# Patient Record
Sex: MALE | Race: BLACK OR AFRICAN AMERICAN | NOT HISPANIC OR LATINO | Employment: UNEMPLOYED | URBAN - METROPOLITAN AREA
[De-identification: names, ages, dates, MRNs, and addresses within clinical notes are randomized per-mention and may not be internally consistent; named-entity substitution may affect disease eponyms.]

---

## 2017-02-07 ENCOUNTER — HOSPITAL ENCOUNTER (EMERGENCY)
Facility: MEDICAL CENTER | Age: 67
End: 2017-02-08
Attending: EMERGENCY MEDICINE
Payer: MEDICARE

## 2017-02-07 DIAGNOSIS — R60.9 PERIPHERAL EDEMA: ICD-10-CM

## 2017-02-07 LAB
ANION GAP SERPL CALC-SCNC: 10 MMOL/L (ref 0–11.9)
BASOPHILS # BLD AUTO: 0.3 % (ref 0–1.8)
BASOPHILS # BLD: 0.03 K/UL (ref 0–0.12)
BNP SERPL-MCNC: 81 PG/ML (ref 0–100)
BUN SERPL-MCNC: 20 MG/DL (ref 8–22)
CALCIUM SERPL-MCNC: 9 MG/DL (ref 8.5–10.5)
CHLORIDE SERPL-SCNC: 106 MMOL/L (ref 96–112)
CO2 SERPL-SCNC: 23 MMOL/L (ref 20–33)
CREAT SERPL-MCNC: 1.01 MG/DL (ref 0.5–1.4)
EOSINOPHIL # BLD AUTO: 0.42 K/UL (ref 0–0.51)
EOSINOPHIL NFR BLD: 4.8 % (ref 0–6.9)
ERYTHROCYTE [DISTWIDTH] IN BLOOD BY AUTOMATED COUNT: 48.7 FL (ref 35.9–50)
GFR SERPL CREATININE-BSD FRML MDRD: >60 ML/MIN/1.73 M 2
GLUCOSE SERPL-MCNC: 98 MG/DL (ref 65–99)
HCT VFR BLD AUTO: 45.2 % (ref 42–52)
HGB BLD-MCNC: 14.8 G/DL (ref 14–18)
IMM GRANULOCYTES # BLD AUTO: 0.03 K/UL (ref 0–0.11)
IMM GRANULOCYTES NFR BLD AUTO: 0.3 % (ref 0–0.9)
LYMPHOCYTES # BLD AUTO: 2.17 K/UL (ref 1–4.8)
LYMPHOCYTES NFR BLD: 25 % (ref 22–41)
MCH RBC QN AUTO: 29.7 PG (ref 27–33)
MCHC RBC AUTO-ENTMCNC: 32.7 G/DL (ref 33.7–35.3)
MCV RBC AUTO: 90.6 FL (ref 81.4–97.8)
MONOCYTES # BLD AUTO: 0.49 K/UL (ref 0–0.85)
MONOCYTES NFR BLD AUTO: 5.7 % (ref 0–13.4)
NEUTROPHILS # BLD AUTO: 5.53 K/UL (ref 1.82–7.42)
NEUTROPHILS NFR BLD: 63.9 % (ref 44–72)
NRBC # BLD AUTO: 0 K/UL
NRBC BLD AUTO-RTO: 0 /100 WBC
PLATELET # BLD AUTO: 158 K/UL (ref 164–446)
PMV BLD AUTO: 11 FL (ref 9–12.9)
POTASSIUM SERPL-SCNC: 4.1 MMOL/L (ref 3.6–5.5)
RBC # BLD AUTO: 4.99 M/UL (ref 4.7–6.1)
SODIUM SERPL-SCNC: 139 MMOL/L (ref 135–145)
TROPONIN I SERPL-MCNC: 0.02 NG/ML (ref 0–0.04)
WBC # BLD AUTO: 8.7 K/UL (ref 4.8–10.8)

## 2017-02-07 PROCEDURE — 700111 HCHG RX REV CODE 636 W/ 250 OVERRIDE (IP): Performed by: EMERGENCY MEDICINE

## 2017-02-07 PROCEDURE — 96374 THER/PROPH/DIAG INJ IV PUSH: CPT

## 2017-02-07 PROCEDURE — 84484 ASSAY OF TROPONIN QUANT: CPT

## 2017-02-07 PROCEDURE — 80048 BASIC METABOLIC PNL TOTAL CA: CPT

## 2017-02-07 PROCEDURE — 93005 ELECTROCARDIOGRAM TRACING: CPT | Performed by: EMERGENCY MEDICINE

## 2017-02-07 PROCEDURE — 99285 EMERGENCY DEPT VISIT HI MDM: CPT

## 2017-02-07 PROCEDURE — 85025 COMPLETE CBC W/AUTO DIFF WBC: CPT

## 2017-02-07 PROCEDURE — 36415 COLL VENOUS BLD VENIPUNCTURE: CPT

## 2017-02-07 PROCEDURE — 83880 ASSAY OF NATRIURETIC PEPTIDE: CPT

## 2017-02-07 RX ORDER — FUROSEMIDE 10 MG/ML
40 INJECTION INTRAMUSCULAR; INTRAVENOUS ONCE
Status: COMPLETED | OUTPATIENT
Start: 2017-02-07 | End: 2017-02-07

## 2017-02-07 RX ADMIN — FUROSEMIDE 40 MG: 10 INJECTION, SOLUTION INTRAVENOUS at 23:06

## 2017-02-07 ASSESSMENT — PAIN SCALES - GENERAL: PAINLEVEL_OUTOF10: 6

## 2017-02-07 NOTE — ED AVS SNAPSHOT
[employer] Access Code: [email or web address]  Expires: 3/10/2017 12:58 AM    Your email address is not on file at [facility].  Email Addresses are required for you to sign up for [employer], please contact [phone] to verify your personal information and to provide your email address prior to attempting to register for [employer].    Robert Mcdonnell  [address] [place] [address]    [employer]  A secure, online tool to manage your health information     [employer]’s [employer]® is a secure, online tool that connects you to your personalized health information from the privacy of your home -- day or night - making it very easy for you to manage your healthcare. Once the activation process is completed, you can even access your medical information using the [employer] patricia, which is available for free in the Apple Patricia store or Google Play store.     To learn more about [employer], visit [email or web address]    There are two levels of access available (as shown below):   My Chart Features  [facility] Primary Care Doctor [facility]  Specialists [facility]  Urgent  Care Non-[facility] Primary Care Doctor   Email your healthcare team securely and privately 24/7 X X X    Manage appointments: schedule your next appointment; view details of past/upcoming appointments X      Request prescription refills. X      View recent personal medical records, including lab and immunizations X X X X   View health record, including health history, allergies, medications X X X X   Read reports about your outpatient visits, procedures, consult and ER notes X X X X   See your discharge summary, which is a recap of your hospital and/or ER visit that includes your diagnosis, lab results, and care plan X X  X     How to register for BUILDt:  Once your e-mail address has been verified, follow the following steps to sign up for [employer].     1. Go to  [email or web address]  2. Click on the Sign Up Now box, which takes you to the New Member Sign Up page. You will need to  provide the following information:  a. Enter your [employer] Access Code exactly as it appears at the top of this page. (You will not need to use this code after you’ve completed the sign-up process. If you do not sign up before the expiration date, you must request a new code.)   b. Enter your date of birth.   c. Enter your home email address.   d. Click Submit, and follow the next screen’s instructions.  3. Create a Citymaps ID. This will be your [employer] login ID and cannot be changed, so think of one that is secure and easy to remember.  4. Create a [employer] password. You can change your password at any time.  5. Enter your Password Reset Question and Answer. This can be used at a later time if you forget your password.   6. Enter your e-mail address. This allows you to receive e-mail notifications when new information is available in Citymaps.  7. Click Sign Up. You can now view your health information.    For assistance activating your [employer] account, call [phone]

## 2017-02-07 NOTE — ED AVS SNAPSHOT
Home Care Instructions                                                                                                                Robert Mcdonnell   MRN: [id number]    Department:  [facility], Emergency Dept   Date of Visit:  2/7/2017            [facility], Emergency Dept    [address] [place] [id number]    Phone:  [phone]      You were seen by     Raul Benton M.D.      Your Diagnosis Was     Peripheral edema     R60.9       These are the medications you received during your hospitalization from 02/07/2017 1618 to 02/08/2017 0058     Date/Time Order Dose Route Action    02/07/2017 2306 furosemide (LASIX) injection 40 mg 40 mg Intravenous Given      Follow-up Information     1. Schedule an appointment as soon as possible for a visit with Ming Stinson M.D..    Specialty:  Geriatrics    Contact information    [address]  [address]  [facility] [address] [phone]          2. Follow up with [facility], Emergency Dept.    Specialty:  Emergency Medicine    Why:  If symptoms worsen    Contact information    [address] [address] [phone]      Medication Information     Review all of your home medications and newly ordered medications with your primary doctor and/or pharmacist as soon as possible. Follow medication instructions as directed by your doctor and/or pharmacist.     Please keep your complete medication list with you and share with your physician. Update the information when medications are discontinued, doses are changed, or new medications (including over-the-counter products) are added; and carry medication information at all times in the event of emergency situations.               Medication List      START taking these medications        Instructions    furosemide 20 MG Tabs   Commonly known as:  LASIX    Take 1 Tab by mouth 2 times a day.   Dose:  20 mg         ASK your doctor about these  medications        Instructions    amlodipine 10 MG Tabs   Commonly known as:  NORVASC    Take 1 Tab by mouth every day.   Dose:  10 mg       ascorbic acid 500 MG tablet   Commonly known as:  VITAMIN C    Take 1 Tab by mouth every day.   Dose:  500 mg       ferrous gluconate 324 (38 FE) MG Tabs   Commonly known as:  FERGON    Take 1 Tab by mouth 2 times a day, with meals.   Dose:  324 mg       metoprolol SR 50 MG Tb24   Commonly known as:  TOPROL XL    Take 1 Tab by mouth every day.   Dose:  50 mg       zinc sulfate 220 MG Caps   Commonly known as:  ZINCATE    Take 1 Cap by mouth every day.   Dose:  220 mg               Procedures and tests performed during your visit     BASIC METABOLIC PANEL    BTYPE NATRIURETIC PEPTIDE    CBC WITH DIFFERENTIAL    Cardiac Monitoring    EKG (NOW)    ESTIMATED GFR    IV Saline Lock    Oxygen    TROPONIN        Discharge Instructions       Peripheral Edema  You have swelling in your legs (peripheral edema). This swelling is due to excess accumulation of salt and water in your body. Edema may be a sign of heart, kidney or liver disease, or a side effect of a medication. It may also be due to problems in the leg veins. Elevating your legs and using special support stockings may be very helpful, if the cause of the swelling is due to poor venous circulation. Avoid long periods of standing, whatever the cause.  Treatment of edema depends on identifying the cause. Chips, pretzels, pickles and other salty foods should be avoided. Restricting salt in your diet is almost always needed. Water pills (diuretics) are often used to remove the excess salt and water from your body via urine. These medicines prevent the kidney from reabsorbing sodium. This increases urine flow.  Diuretic treatment may also result in lowering of potassium levels in your body. Potassium supplements may be needed if you have to use diuretics daily. Daily weights can help you keep track of your progress in clearing your  edema. You should call your caregiver for follow up care as recommended.  SEEK IMMEDIATE MEDICAL CARE IF:   · You have increased swelling, pain, redness, or heat in your legs.  · You develop shortness of breath, especially when lying down.  · You develop chest or abdominal pain, weakness, or fainting.  · You have a fever.     This information is not intended to replace advice given to you by your health care provider. Make sure you discuss any questions you have with your health care provider.     Document Released: 01/25/2006 Document Revised: 03/11/2013 Document Reviewed: 01/05/2011  [employer] Interactive Patient Education ©2016 [employer]            Patient Information     Patient Information    Following emergency treatment: all patient requiring follow-up care must return either to a private physician or a clinic if your condition worsens before you are able to obtain further medical attention, please return to the emergency room.     Billing Information    At [facility], we work to make the billing process streamlined for our patients.  Our Representatives are here to answer any questions you may have regarding your hospital bill.  If you have insurance coverage and have supplied your insurance information to us, we will submit a claim to your insurer on your behalf.  Should you have any questions regarding your bill, we can be reached online or by phone as follows:  Online: You are able pay your bills online or live chat with our representatives about any billing questions you may have. We are here to help Monday - Friday from 8:00am to 7:30pm and 9:00am - 12:00pm on Saturdays.  Please visit [email or web address]  for more information.   Phone:  [phone] or [phone]    Please note that your emergency physician, surgeon, pathologist, radiologist, anesthesiologist, and other specialists are not employed by [facility] and will therefore bill separately for their  services.  Please contact them directly for any questions concerning their bills at the numbers below:     Emergency Physician Services:  [phone]  [facility]:  [phone]  [facility]:  [phone]  [facility]:  [phone]    1. Your final bill may vary from the amount quoted upon discharge if all procedures are not complete at that time, or if your doctor has additional procedures of which we are not aware. You will receive an additional bill if you return to the Emergency Department at [facility] for suture removal regardless of the facility of which the sutures were placed.     2. Please arrange for settlement of this account at the emergency registration.    3. All self-pay accounts are due in full at the time of treatment.  If you are unable to meet this obligation then payment is expected within 4-5 days.     4. If you have had radiology studies (CT, X-ray, Ultrasound, MRI), you have received a preliminary result during your emergency department visit. Please contact the radiology department [phone] to receive a copy of your final result. Please discuss the Final result with your primary physician or with the follow up physician provided.     Crisis Hotline:  [place] Crisis Hotline:  0-765-MZUYCPJ or 1-112.305.2612  [place] Crisis Hotline:    1-977.704.9377 or 627-595-3367         ED Discharge Follow Up Questions    1. In order to provide you with very good care, we would like to follow up with a phone call in the next few days.  May we have your permission to contact you?     YES /  NO    2. What is the best phone number to call you? (       )_____-__________    3. What is the best time to call you?      Morning  /  Afternoon  /  Evening                   Patient Signature:  ____________________________________________________________    Date:  ____________________________________________________________

## 2017-02-07 NOTE — ED AVS SNAPSHOT
2/8/2017          Robert Mcdonnell  [address] [place] [address]    Dear Robert:    [facility] wants to ensure your discharge home is safe and you or your loved ones have had all your questions answered regarding your care after you leave the hospital.    You may receive a telephone call within two days of your discharge.  This call is to make certain you understand your discharge instructions as well as ensure we provided you with the best care possible during your stay with us.     The call will only last approximately 3-5 minutes and will be done by a nurse.    Once again, we want to ensure your discharge home is safe and that you have a clear understanding of any next steps in your care.  If you have any questions or concerns, please do not hesitate to contact us, we are here for you.  Thank you for choosing [facility] for your healthcare needs.    Sincerely,    Terrell Hayward    [facility]

## 2017-02-08 VITALS
WEIGHT: 230 LBS | OXYGEN SATURATION: 90 % | BODY MASS INDEX: 30.48 KG/M2 | SYSTOLIC BLOOD PRESSURE: 147 MMHG | HEART RATE: 89 BPM | HEIGHT: 73 IN | DIASTOLIC BLOOD PRESSURE: 102 MMHG | TEMPERATURE: 97.8 F

## 2017-02-08 LAB — EKG IMPRESSION: NORMAL

## 2017-02-08 RX ORDER — FUROSEMIDE 20 MG/1
20 TABLET ORAL 2 TIMES DAILY
Qty: 60 TAB | Refills: 0 | Status: SHIPPED | OUTPATIENT
Start: 2017-02-08 | End: 2020-04-26

## 2017-02-08 NOTE — ED NOTES
Retrieved pt from alexis, pt ambulatory to room B14 with steady gait, prompted pt to change into gown, given warm blankets, pt placed on monitor, assessment as charted.

## 2017-02-08 NOTE — DISCHARGE INSTRUCTIONS
Peripheral Edema  You have swelling in your legs (peripheral edema). This swelling is due to excess accumulation of salt and water in your body. Edema may be a sign of heart, kidney or liver disease, or a side effect of a medication. It may also be due to problems in the leg veins. Elevating your legs and using special support stockings may be very helpful, if the cause of the swelling is due to poor venous circulation. Avoid long periods of standing, whatever the cause.  Treatment of edema depends on identifying the cause. Chips, pretzels, pickles and other salty foods should be avoided. Restricting salt in your diet is almost always needed. Water pills (diuretics) are often used to remove the excess salt and water from your body via urine. These medicines prevent the kidney from reabsorbing sodium. This increases urine flow.  Diuretic treatment may also result in lowering of potassium levels in your body. Potassium supplements may be needed if you have to use diuretics daily. Daily weights can help you keep track of your progress in clearing your edema. You should call your caregiver for follow up care as recommended.  SEEK IMMEDIATE MEDICAL CARE IF:   · You have increased swelling, pain, redness, or heat in your legs.  · You develop shortness of breath, especially when lying down.  · You develop chest or abdominal pain, weakness, or fainting.  · You have a fever.     This information is not intended to replace advice given to you by your health care provider. Make sure you discuss any questions you have with your health care provider.     Document Released: 01/25/2006 Document Revised: 03/11/2013 Document Reviewed: 01/05/2011  [employer] Interactive Patient Education ©2016 [employer]

## 2017-02-08 NOTE — ED NOTES
"Pt reports s/s x 10-11 days, came in today \"because the pain got worse.\" Pt denies any cardiac history.   "

## 2017-02-08 NOTE — ED NOTES
Ambulatory to triage with   Chief Complaint   Patient presents with   • Peripheral Edema     Bilateral LE edema 3+, pitting.    Denies CP, SOB, or N/V. States above symptoms worsening x 11 days

## 2017-02-08 NOTE — ED NOTES
Pt given discharge instructions, follow up care, and prescriptions. Pt verbalized understanding. RN to answer and questions pt and family had. Pt instructed not to drive or operate heavy machinery after receiving narcotics. VSS. Pt ambulated out to front lobby.

## 2017-02-08 NOTE — ED PROVIDER NOTES
ED Provider Note    Scribed for Raul Benton M.D. by Francisca iRvera. 2/7/2017, 10:15 PM.    Primary care provider: Ming Stinson M.D.  Means of arrival: walk-in  History obtained from: patient  History limited by: none    CHIEF COMPLAINT  Chief Complaint   Patient presents with   • Peripheral Edema     Bilateral LE edema 3+, pitting.        HPI  Robert Mcdonnell is a 66 y.o. 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 Patient has experienced this swelling in the past and this current episode is continuously worsening. He is not currently on a water pill.  No complaints of chest pain, trouble breathing, fever, chills, nausea, or vomiting. Patient reports no cardiac medical history what-so-ever.     REVIEW OF SYSTEMS  Pertinent positives include bilateral LE edema. Pertinent negatives include no chest pain, trouble breathing, fever, chills, nausea, vomiting. Otherwise ten systems reviewed and otherwise negative.     PAST MEDICAL HISTORY   has a past medical history of Hypertension.    SURGICAL HISTORY  patient denies any surgical history    SOCIAL HISTORY  Social History   Substance Use Topics   • Smoking status: Current Every Day Smoker -- 1.00 packs/day     Types: Cigarettes   • Smokeless tobacco: Never Used   • Alcohol Use: 14.4 oz/week     24 Cans of beer per week      Comment: occ      History   Drug Use No       FAMILY HISTORY  Non-Contributory    CURRENT MEDICATIONS  Home Medications     Reviewed by Sherley Fry R.N. (Registered Nurse) on 02/07/17 at 2215  Med List Status: Partial    Medication Last Dose Status    amlodipine (NORVASC) 10 MG Tab 10/1/2015 Active    ascorbic acid (VITAMIN C) 500 MG tablet 10/1/2015 Active    ferrous gluconate (FERGON) 324 (38 FE) MG Tab 10/1/2015 Active    metoprolol SR (TOPROL XL) 50 MG TABLET SR 24 HR 10/1/2015 Active    zinc sulfate (ZINCATE) 220 MG Cap  "10/1/2015 Active                ALLERGIES  Allergies   Allergen Reactions   • Aspirin Vomiting     Pt states vomiting.       PHYSICAL EXAM  VITAL SIGNS: /102 mmHg  Pulse 70  Temp(Src) 36.6 °C (97.8 °F) (Temporal)  Resp 14  Ht 1.854 m (6' 1\")  Wt 104.327 kg (230 lb)  BMI 30.35 kg/m2  SpO2 95%    Pulse ox interpretation: I interpret this pulse ox as normal.  Constitutional: Alert and oriented x 3, no Distress  HEENT: Atraumatic normocephalic, pupils are equal round reactive to light extraocular movements are intact. The nares is clear, external ears are normal, mouth shows moist mucous membranes  Neck: Supple, no JVD no tracheal deviation  Cardiovascular: Regular rate and rhythm no murmur rub or gallop 2+ pulses peripherally x4  Thorax & Lungs: No respiratory distress, no wheezes rales or rhonchi, No chest tenderness.   GI: Soft non tender non distended positive bowel sounds, no peritoneal signs  Skin: Warm dry no acute rash or lesion  Musculoskeletal: Moving all extremities with full range and 5 of 5 strength, 2+ bilateral pitting edema to knees  Neurologic: Cranial nerves III through XII are grossly intact, no sensory deficit, no cerebellar dysfunction   Psychiatric: Appropriate affect for situation at this time      DIAGNOSTIC STUDIES / PROCEDURES  LABS  Results for orders placed or performed during the hospital encounter of 02/07/17   CBC WITH DIFFERENTIAL   Result Value Ref Range    WBC 8.7 4.8 - 10.8 K/uL    RBC 4.99 4.70 - 6.10 M/uL    Hemoglobin 14.8 14.0 - 18.0 g/dL    Hematocrit 45.2 42.0 - 52.0 %    MCV 90.6 81.4 - 97.8 fL    MCH 29.7 27.0 - 33.0 pg    MCHC 32.7 (L) 33.7 - 35.3 g/dL    RDW 48.7 35.9 - 50.0 fL    Platelet Count 158 (L) 164 - 446 K/uL    MPV 11.0 9.0 - 12.9 fL    Neutrophils-Polys 63.90 44.00 - 72.00 %    Lymphocytes 25.00 22.00 - 41.00 %    Monocytes 5.70 0.00 - 13.40 %    Eosinophils 4.80 0.00 - 6.90 %    Basophils 0.30 0.00 - 1.80 %    Immature Granulocytes 0.30 0.00 - 0.90 % "    Nucleated RBC 0.00 /100 WBC    Neutrophils (Absolute) 5.53 1.82 - 7.42 K/uL    Lymphs (Absolute) 2.17 1.00 - 4.80 K/uL    Monos (Absolute) 0.49 0.00 - 0.85 K/uL    Eos (Absolute) 0.42 0.00 - 0.51 K/uL    Baso (Absolute) 0.03 0.00 - 0.12 K/uL    Immature Granulocytes (abs) 0.03 0.00 - 0.11 K/uL    NRBC (Absolute) 0.00 K/uL   BASIC METABOLIC PANEL   Result Value Ref Range    Sodium 139 135 - 145 mmol/L    Potassium 4.1 3.6 - 5.5 mmol/L    Chloride 106 96 - 112 mmol/L    Co2 23 20 - 33 mmol/L    Glucose 98 65 - 99 mg/dL    Bun 20 8 - 22 mg/dL    Creatinine 1.01 0.50 - 1.40 mg/dL    Calcium 9.0 8.5 - 10.5 mg/dL    Anion Gap 10.0 0.0 - 11.9   TROPONIN   Result Value Ref Range    Troponin I 0.02 0.00 - 0.04 ng/mL   BTYPE NATRIURETIC PEPTIDE   Result Value Ref Range    B Natriuretic Peptide 81 0 - 100 pg/mL   ESTIMATED GFR   Result Value Ref Range    GFR If African American >60 >60 mL/min/1.73 m 2    GFR If Non African American >60 >60 mL/min/1.73 m 2   EKG (NOW)   Result Value Ref Range    Report       [facility] Emergency Dept.    Test Date:  2017  Pt Name:    HUNTER STEELE                 Department: ER  MRN:        [id number]                      Room:        14  Gender:     M                            Technician: [id number]  :        1950                   Requested By:ORIANA VERDUZCO  Order #:    [id number]                    Reading MD:    Measurements  Intervals                                Axis  Rate:       66                           P:          76  NM:         140                          QRS:        -1  QRSD:       102                          T:          84  QT:         448  QTc:        470    Interpretive Statements  SINUS RHYTHM  LEFT VENTRICULAR HYPERTROPHY  ANTERIOR ST ELEVATION, PROBABLY DUE TO LVH  No previous ECG available for comparison         All labs reviewed by me.    EKG Interpretation  Interpreted by me    Rhythm:  Normal sinus rhythm   Rate: 66  Left  ventricular hypertrophy  Axis: normal  Ectopy: none  Conduction: normal  ST Segments: no acute change  T Waves: slight peaking in pre cordial leads  Q Waves: none  Clinical Impression: LVH      COURSE & MEDICAL DECISION MAKING  Pertinent Labs & Imaging studies reviewed. (See chart for details)    10:15 PM - Patient seen and examined at bedside. Patient will be treated with 40mg IV Lasix. Ordered CBC, BMP, troponin, BTYPE natriuretic peptide and EKG to evaluate his symptoms. I informed the patient that I would run general labs to rule out any acute origin for his edema as well as administer Lasix to help him expel the extra fluid.    Medical Decision Making: Very pleasant 66-year-old male presents for worsening pitting edema denies any history of CHF. Patient is LVH on EKG , troponin negative BNP negative having large fluid diuresis after Lasix feels much better. Patient be started on daily Lasix return for worsening swelling chest pain shortness breath any other acute concerns otherwise follow-up with primary care. Discharged on stable condition.     The patient will return for new or worsening symptoms and is stable at the time of discharge.       DISPOSITION:  Patient will be discharged home in stable condition.    FOLLOW UP:  Ming Stinson M.D.  [address]  [address]  [facility] [id number] [phone]    Schedule an appointment as soon as possible for a visit      [facility], Emergency Dept  [address] [id number] [phone]    If symptoms worsen      OUTPATIENT MEDICATIONS:  Discharge Medication List as of 2/8/2017 12:58 AM      START taking these medications    Details   furosemide (LASIX) 20 MG Tab Take 1 Tab by mouth 2 times a day., Disp-60 Tab, R-0, Print Rx Paper                 FINAL IMPRESSION  1. Peripheral edema           This dictation has been created using voice recognition software and/or scribes. The accuracy of the dictation is limited by the  abilities of the software and the expertise of the scribes. I expect there may be some errors of grammar and possibly content. I made every attempt to manually correct the errors within my dictation. However, errors related to voice recognition software and/or scribes may still exist and should be interpreted within the appropriate context.     I, Francisca Rivera (Scribe), am scribing for, and in the presence of, Raul Benton M.D..    Electronically signed by: Francisca Rivera (Scribe), 2/7/2017    I, Raul Benton M.D. personally performed the services described in this documentation, as scribed by Francisca Rivera in my presence, and it is both accurate and complete.    The note accurately reflects work and decisions made by me.  Raul Benton  2/8/2017  7:50 AM

## 2020-04-26 ENCOUNTER — HOSPITAL ENCOUNTER (INPATIENT)
Facility: MEDICAL CENTER | Age: 70
LOS: 7 days | DRG: 291 | End: 2020-05-03
Attending: EMERGENCY MEDICINE | Admitting: HOSPITALIST
Payer: MEDICARE

## 2020-04-26 ENCOUNTER — APPOINTMENT (OUTPATIENT)
Dept: CARDIOLOGY | Facility: MEDICAL CENTER | Age: 70
DRG: 291 | End: 2020-04-26
Attending: HOSPITALIST
Payer: MEDICARE

## 2020-04-26 ENCOUNTER — APPOINTMENT (OUTPATIENT)
Dept: RADIOLOGY | Facility: MEDICAL CENTER | Age: 70
DRG: 291 | End: 2020-04-26
Attending: EMERGENCY MEDICINE
Payer: MEDICARE

## 2020-04-26 DIAGNOSIS — I50.21 ACUTE SYSTOLIC HEART FAILURE (HCC): ICD-10-CM

## 2020-04-26 DIAGNOSIS — I48.91 ATRIAL FIBRILLATION WITH RAPID VENTRICULAR RESPONSE (HCC): ICD-10-CM

## 2020-04-26 DIAGNOSIS — R06.02 SHORTNESS OF BREATH: ICD-10-CM

## 2020-04-26 DIAGNOSIS — I48.0 PAROXYSMAL ATRIAL FIBRILLATION (HCC): ICD-10-CM

## 2020-04-26 DIAGNOSIS — R60.9 PERIPHERAL EDEMA: ICD-10-CM

## 2020-04-26 PROBLEM — R91.8 RIGHT UPPER LOBE PULMONARY INFILTRATE: Status: ACTIVE | Noted: 2020-04-26

## 2020-04-26 PROBLEM — R79.89 ELEVATED TROPONIN: Status: ACTIVE | Noted: 2020-04-26

## 2020-04-26 PROBLEM — R60.0 LOWER EXTREMITY EDEMA: Status: ACTIVE | Noted: 2020-04-26

## 2020-04-26 PROBLEM — R06.00 DYSPNEA: Status: ACTIVE | Noted: 2020-04-26

## 2020-04-26 LAB
ALBUMIN SERPL BCP-MCNC: 4 G/DL (ref 3.2–4.9)
ALBUMIN/GLOB SERPL: 1.4 G/DL
ALP SERPL-CCNC: 133 U/L (ref 30–99)
ALT SERPL-CCNC: 23 U/L (ref 2–50)
ANION GAP SERPL CALC-SCNC: 15 MMOL/L (ref 7–16)
APPEARANCE UR: CLEAR
AST SERPL-CCNC: 30 U/L (ref 12–45)
BACTERIA #/AREA URNS HPF: NEGATIVE /HPF
BASOPHILS # BLD AUTO: 0.6 % (ref 0–1.8)
BASOPHILS # BLD: 0.04 K/UL (ref 0–0.12)
BILIRUB SERPL-MCNC: 2 MG/DL (ref 0.1–1.5)
BILIRUB UR QL STRIP.AUTO: NEGATIVE
BUN SERPL-MCNC: 17 MG/DL (ref 8–22)
CALCIUM SERPL-MCNC: 9.1 MG/DL (ref 8.5–10.5)
CHLORIDE SERPL-SCNC: 108 MMOL/L (ref 96–112)
CO2 SERPL-SCNC: 21 MMOL/L (ref 20–33)
COLOR UR: YELLOW
COVID ORDER STATUS COVID19: NORMAL
CREAT SERPL-MCNC: 0.98 MG/DL (ref 0.5–1.4)
D DIMER PPP IA.FEU-MCNC: 4.84 UG/ML (FEU) (ref 0–0.5)
EKG IMPRESSION: NORMAL
EOSINOPHIL # BLD AUTO: 0.1 K/UL (ref 0–0.51)
EOSINOPHIL NFR BLD: 1.4 % (ref 0–6.9)
EPI CELLS #/AREA URNS HPF: NEGATIVE /HPF
ERYTHROCYTE [DISTWIDTH] IN BLOOD BY AUTOMATED COUNT: 53.9 FL (ref 35.9–50)
GLOBULIN SER CALC-MCNC: 2.8 G/DL (ref 1.9–3.5)
GLUCOSE SERPL-MCNC: 97 MG/DL (ref 65–99)
GLUCOSE UR STRIP.AUTO-MCNC: NEGATIVE MG/DL
HCT VFR BLD AUTO: 39.7 % (ref 42–52)
HGB BLD-MCNC: 12.9 G/DL (ref 14–18)
HYALINE CASTS #/AREA URNS LPF: ABNORMAL /LPF
IMM GRANULOCYTES # BLD AUTO: 0.02 K/UL (ref 0–0.11)
IMM GRANULOCYTES NFR BLD AUTO: 0.3 % (ref 0–0.9)
KETONES UR STRIP.AUTO-MCNC: NEGATIVE MG/DL
LACTATE BLD-SCNC: 1.5 MMOL/L (ref 0.5–2)
LACTATE BLD-SCNC: 1.6 MMOL/L (ref 0.5–2)
LEUKOCYTE ESTERASE UR QL STRIP.AUTO: NEGATIVE
LV EJECT FRACT  99904: 30
LV EJECT FRACT MOD 2C 99903: 49.14
LV EJECT FRACT MOD 4C 99902: 45.93
LV EJECT FRACT MOD BP 99901: 47.03
LYMPHOCYTES # BLD AUTO: 0.93 K/UL (ref 1–4.8)
LYMPHOCYTES NFR BLD: 12.8 % (ref 22–41)
MCH RBC QN AUTO: 29.1 PG (ref 27–33)
MCHC RBC AUTO-ENTMCNC: 32.5 G/DL (ref 33.7–35.3)
MCV RBC AUTO: 89.6 FL (ref 81.4–97.8)
MICRO URNS: ABNORMAL
MONOCYTES # BLD AUTO: 0.84 K/UL (ref 0–0.85)
MONOCYTES NFR BLD AUTO: 11.6 % (ref 0–13.4)
NEUTROPHILS # BLD AUTO: 5.33 K/UL (ref 1.82–7.42)
NEUTROPHILS NFR BLD: 73.3 % (ref 44–72)
NITRITE UR QL STRIP.AUTO: NEGATIVE
NRBC # BLD AUTO: 0 K/UL
NRBC BLD-RTO: 0 /100 WBC
NT-PROBNP SERPL IA-MCNC: 634 PG/ML (ref 0–125)
PH UR STRIP.AUTO: 5.5 [PH] (ref 5–8)
PLATELET # BLD AUTO: 149 K/UL (ref 164–446)
PMV BLD AUTO: 10.5 FL (ref 9–12.9)
POTASSIUM SERPL-SCNC: 3.9 MMOL/L (ref 3.6–5.5)
PROT SERPL-MCNC: 6.8 G/DL (ref 6–8.2)
PROT UR QL STRIP: NEGATIVE MG/DL
RBC # BLD AUTO: 4.43 M/UL (ref 4.7–6.1)
RBC # URNS HPF: ABNORMAL /HPF
RBC UR QL AUTO: ABNORMAL
SARS-COV-2 RNA RESP QL NAA+PROBE: NEGATIVE
SODIUM SERPL-SCNC: 144 MMOL/L (ref 135–145)
SP GR UR STRIP.AUTO: 1.01
SPECIMEN SOURCE: NORMAL
T4 FREE SERPL-MCNC: 1.56 NG/DL (ref 0.53–1.43)
TROPONIN T SERPL-MCNC: 30 NG/L (ref 6–19)
TROPONIN T SERPL-MCNC: 31 NG/L (ref 6–19)
TSH SERPL DL<=0.005 MIU/L-ACNC: 2.04 UIU/ML (ref 0.38–5.33)
UROBILINOGEN UR STRIP.AUTO-MCNC: 2 MG/DL
WBC # BLD AUTO: 7.3 K/UL (ref 4.8–10.8)
WBC #/AREA URNS HPF: ABNORMAL /HPF

## 2020-04-26 PROCEDURE — 85025 COMPLETE CBC W/AUTO DIFF WBC: CPT

## 2020-04-26 PROCEDURE — 85379 FIBRIN DEGRADATION QUANT: CPT

## 2020-04-26 PROCEDURE — 71275 CT ANGIOGRAPHY CHEST: CPT

## 2020-04-26 PROCEDURE — A9270 NON-COVERED ITEM OR SERVICE: HCPCS | Performed by: HOSPITALIST

## 2020-04-26 PROCEDURE — 71045 X-RAY EXAM CHEST 1 VIEW: CPT

## 2020-04-26 PROCEDURE — 84443 ASSAY THYROID STIM HORMONE: CPT

## 2020-04-26 PROCEDURE — 93005 ELECTROCARDIOGRAM TRACING: CPT | Performed by: EMERGENCY MEDICINE

## 2020-04-26 PROCEDURE — 770020 HCHG ROOM/CARE - TELE (206)

## 2020-04-26 PROCEDURE — 700117 HCHG RX CONTRAST REV CODE 255: Performed by: EMERGENCY MEDICINE

## 2020-04-26 PROCEDURE — 700111 HCHG RX REV CODE 636 W/ 250 OVERRIDE (IP): Performed by: HOSPITALIST

## 2020-04-26 PROCEDURE — 93306 TTE W/DOPPLER COMPLETE: CPT

## 2020-04-26 PROCEDURE — 99223 1ST HOSP IP/OBS HIGH 75: CPT | Mod: AI | Performed by: HOSPITALIST

## 2020-04-26 PROCEDURE — 700102 HCHG RX REV CODE 250 W/ 637 OVERRIDE(OP): Performed by: HOSPITALIST

## 2020-04-26 PROCEDURE — 87086 URINE CULTURE/COLONY COUNT: CPT

## 2020-04-26 PROCEDURE — 93971 EXTREMITY STUDY: CPT | Mod: RT

## 2020-04-26 PROCEDURE — 99285 EMERGENCY DEPT VISIT HI MDM: CPT

## 2020-04-26 PROCEDURE — 96376 TX/PRO/DX INJ SAME DRUG ADON: CPT

## 2020-04-26 PROCEDURE — 81001 URINALYSIS AUTO W/SCOPE: CPT

## 2020-04-26 PROCEDURE — 83605 ASSAY OF LACTIC ACID: CPT

## 2020-04-26 PROCEDURE — 96375 TX/PRO/DX INJ NEW DRUG ADDON: CPT

## 2020-04-26 PROCEDURE — 93005 ELECTROCARDIOGRAM TRACING: CPT

## 2020-04-26 PROCEDURE — 83880 ASSAY OF NATRIURETIC PEPTIDE: CPT

## 2020-04-26 PROCEDURE — 700111 HCHG RX REV CODE 636 W/ 250 OVERRIDE (IP): Performed by: EMERGENCY MEDICINE

## 2020-04-26 PROCEDURE — U0004 COV-19 TEST NON-CDC HGH THRU: HCPCS

## 2020-04-26 PROCEDURE — 84439 ASSAY OF FREE THYROXINE: CPT

## 2020-04-26 PROCEDURE — 36415 COLL VENOUS BLD VENIPUNCTURE: CPT

## 2020-04-26 PROCEDURE — 93306 TTE W/DOPPLER COMPLETE: CPT | Mod: 26 | Performed by: INTERNAL MEDICINE

## 2020-04-26 PROCEDURE — G2023 SPECIMEN COLLECT COVID-19: HCPCS | Performed by: EMERGENCY MEDICINE

## 2020-04-26 PROCEDURE — 80053 COMPREHEN METABOLIC PANEL: CPT

## 2020-04-26 PROCEDURE — 96374 THER/PROPH/DIAG INJ IV PUSH: CPT

## 2020-04-26 PROCEDURE — 87040 BLOOD CULTURE FOR BACTERIA: CPT | Mod: 91

## 2020-04-26 PROCEDURE — 84484 ASSAY OF TROPONIN QUANT: CPT

## 2020-04-26 RX ORDER — DILTIAZEM HYDROCHLORIDE 5 MG/ML
10 INJECTION INTRAVENOUS EVERY 4 HOURS PRN
Status: DISCONTINUED | OUTPATIENT
Start: 2020-04-26 | End: 2020-05-03 | Stop reason: HOSPADM

## 2020-04-26 RX ORDER — POLYETHYLENE GLYCOL 3350 17 G/17G
1 POWDER, FOR SOLUTION ORAL
Status: DISCONTINUED | OUTPATIENT
Start: 2020-04-26 | End: 2020-05-03 | Stop reason: HOSPADM

## 2020-04-26 RX ORDER — BISACODYL 10 MG
10 SUPPOSITORY, RECTAL RECTAL
Status: DISCONTINUED | OUTPATIENT
Start: 2020-04-26 | End: 2020-05-03 | Stop reason: HOSPADM

## 2020-04-26 RX ORDER — FUROSEMIDE 10 MG/ML
20 INJECTION INTRAMUSCULAR; INTRAVENOUS ONCE
Status: COMPLETED | OUTPATIENT
Start: 2020-04-26 | End: 2020-04-26

## 2020-04-26 RX ORDER — ACETAMINOPHEN 325 MG/1
650 TABLET ORAL EVERY 6 HOURS PRN
Status: DISCONTINUED | OUTPATIENT
Start: 2020-04-26 | End: 2020-05-03 | Stop reason: HOSPADM

## 2020-04-26 RX ORDER — DILTIAZEM HYDROCHLORIDE 5 MG/ML
10 INJECTION INTRAVENOUS ONCE
Status: COMPLETED | OUTPATIENT
Start: 2020-04-26 | End: 2020-04-26

## 2020-04-26 RX ORDER — DOXYCYCLINE 100 MG/1
100 TABLET ORAL EVERY 12 HOURS
Status: DISCONTINUED | OUTPATIENT
Start: 2020-04-26 | End: 2020-04-27

## 2020-04-26 RX ORDER — AMOXICILLIN 250 MG
2 CAPSULE ORAL 2 TIMES DAILY
Status: DISCONTINUED | OUTPATIENT
Start: 2020-04-26 | End: 2020-05-03 | Stop reason: HOSPADM

## 2020-04-26 RX ORDER — FUROSEMIDE 10 MG/ML
20 INJECTION INTRAMUSCULAR; INTRAVENOUS
Status: DISCONTINUED | OUTPATIENT
Start: 2020-04-26 | End: 2020-04-30

## 2020-04-26 RX ADMIN — RIVAROXABAN 20 MG: 20 TABLET, FILM COATED ORAL at 19:48

## 2020-04-26 RX ADMIN — FUROSEMIDE 20 MG: 10 INJECTION, SOLUTION INTRAVENOUS at 15:54

## 2020-04-26 RX ADMIN — DILTIAZEM HYDROCHLORIDE 10 MG: 5 INJECTION, SOLUTION INTRAVENOUS at 17:47

## 2020-04-26 RX ADMIN — IOHEXOL 80 ML: 350 INJECTION, SOLUTION INTRAVENOUS at 16:37

## 2020-04-26 RX ADMIN — ACETAMINOPHEN 650 MG: 325 TABLET, FILM COATED ORAL at 19:47

## 2020-04-26 RX ADMIN — FUROSEMIDE 20 MG: 10 INJECTION, SOLUTION INTRAVENOUS at 17:47

## 2020-04-26 RX ADMIN — SENNOSIDES AND DOCUSATE SODIUM 2 TABLET: 8.6; 5 TABLET ORAL at 17:47

## 2020-04-26 RX ADMIN — METOPROLOL TARTRATE 50 MG: 25 TABLET, FILM COATED ORAL at 18:30

## 2020-04-26 ASSESSMENT — LIFESTYLE VARIABLES
TREMOR: NO TREMOR
CONSUMPTION TOTAL: POSITIVE
EVER_SMOKED: YES
EVER HAD A DRINK FIRST THING IN THE MORNING TO STEADY YOUR NERVES TO GET RID OF A HANGOVER: NO
SUBSTANCE_ABUSE: 0
TREMOR: NO TREMOR
AUDITORY DISTURBANCES: NOT PRESENT
HAVE YOU EVER FELT YOU SHOULD CUT DOWN ON YOUR DRINKING: NO
HEADACHE, FULLNESS IN HEAD: NOT PRESENT
TOTAL SCORE: 0
DOES PATIENT WANT TO STOP DRINKING: NO
PAROXYSMAL SWEATS: NO SWEAT VISIBLE
HAVE PEOPLE ANNOYED YOU BY CRITICIZING YOUR DRINKING: NO
NAUSEA AND VOMITING: NO NAUSEA AND NO VOMITING
ALCOHOL_USE: YES
ORIENTATION AND CLOUDING OF SENSORIUM: ORIENTED AND CAN DO SERIAL ADDITIONS
TOTAL SCORE: 0
HOW MANY TIMES IN THE PAST YEAR HAVE YOU HAD 5 OR MORE DRINKS IN A DAY: 365
TOTAL SCORE: 0
ON A TYPICAL DAY WHEN YOU DRINK ALCOHOL HOW MANY DRINKS DO YOU HAVE: 6
VISUAL DISTURBANCES: NOT PRESENT
EVER FELT BAD OR GUILTY ABOUT YOUR DRINKING: NO
VISUAL DISTURBANCES: NOT PRESENT
ANXIETY: NO ANXIETY (AT EASE)
TOTAL SCORE: 0
NAUSEA AND VOMITING: NO NAUSEA AND NO VOMITING
ORIENTATION AND CLOUDING OF SENSORIUM: ORIENTED AND CAN DO SERIAL ADDITIONS
TOTAL SCORE: 0
ANXIETY: NO ANXIETY (AT EASE)
AUDITORY DISTURBANCES: NOT PRESENT
PAROXYSMAL SWEATS: NO SWEAT VISIBLE
AGITATION: NORMAL ACTIVITY
AGITATION: NORMAL ACTIVITY
HEADACHE, FULLNESS IN HEAD: NOT PRESENT
AVERAGE NUMBER OF DAYS PER WEEK YOU HAVE A DRINK CONTAINING ALCOHOL: 7

## 2020-04-26 ASSESSMENT — ENCOUNTER SYMPTOMS
BACK PAIN: 0
WHEEZING: 0
FEVER: 0
FLANK PAIN: 0
HEMOPTYSIS: 0
COUGH: 1
SPEECH CHANGE: 0
TREMORS: 0
SENSORY CHANGE: 0
NECK PAIN: 0
CHILLS: 0
MYALGIAS: 0
WEAKNESS: 1
ABDOMINAL PAIN: 0
VOMITING: 0
HALLUCINATIONS: 0
SHORTNESS OF BREATH: 1
EYE REDNESS: 0
FOCAL WEAKNESS: 0
DIAPHORESIS: 0
STRIDOR: 0
DIARRHEA: 0
EYE DISCHARGE: 0
PALPITATIONS: 0

## 2020-04-26 ASSESSMENT — CHA2DS2 SCORE
SEX: MALE
HYPERTENSION: YES
AGE 75 OR GREATER: NO
PRIOR STROKE OR TIA OR THROMBOEMBOLISM: NO
VASCULAR DISEASE: NO
CHA2DS2 VASC SCORE: 3
AGE 65 TO 74: YES
CHF OR LEFT VENTRICULAR DYSFUNCTION: YES
DIABETES: NO

## 2020-04-26 ASSESSMENT — FIBROSIS 4 INDEX: FIB4 SCORE: 2.9

## 2020-04-26 ASSESSMENT — PATIENT HEALTH QUESTIONNAIRE - PHQ9
SUM OF ALL RESPONSES TO PHQ9 QUESTIONS 1 AND 2: 0
1. LITTLE INTEREST OR PLEASURE IN DOING THINGS: NOT AT ALL
2. FEELING DOWN, DEPRESSED, IRRITABLE, OR HOPELESS: NOT AT ALL

## 2020-04-26 NOTE — ED PROVIDER NOTES
ED Provider Note    CHIEF COMPLAINT  Chief Complaint   Patient presents with   • Leg Swelling     (B), pitting with drainage noted to RLE increasing over last week   • Shortness of Breath     >1 week       HPI  Robert Mcdonnell is a 69 y.o. male with a history of hypertension, peripheral edema who presents with complaints of increased lower extremity swelling along with shortness of breath of the past week.  The patient has some seeping from a wound over his right leg and says it started draining yesterday.  He has had increased difficulty breathing especially with exertional activity.  He has had no fever, chills, sore throat, cough, congestion.  Denies any stomach problems, has had no nausea vomiting, vomiting, diarrhea, or abdominal pain.  The patient continues smoke tobacco about 1 pack/day, does drink beer on a regular basis.  The patient denies any previous cardiac history.  He says he has no regular physician despite living in [place] for many years.    REVIEW OF SYSTEMS  See HPI for further details. All other systems are negative.     PAST MEDICAL HISTORY  Past Medical History:   Diagnosis Date   • Hypertension        FAMILY HISTORY  Family History   Problem Relation Age of Onset   • Heart Disease Mother    • Heart Disease Father        SOCIAL HISTORY  Social History     Tobacco Use   • Smoking status: Current Every Day Smoker     Packs/day: 1.00     Types: Cigarettes   • Smokeless tobacco: Never Used   Substance Use Topics   • Alcohol use: Yes     Alcohol/week: 14.4 oz     Types: 24 Cans of beer per week     Comment: occ   • Drug use: No      Social History     Substance and Sexual Activity   Drug Use No       SURGICAL HISTORY  History reviewed. No pertinent surgical history.    CURRENT MEDICATIONS  Home Medications     Reviewed by Francisca Shannon R.N. (Registered Nurse) on 04/26/20 at 2007  Med List Status: Complete   Patient Valentin Taking any Medications                 ALLERGIES  Allergies   Allergen Reactions   •  "Aspirin Vomiting     Pt states vomiting.       PHYSICAL EXAM0  VITAL SIGNS: Blood Pressure 155/78   Pulse (Abnormal) 103   Temperature 37.8 °C (100.1 °F) (Temporal)   Respiration 20   Height 1.854 m (6' 1\")   Weight (Abnormal) 127 kg (279 lb 15.8 oz)   Oxygen Saturation 92%   Body Mass Index 36.94 kg/m²   Constitutional: Awake, alert, mildly tachypneic, nontoxic.  HENT: Atraumatic. Bilateral external ears normal, mucous membranes moist, throat nonerythematous without exudates, nose is normal.  Eyes: PERRL, EOMI, conjunctiva moist, noninjected.  Neck: Nontender, Normal range of motion, No nuchal rigidity, No stridor.   Lymphatic: No lymphadenopathy noted.   Cardiovascular: Irregular rate and rhythm, rate in the 120's, no murmurs, rubs, gallops.  Thorax & Lungs: Diminished breath sounds on the left, bibasilar crackles, no wheezes or retractions.  No chest tenderness.  Abdomen: Bowel sounds normal, Soft, nontender, nondistended, no rebound, guarding, masses.  Back: No CVA or spinal tenderness.  Extremities: Intact distal pulses, this 4+ edema to the lower extremities with some seeping of clear fluid from several shallow wounds to the right shin.  Skin: Warm, Dry, No rashes.  There were several shallow open wounds over the right shin with seepage of clear fluid.  Musculoskeletal: No joint swelling or tenderness.  Neurologic: Alert & oriented x 3, cranial nerves II through XII intact, speech is fluent, no facial asymmetry, sensory and motor function normal. No focal deficits.   Psychiatric: Affect normal, Judgment normal, Mood normal.     EKG  EKG Interpretation:  Interpreted by me    Rhythm: Atrial fibrillation with RVR  Rate: 132  Intervals: Normal  Axis: Left axis deviation  Ectopy: none  Conduction: normal  ST Segments: no evidence of elevation or depression  T Waves: Inverted in leads I, aVL  Q Waves: No pathologic Q waves  Poor R wave progression anteriorly  Clinical Impression: No acute injury or ischemic " pattern.   Comparison to previous EKG from February 2017 showed a normal sinus rhythm previously      LABS  Labs Reviewed   CBC WITH DIFFERENTIAL - Abnormal; Notable for the following components:       Result Value    RBC 4.43 (*)     Hemoglobin 12.9 (*)     Hematocrit 39.7 (*)     MCHC 32.5 (*)     RDW 53.9 (*)     Platelet Count 149 (*)     Neutrophils-Polys 73.30 (*)     Lymphocytes 12.80 (*)     Lymphs (Absolute) 0.93 (*)     All other components within normal limits   URINALYSIS - Abnormal; Notable for the following components:    Occult Blood Trace (*)     All other components within normal limits    Narrative:     Indication for culture:->Septic Shock: Persistent  hypotension,  Lactic acid > 4, vasopressors/inotropes started   D-DIMER - Abnormal; Notable for the following components:    D-Dimer Screen 4.84 (*)     All other components within normal limits   TROPONIN - Abnormal; Notable for the following components:    Troponin T 31 (*)     All other components within normal limits   PROBRAIN NATRIURETIC PEPTIDE, NT - Abnormal; Notable for the following components:    NT-proBNP 634 (*)     All other components within normal limits   COMP METABOLIC PANEL - Abnormal; Notable for the following components:    Alkaline Phosphatase 133 (*)     Total Bilirubin 2.0 (*)     All other components within normal limits   TROPONIN - Abnormal; Notable for the following components:    Troponin T 30 (*)     All other components within normal limits   FREE THYROXINE - Abnormal; Notable for the following components:    Free T-4 1.56 (*)     All other components within normal limits   URINE MICROSCOPIC (W/UA) - Abnormal; Notable for the following components:    WBC 0-2 (*)     RBC 5-10 (*)     All other components within normal limits    Narrative:     Indication for culture:->Septic Shock: Persistent  hypotension,  Lactic acid > 4, vasopressors/inotropes started   LACTIC ACID   LACTIC ACID    Narrative:     Repeat if initial  "lactic acid result is greater than 2   URINE CULTURE(NEW)    Narrative:     Indication for culture:->Septic Shock: Persistent  hypotension,  Lactic acid > 4, vasopressors/inotropes started   BLOOD CULTURE    Narrative:     Per Hospital Policy: Only change Specimen Src: to \"Line\" if  specified by physician order.   BLOOD CULTURE    Narrative:     Per Hospital Policy: Only change Specimen Src: to \"Line\" if  specified by physician order.   ESTIMATED GFR   COVID/SARS COV-2    Narrative:     Indication for culture:->Septic Shock: Persistent  hypotension,  Lactic acid > 4, vasopressors/inotropes started   TSH   SARS-COV-2, PCR (IN-HOUSE)    Narrative:     Indication for culture:->Septic Shock: Persistent  hypotension,  Lactic acid > 4, vasopressors/inotropes started   LACTIC ACID   TROPONIN       All labs reviewed by me.      RADIOLOGY/PROCEDURES  CT-CTA CHEST PULMONARY ARTERY W/ RECONS   Final Result      1.  No large pulmonary embolus is identified.      2.  Irregular nodular opacity in the right lung apex may represent an infectious or inflammatory process. Follow-up low dose CT is recommended in approximately 3 months to ensure resolution.      3.  Atherosclerosis and cardiomegaly.      4.  Ascites in the partially visualized upper abdomen            US-EXTREMITY VENOUS LOWER UNILAT RIGHT   Final Result      DX-CHEST-PORTABLE (1 VIEW)   Final Result      Interval cardiac silhouette enlargement without consolidation identified      EC-ECHOCARDIOGRAM COMPLETE W/ CONT    (Results Pending)       The radiologist's interpretations of all radiological studies have been reviewed by me.        COURSE & MEDICAL DECISION MAKING  Pertinent Labs & Imaging studies reviewed. (See chart for details)  The patient presents with the above complaints.  He does have significant peripheral edema and shortness of breath.  His saturation is 92% on room air.  He did have a low-grade fever and was tachycardic.    EKG shows atrial fibrillation " with RVR which appears to be new as his previous EKG shows a sinus rhythm.  Chest x-ray shows interval cardiac silhouette enlargement.  Ultrasound of the right lower extremity was negative for DVT.  CBC showed a white count 7300, hemoglobin 12.9, 73% polys, chemistry shows normal electrolytes, normal renal function, total bilirubin 2.0, lactic acid 1.6, troponin 31, BNP elevated 634.  D-dimer was elevated at 4.84.  CT scan of the chest was negative for pulmonary embolism, but did show an irregular nodular density in the right lung apex.  Given the patient's symptoms and physical findings I suspect the likely has some early heart failure.  He was given Lasix 20 mg IV.  He was given Cardizem 10 mg IV for a his rapid atrial fibrillation.  Findings were discussed with the patient and he is agreeable to admission.  Case discussed with Dr. Carty.    FINAL IMPRESSION  1. Shortness of breath    2. Peripheral edema    3. Paroxysmal atrial fibrillation (HCC)          Electronically signed by: James Reynolds M.D., 4/26/2020 1:57 PM

## 2020-04-26 NOTE — ED TRIAGE NOTES
Robert Mcdonnell  Chief Complaint   Patient presents with   • Leg Swelling     (B), pitting with drainage noted to RLE increasing over last week   • Shortness of Breath     >1 week       Pt to triage with above complaint.  Elevated HR noted, EKG done.  Pt denies fever/ cough or contact with anyone positive for Covid/ Corona. Pt/staff masked and in appropriate PPE during encounter.     Sepsis score of 3 noted,  Protocol initiated, charge RN notified.

## 2020-04-27 PROBLEM — J96.01 ACUTE HYPOXEMIC RESPIRATORY FAILURE (HCC): Status: ACTIVE | Noted: 2020-04-26

## 2020-04-27 PROBLEM — D69.6 THROMBOCYTOPENIA (HCC): Status: ACTIVE | Noted: 2020-04-27

## 2020-04-27 PROBLEM — I50.21 ACUTE SYSTOLIC HEART FAILURE (HCC): Status: ACTIVE | Noted: 2020-04-27

## 2020-04-27 LAB
ANION GAP SERPL CALC-SCNC: 15 MMOL/L (ref 7–16)
BUN SERPL-MCNC: 20 MG/DL (ref 8–22)
CALCIUM SERPL-MCNC: 9.4 MG/DL (ref 8.5–10.5)
CHLORIDE SERPL-SCNC: 105 MMOL/L (ref 96–112)
CHOLEST SERPL-MCNC: 102 MG/DL (ref 100–199)
CHOLEST SERPL-MCNC: 108 MG/DL (ref 100–199)
CO2 SERPL-SCNC: 22 MMOL/L (ref 20–33)
CREAT SERPL-MCNC: 1.06 MG/DL (ref 0.5–1.4)
GLUCOSE SERPL-MCNC: 126 MG/DL (ref 65–99)
HDLC SERPL-MCNC: 32 MG/DL
HDLC SERPL-MCNC: 34 MG/DL
INR PPP: 2.72 (ref 0.87–1.13)
LDLC SERPL CALC-MCNC: 59 MG/DL
LDLC SERPL CALC-MCNC: 62 MG/DL
POTASSIUM SERPL-SCNC: 4.1 MMOL/L (ref 3.6–5.5)
PROCALCITONIN SERPL-MCNC: <0.05 NG/ML
PROTHROMBIN TIME: 29.9 SEC (ref 12–14.6)
SODIUM SERPL-SCNC: 142 MMOL/L (ref 135–145)
TRIGL SERPL-MCNC: 54 MG/DL (ref 0–149)
TRIGL SERPL-MCNC: 58 MG/DL (ref 0–149)
TROPONIN T SERPL-MCNC: 35 NG/L (ref 6–19)

## 2020-04-27 PROCEDURE — A9270 NON-COVERED ITEM OR SERVICE: HCPCS | Performed by: HOSPITALIST

## 2020-04-27 PROCEDURE — 700102 HCHG RX REV CODE 250 W/ 637 OVERRIDE(OP): Performed by: HOSPITALIST

## 2020-04-27 PROCEDURE — 700105 HCHG RX REV CODE 258: Performed by: HOSPITALIST

## 2020-04-27 PROCEDURE — 99223 1ST HOSP IP/OBS HIGH 75: CPT | Performed by: INTERNAL MEDICINE

## 2020-04-27 PROCEDURE — 85610 PROTHROMBIN TIME: CPT

## 2020-04-27 PROCEDURE — 700102 HCHG RX REV CODE 250 W/ 637 OVERRIDE(OP): Performed by: INTERNAL MEDICINE

## 2020-04-27 PROCEDURE — 84145 PROCALCITONIN (PCT): CPT

## 2020-04-27 PROCEDURE — 80061 LIPID PANEL: CPT | Mod: 91

## 2020-04-27 PROCEDURE — 84484 ASSAY OF TROPONIN QUANT: CPT

## 2020-04-27 PROCEDURE — 700111 HCHG RX REV CODE 636 W/ 250 OVERRIDE (IP): Performed by: HOSPITALIST

## 2020-04-27 PROCEDURE — 770020 HCHG ROOM/CARE - TELE (206)

## 2020-04-27 PROCEDURE — 99233 SBSQ HOSP IP/OBS HIGH 50: CPT | Performed by: HOSPITALIST

## 2020-04-27 PROCEDURE — 80048 BASIC METABOLIC PNL TOTAL CA: CPT

## 2020-04-27 PROCEDURE — A9270 NON-COVERED ITEM OR SERVICE: HCPCS | Performed by: INTERNAL MEDICINE

## 2020-04-27 RX ORDER — LISINOPRIL 10 MG/1
10 TABLET ORAL
Status: DISCONTINUED | OUTPATIENT
Start: 2020-04-27 | End: 2020-04-29

## 2020-04-27 RX ADMIN — SENNOSIDES AND DOCUSATE SODIUM 2 TABLET: 8.6; 5 TABLET ORAL at 17:14

## 2020-04-27 RX ADMIN — LISINOPRIL 10 MG: 10 TABLET ORAL at 13:16

## 2020-04-27 RX ADMIN — FUROSEMIDE 20 MG: 10 INJECTION, SOLUTION INTRAVENOUS at 04:50

## 2020-04-27 RX ADMIN — FUROSEMIDE 20 MG: 10 INJECTION, SOLUTION INTRAVENOUS at 15:58

## 2020-04-27 RX ADMIN — METOPROLOL TARTRATE 50 MG: 25 TABLET, FILM COATED ORAL at 04:49

## 2020-04-27 RX ADMIN — ACETAMINOPHEN 650 MG: 325 TABLET, FILM COATED ORAL at 23:41

## 2020-04-27 RX ADMIN — RIVAROXABAN 20 MG: 20 TABLET, FILM COATED ORAL at 17:15

## 2020-04-27 RX ADMIN — CEFTRIAXONE SODIUM 2 G: 2 INJECTION, POWDER, FOR SOLUTION INTRAMUSCULAR; INTRAVENOUS at 04:55

## 2020-04-27 RX ADMIN — DOXYCYCLINE 100 MG: 100 TABLET, FILM COATED ORAL at 04:49

## 2020-04-27 RX ADMIN — METOPROLOL TARTRATE 50 MG: 25 TABLET, FILM COATED ORAL at 17:14

## 2020-04-27 RX ADMIN — ACETAMINOPHEN 650 MG: 325 TABLET, FILM COATED ORAL at 13:16

## 2020-04-27 RX ADMIN — DOXYCYCLINE 100 MG: 100 TABLET, FILM COATED ORAL at 00:03

## 2020-04-27 ASSESSMENT — PATIENT HEALTH QUESTIONNAIRE - PHQ9
SUM OF ALL RESPONSES TO PHQ9 QUESTIONS 1 AND 2: 0
1. LITTLE INTEREST OR PLEASURE IN DOING THINGS: NOT AT ALL
SUM OF ALL RESPONSES TO PHQ9 QUESTIONS 1 AND 2: 0
1. LITTLE INTEREST OR PLEASURE IN DOING THINGS: NOT AT ALL
2. FEELING DOWN, DEPRESSED, IRRITABLE, OR HOPELESS: NOT AT ALL
2. FEELING DOWN, DEPRESSED, IRRITABLE, OR HOPELESS: NOT AT ALL

## 2020-04-27 ASSESSMENT — LIFESTYLE VARIABLES
TOTAL SCORE: 0
EVER HAD A DRINK FIRST THING IN THE MORNING TO STEADY YOUR NERVES TO GET RID OF A HANGOVER: NO
ORIENTATION AND CLOUDING OF SENSORIUM: ORIENTED AND CAN DO SERIAL ADDITIONS
TREMOR: NO TREMOR
TOTAL SCORE: 0
DOES PATIENT WANT TO STOP DRINKING: NO
HEADACHE, FULLNESS IN HEAD: NOT PRESENT
TREMOR: NO TREMOR
CONSUMPTION TOTAL: POSITIVE
HOW MANY TIMES IN THE PAST YEAR HAVE YOU HAD 5 OR MORE DRINKS IN A DAY: 365
AVERAGE NUMBER OF DAYS PER WEEK YOU HAVE A DRINK CONTAINING ALCOHOL: 7
PAROXYSMAL SWEATS: NO SWEAT VISIBLE
TOTAL SCORE: 1
TOTAL SCORE: 0
HAVE YOU EVER FELT YOU SHOULD CUT DOWN ON YOUR DRINKING: NO
AGITATION: NORMAL ACTIVITY
VISUAL DISTURBANCES: NOT PRESENT
ON A TYPICAL DAY WHEN YOU DRINK ALCOHOL HOW MANY DRINKS DO YOU HAVE: 6
HEADACHE, FULLNESS IN HEAD: NOT PRESENT
ORIENTATION AND CLOUDING OF SENSORIUM: ORIENTED AND CAN DO SERIAL ADDITIONS
NAUSEA AND VOMITING: NO NAUSEA AND NO VOMITING
VISUAL DISTURBANCES: NOT PRESENT
HAVE PEOPLE ANNOYED YOU BY CRITICIZING YOUR DRINKING: NO
TOTAL SCORE: 0
ANXIETY: MILDLY ANXIOUS
AGITATION: NORMAL ACTIVITY
EVER FELT BAD OR GUILTY ABOUT YOUR DRINKING: NO
PAROXYSMAL SWEATS: NO SWEAT VISIBLE
NAUSEA AND VOMITING: NO NAUSEA AND NO VOMITING
DO YOU DRINK ALCOHOL: YES
AUDITORY DISTURBANCES: NOT PRESENT
ANXIETY: NO ANXIETY (AT EASE)
AUDITORY DISTURBANCES: NOT PRESENT

## 2020-04-27 ASSESSMENT — ENCOUNTER SYMPTOMS
DIZZINESS: 0
PSYCHIATRIC NEGATIVE: 1
FALLS: 0
ABDOMINAL PAIN: 0
COUGH: 1
NAUSEA: 0
FEVER: 0
CHILLS: 0
SHORTNESS OF BREATH: 1
SPUTUM PRODUCTION: 0
PALPITATIONS: 1
VOMITING: 0
LOSS OF CONSCIOUSNESS: 0
HEADACHES: 0

## 2020-04-27 NOTE — CARE PLAN
Problem: Venous Thromboembolism (VTW)/Deep Vein Thrombosis (DVT) Prevention:  Goal: Patient will participate in Venous Thrombosis (VTE)/Deep Vein Thrombosis (DVT)Prevention Measures  Outcome: PROGRESSING AS EXPECTED  Flowsheets (Taken 4/27/2020 0035)  Pharmacologic Prophylaxis Used: Rivaroxaban (Xarelto) - (ONLY for Total Knee and Total Hip Surgery)  Note: Patient being anticoagulated with xarelto for new afib.  Rate controlled with lopressor.       Problem: Fluid Volume:  Goal: Will maintain balanced intake and output  Outcome: PROGRESSING SLOWER THAN EXPECTED  Note: Strict intake and output maintained - patient educated on fluid restrictions.  Patient being diuresed with lasix.  Patient with adequate urine output.       Problem: Respiratory:  Goal: Respiratory status will improve  Outcome: PROGRESSING SLOWER THAN EXPECTED  Note: Patient started on room air but seemed to be uncomfortable with an increased work of breathing, oxygen saturation was in the low 90's. - 2L supplemental oxygen via nasal cannula was placed on patient with therapeutic effect achieved.  Oxygen saturation >95%. Patient with wheezing heard - likely related to fluid overload.  Patient actively being diuresed.

## 2020-04-27 NOTE — ASSESSMENT & PLAN NOTE
New diagnosis unknown duration. Likely cause for patient's exertional dyspnea. Rate with better control now.   Continue rate control with metoprolol.  Cardiology evaluated him and made recommendations.  His insurance does not cover Xarelto and I discussed with case management.  After discussion I discontinued Xarelto and started him on warfarin.  Continue warfarin currently INR is not therapeutic.  Continue monitor on telemetry

## 2020-04-27 NOTE — CONSULTS
Reason of Consult: Heart failure with reduced ejection fraction and atrial fibrillation    Consulting Physician:  Yuko Chris    HPI:  69-year-old morbidly obese male with chronic edema, chronic alcohol and tobacco abuse presented 4/26/2020 with shortness of breath and worsening leg swelling over the past 2 weeks, chronically present for over 3 years.  No COVID-19 contacts.  Approximate 20 pound weight gain recently.  Does not do drugs per his report.  No family history precocious CAD.  Feeling improved since initiation of therapy in the hospital.  Echocardiogram reveals an EF of 30% previously unknown to the patient.  CT PE protocol reveals atherosclerosis but no pulmonary embolism.  Noted to have persistent atrial fibrillation during his hospital stay here, coming under rate control.    Past Medical History:   Diagnosis Date   • Hypertension        Social History     Socioeconomic History   • Marital status:      Spouse name: Not on file   • Number of children: Not on file   • Years of education: Not on file   • Highest education level: Not on file   Occupational History   • Not on file   Social Needs   • Financial resource strain: Not on file   • Food insecurity     Worry: Not on file     Inability: Not on file   • Transportation needs     Medical: Not on file     Non-medical: Not on file   Tobacco Use   • Smoking status: Current Every Day Smoker     Packs/day: 1.00     Types: Cigarettes   • Smokeless tobacco: Never Used   Substance and Sexual Activity   • Alcohol use: Yes     Alcohol/week: 14.4 oz     Types: 24 Cans of beer per week     Comment: occ   • Drug use: No   • Sexual activity: Not on file   Lifestyle   • Physical activity     Days per week: Not on file     Minutes per session: Not on file   • Stress: Not on file   Relationships   • Social connections     Talks on phone: Not on file     Gets together: Not on file     Attends Jewish service: Not on file     Active member of club or  organization: Not on file     Attends meetings of clubs or organizations: Not on file     Relationship status: Not on file   • Intimate partner violence     Fear of current or ex partner: Not on file     Emotionally abused: Not on file     Physically abused: Not on file     Forced sexual activity: Not on file   Other Topics Concern   • Not on file   Social History Narrative   • Not on file       No current facility-administered medications on file prior to encounter.      No current outpatient medications on file prior to encounter.       Current Facility-Administered Medications   Medication Dose Frequency Provider Last Rate Last Dose   • senna-docusate (PERICOLACE or SENOKOT S) 8.6-50 MG per tablet 2 Tab  2 Tab BID Josue Carty M.D.   2 Tab at 04/26/20 1747    And   • polyethylene glycol/lytes (MIRALAX) PACKET 1 Packet  1 Packet QDAY PRN Josue Carty M.D.        And   • magnesium hydroxide (MILK OF MAGNESIA) suspension 30 mL  30 mL QDAY PRN Josue Carty M.D.        And   • bisacodyl (DULCOLAX) suppository 10 mg  10 mg QDAY PRN Josue Carty M.D.       • acetaminophen (TYLENOL) tablet 650 mg  650 mg Q6HRS PRN Josue Carty M.D.   650 mg at 04/26/20 1947   • furosemide (LASIX) injection 20 mg  20 mg BID DIURETIC Josue Carty M.D.   20 mg at 04/27/20 0450   • metoprolol (LOPRESSOR) tablet 50 mg  50 mg TWICE DAILY Josue Carty M.D.   50 mg at 04/27/20 0449   • DILTIAZem (CARDIZEM) injection 10 mg  10 mg Q4HRS PRN Josue Carty M.D.       • rivaroxaban (XARELTO) tablet 20 mg  20 mg PM MEAL Josue Carty M.D.   20 mg at 04/26/20 1948   • Respiratory Therapy Consult   Continuous RT Josue Carty M.D.       • cefTRIAXone (ROCEPHIN) 2 g in  mL IVPB  2 g Q24HRS Josue Carty M.D.   Stopped at 04/27/20 0525   • doxycycline monohydrate (ADOXA) tablet 100 mg  100 mg Q12HRS Josue Carty M.D.   100 mg at 04/27/20 0449   Last reviewed on 4/26/2020  8:07 PM by Francisca Shannon R.N.      Aspirin    Family History   Problem  "Relation Age of Onset   • Heart Disease Mother    • Heart Disease Father        ROS: As per HPI all other systems reviewed and negative     Physical Exam   Blood pressure 149/83, pulse 91, temperature 36.7 °C (98 °F), temperature source Temporal, resp. rate 20, height 1.854 m (6' 1\"), weight (!) 127 kg (279 lb 15.8 oz), SpO2 92 %.    Constitutional: Obese.  Appears well-developed.   HENT: Normocephalic and atraumatic. No scleral icterus.   Neck: JVD present. To jawline while upright.  Cardiovascular: Normal rate. Exam reveals no gallop and no friction rub. No murmur heard.   Pulmonary/Chest: Coarse   Abdominal: S/NT/ND BS+   Musculoskeletal:  Pulses present. No atrophy. Strength normal.  Extremities: Exhibits 3-4+ bilateral lower extremity edema. No clubbing or cyanosis.   Skin: Skin is warm and dry.   Neuro: Non-focal, CN 2-12 intact grossly      Intake/Output Summary (Last 24 hours) at 4/27/2020 1104  Last data filed at 4/27/2020 0828  Gross per 24 hour   Intake 1358 ml   Output 4275 ml   Net -2917 ml       Recent Labs     04/26/20  1357   WBC 7.3   RBC 4.43*   HEMOGLOBIN 12.9*   HEMATOCRIT 39.7*   MCV 89.6   MCH 29.1   MCHC 32.5*   RDW 53.9*   PLATELETCT 149*   MPV 10.5     Recent Labs     04/26/20  1357 04/27/20  0000   SODIUM 144 142   POTASSIUM 3.9 4.1   CHLORIDE 108 105   CO2 21 22   GLUCOSE 97 126*   BUN 17 20   CREATININE 0.98 1.06   CALCIUM 9.1 9.4     Recent Labs     04/27/20  0830   INR 2.72*             Recent Labs     04/27/20  0000 04/27/20  0515   TRIGLYCERIDE 58 54   HDL 34* 32*   LDL 62 59     ECHO CONCLUSIONS (4/26/2020):  Compared to the images of the prior echocardiogram dated 8/29/2015 -   there is now  a reduced ejection fraction and atrial fibrillation.   There is now moderate mitral regurgitation and severe tricuspid   regurgitation.  Moderately reduced left ventricular systolic function.  Left ventricular ejection fraction is visually estimated to be 30%.  Global hypokinesis most prominent " in the inferior wall.  Moderate  mitral regurgitation.  Severe tricuspid regurgitation.  Unable to estimate pulmonary artery pressure due to severe tricuspid   Regurgitation.    EKG (2020):  I have personally reviewed the EKG this visit and discussed with the patient. It shows atrial fibrillation with a ventricular sponsored 132 bpm.  Poor R wave progression.  EKG signed by the ER physician is misread as showing old inferior infarction which is not present.    Imaging reviewed    Impressions:  1.  Acutely decompensated heart failure with reduced ejection fraction, LVEF 30%  2.  Atrial fibrillation, new diagnosis  3.  Poorly controlled hypertension  4.  Alcohol abuse  5.  Tobacco abuse  6.  Morbid obesity  7.  Atherosclerosis  8.  Severe tricuspid regurgitation related to above  9.  Moderate mitral regurgitation related to above    Recommendations:  He has decompensated likely subacute heart failure related to a combination of A. fib RVR, alcohol abuse uncontrolled hypertension and possibly underlying ischemic disease.  He is grossly volume overloaded.  Recommend the followin.  Continued IV diuresis until euvolemic  2.  Continue Lopressor, titrate for heart rate control, change to Toprol-XL or Coreg at discharge.  3.  Continue new anticoagulation for stroke prevention and atrial fibrillation.  4.  Initiate lisinopril 10 mg daily for afterload reduction and improvement of blood pressure control and neuro humeral modification for his heart failure.  5.  He will need an ischemic work-up once his heart failure is treated and he is compensated.  This can be accomplished as an outpatient after he has time to acclimate to his new medical regimen.  I would also suggest discontinuation of ongoing troponin evaluations as they are essentially normal and ordered for every 6 hours indefinitely.  I have taken liberty of canceling this order.  6.  Smoking cessation and alcohol abstinence, monitor for  withdrawal.    Discussed with the referring physician and bedside nursing.

## 2020-04-27 NOTE — ASSESSMENT & PLAN NOTE
Platelet count is trending up and current platelet count is 164.  Does not have any right upper quadrant pain.  Liver enzymes are within normal limits  CMP tomorrow morning.

## 2020-04-27 NOTE — PROGRESS NOTES
Skin assessment completed - wounds cleansed with normal saline and wet to dry dressing applied.  Wound care consult placed. See LDAs and pictures.   - Right leg (shin)  - Right Ankle   - Left lower leg     No other breakdown noted.

## 2020-04-27 NOTE — ASSESSMENT & PLAN NOTE
Patient without chest pain and EKG without ischemic changes.  Likely due to demand ischemia 2/2 afib with RVR and fluid overload  Cardiology evaluated him and recommended outpatient ischemic work-up.  He does not have any acute chest pain

## 2020-04-27 NOTE — CARE PLAN
Problem: Communication  Goal: The ability to communicate needs accurately and effectively will improve  Outcome: PROGRESSING AS EXPECTED     Problem: Safety  Goal: Will remain free from injury  Outcome: PROGRESSING AS EXPECTED  Goal: Will remain free from falls  Outcome: PROGRESSING AS EXPECTED     Problem: Fluid Volume:  Goal: Will maintain balanced intake and output  Outcome: PROGRESSING AS EXPECTED    Strict I/O monitoring     Call light education provide, patient completed return demonstration successfully. Re-enforced use of call light to ensure patient safety and decrease risk of fall.

## 2020-04-27 NOTE — PROGRESS NOTES
Assumed care of patient - patient alert, oriented, able to communicate needs and follow commands.  Patient remains on telemetry - no apparent distress noted. Patient voided in the urinal - no concerns at this time.

## 2020-04-27 NOTE — CARE PLAN
Problem: Safety  Goal: Will remain free from injury  Outcome: MET     Problem: Infection  Goal: Will remain free from infection  Outcome: MET     Problem: Fluid Volume:  Goal: Will maintain balanced intake and output  Outcome: MET     Problem: Respiratory:  Goal: Respiratory status will improve  Outcome: MET

## 2020-04-27 NOTE — ASSESSMENT & PLAN NOTE
Hemoglobin remained stable.  He does not show signs of active bleeding.  Continue to monitor and transfuse if hemoglobin below 7.

## 2020-04-27 NOTE — PROGRESS NOTES
Patient transferred to floor with ACLS RN and telemetry monitored. Monitors applied and patient assessed at this time.

## 2020-04-27 NOTE — ASSESSMENT & PLAN NOTE
New diagnosis of HFrEF, EF reduced from 50 to 30%. Net negative 2.3L since admission.   - cardiology consulted and they evaluated him and made recommendations to  Continue heart failure medication which include metoprolol and ACE inhibitor  Cardiology increase the dose of IV Lasix to 40 mg IV twice daily.  I discussed plan of care with him.  I discussed plan of care with cardiology.

## 2020-04-27 NOTE — ASSESSMENT & PLAN NOTE
Continues to use tobacco.  - nicotine replacement declined initially but available  Continue counseling

## 2020-04-27 NOTE — PROGRESS NOTES
"Hospital Medicine Daily Progress Note    Date of Service  4/27/2020    Chief Complaint  69 y.o. male admitted 4/26/2020 with shortness of breath and LE swelling.     Hospital Course    70 yo M with obesity, chronic LE edema, alcohol and tobacco use here with LE swelling and progressive SOB. He repots that the swelling is chronic but became acute worse over the last 2 months. Endorsed 20 pound weight gain. He was found to have new congestive heart failure and Afib with RVR.      Interval Problem Update  Feels \"better today\" but still with SOB and swelling. No overnight events. Net negative 2.3L since admission yesterday.     Consultants/Specialty  Cardiology    Code Status  Full    Disposition  Anticipate home in 2-3 days.     Review of Systems  Review of Systems   Constitutional: Positive for malaise/fatigue. Negative for chills and fever.   HENT: Negative for congestion.    Respiratory: Positive for cough and shortness of breath. Negative for sputum production.    Cardiovascular: Positive for palpitations and leg swelling. Negative for chest pain.   Gastrointestinal: Negative for abdominal pain, nausea and vomiting.   Genitourinary: Negative for dysuria.   Musculoskeletal: Negative for falls.   Skin: Positive for rash.   Neurological: Negative for dizziness, loss of consciousness and headaches.   Psychiatric/Behavioral: Negative.    All other systems reviewed and are negative.       Physical Exam  Temp:  [36.8 °C (98.3 °F)-37.8 °C (100.1 °F)] 37.4 °C (99.4 °F)  Pulse:  [] 102  Resp:  [19-24] 20  BP: (138-179)/() 149/83  SpO2:  [91 %-96 %] 95 %    Physical Exam  Vitals signs reviewed.   Constitutional:       General: He is not in acute distress.     Appearance: He is obese. He is not diaphoretic.      Interventions: Nasal cannula in place.   HENT:      Head: Normocephalic.      Mouth/Throat:      Mouth: Mucous membranes are moist.   Eyes:      General: No scleral icterus.        Right eye: No discharge.  "        Left eye: No discharge.      Extraocular Movements: Extraocular movements intact.   Neck:      Musculoskeletal: Normal range of motion. No muscular tenderness.   Cardiovascular:      Rate and Rhythm: Normal rate and regular rhythm.      Pulses: Normal pulses.   Pulmonary:      Effort: Pulmonary effort is normal. No respiratory distress.      Breath sounds: No wheezing.      Comments: Diminished at the bases  Abdominal:      General: There is no distension.      Palpations: Abdomen is soft.      Tenderness: There is no abdominal tenderness. There is no guarding.   Musculoskeletal:         General: No tenderness.      Right lower leg: Edema present.      Left lower leg: Edema present.   Skin:     Coloration: Skin is not jaundiced.   Neurological:      General: No focal deficit present.      Mental Status: He is alert and oriented to person, place, and time.   Psychiatric:         Mood and Affect: Mood normal.         Speech: Speech normal.         Behavior: Behavior normal. Behavior is cooperative.         Fluids    Intake/Output Summary (Last 24 hours) at 4/27/2020 0726  Last data filed at 4/27/2020 0626  Gross per 24 hour   Intake 1358 ml   Output 3675 ml   Net -2317 ml       Laboratory  Recent Labs     04/26/20  1357   WBC 7.3   RBC 4.43*   HEMOGLOBIN 12.9*   HEMATOCRIT 39.7*   MCV 89.6   MCH 29.1   MCHC 32.5*   RDW 53.9*   PLATELETCT 149*   MPV 10.5     Recent Labs     04/26/20  1357 04/27/20  0000   SODIUM 144 142   POTASSIUM 3.9 4.1   CHLORIDE 108 105   CO2 21 22   GLUCOSE 97 126*   BUN 17 20   CREATININE 0.98 1.06   CALCIUM 9.1 9.4             Recent Labs     04/27/20  0000 04/27/20  0515   TRIGLYCERIDE 58 54   HDL 34* 32*   LDL 62 59       Imaging  EC-ECHOCARDIOGRAM COMPLETE W/O CONT   Final Result      CT-CTA CHEST PULMONARY ARTERY W/ RECONS   Final Result      1.  No large pulmonary embolus is identified.      2.  Irregular nodular opacity in the right lung apex may represent an infectious or  inflammatory process. Follow-up low dose CT is recommended in approximately 3 months to ensure resolution.      3.  Atherosclerosis and cardiomegaly.      4.  Ascites in the partially visualized upper abdomen            US-EXTREMITY VENOUS LOWER UNILAT RIGHT   Final Result      DX-CHEST-PORTABLE (1 VIEW)   Final Result      Interval cardiac silhouette enlargement without consolidation identified           Assessment/Plan  * Acute systolic heart failure (HCC)- (present on admission)  Assessment & Plan  New diagnosis of HFrEF, EF reduced from 50 to 30%. Net negative 2.3L since admission.   - cardiology consulted, appreciate recs  - I/Os, daily weights  - on metop and lisinopril  - will need to change to Toprol-XL or Coreg at discharge  - lasix 20mg IV daily, monitor renal function    Atrial fibrillation with rapid ventricular response (HCC)- (present on admission)  Assessment & Plan  New diagnosis unknown duration. Likely cause for patient's exertional dyspnea. Rate with better control now.   - continue metop  - PRN cardizem  - elevated chadsvasc score; started on xarelto and preauth sent to pharmacy    Acute hypoxemic respiratory failure (HCC)- (present on admission)  Assessment & Plan  Related to Afib with RVR and acute CHF exacerbation. Procal low. CTA without evidence of PE  - covid negative x1  - management of afib and CHF as noted above  - RT protocol    Lower extremity edema  Assessment & Plan  Chronic. Worsened over the past several weeks to 2 months, per patient.   - management as above    Elevated troponin- (present on admission)  Assessment & Plan  Patient without chest pain and EKG without ischemic changes.  Likely due to demand ischemia 2/2 afib with RVR and fluid overload  - monitor clinically    Hypertension- (present on admission)  Assessment & Plan  Uncontrolled.  - start lisinopril  - continue metop  - continue with diuresis    Thrombocytopenia (HCC)  Assessment & Plan  Plt down to 149. AST/ALT  "normal but Alk phos mildly elevated. Liver disease?  - repeat tomorrow, may need RUQ US    Right upper lobe pulmonary infiltrate- (present on admission)  Assessment & Plan  With symptoms of mild cough, dyspnea. Suspect inflammatory process. \"Irregular nodular opacity in the right lung apex \"  - will need repeat imaging in 3 months to ensure resolution  - d/c abx as procal low    Tobacco dependence- (present on admission)  Assessment & Plan  Continues to use tobacco.  - nicotine replacement declined initially but available  - continued counseling and education    Obesity (BMI 30-39.9)- (present on admission)  Assessment & Plan  Body mass index is 36.94 kg/m².    Anemia- (present on admission)  Assessment & Plan  Hemoglobin at 12.9 today. No evidence of active bleeding  - trend H/H  - transfuse if hemoglobin < 7    Wound of lower extremity- (present on admission)  Assessment & Plan  With stasis dermatitis. No signs for infection on exam.  - Supportive treatment  - Wound care consult       VTE prophylaxis: anticoagulated with xarelto.      "

## 2020-04-27 NOTE — ASSESSMENT & PLAN NOTE
"With symptoms of mild cough, dyspnea. Suspect inflammatory process. \"Irregular nodular opacity in the right lung apex \"  - will need repeat imaging in 3 months to ensure resolution  He remains afebrile.  "

## 2020-04-27 NOTE — H&P
Hospital Medicine History & Physical Note    Date of Service  4/26/2020    Primary Care Physician  Ming Stinson M.D.    Code Status  Full Code    Chief Complaint  Chief Complaint   Patient presents with   • Leg Swelling     (B), pitting with drainage noted to RLE increasing over last week   • Shortness of Breath     >1 week       History of Presenting Illness  69 y.o. male history of morbid obesity, chronic lower extremity edema, alcohol, tobacco abuse who presented 4/26/2020 with increasing shortness of breath with leg swelling.  Patient reports he has had leg swelling over the past 3 years worsened over the past 2 weeks.  Has had dyspnea with exertion.  Source to his shins developed over the past 2 weeks with weeping and drainage.  No fevers or chills no chest pain or shortness of breath.  He does have occasional cough attributed to allergies this time yearly.  Has had weight gain and proxy 20 pounds fluid retention recently.  No tremors, nausea vomiting or diarrhea.  No reported recent travel outside the region or known COVID 19 contacts.      Review of Systems  Review of Systems   Constitutional: Positive for malaise/fatigue. Negative for chills, diaphoresis and fever.   HENT: Negative for congestion.    Eyes: Negative for discharge and redness.   Respiratory: Positive for cough and shortness of breath. Negative for hemoptysis, wheezing and stridor.    Cardiovascular: Positive for leg swelling. Negative for chest pain and palpitations.   Gastrointestinal: Negative for abdominal pain, diarrhea and vomiting.   Genitourinary: Negative for flank pain and hematuria.   Musculoskeletal: Negative for back pain, joint pain, myalgias and neck pain.   Neurological: Positive for weakness (Generalized weakness). Negative for tremors, sensory change, speech change and focal weakness.   Psychiatric/Behavioral: Negative for hallucinations and substance abuse.       Past Medical History   has a past medical history  of Hypertension.  Alcohol abuse no reported history of withdrawal.    Surgical History  None reported    Family History  family history includes Heart Disease in his father and mother.     Social History   reports that he has been smoking cigarettes. He has been smoking about 1.00 pack per day. He has never used smokeless tobacco. He reports current alcohol use of about 14.4 oz of alcohol per week. He reports that he does not use drugs.  Lives by himself.  Averages a 6 pack a day    Allergies  Allergies   Allergen Reactions   • Aspirin Vomiting     Pt states vomiting.       Medications  None       Physical Exam  Temp:  [36.8 °C (98.3 °F)-37.8 °C (100.1 °F)] 37.8 °C (100.1 °F)  Pulse:  [103-135] 103  Resp:  [19-24] 20  BP: (138-179)/() 155/78  SpO2:  [91 %-96 %] 92 %    Physical Exam  Constitutional:       General: He is not in acute distress.     Appearance: He is obese. He is not diaphoretic.      Comments: Disheveled appearing   HENT:      Head: Normocephalic and atraumatic.      Right Ear: External ear normal.      Left Ear: External ear normal.      Nose: Nose normal.   Eyes:      General: No scleral icterus.     Extraocular Movements: Extraocular movements intact.      Conjunctiva/sclera: Conjunctivae normal.   Neck:      Musculoskeletal: Normal range of motion. No neck rigidity.      Comments: Neck vein distention  5 cm  Cardiovascular:      Rate and Rhythm: Normal rate and regular rhythm.      Heart sounds: No murmur.   Pulmonary:      Breath sounds: No stridor. No wheezing, rhonchi or rales.   Abdominal:      General: There is no distension.      Palpations: Abdomen is soft.      Tenderness: There is no abdominal tenderness.   Musculoskeletal:         General: Swelling present.      Right lower leg: Edema present.      Left lower leg: Edema present.      Comments: 4+ lower extremity edema with chronic stasis hyperpigmented changes.  He has ulcers that are weeping with clear serous drainage right shin  and medial heel region.  No increased warmth fluctuance or significant redness  Negative Homans sign   Skin:     General: Skin is dry.   Neurological:      General: No focal deficit present.      Mental Status: He is alert and oriented to person, place, and time.         Laboratory:  Recent Labs     04/26/20  1357   WBC 7.3   RBC 4.43*   HEMOGLOBIN 12.9*   HEMATOCRIT 39.7*   MCV 89.6   MCH 29.1   MCHC 32.5*   RDW 53.9*   PLATELETCT 149*   MPV 10.5     Recent Labs     04/26/20  1357   SODIUM 144   POTASSIUM 3.9   CHLORIDE 108   CO2 21   GLUCOSE 97   BUN 17   CREATININE 0.98   CALCIUM 9.1     Recent Labs     04/26/20  1357   ALTSGPT 23   ASTSGOT 30   ALKPHOSPHAT 133*   TBILIRUBIN 2.0*   GLUCOSE 97         Recent Labs     04/26/20  1357   NTPROBNP 634*         Recent Labs     04/26/20  1357 04/26/20  1745   TROPONINT 31* 30*       Imaging:  EC-ECHOCARDIOGRAM COMPLETE W/O CONT         CT-CTA CHEST PULMONARY ARTERY W/ RECONS   Final Result      1.  No large pulmonary embolus is identified.      2.  Irregular nodular opacity in the right lung apex may represent an infectious or inflammatory process. Follow-up low dose CT is recommended in approximately 3 months to ensure resolution.      3.  Atherosclerosis and cardiomegaly.      4.  Ascites in the partially visualized upper abdomen            US-EXTREMITY VENOUS LOWER UNILAT RIGHT   Final Result      DX-CHEST-PORTABLE (1 VIEW)   Final Result      Interval cardiac silhouette enlargement without consolidation identified        EKG revealed atrial fibrillation rate 130s.  Q inferior lead III aVF.  Nonspecific ST-T findings in the anterolateral leads.  Poor R wave progression.    Assessment/Plan:      * Dyspnea  Assessment & Plan  Likely due to rapid A. Fib.  Plan A. fib rate control  Start IV Lasix   Check echocardiogram to assess LV function.  CTA did reveal right apical opacity.  Reports of mild cough. This area is less typical for COVID 19 pneumonia.  However,  he does  have lymphopenia.  COVID-19 negative x1 . Continue with contact and droplet precautions.  Recommend follow-up low dose CT is recommended in approximately 3 months to ensure resolution.        Atrial fibrillation with rapid ventricular response (HCC)  Assessment & Plan  New diagnosis unknown duration.  Likely cause for patient's exertional dyspnea.  Start oral Lopressor for rate control with PRN IV Cardizem  ChadVasc2 is 2-3 as may have underlying heart failure.   I discussed risks of stroke with his A. fib.  Benefits of anticoagulation and risk of use including bleeding were discussed with patient.  Patient okay with starting Xarelto.      Lower extremity edema  Assessment & Plan  Chronic, worsened over the past 2 weeks.  Start IV Lasix diuresis  Check echocardiogram to evaluate LV and valve function  Strict I's and O's and limit fluid intake  Keep elevated as tolerated    Elevated troponin  Assessment & Plan  Patient without chest pain and EKG without ischemic changes.  Likely due to demand ischemia associated with A. fib RVR  Start Lopressor for A. fib rate control  Repeat serial cardiac enzymes.   Check fasting lipid panel  Monitor telemetry  Cardiology evaluation if significantly increased trend.    Hypertension- (present on admission)  Assessment & Plan  Uncontrolled  Plan to start beta-blocker  Monitor blood pressure response    Right upper lobe pulmonary infiltrate  Assessment & Plan  With symptoms of mild cough, dyspnea.  Suspect overlap symptoms associated with A. fib RVR  Start empiric IV Rocephin, doxycycline.  Check procalcitonin and de-escalate antibiotics as appropriate.     Tobacco dependence- (present on admission)  Assessment & Plan  Benefits of tobacco cessation and treatment strategies were discussed at length with patient  We will place a nicotine patch  Tobacco counseling cessation time spent 5 mins.     Obesity (BMI 30-39.9)- (present on admission)  Assessment & Plan  Continue to encourage  weight loss.  Would benefit from medically supervised weight loss program outpatient    Anemia- (present on admission)  Assessment & Plan  No symptoms of bleed likely chronic disease  Follow-up hemoglobin    Wound of lower extremity- (present on admission)  Assessment & Plan  Patient reports  onset approximately 2 weeks ago with some older lesions.  Suspect component of stasis dermatitis.  No signs for infection on exam.  Supportive treatment  Wound care consult      DVT prophylaxis: SCDS

## 2020-04-27 NOTE — ASSESSMENT & PLAN NOTE
Now resolved currently he is on room air admitted in saturation.  Related to Afib with RVR and acute CHF exacerbation. Procal low. CTA without evidence of PE  - covid negative x1  He is responding to diuresis.  Discussed with cardiology and increase the dose of IV diuretics.  Continue to monitor input and output closely.  Continue to provide him oxygen and titrate as tolerated.

## 2020-04-27 NOTE — PROGRESS NOTES
SEBASTIÁNWA education completed, patient verbalized understanding of s/s of alcohol withdrawal, admitting hospitalist aware.

## 2020-04-27 NOTE — PROGRESS NOTES
PTA meds reviewed - patient states that he does not take any home medications nor has he seen a PCP in many years.

## 2020-04-28 ENCOUNTER — PATIENT OUTREACH (OUTPATIENT)
Dept: HEALTH INFORMATION MANAGEMENT | Facility: OTHER | Age: 70
End: 2020-04-28

## 2020-04-28 LAB
ALBUMIN SERPL BCP-MCNC: 3.6 G/DL (ref 3.2–4.9)
ALBUMIN/GLOB SERPL: 1.2 G/DL
ALP SERPL-CCNC: 129 U/L (ref 30–99)
ALT SERPL-CCNC: 21 U/L (ref 2–50)
ANION GAP SERPL CALC-SCNC: 12 MMOL/L (ref 7–16)
AST SERPL-CCNC: 28 U/L (ref 12–45)
BACTERIA UR CULT: NORMAL
BILIRUB SERPL-MCNC: 1.4 MG/DL (ref 0.1–1.5)
BUN SERPL-MCNC: 18 MG/DL (ref 8–22)
CALCIUM SERPL-MCNC: 9.3 MG/DL (ref 8.5–10.5)
CHLORIDE SERPL-SCNC: 103 MMOL/L (ref 96–112)
CO2 SERPL-SCNC: 26 MMOL/L (ref 20–33)
CREAT SERPL-MCNC: 1.04 MG/DL (ref 0.5–1.4)
ERYTHROCYTE [DISTWIDTH] IN BLOOD BY AUTOMATED COUNT: 54.2 FL (ref 35.9–50)
GLOBULIN SER CALC-MCNC: 3.1 G/DL (ref 1.9–3.5)
GLUCOSE SERPL-MCNC: 116 MG/DL (ref 65–99)
HCT VFR BLD AUTO: 39 % (ref 42–52)
HGB BLD-MCNC: 12.9 G/DL (ref 14–18)
MCH RBC QN AUTO: 29.3 PG (ref 27–33)
MCHC RBC AUTO-ENTMCNC: 33.1 G/DL (ref 33.7–35.3)
MCV RBC AUTO: 88.6 FL (ref 81.4–97.8)
MORPHOLOGY BLD-IMP: NORMAL
PLATELET # BLD AUTO: 154 K/UL (ref 164–446)
PLATELET BLD QL SMEAR: NORMAL
PMV BLD AUTO: 10.3 FL (ref 9–12.9)
POTASSIUM SERPL-SCNC: 3.6 MMOL/L (ref 3.6–5.5)
PROT SERPL-MCNC: 6.7 G/DL (ref 6–8.2)
RBC # BLD AUTO: 4.4 M/UL (ref 4.7–6.1)
SIGNIFICANT IND 70042: NORMAL
SITE SITE: NORMAL
SODIUM SERPL-SCNC: 141 MMOL/L (ref 135–145)
SOURCE SOURCE: NORMAL
WBC # BLD AUTO: 6.5 K/UL (ref 4.8–10.8)

## 2020-04-28 PROCEDURE — 80053 COMPREHEN METABOLIC PANEL: CPT

## 2020-04-28 PROCEDURE — 700102 HCHG RX REV CODE 250 W/ 637 OVERRIDE(OP): Performed by: HOSPITALIST

## 2020-04-28 PROCEDURE — 700102 HCHG RX REV CODE 250 W/ 637 OVERRIDE(OP): Performed by: INTERNAL MEDICINE

## 2020-04-28 PROCEDURE — 99232 SBSQ HOSP IP/OBS MODERATE 35: CPT | Performed by: INTERNAL MEDICINE

## 2020-04-28 PROCEDURE — 770020 HCHG ROOM/CARE - TELE (206)

## 2020-04-28 PROCEDURE — A9270 NON-COVERED ITEM OR SERVICE: HCPCS | Performed by: INTERNAL MEDICINE

## 2020-04-28 PROCEDURE — 99232 SBSQ HOSP IP/OBS MODERATE 35: CPT | Performed by: PHYSICIAN ASSISTANT

## 2020-04-28 PROCEDURE — 85027 COMPLETE CBC AUTOMATED: CPT

## 2020-04-28 PROCEDURE — 700111 HCHG RX REV CODE 636 W/ 250 OVERRIDE (IP): Performed by: HOSPITALIST

## 2020-04-28 PROCEDURE — A9270 NON-COVERED ITEM OR SERVICE: HCPCS | Performed by: HOSPITALIST

## 2020-04-28 RX ADMIN — METOPROLOL TARTRATE 50 MG: 25 TABLET, FILM COATED ORAL at 05:04

## 2020-04-28 RX ADMIN — LISINOPRIL 10 MG: 10 TABLET ORAL at 05:06

## 2020-04-28 RX ADMIN — FUROSEMIDE 20 MG: 10 INJECTION, SOLUTION INTRAVENOUS at 05:03

## 2020-04-28 RX ADMIN — SENNOSIDES AND DOCUSATE SODIUM 2 TABLET: 8.6; 5 TABLET ORAL at 05:03

## 2020-04-28 RX ADMIN — ACETAMINOPHEN 650 MG: 325 TABLET, FILM COATED ORAL at 17:14

## 2020-04-28 RX ADMIN — FUROSEMIDE 20 MG: 10 INJECTION, SOLUTION INTRAVENOUS at 17:14

## 2020-04-28 RX ADMIN — METOPROLOL TARTRATE 50 MG: 25 TABLET, FILM COATED ORAL at 17:14

## 2020-04-28 RX ADMIN — RIVAROXABAN 20 MG: 20 TABLET, FILM COATED ORAL at 17:15

## 2020-04-28 ASSESSMENT — LIFESTYLE VARIABLES
NAUSEA AND VOMITING: NO NAUSEA AND NO VOMITING
AUDITORY DISTURBANCES: NOT PRESENT
AGITATION: NORMAL ACTIVITY
TREMOR: NO TREMOR
ANXIETY: NO ANXIETY (AT EASE)
ORIENTATION AND CLOUDING OF SENSORIUM: ORIENTED AND CAN DO SERIAL ADDITIONS
PAROXYSMAL SWEATS: NO SWEAT VISIBLE
TOTAL SCORE: 0
ON A TYPICAL DAY WHEN YOU DRINK ALCOHOL HOW MANY DRINKS DO YOU HAVE: 6
TREMOR: NO TREMOR
SUBSTANCE_ABUSE: 0
TREMOR: NO TREMOR
AVERAGE NUMBER OF DAYS PER WEEK YOU HAVE A DRINK CONTAINING ALCOHOL: 7
NAUSEA AND VOMITING: NO NAUSEA AND NO VOMITING
TOTAL SCORE: 0
TREMOR: NO TREMOR
HOW MANY TIMES IN THE PAST YEAR HAVE YOU HAD 5 OR MORE DRINKS IN A DAY: 365
VISUAL DISTURBANCES: NOT PRESENT
ANXIETY: NO ANXIETY (AT EASE)
AUDITORY DISTURBANCES: NOT PRESENT
NAUSEA AND VOMITING: NO NAUSEA AND NO VOMITING
VISUAL DISTURBANCES: NOT PRESENT
PAROXYSMAL SWEATS: NO SWEAT VISIBLE
EVER FELT BAD OR GUILTY ABOUT YOUR DRINKING: NO
VISUAL DISTURBANCES: NOT PRESENT
AGITATION: NORMAL ACTIVITY
TOTAL SCORE: 0
TOTAL SCORE: 0
HEADACHE, FULLNESS IN HEAD: NOT PRESENT
TOTAL SCORE: 0
HEADACHE, FULLNESS IN HEAD: NOT PRESENT
TOTAL SCORE: 0
TREMOR: NO TREMOR
VISUAL DISTURBANCES: NOT PRESENT
AUDITORY DISTURBANCES: NOT PRESENT
HEADACHE, FULLNESS IN HEAD: NOT PRESENT
AGITATION: NORMAL ACTIVITY
ORIENTATION AND CLOUDING OF SENSORIUM: ORIENTED AND CAN DO SERIAL ADDITIONS
CONSUMPTION TOTAL: POSITIVE
ANXIETY: NO ANXIETY (AT EASE)
TOTAL SCORE: 0
HAVE PEOPLE ANNOYED YOU BY CRITICIZING YOUR DRINKING: NO
AUDITORY DISTURBANCES: NOT PRESENT
ANXIETY: NO ANXIETY (AT EASE)
NAUSEA AND VOMITING: NO NAUSEA AND NO VOMITING
AGITATION: NORMAL ACTIVITY
ANXIETY: NO ANXIETY (AT EASE)
PAROXYSMAL SWEATS: NO SWEAT VISIBLE
AUDITORY DISTURBANCES: NOT PRESENT
HEADACHE, FULLNESS IN HEAD: NOT PRESENT
HEADACHE, FULLNESS IN HEAD: NOT PRESENT
PAROXYSMAL SWEATS: NO SWEAT VISIBLE
PAROXYSMAL SWEATS: NO SWEAT VISIBLE
EVER HAD A DRINK FIRST THING IN THE MORNING TO STEADY YOUR NERVES TO GET RID OF A HANGOVER: NO
TOTAL SCORE: 0
HAVE YOU EVER FELT YOU SHOULD CUT DOWN ON YOUR DRINKING: NO
VISUAL DISTURBANCES: NOT PRESENT
NAUSEA AND VOMITING: NO NAUSEA AND NO VOMITING
ORIENTATION AND CLOUDING OF SENSORIUM: ORIENTED AND CAN DO SERIAL ADDITIONS
ORIENTATION AND CLOUDING OF SENSORIUM: ORIENTED AND CAN DO SERIAL ADDITIONS
DOES PATIENT WANT TO STOP DRINKING: NO
DO YOU DRINK ALCOHOL: YES
ORIENTATION AND CLOUDING OF SENSORIUM: ORIENTED AND CAN DO SERIAL ADDITIONS
AGITATION: NORMAL ACTIVITY

## 2020-04-28 ASSESSMENT — ENCOUNTER SYMPTOMS
SHORTNESS OF BREATH: 1
FOCAL WEAKNESS: 0
EYE PAIN: 0
FEVER: 0
BLURRED VISION: 0
VOMITING: 0
DIARRHEA: 0
SPUTUM PRODUCTION: 1
TREMORS: 0
DOUBLE VISION: 0
SPEECH CHANGE: 0
DIZZINESS: 0
WEIGHT LOSS: 0
SENSORY CHANGE: 0
HALLUCINATIONS: 0
COUGH: 1
ABDOMINAL PAIN: 0
PHOTOPHOBIA: 0
ORTHOPNEA: 0
PALPITATIONS: 0
HEADACHES: 0
CHILLS: 0
MYALGIAS: 0
NECK PAIN: 0
CONSTIPATION: 0
BACK PAIN: 0
NAUSEA: 0
TINGLING: 0

## 2020-04-28 ASSESSMENT — PATIENT HEALTH QUESTIONNAIRE - PHQ9
1. LITTLE INTEREST OR PLEASURE IN DOING THINGS: NOT AT ALL
SUM OF ALL RESPONSES TO PHQ9 QUESTIONS 1 AND 2: 0
2. FEELING DOWN, DEPRESSED, IRRITABLE, OR HOPELESS: NOT AT ALL

## 2020-04-28 NOTE — DIETARY
Nutrition Services: Consult received for HF diet education. Also noted pt with skin breakdown; Wound Team consult pending. Per policy, RD not permitted to enter isolation rooms. Therefore, provided heart failure booklet to pt's RN and asked that she give it to the pt. If pt has questions, please feel free to have him call [facility] @ [phone]. Thank you.

## 2020-04-28 NOTE — HEART FAILURE PROGRAM
Cardiovascular Nurse Navigator () Advanced Heart Failure Program HF Exacerbation Consult Note:     Patient presented with LE edema and SOB that had apparently been going on for 3 years but worsened recently with the added symptom of a 20 # weight gain.    Patient has poorly controlled HTN, AF as a new diagnosis, obesity, tobacco use, and ETOH consumption higher than that recommended by the Dietary Guidelines for Americans:    U.S. Department of Health and Human Services and U.S. Department of Agriculture. 2015 - 2020 Dietary Guidelines for Americans. 8th Edition. December 2015. Available at https://health.gov/dietaryguidelines/2015/guidelines/.    No cardiology notes in our system since 2012 and those notes were from another local provider who no longer practices with [facility]. So, I've asked hospital schedulers to please check in with the patient to find out if he's been following with another cardiology group before we schedule?    Patient self identifies as  so we do need to address his candidacy for Hydralazine dinitrate prior to discharge.    Dr. Sue consulted today and has advised that patient have an angiogram to assess for ICM once he is euvolemic.    · HFrEF (30%)  · NYHA: not being addressed by providers however, based upon notes currently available, I would say NYHA III upon presentation  · Stage: not being addressed by providers  · Etiology: work up is ongoing. Ischemia to be evaluated with angio and multiple other candidates for etiology listed above.  · Diuresis: furosemide IV 20 BID  · Diabetic or newly diagnosed DM?: no  · Atrial fibrillation?: yes AC with rivaroxaban  · Current smoker? Yes, thank you so much to Dr. Yuko Chris who documented cessation counseling today!  · PHQ-2 score: 0    [facility] Plan Notes: CTTA    Therapy Notes: none    Demographics:    · Residence: [place]  · Insurance: Medicare    GD Secondary Prevention Interventions:    ·   · Influenza vaccine:  n/a    Daily Weights: ordered    I's and O's: ordered    HFrEF Specific GDMT:  · CLEMENTINA - I: lisinopril  · Evidence based BB (bisoprolol, carvedilol, or Toprol XL): Not currently prescribed, will need to be addressed: prescribed or a provider note indicating why not prescribed, prior to discharge.  · Aldosterone receptor antagonist: Not currently prescribed, will need to be addressed: prescribed or a provider note indicating why not prescribed, prior to discharge.   · Consider for hydralazine dinitrate?: needs to be addressed    HFrEF Specific Device Therapy Screening Tool not indicated for new diagnosis    Source: [employer] SCA Prevention Program Screening Tool  2013 ACC/ AHA Heart Failure Guidelines  Rev date: 12/2014    Advanced Care Planning: no AD on file. Full code status at the time of this note's filing.    The ACC recommends engaging palliative care as part of optimization of HF treatment to solicit goals of care and focus on quality of life throughout the clinical course of HF.    Once all diagnostics are in, please consider an order for palliative to discuss Advanced Care Planning.      Speaking with patients frankly about their end-of-life wishes is one of the most important things a palliative care team can do. This is especially important in the context of heart failure, since it’s such an unpredictable disease.    Follow up appointment:   • If discharged from acute care to home (exception hospice discharge), pt must have an appointment scheduled within 7 days of discharge (Cardiology, PCP, or DC Clinic).    • If discharged to Transitional Care Facility (LTAC, SNF, IRH), appointment should be made about a month out for after TCF.     Bedside Nursing Education:  Please provide HF booklet and repeated, ongoing education while administering medications, weighing patient, discussing management of symptoms, diet and need to follow up and act on changes. Please target education to the precipitant of the  "exacerbation.    Bedside Nursing Discharge:  When completing the after visit summary (discharge instructions) please select \"Cardiac Diagnosis, and Heart Failure\" in the special instructions section to populate the heart failure specific discharge instructions.     Referrals/Orders Placed:    Hospital Schedulers for HF f/u?  yes  Social Work   No following  Registered Dietician  yes  [facility] CP Program for patients with Medicaid, [facility], or [facility] coverage?  non  Outpatient Care Coordination for patients with Medicaid?  no    Many thanks, Francisca, Cardiovascular Nurse Navigator, RN, [facility] [phone], & [employer] M-F (excluding holidays).          "

## 2020-04-28 NOTE — PROGRESS NOTES
Hospital Medicine Daily Progress Note    Date of Service  4/28/2020    Chief Complaint  69 y.o. male admitted 4/26/2020 with shortness of breath and LE swelling.     Hospital Course    70 yo M with obesity, chronic LE edema, alcohol and tobacco use here with LE swelling and progressive SOB. He repots that the swelling is chronic but became acute worse over the last 2 months. Endorsed 20 pound weight gain. He was found to have new congestive heart failure and Afib with RVR.  Cardiology was consulted and he was started on IV diuresis.      Interval Problem Update  Today I evaluated and examined him at the bedside  He reported that he is feeling better and his shortness of breath has been improving.  Cardiology evaluated him and recommended continue IV diuresis.  I discussed plan of care with him.  Continue to monitor input and output closely.    Consultants/Specialty  Cardiology    Code Status  Full    Disposition  Home when medically stable.    Review of Systems  Review of Systems   Constitutional: Positive for malaise/fatigue. Negative for chills, fever and weight loss.   HENT: Negative for hearing loss and tinnitus.    Eyes: Negative for blurred vision, double vision, photophobia and pain.   Respiratory: Positive for cough, sputum production and shortness of breath.    Cardiovascular: Negative for chest pain, palpitations, orthopnea and leg swelling.   Gastrointestinal: Negative for abdominal pain, constipation, diarrhea, nausea and vomiting.   Genitourinary: Negative for dysuria, frequency and urgency.   Musculoskeletal: Negative for back pain, joint pain, myalgias and neck pain.   Skin: Negative for rash.   Neurological: Negative for dizziness, tingling, tremors, sensory change, speech change, focal weakness and headaches.   Psychiatric/Behavioral: Negative for hallucinations and substance abuse.   All other systems reviewed and are negative.       Physical Exam  Temp:  [36.1 °C (97 °F)-37.2 °C (99 °F)] 37.2 °C  (99 °F)  Pulse:  [] 98  Resp:  [18-41] 20  BP: (123-156)/() 123/72  SpO2:  [91 %-100 %] 94 %    Physical Exam  Vitals signs reviewed.   Constitutional:       General: He is not in acute distress.  HENT:      Head: Normocephalic and atraumatic. No contusion.      Right Ear: External ear normal.      Left Ear: External ear normal.      Nose: Nose normal.      Comments: Wearing oxygen nasal cannula.     Mouth/Throat:      Mouth: Mucous membranes are dry.      Pharynx: No oropharyngeal exudate.   Eyes:      General:         Right eye: No discharge.         Left eye: No discharge.      Pupils: Pupils are equal, round, and reactive to light.   Neck:      Musculoskeletal: No neck rigidity or muscular tenderness.   Cardiovascular:      Rate and Rhythm: Normal rate and regular rhythm.      Heart sounds: No murmur. No friction rub. No gallop.    Pulmonary:      Effort: Pulmonary effort is normal.      Breath sounds: No wheezing or rhonchi.      Comments: Decreased breath sounds at the bases.  Abdominal:      General: Bowel sounds are normal. There is no distension.      Palpations: Abdomen is soft.      Tenderness: There is no abdominal tenderness. There is no rebound.   Musculoskeletal: Normal range of motion.         General: No swelling or tenderness.      Right lower leg: Edema present.      Left lower leg: Edema present.   Skin:     General: Skin is warm and dry.      Coloration: Skin is not jaundiced.   Neurological:      General: No focal deficit present.      Mental Status: He is alert.      Cranial Nerves: No cranial nerve deficit.      Sensory: No sensory deficit.   Psychiatric:         Mood and Affect: Mood normal.         Fluids    Intake/Output Summary (Last 24 hours) at 4/28/2020 1318  Last data filed at 4/28/2020 0902  Gross per 24 hour   Intake 958 ml   Output 2650 ml   Net -1692 ml       Laboratory  Recent Labs     04/26/20  1357 04/28/20  0515   WBC 7.3 6.5   RBC 4.43* 4.40*   HEMOGLOBIN 12.9*  12.9*   HEMATOCRIT 39.7* 39.0*   MCV 89.6 88.6   MCH 29.1 29.3   MCHC 32.5* 33.1*   RDW 53.9* 54.2*   PLATELETCT 149* 154*   MPV 10.5 10.3     Recent Labs     04/26/20  1357 04/27/20  0000 04/28/20  0515   SODIUM 144 142 141   POTASSIUM 3.9 4.1 3.6   CHLORIDE 108 105 103   CO2 21 22 26   GLUCOSE 97 126* 116*   BUN 17 20 18   CREATININE 0.98 1.06 1.04   CALCIUM 9.1 9.4 9.3     Recent Labs     04/27/20  0830   INR 2.72*         Recent Labs     04/27/20  0000 04/27/20  0515   TRIGLYCERIDE 58 54   HDL 34* 32*   LDL 62 59       Imaging  EC-ECHOCARDIOGRAM COMPLETE W/O CONT   Final Result      CT-CTA CHEST PULMONARY ARTERY W/ RECONS   Final Result      1.  No large pulmonary embolus is identified.      2.  Irregular nodular opacity in the right lung apex may represent an infectious or inflammatory process. Follow-up low dose CT is recommended in approximately 3 months to ensure resolution.      3.  Atherosclerosis and cardiomegaly.      4.  Ascites in the partially visualized upper abdomen            US-EXTREMITY VENOUS LOWER UNILAT RIGHT   Final Result      DX-CHEST-PORTABLE (1 VIEW)   Final Result      Interval cardiac silhouette enlargement without consolidation identified           Assessment/Plan  * Acute systolic heart failure (HCC)- (present on admission)  Assessment & Plan  New diagnosis of HFrEF, EF reduced from 50 to 30%. Net negative 2.3L since admission.   - cardiology consulted and they evaluated him and made recommendations to  Continue IV Lasix.  Continue heart failure medication which include metoprolol and ACE inhibitor      Atrial fibrillation with rapid ventricular response (HCC)- (present on admission)  Assessment & Plan  New diagnosis unknown duration. Likely cause for patient's exertional dyspnea. Rate with better control now.   Continue rate control with metoprolol.  Cardiology evaluated him and made recommendations.  Continue anticoagulation with Xarelto.  Continue monitor on telemetry    Acute  "hypoxemic respiratory failure (HCC)- (present on admission)  Assessment & Plan  Related to Afib with RVR and acute CHF exacerbation. Procal low. CTA without evidence of PE  - covid negative x1  Cardiology consulted and recommended diuresis with IV Lasix.  Continue to monitor input and output closely.  Continue to provide him oxygen and titrate as tolerated.    Lower extremity edema  Assessment & Plan  Chronic. Worsened over the past several weeks to 2 months, per patient.   Continue diuresis.     Elevated troponin- (present on admission)  Assessment & Plan  Patient without chest pain and EKG without ischemic changes.  Likely due to demand ischemia 2/2 afib with RVR and fluid overload  Cardiology evaluated him and recommended outpatient ischemic work-up.  He does not have any acute chest pain    Hypertension- (present on admission)  Assessment & Plan  Uncontrolled.  Continue lisinopril and metoprolol.  Continue Lasix for diuresis.    Thrombocytopenia (HCC)  Assessment & Plan  Platelet count is trending up and current platelet count is 154.  Does not have any right upper quadrant pain.  Liver enzymes are within normal limits  CMP tomorrow morning.    Right upper lobe pulmonary infiltrate- (present on admission)  Assessment & Plan  With symptoms of mild cough, dyspnea. Suspect inflammatory process. \"Irregular nodular opacity in the right lung apex \"  - will need repeat imaging in 3 months to ensure resolution  He remains afebrile.    Tobacco dependence- (present on admission)  Assessment & Plan  Continues to use tobacco.  - nicotine replacement declined initially but available  Continue counseling    Obesity (BMI 30-39.9)- (present on admission)  Assessment & Plan  Body mass index is 36.94 kg/m².    Anemia- (present on admission)  Assessment & Plan  Hemoglobin stable at 12.9 today.   He does not show signs of active bleeding.  Continue to monitor and transfuse if hemoglobin below 7.      Wound of lower extremity- " (present on admission)  Assessment & Plan  With stasis dermatitis. No signs for infection on exam.  Wound care.       VTE prophylaxis: Continue Xarelto for anticoagulation

## 2020-04-28 NOTE — PROGRESS NOTES
Bedside report received 0700. POC discussed with pt; SEBASTIÁNWA per flowsheet; Medication not indicated; No overnight events; Pt requiring supplemental oxygen; Will wean as tolerated; all questions answered at this time.

## 2020-04-28 NOTE — PROGRESS NOTES
Patient resting quietly in bed. No c/o at the present time. Slight dyspnea especially with exertion. Wheezing audibly at times. Sats. are in low 90's so O2 at 2L started per nasal cannula. Patient with generalized edema. Abdomen distended and midline protrusion noted when patient strains to move. Bilateral lower extremities with swelling and discoloration. Open areas noted to front of right lower leg and outer aspect of left lower leg. Open areas also to both inner heels of feet. These areas appear to be healing and sloughing. All wounds cleansed and redressed. Old dressings with large amount of serous drainage.

## 2020-04-28 NOTE — PROGRESS NOTES
[facility] Cardiology Follow-up Note    Date of Service:    4/28/2020      Name:   Robert Mcdonnell     YOB: 1950  Age:   69 y.o.  male   MRN:   [id number]      Chief Complaint: CHF, AFIB    HPI:  Mr Mcdonnell is a 68 y/o fellow with PMH including HTN.  Lives at home with his brother.  Was admitted to [facility] on 4/26/20 with c/o shortness of breath and leg swelling, 20 lb wt gain.  Found to be in AFIB, EF newly reduced from 50 to 30%.  Ruled out for PE by CTA.  COVID negative on 4/26.    Interim Events:  Patient states he is still feeling somewhat shortness of breath  His LE swelling is present but improved  Denies chest pain  Denies productive cough.  + fatigue and copious UOP.      ROS  All other review of systems reviewed and negative.    Past medical, surgical, social, and family history reviewed and unchanged from admission except as noted in assessment and plan.    Medications: Reviewed in MAR  Current Facility-Administered Medications   Medication Dose Frequency Provider Last Rate Last Dose   • lisinopril (PRINIVIL) tablet 10 mg  10 mg Q DAY Brent Sue M.D.   10 mg at 04/28/20 0506   • senna-docusate (PERICOLACE or SENOKOT S) 8.6-50 MG per tablet 2 Tab  2 Tab BID Josue Carty M.D.   2 Tab at 04/28/20 0503    And   • polyethylene glycol/lytes (MIRALAX) PACKET 1 Packet  1 Packet QDAY PRN Josue Carty M.D.        And   • magnesium hydroxide (MILK OF MAGNESIA) suspension 30 mL  30 mL QDAY PRN Josue Carty M.D.        And   • bisacodyl (DULCOLAX) suppository 10 mg  10 mg QDAY PRN Josue Carty M.D.       • acetaminophen (TYLENOL) tablet 650 mg  650 mg Q6HRS PRN Josue Carty M.D.   650 mg at 04/27/20 2341   • furosemide (LASIX) injection 20 mg  20 mg BID DIURETIC Josue Carty M.D.   20 mg at 04/28/20 0503   • metoprolol (LOPRESSOR) tablet 50 mg  50 mg TWICE DAILY Josue Carty M.D.   50 mg at 04/28/20 0504   • DILTIAZem (CARDIZEM) injection 10 mg  10 mg Q4HRS PRN Josue  "TAHIR Carty M.D.       • rivaroxaban (XARELTO) tablet 20 mg  20 mg PM MEAL Josue Carty M.D.   20 mg at 04/27/20 1715   • Respiratory Therapy Consult   Continuous RT Josue Carty M.D.       Last reviewed on 4/26/2020  8:07 PM by Francisca Shannon R.N.    Allergies   Allergen Reactions   • Aspirin Vomiting     Pt states vomiting.       Physical Exam  Body mass index is 36.94 kg/m². /82   Pulse 96   Temp 36.6 °C (97.8 °F) (Temporal)   Resp 20   Ht 1.854 m (6' 1\")   Wt (!) 127 kg (279 lb 15.8 oz)   SpO2 91%    Vitals:    04/28/20 0000 04/28/20 0100 04/28/20 0504 04/28/20 0804   BP: 150/85  145/88 139/82   Pulse: 95 92 95 96   Resp: 18 (!) 21  20   Temp:   36.6 °C (97.9 °F) 36.6 °C (97.8 °F)   TempSrc:   Temporal Temporal   SpO2:   94% 91%   Weight:       Height:        Oxygen Therapy:  Pulse Oximetry: 91 %, O2 (LPM): 0, O2 Delivery Device: None - Room Air    General: no apparent distress, obese  Eyes: normal conjunctiva  ENT: OP clear  Neck: + JVD   Lungs: normal respiratory effort,  With bibasilar crackles, no wheezing or rhonchi.  Heart: normal rate,  irregular rhythm, no murmur, no rubs or gallops,   EXT: 2-3+ edema bilateral lower extremities. + bilateral pedal pulses. no cyanosis  Abdomen: soft, non tender, non distended,  Neurological: No focal deficits, no facial asymmetry.  Normal speech.  Psychiatric: Appropriate affect, alert and oriented x 3.   Skin: very dry and thickened to the BLE.  No rash.    Labs (personally reviewed):     Lab Results   Component Value Date/Time    SODIUM 141 04/28/2020 05:15 AM    POTASSIUM 3.6 04/28/2020 05:15 AM    CHLORIDE 103 04/28/2020 05:15 AM    CO2 26 04/28/2020 05:15 AM    GLUCOSE 116 (H) 04/28/2020 05:15 AM    BUN 18 04/28/2020 05:15 AM    CREATININE 1.04 04/28/2020 05:15 AM     Lab Results   Component Value Date/Time    ALKPHOSPHAT 129 (H) 04/28/2020 05:15 AM    ASTSGOT 28 04/28/2020 05:15 AM    ALTSGPT 21 04/28/2020 05:15 AM    TBILIRUBIN 1.4 04/28/2020 05:15 AM    "   Lab Results   Component Value Date/Time    CHOLSTRLTOT 102 2020 05:15 AM    LDL 59 2020 05:15 AM    HDL 32 (A) 2020 05:15 AM    TRIGLYCERIDE 54 2020 05:15 AM     Lab Results   Component Value Date/Time    BNPBTYPENAT 81 2017 10:25 PM         Cardiac Imaging and Procedures Review:      Personal Telemetry Review:  afib in the 90s.    Personal EKG Interpretation 20:  AFIB 132, Qrad 93, Qtc 464.  Some inferior/lateral st changes.    Echo 20:  CONCLUSIONS  Compared to the images of the prior echocardiogram dated 2015 -   there is now  a reduced ejection fraction and atrial fibrillation.   There is now moderate mitral regurgitation and severe tricuspid   regurgitation.  Moderately reduced left ventricular systolic function.  Left ventricular ejection fraction is visually estimated to be 30%.  Global hypokinesis most prominent in the inferior wall.  Moderate  mitral regurgitation.  Severe tricuspid regurgitation.  Unable to estimate pulmonary artery pressure due to severe tricuspid   regurgitation.      Assessment and Medical Decision Makin   Acute decompensated HFrEF, 30%.  Continue IV diuresis.  Not close to euvolemia.      2   Newly detected atrial fibrillation with improved rate control.  HR in the 90s this AM.  Continue lopressor 50 mg BID.  [id number] = 4 started on Xarelto 20 mg.    3   Essential hypertension  BP improved 139/82 this AM, goal < 130/80.  Will likely continue to improve with volume reduction, otherwise recommend increasing lisinopril or change lopressor to carvedilol.    4   ETOH use     5   Tobacco dependence. Cessation encouraged.     6   Atherosclerosis on CT.  Ischemic evaluation in the future, can be done as out patient.    7   Moderate MR.    8   Severe TR.    9   Obesity, BMI 36.9.    10  COVID negative x 1 on 20.      See further recommendations from Dr. Sue in attestation above.    Yuko Haq PA-C  [facility]

## 2020-04-29 ENCOUNTER — PATIENT OUTREACH (OUTPATIENT)
Dept: HEALTH INFORMATION MANAGEMENT | Facility: OTHER | Age: 70
End: 2020-04-29

## 2020-04-29 LAB
ALBUMIN SERPL BCP-MCNC: 3.8 G/DL (ref 3.2–4.9)
ALBUMIN/GLOB SERPL: 1.2 G/DL
ALP SERPL-CCNC: 130 U/L (ref 30–99)
ALT SERPL-CCNC: 22 U/L (ref 2–50)
ANION GAP SERPL CALC-SCNC: 12 MMOL/L (ref 7–16)
AST SERPL-CCNC: 28 U/L (ref 12–45)
BILIRUB SERPL-MCNC: 1.7 MG/DL (ref 0.1–1.5)
BUN SERPL-MCNC: 17 MG/DL (ref 8–22)
CALCIUM SERPL-MCNC: 9.5 MG/DL (ref 8.5–10.5)
CHLORIDE SERPL-SCNC: 99 MMOL/L (ref 96–112)
CO2 SERPL-SCNC: 26 MMOL/L (ref 20–33)
CREAT SERPL-MCNC: 1.04 MG/DL (ref 0.5–1.4)
ERYTHROCYTE [DISTWIDTH] IN BLOOD BY AUTOMATED COUNT: 52.5 FL (ref 35.9–50)
GLOBULIN SER CALC-MCNC: 3.1 G/DL (ref 1.9–3.5)
GLUCOSE SERPL-MCNC: 91 MG/DL (ref 65–99)
HCT VFR BLD AUTO: 39.8 % (ref 42–52)
HGB BLD-MCNC: 13 G/DL (ref 14–18)
INR PPP: 2.42 (ref 0.87–1.13)
MAGNESIUM SERPL-MCNC: 1.7 MG/DL (ref 1.5–2.5)
MCH RBC QN AUTO: 28.8 PG (ref 27–33)
MCHC RBC AUTO-ENTMCNC: 32.7 G/DL (ref 33.7–35.3)
MCV RBC AUTO: 88.2 FL (ref 81.4–97.8)
PLATELET # BLD AUTO: 149 K/UL (ref 164–446)
PMV BLD AUTO: 10.8 FL (ref 9–12.9)
POTASSIUM SERPL-SCNC: 3.6 MMOL/L (ref 3.6–5.5)
PROT SERPL-MCNC: 6.9 G/DL (ref 6–8.2)
PROTHROMBIN TIME: 27.1 SEC (ref 12–14.6)
RBC # BLD AUTO: 4.51 M/UL (ref 4.7–6.1)
SODIUM SERPL-SCNC: 137 MMOL/L (ref 135–145)
WBC # BLD AUTO: 6.6 K/UL (ref 4.8–10.8)

## 2020-04-29 PROCEDURE — 80053 COMPREHEN METABOLIC PANEL: CPT

## 2020-04-29 PROCEDURE — 85610 PROTHROMBIN TIME: CPT

## 2020-04-29 PROCEDURE — A9270 NON-COVERED ITEM OR SERVICE: HCPCS | Performed by: INTERNAL MEDICINE

## 2020-04-29 PROCEDURE — 700111 HCHG RX REV CODE 636 W/ 250 OVERRIDE (IP): Performed by: HOSPITALIST

## 2020-04-29 PROCEDURE — 83735 ASSAY OF MAGNESIUM: CPT

## 2020-04-29 PROCEDURE — 700102 HCHG RX REV CODE 250 W/ 637 OVERRIDE(OP): Performed by: INTERNAL MEDICINE

## 2020-04-29 PROCEDURE — 99232 SBSQ HOSP IP/OBS MODERATE 35: CPT | Performed by: INTERNAL MEDICINE

## 2020-04-29 PROCEDURE — A9270 NON-COVERED ITEM OR SERVICE: HCPCS | Performed by: HOSPITALIST

## 2020-04-29 PROCEDURE — 700102 HCHG RX REV CODE 250 W/ 637 OVERRIDE(OP): Performed by: HOSPITALIST

## 2020-04-29 PROCEDURE — 770020 HCHG ROOM/CARE - TELE (206)

## 2020-04-29 PROCEDURE — 99232 SBSQ HOSP IP/OBS MODERATE 35: CPT | Performed by: PHYSICIAN ASSISTANT

## 2020-04-29 PROCEDURE — 85027 COMPLETE CBC AUTOMATED: CPT

## 2020-04-29 PROCEDURE — A6209 FOAM DRSG <=16 SQ IN W/O BDR: HCPCS | Performed by: INTERNAL MEDICINE

## 2020-04-29 RX ORDER — LISINOPRIL 20 MG/1
20 TABLET ORAL
Status: DISCONTINUED | OUTPATIENT
Start: 2020-04-30 | End: 2020-05-01

## 2020-04-29 RX ORDER — WARFARIN SODIUM 5 MG/1
5 TABLET ORAL
Status: COMPLETED | OUTPATIENT
Start: 2020-04-29 | End: 2020-04-29

## 2020-04-29 RX ADMIN — FUROSEMIDE 20 MG: 10 INJECTION, SOLUTION INTRAVENOUS at 05:25

## 2020-04-29 RX ADMIN — FUROSEMIDE 20 MG: 10 INJECTION, SOLUTION INTRAVENOUS at 17:13

## 2020-04-29 RX ADMIN — ACETAMINOPHEN 650 MG: 325 TABLET, FILM COATED ORAL at 10:50

## 2020-04-29 RX ADMIN — METOPROLOL TARTRATE 50 MG: 25 TABLET, FILM COATED ORAL at 17:11

## 2020-04-29 RX ADMIN — METOPROLOL TARTRATE 50 MG: 25 TABLET, FILM COATED ORAL at 05:25

## 2020-04-29 RX ADMIN — LISINOPRIL 10 MG: 10 TABLET ORAL at 05:26

## 2020-04-29 RX ADMIN — WARFARIN SODIUM 5 MG: 5 TABLET ORAL at 17:10

## 2020-04-29 ASSESSMENT — LIFESTYLE VARIABLES
TREMOR: NO TREMOR
ORIENTATION AND CLOUDING OF SENSORIUM: ORIENTED AND CAN DO SERIAL ADDITIONS
VISUAL DISTURBANCES: NOT PRESENT
AUDITORY DISTURBANCES: NOT PRESENT
AGITATION: NORMAL ACTIVITY
HEADACHE, FULLNESS IN HEAD: NOT PRESENT
ANXIETY: NO ANXIETY (AT EASE)
TOTAL SCORE: 0
NAUSEA AND VOMITING: NO NAUSEA AND NO VOMITING
SUBSTANCE_ABUSE: 0
PAROXYSMAL SWEATS: NO SWEAT VISIBLE

## 2020-04-29 ASSESSMENT — ENCOUNTER SYMPTOMS
DIARRHEA: 0
MYALGIAS: 0
COUGH: 1
NAUSEA: 0
VOMITING: 0
ORTHOPNEA: 0
TINGLING: 0
ABDOMINAL PAIN: 0
SHORTNESS OF BREATH: 1
DOUBLE VISION: 0
EYE PAIN: 0
WEIGHT LOSS: 0
PALPITATIONS: 0
SPEECH CHANGE: 0
SPUTUM PRODUCTION: 0
NECK PAIN: 0
FOCAL WEAKNESS: 0
SENSORY CHANGE: 0
BACK PAIN: 0
HALLUCINATIONS: 0
FEVER: 0
CHILLS: 0
TREMORS: 0
DIZZINESS: 0
PHOTOPHOBIA: 0
BLURRED VISION: 0
CONSTIPATION: 0
HEADACHES: 0

## 2020-04-29 ASSESSMENT — CHA2DS2 SCORE
PRIOR STROKE OR TIA OR THROMBOEMBOLISM: NO
HYPERTENSION: YES
AGE 65 TO 74: YES
CHA2DS2 VASC SCORE: 3
DIABETES: NO
AGE 75 OR GREATER: NO
SEX: MALE
CHF OR LEFT VENTRICULAR DYSFUNCTION: YES
VASCULAR DISEASE: NO

## 2020-04-29 NOTE — PROGRESS NOTES
Patient slept well. In atrial fibrillation all night. Average heart rate - 97. No c/o all night. Intake fair. Output could be better. Urine remains very concentrated. Again water encouraged. Dsgs. clean, dry and intact to lower legs. Oxygen placed during the night since his oxygen levels fell into the high 80's. Pleasant gentleman.

## 2020-04-29 NOTE — PROGRESS NOTES
Patient sleeping soundly at this time. Not disturbed. No evidence of CIWA issues since patient admission. Urine still continues quite dark.

## 2020-04-29 NOTE — CARE PLAN
Problem: Communication  Goal: The ability to communicate needs accurately and effectively will improve  Outcome: PROGRESSING AS EXPECTED     Problem: Safety  Goal: Will remain free from falls  Outcome: PROGRESSING AS EXPECTED  Goal: Will remain free from injury  Outcome: PROGRESSING AS EXPECTED     Problem: Venous Thromboembolism (VTW)/Deep Vein Thrombosis (DVT) Prevention:  Goal: Patient will participate in Venous Thrombosis (VTE)/Deep Vein Thrombosis (DVT)Prevention Measures  Outcome: PROGRESSING AS EXPECTED     Problem: Pain Management  Goal: Pain level will decrease to patient's comfort goal  Outcome: PROGRESSING AS EXPECTED     Problem: Infection  Goal: Will remain free from infection  Outcome: PROGRESSING AS EXPECTED

## 2020-04-29 NOTE — WOUND TEAM
[facility]  Inpatient Services  Initial Wound and Skin Care Evaluation    Admission Date: 4/26/2020     Last order of IP CONSULT TO WOUND CARE was found on 4/29/2020 from Hospital Encounter on 4/26/2020       HPI, PMH, SH: Reviewed    Unit where seen by Wound Team: T216/00     WOUND CONSULT RELATED TO:  BLE    Self Report / Pain Level:  Pain with palpation       OBJECTIVE:  Pt on pressure redistribution mattress, dressing in place to BLE.    WOUND TYPE, LOCATION, CHARACTERISTICS (Pressure Injuries: location, stage, POA or date identified)  Wound 04/26/20 Full Thickness Wound Leg;Ankle Anterior;Posterior;Medial Bilateral (Active)   Wound Image     4/29/2020  3:00 PM   Site Assessment Dry;White;Red 4/29/2020  3:00 PM   Periwound Assessment Warm;Edema;Painful 4/29/2020  3:00 PM   Margins Defined edges 4/29/2020  3:00 PM   Closure None 4/29/2020  3:00 PM   Drainage Amount Scant 4/29/2020  3:00 PM   Drainage Description Serous 4/29/2020  3:00 PM   Treatments Cleansed;Site care 4/29/2020  3:00 PM   Wound Cleansing Not Applicable 4/29/2020  3:00 PM   Periwound Protectant Skin Protectant Wipes to Periwound 4/29/2020  3:00 PM   Dressing Cleansing/Solutions Not Applicable 4/29/2020  3:00 PM   Dressing Options Hydrofera Blue Ready;Silicone Adhesive Foam;Tubigrip 4/29/2020  3:00 PM   Dressing Changed New 4/29/2020  3:00 PM   Dressing Status Dry;Intact 4/29/2020  3:00 PM   Dressing Change/Treatment Frequency Every 48 hrs, and As Needed 4/29/2020  3:00 PM   NEXT Dressing Change/Treatment Date 05/01/20 4/29/2020  3:00 PM   NEXT Weekly Photo (Inpatient Only) 05/06/20 4/29/2020  3:00 PM   Non-staged Wound Description Full thickness 4/29/2020  3:00 PM   Wound Length (cm) 2 cm 4/29/2020  3:00 PM   Wound Width (cm) 1 cm 4/29/2020  3:00 PM   Wound Depth (cm) 0.1 cm 4/29/2020  3:00 PM   Wound Surface Area (cm^2) 2 cm^2 4/29/2020  3:00 PM   Wound Volume (cm^3) 0.2 cm^3 4/29/2020  3:00 PM   Tunneling (cm) 0 cm 4/29/2020   3:00 PM   Undermining (cm) 0 cm 4/29/2020  3:00 PM   Shape irregular 4/29/2020  3:00 PM   Wound Odor None 4/29/2020  3:00 PM   Pulses 1+;Right;Left;DP;Thready 4/29/2020  3:00 PM   Exposed Structures None 4/29/2020  3:00 PM   Number of days: 3          Vascular:    SHANTI:   No results found.      Lab Values:    Lab Results   Component Value Date/Time    WBC 6.6 04/29/2020 05:30 AM    RBC 4.51 (L) 04/29/2020 05:30 AM    HEMOGLOBIN 13.0 (L) 04/29/2020 05:30 AM    HEMATOCRIT 39.8 (L) 04/29/2020 05:30 AM    SEDRATEWES 9 08/26/2015 08:15 AM    HBA1C 5.5 08/26/2015 08:15 AM          Culture:   NA,   Culture Results show:  No results found for this or any previous visit (from the past 720 hour(s)).      INTERVENTIONS BY WOUND TEAM: Donned PPE. BLE dressing removed. Wound and harriett wound cleansed with wound cleanser and gauze. Pictures and measuements obtained. No sting barrier to harriett wound. Hydrofera blue ready applied to wound beds. Secured with adhesive foams. Tubi  G applied.    Interdisciplinary consultation: Patient, Aneesh BHATIA. Dr. Camejo    EVALUATION: Pt presenting with full thickness wounds to R anterior shin, left posterior calf and R medial ankle. Pt states wounds developed secondary to swelling. Pt reports welling has vastly improved since admission. Tubi  G applied for mild compression.Hydrofera Blue applied for the hydrophilic polyurethane foam which contains ethylene oxide used as a bactericidal, fungicidal, and sporicidal disinfectant. Hydrofera Blue also aids in maintaining a moist wound environment. The absorption properties of this dressing are important in collecting exudates and bacteria from the injured area. These harmful fluid secretions bind to the dressing removing it from the wound without the foam sticking to the wound causing more harm. Dr. Camejo to place referral for OP wound care.    Goals: Steady decrease in wound area and depth weekly.    NURSING PLAN OF CARE ORDERS (X):    Dressing  changes: See Dressing Care orders: x  Skin care: See Skin Care orders:   Rectal tube care: See Rectal Tube Care orders:   Other orders:    RSKIN:   CURRENTLY IN PLACE (X), APPLIED THIS VISIT (A), ORDERED (O):   Q shift Edward:  x  Q shift pressure point assessments:  x  Pressure redistribution mattress     x       Low Airloss          Bariatric AREN         Bariatric foam           Heel float boots     Heel Silicone dressing        Float Heels off Bed with Pillows               Barrier wipes         Barrier Cream         Barrier paste          Sacral silicone dressing         Silicone O2 tubing         Anchorfast         Cannula fixation Device (Tender )          Gray Foam Ear protectors           Trach with Optifoam split foam                 Waffle cushion        Waffle Overlay         Rectal tube or BMS    Purwick/Condom Cath          Antifungal tx      Interdry          Reposition q 2 hours   X encourage pt turning     Up to chair        Ambulate      PT/OT        Dietician        Diabetes Education      PO   x  TF     TPN     NPO   # days   Other    Tubi  applied    WOUND TEAM PLAN OF CARE   Dressing changes by wound team:          Follow up 1-2 times weekly:   X nursing to perform dressing care; wound team to follow weekly.            Follow up 3 times weekly:                NPWT change 3 times weekly:     Follow up as needed:       Other (explain):     Anticipated discharge plans:  LTACH:        SNF/Rehab:                  Home Care:           Outpatient Wound Center:     x       Self Care:

## 2020-04-29 NOTE — PROGRESS NOTES
Inpatient Anticoagulation Service Note    Date: 4/29/2020    Reason for Anticoagulation: Atrial Fibrillation   Target INR: 2.0 to 3.0  [id number] VASc Score: 3  HAS-BLED Score: 1   Hemoglobin Value: (!) 13  Hematocrit Value: (!) 39.8  Lab Platelet Value: (!) 149    INR from last 7 days     Date/Time INR Value    04/29/20 0530  (!) 2.42    04/27/20 0830  (!) 2.72        Dose from last 7 days     Date/Time Dose (mg)    04/29/20 1546  5        Average Dose (mg): new start   Significant Interactions: Not Applicable  Bridge Therapy: No     Reversal Agent Administered: Not Applicable    Comments: New start warfarin for AFib. The patient was previously on Xarelto (dose given the past 3 nights), but DOAC was not covered by the patient's insurance. A referral was sent to the [facility], [place] anticoagulation clinic to establish warfarin care after discharge. INR today is within therapeutic range. H/H and renal function remain stable. No sxs of bleeding noted.     Plan:  Warfarin 5 mg PO this evening. Will obtain an INR with AM labs tomorrow. Pharmacy will continue to follow.        Education Material Provided?: No(The patient will require warfarin counseling prior to discharge)  Pharmacist suggested discharge dosing: TBD based on subsequent INR results. Obtain a follow up INR within 72h of discharge.     Karen Nolasco, PharmD, BCPS

## 2020-04-29 NOTE — PROGRESS NOTES
[facility] Cardiology Follow-up Note    Date of Service:    4/29/2020      Name:   Robert Mcdonnell     YOB: 1950  Age:   69 y.o.  male   MRN:   [id number]      Chief Complaint: CHF, AFIB    HPI:  Mr Mcdonnell is a 68 y/o fellow with PMH including HTN.  Lives at home with his brother.  Was admitted to [facility] on 4/26/20 with c/o shortness of breath and leg swelling, 20 lb wt gain.  Found to be in AFIB, EF newly reduced from 50 to 30%.  Ruled out for PE by CTA.  COVID negative on 4/26.    Interim Events:  Continues to have good UOP  LE swelling improved.   Net negative 5.2 L    ROS  All other review of systems reviewed and negative.    Past medical, surgical, social, and family history reviewed and unchanged from admission except as noted in assessment and plan.    Medications: Reviewed in MAR  Current Facility-Administered Medications   Medication Dose Frequency Provider Last Rate Last Dose   • MD Alert...Warfarin per Pharmacy   PHARMACY TO DOSE Suzette Camejo M.D.       • warfarin (COUMADIN) tablet 5 mg  5 mg ONCE AT 1800 Suzette Camejo M.D.       • lisinopril (PRINIVIL) tablet 10 mg  10 mg Q DAY Brent Sue M.D.   10 mg at 04/29/20 0526   • senna-docusate (PERICOLACE or SENOKOT S) 8.6-50 MG per tablet 2 Tab  2 Tab BID Josue Carty M.D.   2 Tab at 04/28/20 0503    And   • polyethylene glycol/lytes (MIRALAX) PACKET 1 Packet  1 Packet QDAY PRN Josue Carty M.D.        And   • magnesium hydroxide (MILK OF MAGNESIA) suspension 30 mL  30 mL QDAY PRN Josue Carty M.D.        And   • bisacodyl (DULCOLAX) suppository 10 mg  10 mg QDAY PRN Josue Carty M.D.       • acetaminophen (TYLENOL) tablet 650 mg  650 mg Q6HRS PRN Josue Carty M.D.   650 mg at 04/29/20 1050   • furosemide (LASIX) injection 20 mg  20 mg BID DIURETIC Josue Carty M.D.   20 mg at 04/29/20 0525   • metoprolol (LOPRESSOR) tablet 50 mg  50 mg TWICE DAILY Josue Carty M.D.   50 mg at 04/29/20  "0525   • DILTIAZem (CARDIZEM) injection 10 mg  10 mg Q4HRS PRN Josue Carty M.D.       • Respiratory Therapy Consult   Continuous RT Josue Carty M.D.       Last reviewed on 4/26/2020  8:07 PM by Francisca Shannon R.N.    Allergies   Allergen Reactions   • Aspirin Vomiting     Pt states vomiting.       Physical Exam  Body mass index is 36.94 kg/m². /90   Pulse 93   Temp 36.9 °C (98.4 °F) (Temporal)   Resp 20   Ht 1.854 m (6' 1\")   Wt (!) 127 kg (279 lb 15.8 oz)   SpO2 92%    Vitals:    04/29/20 0525 04/29/20 0600 04/29/20 0754 04/29/20 1335   BP: 146/106  130/65 145/90   Pulse:  95 93    Resp:  (!) 22 20 20   Temp:   37.6 °C (99.6 °F) 36.9 °C (98.4 °F)   TempSrc:   Temporal Temporal   SpO2:  90% 90% 92%   Weight:       Height:        Oxygen Therapy:  Pulse Oximetry: 92 %, O2 (LPM): 0, O2 Delivery Device: None - Room Air    General: no apparent distress, obese  Eyes: normal conjunctiva  ENT: OP clear  Neck: + JVD   Lungs: normal respiratory effort,  With bibasilar crackles, no wheezing or rhonchi.  Heart: normal rate,  irregular rhythm, no murmur, no rubs or gallops,   EXT: 2+ edema bilateral lower extremities. + bilateral pedal pulses. no cyanosis  Abdomen: soft, non tender, non distended,  Neurological: No focal deficits, no facial asymmetry.  Normal speech.  Psychiatric: Appropriate affect, alert and oriented x 3.   Skin: very dry and thickened to the BLE.  No rash.    Labs (personally reviewed):     Lab Results   Component Value Date/Time    SODIUM 137 04/29/2020 05:30 AM    POTASSIUM 3.6 04/29/2020 05:30 AM    CHLORIDE 99 04/29/2020 05:30 AM    CO2 26 04/29/2020 05:30 AM    GLUCOSE 91 04/29/2020 05:30 AM    BUN 17 04/29/2020 05:30 AM    CREATININE 1.04 04/29/2020 05:30 AM     Lab Results   Component Value Date/Time    ALKPHOSPHAT 130 (H) 04/29/2020 05:30 AM    ASTSGOT 28 04/29/2020 05:30 AM    ALTSGPT 22 04/29/2020 05:30 AM    TBILIRUBIN 1.7 (H) 04/29/2020 05:30 AM      Lab Results   Component Value " Date/Time    CHOLSTRLTOT 102 2020 05:15 AM    LDL 59 2020 05:15 AM    HDL 32 (A) 2020 05:15 AM    TRIGLYCERIDE 54 2020 05:15 AM     Lab Results   Component Value Date/Time    BNPBTYPENAT 81 2017 10:25 PM         Cardiac Imaging and Procedures Review:      Personal Telemetry Review:  afib in the 90s.    Personal EKG Interpretation 20:  AFIB 132, Qrad 93, Qtc 464.  Some inferior/lateral st changes.    Echo 20:  CONCLUSIONS  Compared to the images of the prior echocardiogram dated 2015 -   there is now  a reduced ejection fraction and atrial fibrillation.   There is now moderate mitral regurgitation and severe tricuspid   regurgitation.  Moderately reduced left ventricular systolic function.  Left ventricular ejection fraction is visually estimated to be 30%.  Global hypokinesis most prominent in the inferior wall.  Moderate  mitral regurgitation.  Severe tricuspid regurgitation.  Unable to estimate pulmonary artery pressure due to severe tricuspid   regurgitation.      Assessment and Medical Decision Makin   Acute decompensated HFrEF, 30%.  Continue IV diuresis today.    2   Newly detected atrial fibrillation with improved rate control.  HR in the 90s this AM.  Continue lopressor 50 mg BID.  [id number] = 4 cannot afford Xarelto, discussed with hospitalist, will change to warfarin.    3   Essential hypertension  BP improved, but still a little high.  Increase lisinopril from 10 to 20 mg for AM.    4   ETOH use     5   Tobacco dependence. Cessation encouraged.     6   Atherosclerosis on CT.  Ischemic evaluation in the future, can be done as out patient.    7   Moderate MR.    8   Severe TR.    9   Obesity, BMI 36.9.    10  COVID negative x 1 on 20.      Yuko Haq PA-C  [facility]

## 2020-04-29 NOTE — RESPIRATORY CARE
" COPD EDUCATION by COPD CLINICAL EDUCATOR  4/29/2020 at 2:10 PM by Kerrie Yanes, RRT     Smoking Cessation Intervention and education completed.  Provided smoking cessation packet with \"Tips to Quit\" and flyer for \"Free Smoking Cessation Classes\". Pt currently in COVID Isolation. Our contact information was provided for further questions    "

## 2020-04-29 NOTE — PROGRESS NOTES
[facility] Medicine Daily Progress Note    Date of Service  4/29/2020    Chief Complaint  69 y.o. male admitted 4/26/2020 with shortness of breath and LE swelling.     Hospital Course    68 yo M with obesity, chronic LE edema, alcohol and tobacco use here with LE swelling and progressive SOB. He repots that the swelling is chronic but became acute worse over the last 2 months. Endorsed 20 pound weight gain. He was found to have new congestive heart failure and Afib with RVR.  Cardiology was consulted and he was started on IV diuresis.      Interval Problem Update  Today I evaluated and examined him at the bedside  His shortness of breath has been improving.  He underwent home oxygen evaluation and has oxygen saturations remained stable.  I discussed with cardiology and they recommended to continue IV diuresis for 1 more day.  His insurance does not cover Xarelto and I discussed with pharmacy and started him on warfarin.  I ordered Coumadin clinic referral and discussed with .  I discussed with him regarding Coumadin and explained him that Xarelto is not covered by his insurance.    Consultants/Specialty  Cardiology    Code Status  Full    Disposition  Home when medically stable.    Review of Systems  Review of Systems   Constitutional: Positive for malaise/fatigue. Negative for chills, fever and weight loss.   HENT: Negative for hearing loss and tinnitus.    Eyes: Negative for blurred vision, double vision, photophobia and pain.   Respiratory: Positive for cough and shortness of breath (Improved). Negative for sputum production.    Cardiovascular: Negative for chest pain, palpitations, orthopnea and leg swelling.   Gastrointestinal: Negative for abdominal pain, constipation, diarrhea, nausea and vomiting.   Genitourinary: Negative for dysuria, frequency and urgency.   Musculoskeletal: Negative for back pain, joint pain, myalgias and neck pain.   Skin: Negative for rash.   Neurological: Negative for dizziness,  tingling, tremors, sensory change, speech change, focal weakness and headaches.   Psychiatric/Behavioral: Negative for hallucinations and substance abuse.   All other systems reviewed and are negative.       Physical Exam  Temp:  [36.4 °C (97.6 °F)-37.6 °C (99.6 °F)] 36.9 °C (98.4 °F)  Pulse:  [] 93  Resp:  [15-29] 20  BP: (130-146)/() 145/90  SpO2:  [87 %-92 %] 92 %    Physical Exam  Vitals signs reviewed.   Constitutional:       General: He is not in acute distress.  HENT:      Head: Normocephalic and atraumatic. No contusion.      Right Ear: External ear normal.      Left Ear: External ear normal.      Nose: Nose normal.      Comments: Wearing oxygen nasal cannula.     Mouth/Throat:      Mouth: Mucous membranes are dry.      Pharynx: No oropharyngeal exudate.   Eyes:      General:         Right eye: No discharge.         Left eye: No discharge.      Pupils: Pupils are equal, round, and reactive to light.   Neck:      Musculoskeletal: No neck rigidity or muscular tenderness.   Cardiovascular:      Rate and Rhythm: Normal rate and regular rhythm.      Heart sounds: No murmur. No friction rub. No gallop.    Pulmonary:      Effort: Pulmonary effort is normal.      Breath sounds: No wheezing or rhonchi.      Comments: Decreased breath sounds at the bases.  Abdominal:      General: Bowel sounds are normal. There is no distension.      Palpations: Abdomen is soft.      Tenderness: There is no abdominal tenderness. There is no rebound.   Musculoskeletal: Normal range of motion.         General: No swelling or tenderness.      Right lower leg: Edema (Decreasing) present.      Left lower leg: Edema (Decreasing) present.   Skin:     General: Skin is warm and dry.      Coloration: Skin is not jaundiced.   Neurological:      General: No focal deficit present.      Mental Status: He is alert.      Cranial Nerves: No cranial nerve deficit.      Sensory: No sensory deficit.   Psychiatric:         Mood and Affect:  Mood normal.         Fluids    Intake/Output Summary (Last 24 hours) at 4/29/2020 1454  Last data filed at 4/29/2020 1335  Gross per 24 hour   Intake 1248 ml   Output 2475 ml   Net -1227 ml       Laboratory  Recent Labs     04/28/20  0515 04/29/20  0530   WBC 6.5 6.6   RBC 4.40* 4.51*   HEMOGLOBIN 12.9* 13.0*   HEMATOCRIT 39.0* 39.8*   MCV 88.6 88.2   MCH 29.3 28.8   MCHC 33.1* 32.7*   RDW 54.2* 52.5*   PLATELETCT 154* 149*   MPV 10.3 10.8     Recent Labs     04/27/20  0000 04/28/20  0515 04/29/20  0530   SODIUM 142 141 137   POTASSIUM 4.1 3.6 3.6   CHLORIDE 105 103 99   CO2 22 26 26   GLUCOSE 126* 116* 91   BUN 20 18 17   CREATININE 1.06 1.04 1.04   CALCIUM 9.4 9.3 9.5     Recent Labs     04/27/20  0830   INR 2.72*         Recent Labs     04/27/20  0000 04/27/20  0515   TRIGLYCERIDE 58 54   HDL 34* 32*   LDL 62 59       Imaging  EC-ECHOCARDIOGRAM COMPLETE W/O CONT   Final Result      CT-CTA CHEST PULMONARY ARTERY W/ RECONS   Final Result      1.  No large pulmonary embolus is identified.      2.  Irregular nodular opacity in the right lung apex may represent an infectious or inflammatory process. Follow-up low dose CT is recommended in approximately 3 months to ensure resolution.      3.  Atherosclerosis and cardiomegaly.      4.  Ascites in the partially visualized upper abdomen            US-EXTREMITY VENOUS LOWER UNILAT RIGHT   Final Result      DX-CHEST-PORTABLE (1 VIEW)   Final Result      Interval cardiac silhouette enlargement without consolidation identified           Assessment/Plan  * Acute systolic heart failure (HCC)- (present on admission)  Assessment & Plan  New diagnosis of HFrEF, EF reduced from 50 to 30%. Net negative 2.3L since admission.   - cardiology consulted and they evaluated him and made recommendations to  I discussed with cardiology and they recommended to continue IV Lasix for diuresis.  Continue heart failure medication which include metoprolol and ACE inhibitor      Atrial fibrillation  "with rapid ventricular response (HCC)- (present on admission)  Assessment & Plan  New diagnosis unknown duration. Likely cause for patient's exertional dyspnea. Rate with better control now.   Continue rate control with metoprolol.  Cardiology evaluated him and made recommendations.  His insurance does not cover Xarelto and I discussed with case management.  After discussion I discontinued Xarelto and started him on warfarin.  Continue monitor on telemetry    Acute hypoxemic respiratory failure (HCC)- (present on admission)  Assessment & Plan  Now resolved currently he is on room air admitted in saturation.  Related to Afib with RVR and acute CHF exacerbation. Procal low. CTA without evidence of PE  - covid negative x1  He is responding to diuresis.  Continue IV Lasix.  Continue to monitor input and output closely.  Continue to provide him oxygen and titrate as tolerated.    Lower extremity edema  Assessment & Plan  Chronic.  His edema has been improving.  Continue diuresis.     Elevated troponin- (present on admission)  Assessment & Plan  Patient without chest pain and EKG without ischemic changes.  Likely due to demand ischemia 2/2 afib with RVR and fluid overload  Cardiology evaluated him and recommended outpatient ischemic work-up.  He does not have any acute chest pain    Hypertension- (present on admission)  Assessment & Plan  Uncontrolled.  Continue lisinopril and metoprolol.  Continue Lasix for diuresis.    Thrombocytopenia (HCC)  Assessment & Plan  Platelet count is trending up and current platelet count is 149.  Does not have any right upper quadrant pain.  Liver enzymes are within normal limits  CMP tomorrow morning.    Right upper lobe pulmonary infiltrate- (present on admission)  Assessment & Plan  With symptoms of mild cough, dyspnea. Suspect inflammatory process. \"Irregular nodular opacity in the right lung apex \"  - will need repeat imaging in 3 months to ensure resolution  He remains " afebrile.    Tobacco dependence- (present on admission)  Assessment & Plan  Continues to use tobacco.  - nicotine replacement declined initially but available  Continue counseling    Obesity (BMI 30-39.9)- (present on admission)  Assessment & Plan  Body mass index is 36.94 kg/m².    Anemia- (present on admission)  Assessment & Plan  Hemoglobin remained stable.  He does not show signs of active bleeding.  Continue to monitor and transfuse if hemoglobin below 7.      Wound of lower extremity- (present on admission)  Assessment & Plan  With stasis dermatitis. No signs for infection on exam.  Wound care.       VTE prophylaxis: Continue Xarelto for anticoagulation

## 2020-04-30 LAB
ANION GAP SERPL CALC-SCNC: 12 MMOL/L (ref 7–16)
BUN SERPL-MCNC: 17 MG/DL (ref 8–22)
CALCIUM SERPL-MCNC: 9.4 MG/DL (ref 8.5–10.5)
CHLORIDE SERPL-SCNC: 100 MMOL/L (ref 96–112)
CO2 SERPL-SCNC: 27 MMOL/L (ref 20–33)
CREAT SERPL-MCNC: 1.14 MG/DL (ref 0.5–1.4)
ERYTHROCYTE [DISTWIDTH] IN BLOOD BY AUTOMATED COUNT: 51.2 FL (ref 35.9–50)
GLUCOSE SERPL-MCNC: 98 MG/DL (ref 65–99)
HCT VFR BLD AUTO: 40.6 % (ref 42–52)
HGB BLD-MCNC: 13.5 G/DL (ref 14–18)
INR PPP: 1.4 (ref 0.87–1.13)
MCH RBC QN AUTO: 29.2 PG (ref 27–33)
MCHC RBC AUTO-ENTMCNC: 33.3 G/DL (ref 33.7–35.3)
MCV RBC AUTO: 87.7 FL (ref 81.4–97.8)
PLATELET # BLD AUTO: 164 K/UL (ref 164–446)
PMV BLD AUTO: 11.2 FL (ref 9–12.9)
POTASSIUM SERPL-SCNC: 3.9 MMOL/L (ref 3.6–5.5)
PROTHROMBIN TIME: 17.6 SEC (ref 12–14.6)
RBC # BLD AUTO: 4.63 M/UL (ref 4.7–6.1)
SODIUM SERPL-SCNC: 139 MMOL/L (ref 135–145)
WBC # BLD AUTO: 6.5 K/UL (ref 4.8–10.8)

## 2020-04-30 PROCEDURE — 700102 HCHG RX REV CODE 250 W/ 637 OVERRIDE(OP): Performed by: INTERNAL MEDICINE

## 2020-04-30 PROCEDURE — 700102 HCHG RX REV CODE 250 W/ 637 OVERRIDE(OP): Performed by: PHYSICIAN ASSISTANT

## 2020-04-30 PROCEDURE — 700111 HCHG RX REV CODE 636 W/ 250 OVERRIDE (IP): Performed by: PHYSICIAN ASSISTANT

## 2020-04-30 PROCEDURE — 85027 COMPLETE CBC AUTOMATED: CPT

## 2020-04-30 PROCEDURE — 99232 SBSQ HOSP IP/OBS MODERATE 35: CPT | Performed by: INTERNAL MEDICINE

## 2020-04-30 PROCEDURE — 770020 HCHG ROOM/CARE - TELE (206)

## 2020-04-30 PROCEDURE — 85610 PROTHROMBIN TIME: CPT

## 2020-04-30 PROCEDURE — A9270 NON-COVERED ITEM OR SERVICE: HCPCS | Performed by: PHYSICIAN ASSISTANT

## 2020-04-30 PROCEDURE — 80048 BASIC METABOLIC PNL TOTAL CA: CPT

## 2020-04-30 PROCEDURE — 700102 HCHG RX REV CODE 250 W/ 637 OVERRIDE(OP): Performed by: HOSPITALIST

## 2020-04-30 PROCEDURE — 99233 SBSQ HOSP IP/OBS HIGH 50: CPT | Performed by: PHYSICIAN ASSISTANT

## 2020-04-30 PROCEDURE — 700111 HCHG RX REV CODE 636 W/ 250 OVERRIDE (IP): Performed by: HOSPITALIST

## 2020-04-30 PROCEDURE — A9270 NON-COVERED ITEM OR SERVICE: HCPCS | Performed by: HOSPITALIST

## 2020-04-30 PROCEDURE — A9270 NON-COVERED ITEM OR SERVICE: HCPCS | Performed by: INTERNAL MEDICINE

## 2020-04-30 RX ORDER — METOPROLOL SUCCINATE 100 MG/1
100 TABLET, EXTENDED RELEASE ORAL EVERY EVENING
Status: DISCONTINUED | OUTPATIENT
Start: 2020-04-30 | End: 2020-05-03 | Stop reason: HOSPADM

## 2020-04-30 RX ORDER — WARFARIN SODIUM 7.5 MG/1
7.5 TABLET ORAL
Status: COMPLETED | OUTPATIENT
Start: 2020-04-30 | End: 2020-04-30

## 2020-04-30 RX ORDER — SPIRONOLACTONE 50 MG/1
25 TABLET, FILM COATED ORAL
Status: DISCONTINUED | OUTPATIENT
Start: 2020-04-30 | End: 2020-05-03 | Stop reason: HOSPADM

## 2020-04-30 RX ORDER — FUROSEMIDE 10 MG/ML
40 INJECTION INTRAMUSCULAR; INTRAVENOUS
Status: DISCONTINUED | OUTPATIENT
Start: 2020-04-30 | End: 2020-05-03

## 2020-04-30 RX ORDER — POTASSIUM CHLORIDE 20 MEQ/1
20 TABLET, EXTENDED RELEASE ORAL DAILY
Status: DISCONTINUED | OUTPATIENT
Start: 2020-04-30 | End: 2020-05-03

## 2020-04-30 RX ADMIN — FUROSEMIDE 20 MG: 10 INJECTION, SOLUTION INTRAVENOUS at 05:43

## 2020-04-30 RX ADMIN — WARFARIN SODIUM 7.5 MG: 7.5 TABLET ORAL at 17:20

## 2020-04-30 RX ADMIN — FUROSEMIDE 40 MG: 10 INJECTION, SOLUTION INTRAMUSCULAR; INTRAVENOUS at 17:20

## 2020-04-30 RX ADMIN — METOPROLOL SUCCINATE 100 MG: 100 TABLET, EXTENDED RELEASE ORAL at 17:20

## 2020-04-30 RX ADMIN — METOPROLOL TARTRATE 50 MG: 25 TABLET, FILM COATED ORAL at 05:43

## 2020-04-30 RX ADMIN — LISINOPRIL 20 MG: 20 TABLET ORAL at 05:43

## 2020-04-30 RX ADMIN — FUROSEMIDE 40 MG: 10 INJECTION, SOLUTION INTRAMUSCULAR; INTRAVENOUS at 12:13

## 2020-04-30 RX ADMIN — SPIRONOLACTONE 25 MG: 50 TABLET ORAL at 12:13

## 2020-04-30 RX ADMIN — POTASSIUM CHLORIDE 20 MEQ: 1500 TABLET, EXTENDED RELEASE ORAL at 17:20

## 2020-04-30 ASSESSMENT — PATIENT HEALTH QUESTIONNAIRE - PHQ9
2. FEELING DOWN, DEPRESSED, IRRITABLE, OR HOPELESS: NOT AT ALL
1. LITTLE INTEREST OR PLEASURE IN DOING THINGS: NOT AT ALL
SUM OF ALL RESPONSES TO PHQ9 QUESTIONS 1 AND 2: 0
1. LITTLE INTEREST OR PLEASURE IN DOING THINGS: NOT AT ALL
SUM OF ALL RESPONSES TO PHQ9 QUESTIONS 1 AND 2: 0

## 2020-04-30 ASSESSMENT — ENCOUNTER SYMPTOMS
SPUTUM PRODUCTION: 0
ABDOMINAL DISTENTION: 1
DIZZINESS: 0
FOCAL WEAKNESS: 0
PHOTOPHOBIA: 0
NECK PAIN: 0
ACTIVITY CHANGE: 0
FEVER: 0
COUGH: 1
CHILLS: 0
ABDOMINAL PAIN: 0
TINGLING: 0
CHEST TIGHTNESS: 0
ORTHOPNEA: 0
TREMORS: 0
SHORTNESS OF BREATH: 1
SPEECH CHANGE: 0
DIARRHEA: 0
MYALGIAS: 0
CONSTIPATION: 0
WEIGHT LOSS: 0
LIGHT-HEADEDNESS: 0
BLURRED VISION: 0
BACK PAIN: 0
SENSORY CHANGE: 0
VOMITING: 0
NAUSEA: 0
COUGH: 0
HEADACHES: 0
HALLUCINATIONS: 0
PALPITATIONS: 0
EYE PAIN: 0
DOUBLE VISION: 0

## 2020-04-30 ASSESSMENT — FIBROSIS 4 INDEX: FIB4 SCORE: 2.51

## 2020-04-30 ASSESSMENT — LIFESTYLE VARIABLES: SUBSTANCE_ABUSE: 0

## 2020-04-30 ASSESSMENT — PAIN SCALES - WONG BAKER: WONGBAKER_NUMERICALRESPONSE: DOESN'T HURT AT ALL

## 2020-04-30 NOTE — PROGRESS NOTES
Hospital Medicine Daily Progress Note    Date of Service  4/30/2020    Chief Complaint  69 y.o. male admitted 4/26/2020 with shortness of breath and LE swelling.     Hospital Course    70 yo M with obesity, chronic LE edema, alcohol and tobacco use here with LE swelling and progressive SOB. He repots that the swelling is chronic but became acute worse over the last 2 months. Endorsed 20 pound weight gain. He was found to have new congestive heart failure and Afib with RVR.  Cardiology was consulted and he was started on IV diuresis.      Interval Problem Update    Today I evaluated and examined him at the bedside  Reported that he is feeling better.  He denies any symptoms of severe shortness of breath.  I discussed with cardiology and increase the dose of diuretic.  I discussed plan of care with him.  Continue warfarin.    Consultants/Specialty  Cardiology    Code Status  Full    Disposition  Home when medically stable.    Review of Systems  Review of Systems   Constitutional: Positive for malaise/fatigue. Negative for chills, fever and weight loss.   HENT: Negative for hearing loss and tinnitus.    Eyes: Negative for blurred vision, double vision, photophobia and pain.   Respiratory: Positive for cough and shortness of breath (Improving). Negative for sputum production.    Cardiovascular: Negative for chest pain, palpitations, orthopnea and leg swelling.   Gastrointestinal: Negative for abdominal pain, constipation, diarrhea, nausea and vomiting.   Genitourinary: Negative for dysuria, frequency and urgency.   Musculoskeletal: Negative for back pain, joint pain, myalgias and neck pain.   Skin: Negative for rash.   Neurological: Negative for dizziness, tingling, tremors, sensory change, speech change, focal weakness and headaches.   Psychiatric/Behavioral: Negative for hallucinations and substance abuse.   All other systems reviewed and are negative.       Physical Exam  Temp:  [36 °C (96.8 °F)-36.9 °C (98.5 °F)]  36.7 °C (98 °F)  Pulse:  [] 94  Resp:  [20] 20  BP: (129-156)/(79-98) 129/85  SpO2:  [90 %-96 %] 95 %    Physical Exam  Vitals signs reviewed.   Constitutional:       General: He is not in acute distress.  HENT:      Head: Normocephalic and atraumatic. No contusion.      Right Ear: External ear normal.      Left Ear: External ear normal.      Nose: Nose normal.      Mouth/Throat:      Mouth: Mucous membranes are moist.      Pharynx: No oropharyngeal exudate.   Eyes:      General:         Right eye: No discharge.         Left eye: No discharge.      Pupils: Pupils are equal, round, and reactive to light.   Neck:      Musculoskeletal: No neck rigidity or muscular tenderness.   Cardiovascular:      Rate and Rhythm: Normal rate and regular rhythm.      Heart sounds: No murmur. No friction rub. No gallop.    Pulmonary:      Effort: Pulmonary effort is normal.      Breath sounds: No wheezing or rhonchi.      Comments: Decreased breath sounds at the bases.  Abdominal:      General: Bowel sounds are normal. There is no distension.      Palpations: Abdomen is soft.      Tenderness: There is no abdominal tenderness. There is no rebound.   Musculoskeletal: Normal range of motion.         General: No swelling or tenderness.      Right lower leg: Edema (Decreasing) present.      Left lower leg: Edema (Decreasing) present.   Skin:     General: Skin is warm and dry.      Coloration: Skin is not jaundiced.   Neurological:      General: No focal deficit present.      Mental Status: He is alert.      Cranial Nerves: No cranial nerve deficit.      Sensory: No sensory deficit.   Psychiatric:         Mood and Affect: Mood normal.         Fluids    Intake/Output Summary (Last 24 hours) at 4/30/2020 1603  Last data filed at 4/30/2020 1522  Gross per 24 hour   Intake 660 ml   Output 4100 ml   Net -3440 ml       Laboratory  Recent Labs     04/28/20  0515 04/29/20  0530 04/30/20  0400   WBC 6.5 6.6 6.5   RBC 4.40* 4.51* 4.63*    HEMOGLOBIN 12.9* 13.0* 13.5*   HEMATOCRIT 39.0* 39.8* 40.6*   MCV 88.6 88.2 87.7   MCH 29.3 28.8 29.2   MCHC 33.1* 32.7* 33.3*   RDW 54.2* 52.5* 51.2*   PLATELETCT 154* 149* 164   MPV 10.3 10.8 11.2     Recent Labs     04/28/20  0515 04/29/20  0530 04/30/20  0400   SODIUM 141 137 139   POTASSIUM 3.6 3.6 3.9   CHLORIDE 103 99 100   CO2 26 26 27   GLUCOSE 116* 91 98   BUN 18 17 17   CREATININE 1.04 1.04 1.14   CALCIUM 9.3 9.5 9.4     Recent Labs     04/29/20  0530 04/30/20  0650   INR 2.42* 1.40*               Imaging  EC-ECHOCARDIOGRAM COMPLETE W/O CONT   Final Result      CT-CTA CHEST PULMONARY ARTERY W/ RECONS   Final Result      1.  No large pulmonary embolus is identified.      2.  Irregular nodular opacity in the right lung apex may represent an infectious or inflammatory process. Follow-up low dose CT is recommended in approximately 3 months to ensure resolution.      3.  Atherosclerosis and cardiomegaly.      4.  Ascites in the partially visualized upper abdomen            US-EXTREMITY VENOUS LOWER UNILAT RIGHT   Final Result      DX-CHEST-PORTABLE (1 VIEW)   Final Result      Interval cardiac silhouette enlargement without consolidation identified           Assessment/Plan  * Acute systolic heart failure (HCC)- (present on admission)  Assessment & Plan  New diagnosis of HFrEF, EF reduced from 50 to 30%. Net negative 2.3L since admission.   - cardiology consulted and they evaluated him and made recommendations to  Continue heart failure medication which include metoprolol and ACE inhibitor  Cardiology increase the dose of IV Lasix to 40 mg IV twice daily.  I discussed plan of care with him.  I discussed plan of care with cardiology.    Atrial fibrillation with rapid ventricular response (HCC)- (present on admission)  Assessment & Plan  New diagnosis unknown duration. Likely cause for patient's exertional dyspnea. Rate with better control now.   Continue rate control with metoprolol.  Cardiology evaluated him  "and made recommendations.  His insurance does not cover Xarelto and I discussed with case management.  After discussion I discontinued Xarelto and started him on warfarin.  Continue warfarin currently INR is not therapeutic.  Continue monitor on telemetry    Acute hypoxemic respiratory failure (HCC)- (present on admission)  Assessment & Plan  Now resolved currently he is on room air admitted in saturation.  Related to Afib with RVR and acute CHF exacerbation. Procal low. CTA without evidence of PE  - covid negative x1  He is responding to diuresis.  Discussed with cardiology and increase the dose of IV diuretics.  Continue to monitor input and output closely.  Continue to provide him oxygen and titrate as tolerated.    Lower extremity edema  Assessment & Plan  Chronic.  His edema has been improving.  Continue diuresis.     Elevated troponin- (present on admission)  Assessment & Plan  Patient without chest pain and EKG without ischemic changes.  Likely due to demand ischemia 2/2 afib with RVR and fluid overload  Cardiology evaluated him and recommended outpatient ischemic work-up.  He does not have any acute chest pain    Hypertension- (present on admission)  Assessment & Plan  Uncontrolled.  Continue lisinopril and metoprolol.  Continue Lasix for diuresis.    Thrombocytopenia (HCC)  Assessment & Plan  Platelet count is trending up and current platelet count is 164.  Does not have any right upper quadrant pain.  Liver enzymes are within normal limits  CMP tomorrow morning.    Right upper lobe pulmonary infiltrate- (present on admission)  Assessment & Plan  With symptoms of mild cough, dyspnea. Suspect inflammatory process. \"Irregular nodular opacity in the right lung apex \"  - will need repeat imaging in 3 months to ensure resolution  He remains afebrile.    Tobacco dependence- (present on admission)  Assessment & Plan  Continues to use tobacco.  - nicotine replacement declined initially but available  Continue " counseling    Obesity (BMI 30-39.9)- (present on admission)  Assessment & Plan  Body mass index is 36.94 kg/m².    Anemia- (present on admission)  Assessment & Plan  Hemoglobin remained stable.  He does not show signs of active bleeding.  Continue to monitor and transfuse if hemoglobin below 7.      Wound of lower extremity- (present on admission)  Assessment & Plan  With stasis dermatitis. No signs for infection on exam.  Wound care.     I discussed with cardiology regarding his current medical condition.    VTE prophylaxis: Warfarin

## 2020-04-30 NOTE — PROGRESS NOTES
Bedside shift report received from day shift RN. Assumed care of pt. Pt A&Ox4. Denies any pain, nausea, or SOB at this time. Tele monitor on. Pt resting in bed with call light within reach. Bed locked and in lowest position.

## 2020-04-30 NOTE — PROGRESS NOTES
Cardiology Follow Up Progress Note    Date of Service  4/30/2020    Attending Physician  SANDRA Frias*    Chief Complaint   Shortness of breath, leg swelling, weight gain    HPI  Robert Mcdonnell is a 69 y.o. male with HTN admitted 4/26/20 with shortness of breath and leg swelling, 20 lb wt gain.  Found to be in AFIB with RVR, EF newly reduced from 50 to 30%.     Interim Events  Still has orthopnea, requires bed to be above 45deg angle. Leg swelling has improved. Urinating frequently throughout the day. No palpitations or dizziness. Does not have scale at home.     Afib 90-95 on telemetry.     BPs 130-150  Review of Systems  Review of Systems   Constitutional: Negative for activity change, chills and fever.   Respiratory: Positive for shortness of breath. Negative for cough and chest tightness.    Cardiovascular: Positive for leg swelling.        +Orthopnea   Gastrointestinal: Positive for abdominal distention.   Genitourinary: Negative for difficulty urinating and scrotal swelling.   Neurological: Negative for dizziness and light-headedness.       Vital signs in last 24 hours  Temp:  [36 °C (96.8 °F)-36.9 °C (98.5 °F)] 36 °C (96.8 °F)  Pulse:  [] 87  Resp:  [20] 20  BP: (136-156)/(79-98) 136/79  SpO2:  [90 %-96 %] 95 %    Physical Exam  Physical Exam  Vitals signs and nursing note reviewed.   Constitutional:       Appearance: Normal appearance. He is not ill-appearing.   HENT:      Head: Normocephalic and atraumatic.      Mouth/Throat:      Mouth: Mucous membranes are moist.   Neck:      Comments: JVD 12-13  Cardiovascular:      Rate and Rhythm: Normal rate. Rhythm irregular.   Pulmonary:      Effort: Pulmonary effort is normal.      Comments: No rales or wheezes  Abdominal:      General: There is distension.      Tenderness: There is no guarding.      Comments: Firm abdomen without guarding or pain   Musculoskeletal:      Comments: 1+ pitting edema bilateral pretibia   Skin:     General: Skin is warm  and dry.      Comments: Cracked scaling skin lesion over bilateral feet, ankles, LE   Neurological:      Mental Status: He is alert. Mental status is at baseline.   Psychiatric:         Judgment: Judgment normal.         Lab Review  Lab Results   Component Value Date/Time    WBC 6.5 04/30/2020 04:00 AM    RBC 4.63 (L) 04/30/2020 04:00 AM    HEMOGLOBIN 13.5 (L) 04/30/2020 04:00 AM    HEMATOCRIT 40.6 (L) 04/30/2020 04:00 AM    MCV 87.7 04/30/2020 04:00 AM    MCH 29.2 04/30/2020 04:00 AM    MCHC 33.3 (L) 04/30/2020 04:00 AM    MPV 11.2 04/30/2020 04:00 AM      Lab Results   Component Value Date/Time    SODIUM 139 04/30/2020 04:00 AM    POTASSIUM 3.9 04/30/2020 04:00 AM    CHLORIDE 100 04/30/2020 04:00 AM    CO2 27 04/30/2020 04:00 AM    GLUCOSE 98 04/30/2020 04:00 AM    BUN 17 04/30/2020 04:00 AM    CREATININE 1.14 04/30/2020 04:00 AM      Lab Results   Component Value Date/Time    ASTSGOT 28 04/29/2020 05:30 AM    ALTSGPT 22 04/29/2020 05:30 AM     Lab Results   Component Value Date/Time    CHOLSTRLTOT 102 04/27/2020 05:15 AM    LDL 59 04/27/2020 05:15 AM    HDL 32 (A) 04/27/2020 05:15 AM    TRIGLYCERIDE 54 04/27/2020 05:15 AM    TROPONINT 35 (H) 04/27/2020 12:00 AM       No results for input(s): NTPROBNP in the last 72 hours.    Cardiac Imaging and Procedures Review  EKG 4/26/20: atrial fibrillation with RVR, slow R wave progression, inferior infarct    Echocardiogram:  4/26/20  CONCLUSIONS  Compared to the images of the prior echocardiogram dated 8/29/2015 -   there is now  a reduced ejection fraction and atrial fibrillation.   There is now moderate mitral regurgitation and severe tricuspid   regurgitation.  Moderately reduced left ventricular systolic function.  Left ventricular ejection fraction is visually estimated to be 30%.  Global hypokinesis most prominent in the inferior wall.  Moderate  mitral regurgitation.  Severe tricuspid regurgitation.  Unable to estimate pulmonary artery pressure due to severe  tricuspid   regurgitation.      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  Increase to lasix 40mg IV bid, weight down 16lbs from admission (279--> 263lbs) with potassium 20meq   - lisinopril 20mg  - BB: change to metoprolol SR 100mg QHS, consider additional 50mg in the AM for HR control   - AA: start mary 25mg   - RN to obtain scale for patient to take home     Persistent atrial fibrillation, new diagnosis  - unclear on duration of afib, considered YVONNE/CV however given his volume overload, the chronicity of his arrhythmia, he is likely to not remain in NSR, will follow up in clinic    - good rate control, continue metoprolol SR 100mg  [id number] = 4  -  Warfarin with referral to AC clinic      Essential hypertension, uncontrolled  -start [employer] lasix today  - goal BP<120/80, titrate lisinopril       ETOH use    Tobacco dependence  -  Education re cardio toxicity of ETOH, Cessation encouraged.        Plan discussed with Zoe Hernandez, RN    Staffed with Dr. Sue.

## 2020-04-30 NOTE — CARE PLAN
Problem: Communication  Goal: The ability to communicate needs accurately and effectively will improve  Outcome: PROGRESSING AS EXPECTED       Problem: Safety  Goal: Will remain free from falls  Outcome: PROGRESSING AS EXPECTED  Goal: Will remain free from injury  Outcome: PROGRESSING AS EXPECTED     Problem: Pain Management  Goal: Pain level will decrease to patient's comfort goal  Outcome: PROGRESSING AS EXPECTED

## 2020-04-30 NOTE — PROGRESS NOTES
Inpatient Anticoagulation Service Note    Date: 4/30/2020    Reason for Anticoagulation: Atrial Fibrillation   Target INR: 2.0 to 3.0  [id number] VASc Score: 3  HAS-BLED Score: 1   Hemoglobin Value: (!) 13.5  Hematocrit Value: (!) 40.6  Lab Platelet Value: 164    INR from last 7 days     Date/Time INR Value    04/30/20 0650  (!) 1.4    04/29/20 0530  (!) 2.42    04/27/20 0830  (!) 2.72        Dose from last 7 days     Date/Time Dose (mg)    04/30/20 1438  7.5    04/29/20 1546  5        Average Dose (mg): New start  Significant Interactions: Not Applicable  Bridge Therapy: No   Reversal Agent Administered: Not Applicable    Comments: New start warfarin for new diagnosis of AFib (day #2 of warfarin therapy). The patient was previously on Xarelto, but DOAC was not covered by the patient's insurance. A referral was sent to the [facility] anticoagulation clinic to establish warfarin care after discharge. INR today is subtherapeutic. H/H and renal function remain stable. No sxs of bleeding noted.     Plan:  Warfarin 7.5 mg PO this evening. Will obtain an INR with AM labs tomorrow. Pharmacy will continue to follow.    Education Material Provided?: The patient will require warfarin counseling prior to discharge  Pharmacist suggested discharge dosing: TBD based on subsequent INR results. Obtain a follow up INR within 72h of discharge.      Karen Nolasco, PharmD, BCPS

## 2020-05-01 LAB
ANION GAP SERPL CALC-SCNC: 13 MMOL/L (ref 7–16)
BACTERIA BLD CULT: NORMAL
BACTERIA BLD CULT: NORMAL
BUN SERPL-MCNC: 17 MG/DL (ref 8–22)
CALCIUM SERPL-MCNC: 9.3 MG/DL (ref 8.5–10.5)
CHLORIDE SERPL-SCNC: 99 MMOL/L (ref 96–112)
CO2 SERPL-SCNC: 28 MMOL/L (ref 20–33)
CREAT SERPL-MCNC: 1.12 MG/DL (ref 0.5–1.4)
ERYTHROCYTE [DISTWIDTH] IN BLOOD BY AUTOMATED COUNT: 54.1 FL (ref 35.9–50)
GLUCOSE SERPL-MCNC: 105 MG/DL (ref 65–99)
HCT VFR BLD AUTO: 43.5 % (ref 42–52)
HGB BLD-MCNC: 13.8 G/DL (ref 14–18)
INR PPP: 1.4 (ref 0.87–1.13)
MCH RBC QN AUTO: 28.5 PG (ref 27–33)
MCHC RBC AUTO-ENTMCNC: 31.7 G/DL (ref 33.7–35.3)
MCV RBC AUTO: 89.9 FL (ref 81.4–97.8)
PLATELET # BLD AUTO: 166 K/UL (ref 164–446)
PMV BLD AUTO: 11.4 FL (ref 9–12.9)
POTASSIUM SERPL-SCNC: 3.8 MMOL/L (ref 3.6–5.5)
PROTHROMBIN TIME: 17.5 SEC (ref 12–14.6)
RBC # BLD AUTO: 4.84 M/UL (ref 4.7–6.1)
SIGNIFICANT IND 70042: NORMAL
SIGNIFICANT IND 70042: NORMAL
SITE SITE: NORMAL
SITE SITE: NORMAL
SODIUM SERPL-SCNC: 140 MMOL/L (ref 135–145)
SOURCE SOURCE: NORMAL
SOURCE SOURCE: NORMAL
WBC # BLD AUTO: 7.7 K/UL (ref 4.8–10.8)

## 2020-05-01 PROCEDURE — A9270 NON-COVERED ITEM OR SERVICE: HCPCS | Performed by: INTERNAL MEDICINE

## 2020-05-01 PROCEDURE — 700102 HCHG RX REV CODE 250 W/ 637 OVERRIDE(OP): Performed by: PHYSICIAN ASSISTANT

## 2020-05-01 PROCEDURE — 80048 BASIC METABOLIC PNL TOTAL CA: CPT

## 2020-05-01 PROCEDURE — A9270 NON-COVERED ITEM OR SERVICE: HCPCS | Performed by: PHYSICIAN ASSISTANT

## 2020-05-01 PROCEDURE — 85027 COMPLETE CBC AUTOMATED: CPT

## 2020-05-01 PROCEDURE — 85610 PROTHROMBIN TIME: CPT

## 2020-05-01 PROCEDURE — 700102 HCHG RX REV CODE 250 W/ 637 OVERRIDE(OP): Performed by: INTERNAL MEDICINE

## 2020-05-01 PROCEDURE — 700111 HCHG RX REV CODE 636 W/ 250 OVERRIDE (IP): Performed by: PHYSICIAN ASSISTANT

## 2020-05-01 PROCEDURE — 770020 HCHG ROOM/CARE - TELE (206)

## 2020-05-01 PROCEDURE — 97161 PT EVAL LOW COMPLEX 20 MIN: CPT

## 2020-05-01 PROCEDURE — 99232 SBSQ HOSP IP/OBS MODERATE 35: CPT | Performed by: INTERNAL MEDICINE

## 2020-05-01 PROCEDURE — 99232 SBSQ HOSP IP/OBS MODERATE 35: CPT | Performed by: NURSE PRACTITIONER

## 2020-05-01 RX ORDER — WARFARIN SODIUM 7.5 MG/1
7.5 TABLET ORAL
Status: COMPLETED | OUTPATIENT
Start: 2020-05-01 | End: 2020-05-01

## 2020-05-01 RX ORDER — LISINOPRIL 20 MG/1
40 TABLET ORAL
Status: DISCONTINUED | OUTPATIENT
Start: 2020-05-02 | End: 2020-05-03 | Stop reason: HOSPADM

## 2020-05-01 RX ADMIN — METOPROLOL SUCCINATE 100 MG: 100 TABLET, EXTENDED RELEASE ORAL at 17:07

## 2020-05-01 RX ADMIN — FUROSEMIDE 40 MG: 10 INJECTION, SOLUTION INTRAMUSCULAR; INTRAVENOUS at 06:22

## 2020-05-01 RX ADMIN — SPIRONOLACTONE 25 MG: 50 TABLET ORAL at 05:15

## 2020-05-01 RX ADMIN — LISINOPRIL 20 MG: 20 TABLET ORAL at 05:14

## 2020-05-01 RX ADMIN — FUROSEMIDE 40 MG: 10 INJECTION, SOLUTION INTRAMUSCULAR; INTRAVENOUS at 17:51

## 2020-05-01 RX ADMIN — WARFARIN SODIUM 7.5 MG: 7.5 TABLET ORAL at 17:06

## 2020-05-01 RX ADMIN — POTASSIUM CHLORIDE 20 MEQ: 1500 TABLET, EXTENDED RELEASE ORAL at 05:14

## 2020-05-01 ASSESSMENT — ENCOUNTER SYMPTOMS
DOUBLE VISION: 0
DIZZINESS: 0
CHEST TIGHTNESS: 0
BLURRED VISION: 0
COUGH: 1
SHORTNESS OF BREATH: 1
PHOTOPHOBIA: 0
HALLUCINATIONS: 0
SHORTNESS OF BREATH: 0
DIARRHEA: 0
MYALGIAS: 0
BACK PAIN: 0
HEADACHES: 0
TREMORS: 0
FOCAL WEAKNESS: 0
NECK PAIN: 0
SENSORY CHANGE: 0
NAUSEA: 0
TINGLING: 0
ORTHOPNEA: 0
FEVER: 0
CONSTIPATION: 0
WHEEZING: 0
PALPITATIONS: 0
SPEECH CHANGE: 0
CHILLS: 0
VOMITING: 0
SPUTUM PRODUCTION: 0
EYE PAIN: 0
ABDOMINAL PAIN: 0
WEIGHT LOSS: 0
COUGH: 0

## 2020-05-01 ASSESSMENT — GAIT ASSESSMENTS
DISTANCE (FEET): 50
ASSISTIVE DEVICE: NONE;SINGLE POINT CANE
GAIT LEVEL OF ASSIST: SUPERVISED

## 2020-05-01 ASSESSMENT — PAIN SCALES - WONG BAKER
WONGBAKER_NUMERICALRESPONSE: DOESN'T HURT AT ALL
WONGBAKER_NUMERICALRESPONSE: DOESN'T HURT AT ALL

## 2020-05-01 ASSESSMENT — COGNITIVE AND FUNCTIONAL STATUS - GENERAL
SUGGESTED CMS G CODE MODIFIER MOBILITY: CH
MOBILITY SCORE: 24

## 2020-05-01 ASSESSMENT — PATIENT HEALTH QUESTIONNAIRE - PHQ9
SUM OF ALL RESPONSES TO PHQ9 QUESTIONS 1 AND 2: 0
2. FEELING DOWN, DEPRESSED, IRRITABLE, OR HOPELESS: NOT AT ALL
1. LITTLE INTEREST OR PLEASURE IN DOING THINGS: NOT AT ALL

## 2020-05-01 ASSESSMENT — LIFESTYLE VARIABLES: SUBSTANCE_ABUSE: 0

## 2020-05-01 NOTE — PROGRESS NOTES
Inpatient Anticoagulation Service Note    Date: 5/1/2020    Reason for Anticoagulation: Atrial Fibrillation   Target INR: 2.0 to 3.0  [id number] VASc Score: 3  HAS-BLED Score: 1   Hemoglobin Value: (!) 13.8  Hematocrit Value: 43.5  Lab Platelet Value: 166    INR from last 7 days     Date/Time INR Value    05/01/20 0522  (!) 1.4    04/30/20 0650  (!) 1.4    04/29/20 0530  (!) 2.42    04/27/20 0830  (!) 2.72        Dose from last 7 days     Date/Time Dose (mg)    05/01/20 0958  7.5    04/30/20 1438  7.5    04/29/20 1546  5        Average Dose (mg): New start   Significant Interactions: Not Applicable  Bridge Therapy: No   Reversal Agent Administered: Not Applicable    Comments: New start warfarin for AFib (day #3). The patient was previously on Xarelto, but DOAC was not covered by the patient's insurance. A referral was sent to the [facility] anticoagulation clinic to establish warfarin care after discharge. INR today remains subtherapeutic. H/H and renal function are stable. No sxs of bleeding noted. No change in diet.      Plan: Warfarin 7.5 mg PO this evening. Will obtain an INR with AM labs tomorrow. Pharmacy will continue to follow.   Education Material Provided?:The patient will require warfarin counseling prior to discharge  Pharmacist suggested discharge dosing: TBD based on subsequent INR results. Obtain a follow up INR within 72h of discharge.      Karen Nolasco, PharmD, BCPS

## 2020-05-01 NOTE — FACE TO FACE
Face to Face Supporting Documentation - Home Health    The encounter with this patient was in whole or in part the primary reason for home health admission.    Date of encounter:   Patient:                    MRN:                       YOB: 2020  Robert Mcdonnell  [id number]  1950     Home health to see patient for:  Physical Therapy evaluation and treatment    Skilled need for:  New Onset Medical Diagnosis Systolic congestive heart failure    Skilled nursing interventions to include:  Comment: Physical therapy    Homebound status evidenced by:  Need the aid of supportive devices such as crutches, canes, wheelchairs or walkers. Leaving home requires a considerable and taxing effort. There is a normal inability to leave the home.    Community Physician to provide follow up care: Ming Stinson M.D.     Optional Interventions? No      I certify the face to face encounter for this home health care referral meets the CMS requirements and the encounter/clinical assessment with the patient was, in whole, or in part, for the medical condition(s) listed above, which is the primary reason for home health care. Based on my clinical findings: the service(s) are medically necessary, support the need for home health care, and the homebound criteria are met.  I certify that this patient has had a face to face encounter by myself.  Suzette Camejo M.D. - NPI: [id number]

## 2020-05-01 NOTE — CARE PLAN
Problem: Communication  Goal: The ability to communicate needs accurately and effectively will improve  Outcome: PROGRESSING AS EXPECTED     Problem: Safety  Goal: Will remain free from falls  Outcome: PROGRESSING AS EXPECTED     Problem: Safety  Goal: Will remain free from injury  Outcome: PROGRESSING AS EXPECTED

## 2020-05-01 NOTE — PROGRESS NOTES
Hospital Medicine Daily Progress Note    Date of Service  5/1/2020    Chief Complaint  69 y.o. male admitted 4/26/2020 with shortness of breath and LE swelling.     Hospital Course    70 yo M with obesity, chronic LE edema, alcohol and tobacco use here with LE swelling and progressive SOB. He repots that the swelling is chronic but became acute worse over the last 2 months. Endorsed 20 pound weight gain. He was found to have new congestive heart failure and Afib with RVR.  Cardiology was consulted and he was started on IV diuresis.      Interval Problem Update    Today I evaluated and examined him at the bedside  He reported his lower extremity swelling has been improving.  I discussed with cardiology plan is to continue IV diuresis.  Physical therapy evaluated him and recommended home health and I ordered home health for him.  I discussed plan of care with him.  Continue warfarin.      Consultants/Specialty  Cardiology    Code Status  Full    Disposition  Home when medically stable.    Review of Systems  Review of Systems   Constitutional: Negative for chills, fever, malaise/fatigue and weight loss.   HENT: Negative for hearing loss and tinnitus.    Eyes: Negative for blurred vision, double vision, photophobia and pain.   Respiratory: Positive for cough and shortness of breath (Improving). Negative for sputum production.    Cardiovascular: Negative for chest pain, palpitations, orthopnea and leg swelling.   Gastrointestinal: Negative for abdominal pain, constipation, diarrhea, nausea and vomiting.   Genitourinary: Negative for dysuria, frequency and urgency.   Musculoskeletal: Negative for back pain, joint pain, myalgias and neck pain.   Skin: Negative for rash.   Neurological: Negative for dizziness, tingling, tremors, sensory change, speech change, focal weakness and headaches.   Psychiatric/Behavioral: Negative for hallucinations and substance abuse.   All other systems reviewed and are negative.       Physical  Exam  Temp:  [36.4 °C (97.6 °F)-37.1 °C (98.7 °F)] 37 °C (98.6 °F)  Pulse:  [90-99] 94  Resp:  [18-20] 19  BP: (139-154)/(86-97) 141/86  SpO2:  [90 %-99 %] 95 %    Physical Exam  Vitals signs reviewed.   Constitutional:       General: He is not in acute distress.  HENT:      Head: Normocephalic and atraumatic. No contusion.      Right Ear: External ear normal.      Left Ear: External ear normal.      Nose: Nose normal.      Mouth/Throat:      Mouth: Mucous membranes are moist.      Pharynx: No oropharyngeal exudate.   Eyes:      General:         Right eye: No discharge.         Left eye: No discharge.      Pupils: Pupils are equal, round, and reactive to light.   Neck:      Musculoskeletal: No neck rigidity or muscular tenderness.   Cardiovascular:      Rate and Rhythm: Normal rate and regular rhythm.      Heart sounds: No murmur. No friction rub. No gallop.    Pulmonary:      Effort: Pulmonary effort is normal.      Breath sounds: No wheezing or rhonchi.      Comments: Decreased breath sounds at the bases.  Abdominal:      General: Bowel sounds are normal. There is no distension.      Palpations: Abdomen is soft.      Tenderness: There is no abdominal tenderness. There is no rebound.   Musculoskeletal: Normal range of motion.         General: No swelling or tenderness.      Right lower leg: Edema (Improving) present.      Left lower leg: Edema (Improving) present.   Skin:     General: Skin is warm and dry.      Coloration: Skin is not jaundiced.   Neurological:      General: No focal deficit present.      Mental Status: He is alert.      Cranial Nerves: No cranial nerve deficit.      Sensory: No sensory deficit.   Psychiatric:         Mood and Affect: Mood normal.         Fluids    Intake/Output Summary (Last 24 hours) at 5/1/2020 1618  Last data filed at 5/1/2020 1424  Gross per 24 hour   Intake 922 ml   Output 5625 ml   Net -4703 ml       Laboratory  Recent Labs     04/29/20  0530 04/30/20  0400 05/01/20  0522    WBC 6.6 6.5 7.7   RBC 4.51* 4.63* 4.84   HEMOGLOBIN 13.0* 13.5* 13.8*   HEMATOCRIT 39.8* 40.6* 43.5   MCV 88.2 87.7 89.9   MCH 28.8 29.2 28.5   MCHC 32.7* 33.3* 31.7*   RDW 52.5* 51.2* 54.1*   PLATELETCT 149* 164 166   MPV 10.8 11.2 11.4     Recent Labs     04/29/20  0530 04/30/20  0400 05/01/20  0522   SODIUM 137 139 140   POTASSIUM 3.6 3.9 3.8   CHLORIDE 99 100 99   CO2 26 27 28   GLUCOSE 91 98 105*   BUN 17 17 17   CREATININE 1.04 1.14 1.12   CALCIUM 9.5 9.4 9.3     Recent Labs     04/29/20  0530 04/30/20  0650 05/01/20  0522   INR 2.42* 1.40* 1.40*               Imaging  EC-ECHOCARDIOGRAM COMPLETE W/O CONT   Final Result      CT-CTA CHEST PULMONARY ARTERY W/ RECONS   Final Result      1.  No large pulmonary embolus is identified.      2.  Irregular nodular opacity in the right lung apex may represent an infectious or inflammatory process. Follow-up low dose CT is recommended in approximately 3 months to ensure resolution.      3.  Atherosclerosis and cardiomegaly.      4.  Ascites in the partially visualized upper abdomen            US-EXTREMITY VENOUS LOWER UNILAT RIGHT   Final Result      DX-CHEST-PORTABLE (1 VIEW)   Final Result      Interval cardiac silhouette enlargement without consolidation identified           Assessment/Plan  * Acute systolic heart failure (HCC)- (present on admission)  Assessment & Plan  New diagnosis of HFrEF, EF reduced from 50 to 30%. Net negative 2.3L since admission.   - cardiology consulted and they evaluated him and made recommendations to  Continue heart failure medication which include metoprolol and ACE inhibitor  Cardiology increase the dose of IV Lasix to 40 mg IV twice daily.  I discussed plan of care with him.  I discussed plan of care with cardiology.    Atrial fibrillation with rapid ventricular response (HCC)- (present on admission)  Assessment & Plan  New diagnosis unknown duration. Likely cause for patient's exertional dyspnea. Rate with better control now.  "  Continue rate control with metoprolol.  Cardiology evaluated him and made recommendations.  His insurance does not cover Xarelto and I discussed with case management.  After discussion I discontinued Xarelto and started him on warfarin.  Continue warfarin currently INR is not therapeutic.  Continue monitor on telemetry    Acute hypoxemic respiratory failure (HCC)- (present on admission)  Assessment & Plan  Now resolved currently he is on room air admitted in saturation.  Related to Afib with RVR and acute CHF exacerbation. Procal low. CTA without evidence of PE  - covid negative x1  He is responding to diuresis.  Discussed with cardiology and increase the dose of IV diuretics.  Continue to monitor input and output closely.  Continue to provide him oxygen and titrate as tolerated.    Lower extremity edema  Assessment & Plan  Chronic.  His edema has been improving.  Continue diuresis.     Elevated troponin- (present on admission)  Assessment & Plan  Patient without chest pain and EKG without ischemic changes.  Likely due to demand ischemia 2/2 afib with RVR and fluid overload  Cardiology evaluated him and recommended outpatient ischemic work-up.  He does not have any acute chest pain    Hypertension- (present on admission)  Assessment & Plan  Uncontrolled.  Continue lisinopril and metoprolol.  Continue Lasix for diuresis.    Thrombocytopenia (HCC)  Assessment & Plan  Platelet count is trending up and current platelet count is 164.  Does not have any right upper quadrant pain.  Liver enzymes are within normal limits  CMP tomorrow morning.    Right upper lobe pulmonary infiltrate- (present on admission)  Assessment & Plan  With symptoms of mild cough, dyspnea. Suspect inflammatory process. \"Irregular nodular opacity in the right lung apex \"  - will need repeat imaging in 3 months to ensure resolution  He remains afebrile.    Tobacco dependence- (present on admission)  Assessment & Plan  Continues to use tobacco.  - " nicotine replacement declined initially but available  Continue counseling    Obesity (BMI 30-39.9)- (present on admission)  Assessment & Plan  Body mass index is 36.94 kg/m².    Anemia- (present on admission)  Assessment & Plan  Hemoglobin remained stable.  He does not show signs of active bleeding.  Continue to monitor and transfuse if hemoglobin below 7.      Wound of lower extremity- (present on admission)  Assessment & Plan  With stasis dermatitis. No signs for infection on exam.  Wound care.     I discussed with cardiology regarding his current medical condition.  Plan is to continue diuresis.  Physical therapy evaluated him and recommended home health and I ordered home health for him.    I have seen and examined patient on 5/1/2020. I have reviewed vitals, new labs and imaging. I have discussed POC with RN. There are no changes from (4/30/2020) except for what is mentioned above.       VTE prophylaxis: Warfarin

## 2020-05-01 NOTE — CARE PLAN
Problem: Infection  Goal: Will remain free from infection  Outcome: PROGRESSING AS EXPECTED  Intervention: Implement standard precautions and perform hand washing before and after patient contact  Note: Adhere to isolation protocol.     Problem: Fluid Volume:  Goal: Will maintain balanced intake and output  Outcome: PROGRESSING AS EXPECTED  Intervention: Monitor, educate, and encourage compliance with therapeutic intake of liquids  Note: Administer prescribed diuretics.

## 2020-05-01 NOTE — PROGRESS NOTES
Cardiology Follow Up Progress Note    Date of Service  5/1/2020    Attending Physician  SANDRA Frias*    Chief Complaint   Shortness of breath, leg swelling, weight gain    HPI  Robert Mcdonnell is a 69 y.o. male with HTN admitted 4/26/20 with shortness of breath and leg swelling, 20 lb wt gain.  Found to be in AFIB with RVR, EF newly reduced from 50 to 30%.     Interim Events  Still has orthopnea, requires bed to be above 45deg angle. Leg swelling has improved. Urinating frequently throughout the day. No palpitations or dizziness. Does not have scale at home.     Afib 90-95 on telemetry.     BPs 130-150  Review of Systems  Review of Systems   Constitutional: Negative for chills and fever.   Respiratory: Negative for cough, chest tightness, shortness of breath and wheezing.    Cardiovascular: Negative for chest pain, palpitations and leg swelling.   Gastrointestinal: Negative for nausea and vomiting.   Neurological: Negative for dizziness and headaches.   All other systems reviewed and are negative.      Vital signs in last 24 hours  Temp:  [36.4 °C (97.6 °F)-37.1 °C (98.7 °F)] 36.4 °C (97.6 °F)  Pulse:  [90-99] 96  Resp:  [18-20] 18  BP: (129-154)/(85-97) 139/87  SpO2:  [90 %-99 %] 99 %    Physical Exam  Physical Exam  Vitals signs and nursing note reviewed.   Constitutional:       Appearance: Normal appearance.   HENT:      Head: Normocephalic and atraumatic.      Mouth/Throat:      Mouth: Mucous membranes are moist.   Eyes:      Extraocular Movements: Extraocular movements intact.   Neck:      Musculoskeletal: Normal range of motion and neck supple.      Comments: No JVD  Cardiovascular:      Rate and Rhythm: Normal rate and regular rhythm.      Pulses: Normal pulses.      Heart sounds: Normal heart sounds. No murmur.   Pulmonary:      Effort: Pulmonary effort is normal.      Breath sounds: Normal breath sounds.   Abdominal:      Palpations: Abdomen is soft.   Skin:     General: Skin is warm and dry.    Neurological:      General: No focal deficit present.      Mental Status: He is alert and oriented to person, place, and time.   Psychiatric:         Mood and Affect: Mood normal.         Behavior: Behavior normal.         Lab Review  Lab Results   Component Value Date/Time    WBC 7.7 05/01/2020 05:22 AM    RBC 4.84 05/01/2020 05:22 AM    HEMOGLOBIN 13.8 (L) 05/01/2020 05:22 AM    HEMATOCRIT 43.5 05/01/2020 05:22 AM    MCV 89.9 05/01/2020 05:22 AM    MCH 28.5 05/01/2020 05:22 AM    MCHC 31.7 (L) 05/01/2020 05:22 AM    MPV 11.4 05/01/2020 05:22 AM      Lab Results   Component Value Date/Time    SODIUM 140 05/01/2020 05:22 AM    POTASSIUM 3.8 05/01/2020 05:22 AM    CHLORIDE 99 05/01/2020 05:22 AM    CO2 28 05/01/2020 05:22 AM    GLUCOSE 105 (H) 05/01/2020 05:22 AM    BUN 17 05/01/2020 05:22 AM    CREATININE 1.12 05/01/2020 05:22 AM      Lab Results   Component Value Date/Time    ASTSGOT 28 04/29/2020 05:30 AM    ALTSGPT 22 04/29/2020 05:30 AM     Lab Results   Component Value Date/Time    CHOLSTRLTOT 102 04/27/2020 05:15 AM    LDL 59 04/27/2020 05:15 AM    HDL 32 (A) 04/27/2020 05:15 AM    TRIGLYCERIDE 54 04/27/2020 05:15 AM    TROPONINT 35 (H) 04/27/2020 12:00 AM       No results for input(s): NTPROBNP in the last 72 hours.    Cardiac Imaging and Procedures Review  EKG 4/26/20: atrial fibrillation with RVR, slow R wave progression, inferior infarct    Echocardiogram:  4/26/20  CONCLUSIONS  Compared to the images of the prior echocardiogram dated 8/29/2015 -   there is now  a reduced ejection fraction and atrial fibrillation.   There is now moderate mitral regurgitation and severe tricuspid   regurgitation.  Moderately reduced left ventricular systolic function.  Left ventricular ejection fraction is visually estimated to be 30%.  Global hypokinesis most prominent in the inferior wall.  Moderate  mitral regurgitation.  Severe tricuspid regurgitation.  Unable to estimate pulmonary artery pressure due to severe  tricuspid   regurgitation.      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  Continue furosemide 40 mg IV twice daily, weight down 16lbs from admission (279--> 263lbs) with potassium 20meq   -Continue lisinopril 20mg, metoprolol  mg every evening, spironolactone 25 mg daily      Persistent atrial fibrillation, new diagnosis  - unclear on duration of afib, considered YVONNE/CV however given his volume overload, the chronicity of his arrhythmia, he is likely to not remain in NSR, will follow up in clinic    - good rate control, continue metoprolol SR 100mg  [id number] = 4  -  Warfarin with referral to AC clinic      Essential hypertension, uncontrolled  -Increase lisinopril 40 mg daily  - goal BP<120/80      ETOH use    Tobacco dependence  -  Education re cardio toxicity of ETOH, Cessation encouraged.        Plan discussed with Zoe Hernandez, RN    Staffed with Dr. Sue.

## 2020-05-01 NOTE — PROGRESS NOTES
Received in bed, aox4, afib hr 101  on monitor. Assessment as per CDU. Call light within reach. Needs attended. Plan of care discussed and understood.

## 2020-05-02 LAB
ANION GAP SERPL CALC-SCNC: 15 MMOL/L (ref 7–16)
BUN SERPL-MCNC: 21 MG/DL (ref 8–22)
CALCIUM SERPL-MCNC: 9.4 MG/DL (ref 8.5–10.5)
CHLORIDE SERPL-SCNC: 99 MMOL/L (ref 96–112)
CO2 SERPL-SCNC: 27 MMOL/L (ref 20–33)
CREAT SERPL-MCNC: 1.16 MG/DL (ref 0.5–1.4)
EKG IMPRESSION: NORMAL
GLUCOSE SERPL-MCNC: 97 MG/DL (ref 65–99)
INR PPP: 1.53 (ref 0.87–1.13)
MAGNESIUM SERPL-MCNC: 1.9 MG/DL (ref 1.5–2.5)
MAGNESIUM SERPL-MCNC: 2 MG/DL (ref 1.5–2.5)
POTASSIUM SERPL-SCNC: 4 MMOL/L (ref 3.6–5.5)
PROTHROMBIN TIME: 18.8 SEC (ref 12–14.6)
SODIUM SERPL-SCNC: 141 MMOL/L (ref 135–145)
TROPONIN T SERPL-MCNC: 26 NG/L (ref 6–19)
TROPONIN T SERPL-MCNC: 27 NG/L (ref 6–19)

## 2020-05-02 PROCEDURE — A9270 NON-COVERED ITEM OR SERVICE: HCPCS | Performed by: NURSE PRACTITIONER

## 2020-05-02 PROCEDURE — 85610 PROTHROMBIN TIME: CPT

## 2020-05-02 PROCEDURE — 99232 SBSQ HOSP IP/OBS MODERATE 35: CPT | Performed by: NURSE PRACTITIONER

## 2020-05-02 PROCEDURE — 93005 ELECTROCARDIOGRAM TRACING: CPT | Performed by: HOSPITALIST

## 2020-05-02 PROCEDURE — A9270 NON-COVERED ITEM OR SERVICE: HCPCS | Performed by: PHYSICIAN ASSISTANT

## 2020-05-02 PROCEDURE — 700102 HCHG RX REV CODE 250 W/ 637 OVERRIDE(OP): Performed by: HOSPITALIST

## 2020-05-02 PROCEDURE — 93010 ELECTROCARDIOGRAM REPORT: CPT | Performed by: INTERNAL MEDICINE

## 2020-05-02 PROCEDURE — 80048 BASIC METABOLIC PNL TOTAL CA: CPT

## 2020-05-02 PROCEDURE — A9270 NON-COVERED ITEM OR SERVICE: HCPCS | Performed by: HOSPITALIST

## 2020-05-02 PROCEDURE — 770020 HCHG ROOM/CARE - TELE (206)

## 2020-05-02 PROCEDURE — 700102 HCHG RX REV CODE 250 W/ 637 OVERRIDE(OP): Performed by: PHYSICIAN ASSISTANT

## 2020-05-02 PROCEDURE — 700111 HCHG RX REV CODE 636 W/ 250 OVERRIDE (IP): Performed by: PHYSICIAN ASSISTANT

## 2020-05-02 PROCEDURE — 700102 HCHG RX REV CODE 250 W/ 637 OVERRIDE(OP): Performed by: INTERNAL MEDICINE

## 2020-05-02 PROCEDURE — 83735 ASSAY OF MAGNESIUM: CPT

## 2020-05-02 PROCEDURE — 99232 SBSQ HOSP IP/OBS MODERATE 35: CPT | Performed by: INTERNAL MEDICINE

## 2020-05-02 PROCEDURE — 700102 HCHG RX REV CODE 250 W/ 637 OVERRIDE(OP): Performed by: NURSE PRACTITIONER

## 2020-05-02 PROCEDURE — 84484 ASSAY OF TROPONIN QUANT: CPT

## 2020-05-02 PROCEDURE — A9270 NON-COVERED ITEM OR SERVICE: HCPCS | Performed by: INTERNAL MEDICINE

## 2020-05-02 RX ORDER — WARFARIN SODIUM 7.5 MG/1
7.5 TABLET ORAL DAILY
Status: DISCONTINUED | OUTPATIENT
Start: 2020-05-02 | End: 2020-05-03 | Stop reason: HOSPADM

## 2020-05-02 RX ADMIN — SENNOSIDES AND DOCUSATE SODIUM 2 TABLET: 8.6; 5 TABLET ORAL at 06:11

## 2020-05-02 RX ADMIN — ACETAMINOPHEN 650 MG: 325 TABLET, FILM COATED ORAL at 20:40

## 2020-05-02 RX ADMIN — WARFARIN SODIUM 7.5 MG: 7.5 TABLET ORAL at 17:40

## 2020-05-02 RX ADMIN — SPIRONOLACTONE 25 MG: 50 TABLET ORAL at 06:12

## 2020-05-02 RX ADMIN — FUROSEMIDE 40 MG: 10 INJECTION, SOLUTION INTRAMUSCULAR; INTRAVENOUS at 15:55

## 2020-05-02 RX ADMIN — POTASSIUM CHLORIDE 20 MEQ: 1500 TABLET, EXTENDED RELEASE ORAL at 06:11

## 2020-05-02 RX ADMIN — FUROSEMIDE 40 MG: 10 INJECTION, SOLUTION INTRAMUSCULAR; INTRAVENOUS at 06:13

## 2020-05-02 RX ADMIN — LISINOPRIL 40 MG: 20 TABLET ORAL at 06:11

## 2020-05-02 RX ADMIN — METOPROLOL SUCCINATE 100 MG: 100 TABLET, EXTENDED RELEASE ORAL at 17:40

## 2020-05-02 ASSESSMENT — ENCOUNTER SYMPTOMS
MYALGIAS: 0
CHEST TIGHTNESS: 0
TINGLING: 0
SPEECH CHANGE: 0
PHOTOPHOBIA: 0
SENSORY CHANGE: 0
SPUTUM PRODUCTION: 0
COUGH: 0
SHORTNESS OF BREATH: 1
HALLUCINATIONS: 0
BLURRED VISION: 0
FEVER: 0
NAUSEA: 0
PALPITATIONS: 0
BACK PAIN: 0
TREMORS: 0
COUGH: 1
CONSTIPATION: 0
CHILLS: 0
EYE PAIN: 0
WHEEZING: 0
NECK PAIN: 0
DIZZINESS: 0
DIARRHEA: 0
SHORTNESS OF BREATH: 0
FOCAL WEAKNESS: 0
HEADACHES: 0
DOUBLE VISION: 0
ORTHOPNEA: 0
ABDOMINAL PAIN: 0
VOMITING: 0
WEIGHT LOSS: 0

## 2020-05-02 ASSESSMENT — PATIENT HEALTH QUESTIONNAIRE - PHQ9
2. FEELING DOWN, DEPRESSED, IRRITABLE, OR HOPELESS: NOT AT ALL
1. LITTLE INTEREST OR PLEASURE IN DOING THINGS: NOT AT ALL
SUM OF ALL RESPONSES TO PHQ9 QUESTIONS 1 AND 2: 0

## 2020-05-02 ASSESSMENT — PAIN SCALES - WONG BAKER
WONGBAKER_NUMERICALRESPONSE: DOESN'T HURT AT ALL
WONGBAKER_NUMERICALRESPONSE: DOESN'T HURT AT ALL

## 2020-05-02 ASSESSMENT — LIFESTYLE VARIABLES: SUBSTANCE_ABUSE: 0

## 2020-05-02 ASSESSMENT — FIBROSIS 4 INDEX: FIB4 SCORE: 2.48

## 2020-05-02 NOTE — PROGRESS NOTES
Patient presented to the hospital with shortness of breath and leg swelling found to have systolic heart failure and he has been receiving diuretics.  He underwent COVID-19 testing it came back negative.  His symptoms of shortness of breath and leg swelling related to his new onset of heart failure and his chances of having COVID-19 risk is low.

## 2020-05-02 NOTE — PROGRESS NOTES
Inpatient Anticoagulation Service Note    Date: 5/2/2020    Reason for Anticoagulation: Atrial Fibrillation   Target INR: 2.0 to 3.0  [id number] VASc Score: 3  HAS-BLED Score: 1   Hemoglobin Value: (!) 13.8  Hematocrit Value: 43.5  Lab Platelet Value: 166    INR from last 7 days     Date/Time INR Value    05/02/20 0220  (!) 1.53    05/01/20 0522  (!) 1.4    04/30/20 0650  (!) 1.4    04/29/20 0530  (!) 2.42    04/27/20 0830  (!) 2.72        Dose from last 7 days     Date/Time Dose (mg)    05/02/20 0917  7.5    05/01/20 0958  7.5    04/30/20 1438  7.5    04/29/20 1546  5        Average Dose (mg): (New start )  Significant Interactions: Not Applicable  Bridge Therapy: No     Reversal Agent Administered: Not Applicable  Comments: INR subtherapeutic but starting to trend up. Continue 7.5mg daily. NNL to assess H/H but no indication of bleeding noted. No DDI.     Plan:  Warfarin 7.5mg with INR check tomorrow  Education Material Provided?: No(The patient will require warfarin counseling prior to discharge)  Pharmacist suggested discharge dosing: Warfarin 7.5mg PO daily with close f/u within 3 days       Chinyere Castellanos, PharmD

## 2020-05-02 NOTE — PROGRESS NOTES
Seen by MD today and update plan of care. For floor transfer. Afib, with BBB. On room air. Happy to loose weight. Continue I/O.

## 2020-05-02 NOTE — PROGRESS NOTES
Cardiology Follow Up Progress Note    Date of Service  5/2/2020    Attending Physician  SANDRA Frias*    Chief Complaint   Worsening dyspnea x2 weeks, lower extremity edema chronic worsening x2 weeks, dyspnea on exertion, 20 pounds fluid retention (weight gain)    LAUREANO Mcdonnell is a 69 y.o. male admitted 4/26/2020 with above mentioned, found to have new cardiomyopathy, LVEF 30%, moderate MR, severe TR, global hypokinesis most prominent in the inferior wall.  In addition EKG revealed new diagnosis of A. fib RVR.  CT PE reveals atherosclerosis, no pulmonary embolism.        Past medical history significant for morbid obesity, chronic alcohol and tobacco abuse, chronic lower extremity edema.        Blood pressure 140/78  Rhythm: Sinus    Labs reviewed  Sodium 138  Potassium 4  Creatinine 1.12  Magnesium 1.9  Troponin 24  LDL 59    Interim Events  5/3/2020 no overnight cardiac events, diuresing well, weight down, lower extremity edema (chronic) persists, ambulated in the room, asymptomatic, denies angina, no dyspnea, we discussed signs and symptoms of heart failure, discussed compliance with close follow-up with heart failure clinic, discussed outpatient ischemic and DCCV work-up.    Review of Systems  Review of Systems   Respiratory: Negative for apnea, cough, choking, chest tightness, shortness of breath, wheezing and stridor.    Cardiovascular: Positive for leg swelling. Negative for chest pain.       Vital signs in last 24 hours  Temp:  [36.3 °C (97.3 °F)-36.9 °C (98.5 °F)] 36.3 °C (97.3 °F)  Pulse:  [81-98] 91  Resp:  [16-25] 25  BP: (124-137)/(75-84) 124/81  SpO2:  [92 %-97 %] 93 %    Physical Exam  Physical Exam  Cardiovascular:      Rate and Rhythm: Rhythm irregular.      Pulses: Normal pulses.   Pulmonary:      Effort: Pulmonary effort is normal.   Abdominal:      General: Abdomen is flat.   Skin:     General: Skin is warm.   Neurological:      General: No focal deficit present.      Mental  Status: He is alert.   Psychiatric:         Mood and Affect: Mood normal.         Lab Review  Lab Results   Component Value Date/Time    WBC 7.7 05/01/2020 05:22 AM    RBC 4.84 05/01/2020 05:22 AM    HEMOGLOBIN 13.8 (L) 05/01/2020 05:22 AM    HEMATOCRIT 43.5 05/01/2020 05:22 AM    MCV 89.9 05/01/2020 05:22 AM    MCH 28.5 05/01/2020 05:22 AM    MCHC 31.7 (L) 05/01/2020 05:22 AM    MPV 11.4 05/01/2020 05:22 AM      Lab Results   Component Value Date/Time    SODIUM 141 05/02/2020 02:20 AM    POTASSIUM 4.0 05/02/2020 02:20 AM    CHLORIDE 99 05/02/2020 02:20 AM    CO2 27 05/02/2020 02:20 AM    GLUCOSE 97 05/02/2020 02:20 AM    BUN 21 05/02/2020 02:20 AM    CREATININE 1.16 05/02/2020 02:20 AM      Lab Results   Component Value Date/Time    ASTSGOT 28 04/29/2020 05:30 AM    ALTSGPT 22 04/29/2020 05:30 AM     Lab Results   Component Value Date/Time    CHOLSTRLTOT 102 04/27/2020 05:15 AM    LDL 59 04/27/2020 05:15 AM    HDL 32 (A) 04/27/2020 05:15 AM    TRIGLYCERIDE 54 04/27/2020 05:15 AM    TROPONINT 26 (H) 05/02/2020 06:26 AM       No results for input(s): NTPROBNP in the last 72 hours.    Cardiac Imaging and Procedures Review  EKG: A. fib RVR    Echocardiogram: LVEF 30%    Cardiac Catheterization: Not applicable    Imaging  Chest X-Ray: On admission, interval cardiac silhouette enlargement without consolidation identified    Stress Test: Not applicable    Assessment/Plan    Acute/subacute decompensated systolic heart failure, NYHA class II/III, stage C  -Furosemide 40 IV twice daily, switch to torsemide  -Discussed daily weight  -Strict intake and output  -Current weight 239#, dry weight ~230's per patient report   -Lower extremity edema persists but stable (chronic), wound care  -Wishes to be discharged today  -We will schedule follow-up appointment with heart failure clinic early this week  -Follow-up with Coumadin clinic  -BMP this week      New cardiomyopathy, LVEF 30%  -Optimal guided medical therapy  Lisinopril 40  mg, Toprol- mg, spironolactone 25 mg  -plan for outpatient ischemic work up      New diagnosis A. fib RVR  -Warfarin  -INR  -Toprol- mg  -Remains in A. fib, persistent, rate well controlled  -Will consider outpatient DCCV work-up.  -New left bundle branch block/repeat EKG today prior to discharge.      Hypertension  -This morning blood pressure check reveals 140/70 on lisinopril 40 mg and Toprol- mg, in addition to IV furosemide 40 twice a day.  -We will monitor    Alcohol abuse  -Encouraged alcohol abstinence      Tobacco abuse  -Encouraged smoking cessation      Morbid obesity  -BMI 33, discussed therapeutic lifestyle changes    Severe TR based on recent echo  -Continue with torsemide    Moderate MR based on recent echo  -Continue with torsemide      Close follow-up with the Coumadin clinic, repeat echo in 90 days and optimal guided medical therapy, outpatient ischemic and DCCV follow-up, will arrange        Please contact me with any questions.    ANDREW Abad.   Cardiologist, [facility]  [phone]

## 2020-05-02 NOTE — PROGRESS NOTES
Cardiology Follow Up Progress Note    Date of Service  5/2/2020    Attending Physician  SANDRA Frias*    Chief Complaint   Shortness of breath, leg swelling, weight gain    HPI  Robert Mcdonnell is a 69 y.o. male with HTN admitted 4/26/20 with shortness of breath and leg swelling, 20 lb wt gain.  Found to be in AFIB with RVR, EF newly reduced from 50 to 30%.     Interim Events  5/2/2020: Patient is going to be de-escalated since he has 1- COVID 19 test.  His lower extremity edema is significantly improved.  His weight is down 9 kg.  He is feeling much better.  Still a little short of breath.  EKG shows new left bundle branch block.  It is intermittent.  Patient denies chest pain, palpitations, improvement in shortness of breath and orthopnea.    Labs: , TG 54, HDL 32, LDL 59, Na 141, K 4.0, BUN 21, Cr 1.16, GFR >60      Review of Systems  Review of Systems   Constitutional: Negative for chills and fever.   Respiratory: Negative for cough, chest tightness, shortness of breath and wheezing.    Cardiovascular: Negative for chest pain, palpitations and leg swelling.   Gastrointestinal: Negative for nausea and vomiting.   Neurological: Negative for dizziness and headaches.   All other systems reviewed and are negative.      Vital signs in last 24 hours  Temp:  [36.3 °C (97.4 °F)-37 °C (98.6 °F)] 36.6 °C (97.9 °F)  Pulse:  [81-98] 98  Resp:  [16-20] 20  BP: (126-141)/(75-86) 126/84  SpO2:  [92 %-97 %] 93 %    Physical Exam  Physical Exam  Vitals signs and nursing note reviewed.   Constitutional:       Appearance: Normal appearance.   HENT:      Head: Normocephalic and atraumatic.      Mouth/Throat:      Mouth: Mucous membranes are moist.   Eyes:      Extraocular Movements: Extraocular movements intact.   Neck:      Musculoskeletal: Normal range of motion and neck supple.      Comments: No JVD  Cardiovascular:      Rate and Rhythm: Normal rate and regular rhythm.      Pulses: Normal pulses.           Carotid  pulses are 2+ on the right side and 2+ on the left side.       Radial pulses are 2+ on the right side and 2+ on the left side.      Heart sounds: Normal heart sounds. No murmur.      Comments: 2+ B/L lower extremity edema  Pulmonary:      Effort: Pulmonary effort is normal.      Breath sounds: Normal breath sounds.   Abdominal:      Palpations: Abdomen is soft.   Skin:     General: Skin is warm and dry.   Neurological:      General: No focal deficit present.      Mental Status: He is alert and oriented to person, place, and time.   Psychiatric:         Mood and Affect: Mood normal.         Behavior: Behavior normal.         Lab Review  Lab Results   Component Value Date/Time    WBC 7.7 05/01/2020 05:22 AM    RBC 4.84 05/01/2020 05:22 AM    HEMOGLOBIN 13.8 (L) 05/01/2020 05:22 AM    HEMATOCRIT 43.5 05/01/2020 05:22 AM    MCV 89.9 05/01/2020 05:22 AM    MCH 28.5 05/01/2020 05:22 AM    MCHC 31.7 (L) 05/01/2020 05:22 AM    MPV 11.4 05/01/2020 05:22 AM      Lab Results   Component Value Date/Time    SODIUM 141 05/02/2020 02:20 AM    POTASSIUM 4.0 05/02/2020 02:20 AM    CHLORIDE 99 05/02/2020 02:20 AM    CO2 27 05/02/2020 02:20 AM    GLUCOSE 97 05/02/2020 02:20 AM    BUN 21 05/02/2020 02:20 AM    CREATININE 1.16 05/02/2020 02:20 AM      Lab Results   Component Value Date/Time    ASTSGOT 28 04/29/2020 05:30 AM    ALTSGPT 22 04/29/2020 05:30 AM     Lab Results   Component Value Date/Time    CHOLSTRLTOT 102 04/27/2020 05:15 AM    LDL 59 04/27/2020 05:15 AM    HDL 32 (A) 04/27/2020 05:15 AM    TRIGLYCERIDE 54 04/27/2020 05:15 AM    TROPONINT 26 (H) 05/02/2020 06:26 AM       No results for input(s): NTPROBNP in the last 72 hours.    Cardiac Imaging and Procedures Review  EKG 4/26/20: atrial fibrillation with RVR, slow R wave progression, inferior infarct    Echocardiogram:  4/26/20  CONCLUSIONS  Compared to the images of the prior echocardiogram dated 8/29/2015 -   there is now  a reduced ejection fraction and atrial  fibrillation.   There is now moderate mitral regurgitation and severe tricuspid   regurgitation.  Moderately reduced left ventricular systolic function.  Left ventricular ejection fraction is visually estimated to be 30%.  Global hypokinesis most prominent in the inferior wall.  Moderate  mitral regurgitation.  Severe tricuspid regurgitation.  Unable to estimate pulmonary artery pressure due to severe tricuspid   regurgitation.      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  Continue furosemide 40 mg IV twice daily  -Continue lisinopril 40mg, metoprolol  mg every evening, spironolactone 25 mg daily      Persistent atrial fibrillation, new diagnosis  - unclear on duration of afib, considered YVONNE/CV however given his volume overload, the chronicity of his arrhythmia, he is likely to not remain in NSR, will follow up in clinic    - good rate control, continue metoprolol SR 100mg  [id number] = 4  -  Warfarin with referral to AC clinic      Essential hypertension, uncontrolled  -Increase lisinopril 40 mg daily  - goal BP<120/80      ETOH use    Tobacco dependence  -  Education re cardio toxicity of ETOH, Cessation encouraged.        Plan discussed with Dr. Camejo and RN           MIKAELA Melendrez  [facility]  [phone]    Future Appointments   Date Time Provider Department Center   5/4/2020  2:00 PM [facility] EXAM 5 [facility] None       Please note that this dictation was created using voice recognition software. I have worked with consultants from the vendor as well as technical experts from [facility] to optimize the interface. I have made every reasonable attempt to correct obvious errors, but I  expect that there are errors of grammar and possibly content I did not discover before finalizing the note.

## 2020-05-02 NOTE — PROGRESS NOTES
Received in bed, aox4, afib  with couplets  on monitor. Assessment as per CDU. Call light within reach. Needs attended. Plan of care discussed and understood.

## 2020-05-02 NOTE — CARE PLAN
Problem: Safety  Goal: Will remain free from falls  Outcome: PROGRESSING AS EXPECTED  Intervention: Implement fall precautions  Note: Assist with ADL. Fall precautions in place.     Problem: Infection  Goal: Will remain free from infection  Outcome: PROGRESSING AS EXPECTED  Intervention: Implement standard precautions and perform hand washing before and after patient contact  Note: Adhere to isolation protocols.

## 2020-05-02 NOTE — CARE PLAN
Problem: Safety  Goal: Will remain free from falls  Outcome: MET     Problem: Infection  Goal: Will remain free from infection  Outcome: MET     Problem: Medication  Goal: Compliance with prescribed medication will improve  Outcome: MET

## 2020-05-03 VITALS
BODY MASS INDEX: 31.76 KG/M2 | RESPIRATION RATE: 18 BRPM | HEART RATE: 89 BPM | OXYGEN SATURATION: 96 % | HEIGHT: 73 IN | WEIGHT: 239.64 LBS | DIASTOLIC BLOOD PRESSURE: 78 MMHG | TEMPERATURE: 96.6 F | SYSTOLIC BLOOD PRESSURE: 142 MMHG

## 2020-05-03 PROBLEM — D69.6 THROMBOCYTOPENIA (HCC): Status: RESOLVED | Noted: 2020-04-27 | Resolved: 2020-05-03

## 2020-05-03 PROBLEM — R91.8 RIGHT UPPER LOBE PULMONARY INFILTRATE: Status: RESOLVED | Noted: 2020-04-26 | Resolved: 2020-05-03

## 2020-05-03 LAB
ANION GAP SERPL CALC-SCNC: 14 MMOL/L (ref 7–16)
BUN SERPL-MCNC: 20 MG/DL (ref 8–22)
CALCIUM SERPL-MCNC: 9.2 MG/DL (ref 8.5–10.5)
CHLORIDE SERPL-SCNC: 99 MMOL/L (ref 96–112)
CO2 SERPL-SCNC: 25 MMOL/L (ref 20–33)
CREAT SERPL-MCNC: 1.12 MG/DL (ref 0.5–1.4)
ERYTHROCYTE [DISTWIDTH] IN BLOOD BY AUTOMATED COUNT: 54.1 FL (ref 35.9–50)
GLUCOSE SERPL-MCNC: 95 MG/DL (ref 65–99)
HCT VFR BLD AUTO: 44.2 % (ref 42–52)
HGB BLD-MCNC: 14.6 G/DL (ref 14–18)
INR PPP: 1.71 (ref 0.87–1.13)
MCH RBC QN AUTO: 29.6 PG (ref 27–33)
MCHC RBC AUTO-ENTMCNC: 33 G/DL (ref 33.7–35.3)
MCV RBC AUTO: 89.7 FL (ref 81.4–97.8)
MORPHOLOGY BLD-IMP: NORMAL
PLATELET # BLD AUTO: 173 K/UL (ref 164–446)
PLATELET BLD QL SMEAR: NORMAL
PMV BLD AUTO: 11.7 FL (ref 9–12.9)
POTASSIUM SERPL-SCNC: 4.1 MMOL/L (ref 3.6–5.5)
PROTHROMBIN TIME: 20.6 SEC (ref 12–14.6)
RBC # BLD AUTO: 4.93 M/UL (ref 4.7–6.1)
SODIUM SERPL-SCNC: 138 MMOL/L (ref 135–145)
TROPONIN T SERPL-MCNC: 24 NG/L (ref 6–19)
WBC # BLD AUTO: 7.3 K/UL (ref 4.8–10.8)

## 2020-05-03 PROCEDURE — 700102 HCHG RX REV CODE 250 W/ 637 OVERRIDE(OP): Performed by: NURSE PRACTITIONER

## 2020-05-03 PROCEDURE — 93005 ELECTROCARDIOGRAM TRACING: CPT | Performed by: NURSE PRACTITIONER

## 2020-05-03 PROCEDURE — 80048 BASIC METABOLIC PNL TOTAL CA: CPT

## 2020-05-03 PROCEDURE — 99232 SBSQ HOSP IP/OBS MODERATE 35: CPT | Performed by: NURSE PRACTITIONER

## 2020-05-03 PROCEDURE — 99239 HOSP IP/OBS DSCHRG MGMT >30: CPT | Performed by: INTERNAL MEDICINE

## 2020-05-03 PROCEDURE — 84484 ASSAY OF TROPONIN QUANT: CPT

## 2020-05-03 PROCEDURE — 85610 PROTHROMBIN TIME: CPT

## 2020-05-03 PROCEDURE — A9270 NON-COVERED ITEM OR SERVICE: HCPCS | Performed by: PHYSICIAN ASSISTANT

## 2020-05-03 PROCEDURE — 700111 HCHG RX REV CODE 636 W/ 250 OVERRIDE (IP): Performed by: PHYSICIAN ASSISTANT

## 2020-05-03 PROCEDURE — 85027 COMPLETE CBC AUTOMATED: CPT

## 2020-05-03 PROCEDURE — A9270 NON-COVERED ITEM OR SERVICE: HCPCS | Performed by: NURSE PRACTITIONER

## 2020-05-03 PROCEDURE — 700102 HCHG RX REV CODE 250 W/ 637 OVERRIDE(OP): Performed by: PHYSICIAN ASSISTANT

## 2020-05-03 RX ORDER — METOPROLOL SUCCINATE 100 MG/1
100 TABLET, EXTENDED RELEASE ORAL EVERY EVENING
Qty: 30 TAB | Refills: 0 | Status: SHIPPED | OUTPATIENT
Start: 2020-05-03 | End: 2020-11-24 | Stop reason: SDUPTHER

## 2020-05-03 RX ORDER — SPIRONOLACTONE 25 MG/1
25 TABLET ORAL DAILY
Qty: 30 TAB | Refills: 3 | Status: SHIPPED | OUTPATIENT
Start: 2020-05-04 | End: 2020-11-24 | Stop reason: SDUPTHER

## 2020-05-03 RX ORDER — LISINOPRIL 40 MG/1
40 TABLET ORAL DAILY
Qty: 30 TAB | Refills: 0 | Status: SHIPPED | OUTPATIENT
Start: 2020-05-04 | End: 2020-11-24 | Stop reason: SDUPTHER

## 2020-05-03 RX ORDER — WARFARIN SODIUM 7.5 MG/1
7.5 TABLET ORAL DAILY
Qty: 30 TAB | Refills: 0 | Status: SHIPPED | OUTPATIENT
Start: 2020-05-03 | End: 2020-11-24 | Stop reason: SDUPTHER

## 2020-05-03 RX ORDER — TORSEMIDE 20 MG/1
20 TABLET ORAL DAILY
Qty: 30 TAB | Refills: 3 | Status: SHIPPED | OUTPATIENT
Start: 2020-05-04 | End: 2020-11-24 | Stop reason: SDUPTHER

## 2020-05-03 RX ORDER — TORSEMIDE 20 MG/1
20 TABLET ORAL
Status: DISCONTINUED | OUTPATIENT
Start: 2020-05-03 | End: 2020-05-03 | Stop reason: HOSPADM

## 2020-05-03 RX ADMIN — FUROSEMIDE 40 MG: 10 INJECTION, SOLUTION INTRAMUSCULAR; INTRAVENOUS at 05:32

## 2020-05-03 RX ADMIN — LISINOPRIL 40 MG: 20 TABLET ORAL at 05:32

## 2020-05-03 RX ADMIN — SPIRONOLACTONE 25 MG: 50 TABLET ORAL at 05:33

## 2020-05-03 RX ADMIN — TORSEMIDE 20 MG: 20 TABLET ORAL at 09:44

## 2020-05-03 RX ADMIN — POTASSIUM CHLORIDE 20 MEQ: 1500 TABLET, EXTENDED RELEASE ORAL at 05:32

## 2020-05-03 ASSESSMENT — ENCOUNTER SYMPTOMS
COUGH: 0
CHEST TIGHTNESS: 0
CHOKING: 0
APNEA: 0
SHORTNESS OF BREATH: 0
WHEEZING: 0
STRIDOR: 0

## 2020-05-03 ASSESSMENT — FIBROSIS 4 INDEX
FIB4 SCORE: 2.38
FIB4 SCORE: 2.38

## 2020-05-03 NOTE — DISCHARGE SUMMARY
Discharge Summary    CHIEF COMPLAINT ON ADMISSION  Chief Complaint   Patient presents with   • Leg Swelling     (B), pitting with drainage noted to RLE increasing over last week   • Shortness of Breath     >1 week       Reason for Admission  leg swelling      Admission Date  4/26/2020    CODE STATUS  Full Code    HPI & HOSPITAL COURSE    69-year-old male with past medical history of obesity, chronic LE edema, alcohol and tobacco use here with lower extremity swelling and progressive shortness of breath. He repots that the swelling is chronic but became acute worse over the last 2 months.  He reported 20 pound weight gain. He was found to have new congestive heart failure ejection fraction of 30% and Afib with RVR.  Cardiology was consulted and he was started on IV diuresis.  For his atrial fibrillation he was started on Xarelto initially but insurance does not cover Xarelto so after discussing with patient and cardiology I started him on Coumadin for anticoagulation.  He has been diuresing well during his hospitalization and his shortness of breath has improved significantly.  I requested referral for wound care as well as with anticoagulation clinic.  He is going to have follow-up appointment with cardiology.  Today I evaluated and examined him at the bedside he denies any acute complaints and he feels ready to be discharged.  I discussed with cardiology regarding his current condition and recommended follow-up with cardiology clinic with BMP.  I recommended patient not to take any NSAIDs for pain control due to use of warfarin.  I discussed with pharmacy regarding dosage of warfarin and I discharged him on 7.5 mg of warfarin and he has follow-up appointment with anticoagulation clinic.  He requested me to provide him time off from his work for 1 week and I provided him a letter.  I discussed plan of care with him and answered all his questions.  His current INR is 1.71.      Therefore, he is discharged in fair and  stable condition to home with close outpatient follow-up.    The patient met 2-midnight criteria for an inpatient stay at the time of discharge.    Discharge Date  05/03/20      FOLLOW UP ITEMS POST DISCHARGE  Primary care provider  Cardiology    DISCHARGE DIAGNOSES  Principal Problem:    Acute systolic heart failure (HCC) POA: Yes  Active Problems:    Lower extremity edema POA: Unknown    Acute hypoxemic respiratory failure (HCC) POA: Yes    Atrial fibrillation with rapid ventricular response (HCC) POA: Yes    Hypertension POA: Yes    Elevated troponin POA: Yes    Wound of lower extremity POA: Yes    Anemia POA: Yes    Obesity (BMI 30-39.9) POA: Yes    Tobacco dependence POA: Yes  Resolved Problems:    Right upper lobe pulmonary infiltrate POA: Yes    Thrombocytopenia (HCC) POA: Unknown      FOLLOW UP  Future Appointments   Date Time Provider Department Center   5/4/2020  2:00 PM [facility] EXAM 5 [facility] [address] [address]  [phone]  Go on 5/6/2020  Please walk in at 9am to establish care with a primary care physician. Thank you!     Coumadin/Anti-Coag Clinic  [address]  [phone]  Go on 5/4/2020  2pm      MEDICATIONS ON DISCHARGE     Medication List      START taking these medications      Instructions   lisinopril 40 MG tablet  Start taking on:  May 4, 2020  Commonly known as:  PRINIVIL   Take 1 Tab by mouth every day.  Dose:  40 mg     metoprolol  MG Tb24  Commonly known as:  TOPROL XL   Take 1 Tab by mouth every evening.  Dose:  100 mg     spironolactone 25 MG Tabs  Start taking on:  May 4, 2020  Commonly known as:  ALDACTONE   Take 1 Tab by mouth every day.  Dose:  25 mg     torsemide 20 MG Tabs  Start taking on:  May 4, 2020  Commonly known as:  DEMADEX   Take 1 Tab by mouth every day.  Dose:  20 mg     warfarin 7.5 MG Tabs  Commonly known as:  COUMADIN   Take 1 Tab by mouth every day at 6 PM.  Dose:   7.5 mg            Allergies  Allergies   Allergen Reactions   • Aspirin Vomiting     Pt states vomiting.       DIET  Orders Placed This Encounter   Procedures   • Diet Order Cardiac     Standing Status:   Standing     Number of Occurrences:   1     Order Specific Question:   Diet:     Answer:   Cardiac [6]       ACTIVITY  As tolerated.  Weight bearing as tolerated    CONSULTATIONS  Cardiology    PROCEDURES  None    LABORATORY  Lab Results   Component Value Date    SODIUM 138 05/03/2020    POTASSIUM 4.1 05/03/2020    CHLORIDE 99 05/03/2020    CO2 25 05/03/2020    GLUCOSE 95 05/03/2020    BUN 20 05/03/2020    CREATININE 1.12 05/03/2020        Lab Results   Component Value Date    WBC 7.3 05/03/2020    HEMOGLOBIN 14.6 05/03/2020    HEMATOCRIT 44.2 05/03/2020    PLATELETCT 173 05/03/2020        EC-ECHOCARDIOGRAM COMPLETE W/O CONT   Final Result      CT-CTA CHEST PULMONARY ARTERY W/ RECONS   Final Result      1.  No large pulmonary embolus is identified.      2.  Irregular nodular opacity in the right lung apex may represent an infectious or inflammatory process. Follow-up low dose CT is recommended in approximately 3 months to ensure resolution.      3.  Atherosclerosis and cardiomegaly.      4.  Ascites in the partially visualized upper abdomen            US-EXTREMITY VENOUS LOWER UNILAT RIGHT   Final Result      DX-CHEST-PORTABLE (1 VIEW)   Final Result      Interval cardiac silhouette enlargement without consolidation identified            Total time of the discharge process exceeds 34 minutes.

## 2020-05-03 NOTE — PROGRESS NOTES
Patient got Weighing scale for patient to use. Discuss Lab draw on Wednesday as schedule. Need to follow up Coumadin clinic as discussed.

## 2020-05-03 NOTE — PROGRESS NOTES
A/o, respirations are even and unlabored on room air ,assessment completed, vital signs stable, Afib on the monitor Hr-81,bilateral leg edema noted, pt complaining of ankle pain, tylenol given, updated communication board,  poc discussed and understood, verbalized understanding, pt strict I and O,  all questions answered at this time , fall precautions in place, call button within reach, will continue to monitor

## 2020-05-03 NOTE — PROGRESS NOTES
Hospital Medicine Daily Progress Note    Date of Service  5/2/2020    Chief Complaint  69 y.o. male admitted 4/26/2020 with shortness of breath and LE swelling.     Hospital Course    70 yo M with obesity, chronic LE edema, alcohol and tobacco use here with LE swelling and progressive SOB. He repots that the swelling is chronic but became acute worse over the last 2 months. Endorsed 20 pound weight gain. He was found to have new congestive heart failure and Afib with RVR.  Cardiology was consulted and he was started on IV diuresis.      Interval Problem Update    He reported that his breathing has been improving.  Overnight he developed new left bundle branch block.  I discussed this with cardiology.    Consultants/Specialty  Cardiology    Code Status  Full    Disposition  Home when medically stable.    Review of Systems  Review of Systems   Constitutional: Negative for chills, fever, malaise/fatigue and weight loss.   HENT: Negative for hearing loss and tinnitus.    Eyes: Negative for blurred vision, double vision, photophobia and pain.   Respiratory: Positive for cough and shortness of breath (Improving). Negative for sputum production.    Cardiovascular: Negative for chest pain, palpitations, orthopnea and leg swelling.   Gastrointestinal: Negative for abdominal pain, constipation, diarrhea, nausea and vomiting.   Genitourinary: Negative for dysuria, frequency and urgency.   Musculoskeletal: Negative for back pain, joint pain, myalgias and neck pain.   Skin: Negative for rash.   Neurological: Negative for dizziness, tingling, tremors, sensory change, speech change, focal weakness and headaches.   Psychiatric/Behavioral: Negative for hallucinations and substance abuse.   All other systems reviewed and are negative.       Physical Exam  Temp:  [36.1 °C (97 °F)-36.9 °C (98.5 °F)] 36.1 °C (97 °F)  Pulse:  [81-98] 92  Resp:  [16-25] 20  BP: (121-133)/(64-84) 121/64  SpO2:  [92 %-99 %] 99 %    Physical Exam  Vitals  signs reviewed.   Constitutional:       General: He is not in acute distress.  HENT:      Head: Normocephalic and atraumatic. No contusion.      Right Ear: External ear normal.      Left Ear: External ear normal.      Nose: Nose normal.      Mouth/Throat:      Mouth: Mucous membranes are moist.      Pharynx: No oropharyngeal exudate.   Eyes:      General:         Right eye: No discharge.         Left eye: No discharge.      Pupils: Pupils are equal, round, and reactive to light.   Neck:      Musculoskeletal: No neck rigidity or muscular tenderness.   Cardiovascular:      Rate and Rhythm: Normal rate and regular rhythm.      Heart sounds: No murmur. No friction rub. No gallop.    Pulmonary:      Effort: Pulmonary effort is normal.      Breath sounds: No wheezing or rhonchi.      Comments: Decreased breath sounds at the bases.  Abdominal:      General: Bowel sounds are normal. There is no distension.      Palpations: Abdomen is soft.      Tenderness: There is no abdominal tenderness. There is no rebound.   Musculoskeletal: Normal range of motion.         General: No swelling or tenderness.      Right lower leg: Edema (Improving) present.      Left lower leg: Edema (Improving) present.   Skin:     General: Skin is warm and dry.      Coloration: Skin is not jaundiced.   Neurological:      General: No focal deficit present.      Mental Status: He is alert.      Cranial Nerves: No cranial nerve deficit.      Sensory: No sensory deficit.   Psychiatric:         Mood and Affect: Mood normal.     I performed physical exam on May 2, 2020 recommend similar without any acute changes.    Fluids    Intake/Output Summary (Last 24 hours) at 5/2/2020 1724  Last data filed at 5/2/2020 1718  Gross per 24 hour   Intake 460 ml   Output 3100 ml   Net -2640 ml       Laboratory  Recent Labs     04/30/20  0400 05/01/20  0522   WBC 6.5 7.7   RBC 4.63* 4.84   HEMOGLOBIN 13.5* 13.8*   HEMATOCRIT 40.6* 43.5   MCV 87.7 89.9   MCH 29.2 28.5   MCHC  33.3* 31.7*   RDW 51.2* 54.1*   PLATELETCT 164 166   MPV 11.2 11.4     Recent Labs     04/30/20  0400 05/01/20  0522 05/02/20  0220   SODIUM 139 140 141   POTASSIUM 3.9 3.8 4.0   CHLORIDE 100 99 99   CO2 27 28 27   GLUCOSE 98 105* 97   BUN 17 17 21   CREATININE 1.14 1.12 1.16   CALCIUM 9.4 9.3 9.4     Recent Labs     04/30/20  0650 05/01/20  0522 05/02/20  0220   INR 1.40* 1.40* 1.53*               Imaging  EC-ECHOCARDIOGRAM COMPLETE W/O CONT   Final Result      CT-CTA CHEST PULMONARY ARTERY W/ RECONS   Final Result      1.  No large pulmonary embolus is identified.      2.  Irregular nodular opacity in the right lung apex may represent an infectious or inflammatory process. Follow-up low dose CT is recommended in approximately 3 months to ensure resolution.      3.  Atherosclerosis and cardiomegaly.      4.  Ascites in the partially visualized upper abdomen            US-EXTREMITY VENOUS LOWER UNILAT RIGHT   Final Result      DX-CHEST-PORTABLE (1 VIEW)   Final Result      Interval cardiac silhouette enlargement without consolidation identified           Assessment/Plan  * Acute systolic heart failure (HCC)- (present on admission)  Assessment & Plan  New diagnosis of HFrEF, EF reduced from 50 to 30%. Net negative 2.3L since admission.   - cardiology consulted and they evaluated him and made recommendations to  Continue heart failure medication which include metoprolol and ACE inhibitor  Cardiology increase the dose of IV Lasix to 40 mg IV twice daily.  I discussed plan of care with him.  I discussed plan of care with cardiology.    Atrial fibrillation with rapid ventricular response (HCC)- (present on admission)  Assessment & Plan  New diagnosis unknown duration. Likely cause for patient's exertional dyspnea. Rate with better control now.   Continue rate control with metoprolol.  Cardiology evaluated him and made recommendations.  His insurance does not cover Xarelto and I discussed with case management.  After  "discussion I discontinued Xarelto and started him on warfarin.  Continue warfarin currently INR is not therapeutic.  Continue monitor on telemetry    Acute hypoxemic respiratory failure (HCC)- (present on admission)  Assessment & Plan  Now resolved currently he is on room air admitted in saturation.  Related to Afib with RVR and acute CHF exacerbation. Procal low. CTA without evidence of PE  - covid negative x1  He is responding to diuresis.  Discussed with cardiology and increase the dose of IV diuretics.  Continue to monitor input and output closely.  Continue to provide him oxygen and titrate as tolerated.    Lower extremity edema  Assessment & Plan  Chronic.  His edema has been improving.  Continue diuresis.     Elevated troponin- (present on admission)  Assessment & Plan  Patient without chest pain and EKG without ischemic changes.  Likely due to demand ischemia 2/2 afib with RVR and fluid overload  Cardiology evaluated him and recommended outpatient ischemic work-up.  He does not have any acute chest pain    Hypertension- (present on admission)  Assessment & Plan  Uncontrolled.  Continue lisinopril and metoprolol.  Continue Lasix for diuresis.    Thrombocytopenia (HCC)  Assessment & Plan  Platelet count is trending up and current platelet count is 164.  Does not have any right upper quadrant pain.  Liver enzymes are within normal limits  CMP tomorrow morning.    Right upper lobe pulmonary infiltrate- (present on admission)  Assessment & Plan  With symptoms of mild cough, dyspnea. Suspect inflammatory process. \"Irregular nodular opacity in the right lung apex \"  - will need repeat imaging in 3 months to ensure resolution  He remains afebrile.    Tobacco dependence- (present on admission)  Assessment & Plan  Continues to use tobacco.  - nicotine replacement declined initially but available  Continue counseling    Obesity (BMI 30-39.9)- (present on admission)  Assessment & Plan  Body mass index is 36.94 " kg/m².    Anemia- (present on admission)  Assessment & Plan  Hemoglobin remained stable.  He does not show signs of active bleeding.  Continue to monitor and transfuse if hemoglobin below 7.      Wound of lower extremity- (present on admission)  Assessment & Plan  With stasis dermatitis. No signs for infection on exam.  Wound care.     I discussed with cardiology regarding his current medical condition and discussed above his new left bundle branch block.  Continue diuretics with IV Lasix 40 mg twice daily.  Continue warfarin for anticoagulation.    I have seen and examined patient on 5/2/2020. I have reviewed vitals, new labs and imaging. I have discussed POC with RN. There are no changes from (5/1/2020) except for what is mentioned above.       VTE prophylaxis: Warfarin

## 2020-05-03 NOTE — DISCHARGE INSTRUCTIONS
Discharge Instructions    Discharged to home by car with relative. Discharged via walking, hospital escort: Yes.  Special equipment needed: Not Applicable    Be sure to schedule a follow-up appointment with your primary care doctor or any specialists as instructed.     Discharge Plan:   Diet Plan: Discussed  Activity Level: Discussed  Smoking Cessation Offered: Patient Counseled  Confirmed Follow up Appointment: Patient to Call and Schedule Appointment  Confirmed Symptoms Management: Discussed  Medication Reconciliation Updated: Yes  Influenza Vaccine Indication: Patient Refuses    I understand that a diet low in cholesterol, fat, and sodium is recommended for good health. Unless I have been given specific instructions below for another diet, I accept this instruction as my diet prescription.   Other diet: Cardiac Diet    Special Instructions: None    · Is patient discharged on Warfarin / Coumadin?   No     Depression / Suicide Risk    As you are discharged from this [facility] facility, it is important to learn how to keep safe from harming yourself.    Recognize the warning signs:  · Abrupt changes in personality, positive or negative- including increase in energy   · Giving away possessions  · Change in eating patterns- significant weight changes-  positive or negative  · Change in sleeping patterns- unable to sleep or sleeping all the time   · Unwillingness or inability to communicate  · Depression  · Unusual sadness, discouragement and loneliness  · Talk of wanting to die  · Neglect of personal appearance   · Rebelliousness- reckless behavior  · Withdrawal from people/activities they love  · Confusion- inability to concentrate     If you or a loved one observes any of these behaviors or has concerns about self-harm, here's what you can do:  · Talk about it- your feelings and reasons for harming yourself  · Remove any means that you might use to hurt yourself (examples: pills, rope, extension cords,  firearm)  · Get professional help from the community (Mental Health, Substance Abuse, psychological counseling)  · Do not be alone:Call your Safe Contact- someone whom you trust who will be there for you.  · Call your local CRISIS HOTLINE 525-8112 or 699-337-2531  · Call your local Children's Mobile Crisis Response Team [place] (441) 872-8485 or www.[employer]  · Call the toll free National Suicide Prevention Hotlines   · National Suicide Prevention Lifeline 549-981-WMTL (9127)  · [employer] Line Network 800-SUICIDE (011-5505)        DISCHARGE INSTRUCTIONS PER Suzette Camejo M.D.    Diagnosis: Systoliccongestive heart failure    Please take medication as prescribed.  Please follow-up with primary care provider and with cardiology.  Please check BMP.  Any medical emergency please go to the nearest emergency room or call 911.  Please follow-up with Coumadin clinic and did not take pain medication like ibuprofen, Aleve and naproxen.  Please take Tylenol for pain control and before taking any new medication please discuss with your primary care provider.  For many medical emergency please go to the nearest emergency room or call 911.

## 2020-05-04 ENCOUNTER — TELEPHONE (OUTPATIENT)
Dept: VASCULAR LAB | Facility: MEDICAL CENTER | Age: 70
End: 2020-05-04

## 2020-05-04 LAB
EKG IMPRESSION: NORMAL
EKG IMPRESSION: NORMAL

## 2020-05-04 PROCEDURE — 93010 ELECTROCARDIOGRAM REPORT: CPT | Performed by: INTERNAL MEDICINE

## 2020-05-04 PROCEDURE — 93010 ELECTROCARDIOGRAM REPORT: CPT | Mod: 76 | Performed by: INTERNAL MEDICINE

## 2020-05-04 NOTE — TELEPHONE ENCOUNTER
[facility]    Pt missed his appt today.  Tried calling pt by phone but unable to leave VM, will try again at a later time.    Maxi Lazo, PharmD

## 2020-05-06 ENCOUNTER — TELEPHONE (OUTPATIENT)
Dept: VASCULAR LAB | Facility: MEDICAL CENTER | Age: 70
End: 2020-05-06

## 2020-05-06 NOTE — LETTER
May 6, 2020      Robert Mcdonnell  [address] [address]      Dear Robert Mcdonnell ,    We have been unsuccessful in our attempts to contact you regarding your Anticoagulation Service referral.  Anticoagulants are medications that can cause potential harmful side effects when not monitored properly. Without proper monitoring there is potential for serious, sometimes life-threatening bleeding problems or life-threatening blood clots or stroke could result.    Please contact our clinic so we may assist you.  We are open Monday-Friday 8 am until 5 pm.  You may reach our Service at [phone].    To monitor your anticoagulant effectively, we need to be able to communicate with you.  This is a requirement to be followed by our Service.  Please contact the clinic as soon as possible to establish care and provide your current contact information.  Thank you.        Sincerely,        Sreedhar Lazar, PharmD, [facility]  Pharmacy Clinical Supervisor  [facility]  Outpatient Anticoagulation Service

## 2020-05-06 NOTE — TELEPHONE ENCOUNTER
Received anticoagulation referral for warfarin due to Afib from Dr. Camejo on 04/29/20    Phone rings and rings, no answer and VM box is full. No mychart. Will send letter.     Insurance: Medicare  [facility]P: [facility]  Locations to be seen: [facility]???    [facility] at [phone], fax [phone]    Pati Oropeza, PharmD

## 2020-05-11 ENCOUNTER — TELEPHONE (OUTPATIENT)
Dept: CARDIOLOGY | Facility: MEDICAL CENTER | Age: 70
End: 2020-05-11

## 2020-05-11 NOTE — LETTER
May 11, 2020        Robert Mcdonnell  [address] [address]        Dear Robert:    We have attempted to reach you regarding scheduling your appointment.    Please call our offices as soon as possible.    [phone]    If you have any questions or concerns, please don't hesitate to call.        Sincerely,      [facility]  [facility]

## 2020-05-11 NOTE — TELEPHONE ENCOUNTER
Message   Received: 3 days ago   Message Contents   LEBRON Abad R.N.             Patient was seen by [facility] inpatient ,ruma can follow up with the patient   thx    Previous Messages      ----- Message -----   From: Sahra Vides R.N.   Sent: 5/5/2020   1:26 PM PDT   To: LEBRON Abad, *     Should this be a HF New appointment?   This is not an already established HF patient     Should they see a HF MD in office this week instead?     Please advise ladies.   ----- Message -----   From: Ying Quevedo R.N.   Sent: 5/5/2020  12:01 PM PDT   To: Sahra Vides R.N.     I missed this yesterday.  But its for you.       Thank you.     T   ----- Message -----   From: LEBRON Abad   Sent: 5/3/2020   9:11 AM PDT   To: PATTI Louis     Would you please forward this to Ruma Horner's RN.       Patient needs a follow-up appointment with Ruma for heart failure this week please preferably Thursday or Friday thank you so much         Pt called x 2  VM box is full and unable to LVM    Tabitha Manzano, [phone] called. No answer, voicemail left with request for call back and contact information.     Message   Received: Today   Message Contents   SARAH Santana R.N.             Pt should be a Heart Failure new.     Ruma      Letter written and sent regarding calling to schedule appointment

## 2020-05-13 ENCOUNTER — TELEPHONE (OUTPATIENT)
Dept: VASCULAR LAB | Facility: MEDICAL CENTER | Age: 70
End: 2020-05-13

## 2020-05-13 NOTE — LETTER
May 13, 2020    Robert Mcdonnell  [address] [address]      Dear Robert Mcdonnell ,    We have been unsuccessful in our attempts to contact you regarding your Anticoagulation Service referral.  Anticoagulants are medications that can cause potential harmful side effects when not monitored properly. Without proper monitoring there is potential for serious, sometimes life-threatening bleeding problems or life-threatening blood clots or stroke could result.    Please contact our clinic so we may assist you.  We are open Monday-Friday 8 am until 5 pm.  You may reach our Service at [phone].    To monitor your anticoagulant effectively, we need to be able to communicate with you.  This is a requirement to be followed by our Service.  Please contact the clinic as soon as possible to establish care and provide your current contact information.  Thank you.        Sincerely,        Sreedhar Lazar, PharmD, [facility]  Pharmacy Clinical Supervisor  [facility]  Outpatient Anticoagulation Service

## 2020-05-22 ENCOUNTER — TELEPHONE (OUTPATIENT)
Dept: VASCULAR LAB | Facility: MEDICAL CENTER | Age: 70
End: 2020-05-22

## 2020-06-02 ENCOUNTER — TELEPHONE (OUTPATIENT)
Dept: VASCULAR LAB | Facility: MEDICAL CENTER | Age: 70
End: 2020-06-02

## 2020-11-24 ENCOUNTER — HOSPITAL ENCOUNTER (EMERGENCY)
Facility: MEDICAL CENTER | Age: 70
End: 2020-11-24
Attending: EMERGENCY MEDICINE
Payer: MEDICARE

## 2020-11-24 ENCOUNTER — APPOINTMENT (OUTPATIENT)
Dept: RADIOLOGY | Facility: MEDICAL CENTER | Age: 70
End: 2020-11-24
Attending: EMERGENCY MEDICINE
Payer: MEDICARE

## 2020-11-24 VITALS
SYSTOLIC BLOOD PRESSURE: 136 MMHG | HEART RATE: 113 BPM | HEIGHT: 73 IN | WEIGHT: 230 LBS | BODY MASS INDEX: 30.48 KG/M2 | TEMPERATURE: 98.7 F | DIASTOLIC BLOOD PRESSURE: 79 MMHG | RESPIRATION RATE: 24 BRPM | OXYGEN SATURATION: 98 %

## 2020-11-24 DIAGNOSIS — I48.91 ATRIAL FIBRILLATION WITH RAPID VENTRICULAR RESPONSE (HCC): ICD-10-CM

## 2020-11-24 DIAGNOSIS — R60.9 PERIPHERAL EDEMA: ICD-10-CM

## 2020-11-24 DIAGNOSIS — R60.0 LOWER EXTREMITY EDEMA: ICD-10-CM

## 2020-11-24 LAB
ANION GAP SERPL CALC-SCNC: 8 MMOL/L (ref 7–16)
BASOPHILS # BLD AUTO: 0.4 % (ref 0–1.8)
BASOPHILS # BLD: 0.03 K/UL (ref 0–0.12)
BUN SERPL-MCNC: 13 MG/DL (ref 8–22)
CALCIUM SERPL-MCNC: 9 MG/DL (ref 8.5–10.5)
CHLORIDE SERPL-SCNC: 105 MMOL/L (ref 96–112)
CO2 SERPL-SCNC: 30 MMOL/L (ref 20–33)
CREAT SERPL-MCNC: 1.15 MG/DL (ref 0.5–1.4)
EOSINOPHIL # BLD AUTO: 0.15 K/UL (ref 0–0.51)
EOSINOPHIL NFR BLD: 1.9 % (ref 0–6.9)
ERYTHROCYTE [DISTWIDTH] IN BLOOD BY AUTOMATED COUNT: 48.1 FL (ref 35.9–50)
GLUCOSE SERPL-MCNC: 64 MG/DL (ref 65–99)
HCT VFR BLD AUTO: 40.6 % (ref 42–52)
HGB BLD-MCNC: 13.3 G/DL (ref 14–18)
IMM GRANULOCYTES # BLD AUTO: 0.03 K/UL (ref 0–0.11)
IMM GRANULOCYTES NFR BLD AUTO: 0.4 % (ref 0–0.9)
LYMPHOCYTES # BLD AUTO: 1.42 K/UL (ref 1–4.8)
LYMPHOCYTES NFR BLD: 17.9 % (ref 22–41)
MCH RBC QN AUTO: 30.4 PG (ref 27–33)
MCHC RBC AUTO-ENTMCNC: 32.8 G/DL (ref 33.7–35.3)
MCV RBC AUTO: 92.9 FL (ref 81.4–97.8)
MONOCYTES # BLD AUTO: 0.9 K/UL (ref 0–0.85)
MONOCYTES NFR BLD AUTO: 11.4 % (ref 0–13.4)
NEUTROPHILS # BLD AUTO: 5.39 K/UL (ref 1.82–7.42)
NEUTROPHILS NFR BLD: 68 % (ref 44–72)
NRBC # BLD AUTO: 0 K/UL
NRBC BLD-RTO: 0 /100 WBC
PLATELET # BLD AUTO: 141 K/UL (ref 164–446)
PMV BLD AUTO: 11.4 FL (ref 9–12.9)
POTASSIUM SERPL-SCNC: 4.4 MMOL/L (ref 3.6–5.5)
RBC # BLD AUTO: 4.37 M/UL (ref 4.7–6.1)
SODIUM SERPL-SCNC: 143 MMOL/L (ref 135–145)
WBC # BLD AUTO: 7.9 K/UL (ref 4.8–10.8)

## 2020-11-24 PROCEDURE — 99283 EMERGENCY DEPT VISIT LOW MDM: CPT | Mod: XU

## 2020-11-24 PROCEDURE — 80048 BASIC METABOLIC PNL TOTAL CA: CPT

## 2020-11-24 PROCEDURE — 71045 X-RAY EXAM CHEST 1 VIEW: CPT

## 2020-11-24 PROCEDURE — 36415 COLL VENOUS BLD VENIPUNCTURE: CPT

## 2020-11-24 PROCEDURE — 85025 COMPLETE CBC W/AUTO DIFF WBC: CPT

## 2020-11-24 RX ORDER — LISINOPRIL 40 MG/1
40 TABLET ORAL DAILY
Qty: 30 TAB | Refills: 0 | Status: SHIPPED | OUTPATIENT
Start: 2020-11-24 | End: 2021-06-16 | Stop reason: SDUPTHER

## 2020-11-24 RX ORDER — SPIRONOLACTONE 25 MG/1
25 TABLET ORAL DAILY
Qty: 30 TAB | Refills: 3 | Status: SHIPPED | OUTPATIENT
Start: 2020-11-24 | End: 2021-06-16 | Stop reason: SDUPTHER

## 2020-11-24 RX ORDER — WARFARIN SODIUM 7.5 MG/1
7.5 TABLET ORAL DAILY
Qty: 30 TAB | Refills: 0 | Status: SHIPPED | OUTPATIENT
Start: 2020-11-24 | End: 2021-06-16 | Stop reason: SDUPTHER

## 2020-11-24 RX ORDER — FUROSEMIDE 10 MG/ML
60 INJECTION INTRAMUSCULAR; INTRAVENOUS ONCE
Status: DISCONTINUED | OUTPATIENT
Start: 2020-11-24 | End: 2020-11-24 | Stop reason: HOSPADM

## 2020-11-24 RX ORDER — TORSEMIDE 20 MG/1
20 TABLET ORAL DAILY
Qty: 30 TAB | Refills: 3 | Status: SHIPPED | OUTPATIENT
Start: 2020-11-24 | End: 2021-06-16 | Stop reason: SDUPTHER

## 2020-11-24 RX ORDER — METOPROLOL SUCCINATE 100 MG/1
100 TABLET, EXTENDED RELEASE ORAL EVERY EVENING
Qty: 30 TAB | Refills: 0 | Status: SHIPPED | OUTPATIENT
Start: 2020-11-24 | End: 2021-06-16 | Stop reason: SDUPTHER

## 2020-11-24 ASSESSMENT — FIBROSIS 4 INDEX: FIB4 SCORE: 2.42

## 2020-11-25 ENCOUNTER — TELEPHONE (OUTPATIENT)
Dept: VASCULAR LAB | Facility: MEDICAL CENTER | Age: 70
End: 2020-11-25

## 2020-11-25 NOTE — ED TRIAGE NOTES
Pt presents to ED with sudden onset of SOB today. Pt reports bilateral lower leg edema for the last week. After talking with pt, he has been out of his lasix for the past 2 weeks. Pt does not appear to be in any apparent distress. VSS. Call ligth within [facility]. Pt got 40 lasix IV, 0.8 nitro, and 10 mg morphine by EMS. Pt has voided 700 ml.

## 2020-11-25 NOTE — DISCHARGE INSTRUCTIONS
We have made sure that there are refills available for you at your pharmacy.  Please call your primary care doctor to schedule a follow-up appointment.  We have also placed referrals to the heart failure clinic and anticoagulation clinic.  Use the phone numbers here to call to confirm these appointments.

## 2020-11-25 NOTE — TELEPHONE ENCOUNTER
Received anticoagulation referral for warfarin due to atrial fibrillation from hospital discharge team on 11-24-20    LM for patient to return call to establish care.    Insurance: Medicare  PCP: Shantell Lima)  Locations to be seen: [facility] or [facility] at [phone], fax [phone]    Bonilla Ugalde, PharmD, BCACP

## 2020-11-25 NOTE — ED PROVIDER NOTES
ED Provider Note    Scribed for Ramu Olvera M.D. by Ramu Olvera M.D.. 11/24/2020,  6:56 PM.    CHIEF COMPLAINT  Chief Complaint   Patient presents with   • Shortness of Breath   • Peripheral Edema       HPI  Robert Mcdonnell is a 70 y.o. male with a history of CHF and heavy alcohol consumption, which continues, who presents to the Emergency Department reporting shortness of breath that he developed today.  However, he also reports bilateral lower extremity edema for at least the last week.  He reports running out of his home medications, including his diuretic, at least 2 weeks ago.  I suspect is been somewhat longer, given that his last documented visit within our cardiology department was in June.  He reports that he was unable to  medications because of of financial issues, but is doing better, working, and can  medications at his pharmacy now if they are available.  He shows no signs of respiratory distress.  He is not requiring supplemental oxygen.  He has not had fevers or chills, chest pain, cough or nausea or vomiting.  He came in by EMS, and got 40 mg of IV Lasix among other medications.  On arrival, he had already voided 700 mL.    REVIEW OF SYSTEMS  See HPI for further details. All other systems are negative.     PAST MEDICAL HISTORY   has a past medical history of Hypertension.    SOCIAL HISTORY  Social History     Tobacco Use   • Smoking status: Current Every Day Smoker     Packs/day: 1.00     Types: Cigarettes   • Smokeless tobacco: Never Used   Substance and Sexual Activity   • Alcohol use: Yes     Alcohol/week: 14.4 oz     Types: 24 Cans of beer per week     Comment: occ   • Drug use: No   • Sexual activity: Not on file     Social History     Substance and Sexual Activity   Drug Use No       SURGICAL HISTORY  patient denies any surgical history    CURRENT MEDICATIONS  Home Medications     Reviewed by Beckie Pérez R.N. (Registered Nurse) on 11/24/20 at 1912  Med List  "Status: <None>   Medication Last Dose Status   lisinopril (PRINIVIL) 40 MG tablet  Active   metoprolol SR (TOPROL XL) 100 MG TABLET SR 24 HR  Active   spironolactone (ALDACTONE) 25 MG Tab  Active   torsemide (DEMADEX) 20 MG Tab  Active   warfarin (COUMADIN) 7.5 MG Tab  Active                ALLERGIES  Allergies   Allergen Reactions   • Aspirin Vomiting     Pt states vomiting.       PHYSICAL EXAM  VITAL SIGNS: /79   Pulse (!) 113   Temp 37.1 °C (98.7 °F) (Temporal)   Resp (!) 24   Ht 1.854 m (6' 1\")   Wt 104.3 kg (230 lb)   SpO2 98%   BMI 30.34 kg/m²   Pulse ox interpretation: I interpret this pulse ox as normal.  Constitutional: Alert in no apparent distress.  HENT: No signs of trauma, Bilateral external ears normal, Nose normal.   Eyes: Conjunctiva normal, Non-icteric.   Neck: Normal range of motion, Supple, No stridor.   Lymphatic: No lymphadenopathy noted.   Cardiovascular: Regular slightly tachycardic rate and rhythm, no murmurs.   Thorax & Lungs: Normal breath sounds, No respiratory distress, No wheezing, No chest tenderness.   Abdomen: Bowel sounds normal, Soft, No tenderness, No masses, No pulsatile masses. No peritoneal signs.  Skin: Warm, Dry, No erythema, No rash.   Extremities: Intact distal pulses, symmetric bilateral lower extremity edema, No cyanosis.  Musculoskeletal: Good range of motion in all major joints. No or major deformities noted.   Neurologic: Alert , Normal motor function, Normal sensory function, No focal deficits noted.   Psychiatric: Affect normal, Judgment normal, Mood normal.     DIAGNOSTIC STUDIES / PROCEDURES        LABS  Labs Reviewed   CBC WITH DIFFERENTIAL - Abnormal; Notable for the following components:       Result Value    RBC 4.37 (*)     Hemoglobin 13.3 (*)     Hematocrit 40.6 (*)     MCHC 32.8 (*)     Platelet Count 141 (*)     Lymphocytes 17.90 (*)     Monos (Absolute) 0.90 (*)     All other components within normal limits   BASIC METABOLIC PANEL - Abnormal; " Notable for the following components:    Glucose 64 (*)     All other components within normal limits   ESTIMATED GFR     All labs reviewed by me.    RADIOLOGY  DX-CHEST-LIMITED (1 VIEW)   Final Result         Small right pleural effusion with right basilar atelectasis.        The radiologist's interpretation of all radiological studies have been reviewed by me.    COURSE & MEDICAL DECISION MAKING  Nursing notes, VS, PMSFHx reviewed in chart.     6:56 PM Patient seen and examined at bedside. Differential diagnosis includes but is not limited to peripheral edema secondary to CHF, likely a component of medication noncompliance.  Less likely acute kidney injury. Ordered for CBC BMP, chest x-ray to evaluate. Patient will be treated with additional IV Lasix for his symptoms.     8:13 PM this patient has diuresed 2L with IV Lasix.  His laboratory tests are reassuring.  His chest x-ray is unremarkable.  He has never been hypoxic.  I think his mild tachycardia is related to ongoing alcohol abuse.  He has been referred to heart failure clinic and anticoagulation clinic.  I have ensured that he has refills available at his pharmacy which he reports he intends to  tomorrow.     The patient will return for new or worsening symptoms and is stable at the time of discharge.    The patient is referred to a primary physician for blood pressure management, diabetic screening, and for all other preventative health concerns.      DISPOSITION:  Patient will be discharged home in stable condition.    FOLLOW UP:  Ming Stinson M.D.  [address]  [place] [address] [phone]    Schedule an appointment as soon as possible for a visit       [facility]  [address]  [place] [facility] [address] [phone]  Schedule an appointment as soon as possible for a visit       Outpatient Anticoagulation Services  [address] [address] [phone]  Schedule an appointment as  soon as possible for a visit         OUTPATIENT MEDICATIONS:  Discharge Medication List as of 11/24/2020  8:23 PM            FINAL IMPRESSION  1. Peripheral edema    2. Lower extremity edema    3. Atrial fibrillation with rapid ventricular response (HCC)

## 2020-11-30 ENCOUNTER — TELEPHONE (OUTPATIENT)
Dept: VASCULAR LAB | Facility: MEDICAL CENTER | Age: 70
End: 2020-11-30

## 2020-11-30 NOTE — TELEPHONE ENCOUNTER
Received anticoagulation referral for warfarin due to atrial fibrillation from hospital discharge team on 11-24-20     2nd attempt to call pt. LM for patient to return call to establish care.     Insurance: Medicare  PCP: Shantell Lima)  Locations to be seen: [facility] or [facility] at [phone], fax [phone]

## 2020-12-04 ENCOUNTER — TELEPHONE (OUTPATIENT)
Dept: VASCULAR LAB | Facility: MEDICAL CENTER | Age: 70
End: 2020-12-04

## 2020-12-04 NOTE — TELEPHONE ENCOUNTER
[facility] [place] [facility]    Received anticoagulation referral for warfarin due to atrial fibrillation from hospital discharge team on 11-24-20     LM for patient to return call to establish care.     Insurance: Medicare  PCP: Shantell Lima)  Locations to be seen: [facility] or [facility] at [phone], fax [phone]       Minoo GillilandD

## 2020-12-07 ENCOUNTER — TELEPHONE (OUTPATIENT)
Dept: VASCULAR LAB | Facility: MEDICAL CENTER | Age: 70
End: 2020-12-07

## 2020-12-07 NOTE — TELEPHONE ENCOUNTER
[facility] [place] [facility]     Received anticoagulation referral for warfarin due to atrial fibrillation from hospital discharge team on 11-24-20     4th attempt  LM for patient to return call to establish care.  Pt is homeless - unable to send a letter     Insurance: Medicare  PCP: Shantell (Natacha)  Locations to be seen: [facility] or [facility] at [phone], fax [phone]      Maya Kirby, DarshanaD

## 2020-12-07 NOTE — LETTER
Robert Mcdonnell  [address] [place] [address]    12/07/20      Dear Robert Mcdonnell,    We have been unsuccessful in our attempts to contact you regarding your Anticoagulation Service appointments.  Warfarin is a potent blood-thinning agent that requires frequent monitoring to measure its level in the blood.  If your level becomes too high, you could develop serious, sometimes life-threatening bleeding problems.  If your level becomes too low, life-threatening blood clots or stroke could result.     The last recorded INR that we have on file for you is:  INR   Date Value Ref Range Status   05/03/2020 1.71 (H) 0.87 - 1.13 Final     Comment:     INR - Non-therapeutic Reference Range: 0.87-1.13  INR - Therapeutic Reference Range: 2.0-4.0       No results found for: POCINR     To monitor your warfarin effectively, we need to be able to communicate with you.  This is a requirement to be followed by our Service.  If we are unable to contact you through repeated occasions, you are at risk of being discharged from the Anticoagulation Service.     It is extremely important that you have your lab work drawn as soon as possible.  Alternatively, you may schedule an appointment for a fingerstick INR at our clinic.  We are open Monday-Friday 8 am until 5 pm.  You may reach our Service at [phone].        Sincerely,           Maxi Lazo PharmD, [facility]  Clinic Supervisor  [facility]  Outpatient Anticoagulation Service

## 2020-12-14 ENCOUNTER — TELEPHONE (OUTPATIENT)
Dept: VASCULAR LAB | Facility: MEDICAL CENTER | Age: 70
End: 2020-12-14

## 2020-12-14 NOTE — TELEPHONE ENCOUNTER
[facility] [place] [facility]     Received anticoagulation referral for warfarin due to atrial fibrillation from hospital discharge team on 11-24-20     5th attempt  LM for patient to return call to establish care.  Pt is homeless - unable to send a letter     Insurance: Medicare  PCP: Shantell (Natacha)  Locations to be seen: [facility] or [facility] at [phone], fax [phone]      Maya Kirby, DarshanaD

## 2020-12-21 ENCOUNTER — TELEPHONE (OUTPATIENT)
Dept: VASCULAR LAB | Facility: MEDICAL CENTER | Age: 70
End: 2020-12-21

## 2020-12-21 NOTE — TELEPHONE ENCOUNTER
[facility] [place] [facility]     Received anticoagulation referral for warfarin due to atrial fibrillation from hospital discharge team on 11-24-20     6th attempt  LM for patient to return call to establish care.  Pt is homeless - unable to send a letter     Insurance: Medicare  PCP: Shantell (Natacha)  Locations to be seen: [facility] or [facility] at [phone], fax [phone]    Darshana ValentinoD

## 2020-12-28 ENCOUNTER — TELEPHONE (OUTPATIENT)
Dept: VASCULAR LAB | Facility: MEDICAL CENTER | Age: 70
End: 2020-12-28

## 2020-12-28 NOTE — TELEPHONE ENCOUNTER
[facility] [place] [facility]     Received anticoagulation referral for warfarin due to atrial fibrillation from hospital discharge team on 11-24-20 7th attempt  LM for patient to return call to establish care.  Pt is homeless - unable to send a letter     Insurance: Medicare  PCP: Shantell (Natacha)  Locations to be seen: [facility] or [facility] at [phone], fax [phone]     Maya Kirby, DarshanaD

## 2021-01-04 ENCOUNTER — TELEPHONE (OUTPATIENT)
Dept: VASCULAR LAB | Facility: MEDICAL CENTER | Age: 71
End: 2021-01-04

## 2021-01-04 NOTE — TELEPHONE ENCOUNTER
[facility]    Unable to reach pt regarding anticoagulation referral.  Pt has not responded to our multiple forms of contact.  Will send letter to contact us + send FYI to referring provider.    Maya Kirby, DarshanaD

## 2021-01-15 DIAGNOSIS — Z23 NEED FOR VACCINATION: ICD-10-CM

## 2021-06-09 ENCOUNTER — DOCUMENTATION (OUTPATIENT)
Dept: VASCULAR LAB | Facility: MEDICAL CENTER | Age: 71
End: 2021-06-09

## 2021-06-09 NOTE — PROGRESS NOTES
Refill  Received: Today  Emilie Trujillo  I spoke with pt today and he said that [facility] pharmacy wouldn't give him all 5 of his medications at . He was told to contact Provider's office to get details of why he was denied.      ------------------------------------    Received called from pt. S/w pt. Explained that he has no refills on his prescription because he has not followed up with his PCP. We received an anticoagulation referral on April 2020 and November 2020, and pt did not respond to our multiple calls to get him to establish care. Scheduled NP for tomorrow 6/10 @ 8:15AM.    Maya Kirby, DarshanaD

## 2021-06-10 ENCOUNTER — APPOINTMENT (OUTPATIENT)
Dept: VASCULAR LAB | Facility: MEDICAL CENTER | Age: 71
End: 2021-06-10
Attending: INTERNAL MEDICINE
Payer: MEDICARE

## 2021-06-15 ENCOUNTER — DOCUMENTATION (OUTPATIENT)
Dept: VASCULAR LAB | Facility: MEDICAL CENTER | Age: 71
End: 2021-06-15

## 2021-06-15 NOTE — PROGRESS NOTES
Called pt and left VM to have them call back to get rescheduled for missed new pt anticoag appt with the [facility].

## 2021-06-16 ENCOUNTER — SUPERVISING PHYSICIAN REVIEW (OUTPATIENT)
Dept: VASCULAR LAB | Facility: MEDICAL CENTER | Age: 71
End: 2021-06-16

## 2021-06-16 ENCOUNTER — APPOINTMENT (OUTPATIENT)
Dept: RADIOLOGY | Facility: MEDICAL CENTER | Age: 71
End: 2021-06-16
Attending: EMERGENCY MEDICINE
Payer: MEDICARE

## 2021-06-16 ENCOUNTER — HOSPITAL ENCOUNTER (EMERGENCY)
Facility: MEDICAL CENTER | Age: 71
End: 2021-06-16
Attending: EMERGENCY MEDICINE
Payer: MEDICARE

## 2021-06-16 ENCOUNTER — ANTICOAGULATION VISIT (OUTPATIENT)
Dept: VASCULAR LAB | Facility: MEDICAL CENTER | Age: 71
End: 2021-06-16
Attending: INTERNAL MEDICINE
Payer: MEDICARE

## 2021-06-16 VITALS
HEART RATE: 92 BPM | SYSTOLIC BLOOD PRESSURE: 144 MMHG | OXYGEN SATURATION: 95 % | WEIGHT: 267.2 LBS | BODY MASS INDEX: 35.41 KG/M2 | DIASTOLIC BLOOD PRESSURE: 92 MMHG | TEMPERATURE: 97.3 F | RESPIRATION RATE: 23 BRPM | HEIGHT: 73 IN

## 2021-06-16 VITALS — HEART RATE: 119 BPM | DIASTOLIC BLOOD PRESSURE: 81 MMHG | OXYGEN SATURATION: 96 % | SYSTOLIC BLOOD PRESSURE: 167 MMHG

## 2021-06-16 DIAGNOSIS — Z76.0 MEDICATION REFILL: ICD-10-CM

## 2021-06-16 DIAGNOSIS — I50.9 CHRONIC CONGESTIVE HEART FAILURE, UNSPECIFIED HEART FAILURE TYPE (HCC): ICD-10-CM

## 2021-06-16 DIAGNOSIS — R60.9 PERIPHERAL EDEMA: ICD-10-CM

## 2021-06-16 DIAGNOSIS — I48.91 ATRIAL FIBRILLATION WITH RAPID VENTRICULAR RESPONSE (HCC): ICD-10-CM

## 2021-06-16 LAB
ALBUMIN SERPL BCP-MCNC: 4.2 G/DL (ref 3.2–4.9)
ALBUMIN/GLOB SERPL: 1.6 G/DL
ALP SERPL-CCNC: 94 U/L (ref 30–99)
ALT SERPL-CCNC: 27 U/L (ref 2–50)
ANION GAP SERPL CALC-SCNC: 13 MMOL/L (ref 7–16)
AST SERPL-CCNC: 37 U/L (ref 12–45)
BASOPHILS # BLD AUTO: 0.4 % (ref 0–1.8)
BASOPHILS # BLD: 0.03 K/UL (ref 0–0.12)
BILIRUB SERPL-MCNC: 1.1 MG/DL (ref 0.1–1.5)
BUN SERPL-MCNC: 14 MG/DL (ref 8–22)
CALCIUM SERPL-MCNC: 9.1 MG/DL (ref 8.5–10.5)
CHLORIDE SERPL-SCNC: 109 MMOL/L (ref 96–112)
CO2 SERPL-SCNC: 20 MMOL/L (ref 20–33)
CREAT SERPL-MCNC: 1.08 MG/DL (ref 0.5–1.4)
EKG IMPRESSION: NORMAL
EOSINOPHIL # BLD AUTO: 0.12 K/UL (ref 0–0.51)
EOSINOPHIL NFR BLD: 1.5 % (ref 0–6.9)
ERYTHROCYTE [DISTWIDTH] IN BLOOD BY AUTOMATED COUNT: 54.5 FL (ref 35.9–50)
GLOBULIN SER CALC-MCNC: 2.6 G/DL (ref 1.9–3.5)
GLUCOSE SERPL-MCNC: 94 MG/DL (ref 65–99)
HCT VFR BLD AUTO: 42.5 % (ref 42–52)
HGB BLD-MCNC: 14 G/DL (ref 14–18)
IMM GRANULOCYTES # BLD AUTO: 0.03 K/UL (ref 0–0.11)
IMM GRANULOCYTES NFR BLD AUTO: 0.4 % (ref 0–0.9)
INR PPP: 1.2 (ref 2–3.5)
LYMPHOCYTES # BLD AUTO: 1.13 K/UL (ref 1–4.8)
LYMPHOCYTES NFR BLD: 14 % (ref 22–41)
MCH RBC QN AUTO: 30.4 PG (ref 27–33)
MCHC RBC AUTO-ENTMCNC: 32.9 G/DL (ref 33.7–35.3)
MCV RBC AUTO: 92.2 FL (ref 81.4–97.8)
MONOCYTES # BLD AUTO: 0.79 K/UL (ref 0–0.85)
MONOCYTES NFR BLD AUTO: 9.8 % (ref 0–13.4)
NEUTROPHILS # BLD AUTO: 5.96 K/UL (ref 1.82–7.42)
NEUTROPHILS NFR BLD: 73.9 % (ref 44–72)
NRBC # BLD AUTO: 0 K/UL
NRBC BLD-RTO: 0 /100 WBC
PLATELET # BLD AUTO: 106 K/UL (ref 164–446)
PMV BLD AUTO: 12 FL (ref 9–12.9)
POTASSIUM SERPL-SCNC: 4 MMOL/L (ref 3.6–5.5)
PROT SERPL-MCNC: 6.8 G/DL (ref 6–8.2)
RBC # BLD AUTO: 4.61 M/UL (ref 4.7–6.1)
SODIUM SERPL-SCNC: 142 MMOL/L (ref 135–145)
TROPONIN T SERPL-MCNC: 22 NG/L (ref 6–19)
WBC # BLD AUTO: 8.1 K/UL (ref 4.8–10.8)

## 2021-06-16 PROCEDURE — 80053 COMPREHEN METABOLIC PANEL: CPT

## 2021-06-16 PROCEDURE — 85610 PROTHROMBIN TIME: CPT

## 2021-06-16 PROCEDURE — 71045 X-RAY EXAM CHEST 1 VIEW: CPT

## 2021-06-16 PROCEDURE — A9270 NON-COVERED ITEM OR SERVICE: HCPCS | Performed by: EMERGENCY MEDICINE

## 2021-06-16 PROCEDURE — 99284 EMERGENCY DEPT VISIT MOD MDM: CPT

## 2021-06-16 PROCEDURE — 93005 ELECTROCARDIOGRAM TRACING: CPT | Performed by: EMERGENCY MEDICINE

## 2021-06-16 PROCEDURE — 36415 COLL VENOUS BLD VENIPUNCTURE: CPT

## 2021-06-16 PROCEDURE — 84484 ASSAY OF TROPONIN QUANT: CPT

## 2021-06-16 PROCEDURE — 93005 ELECTROCARDIOGRAM TRACING: CPT

## 2021-06-16 PROCEDURE — 85025 COMPLETE CBC W/AUTO DIFF WBC: CPT

## 2021-06-16 PROCEDURE — 700102 HCHG RX REV CODE 250 W/ 637 OVERRIDE(OP): Performed by: EMERGENCY MEDICINE

## 2021-06-16 PROCEDURE — 99213 OFFICE O/P EST LOW 20 MIN: CPT | Mod: 25,27 | Performed by: NURSE PRACTITIONER

## 2021-06-16 RX ORDER — METOPROLOL SUCCINATE 50 MG/1
100 TABLET, EXTENDED RELEASE ORAL ONCE
Status: COMPLETED | OUTPATIENT
Start: 2021-06-16 | End: 2021-06-16

## 2021-06-16 RX ORDER — METOPROLOL SUCCINATE 100 MG/1
100 TABLET, EXTENDED RELEASE ORAL EVERY EVENING
Qty: 30 TABLET | Refills: 0 | Status: SHIPPED | OUTPATIENT
Start: 2021-06-16 | End: 2021-07-26 | Stop reason: SDUPTHER

## 2021-06-16 RX ORDER — WARFARIN SODIUM 7.5 MG/1
TABLET ORAL
Qty: 30 TABLET | Refills: 0 | Status: SHIPPED | OUTPATIENT
Start: 2021-06-16 | End: 2023-01-29

## 2021-06-16 RX ORDER — LISINOPRIL 40 MG/1
40 TABLET ORAL DAILY
Qty: 30 TABLET | Refills: 0 | Status: SHIPPED | OUTPATIENT
Start: 2021-06-16 | End: 2021-07-26 | Stop reason: SDUPTHER

## 2021-06-16 RX ORDER — TORSEMIDE 20 MG/1
20 TABLET ORAL ONCE
Status: COMPLETED | OUTPATIENT
Start: 2021-06-16 | End: 2021-06-16

## 2021-06-16 RX ORDER — LISINOPRIL 20 MG/1
40 TABLET ORAL ONCE
Status: COMPLETED | OUTPATIENT
Start: 2021-06-16 | End: 2021-06-16

## 2021-06-16 RX ORDER — TORSEMIDE 20 MG/1
20 TABLET ORAL DAILY
Qty: 30 TABLET | Refills: 3 | Status: SHIPPED | OUTPATIENT
Start: 2021-06-16 | End: 2021-07-26 | Stop reason: SDUPTHER

## 2021-06-16 RX ORDER — WARFARIN SODIUM 7.5 MG/1
TABLET ORAL
Qty: 30 TABLET | Refills: 0 | Status: SHIPPED | OUTPATIENT
Start: 2021-06-16 | End: 2021-06-16 | Stop reason: SDUPTHER

## 2021-06-16 RX ORDER — WARFARIN SODIUM 7.5 MG/1
7.5 TABLET ORAL ONCE
Status: COMPLETED | OUTPATIENT
Start: 2021-06-16 | End: 2021-06-16

## 2021-06-16 RX ORDER — SPIRONOLACTONE 25 MG/1
25 TABLET ORAL DAILY
Qty: 30 TABLET | Refills: 3 | Status: SHIPPED | OUTPATIENT
Start: 2021-06-16 | End: 2021-07-26 | Stop reason: SDUPTHER

## 2021-06-16 RX ADMIN — TORSEMIDE 20 MG: 20 TABLET ORAL at 20:19

## 2021-06-16 RX ADMIN — LISINOPRIL 40 MG: 20 TABLET ORAL at 20:05

## 2021-06-16 RX ADMIN — METOPROLOL SUCCINATE 100 MG: 50 TABLET, EXTENDED RELEASE ORAL at 20:05

## 2021-06-16 RX ADMIN — WARFARIN SODIUM 7.5 MG: 7.5 TABLET ORAL at 20:19

## 2021-06-16 ASSESSMENT — FIBROSIS 4 INDEX: FIB4 SCORE: 3.01

## 2021-06-16 NOTE — PROGRESS NOTES
Anticoagulation Summary  As of 2021    INR goal:     TTR:  --   INR used for dosin.20 (2021)   Warfarin maintenance plan:  7.5 mg (7.5 mg x 1) every day   Weekly warfarin total:  52.5 mg   Plan last modified:  LEBRON Kelsey (2021)   Next INR check:  2021   Target end date:  Indefinite    Indications    Atrial fibrillation with rapid ventricular response (HCC) [I48.91]             Anticoagulation Episode Summary     INR check location:      Preferred lab:      Send INR reminders to:      Comments:        Anticoagulation Care Providers     Provider Role Specialty Phone number    Ramu Olvera M.D. Referring Emergency Medicine [phone]    [facility] Responsible  [phone]        Anticoagulation Patient Findings      HPI:  Robert Mcdonnell seen in clinic today for follow up on anticoagulation therapy in the presence of AF.   He is new to our clinic.   CHADSVASC = 4  HAS-BLED = 1 (age)    Diagnosed with AF in May of 2020. Never followed up with our clinic. Was non compliant with taking warfarin in the past. He reports restarting warfarin 7.5 mg daily a few months ago but ran out about 2 weeks ago due to no refills. He did not follow up with our clinic despite several attempts to get him in.     PMH: chronic LE edema, HTN, CHF, obesity, anemia, tobacco use - 1 PPD.     Reviewed his medications. He has ran out of all of his meds.  Today he reports shortness of breath which is worse than normal. Worsened by walking, improves a little with rest. States his legs are more swollen than usual. Has an appt with PCP on Friday. He assures me he will attend this visit as he needs his medications refilled.  checked price of DOAC when he was hospitalized and it was unaffordable.    Hx of homelessness. Wife  in . No local family or friends. Currently has an apt nearby. Also has a cell phone.    /81, , 96%.    Pt is not on antiplatelet  therapy.    Education given to patient regarding instructions for taking warfarin, food/drug interactions (keep vitamin k intake consistent), drug/drug interactions (avoid NSAIDs, ASA, notify us of any medication changes) and signs/symptoms of bleeding or thrombosis/stroke. Discussed anticoagulation in detail including dosing, INR levels and safety. Discussed smoking cessation. Offered smoking cessation visits. He declines. Instructed to avoid alcohol while taking warfarin.    A/P:   Breathing slightly labored. No acute distress. Can speak in full sentences. Olvin LE noted. Difficult to assess skin due to jeans.  INR 1.2. Rx for warfarin 7.5 mg sent to pharmacy. He will began taking 1 tab daily. Emphasized need for regular INR monitoring and we discussed s/sx of CVA/TIA. He knows to monitor for prolonged bleeding and to let all his providers know he takes warfarin.  Referral to cards placed.  We discussed the option of the ER for his SOB and worsening olvin LE swelling. He wants to go.    Pt educated to contact our clinic with any changes in medications or s/s of bleeding or thrombosis. Pt is aware to seek immediate medical attention for falls, head injury or deep cuts    Follow up appointment on Tuesday (soonest he can return).    Next Appointment: Tuesday, June 22, 2021 at 4:30 pm.    Doreen LA    Cc:  Dr Bloch Dr Patterson

## 2021-06-17 NOTE — PROGRESS NOTES
Initial anticoag note and most recent d/c summary (april 2020) reviewed.  Patient with afib and chads vasc = 4  Adherence with follow up has been an issue    Pending further recommendations, we will continue with indefinite anticoagulation with warfarin as directed at discharge    Will defer all management of rhythm, rate, and other cv issues, aside from anticoagulation, to cards - needs f/u    Michael Bloch, MD  Anticoagulation Clinic    Cc: MACHO Stinson

## 2021-06-17 NOTE — DISCHARGE INSTRUCTIONS
We have sent medication refills to your pharmacy.  Please make sure you see your primary care doctor, and make sure that you have refills for when these 30 days of medications run out.

## 2021-06-17 NOTE — ED NOTES
Pt discharged. Pt educated to follow-up w/ PCP and to return to the ER w/ any worsening symptoms. Pt had prescription e-scribed to pharmacy. Pt walked to the lobby.

## 2021-06-17 NOTE — ED TRIAGE NOTES
72 y/o male ambulatory to triage with c/o increased sob and bilateral LE swelling. Pt reports symptoms have been increasing since he ran out of his daily prescription medication aprox 2 weeks ago. Hx of CHF.

## 2021-06-17 NOTE — ED NOTES
Pt medicated w/ metoprolol and lisinopril. Waiting for pharmacy to send other medication. No other needs at this time.

## 2021-06-17 NOTE — ED PROVIDER NOTES
ED Provider Note    Scribed for Ramu Olvera M.D. by Ramu Olvera M.D.. 6/16/2021,  7:47 PM.    CHIEF COMPLAINT  Chief Complaint   Patient presents with   • Shortness of Breath   • Leg Swelling       HPI  Robert Mcdonnell is a 71 y.o. male with a known history of CHF and atrial fibrillation who presents to the Emergency Department with a chief complaint of shortness of breath and leg swelling.  He reports the swelling is in both legs, and has been increasing gradually since he ran out of his home medication 2 weeks ago.  He says that he went to his pharmacy 2 weeks ago and could not get refills, and has not followed up with anyone since.  He has noticed a mild but steady progression in bilateral lower extremity leg swelling, which she is used to, and some mild shortness of breath.  His oxygen saturation is 95% at rest.  He is not tachypneic.  He is speaking in full, complete sentences.  He denies any chest pain, fevers or chills, nausea or vomiting, cough, or abdominal pain.  He feels that his symptoms are related to being without his medications.    REVIEW OF SYSTEMS  See HPI for further details. All other systems are negative.     PAST MEDICAL HISTORY   has a past medical history of CHF (congestive heart failure) (HCC) and Hypertension.    SOCIAL HISTORY  Social History     Tobacco Use   • Smoking status: Current Every Day Smoker     Packs/day: 1.00     Types: Cigarettes   • Smokeless tobacco: Never Used   Vaping Use   • Vaping Use: Never used   Substance and Sexual Activity   • Alcohol use: Yes     Alcohol/week: 14.4 oz     Types: 24 Cans of beer per week     Comment: occ   • Drug use: No   • Sexual activity: Not on file     Social History     Substance and Sexual Activity   Drug Use No       SURGICAL HISTORY  patient denies any surgical history    CURRENT MEDICATIONS  Home Medications     Reviewed by Naresh Hansen R.N. (Registered Nurse) on 06/16/21 at 1733  Med List Status: Partial  "  Medication Last Dose Status   lisinopril (PRINIVIL) 40 MG tablet  Active   metoprolol SR (TOPROL XL) 100 MG TABLET SR 24 HR  Active   spironolactone (ALDACTONE) 25 MG Tab  Active   torsemide (DEMADEX) 20 MG Tab  Active   warfarin (COUMADIN) 7.5 MG Tab  Active                ALLERGIES  Allergies   Allergen Reactions   • Aspirin Vomiting     Pt states vomiting.       PHYSICAL EXAM  VITAL SIGNS: BP (!) 165/99   Pulse 98   Temp 36.1 °C (97 °F) (Temporal)   Resp (!) 24   Ht 1.854 m (6' 1\")   Wt 121 kg (267 lb 3.2 oz)   SpO2 94%   BMI 35.25 kg/m²   Pulse ox interpretation: I interpret this pulse ox as normal.  Constitutional: Alert in no apparent distress.  HENT: No signs of trauma, Bilateral external ears normal, Nose normal.   Eyes: Conjunctiva normal, Non-icteric.   Neck: Normal range of motion, Supple, No stridor.   Lymphatic: No lymphadenopathy noted.   Cardiovascular: Regular rate and rhythm, no murmurs.   Thorax & Lungs: Normal breath sounds, No respiratory distress, No wheezing, No chest tenderness.   Abdomen: Bowel sounds normal, Soft, No tenderness, No masses, No pulsatile masses. No peritoneal signs.  Skin: Warm, Dry, No erythema, No rash.   Extremities: Intact distal pulses, significant pitting symmetric bilateral lower extremity edema with signs of chronic venous stasis changes to the skin, No cyanosis.  Musculoskeletal: Good range of motion in all major joints. No or major deformities noted.   Neurologic: Alert , Normal motor function, Normal sensory function, No focal deficits noted.   Psychiatric: Affect normal, Judgment normal, Mood normal.     DIAGNOSTIC STUDIES / PROCEDURES    EKG  Interpreted by me    Results for orders placed or performed during the hospital encounter of 06/16/21   EKG   Result Value Ref Range    Report       [facility]    Test Date:  2021-06-16  Pt Name:    HUNTER STEELE                 Department: ER  MRN:        [id number]                    "   Room:  Gender:     Male                         Technician: [id number]  :        1950                   Requested By:ER TRIAGE PROTOCOL  Order #:    [id number]                    Reading MD: TRELL VILLAR MD    Measurements  Intervals                                Axis  Rate:       107                          P:  MD:                                      QRS:        -75  QRSD:       136                          T:          100  QT:         400  QTc:        534    Interpretive Statements  ATRIAL FIBRILLATION  IVCD, CONSIDER ATYPICAL LBBB  Compared to ECG 2020 09:19:01  No significant changes  Electronically Signed On 2021 19:47:25 PDT by TRELL VILLAR MD         LABS  Labs Reviewed   CBC WITH DIFFERENTIAL - Abnormal; Notable for the following components:       Result Value    RBC 4.61 (*)     MCHC 32.9 (*)     RDW 54.5 (*)     Platelet Count 106 (*)     Neutrophils-Polys 73.90 (*)     Lymphocytes 14.00 (*)     All other components within normal limits   TROPONIN - Abnormal; Notable for the following components:    Troponin T 22 (*)     All other components within normal limits   COMP METABOLIC PANEL   ESTIMATED GFR     All labs reviewed by me.    RADIOLOGY  DX-CHEST-PORTABLE (1 VIEW)   Final Result         1.  Pulmonary vascular congestion and/or mild pulmonary edema.   2.  Cardiomegaly   3.  Atherosclerosis        The radiologist's interpretation of all radiological studies have been reviewed by me.    COURSE & MEDICAL DECISION MAKING  Nursing notes, VS, PMSFHx reviewed in chart.     7:47 PM Patient seen and examined at bedside. Differential diagnosis includes but is not limited to medication noncompliance, peripheral edema, congestive heart failure exacerbation, less likely pulmonary infection or ACS.  Appropriate orders were placed per protocol at triage, and have all resulted except of the chest x-ray.  This patient is a very slight troponin elevation, though actually lower than his  previous values.  This extremely slight elevation is not due to acute coronary syndrome, given the lack of EKG changes and lack of chest pain or any typical symptoms.  Rather, his chronic low-grade troponin elevation is related to congestive heart failure, and in this case likely to medication noncompliance.  His chest x-ray is still pending.  Patient will be treated with his home medications for his symptoms.     8:52 PM this patient has continued to rest comfortably.  His blood pressure has responded well to his home medications.  He has very mild pulmonary edema, but not more than would be expected for 2 weeks without his medications.  I expect him to return steadily to baseline by maintaining medication compliance.  He says he knows who his primary care doctor is, and will schedule follow-up.     The patient will return for new or worsening symptoms and is stable at the time of discharge.    The patient is referred to a primary physician for blood pressure management, diabetic screening, and for all other preventative health concerns.    DISPOSITION:  Patient will be discharged home in stable condition.    FOLLOW UP:  Ming Stinson M.D.  [address] [place] [id number]  [phone]            OUTPATIENT MEDICATIONS:  Current Discharge Medication List            FINAL IMPRESSION  1. Medication refill    2. Peripheral edema    3. Chronic congestive heart failure, unspecified heart failure type (HCC)

## 2021-06-18 LAB — INR BLD: 1.2 (ref 0.9–1.2)

## 2021-06-23 ENCOUNTER — DOCUMENTATION (OUTPATIENT)
Dept: VASCULAR LAB | Facility: MEDICAL CENTER | Age: 71
End: 2021-06-23

## 2021-06-23 ENCOUNTER — TELEPHONE (OUTPATIENT)
Dept: SCHEDULING | Facility: IMAGING CENTER | Age: 71
End: 2021-06-23

## 2021-06-23 NOTE — PROGRESS NOTES
[facility]    Pt missed 6/22/21 routine visit for warfarin monitoring.    Called and LVM for pt to reschedule appt. Will attempt at a later.      Sven Billy, PharmD, [facility] at [phone], fax [phone]

## 2021-06-25 ENCOUNTER — DOCUMENTATION (OUTPATIENT)
Dept: VASCULAR LAB | Facility: MEDICAL CENTER | Age: 71
End: 2021-06-25

## 2021-06-25 ENCOUNTER — TELEPHONE (OUTPATIENT)
Dept: CARDIOLOGY | Facility: MEDICAL CENTER | Age: 71
End: 2021-06-25

## 2021-06-25 NOTE — TELEPHONE ENCOUNTER
Called patient in regards to his NP appointment with  on 07/01/2021. Calling to verify if pt has seen a previous cardiologist, if we have most recent blood work and imaging that's cardiology related within his chart. LVM for patient to call back the office with this information. Verbally informed them of the time & date of NP appointment.

## 2021-06-25 NOTE — PROGRESS NOTES
Left message for pt to have INR checked ASAP as he missed routinely scheduled visit this week.   Bonilla Ugalde, PharmD, BCACP

## 2021-06-29 ENCOUNTER — DOCUMENTATION (OUTPATIENT)
Dept: VASCULAR LAB | Facility: MEDICAL CENTER | Age: 71
End: 2021-06-29

## 2021-06-29 NOTE — PROGRESS NOTES
Pt missed their f/u anticoagulation appointment on 6/22.    2nd call    [facility] to reschedule.    Maya Kirby, DarshanaD

## 2021-07-02 ENCOUNTER — DOCUMENTATION (OUTPATIENT)
Dept: VASCULAR LAB | Facility: MEDICAL CENTER | Age: 71
End: 2021-07-02

## 2021-07-02 NOTE — PROGRESS NOTES
Left message for pt to have INR checked ASAP.  Was recently started on warfarin and has failed to f/u for initial INR.  Bonilla Ugalde, PharmD, BCACP

## 2021-07-06 ENCOUNTER — DOCUMENTATION (OUTPATIENT)
Dept: VASCULAR LAB | Facility: MEDICAL CENTER | Age: 71
End: 2021-07-06

## 2021-07-06 NOTE — PROGRESS NOTES
4th call  Left message for pt to have INR checked ASAP.  Was recently started on warfarin and has failed to f/u for initial INR.    Unable to send letter as pt is homeless    Maya Kirby, DarshanaD

## 2021-07-21 ENCOUNTER — APPOINTMENT (OUTPATIENT)
Dept: MEDICAL GROUP | Facility: MEDICAL CENTER | Age: 71
End: 2021-07-21

## 2021-07-26 ENCOUNTER — OFFICE VISIT (OUTPATIENT)
Dept: MEDICAL GROUP | Facility: MEDICAL CENTER | Age: 71
End: 2021-07-26

## 2021-07-26 VITALS
WEIGHT: 246 LBS | BODY MASS INDEX: 32.46 KG/M2 | RESPIRATION RATE: 16 BRPM | DIASTOLIC BLOOD PRESSURE: 52 MMHG | SYSTOLIC BLOOD PRESSURE: 108 MMHG | TEMPERATURE: 97.6 F | HEART RATE: 77 BPM | OXYGEN SATURATION: 98 %

## 2021-07-26 DIAGNOSIS — I48.91 ON COUMADIN FOR ATRIAL FIBRILLATION (HCC): ICD-10-CM

## 2021-07-26 DIAGNOSIS — I50.22 CHRONIC SYSTOLIC HEART FAILURE (HCC): ICD-10-CM

## 2021-07-26 DIAGNOSIS — I10 ESSENTIAL HYPERTENSION: ICD-10-CM

## 2021-07-26 DIAGNOSIS — I48.91 ATRIAL FIBRILLATION WITH RAPID VENTRICULAR RESPONSE (HCC): ICD-10-CM

## 2021-07-26 DIAGNOSIS — R60.0 LOWER EXTREMITY EDEMA: ICD-10-CM

## 2021-07-26 DIAGNOSIS — Z12.11 COLON CANCER SCREENING: ICD-10-CM

## 2021-07-26 DIAGNOSIS — Z79.01 ON COUMADIN FOR ATRIAL FIBRILLATION (HCC): ICD-10-CM

## 2021-07-26 PROBLEM — J96.01 ACUTE HYPOXEMIC RESPIRATORY FAILURE (HCC): Chronic | Status: ACTIVE | Noted: 2020-04-26

## 2021-07-26 PROBLEM — I50.21 ACUTE SYSTOLIC HEART FAILURE (HCC): Chronic | Status: ACTIVE | Noted: 2020-04-27

## 2021-07-26 PROCEDURE — 99204 OFFICE O/P NEW MOD 45 MIN: CPT | Performed by: STUDENT IN AN ORGANIZED HEALTH CARE EDUCATION/TRAINING PROGRAM

## 2021-07-26 RX ORDER — METOPROLOL SUCCINATE 100 MG/1
100 TABLET, EXTENDED RELEASE ORAL EVERY EVENING
Qty: 30 TABLET | Refills: 11 | Status: SHIPPED | OUTPATIENT
Start: 2021-07-26 | End: 2023-02-16 | Stop reason: SDUPTHER

## 2021-07-26 RX ORDER — LISINOPRIL 40 MG/1
40 TABLET ORAL DAILY
Qty: 30 TABLET | Refills: 11 | Status: SHIPPED | OUTPATIENT
Start: 2021-07-26 | End: 2022-12-28

## 2021-07-26 RX ORDER — SPIRONOLACTONE 25 MG/1
25 TABLET ORAL DAILY
Qty: 30 TABLET | Refills: 11 | Status: SHIPPED | OUTPATIENT
Start: 2021-07-26 | End: 2022-12-28

## 2021-07-26 RX ORDER — ALBUTEROL SULFATE 90 UG/1
2 AEROSOL, METERED RESPIRATORY (INHALATION) EVERY 4 HOURS PRN
Qty: 1 EACH | Refills: 0 | Status: SHIPPED | OUTPATIENT
Start: 2021-07-26

## 2021-07-26 RX ORDER — TORSEMIDE 20 MG/1
20 TABLET ORAL DAILY
Qty: 30 TABLET | Refills: 11 | Status: SHIPPED | OUTPATIENT
Start: 2021-07-26 | End: 2022-12-28

## 2021-07-26 ASSESSMENT — FIBROSIS 4 INDEX: FIB4 SCORE: 4.77

## 2021-07-26 ASSESSMENT — PATIENT HEALTH QUESTIONNAIRE - PHQ9: CLINICAL INTERPRETATION OF PHQ2 SCORE: 0

## 2021-07-26 NOTE — ASSESSMENT & PLAN NOTE
Patient with diagnosis of heart failure.  Patient recommended to follow-up with cardiology.  Patient has not been following up with cardiology.  Patient continue on spironolactone.

## 2021-07-26 NOTE — PROGRESS NOTES
Subjective:     CC:  Diagnoses of Atrial fibrillation with rapid ventricular response (HCC), Colon cancer screening, Chronic systolic heart failure (HCC), Essential hypertension, Lower extremity edema, and On Coumadin for atrial fibrillation (HCC) were pertinent to this visit.    HISTORY OF THE PRESENT ILLNESS: Patient is a 71 y.o. male. This pleasant patient is here today to establish care and discuss heart failure, A. fib, hypertension.     Patient presents today after ED visits, patient states that he has not seen primary care since 2015.  Patient being seen in the ED for complaints of shortness of breath and leg swelling likely due to medication noncompliance.    Atrial fibrillation with rapid ventricular response (HCC)  Patient diagnosed with A. fib 4/2020.  Patient placed on metoprolol for rate control.  Patient continues on metoprolol.  Patient placed on Coumadin and takes as prescribed frequently misses his appointments at the vascular clinic.  On recent labs INR is therapeutic.  Patient encouraged to follow-up with Coumadin clinic regularly.  Patient encouraged to follow-up with cardiology and number provided today.    Chronic systolic heart failure (HCC)  Patient with diagnosis of heart failure.  Patient recommended to follow-up with cardiology.  Patient has not been following up with cardiology.  Patient continue on spironolactone.    Hypertension  Pressure well controlled today at 108/52.  Patient continues on lisinopril and metoprolol.  Patient does have spironolactone for diuresis.  Refill patient's medications for 1 year.  Patient states that frequently has noncompliance is due to not having medication refills.    Lower extremity edema  Patient seen in the ED multiple times for leg swelling.  Most frequently this occurs when he is off of his medications.  Patient states that he has been taking his medications as prescribed and denies trouble with leg swelling today.  Patient denies shortness of breath  or chest pain today.    On Coumadin for atrial fibrillation (HCC)  Patient continues on Coumadin for A. fib.  Patient has previously failed to follow-up with vascular clinic for INR.  Patient strongly encouraged to follow-up.      Current Outpatient Medications Ordered in Epic   Medication Sig Dispense Refill   • lisinopril (PRINIVIL) 40 MG tablet Take 1 tablet by mouth every day. 30 tablet 11   • metoprolol SR (TOPROL XL) 100 MG TABLET SR 24 HR Take 1 tablet by mouth every evening. 30 tablet 11   • spironolactone (ALDACTONE) 25 MG Tab Take 1 tablet by mouth every day. 30 tablet 11   • torsemide (DEMADEX) 20 MG Tab Take 1 tablet by mouth every day. 30 tablet 11   • albuterol 108 (90 Base) MCG/ACT Aero Soln inhalation aerosol Inhale 2 Puffs every four hours as needed for Shortness of Breath. 1 Each 0   • warfarin (COUMADIN) 7.5 MG Tab Take 1 tab by mouth daily or as directed by anticoagulation clinic 30 tablet 0     No current Epic-ordered facility-administered medications on file.         ROS:   Gen: no fevers/chills  Pulm: no sob, no cough  CV: no chest pain, no palpitations  GI: no nausea/vomiting, no diarrhea  : no dysuria  MSk: no myalgias  Skin: no rash  Neuro: no headaches, no numbness/tingling    Objective:     Exam: /52   Pulse 77   Temp 36.4 °C (97.6 °F)   Resp 16   Wt 112 kg (246 lb)   SpO2 98%  Body mass index is 32.46 kg/m².    General: Normal appearing. No distress.  HEENT: Normocephalic. Eyes conjunctiva clear lids without ptosis, pupils equal and reactive to light accommodation, ears normal shape and contour  Neck: Supple without JVD. Thyroid is not enlarged.  Pulmonary: Clear to ausculation.  Normal effort. No rales, ronchi, or wheezing.  Cardiovascular: Regular rate and rhythm without murmur.   Abdomen: Soft, nontender, nondistended.  Neurologic: Grossly nonfocal  Skin: Warm and dry.  No obvious lesions.  Musculoskeletal: Normal gait.  Mild edema  Psych: Normal mood and affect. Alert  and oriented x3. Judgment and insight is normal.      Assessment & Plan:   71 y.o. male with the following -    1. Atrial fibrillation with rapid ventricular response (HCC)  Chronic, stable.  Patient continues on metoprolol and is anticoagulated on warfarin.    2. Colon cancer screening  - REFERRAL TO GI FOR COLONOSCOPY    3. Chronic systolic heart failure (HCC)  4. Essential hypertension  5. Lower extremity edema  Chronic, Stable.  Patient recommended to follow-up with cardiology.  Patient provided phone number today and strongly encouraged to schedule an appointment.    No follow-ups on file.    Please note that this dictation was created using voice recognition software. I have made every reasonable attempt to correct obvious errors, but I expect that there are errors of grammar and possibly content that I did not discover before finalizing the note.

## 2021-07-26 NOTE — ASSESSMENT & PLAN NOTE
Patient diagnosed with A. fib 4/2020.  Patient placed on metoprolol for rate control.  Patient continues on metoprolol.  Patient placed on Coumadin and takes as prescribed frequently misses his appointments at the vascular clinic.  On recent labs INR is therapeutic.  Patient encouraged to follow-up with Coumadin clinic regularly.  Patient encouraged to follow-up with cardiology and number provided today.

## 2021-07-27 NOTE — ASSESSMENT & PLAN NOTE
Pressure well controlled today at 108/52.  Patient continues on lisinopril and metoprolol.  Patient does have spironolactone for diuresis.  Refill patient's medications for 1 year.  Patient states that frequently has noncompliance is due to not having medication refills.

## 2021-07-27 NOTE — ASSESSMENT & PLAN NOTE
Patient continues on Coumadin for A. fib.  Patient has previously failed to follow-up with vascular clinic for INR.  Patient strongly encouraged to follow-up.

## 2021-07-27 NOTE — ASSESSMENT & PLAN NOTE
Patient seen in the ED multiple times for leg swelling.  Most frequently this occurs when he is off of his medications.  Patient states that he has been taking his medications as prescribed and denies trouble with leg swelling today.  Patient denies shortness of breath or chest pain today.

## 2021-08-01 ENCOUNTER — APPOINTMENT (OUTPATIENT)
Dept: RADIOLOGY | Facility: MEDICAL CENTER | Age: 71
End: 2021-08-01
Attending: EMERGENCY MEDICINE
Payer: MEDICARE

## 2021-08-01 ENCOUNTER — HOSPITAL ENCOUNTER (EMERGENCY)
Facility: MEDICAL CENTER | Age: 71
End: 2021-08-01
Attending: EMERGENCY MEDICINE
Payer: MEDICARE

## 2021-08-01 VITALS
RESPIRATION RATE: 18 BRPM | DIASTOLIC BLOOD PRESSURE: 73 MMHG | HEIGHT: 73 IN | SYSTOLIC BLOOD PRESSURE: 131 MMHG | TEMPERATURE: 98 F | BODY MASS INDEX: 32.47 KG/M2 | HEART RATE: 83 BPM | OXYGEN SATURATION: 95 % | WEIGHT: 245 LBS

## 2021-08-01 DIAGNOSIS — S21.91XA LACERATION OF CHEST, INITIAL ENCOUNTER: ICD-10-CM

## 2021-08-01 PROCEDURE — 90471 IMMUNIZATION ADMIN: CPT

## 2021-08-01 PROCEDURE — 700101 HCHG RX REV CODE 250: Performed by: EMERGENCY MEDICINE

## 2021-08-01 PROCEDURE — 71045 X-RAY EXAM CHEST 1 VIEW: CPT

## 2021-08-01 PROCEDURE — 700111 HCHG RX REV CODE 636 W/ 250 OVERRIDE (IP): Performed by: EMERGENCY MEDICINE

## 2021-08-01 PROCEDURE — 305949 HCHG RED TRAUMA ACT PRE-NOTIFY NO CC

## 2021-08-01 PROCEDURE — 304999 HCHG REPAIR-SIMPLE/INTERMED LEVEL 1

## 2021-08-01 PROCEDURE — A9270 NON-COVERED ITEM OR SERVICE: HCPCS | Performed by: EMERGENCY MEDICINE

## 2021-08-01 PROCEDURE — 305308 HCHG STAPLER,SKIN,DISP.

## 2021-08-01 PROCEDURE — 304217 HCHG IRRIGATION SYSTEM

## 2021-08-01 PROCEDURE — 700102 HCHG RX REV CODE 250 W/ 637 OVERRIDE(OP): Performed by: EMERGENCY MEDICINE

## 2021-08-01 PROCEDURE — 90715 TDAP VACCINE 7 YRS/> IM: CPT | Performed by: EMERGENCY MEDICINE

## 2021-08-01 PROCEDURE — 99284 EMERGENCY DEPT VISIT MOD MDM: CPT

## 2021-08-01 RX ORDER — LIDOCAINE HYDROCHLORIDE 10 MG/ML
20 INJECTION, SOLUTION INFILTRATION; PERINEURAL ONCE
Status: COMPLETED | OUTPATIENT
Start: 2021-08-01 | End: 2021-08-01

## 2021-08-01 RX ORDER — CEPHALEXIN 500 MG/1
500 CAPSULE ORAL 3 TIMES DAILY
Qty: 21 CAPSULE | Refills: 0 | Status: SHIPPED | OUTPATIENT
Start: 2021-08-01 | End: 2021-08-08

## 2021-08-01 RX ORDER — CEPHALEXIN 500 MG/1
500 CAPSULE ORAL ONCE
Status: COMPLETED | OUTPATIENT
Start: 2021-08-01 | End: 2021-08-01

## 2021-08-01 RX ADMIN — CEPHALEXIN 500 MG: 500 CAPSULE ORAL at 23:00

## 2021-08-01 RX ADMIN — LIDOCAINE HYDROCHLORIDE 20 ML: 10 INJECTION, SOLUTION INFILTRATION; PERINEURAL at 23:00

## 2021-08-01 RX ADMIN — CLOSTRIDIUM TETANI TOXOID ANTIGEN (FORMALDEHYDE INACTIVATED), CORYNEBACTERIUM DIPHTHERIAE TOXOID ANTIGEN (FORMALDEHYDE INACTIVATED), BORDETELLA PERTUSSIS TOXOID ANTIGEN (GLUTARALDEHYDE INACTIVATED), BORDETELLA PERTUSSIS FILAMENTOUS HEMAGGLUTININ ANTIGEN (FORMALDEHYDE INACTIVATED), BORDETELLA PERTUSSIS PERTACTIN ANTIGEN, AND BORDETELLA PERTUSSIS FIMBRIAE 2/3 ANTIGEN 0.5 ML: 5; 2; 2.5; 5; 3; 5 INJECTION, SUSPENSION INTRAMUSCULAR at 22:26
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
## 2021-08-02 NOTE — ED NOTES
Discharge education provided. Discharge paperwork signed by pt. Prescription  Keflex to be picked up by pt. All questions answered. All belongings with pt. Pt ambulated to lobby unassisted with steady gait
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
## 2021-08-03 ENCOUNTER — DOCUMENTATION (OUTPATIENT)
Dept: VASCULAR LAB | Facility: MEDICAL CENTER | Age: 71
End: 2021-08-03

## 2021-08-03 NOTE — PROGRESS NOTES
5th call  Was recently started on warfarin and has failed to f/u for initial anticoagulation appt.    S/w pt - NP rescheduled on 8/9     Darshana PerezD

## 2021-08-10 ENCOUNTER — DOCUMENTATION (OUTPATIENT)
Dept: VASCULAR LAB | Facility: MEDICAL CENTER | Age: 71
End: 2021-08-10

## 2021-08-10 NOTE — PROGRESS NOTES
6th call  Pt was recently started on warfarin and has failed to f/u for initial INR.     VM was full. Unable to send letter as pt is homeless.    Will try back at a later time.    Kervin Pena, DarshanaD

## 2021-12-02 DIAGNOSIS — Z79.01 CHRONIC ANTICOAGULATION: ICD-10-CM

## 2022-11-22 NOTE — THERAPY
"Physical Therapy Evaluation completed.   Bed Mobility:  Supine to Sit: Supervised  Transfers: Sit to Stand: Supervised  Gait: Level Of Assist: Supervised with No Equipment Needed       Plan of Care: Patient with no further skilled PT needs in the acute care setting at this time  Discharge Recommendations: Equipment: Single Point Cane. Post-acute therapy Discharge to home with home health for additional skilled therapy services.    See \"Rehab Therapy-Acute\" Patient Summary Report for complete documentation.     69 year old male who presents with SOB and leg swelling with PMHx of obesity, LE edema, alcohol and tobacco abuse, HTN and A-fib.  Patient demonstrates limitation in strength, balance, and ambulation tolerance however appear at baseline functional level and is safe to go home.  Recommend home health physical therapy to improve cardiovascular endurance.  Significant education provided on cardiovascular health, including talk test, importance of exercise, alcohol/tobacco abuse effect on heart health.  Patient will not be actively followed for physical therapy services at this time, however may be seen if requested by physician for 1 more visit within 30 days to address any discharge or equipment needs    Ty Berven PT  "
Rectal Polyp

## 2022-12-28 RX ORDER — SPIRONOLACTONE 25 MG/1
TABLET ORAL
Qty: 30 TABLET | Refills: 0 | Status: SHIPPED | OUTPATIENT
Start: 2022-12-28 | End: 2023-02-16 | Stop reason: SDUPTHER

## 2022-12-28 RX ORDER — TORSEMIDE 20 MG/1
TABLET ORAL
Qty: 30 TABLET | Refills: 0 | Status: SHIPPED | OUTPATIENT
Start: 2022-12-28 | End: 2023-02-16 | Stop reason: SDUPTHER

## 2022-12-28 RX ORDER — LISINOPRIL 40 MG/1
TABLET ORAL
Qty: 30 TABLET | Refills: 0 | Status: ON HOLD | OUTPATIENT
Start: 2022-12-28 | End: 2023-02-02 | Stop reason: SDUPTHER

## 2022-12-29 ENCOUNTER — APPOINTMENT (OUTPATIENT)
Dept: MEDICAL GROUP | Facility: MEDICAL CENTER | Age: 72
End: 2022-12-29
Payer: MEDICARE

## 2023-01-28 ENCOUNTER — HOSPITAL ENCOUNTER (INPATIENT)
Facility: MEDICAL CENTER | Age: 73
LOS: 4 days | DRG: 871 | End: 2023-02-02
Attending: EMERGENCY MEDICINE | Admitting: STUDENT IN AN ORGANIZED HEALTH CARE EDUCATION/TRAINING PROGRAM
Payer: COMMERCIAL

## 2023-01-28 ENCOUNTER — APPOINTMENT (OUTPATIENT)
Dept: RADIOLOGY | Facility: MEDICAL CENTER | Age: 73
DRG: 871 | End: 2023-01-28
Attending: EMERGENCY MEDICINE
Payer: COMMERCIAL

## 2023-01-28 DIAGNOSIS — I48.91 ATRIAL FIBRILLATION, UNSPECIFIED TYPE (HCC): ICD-10-CM

## 2023-01-28 DIAGNOSIS — K92.2 UPPER GI BLEED: ICD-10-CM

## 2023-01-28 DIAGNOSIS — I50.9 ACUTE CONGESTIVE HEART FAILURE, UNSPECIFIED HEART FAILURE TYPE (HCC): ICD-10-CM

## 2023-01-28 PROBLEM — E87.70 FLUID OVERLOAD: Status: ACTIVE | Noted: 2023-01-28

## 2023-01-28 PROBLEM — E66.01 MORBID OBESITY (HCC): Status: ACTIVE | Noted: 2023-01-28

## 2023-01-28 LAB
ALBUMIN SERPL BCP-MCNC: 3.5 G/DL (ref 3.2–4.9)
ALBUMIN/GLOB SERPL: 1.5 G/DL
ALP SERPL-CCNC: 63 U/L (ref 30–99)
ALT SERPL-CCNC: 15 U/L (ref 2–50)
ANION GAP SERPL CALC-SCNC: 13 MMOL/L (ref 7–16)
AST SERPL-CCNC: 25 U/L (ref 12–45)
BASOPHILS # BLD AUTO: 0.2 % (ref 0–1.8)
BASOPHILS # BLD: 0.03 K/UL (ref 0–0.12)
BILIRUB SERPL-MCNC: 1 MG/DL (ref 0.1–1.5)
BUN SERPL-MCNC: 52 MG/DL (ref 8–22)
CALCIUM ALBUM COR SERPL-MCNC: 9.1 MG/DL (ref 8.5–10.5)
CALCIUM SERPL-MCNC: 8.7 MG/DL (ref 8.5–10.5)
CHLORIDE SERPL-SCNC: 113 MMOL/L (ref 96–112)
CO2 SERPL-SCNC: 19 MMOL/L (ref 20–33)
CREAT SERPL-MCNC: 1.24 MG/DL (ref 0.5–1.4)
EOSINOPHIL # BLD AUTO: 0.06 K/UL (ref 0–0.51)
EOSINOPHIL NFR BLD: 0.4 % (ref 0–6.9)
ERYTHROCYTE [DISTWIDTH] IN BLOOD BY AUTOMATED COUNT: 53.8 FL (ref 35.9–50)
GFR SERPLBLD CREATININE-BSD FMLA CKD-EPI: 62 ML/MIN/1.73 M 2
GLOBULIN SER CALC-MCNC: 2.4 G/DL (ref 1.9–3.5)
GLUCOSE SERPL-MCNC: 124 MG/DL (ref 65–99)
HCT VFR BLD AUTO: 27 % (ref 42–52)
HGB BLD-MCNC: 8.5 G/DL (ref 14–18)
IMM GRANULOCYTES # BLD AUTO: 0.06 K/UL (ref 0–0.11)
IMM GRANULOCYTES NFR BLD AUTO: 0.4 % (ref 0–0.9)
INR PPP: 1.31 (ref 0.87–1.13)
LYMPHOCYTES # BLD AUTO: 1.61 K/UL (ref 1–4.8)
LYMPHOCYTES NFR BLD: 12 % (ref 22–41)
MCH RBC QN AUTO: 28.4 PG (ref 27–33)
MCHC RBC AUTO-ENTMCNC: 31.5 G/DL (ref 33.7–35.3)
MCV RBC AUTO: 90.3 FL (ref 81.4–97.8)
MONOCYTES # BLD AUTO: 1.06 K/UL (ref 0–0.85)
MONOCYTES NFR BLD AUTO: 7.9 % (ref 0–13.4)
NEUTROPHILS # BLD AUTO: 10.56 K/UL (ref 1.82–7.42)
NEUTROPHILS NFR BLD: 79.1 % (ref 44–72)
NRBC # BLD AUTO: 0.02 K/UL
NRBC BLD-RTO: 0.1 /100 WBC
NT-PROBNP SERPL IA-MCNC: 267 PG/ML (ref 0–125)
PLATELET # BLD AUTO: 134 K/UL (ref 164–446)
PMV BLD AUTO: 11.4 FL (ref 9–12.9)
POTASSIUM SERPL-SCNC: 4.3 MMOL/L (ref 3.6–5.5)
PROCALCITONIN SERPL-MCNC: 0.28 NG/ML
PROT SERPL-MCNC: 5.9 G/DL (ref 6–8.2)
PROTHROMBIN TIME: 16.1 SEC (ref 12–14.6)
RBC # BLD AUTO: 2.99 M/UL (ref 4.7–6.1)
SODIUM SERPL-SCNC: 145 MMOL/L (ref 135–145)
TROPONIN T SERPL-MCNC: 30 NG/L (ref 6–19)
WBC # BLD AUTO: 13.4 K/UL (ref 4.8–10.8)

## 2023-01-28 PROCEDURE — A9270 NON-COVERED ITEM OR SERVICE: HCPCS | Performed by: EMERGENCY MEDICINE

## 2023-01-28 PROCEDURE — 83880 ASSAY OF NATRIURETIC PEPTIDE: CPT

## 2023-01-28 PROCEDURE — 93005 ELECTROCARDIOGRAM TRACING: CPT | Performed by: EMERGENCY MEDICINE

## 2023-01-28 PROCEDURE — 84145 PROCALCITONIN (PCT): CPT

## 2023-01-28 PROCEDURE — 85025 COMPLETE CBC W/AUTO DIFF WBC: CPT

## 2023-01-28 PROCEDURE — 700111 HCHG RX REV CODE 636 W/ 250 OVERRIDE (IP): Performed by: EMERGENCY MEDICINE

## 2023-01-28 PROCEDURE — 99223 1ST HOSP IP/OBS HIGH 75: CPT | Performed by: STUDENT IN AN ORGANIZED HEALTH CARE EDUCATION/TRAINING PROGRAM

## 2023-01-28 PROCEDURE — 80053 COMPREHEN METABOLIC PANEL: CPT

## 2023-01-28 PROCEDURE — 96375 TX/PRO/DX INJ NEW DRUG ADDON: CPT

## 2023-01-28 PROCEDURE — 36415 COLL VENOUS BLD VENIPUNCTURE: CPT

## 2023-01-28 PROCEDURE — 71045 X-RAY EXAM CHEST 1 VIEW: CPT

## 2023-01-28 PROCEDURE — G0378 HOSPITAL OBSERVATION PER HR: HCPCS

## 2023-01-28 PROCEDURE — 700102 HCHG RX REV CODE 250 W/ 637 OVERRIDE(OP): Performed by: EMERGENCY MEDICINE

## 2023-01-28 PROCEDURE — 85610 PROTHROMBIN TIME: CPT

## 2023-01-28 PROCEDURE — C9803 HOPD COVID-19 SPEC COLLECT: HCPCS | Performed by: STUDENT IN AN ORGANIZED HEALTH CARE EDUCATION/TRAINING PROGRAM

## 2023-01-28 PROCEDURE — 99285 EMERGENCY DEPT VISIT HI MDM: CPT

## 2023-01-28 PROCEDURE — 84484 ASSAY OF TROPONIN QUANT: CPT | Mod: 91

## 2023-01-28 PROCEDURE — 96374 THER/PROPH/DIAG INJ IV PUSH: CPT

## 2023-01-28 RX ORDER — METOPROLOL SUCCINATE 50 MG/1
100 TABLET, EXTENDED RELEASE ORAL ONCE
Status: COMPLETED | OUTPATIENT
Start: 2023-01-28 | End: 2023-01-28

## 2023-01-28 RX ORDER — FUROSEMIDE 10 MG/ML
20 INJECTION INTRAMUSCULAR; INTRAVENOUS ONCE
Status: COMPLETED | OUTPATIENT
Start: 2023-01-28 | End: 2023-01-28

## 2023-01-28 RX ADMIN — FUROSEMIDE 20 MG: 10 INJECTION, SOLUTION INTRAMUSCULAR; INTRAVENOUS at 21:53

## 2023-01-28 RX ADMIN — METOPROLOL SUCCINATE 100 MG: 50 TABLET, EXTENDED RELEASE ORAL at 21:53

## 2023-01-28 ASSESSMENT — FIBROSIS 4 INDEX: FIB4 SCORE: 4.84

## 2023-01-29 ENCOUNTER — APPOINTMENT (OUTPATIENT)
Dept: CARDIOLOGY | Facility: MEDICAL CENTER | Age: 73
DRG: 871 | End: 2023-01-29
Attending: STUDENT IN AN ORGANIZED HEALTH CARE EDUCATION/TRAINING PROGRAM
Payer: COMMERCIAL

## 2023-01-29 PROBLEM — A41.9 SEPSIS (HCC): Status: ACTIVE | Noted: 2023-01-29

## 2023-01-29 PROBLEM — I50.9 ACUTE CONGESTIVE HEART FAILURE, UNSPECIFIED HEART FAILURE TYPE (HCC): Status: ACTIVE | Noted: 2023-01-29

## 2023-01-29 PROBLEM — Z72.0 TOBACCO USE: Status: ACTIVE | Noted: 2023-01-29

## 2023-01-29 LAB
APPEARANCE UR: CLEAR
BASOPHILS # BLD AUTO: 0.4 % (ref 0–1.8)
BASOPHILS # BLD: 0.05 K/UL (ref 0–0.12)
BILIRUB UR QL STRIP.AUTO: NEGATIVE
COLOR UR: YELLOW
EKG IMPRESSION: NORMAL
EOSINOPHIL # BLD AUTO: 0.11 K/UL (ref 0–0.51)
EOSINOPHIL NFR BLD: 0.9 % (ref 0–6.9)
ERYTHROCYTE [DISTWIDTH] IN BLOOD BY AUTOMATED COUNT: 53.9 FL (ref 35.9–50)
ERYTHROCYTE [DISTWIDTH] IN BLOOD BY AUTOMATED COUNT: 55.3 FL (ref 35.9–50)
FERRITIN SERPL-MCNC: 111 NG/ML (ref 22–322)
FLUAV RNA SPEC QL NAA+PROBE: NEGATIVE
FLUBV RNA SPEC QL NAA+PROBE: NEGATIVE
GLUCOSE UR STRIP.AUTO-MCNC: NEGATIVE MG/DL
HCT VFR BLD AUTO: 24.6 % (ref 42–52)
HCT VFR BLD AUTO: 25.8 % (ref 42–52)
HEMOCCULT STL QL: POSITIVE
HGB BLD-MCNC: 7.7 G/DL (ref 14–18)
HGB BLD-MCNC: 7.9 G/DL (ref 14–18)
IMM GRANULOCYTES # BLD AUTO: 0.09 K/UL (ref 0–0.11)
IMM GRANULOCYTES NFR BLD AUTO: 0.8 % (ref 0–0.9)
IRON SATN MFR SERPL: 25 % (ref 15–55)
IRON SERPL-MCNC: 69 UG/DL (ref 50–180)
KETONES UR STRIP.AUTO-MCNC: NEGATIVE MG/DL
LEUKOCYTE ESTERASE UR QL STRIP.AUTO: NEGATIVE
LV EJECT FRACT  99904: 45
LV EJECT FRACT MOD 2C 99903: 48.15
LV EJECT FRACT MOD 4C 99902: 55.53
LV EJECT FRACT MOD BP 99901: 50.45
LYMPHOCYTES # BLD AUTO: 1.43 K/UL (ref 1–4.8)
LYMPHOCYTES NFR BLD: 12 % (ref 22–41)
MCH RBC QN AUTO: 28.1 PG (ref 27–33)
MCH RBC QN AUTO: 28.1 PG (ref 27–33)
MCHC RBC AUTO-ENTMCNC: 30.6 G/DL (ref 33.7–35.3)
MCHC RBC AUTO-ENTMCNC: 31.3 G/DL (ref 33.7–35.3)
MCV RBC AUTO: 89.8 FL (ref 81.4–97.8)
MCV RBC AUTO: 91.8 FL (ref 81.4–97.8)
MICRO URNS: NORMAL
MONOCYTES # BLD AUTO: 0.93 K/UL (ref 0–0.85)
MONOCYTES NFR BLD AUTO: 7.8 % (ref 0–13.4)
NEUTROPHILS # BLD AUTO: 9.34 K/UL (ref 1.82–7.42)
NEUTROPHILS NFR BLD: 78.1 % (ref 44–72)
NITRITE UR QL STRIP.AUTO: NEGATIVE
NRBC # BLD AUTO: 0 K/UL
NRBC BLD-RTO: 0 /100 WBC
PH UR STRIP.AUTO: 5 [PH] (ref 5–8)
PLATELET # BLD AUTO: 142 K/UL (ref 164–446)
PLATELET # BLD AUTO: 144 K/UL (ref 164–446)
PMV BLD AUTO: 11 FL (ref 9–12.9)
PMV BLD AUTO: 11 FL (ref 9–12.9)
PROT UR QL STRIP: NEGATIVE MG/DL
RBC # BLD AUTO: 2.74 M/UL (ref 4.7–6.1)
RBC # BLD AUTO: 2.81 M/UL (ref 4.7–6.1)
RBC UR QL AUTO: NEGATIVE
RSV RNA SPEC QL NAA+PROBE: NEGATIVE
SARS-COV-2 RNA RESP QL NAA+PROBE: NOTDETECTED
SP GR UR STRIP.AUTO: 1.01
SPECIMEN SOURCE: NORMAL
TIBC SERPL-MCNC: 278 UG/DL (ref 250–450)
TROPONIN T SERPL-MCNC: 29 NG/L (ref 6–19)
TROPONIN T SERPL-MCNC: 31 NG/L (ref 6–19)
UIBC SERPL-MCNC: 209 UG/DL (ref 110–370)
UROBILINOGEN UR STRIP.AUTO-MCNC: 0.2 MG/DL
WBC # BLD AUTO: 11.5 K/UL (ref 4.8–10.8)
WBC # BLD AUTO: 12 K/UL (ref 4.8–10.8)

## 2023-01-29 PROCEDURE — 700102 HCHG RX REV CODE 250 W/ 637 OVERRIDE(OP): Performed by: STUDENT IN AN ORGANIZED HEALTH CARE EDUCATION/TRAINING PROGRAM

## 2023-01-29 PROCEDURE — 700111 HCHG RX REV CODE 636 W/ 250 OVERRIDE (IP): Performed by: STUDENT IN AN ORGANIZED HEALTH CARE EDUCATION/TRAINING PROGRAM

## 2023-01-29 PROCEDURE — 96365 THER/PROPH/DIAG IV INF INIT: CPT

## 2023-01-29 PROCEDURE — 99233 SBSQ HOSP IP/OBS HIGH 50: CPT | Performed by: STUDENT IN AN ORGANIZED HEALTH CARE EDUCATION/TRAINING PROGRAM

## 2023-01-29 PROCEDURE — 85025 COMPLETE CBC W/AUTO DIFF WBC: CPT

## 2023-01-29 PROCEDURE — 84484 ASSAY OF TROPONIN QUANT: CPT

## 2023-01-29 PROCEDURE — 36415 COLL VENOUS BLD VENIPUNCTURE: CPT

## 2023-01-29 PROCEDURE — 81003 URINALYSIS AUTO W/O SCOPE: CPT

## 2023-01-29 PROCEDURE — 96375 TX/PRO/DX INJ NEW DRUG ADDON: CPT

## 2023-01-29 PROCEDURE — C9113 INJ PANTOPRAZOLE SODIUM, VIA: HCPCS | Performed by: STUDENT IN AN ORGANIZED HEALTH CARE EDUCATION/TRAINING PROGRAM

## 2023-01-29 PROCEDURE — 82272 OCCULT BLD FECES 1-3 TESTS: CPT

## 2023-01-29 PROCEDURE — 93306 TTE W/DOPPLER COMPLETE: CPT | Mod: 26 | Performed by: INTERNAL MEDICINE

## 2023-01-29 PROCEDURE — 700105 HCHG RX REV CODE 258: Performed by: STUDENT IN AN ORGANIZED HEALTH CARE EDUCATION/TRAINING PROGRAM

## 2023-01-29 PROCEDURE — 83550 IRON BINDING TEST: CPT

## 2023-01-29 PROCEDURE — 0241U HCHG SARS-COV-2 COVID-19 NFCT DS RESP RNA 4 TRGT MIC: CPT

## 2023-01-29 PROCEDURE — 770020 HCHG ROOM/CARE - TELE (206)

## 2023-01-29 PROCEDURE — 97162 PT EVAL MOD COMPLEX 30 MIN: CPT

## 2023-01-29 PROCEDURE — 83540 ASSAY OF IRON: CPT

## 2023-01-29 PROCEDURE — 85027 COMPLETE CBC AUTOMATED: CPT

## 2023-01-29 PROCEDURE — A9270 NON-COVERED ITEM OR SERVICE: HCPCS | Performed by: STUDENT IN AN ORGANIZED HEALTH CARE EDUCATION/TRAINING PROGRAM

## 2023-01-29 PROCEDURE — 93306 TTE W/DOPPLER COMPLETE: CPT

## 2023-01-29 PROCEDURE — 82728 ASSAY OF FERRITIN: CPT

## 2023-01-29 PROCEDURE — 96376 TX/PRO/DX INJ SAME DRUG ADON: CPT

## 2023-01-29 RX ORDER — WARFARIN SODIUM 7.5 MG/1
7.5 TABLET ORAL DAILY
Status: DISCONTINUED | OUTPATIENT
Start: 2023-01-29 | End: 2023-02-02 | Stop reason: HOSPADM

## 2023-01-29 RX ORDER — ALBUTEROL SULFATE 90 UG/1
2 AEROSOL, METERED RESPIRATORY (INHALATION) EVERY 4 HOURS PRN
Status: DISCONTINUED | OUTPATIENT
Start: 2023-01-29 | End: 2023-02-02 | Stop reason: HOSPADM

## 2023-01-29 RX ORDER — SPIRONOLACTONE 25 MG/1
25 TABLET ORAL DAILY
Status: DISCONTINUED | OUTPATIENT
Start: 2023-01-29 | End: 2023-02-02 | Stop reason: HOSPADM

## 2023-01-29 RX ORDER — SODIUM CHLORIDE 9 MG/ML
250 INJECTION, SOLUTION INTRAVENOUS ONCE
Status: COMPLETED | OUTPATIENT
Start: 2023-01-29 | End: 2023-01-29

## 2023-01-29 RX ORDER — ENOXAPARIN SODIUM 150 MG/ML
120 INJECTION SUBCUTANEOUS ONCE
Status: COMPLETED | OUTPATIENT
Start: 2023-01-29 | End: 2023-01-29

## 2023-01-29 RX ORDER — ACETAMINOPHEN 325 MG/1
650 TABLET ORAL EVERY 6 HOURS PRN
Status: DISCONTINUED | OUTPATIENT
Start: 2023-01-29 | End: 2023-02-02 | Stop reason: HOSPADM

## 2023-01-29 RX ORDER — WARFARIN SODIUM 7.5 MG/1
7.5 TABLET ORAL DAILY
Status: ON HOLD | COMMUNITY
End: 2023-02-02

## 2023-01-29 RX ORDER — LISINOPRIL 20 MG/1
40 TABLET ORAL DAILY
Status: DISCONTINUED | OUTPATIENT
Start: 2023-01-29 | End: 2023-02-02 | Stop reason: HOSPADM

## 2023-01-29 RX ORDER — PANTOPRAZOLE SODIUM 40 MG/10ML
40 INJECTION, POWDER, LYOPHILIZED, FOR SOLUTION INTRAVENOUS DAILY
Status: DISCONTINUED | OUTPATIENT
Start: 2023-01-29 | End: 2023-01-30

## 2023-01-29 RX ORDER — BENZONATATE 100 MG/1
100 CAPSULE ORAL 3 TIMES DAILY PRN
Status: DISCONTINUED | OUTPATIENT
Start: 2023-01-29 | End: 2023-02-02 | Stop reason: HOSPADM

## 2023-01-29 RX ORDER — FUROSEMIDE 10 MG/ML
40 INJECTION INTRAMUSCULAR; INTRAVENOUS
Status: DISCONTINUED | OUTPATIENT
Start: 2023-01-29 | End: 2023-01-30

## 2023-01-29 RX ORDER — AZITHROMYCIN 500 MG/5ML
500 INJECTION, POWDER, LYOPHILIZED, FOR SOLUTION INTRAVENOUS EVERY 24 HOURS
Status: COMPLETED | OUTPATIENT
Start: 2023-01-29 | End: 2023-01-31

## 2023-01-29 RX ADMIN — WARFARIN SODIUM 11.5 MG: 4 TABLET ORAL at 00:38

## 2023-01-29 RX ADMIN — CEFTRIAXONE SODIUM 2000 MG: 10 INJECTION, POWDER, FOR SOLUTION INTRAVENOUS at 00:50

## 2023-01-29 RX ADMIN — ENOXAPARIN SODIUM 120 MG: 120 INJECTION SUBCUTANEOUS at 01:00

## 2023-01-29 RX ADMIN — AZITHROMYCIN FOR INJECTION INJECTION, POWDER, LYOPHILIZED, FOR SOLUTION 500 MG: 500 INJECTION INTRAVENOUS at 00:54

## 2023-01-29 RX ADMIN — FUROSEMIDE 40 MG: 10 INJECTION, SOLUTION INTRAMUSCULAR; INTRAVENOUS at 05:30

## 2023-01-29 RX ADMIN — FUROSEMIDE 40 MG: 10 INJECTION, SOLUTION INTRAMUSCULAR; INTRAVENOUS at 16:43

## 2023-01-29 RX ADMIN — PANTOPRAZOLE SODIUM 40 MG: 40 INJECTION, POWDER, LYOPHILIZED, FOR SOLUTION INTRAVENOUS at 05:30

## 2023-01-29 RX ADMIN — SODIUM CHLORIDE 250 ML: 9 INJECTION, SOLUTION INTRAVENOUS at 05:39

## 2023-01-29 RX ADMIN — BENZONATATE 100 MG: 100 CAPSULE ORAL at 16:43

## 2023-01-29 ASSESSMENT — LIFESTYLE VARIABLES
TOTAL SCORE: 0
CONSUMPTION TOTAL: NEGATIVE
TOTAL SCORE: 0
HAVE YOU EVER FELT YOU SHOULD CUT DOWN ON YOUR DRINKING: NO
TOTAL SCORE: 0
ALCOHOL_USE: YES
AVERAGE NUMBER OF DAYS PER WEEK YOU HAVE A DRINK CONTAINING ALCOHOL: 1
ON A TYPICAL DAY WHEN YOU DRINK ALCOHOL HOW MANY DRINKS DO YOU HAVE: 1
EVER FELT BAD OR GUILTY ABOUT YOUR DRINKING: NO
EVER HAD A DRINK FIRST THING IN THE MORNING TO STEADY YOUR NERVES TO GET RID OF A HANGOVER: NO
HAVE PEOPLE ANNOYED YOU BY CRITICIZING YOUR DRINKING: NO
HOW MANY TIMES IN THE PAST YEAR HAVE YOU HAD 5 OR MORE DRINKS IN A DAY: 0

## 2023-01-29 ASSESSMENT — ENCOUNTER SYMPTOMS
COUGH: 0
NAUSEA: 0
NEUROLOGICAL NEGATIVE: 1
SPUTUM PRODUCTION: 0
WEAKNESS: 1
DIZZINESS: 0
PALPITATIONS: 0
SORE THROAT: 0
PND: 0
NECK PAIN: 0
HEADACHES: 0
GASTROINTESTINAL NEGATIVE: 1
CHILLS: 0
ORTHOPNEA: 1
PSYCHIATRIC NEGATIVE: 1
FEVER: 0
VOMITING: 0
MYALGIAS: 0
ABDOMINAL PAIN: 0
FOCAL WEAKNESS: 0
SHORTNESS OF BREATH: 1
MUSCULOSKELETAL NEGATIVE: 1
DOUBLE VISION: 0
BLURRED VISION: 0
DEPRESSION: 0
HEARTBURN: 0
EYES NEGATIVE: 1

## 2023-01-29 ASSESSMENT — CHA2DS2 SCORE
HYPERTENSION: YES
SEX: MALE
PRIOR STROKE OR TIA OR THROMBOEMBOLISM: NO
DIABETES: NO
CHA2DS2 VASC SCORE: 3
VASCULAR DISEASE: NO
AGE 65 TO 74: YES
AGE 75 OR GREATER: NO
CHF OR LEFT VENTRICULAR DYSFUNCTION: YES

## 2023-01-29 ASSESSMENT — COGNITIVE AND FUNCTIONAL STATUS - GENERAL
STANDING UP FROM CHAIR USING ARMS: A LITTLE
MOVING FROM LYING ON BACK TO SITTING ON SIDE OF FLAT BED: A LITTLE
SUGGESTED CMS G CODE MODIFIER MOBILITY: CK
TURNING FROM BACK TO SIDE WHILE IN FLAT BAD: A LITTLE
MOVING TO AND FROM BED TO CHAIR: A LOT
CLIMB 3 TO 5 STEPS WITH RAILING: A LOT
WALKING IN HOSPITAL ROOM: A LITTLE
MOBILITY SCORE: 16

## 2023-01-29 ASSESSMENT — GAIT ASSESSMENTS
DISTANCE (FEET): 50
GAIT LEVEL OF ASSIST: CONTACT GUARD ASSIST
DEVIATION: INCREASED BASE OF SUPPORT;BRADYKINETIC;SHUFFLED GAIT;OTHER (COMMENT)
ASSISTIVE DEVICE: FRONT WHEEL WALKER

## 2023-01-29 ASSESSMENT — PATIENT HEALTH QUESTIONNAIRE - PHQ9
SUM OF ALL RESPONSES TO PHQ9 QUESTIONS 1 AND 2: 0
1. LITTLE INTEREST OR PLEASURE IN DOING THINGS: NOT AT ALL
SUM OF ALL RESPONSES TO PHQ9 QUESTIONS 1 AND 2: 0
2. FEELING DOWN, DEPRESSED, IRRITABLE, OR HOPELESS: NOT AT ALL
1. LITTLE INTEREST OR PLEASURE IN DOING THINGS: NOT AT ALL
2. FEELING DOWN, DEPRESSED, IRRITABLE, OR HOPELESS: NOT AT ALL

## 2023-01-29 NOTE — ASSESSMENT & PLAN NOTE
This is Sepsis Present on admission  SIRS criteria identified on my evaluation include: Tachycardia, with heart rate greater than 90 BPM, Tachypnea, with respirations greater than 20 per minute and Leukocytosis, with WBC greater than 12,000  Source is Possible PNA  Sepsis protocol initiated  Fluid resuscitation ordered per protocol  IV antibiotics as appropriate for source of sepsis  Reassessment: I have reassessed the patient's hemodynamic status    Resolving  Bc so far negative  Leukocytosis resolved  Empiric abx stopped  following

## 2023-01-29 NOTE — CARE PLAN
Problem: Pain - Standard  Goal: Alleviation of pain or a reduction in pain to the patient’s comfort goal  Outcome: Progressing     Problem: Knowledge Deficit - Standard  Goal: Patient and family/care givers will demonstrate understanding of plan of care, disease process/condition, diagnostic tests and medications  Outcome: Progressing     The patient is Stable - Low risk of patient condition declining or worsening         Progress made toward(s) clinical / shift goals:  patient understands the care plan that has been set by their care team. Patient understands the importance of using the call light before getting up out of bed.     Patient is not progressing towards the following goals:

## 2023-01-29 NOTE — H&P
Hospital Medicine History & Physical Note    Date of Service  1/28/2023    Primary Care Physician  Ming Stinson M.D.    Consultants  None    Code Status  Full Code    Chief Complaint  Chief Complaint   Patient presents with    Chest Pain    Shortness of Breath     Pt chest pain, SOB and productive cough x1 day. Chest pain non radiating but a constant pain       History of Presenting Illness  Robert Mcdonnell is a 72 y.o. male who presented 1/28/2023 with progressively worsening shortness of breath for the last few days.  Patient has a history of longstanding tobacco use, CHF with reduced ejection fraction, atrial fibrillation on Coumadin.  States that he ran out of his medications several days ago and has not taken anything since then.  For the last few days, he is reported progressively worsening shortness of breath, lower extremity swelling, cough without phlegm.  He denies fever chills nausea vomiting diarrhea abdominal pain.    States that he smokes a pack of cigarettes a day for the last 30 years.  Has never been checked for COPD.     In the ED, patient found to be tachycardic.  Pertinent labs include neutrophil leukocytosis, hemoglobin 8.5, prerenal azotemia, non-anion gap metabolic acidosis, troponin 30, , INR 1.31.  Chest x-ray showing no acute cardiopulmonary abnormality.  EKG showing atrial fibrillation with left bundle branch block.    I discussed the plan of care with patient.    Review of Systems  Review of Systems   Constitutional:  Positive for malaise/fatigue.   HENT: Negative.     Eyes: Negative.    Respiratory:  Positive for shortness of breath.    Cardiovascular:  Positive for leg swelling.   Gastrointestinal: Negative.    Genitourinary: Negative.    Musculoskeletal: Negative.    Skin: Negative.    Neurological: Negative.    Endo/Heme/Allergies: Negative.    Psychiatric/Behavioral: Negative.       Past Medical History   has a past medical history of CHF (congestive heart  failure) (HCC), Congestive heart failure (HCC), and Hypertension.    Surgical History  No pertinent surgical hx      Family History  family history includes Heart Disease in his father and mother.   Family history reviewed with patient. There is no family history that is pertinent to the chief complaint.     Social History   reports that he has been smoking cigarettes. He has been smoking an average of 1 pack per day. He has never used smokeless tobacco. He reports current alcohol use. He reports that he does not currently use drugs.    Allergies  No Active Allergies    Medications  Prior to Admission Medications   Prescriptions Last Dose Informant Patient Reported? Taking?   albuterol 108 (90 Base) MCG/ACT Aero Soln inhalation aerosol   No No   Sig: Inhale 2 Puffs every four hours as needed for Shortness of Breath.   lisinopril (PRINIVIL) 40 MG tablet   No No   Sig: Take 1 tablet by mouth once daily   metoprolol SR (TOPROL XL) 100 MG TABLET SR 24 HR   No No   Sig: Take 1 tablet by mouth every evening.   spironolactone (ALDACTONE) 25 MG Tab   No No   Sig: Take 1 tablet by mouth once daily   torsemide (DEMADEX) 20 MG Tab   No No   Sig: Take 1 tablet by mouth once daily   warfarin (COUMADIN) 7.5 MG Tab   No No   Sig: Take 1 tab by mouth daily or as directed by anticoagulation clinic      Facility-Administered Medications: None       Physical Exam  Temp:  [36.5 °C (97.7 °F)] 36.5 °C (97.7 °F)  Pulse:  [102-118] 102  Resp:  [16-20] 20  BP: (118-131)/(63-82) 126/72  SpO2:  [86 %-100 %] 100 %  Blood Pressure : 126/72   Temperature: 36.5 °C (97.7 °F)   Pulse: (!) 102   Respiration: 20   Pulse Oximetry: 100 %       Physical Exam  Constitutional:       Appearance: Normal appearance. He is obese.   HENT:      Head: Normocephalic.      Nose: Nose normal.      Mouth/Throat:      Mouth: Mucous membranes are moist.   Eyes:      Pupils: Pupils are equal, round, and reactive to light.   Cardiovascular:      Rate and Rhythm:  Tachycardia present. Rhythm irregular.   Pulmonary:      Effort: Pulmonary effort is normal.      Comments: Mild rhonchi heard bilaterally at lung bases  Abdominal:      General: Abdomen is flat. Bowel sounds are normal.      Palpations: Abdomen is soft.   Musculoskeletal:      Cervical back: Neck supple.      Comments: Chronic venous changes noted  Pitting edema 2+ on lower extremities bilaterally   Skin:     General: Skin is warm.   Neurological:      General: No focal deficit present.      Mental Status: He is alert and oriented to person, place, and time. Mental status is at baseline.   Psychiatric:         Mood and Affect: Mood normal.         Behavior: Behavior normal.         Thought Content: Thought content normal.         Judgment: Judgment normal.       Laboratory:  Recent Labs     01/28/23 2137   WBC 13.4*   RBC 2.99*   HEMOGLOBIN 8.5*   HEMATOCRIT 27.0*   MCV 90.3   MCH 28.4   MCHC 31.5*   RDW 53.8*   PLATELETCT 134*   MPV 11.4     Recent Labs     01/28/23 2137   SODIUM 145   POTASSIUM 4.3   CHLORIDE 113*   CO2 19*   GLUCOSE 124*   BUN 52*   CREATININE 1.24   CALCIUM 8.7     Recent Labs     01/28/23 2137   ALTSGPT 15   ASTSGOT 25   ALKPHOSPHAT 63   TBILIRUBIN 1.0   GLUCOSE 124*     Recent Labs     01/28/23 2137   INR 1.31*     Recent Labs     01/28/23 2137   NTPROBNP 267*         Recent Labs     01/28/23 2137   TROPONINT 30*       Imaging:  DX-CHEST-PORTABLE (1 VIEW)   Final Result      No acute cardiopulmonary abnormality.          EKG:  I have personally reviewed the images and compared with prior images.    Assessment/Plan:  Justification for Admission Status  I anticipate this patient will require at least two midnights for appropriate medical management, necessitating inpatient admission because admitted for CHF exacerbation        * Acute exacerbation of CHF (congestive heart failure) (HCC)  Assessment & Plan  Ran out of medication several days ago.  Has a history of EF 30% based on echo in  April 2020.  Repeat echocardiogram  Lasix IV  Restart home medications  Oxygen prn, keep O2 > 90%      Tobacco use  Assessment & Plan  Smokes a pack of cigarettes a day for the last 30 years.  Has never been checked for COPD.  Encouraged to follow-up with primary care doctor to be evaluated for COPD upon discharge.    Sepsis ([facility])  Assessment & Plan  This is Sepsis Present on admission  SIRS criteria identified on my evaluation include: Tachycardia, with heart rate greater than 90 BPM, Tachypnea, with respirations greater than 20 per minute and Leukocytosis, with WBC greater than 12,000  Source is Possible PNA  Sepsis protocol initiated  Fluid resuscitation ordered per protocol  IV antibiotics as appropriate for source of sepsis  Reassessment: I have reassessed the patient's hemodynamic status    Meet criteria for sepsis.  Leukocytosis and procalcitonin elevated.  Possible pneumonia.  Start Rocephin and azithromycin.  Blood cultures.  Can hold fluids for now due to fluid overload status. COVID swab ordered          Morbid obesity ([facility])  Assessment & Plan  Will need counseling when clinically appropriate    AF (atrial fibrillation) ([facility])  Assessment & Plan  Known history of atrial fibrillation, INR subtherapeutic  On Coumadin can give 1 dose of Lovenox tonight to bridge    Atrial fibrillation with rapid ventricular response ([facility])- (present on admission)  Assessment & Plan  Resume home medications        VTE prophylaxis: therapeutic anticoagulation with lovenox

## 2023-01-29 NOTE — ASSESSMENT & PLAN NOTE
Query SSS  Tachy up to 170s then 30's - see cardiology note, likely due to scope - now rvr today, discussed with cardiology and I will resume his previous BB   coumdadin held for possible gi bleed  following

## 2023-01-29 NOTE — PROGRESS NOTES
Contacted Tucker Christianson MD about patient BP dropping. Patient currently not symptomatic. MD said that he would be up to examine the patient.  MD said to hold all morning BP meds and lasix.

## 2023-01-29 NOTE — ED TRIAGE NOTES
"Pulse (!) 110   Temp 36.5 °C (97.7 °F)   Resp (!) 2   Ht 1.854 m (6' 1\")   Wt 125 kg (275 lb)   SpO2 96%   BMI 36.28 kg/m²     Chief Complaint   Patient presents with    Chest Pain    Shortness of Breath     Pt chest pain, SOB and productive cough x1 day. Chest pain non radiating but a constant pain     Pt BIBA [facility] for chest pain, SOB & productive cough x1 day. Pt HX CHF and HTN. Pt takes torsemide, metoprolol, lisinopril and spirolactone but ran out and has not taken meds for a couple days. EKG done en route & per EMS report, Afib rvr was seen.   "

## 2023-01-29 NOTE — ASSESSMENT & PLAN NOTE
Smokes a pack of cigarettes a day for the last 30 years.  Has never been checked for COPD.  Encouraged to follow-up with primary care doctor to be evaluated for COPD upon discharge.

## 2023-01-29 NOTE — ED NOTES
Pt to [facility] from EMS, in gown, on momiotr and chart up for ERP. IV established, blood drawn and sent to lab. Call light in reach

## 2023-01-29 NOTE — PROGRESS NOTES
4 Eyes Skin Assessment Completed by JANNETTE Santos and JANNETTE Jean.    Head WDL  Ears WDL  Nose WDL  Mouth WDL  Neck WDL  Breast/Chest WDL  Shoulder Blades WDL  Spine WDL  (R) Arm/Elbow/Hand WDL  (L) Arm/Elbow/Hand WDL  Abdomen WDL  Groin WDL  Scrotum/Coccyx/Buttocks WDL  (R) Leg WDL  (L) Leg WDL  (R) Heel/Foot/Toe WDL  (L) Heel/Foot/Toe WDL          Devices In Places Tele Box, Blood Pressure Cuff, and Pulse Ox      Interventions In Place Pillows and Pressure Redistribution Mattress    Possible Skin Injury No    Pictures Uploaded Into Epic N/A  Wound Consult Placed N/A  RN Wound Prevention Protocol Ordered No

## 2023-01-29 NOTE — THERAPY
Physical Therapy   Initial Evaluation     Patient Name: Robert Mcdonnell  Age:  72 y.o., Sex:  male  Medical Record #: [id number]  Today's Date: 1/29/2023     Precautions  Precautions: Fall Risk    Assessment  Patient is 72 y.o. male presenting for worsening SOB, acute CHF exacerbation and sepsis w/ a PMH of HFrEF, afib, obesity and tobacco use.  He lives alone in a SS apartment w/ no ECHO, and reports limited outside support available for assistance.  The pt currently displays generalized weakness BLE and poor functional endurance limiting his independence w/ mobility tasks.  He was received seated EOB, and able to demo STS w/ FWW CGA.  He demo'd gait 50' (x2) using FWW CGA via wide LIZETTE, slow apryl and shuffled gait pattern requiring consistent cues for proper AD management, no LOB observed and distance limited by fatigue.  Recommend pt dc home w/ HH for home safety and further PT needs given current extent of functional mobility demonstrated.  Will follow for further gait distance and AD management.    Plan    Physical Therapy Initial Treatment Plan   Treatment Plan : Bed Mobility, Equipment, Gait Training, Manual Therapy, Neuro Re-Education / Balance, Orthotics Training , Self Care / Home Evaluation, Stair Training, Therapeutic Activities, Therapeutic Exercise  Treatment Frequency: 4 Times per Week  Duration: Until Therapy Goals Met    DC Equipment Recommendations: None (pt owns rollator walker)  Discharge Recommendations: Recommend home health for continued physical therapy services       Subjective/Objective       01/29/23 1146   Prior Living Situation   Prior Services Home-Independent   Housing / Facility 1 Story Apartment / Condo   Steps Into Home 0   Steps In Home 0   Equipment Owned 4-Wheel Walker   Lives with - Patient's Self Care Capacity Alone and Able to Care For Self   Comments pt lives alone in ground-level apt, reports limited support available   Prior Level of Functional Mobility   Bed Mobility  Independent   Transfer Status Independent   Ambulation Independent   Distance Ambulation (Feet)   (community distances)   Assistive Devices Used 4-Wheel Walker   History of Falls   History of Falls No   Date of Last Fall   (pt denies any recent falls)   Cognition    Cognition / Consciousness WDL   Comments pt pleasant and cooperative   Passive ROM Lower Body   Passive ROM Lower Body WDL   Active ROM Lower Body    Active ROM Lower Body  X   Comments B hip flexion limited by body habitus, functional for mobility tasks   Strength Lower Body   Lower Body Strength  X   Comments generalized weakness BLE, functional for mobility tasks   Sensation Lower Body   Lower Extremity Sensation   WDL   Comments sensation intact to DP/LT BLE   Coordination Lower Body    Coordination Lower Body  WDL   Balance Assessment   Sitting Balance (Static) Fair +   Sitting Balance (Dynamic) Fair   Standing Balance (Static) Fair -   Standing Balance (Dynamic) Fair -   Weight Shift Sitting Fair   Weight Shift Standing Fair   Comments stand w/ FWW   Bed Mobility    Comments pt position seated EOB pre- and post-assessment   Gait Analysis   Gait Level Of Assist Contact Guard Assist   Assistive Device Front Wheel Walker   Distance (Feet) 50   # of Times Distance was Traveled 2   Deviation Increased Base Of Support;Bradykinetic;Shuffled Gait;Other (Comment)  (forward flexed trunk)   Weight Bearing Status no restrictions   Vision Deficits Impacting Mobility denies   Comments distance limited by fatigue   Functional Mobility   Sit to Stand Contact Guard Assist   Bed, Chair, Wheelchair Transfer Contact Guard Assist   Transfer Method Stand Step   Mobility STS EOBx2 w/ FWW, EOB<>hallway/bathroom   Activity Tolerance   Sitting Edge of Bed >30min   Standing 10min total   Edema / Skin Assessment   Edema / Skin  Not Assessed   Short Term Goals    Short Term Goal # 1 The pt will demo supine<>sit EOB w/ hob flat and no rails spv in 6 visits for IND w/ bed  mobility.   Short Term Goal # 2 The pt will demo STS EOB w/ FWW spv in 6 visits to prepare for OOB mobility.   Short Term Goal # 3 The pt will demo gait >150' using FWW spv in 6 visits for household ambulation.   Education Group   Education Provided Role of Physical Therapist   Role of Physical Therapist Patient Response Patient;Acceptance;Explanation;Demonstration;Action Demonstration   Additional Comments pt receptive of [facility] provided   Physical Therapy Initial Treatment Plan    Treatment Plan  Bed Mobility;Equipment;Gait Training;Manual Therapy;Neuro Re-Education / Balance;Orthotics Training ;Self Care / Home Evaluation;Stair Training;Therapeutic Activities;Therapeutic Exercise   Treatment Frequency 4 Times per Week   Duration Until Therapy Goals Met   Problem List    Problems Impaired Bed Mobility;Impaired Transfers;Impaired Ambulation;Impaired Balance;Decreased Activity Tolerance   Interdisciplinary Plan of Care Collaboration   IDT Collaboration with  Nursing   Patient Position at End of Therapy Seated;Edge of Bed;Call Light within Reach;Tray Table within Reach;Phone within Reach   Collaboration Comments regarding outcome of tx session   Session Information   Date / Session Number  1/29- 1 (1/4, 2/4)

## 2023-01-29 NOTE — PROGRESS NOTES
4 Eyes Skin Assessment Completed by JANNETTE Mina and JANNETTE Espinosa.    Head WDL  Ears Blanching  Nose WDL  Mouth WDL  Neck WDL  Breast/Chest WDL  Shoulder Blades Blanching  Spine WDL  (R) Arm/Elbow/Hand WDL  (L) Arm/Elbow/Hand WDL  Abdomen WDL  Groin WDL  Scrotum/Coccyx/Buttocks WDL  (R) Leg Swelling and Edema  (L) Leg Swelling and Edema  (R) Heel/Foot/Toe Discoloration and Scab  (L) Heel/Foot/Toe Discoloration and Edema          Devices In Places Tele Box and Pulse Ox and Condom Cath      Interventions In Place Pillows    Possible Skin Injury No    Pictures Uploaded Into Epic N/A  Wound Consult Placed N/A  RN Wound Prevention Protocol Ordered No

## 2023-01-29 NOTE — ED PROVIDER NOTES
"ED Provider Note    CHIEF COMPLAINT  Chief Complaint   Patient presents with    Chest Pain    Shortness of Breath     Pt chest pain, SOB and productive cough x1 day. Chest pain non radiating but a constant pain       EXTERNAL RECORDS REVIEWED  Inpatient Notes prior visits for hypertension and CHF    HPI/ROS  LIMITATION TO HISTORY   Select: : None  OUTSIDE HISTORIAN(S):  None    Robert Mcdonnell is a 72 y.o. male who presents to the emerge department chief complaint of shortness of breath.  The patient states that he has been compliant with his medications but ran out of them about 4 days ago and unfortunately he cannot get his refills until the first of this month.  He states he is have aggressively worsening shortness of breath over the last few days as well as a cough but no fevers or chills has been no hemoptysis.  He has bilateral lower extremity swelling states got a little bit worse.  He is definitely short with ambulation and when he lies flat.    PAST MEDICAL HISTORY   has a past medical history of CHF (congestive heart failure) (HCC), Congestive heart failure (HCC), and Hypertension.    SURGICAL HISTORY  patient denies any surgical history    FAMILY HISTORY  Family History   Problem Relation Age of Onset    Heart Disease Mother     Heart Disease Father        SOCIAL HISTORY  Social History     Tobacco Use    Smoking status: Every Day     Packs/day: 1.00     Types: Cigarettes    Smokeless tobacco: Never   Vaping Use    Vaping Use: Never used   Substance and Sexual Activity    Alcohol use: Yes     Comment: occ    Drug use: Not Currently    Sexual activity: Not on file       CURRENT MEDICATIONS  Home Medications    **Home medications have not yet been reviewed for this encounter**         ALLERGIES  No Active Allergies    PHYSICAL EXAM  VITAL SIGNS: /63   Pulse (!) 118   Temp 36.5 °C (97.7 °F)   Resp 19   Ht 1.854 m (6' 1\")   Wt 125 kg (275 lb)   SpO2 100%   BMI 36.28 kg/m²    Pulse Ox " Interpretation:   Pulse Ox is normal  Constitutional: Alert in no apparent distress.  HENT: Normocephalic atraumatic, MMM  Eyes: PER, Conjunctiva normal, Non-icteric.   Neck: Normal range of motion, No tenderness, Supple, No stridor.   Cardiovascular: Irregular regular tachycardic no murmurs.   Thorax & Lungs: Diminished breath sounds with rhonchi at the bases, No respiratory distress, No wheezing, No chest tenderness.   Abdomen: Bowel sounds normal, Soft, No tenderness, No pulsatile masses. No peritoneal signs.  Skin: Warm, Dry, No erythema, No rash.   Back: No bony tenderness, No CVA tenderness.   Extremities/MSK: Intact equal distal pulses, 3+ pitting edema bilateral lower extremities no tenderness, No cyanosis, no major deformities noted  Neurologic: Alert and oriented x3, No focal deficits noted.         DIAGNOSTIC STUDIES / PROCEDURES  EKG  I have independently interpreted this EKG  Results for orders placed or performed during the hospital encounter of 23   EKG   Result Value Ref Range    Report       [facility] Emergency Dept.    Test Date:  2023  Pt Name:    HUNTER STEELE                 Department: ER  MRN:        [id number]                      Room:        22  Gender:     Male                         Technician: [id number]  :        1950                   Requested By:ER TRIAGE PROTOCOL  Order #:    [id number]                    Reading MD: Sarah Pickering MD    Measurements  Intervals                                Axis  Rate:       90                           P:  VT:                                      QRS:        -50  QRSD:       141                          T:          147  QT:         396  QTc:        485    Interpretive Statements  Atrial fibrillation at a rate of 90 with a left bundle branch block no ST  elevations or ST depressions no abnormal T wave inversions    Compared to ECG 2021 17:17:11  Ventricular premature complex(es) now present  Electronically  Signed On 1- 0:20:40 PST by Sarah Pickering MD           LABS  Labs Reviewed   CBC WITH DIFFERENTIAL - Abnormal; Notable for the following components:       Result Value    WBC 13.4 (*)     RBC 2.99 (*)     Hemoglobin 8.5 (*)     Hematocrit 27.0 (*)     MCHC 31.5 (*)     RDW 53.8 (*)     Platelet Count 134 (*)     Neutrophils-Polys 79.10 (*)     Lymphocytes 12.00 (*)     Neutrophils (Absolute) 10.56 (*)     Monos (Absolute) 1.06 (*)     All other components within normal limits    Narrative:     Biotin intake of greater than 5 mg per day may interfere with  troponin levels, causing false low values.   COMP METABOLIC PANEL - Abnormal; Notable for the following components:    Chloride 113 (*)     Co2 19 (*)     Glucose 124 (*)     Bun 52 (*)     Total Protein 5.9 (*)     All other components within normal limits    Narrative:     Biotin intake of greater than 5 mg per day may interfere with  troponin levels, causing false low values.   TROPONIN - Abnormal; Notable for the following components:    Troponin T 30 (*)     All other components within normal limits    Narrative:     Biotin intake of greater than 5 mg per day may interfere with  troponin levels, causing false low values.   TROPONIN - Abnormal; Notable for the following components:    Troponin T 29 (*)     All other components within normal limits    Narrative:     Biotin intake of greater than 5 mg per day may interfere with  troponin levels, causing false low values.   PROBRAIN NATRIURETIC PEPTIDE, NT - Abnormal; Notable for the following components:    NT-proBNP 267 (*)     All other components within normal limits    Narrative:     Biotin intake of greater than 5 mg per day may interfere with  troponin levels, causing false low values.   PROTHROMBIN TIME - Abnormal; Notable for the following components:    PT 16.1 (*)     INR 1.31 (*)     All other components within normal limits    Narrative:     Indicate which anticoagulants the patient is  on:->UNKNOWN   PROCALCITONIN - Abnormal; Notable for the following components:    Procalcitonin 0.28 (*)     All other components within normal limits    Narrative:     Biotin intake of greater than 5 mg per day may interfere with  troponin levels, causing false low values.   ESTIMATED GFR    Narrative:     Biotin intake of greater than 5 mg per day may interfere with  troponin levels, causing false low values.   CORRECTED CALCIUM    Narrative:     Biotin intake of greater than 5 mg per day may interfere with  troponin levels, causing false low values.   TROPONIN   COV-2, FLU A/B, AND RSV BY PCR ([employer])   URINALYSIS         RADIOLOGY  I have independently interpreted the diagnostic imaging associated with this visit and am waiting the final reading from the radiologist.   My preliminary interpretation is a follows: CXR - perihilar fluid collection  Radiologist interpretation:   DX-CHEST-PORTABLE (1 VIEW)   Final Result      No acute cardiopulmonary abnormality.      EC-ECHOCARDIOGRAM COMPLETE W/O CONT    (Results Pending)         COURSE & MEDICAL DECISION MAKING    ED Observation Status? Yes; I am placing the patient in to an observation status due to a diagnostic uncertainty as well as therapeutic intensity. Patient placed in observation status at 9:40PM, 1/28/2023.     Observation plan is as follows: Evaluation for fluid overload hypoxia or determination of admission    Upon Reevaluation, the patient's condition has: not improved; and will be escalated to hospitalization.    Patient discharged from ED Observation status at 10:53 PM (Time) 1/28/23 (Date).     INITIAL ASSESSMENT, COURSE AND PLAN  Care Narrative: Patient resents emerged department with signs symptoms consistent with fluid overload and A. fib.  He is not in severe RVR but is definitely irregular.  He is not given on examination he states he is getting short of breath with ambulation and lying flat.  The plan is for observation laboratory analysis of  given some Lasix as well as his home dose of metoprolol and if he cannot ambulate without dropping his sats then potentially discharged home otherwise he may need to be hospitalized.  Fortunately the patient has been denying any chest pain    10:52 PM  Unfortunately the patient became hypoxic at rest often in the 80s to sleeping.  He was placed on 2 L nasal cannula be hospitalized for fluid overload and hypoxia.      ADDITIONAL PROBLEM LIST  1.  Hypoxia  2.  Fluid overload  3.  Short of breath  DISPOSITION AND DISCUSSIONS  I have discussed management of the patient with the following physicians and BRANDON's: Spoke with Dr. Christianson with the hospitalist service and he is excepted the patient for hospitalization    Discussion of management with other QHP or appropriate source(s): None     Escalation of care considered, and ultimately not performed:diagnostic imaging such as a CT scan of the chest    Barriers to care at this time, including but not limited to: Patient had difficult affording medications.     Decision tools and prescription drugs considered including, but not limited to: HEART Score 4 .    FINAL DIAGNOSIS  1.  Hypoxia  2.  Fluid overload  3.  A. fib RVR  4.  Medication nonadherence       Electronically signed by: Sarah Pickering M.D., 1/28/2023 9:44 PM

## 2023-01-29 NOTE — PROGRESS NOTES
Inpatient Anticoagulation Service Note for 1/29/2023      Reason for Anticoagulation: Atrial Fibrillation   [id number] VASc Score: 3  HAS-BLED Score: 2    Hemoglobin Value: (!) 8.5  Hematocrit Value: (!) 27  Lab Platelet Value: (!) 134  Target INR: 2.0 to 3.0    INR from last 7 days       Date/Time INR Value    01/28/23 2137 1.31          Dose from last 7 days       Date/Time Dose (mg)    01/29/23 0019 11.5          Average Dose (mg): 7.5  Bridge Therapy: Yes (Bridge with one time weight based lovenox dose)     Reversal Agent Administered: Not Applicable  Comments: Patient on warfarin for A-Fib presenting after not taking his medications for a few days due to running out. Per chart review patient was initially established with the [facility] in June of 2021 but failed to show up for any appointments to manage therapy and was subsequently discharged from the clinic. INR on admission sub-therapeutic at 1.31, CBC reviewed and Hgb low at 8.5 mg/dL but no overt signs of bleeding noted. Patient was instucted to start at 7.5mg daily and reports that as his dose still. Plan to bolus with 11.5 mg (roughly 150% of normal dose) then resume home dose of 7.5mg daily. INR with AM labs starting 1/30 ordered x3 occurences.    Plan:  11.5mg now then 7.5mg nightly  Education Material Provided?: No    Pharmacist suggested discharge dosing: Recommend 7.5mg daily with follow up INR within 48-72 hours of discharge.     Ishan Hubbard, PharmD

## 2023-01-29 NOTE — ED NOTES
.Pt transferred out of ED at this time with JANNETTE Menard via wheelchair. Pt is A&Ox4, with stable vital signs and no apparent distress upon transfer. Pt transferred on tele monitor. All paperwork and personal belongings sent with pt to .

## 2023-01-29 NOTE — PROGRESS NOTES
Noted to have downtrending hemoglobin. Reports that he occasionally sees dark colored stool in his stool. Given therapeutic lovenox at 1/29/23. INR 1.31.    Blood pressure 100/74 HR 86   Patient reports symptomatic improvement in SOB after being given lasix.     At this time, ac to be held for now. FOBT, protonix, serial CBC, iron, TIBC, ferritin, 250 ml bolus ordered.   Blood pressure medications hold as his blood pressure somewhat soft.

## 2023-01-29 NOTE — ED NOTES
Med rec completed per patient  Allergies reviewed  No PO Antibiotics in the last 30 days     Patient states he is taking Coumadin 7.5 mg one tablet a day.    Patient states he has not had his medications for about 3 days due to being out of the medications

## 2023-01-30 PROBLEM — N17.9 AKI (ACUTE KIDNEY INJURY) (HCC): Status: ACTIVE | Noted: 2023-01-30

## 2023-01-30 PROBLEM — K92.2 UPPER GI BLEED: Status: ACTIVE | Noted: 2023-01-30

## 2023-01-30 LAB
ANION GAP SERPL CALC-SCNC: 11 MMOL/L (ref 7–16)
BASOPHILS # BLD AUTO: 0.3 % (ref 0–1.8)
BASOPHILS # BLD AUTO: 0.4 % (ref 0–1.8)
BASOPHILS # BLD: 0.02 K/UL (ref 0–0.12)
BASOPHILS # BLD: 0.03 K/UL (ref 0–0.12)
BUN SERPL-MCNC: 33 MG/DL (ref 8–22)
CALCIUM SERPL-MCNC: 8.5 MG/DL (ref 8.5–10.5)
CHLORIDE SERPL-SCNC: 106 MMOL/L (ref 96–112)
CO2 SERPL-SCNC: 25 MMOL/L (ref 20–33)
CREAT SERPL-MCNC: 1.44 MG/DL (ref 0.5–1.4)
EOSINOPHIL # BLD AUTO: 0.17 K/UL (ref 0–0.51)
EOSINOPHIL # BLD AUTO: 0.18 K/UL (ref 0–0.51)
EOSINOPHIL # BLD AUTO: 0.2 K/UL (ref 0–0.51)
EOSINOPHIL # BLD AUTO: 0.2 K/UL (ref 0–0.51)
EOSINOPHIL NFR BLD: 1.8 % (ref 0–6.9)
EOSINOPHIL NFR BLD: 2.2 % (ref 0–6.9)
EOSINOPHIL NFR BLD: 2.2 % (ref 0–6.9)
EOSINOPHIL NFR BLD: 2.6 % (ref 0–6.9)
ERYTHROCYTE [DISTWIDTH] IN BLOOD BY AUTOMATED COUNT: 53.7 FL (ref 35.9–50)
ERYTHROCYTE [DISTWIDTH] IN BLOOD BY AUTOMATED COUNT: 54.3 FL (ref 35.9–50)
ERYTHROCYTE [DISTWIDTH] IN BLOOD BY AUTOMATED COUNT: 54.4 FL (ref 35.9–50)
ERYTHROCYTE [DISTWIDTH] IN BLOOD BY AUTOMATED COUNT: 54.7 FL (ref 35.9–50)
GFR SERPLBLD CREATININE-BSD FMLA CKD-EPI: 51 ML/MIN/1.73 M 2
GLUCOSE SERPL-MCNC: 115 MG/DL (ref 65–99)
HCT VFR BLD AUTO: 23.8 % (ref 42–52)
HCT VFR BLD AUTO: 24.2 % (ref 42–52)
HCT VFR BLD AUTO: 24.3 % (ref 42–52)
HCT VFR BLD AUTO: 24.4 % (ref 42–52)
HGB BLD-MCNC: 7.4 G/DL (ref 14–18)
HGB BLD-MCNC: 7.6 G/DL (ref 14–18)
HGB BLD-MCNC: 7.6 G/DL (ref 14–18)
HGB BLD-MCNC: 7.7 G/DL (ref 14–18)
IMM GRANULOCYTES # BLD AUTO: 0.03 K/UL (ref 0–0.11)
IMM GRANULOCYTES # BLD AUTO: 0.04 K/UL (ref 0–0.11)
IMM GRANULOCYTES # BLD AUTO: 0.05 K/UL (ref 0–0.11)
IMM GRANULOCYTES # BLD AUTO: 0.06 K/UL (ref 0–0.11)
IMM GRANULOCYTES NFR BLD AUTO: 0.4 % (ref 0–0.9)
IMM GRANULOCYTES NFR BLD AUTO: 0.5 % (ref 0–0.9)
IMM GRANULOCYTES NFR BLD AUTO: 0.6 % (ref 0–0.9)
IMM GRANULOCYTES NFR BLD AUTO: 0.6 % (ref 0–0.9)
INR PPP: 1.77 (ref 0.87–1.13)
LYMPHOCYTES # BLD AUTO: 1.43 K/UL (ref 1–4.8)
LYMPHOCYTES # BLD AUTO: 1.5 K/UL (ref 1–4.8)
LYMPHOCYTES # BLD AUTO: 1.51 K/UL (ref 1–4.8)
LYMPHOCYTES # BLD AUTO: 1.74 K/UL (ref 1–4.8)
LYMPHOCYTES NFR BLD: 16.8 % (ref 22–41)
LYMPHOCYTES NFR BLD: 17.4 % (ref 22–41)
LYMPHOCYTES NFR BLD: 18.5 % (ref 22–41)
LYMPHOCYTES NFR BLD: 19.4 % (ref 22–41)
MCH RBC QN AUTO: 28.5 PG (ref 27–33)
MCH RBC QN AUTO: 28.7 PG (ref 27–33)
MCH RBC QN AUTO: 28.8 PG (ref 27–33)
MCH RBC QN AUTO: 28.9 PG (ref 27–33)
MCHC RBC AUTO-ENTMCNC: 31.1 G/DL (ref 33.7–35.3)
MCHC RBC AUTO-ENTMCNC: 31.1 G/DL (ref 33.7–35.3)
MCHC RBC AUTO-ENTMCNC: 31.4 G/DL (ref 33.7–35.3)
MCHC RBC AUTO-ENTMCNC: 31.7 G/DL (ref 33.7–35.3)
MCV RBC AUTO: 90.7 FL (ref 81.4–97.8)
MCV RBC AUTO: 91.4 FL (ref 81.4–97.8)
MCV RBC AUTO: 91.7 FL (ref 81.4–97.8)
MCV RBC AUTO: 93 FL (ref 81.4–97.8)
MONOCYTES # BLD AUTO: 0.78 K/UL (ref 0–0.85)
MONOCYTES # BLD AUTO: 0.78 K/UL (ref 0–0.85)
MONOCYTES # BLD AUTO: 0.85 K/UL (ref 0–0.85)
MONOCYTES # BLD AUTO: 0.97 K/UL (ref 0–0.85)
MONOCYTES NFR BLD AUTO: 10 % (ref 0–13.4)
MONOCYTES NFR BLD AUTO: 10.9 % (ref 0–13.4)
MONOCYTES NFR BLD AUTO: 9 % (ref 0–13.4)
MONOCYTES NFR BLD AUTO: 9.5 % (ref 0–13.4)
NEUTROPHILS # BLD AUTO: 5.25 K/UL (ref 1.82–7.42)
NEUTROPHILS # BLD AUTO: 5.75 K/UL (ref 1.82–7.42)
NEUTROPHILS # BLD AUTO: 6.18 K/UL (ref 1.82–7.42)
NEUTROPHILS # BLD AUTO: 6.56 K/UL (ref 1.82–7.42)
NEUTROPHILS NFR BLD: 67.3 % (ref 44–72)
NEUTROPHILS NFR BLD: 69.2 % (ref 44–72)
NEUTROPHILS NFR BLD: 69.8 % (ref 44–72)
NEUTROPHILS NFR BLD: 70 % (ref 44–72)
NRBC # BLD AUTO: 0 K/UL
NRBC # BLD AUTO: 0.02 K/UL
NRBC # BLD AUTO: 0.02 K/UL
NRBC # BLD AUTO: 0.04 K/UL
NRBC BLD-RTO: 0 /100 WBC
NRBC BLD-RTO: 0.2 /100 WBC
NRBC BLD-RTO: 0.2 /100 WBC
NRBC BLD-RTO: 0.5 /100 WBC
PLATELET # BLD AUTO: 140 K/UL (ref 164–446)
PLATELET # BLD AUTO: 141 K/UL (ref 164–446)
PLATELET # BLD AUTO: 142 K/UL (ref 164–446)
PLATELET # BLD AUTO: 155 K/UL (ref 164–446)
PMV BLD AUTO: 11.1 FL (ref 9–12.9)
PMV BLD AUTO: 11.6 FL (ref 9–12.9)
PMV BLD AUTO: 11.6 FL (ref 9–12.9)
PMV BLD AUTO: 11.8 FL (ref 9–12.9)
POTASSIUM SERPL-SCNC: 3.7 MMOL/L (ref 3.6–5.5)
PROTHROMBIN TIME: 20.2 SEC (ref 12–14.6)
RBC # BLD AUTO: 2.56 M/UL (ref 4.7–6.1)
RBC # BLD AUTO: 2.64 M/UL (ref 4.7–6.1)
RBC # BLD AUTO: 2.67 M/UL (ref 4.7–6.1)
RBC # BLD AUTO: 2.68 M/UL (ref 4.7–6.1)
SODIUM SERPL-SCNC: 142 MMOL/L (ref 135–145)
WBC # BLD AUTO: 7.8 K/UL (ref 4.8–10.8)
WBC # BLD AUTO: 8.2 K/UL (ref 4.8–10.8)
WBC # BLD AUTO: 8.9 K/UL (ref 4.8–10.8)
WBC # BLD AUTO: 9.4 K/UL (ref 4.8–10.8)

## 2023-01-30 PROCEDURE — 700111 HCHG RX REV CODE 636 W/ 250 OVERRIDE (IP): Performed by: STUDENT IN AN ORGANIZED HEALTH CARE EDUCATION/TRAINING PROGRAM

## 2023-01-30 PROCEDURE — 770020 HCHG ROOM/CARE - TELE (206)

## 2023-01-30 PROCEDURE — 99233 SBSQ HOSP IP/OBS HIGH 50: CPT | Performed by: STUDENT IN AN ORGANIZED HEALTH CARE EDUCATION/TRAINING PROGRAM

## 2023-01-30 PROCEDURE — 80048 BASIC METABOLIC PNL TOTAL CA: CPT

## 2023-01-30 PROCEDURE — C9113 INJ PANTOPRAZOLE SODIUM, VIA: HCPCS | Performed by: STUDENT IN AN ORGANIZED HEALTH CARE EDUCATION/TRAINING PROGRAM

## 2023-01-30 PROCEDURE — 36415 COLL VENOUS BLD VENIPUNCTURE: CPT

## 2023-01-30 PROCEDURE — 85610 PROTHROMBIN TIME: CPT

## 2023-01-30 PROCEDURE — 85025 COMPLETE CBC W/AUTO DIFF WBC: CPT

## 2023-01-30 PROCEDURE — 99221 1ST HOSP IP/OBS SF/LOW 40: CPT | Mod: GC | Performed by: SPECIALIST

## 2023-01-30 PROCEDURE — 97165 OT EVAL LOW COMPLEX 30 MIN: CPT

## 2023-01-30 RX ORDER — PANTOPRAZOLE SODIUM 40 MG/10ML
40 INJECTION, POWDER, LYOPHILIZED, FOR SOLUTION INTRAVENOUS 2 TIMES DAILY
Status: COMPLETED | OUTPATIENT
Start: 2023-01-30 | End: 2023-02-02

## 2023-01-30 RX ADMIN — PANTOPRAZOLE SODIUM 40 MG: 40 INJECTION, POWDER, LYOPHILIZED, FOR SOLUTION INTRAVENOUS at 06:43

## 2023-01-30 RX ADMIN — PANTOPRAZOLE SODIUM 40 MG: 40 INJECTION, POWDER, FOR SOLUTION INTRAVENOUS at 17:12

## 2023-01-30 RX ADMIN — CEFTRIAXONE SODIUM 2000 MG: 10 INJECTION, POWDER, FOR SOLUTION INTRAVENOUS at 07:34

## 2023-01-30 RX ADMIN — FUROSEMIDE 40 MG: 10 INJECTION, SOLUTION INTRAMUSCULAR; INTRAVENOUS at 06:43

## 2023-01-30 RX ADMIN — AZITHROMYCIN FOR INJECTION INJECTION, POWDER, LYOPHILIZED, FOR SOLUTION 500 MG: 500 INJECTION INTRAVENOUS at 07:40

## 2023-01-30 ASSESSMENT — COGNITIVE AND FUNCTIONAL STATUS - GENERAL
SUGGESTED CMS G CODE MODIFIER MOBILITY: CH
SUGGESTED CMS G CODE MODIFIER DAILY ACTIVITY: CH
DAILY ACTIVITIY SCORE: 24
SUGGESTED CMS G CODE MODIFIER DAILY ACTIVITY: CH
DAILY ACTIVITIY SCORE: 24
MOBILITY SCORE: 24

## 2023-01-30 ASSESSMENT — ENCOUNTER SYMPTOMS
CHILLS: 0
CONSTIPATION: 0
BLOOD IN STOOL: 0
HEMOPTYSIS: 0
FEVER: 0
COUGH: 0
BRUISES/BLEEDS EASILY: 0
SHORTNESS OF BREATH: 0
DIARRHEA: 0
HEARTBURN: 0
DIZZINESS: 0
ABDOMINAL PAIN: 0
BLURRED VISION: 0
MYALGIAS: 0
VOMITING: 0
NAUSEA: 0

## 2023-01-30 ASSESSMENT — ACTIVITIES OF DAILY LIVING (ADL): TOILETING: INDEPENDENT

## 2023-01-30 ASSESSMENT — PAIN DESCRIPTION - PAIN TYPE: TYPE: ACUTE PAIN

## 2023-01-30 ASSESSMENT — FIBROSIS 4 INDEX
FIB4 SCORE: 3
FIB4 SCORE: 3.3

## 2023-01-30 NOTE — ASSESSMENT & PLAN NOTE
Likely in setting of upper GI bleed  Positive occult blood feces  Transfuse if hbg less 7 , continue to follow  Hold coumadin  EGD cancelled - see above

## 2023-01-30 NOTE — PROGRESS NOTES
Report received from Day Shift RN, assumed care of patient. Patient A&O x 4 . Plan of care discussed with patient, labs and chart reviewed. All needs met at this time. Tele box on, on Room air. Call light within reach, bed locked and in lowest position. All fall precautions and hourly rounding in place.

## 2023-01-30 NOTE — PROGRESS NOTES
Hospital Medicine Daily Progress Note    Date of Service  1/30/2023    Chief Complaint  Robert Mcdonnell is a 72 y.o. male admitted 1/28/2023 with chest pain and shortness of breath    Hospital Course    Robert Mcdonnell is a 72 y.o. male who presented 1/28/2023 with progressively worsening shortness of breath for the last few days.  Patient has a history of longstanding tobacco use, CHF with reduced ejection fraction, atrial fibrillation on Coumadin.  States that he ran out of his medications several days ago and has not taken anything since then.  For the last few days, he reported progressively worsening shortness of breath, lower extremity swelling, cough without phlegm.  He denies fever chills nausea vomiting diarrhea abdominal pain. States that he smokes a pack of cigarettes a day for the last 30 years.  Has never been checked for COPD.      In the ED, patient found to be tachycardic.  Pertinent labs include neutrophil leukocytosis, hemoglobin 8.5, prerenal azotemia, non-anion gap metabolic acidosis, troponin 30, , INR 1.31.  Chest x-ray showing no acute cardiopulmonary abnormality. EKG showing atrial fibrillation with atypical left bundle branch block (not new).      Pt was admitted for a clinical impression of decompensated CHF from med non-compliant and chest pain observation. With elevated procal and leukocytosis, empiric treatment for CAP was also started.     Interval Problem Update  1/30/2023    Vitals remained stable, on room air saturating over 90  Labs reviewed, leukocytosis improved  Hemoglobin trending down,  Noted to have NIRMAL, elevated BUN/creatinine  Blood culture negative, procalcitonin mildly elevated  Positive occult blood feces    Patient does not clinically appear to be volume overloaded.  We will hold Lasix.    GI Dr Lozoya consulted for possible EGD  Monitor H&H closely  Continue on PPI  Continue on Rocephin and azithromycin,  Antibiotics can be discontinued tomorrow once 3 days  course of azithromycin completed as there is no pleural treatment for pneumonia        I have discussed this patient's plan of care and discharge plan at IDT rounds today with Case Management, Nursing, Nursing leadership, and other members of the IDT team.    Consultants/Specialty  GI    Code Status  Full Code    Disposition  Patient is not medically cleared for discharge.   Anticipate discharge to to home with close outpatient follow-up.  I have placed the appropriate orders for post-discharge needs.    Review of Systems  Review of Systems   Constitutional:  Positive for malaise/fatigue. Negative for fever.   HENT:  Negative for hearing loss.    Eyes:  Negative for blurred vision.   Respiratory:  Negative for cough and shortness of breath.    Cardiovascular:  Negative for chest pain.   Gastrointestinal:  Positive for melena. Negative for nausea and vomiting.   Genitourinary:  Negative for dysuria.   Musculoskeletal:  Negative for myalgias.   Skin:  Negative for rash.   Neurological:  Negative for dizziness.   Endo/Heme/Allergies:  Does not bruise/bleed easily.      Physical Exam  Temp:  [36.8 °C (98.2 °F)-37.1 °C (98.8 °F)] 36.9 °C (98.4 °F)  Pulse:  [] 90  Resp:  [18-19] 18  BP: ()/(50-68) 105/60  SpO2:  [90 %-98 %] 97 %    Physical Exam  Constitutional:       General: He is not in acute distress.  HENT:      Head: Normocephalic and atraumatic.      Right Ear: Tympanic membrane normal.      Left Ear: Tympanic membrane normal.      Nose: Nose normal.      Mouth/Throat:      Mouth: Mucous membranes are moist.   Eyes:      Extraocular Movements: Extraocular movements intact.      Pupils: Pupils are equal, round, and reactive to light.   Cardiovascular:      Rate and Rhythm: Normal rate and regular rhythm.      Pulses: Normal pulses.   Pulmonary:      Effort: Pulmonary effort is normal. No respiratory distress.   Abdominal:      General: Abdomen is flat. There is no distension.   Musculoskeletal:       Cervical back: Normal range of motion.      Right lower leg: Edema present.      Left lower leg: Edema present.   Skin:     General: Skin is warm.   Neurological:      General: No focal deficit present.      Mental Status: He is alert and oriented to person, place, and time. Mental status is at baseline.   Psychiatric:         Mood and Affect: Mood normal.       Fluids    Intake/Output Summary (Last 24 hours) at 1/30/2023 1014  Last data filed at 1/30/2023 0523  Gross per 24 hour   Intake 350 ml   Output 1100 ml   Net -750 ml       Laboratory  Recent Labs     01/29/23  0158 01/29/23  1105 01/30/23  0042   WBC 12.0* 11.5* 9.4   RBC 2.81* 2.74* 2.67*   HEMOGLOBIN 7.9* 7.7* 7.6*   HEMATOCRIT 25.8* 24.6* 24.4*   MCV 91.8 89.8 91.4   MCH 28.1 28.1 28.5   MCHC 30.6* 31.3* 31.1*   RDW 55.3* 53.9* 54.4*   PLATELETCT 142* 144* 141*   MPV 11.0 11.0 11.8     Recent Labs     01/28/23  2137 01/30/23  0042   SODIUM 145 142   POTASSIUM 4.3 3.7   CHLORIDE 113* 106   CO2 19* 25   GLUCOSE 124* 115*   BUN 52* 33*   CREATININE 1.24 1.44*   CALCIUM 8.7 8.5     Recent Labs     01/28/23  2137 01/30/23  0042   INR 1.31* 1.77*               Imaging  EC-ECHOCARDIOGRAM COMPLETE W/O CONT   Final Result      DX-CHEST-PORTABLE (1 VIEW)   Final Result      No acute cardiopulmonary abnormality.           Assessment/Plan  * Acute exacerbation of CHF (congestive heart failure) ([facility])  Assessment & Plan  Ran out of medication several days ago.  Has a history of EF 30% based on echo in April 2020.  Repeat echocardiogram with improved EF  Lasix IV (can likely transition to oral lasix on 1/30)  Restart home medications  Oxygen prn, keep O2 > 90%    1/30/2023  He looks euvolemic on exam with no Rales on auscultation, mild lower extremity edema which is chronic,  Hold Lasix      NIRMAL (acute kidney injury) ([facility])  Assessment & Plan  Likely prerenal  Hold Lasix and monitor  Avoid nephrotoxins, monitor inputs and outputs  Nephrology evaluation if renal function  continue to worsens      Upper GI bleed  Assessment & Plan    -Monitor CBC Q6H and transfuse as necessary  -Insert to wide pore peripheral IV accesses    -PPI therapy  -Avoid non-steroidal anti-inflammatory drugs, anti-platelets, anticoagulants  -GI consuled  for EGD   -N.p.o. to midnight             Tobacco use- (present on admission)  Assessment & Plan  Smokes a pack of cigarettes a day for the last 30 years.  Has never been checked for COPD.  Encouraged to follow-up with primary care doctor to be evaluated for COPD upon discharge.    Sepsis (HCC)  Assessment & Plan  This is Sepsis Present on admission  SIRS criteria identified on my evaluation include: Tachycardia, with heart rate greater than 90 BPM, Tachypnea, with respirations greater than 20 per minute and Leukocytosis, with WBC greater than 12,000  Source is Possible PNA  Sepsis protocol initiated  Fluid resuscitation ordered per protocol  IV antibiotics as appropriate for source of sepsis  Reassessment: I have reassessed the patient's hemodynamic status    Meet criteria for sepsis.  Leukocytosis and procalcitonin elevated.  Possible pneumonia.  Started Rocephin and azithromycin (would consider stopping early if cultures unrevealing)  Blood cultures.    Morbid obesity (HCC)  Assessment & Plan  Will need counseling when clinically appropriate    AF (atrial fibrillation) ([facility])  Assessment & Plan  Known history of atrial fibrillation, INR subtherapeutic    Hold Coumadin for upper GI bleed    Atrial fibrillation with rapid ventricular response (HCC)- (present on admission)  Assessment & Plan  Resume home medications    Anemia- (present on admission)  Assessment & Plan  Likely in setting of upper GI bleed  Positive occult blood feces  GI consulted for possible EGD           VTE prophylaxis: SCDs/TEDs  Chemical prophylaxis on hold given GI bleed    I have performed a physical exam and reviewed and updated ROS and Plan today (1/30/2023). In review of yesterday's  note (1/29/2023), there are no changes except as documented above.

## 2023-01-30 NOTE — CONSULTS
Date of Consultation:  1/30/2023    Patient: : Robret Mcdonnell  MRN: [id number]    Referring Physician:  Gregory Kingsley MD     GI:Remy Collazo M.D.     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 Patient was subsequently admitted for decompensated heart failure with IV diuresis and empiric treatment for community acquired pneumonia.    Patient states that they have not had any of their medications for the past month. States that on Saturday they began feeling dyspnea on exertion, lightheadedness on standing, and noticed episodes of melena. Yesterday patient was noted to have melena; FOBT positive. Hgb has downtrended from 8.5 on admission to 7.6 this morning. Hgb in the 14s in 2021. Denies nausea, vomiting, fever, chills, chest pain, shortness of breath, abdominal pain, or hematochezia.    Hgb 8.5 -> 7.6.   .   WBC 11.5 -> 9.4.   BUN 52 -> 33.   INR 1.31 -> 1.77.  Iron panel/ferritin wnl.    Tobacco use: 30py; 1ppd  Alcohol use: 2-3 beers per day  Illicit drug use: Denies    Last EGD: N/A  Last colonoscopy: N/A  NSAID/ASA use: Patient states uses ibuprofen 2-3 times per week.  Anticoagulation use: Patient states without warfarin for 1mo.       Past Medical History:   Diagnosis Date    CHF (congestive heart failure) (HCC)     Congestive heart failure (HCC)     Hypertension          No past surgical history on file.    Family History   Problem Relation Age of Onset    Heart Disease Mother     Heart Disease Father        Social History     Socioeconomic History    Marital status:    Tobacco Use    Smoking status: Every Day     Packs/day: 1.00     Types: Cigarettes    Smokeless tobacco: Never   Vaping Use    Vaping Use: Never used   Substance and Sexual Activity    Alcohol use: Yes      "Comment: occ    Drug use: Not Currently   Social History Narrative    ** Merged History Encounter **            Review of systems:  Review of Systems   Constitutional:  Negative for chills and fever.   Respiratory:  Negative for cough, hemoptysis and shortness of breath.    Cardiovascular:  Positive for leg swelling. Negative for chest pain.   Gastrointestinal:  Positive for melena. Negative for abdominal pain, blood in stool, constipation, diarrhea, heartburn, nausea and vomiting.   Genitourinary:  Negative for dysuria and hematuria.   Neurological:  Negative for dizziness.       Physical Exam:  Vitals:    01/30/23 0523 01/30/23 0748 01/30/23 1006 01/30/23 1226   BP: 91/50 105/60  106/59   Pulse: (!) 103 90  (!) 103   Resp: 19 18  18   Temp:  36.9 °C (98.4 °F)  37 °C (98.6 °F)   TempSrc: Temporal Temporal  Temporal   SpO2: 95% 97%  97%   Weight:   125 kg (275 lb 9.2 oz)    Height:   1.854 m (6' 0.99\")        Physical Exam  Constitutional:       General: He is not in acute distress.     Comments: Unkempt   HENT:      Head: Normocephalic and atraumatic.      Mouth/Throat:      Mouth: Mucous membranes are moist.      Pharynx: Oropharynx is clear. No oropharyngeal exudate or posterior oropharyngeal erythema.      Comments: Poor dentition  Eyes:      General: No scleral icterus.     Extraocular Movements: Extraocular movements intact.      Pupils: Pupils are equal, round, and reactive to light.   Cardiovascular:      Rate and Rhythm: Normal rate and regular rhythm.      Heart sounds: No murmur heard.    No friction rub. No gallop.   Pulmonary:      Effort: No respiratory distress.      Breath sounds: Normal breath sounds. No wheezing, rhonchi or rales.   Abdominal:      General: Bowel sounds are normal. There is no distension.      Palpations: Abdomen is soft. There is no mass.      Tenderness: There is no abdominal tenderness. There is no guarding or rebound.      Hernia: No hernia is present.   Musculoskeletal:        "  General: Swelling present. No tenderness.      Cervical back: No tenderness.      Right lower leg: Edema present.      Left lower leg: Edema present.      Comments: B/l LE 1+ pitting edema w/ venous stasis dermatitis   Lymphadenopathy:      Cervical: No cervical adenopathy.   Skin:     General: Skin is warm and dry.      Coloration: Skin is not jaundiced.   Neurological:      Mental Status: He is alert and oriented to person, place, and time.         Labs:  Recent Labs     01/29/23  1105 01/30/23  0042 01/30/23  1050   WBC 11.5* 9.4 8.9   RBC 2.74* 2.67* 2.68*   HEMOGLOBIN 7.7* 7.6* 7.7*   HEMATOCRIT 24.6* 24.4* 24.3*   MCV 89.8 91.4 90.7   MCH 28.1 28.5 28.7   MCHC 31.3* 31.1* 31.7*   RDW 53.9* 54.4* 53.7*   PLATELETCT 144* 141* 140*   MPV 11.0 11.8 11.6     Recent Labs     01/28/23 2137 01/30/23  0042   SODIUM 145 142   POTASSIUM 4.3 3.7   CHLORIDE 113* 106   CO2 19* 25   GLUCOSE 124* 115*   BUN 52* 33*     Recent Labs     01/28/23 2137 01/30/23  0042   INR 1.31* 1.77*       Recent Labs     01/28/23 2137 01/30/23 0042   ASTSGOT 25  --    ALTSGPT 15  --    TBILIRUBIN 1.0  --    ALKPHOSPHAT 63  --    GLOBULIN 2.4  --    INR 1.31* 1.77*         Imaging:  EC-ECHOCARDIOGRAM COMPLETE W/O CONT  Transthoracic  Echo Report    Echocardiography Laboratory    CONCLUSIONS  Compared to the images of the prior study 04/26/2020, there has been   improvement in ejection fraction, previously 30%  Left ventricular systolic function is mildly reduced.  The left ventricular ejection fraction is visually estimated to be 45%.  Global hypokinesis with akinesis of the basal inferior wall.  Diastolic function is difficult to assess with arrhythmia.  Normal right ventricular size and systolic function.  Mild mitral regurgitation.  Mild tricuspid regurgitation.    HUNTER STEELE  Exam Date:         01/29/2023                      07:59  Exam Location:     Inpatient  Priority:          Routine    Ordering Physician:        MAMI CORTÉS  LV  Referring Physician:       [id number] uRbin  Sonographer:               Mahsa CAMPBELL    Age:    72     Gender:    M  MRN:    [id number]  :    1950  BSA:    2.46   Ht (in):    73     Wt (lb):    275  Exam Type:     Complete    Indications:     Acute MI  ICD Codes:       410.9    CPT Codes:       24627    BP:   126    /   72     HR:   87  Technical Quality:       Fair    MEASUREMENTS  (Male / Female) Normal Values  2D ECHO  LV Diastolic Diameter PLAX        5 cm                  4.2 - 5.9 / 3.9 - 5.3   cm  LV Systolic Diameter PLAX         3.7 cm                2.1 - 4.0 cm  IVS Diastolic Thickness           1.2 cm                  LVPW Diastolic Thickness          1 cm                    LVOT Diameter                     2.2 cm                  Estimated LV Ejection Fraction    45 %                    LV Ejection Fraction MOD BP       50.5 %                >= 55  %  LV Ejection Fraction MOD 4C       55.5 %                  LV Ejection Fraction MOD 2C       48.1 %                    DOPPLER  AV Peak Velocity                  1.1 m/s                 AV Peak Gradient                  5.2 mmHg                AV Mean Gradient                  2.3 mmHg                LVOT Peak Velocity                0.67 m/s                AV Area Cont Eq vti               2 cm2                   Mitral E Point Velocity           0.65 m/s                TR Peak Velocity                  268 cm/s                  * Indicates values subject to auto-interpretation  LV EF:  45    %    FINDINGS  Left Ventricle  Normal left ventricular chamber size. Mild concentric left ventricular   hypertrophy. Left ventricular systolic function is mildly reduced. The   left ventricular ejection fraction is visually estimated to be 45%.   Global hypokinesis with akinesis of the basal inferior wall. Diastolic   function is difficult to assess with arrhythmia.    Right Ventricle  Normal right ventricular size and systolic function.    Right  Atrium  Dilated inferior vena cava with inspiratory collapse. Enlarged right   atrium.    Left Atrium  Mildly dilated left atrium.    Mitral Valve  Structurally normal mitral valve. No mitral stenosis. Mild mitral   regurgitation.    Aortic Valve  Structurally normal aortic valve without significant stenosis or   regurgitation. Tricuspid aortic valve.    Tricuspid Valve  Structurally normal tricuspid valve. No tricuspid stenosis. Mild   tricuspid regurgitation. Estimated right ventricular systolic pressure   is 35 mmHg. Right atrial pressure is estimated to be 8 mmHg.    Pulmonic Valve  Structurally normal pulmonic valve without significant stenosis or   regurgitation.    Pericardium  No pericardial effusion.    Aorta  Normal aortic root for body surface area. The ascending aorta diameter   is  cm. 3.6    Timothy Bullard MD  (Electronically Signed)  Final Date:     29 January 2023                   11:11            Impressions:  Patient is a 71yo male with chronic anticoagulation presenting with likely upper GI bleed. Patient Hgb in the 14s in 2021, with 8.5 on admission and further decrease to 7.6. Iron panel/ferritin wnl. Differential includes PUD (H. Pylori vs. NSAID), esophagitis, gastritis, variceal bleed, Abbi-I[place] tear, AVM, and malignancy. PUD, esophagitis, and gastritis higher on the differential for patient with alcohol use disorder and history of NSAID use. Patient unfortunately ate a full breakfast this morning, so NPO at midnight for EGD tomorrow.      Recommendations:  -NPO at midnight  -Continue to hold warfarin  -Transfuse for Hgb<7  -Continue pantoprazole 40mg IV bid  -Hold NSAIDs/ASA  -Plan for EGD tomorrow

## 2023-01-30 NOTE — PROGRESS NOTES
Hospital Medicine Daily Progress Note    Date of Service  1/29/2023    Chief Complaint  Robert Mcdonnell is a 72 y.o. male admitted 1/28/2023 with chest pain and SOB    Hospital Course  Robert Mcdonnell is a 72 y.o. male who presented 1/28/2023 with progressively worsening shortness of breath for the last few days.  Patient has a history of longstanding tobacco use, CHF with reduced ejection fraction, atrial fibrillation on Coumadin.  States that he ran out of his medications several days ago and has not taken anything since then.  For the last few days, he reported progressively worsening shortness of breath, lower extremity swelling, cough without phlegm.  He denies fever chills nausea vomiting diarrhea abdominal pain. States that he smokes a pack of cigarettes a day for the last 30 years.  Has never been checked for COPD.      In the ED, patient found to be tachycardic.  Pertinent labs include neutrophil leukocytosis, hemoglobin 8.5, prerenal azotemia, non-anion gap metabolic acidosis, troponin 30, , INR 1.31.  Chest x-ray showing no acute cardiopulmonary abnormality. EKG showing atrial fibrillation with atypical left bundle branch block (not new).     Pt was admitted for a clinical impression of decompensated CHF from med non-compliant and chest pain observation. With elevated procal and leukocytosis, empiric treatment for CAP was also started.      Interval Problem Update  1/29  Vitals reviewed; afebrile.  The rest of the vitals within normal parameters.  Pain scale reported - none    Overnight, noted downtrending Hb. No kristie bleeding but reported hx of dark colored stool - anti-coag were held.    At bedside, reported symptoms improvement in SOB. I explained the need to be compliant with meds to prevent exacerbation.     Echo done reviewed with improvement in LVEF to 45% (from 30%), global hypokinesis.   FOBT +  Anemia workup: iron, TIBC and Ferritin.     I have discussed this patient's plan of care  and discharge plan at IDT rounds today with Case Management, Nursing, Nursing leadership, and other members of the IDT team.    Consultants/Specialty  N/A    Code Status  Full Code    Disposition  Patient is not medically cleared for discharge.   Anticipate discharge to to home with close outpatient follow-up.  I have placed the appropriate orders for post-discharge needs.    Review of Systems  Review of Systems   Constitutional:  Positive for malaise/fatigue. Negative for chills and fever.   HENT:  Negative for congestion and sore throat.    Eyes:  Negative for blurred vision and double vision.   Respiratory:  Positive for shortness of breath. Negative for cough and sputum production.    Cardiovascular:  Positive for chest pain, orthopnea and leg swelling. Negative for palpitations and PND.   Gastrointestinal:  Negative for abdominal pain, heartburn, nausea and vomiting.   Genitourinary:  Negative for dysuria, frequency and urgency.   Musculoskeletal:  Negative for myalgias and neck pain.   Neurological:  Positive for weakness. Negative for dizziness, focal weakness and headaches.   Psychiatric/Behavioral:  Negative for depression.       Physical Exam  Temp:  [36.6 °C (97.8 °F)-37.1 °C (98.8 °F)] 36.8 °C (98.2 °F)  Pulse:  [] 96  Resp:  [18-20] 18  BP: ()/(52-74) 99/62  SpO2:  [90 %-100 %] 98 %    Physical Exam  Vitals and nursing note reviewed.   Constitutional:       General: He is not in acute distress.     Appearance: Normal appearance. He is not ill-appearing.   HENT:      Head: Normocephalic and atraumatic.      Mouth/Throat:      Mouth: Mucous membranes are moist.      Pharynx: Oropharynx is clear.   Eyes:      General: No scleral icterus.     Conjunctiva/sclera: Conjunctivae normal.   Cardiovascular:      Rate and Rhythm: Normal rate and regular rhythm.      Pulses: Normal pulses.      Heart sounds: Normal heart sounds.   Pulmonary:      Effort: Pulmonary effort is normal. No respiratory  distress.      Breath sounds: Rales present. No wheezing.   Abdominal:      General: Bowel sounds are normal. There is no distension.      Palpations: Abdomen is soft.      Tenderness: There is no abdominal tenderness.   Musculoskeletal:         General: No swelling or tenderness. Normal range of motion.      Right lower leg: Edema present.      Left lower leg: Edema present.   Skin:     General: Skin is warm and dry.      Capillary Refill: Capillary refill takes less than 2 seconds.   Neurological:      General: No focal deficit present.      Mental Status: He is alert and oriented to person, place, and time. Mental status is at baseline.   Psychiatric:         Mood and Affect: Mood normal.       Fluids    Intake/Output Summary (Last 24 hours) at 1/29/2023 2209  Last data filed at 1/29/2023 2000  Gross per 24 hour   Intake --   Output 1900 ml   Net -1900 ml       Laboratory  Recent Labs     01/28/23 2137 01/29/23  0158 01/29/23  1105   WBC 13.4* 12.0* 11.5*   RBC 2.99* 2.81* 2.74*   HEMOGLOBIN 8.5* 7.9* 7.7*   HEMATOCRIT 27.0* 25.8* 24.6*   MCV 90.3 91.8 89.8   MCH 28.4 28.1 28.1   MCHC 31.5* 30.6* 31.3*   RDW 53.8* 55.3* 53.9*   PLATELETCT 134* 142* 144*   MPV 11.4 11.0 11.0     Recent Labs     01/28/23 2137   SODIUM 145   POTASSIUM 4.3   CHLORIDE 113*   CO2 19*   GLUCOSE 124*   BUN 52*   CREATININE 1.24   CALCIUM 8.7     Recent Labs     01/28/23 2137   INR 1.31*               Imaging  EC-ECHOCARDIOGRAM COMPLETE W/O CONT   Final Result      DX-CHEST-PORTABLE (1 VIEW)   Final Result      No acute cardiopulmonary abnormality.           Assessment/Plan  * Acute exacerbation of CHF (congestive heart failure) (HCC)  Assessment & Plan  Ran out of medication several days ago.  Has a history of EF 30% based on echo in April 2020.  Repeat echocardiogram with improved EF  Lasix IV (can likely transition to oral lasix on 1/30)  Restart home medications  Oxygen prn, keep O2 > 90%      Tobacco use- (present on  admission)  Assessment & Plan  Smokes a pack of cigarettes a day for the last 30 years.  Has never been checked for COPD.  Encouraged to follow-up with primary care doctor to be evaluated for COPD upon discharge.    Sepsis ([facility])  Assessment & Plan  This is Sepsis Present on admission  SIRS criteria identified on my evaluation include: Tachycardia, with heart rate greater than 90 BPM, Tachypnea, with respirations greater than 20 per minute and Leukocytosis, with WBC greater than 12,000  Source is Possible PNA  Sepsis protocol initiated  Fluid resuscitation ordered per protocol  IV antibiotics as appropriate for source of sepsis  Reassessment: I have reassessed the patient's hemodynamic status    Meet criteria for sepsis.  Leukocytosis and procalcitonin elevated.  Possible pneumonia.  Started Rocephin and azithromycin (would consider stopping early if cultures unrevealing)  Blood cultures.    Morbid obesity ([facility])  Assessment & Plan  Will need counseling when clinically appropriate    AF (atrial fibrillation) ([facility])  Assessment & Plan  Known history of atrial fibrillation, INR subtherapeutic  On Coumadin; given 1 dose of Lovenox on admission to bridge    Atrial fibrillation with rapid ventricular response ([facility])- (present on admission)  Assessment & Plan  Resume home medications    Anemia- (present on admission)  Assessment & Plan  Acute on chronic  Iron, TIBC and ferritin wnl  FOBT +  With stable virals, no current indication for an endoscopic procedures unless continued downtrend  Will trend  If stablize, would consider restarting anti-coag         VTE prophylaxis: SCDs/TEDs    I have performed a physical exam and reviewed and updated ROS and Plan today (1/29/2023).

## 2023-01-30 NOTE — CARE PLAN
Problem: Pain - Standard  Goal: Alleviation of pain or a reduction in pain to the patient’s comfort goal  Outcome: Progressing     Problem: Hemodynamics  Goal: Patient's hemodynamics, fluid balance and neurologic status will be stable or improve  Outcome: Progressing     Problem: Respiratory  Goal: Patient will achieve/maintain optimum respiratory ventilation and gas exchange  Outcome: Progressing   The patient is Stable - Low risk of patient condition declining or worsening    Shift Goals  Clinical Goals: maintain respiratory status, monitor vitals  Patient Goals: rest    Progress made toward(s) clinical / shift goals:   Respiratory status remains stable, vitals have been stable

## 2023-01-30 NOTE — THERAPY
"Occupational Therapy   Initial Evaluation     Patient Name: Robert Mcdonnell  Age:  72 y.o., Sex:  male  Medical Record #: [id number]  Today's Date: 1/30/2023       Precautions: Fall Risk    Assessment  Patient is 72 y.o. male admitted for CP and SOB. PMHx: CHF, Afib, obesity, and tobacco dependence. This admission pt is dx w/acute exacerbation of CHF, NIRMAL, Upper GI bleed, and sepsis. At this time pt demonstrated ability to complete ADLs and mobility w/o assist. Pt reports no social supports may benefit from HH to maximize recovery after dc.     Plan  Occupational Therapy Initial Treatment Plan   Duration: Evaluation only  Patient will not be actively followed for occupational therapy services at this time, however may be seen if requested by physician for 1 more visit within 30 days to address any discharge or equipment needs.       DC Equipment Recommendations: None  Discharge Recommendations: Recommend home health for continued occupational therapy services     Subjective  \"I feel pretty normal\"      Objective     01/30/23 1026   Charge Group   OT Evaluation OT Evaluation Low   Total Time Spent   OT Time Spent Yes   OT Evaluation (Minutes) 15   OT Total Time Spent (Calculated) 15    Services   Is patient using  services for this encounter? No   Initial Contact Note    Initial Contact Note Order Received and Verified, Evaluation Only - Patient Does Not Require Further Acute Occupational Therapy at this Time.  However, May Benefit from Post Acute Therapy for Higher Level Functional Deficits.   Prior Living Situation   Prior Services Home-Independent   Housing / Facility 1 Story Apartment / Condo   Steps Into Home 0   Steps In Home 0   Bathroom Set up Bathtub / Shower Combination   Equipment Owned 4-Wheel Walker   Lives with - Patient's Self Care Capacity Alone and Able to Care For Self   Comments pt reports no social supports at dc   Prior Level of ADL Function   Self Feeding Independent "   Grooming / Hygiene Independent   Bathing Independent   Dressing Independent   Toileting Independent   Prior Level of IADL Function   Medication Management Independent   Laundry Independent   Kitchen Mobility Independent   Finances Independent   Home Management Independent   Shopping Independent   Prior Level Of Mobility Independent With Device in Community   Precautions   Precautions Fall Risk   Pain 0 - 10 Group   Therapist Pain Assessment 0;Nurse Notified   Cognition    Cognition / Consciousness WDL   Comments pleasant and cooperative   Passive ROM Upper Body   Passive ROM Upper Body WDL   Active ROM Upper Body   Active ROM Upper Body  WDL   Strength Upper Body   Upper Body Strength  WDL   Sensation Upper Body   Upper Extremity Sensation  WDL   Upper Body Muscle Tone   Upper Body Muscle Tone  Not Tested   Neurological Concerns   Neurological Concerns No   Coordination Upper Body   Coordination WDL   Balance Assessment   Sitting Balance (Static) Fair +   Sitting Balance (Dynamic) Fair   Standing Balance (Static) Fair   Standing Balance (Dynamic) Fair   Weight Shift Sitting Fair   Weight Shift Standing Fair   Comments w/fww   Bed Mobility    Supine to Sit Supervised   Sit to Supine Supervised   Scooting Supervised   ADL Assessment   Grooming Supervision;Standing   Upper Body Dressing Supervision   Lower Body Dressing Supervision   Toileting Supervision   How much help from another person does the patient currently need...   6 Clicks Daily Activity Score 24   Functional Mobility   Sit to Stand Supervised   Bed, Chair, Wheelchair Transfer Supervised   Toilet Transfers Standby Assist   Mobility walking in room w/fww   Edema / Skin Assessment   Edema / Skin  Not Assessed   Activity Tolerance   Comments no c/o pain or fatigue   Patient / Family Goals   Patient / Family Goal #1 to go home   Short Term Goals   Short Term Goal # 1 pt will have no further acute OT needs   Education Group   Role of Occupational Therapist  Patient Response Patient;Acceptance;Explanation;Demonstration   Occupational Therapy Initial Treatment Plan    Duration Evaluation only   Problem List   Problem List None   Anticipated Discharge Equipment and Recommendations   DC Equipment Recommendations None   Discharge Recommendations Recommend home health for continued occupational therapy services   Interdisciplinary Plan of Care Collaboration   IDT Collaboration with  Nursing   Patient Position at End of Therapy In Bed;Call Light within Reach;Tray Table within Reach;Phone within Reach   Collaboration Comments RN aware of OT eval and pts efforts   Session Information   Date / Session Number  1/30 #1 eval only  (dc needs)

## 2023-01-30 NOTE — ASSESSMENT & PLAN NOTE
Monitor CBC Q6H and transfuse as necessary  Insert to wide pore peripheral IV accesses    PPI therapy  Avoid non-steroidal anti-inflammatory drugs, anti-platelets, anticoagulants  GI following, see above

## 2023-01-30 NOTE — CARE PLAN
The patient is Watcher - Medium risk of patient condition declining or worsening    Shift Goals  Clinical Goals: vitals, hgb  Patient Goals: rest    Progress made toward(s) clinical / shift goals:      Patient is not progressing towards the following goals:

## 2023-01-30 NOTE — CARE PLAN
Problem: Pain - Standard  Goal: Alleviation of pain or a reduction in pain to the patient’s comfort goal  Outcome: Progressing     Problem: Knowledge Deficit - Standard  Goal: Patient and family/care givers will demonstrate understanding of plan of care, disease process/condition, diagnostic tests and medications  Outcome: Progressing     Problem: Hemodynamics  Goal: Patient's hemodynamics, fluid balance and neurologic status will be stable or improve  Outcome: Progressing     Problem: Fluid Volume  Goal: Fluid volume balance will be maintained  Outcome: Progressing     Problem: Urinary - Renal Perfusion  Goal: Ability to achieve and maintain adequate renal perfusion and functioning will improve  Outcome: Progressing     Problem: Respiratory  Goal: Patient will achieve/maintain optimum respiratory ventilation and gas exchange  Outcome: Progressing     Problem: Mechanical Ventilation  Goal: Safe management of artificial airway and ventilation  Outcome: Progressing  Goal: Successful weaning off mechanical ventilator, spontaneously maintains adequate gas exchange  Outcome: Progressing  Goal: Patient will be able to express needs and understand communication  Outcome: Progressing     Problem: Physical Regulation  Goal: Diagnostic test results will improve  Outcome: Progressing  Goal: Signs and symptoms of infection will decrease  Outcome: Progressing   The patient is Stable - Low risk of patient condition declining or worsening    Shift Goals  Clinical Goals: Monitor blood pressure  Patient Goals: Rest    Progress made toward(s) clinical / shift goals:  Patient blood pressure slightly improved but still hypotensive. Provider aware. Benzonatate prn given for cough. Lasix given, will monitor output.     Patient is not progressing towards the following goals:

## 2023-01-30 NOTE — PROGRESS NOTES
Inpatient Anticoagulation Service Note for 1/30/2023      Reason for Anticoagulation: Atrial Fibrillation   [id number] VASc Score: 3  HAS-BLED Score: 2    Hemoglobin Value: (!) 7.7  Hematocrit Value: (!) 24.3  Lab Platelet Value: (!) 140  Target INR: 2.0 to 3.0    INR from last 7 days       Date/Time INR Value    01/30/23 0042 1.77    01/28/23 2137 1.31          Dose from last 7 days       Date/Time Dose (mg)    01/30/23 1432 0    01/29/23 0019 11.5          Average Dose (mg): 7.5    Comments: Pt on warfarin 7.5mg daily at home for afib. Received 11.5mg once on admit (subtherapeutic INR and missed doses) + lovenox 120mg x 1. Warfarin is now held for positive fecal occult blood/possible GI bleed. INR remains subtherapeutic today at 1.77.  - Will continue to hold warfarin and follow for restart.     Education Material Provided?: No    Pharmacist suggested discharge dosing: likely home dose of 7.5mg daily. Recommend f/u INR within 48-72h of discharge.      Donna Nash, PharmD

## 2023-01-31 ENCOUNTER — PATIENT OUTREACH (OUTPATIENT)
Dept: SCHEDULING | Facility: IMAGING CENTER | Age: 73
End: 2023-01-31

## 2023-01-31 ENCOUNTER — APPOINTMENT (OUTPATIENT)
Dept: RADIOLOGY | Facility: MEDICAL CENTER | Age: 73
DRG: 871 | End: 2023-01-31
Attending: HOSPITALIST
Payer: COMMERCIAL

## 2023-01-31 ENCOUNTER — ANESTHESIA (OUTPATIENT)
Dept: SURGERY | Facility: MEDICAL CENTER | Age: 73
DRG: 871 | End: 2023-01-31
Payer: COMMERCIAL

## 2023-01-31 ENCOUNTER — ANESTHESIA EVENT (OUTPATIENT)
Dept: SURGERY | Facility: MEDICAL CENTER | Age: 73
DRG: 871 | End: 2023-01-31
Payer: COMMERCIAL

## 2023-01-31 LAB
ALBUMIN SERPL BCP-MCNC: 3.6 G/DL (ref 3.2–4.9)
ALBUMIN/GLOB SERPL: 1.4 G/DL
ALP SERPL-CCNC: 63 U/L (ref 30–99)
ALT SERPL-CCNC: 15 U/L (ref 2–50)
ANION GAP SERPL CALC-SCNC: 9 MMOL/L (ref 7–16)
AST SERPL-CCNC: 27 U/L (ref 12–45)
BASOPHILS # BLD AUTO: 0.4 % (ref 0–1.8)
BASOPHILS # BLD AUTO: 0.5 % (ref 0–1.8)
BASOPHILS # BLD: 0.03 K/UL (ref 0–0.12)
BASOPHILS # BLD: 0.04 K/UL (ref 0–0.12)
BILIRUB SERPL-MCNC: 0.5 MG/DL (ref 0.1–1.5)
BUN SERPL-MCNC: 20 MG/DL (ref 8–22)
CALCIUM ALBUM COR SERPL-MCNC: 8.6 MG/DL (ref 8.5–10.5)
CALCIUM SERPL-MCNC: 8.3 MG/DL (ref 8.5–10.5)
CHLORIDE SERPL-SCNC: 107 MMOL/L (ref 96–112)
CO2 SERPL-SCNC: 26 MMOL/L (ref 20–33)
CREAT SERPL-MCNC: 1.28 MG/DL (ref 0.5–1.4)
EOSINOPHIL # BLD AUTO: 0.16 K/UL (ref 0–0.51)
EOSINOPHIL # BLD AUTO: 0.17 K/UL (ref 0–0.51)
EOSINOPHIL # BLD AUTO: 0.21 K/UL (ref 0–0.51)
EOSINOPHIL # BLD AUTO: 0.22 K/UL (ref 0–0.51)
EOSINOPHIL NFR BLD: 1.9 % (ref 0–6.9)
EOSINOPHIL NFR BLD: 2.2 % (ref 0–6.9)
EOSINOPHIL NFR BLD: 3 % (ref 0–6.9)
EOSINOPHIL NFR BLD: 3.1 % (ref 0–6.9)
ERYTHROCYTE [DISTWIDTH] IN BLOOD BY AUTOMATED COUNT: 53.4 FL (ref 35.9–50)
ERYTHROCYTE [DISTWIDTH] IN BLOOD BY AUTOMATED COUNT: 54 FL (ref 35.9–50)
ERYTHROCYTE [DISTWIDTH] IN BLOOD BY AUTOMATED COUNT: 54.2 FL (ref 35.9–50)
ERYTHROCYTE [DISTWIDTH] IN BLOOD BY AUTOMATED COUNT: 55.1 FL (ref 35.9–50)
GFR SERPLBLD CREATININE-BSD FMLA CKD-EPI: 59 ML/MIN/1.73 M 2
GLOBULIN SER CALC-MCNC: 2.5 G/DL (ref 1.9–3.5)
GLUCOSE SERPL-MCNC: 97 MG/DL (ref 65–99)
HCT VFR BLD AUTO: 23.7 % (ref 42–52)
HCT VFR BLD AUTO: 23.7 % (ref 42–52)
HCT VFR BLD AUTO: 24.2 % (ref 42–52)
HCT VFR BLD AUTO: 24.2 % (ref 42–52)
HGB BLD-MCNC: 7.3 G/DL (ref 14–18)
HGB BLD-MCNC: 7.3 G/DL (ref 14–18)
HGB BLD-MCNC: 7.4 G/DL (ref 14–18)
HGB BLD-MCNC: 7.5 G/DL (ref 14–18)
IMM GRANULOCYTES # BLD AUTO: 0.02 K/UL (ref 0–0.11)
IMM GRANULOCYTES # BLD AUTO: 0.03 K/UL (ref 0–0.11)
IMM GRANULOCYTES # BLD AUTO: 0.04 K/UL (ref 0–0.11)
IMM GRANULOCYTES # BLD AUTO: 0.05 K/UL (ref 0–0.11)
IMM GRANULOCYTES NFR BLD AUTO: 0.3 % (ref 0–0.9)
IMM GRANULOCYTES NFR BLD AUTO: 0.4 % (ref 0–0.9)
IMM GRANULOCYTES NFR BLD AUTO: 0.5 % (ref 0–0.9)
IMM GRANULOCYTES NFR BLD AUTO: 0.6 % (ref 0–0.9)
INR PPP: 1.43 (ref 0.87–1.13)
LYMPHOCYTES # BLD AUTO: 1.23 K/UL (ref 1–4.8)
LYMPHOCYTES # BLD AUTO: 1.42 K/UL (ref 1–4.8)
LYMPHOCYTES # BLD AUTO: 1.46 K/UL (ref 1–4.8)
LYMPHOCYTES # BLD AUTO: 1.61 K/UL (ref 1–4.8)
LYMPHOCYTES NFR BLD: 14.6 % (ref 22–41)
LYMPHOCYTES NFR BLD: 18.2 % (ref 22–41)
LYMPHOCYTES NFR BLD: 21.7 % (ref 22–41)
LYMPHOCYTES NFR BLD: 21.8 % (ref 22–41)
MCH RBC QN AUTO: 27.9 PG (ref 27–33)
MCH RBC QN AUTO: 28.3 PG (ref 27–33)
MCH RBC QN AUTO: 28.5 PG (ref 27–33)
MCH RBC QN AUTO: 28.5 PG (ref 27–33)
MCHC RBC AUTO-ENTMCNC: 30.2 G/DL (ref 33.7–35.3)
MCHC RBC AUTO-ENTMCNC: 30.8 G/DL (ref 33.7–35.3)
MCHC RBC AUTO-ENTMCNC: 31 G/DL (ref 33.7–35.3)
MCHC RBC AUTO-ENTMCNC: 31.2 G/DL (ref 33.7–35.3)
MCV RBC AUTO: 91.2 FL (ref 81.4–97.8)
MCV RBC AUTO: 91.3 FL (ref 81.4–97.8)
MCV RBC AUTO: 92.4 FL (ref 81.4–97.8)
MCV RBC AUTO: 92.6 FL (ref 81.4–97.8)
MONOCYTES # BLD AUTO: 0.63 K/UL (ref 0–0.85)
MONOCYTES # BLD AUTO: 0.65 K/UL (ref 0–0.85)
MONOCYTES # BLD AUTO: 0.66 K/UL (ref 0–0.85)
MONOCYTES # BLD AUTO: 0.74 K/UL (ref 0–0.85)
MONOCYTES NFR BLD AUTO: 7.8 % (ref 0–13.4)
MONOCYTES NFR BLD AUTO: 8.5 % (ref 0–13.4)
MONOCYTES NFR BLD AUTO: 9.5 % (ref 0–13.4)
MONOCYTES NFR BLD AUTO: 9.7 % (ref 0–13.4)
NEUTROPHILS # BLD AUTO: 4.35 K/UL (ref 1.82–7.42)
NEUTROPHILS # BLD AUTO: 4.83 K/UL (ref 1.82–7.42)
NEUTROPHILS # BLD AUTO: 5.41 K/UL (ref 1.82–7.42)
NEUTROPHILS # BLD AUTO: 6.31 K/UL (ref 1.82–7.42)
NEUTROPHILS NFR BLD: 64.8 % (ref 44–72)
NEUTROPHILS NFR BLD: 65.7 % (ref 44–72)
NEUTROPHILS NFR BLD: 69.3 % (ref 44–72)
NEUTROPHILS NFR BLD: 74.7 % (ref 44–72)
NRBC # BLD AUTO: 0.03 K/UL
NRBC # BLD AUTO: 0.04 K/UL
NRBC BLD-RTO: 0.4 /100 WBC
NRBC BLD-RTO: 0.5 /100 WBC
PLATELET # BLD AUTO: 148 K/UL (ref 164–446)
PLATELET # BLD AUTO: 149 K/UL (ref 164–446)
PLATELET # BLD AUTO: 149 K/UL (ref 164–446)
PLATELET # BLD AUTO: 151 K/UL (ref 164–446)
PMV BLD AUTO: 11.1 FL (ref 9–12.9)
PMV BLD AUTO: 11.3 FL (ref 9–12.9)
PMV BLD AUTO: 11.5 FL (ref 9–12.9)
PMV BLD AUTO: 11.7 FL (ref 9–12.9)
POTASSIUM SERPL-SCNC: 3.7 MMOL/L (ref 3.6–5.5)
PROT SERPL-MCNC: 6.1 G/DL (ref 6–8.2)
PROTHROMBIN TIME: 17.2 SEC (ref 12–14.6)
RBC # BLD AUTO: 2.56 M/UL (ref 4.7–6.1)
RBC # BLD AUTO: 2.6 M/UL (ref 4.7–6.1)
RBC # BLD AUTO: 2.62 M/UL (ref 4.7–6.1)
RBC # BLD AUTO: 2.65 M/UL (ref 4.7–6.1)
SODIUM SERPL-SCNC: 142 MMOL/L (ref 135–145)
WBC # BLD AUTO: 6.7 K/UL (ref 4.8–10.8)
WBC # BLD AUTO: 7.4 K/UL (ref 4.8–10.8)
WBC # BLD AUTO: 7.8 K/UL (ref 4.8–10.8)
WBC # BLD AUTO: 8.4 K/UL (ref 4.8–10.8)

## 2023-01-31 PROCEDURE — 85025 COMPLETE CBC W/AUTO DIFF WBC: CPT | Mod: 91

## 2023-01-31 PROCEDURE — 700111 HCHG RX REV CODE 636 W/ 250 OVERRIDE (IP): Performed by: ANESTHESIOLOGY

## 2023-01-31 PROCEDURE — 160009 HCHG ANES TIME/MIN: Performed by: INTERNAL MEDICINE

## 2023-01-31 PROCEDURE — 71045 X-RAY EXAM CHEST 1 VIEW: CPT

## 2023-01-31 PROCEDURE — 160048 HCHG OR STATISTICAL LEVEL 1-5: Performed by: INTERNAL MEDICINE

## 2023-01-31 PROCEDURE — C9113 INJ PANTOPRAZOLE SODIUM, VIA: HCPCS | Performed by: STUDENT IN AN ORGANIZED HEALTH CARE EDUCATION/TRAINING PROGRAM

## 2023-01-31 PROCEDURE — 160035 HCHG PACU - 1ST 60 MINS PHASE I: Performed by: INTERNAL MEDICINE

## 2023-01-31 PROCEDURE — 99100 ANES PT EXTEME AGE<1 YR&>70: CPT | Performed by: ANESTHESIOLOGY

## 2023-01-31 PROCEDURE — 99222 1ST HOSP IP/OBS MODERATE 55: CPT | Performed by: INTERNAL MEDICINE

## 2023-01-31 PROCEDURE — 770020 HCHG ROOM/CARE - TELE (206)

## 2023-01-31 PROCEDURE — 85610 PROTHROMBIN TIME: CPT

## 2023-01-31 PROCEDURE — 80053 COMPREHEN METABOLIC PANEL: CPT

## 2023-01-31 PROCEDURE — 00731 ANES UPR GI NDSC PX NOS: CPT | Performed by: ANESTHESIOLOGY

## 2023-01-31 PROCEDURE — 99233 SBSQ HOSP IP/OBS HIGH 50: CPT | Performed by: HOSPITALIST

## 2023-01-31 PROCEDURE — 700105 HCHG RX REV CODE 258: Performed by: ANESTHESIOLOGY

## 2023-01-31 PROCEDURE — 93005 ELECTROCARDIOGRAM TRACING: CPT | Performed by: STUDENT IN AN ORGANIZED HEALTH CARE EDUCATION/TRAINING PROGRAM

## 2023-01-31 PROCEDURE — 0DJ08ZZ INSPECTION OF UPPER INTESTINAL TRACT, VIA NATURAL OR ARTIFICIAL OPENING ENDOSCOPIC: ICD-10-PCS | Performed by: INTERNAL MEDICINE

## 2023-01-31 PROCEDURE — 43235 EGD DIAGNOSTIC BRUSH WASH: CPT | Performed by: INTERNAL MEDICINE

## 2023-01-31 PROCEDURE — 160202 HCHG ENDO MINUTES - 1ST 30 MINS LEVEL 3: Performed by: INTERNAL MEDICINE

## 2023-01-31 PROCEDURE — 36415 COLL VENOUS BLD VENIPUNCTURE: CPT

## 2023-01-31 PROCEDURE — 160002 HCHG RECOVERY MINUTES (STAT): Performed by: INTERNAL MEDICINE

## 2023-01-31 PROCEDURE — 700111 HCHG RX REV CODE 636 W/ 250 OVERRIDE (IP): Performed by: STUDENT IN AN ORGANIZED HEALTH CARE EDUCATION/TRAINING PROGRAM

## 2023-01-31 RX ORDER — SODIUM CHLORIDE, SODIUM LACTATE, POTASSIUM CHLORIDE, CALCIUM CHLORIDE 600; 310; 30; 20 MG/100ML; MG/100ML; MG/100ML; MG/100ML
INJECTION, SOLUTION INTRAVENOUS
Status: DISCONTINUED | OUTPATIENT
Start: 2023-01-31 | End: 2023-01-31 | Stop reason: SURG

## 2023-01-31 RX ORDER — HALOPERIDOL 5 MG/ML
1 INJECTION INTRAMUSCULAR
Status: DISCONTINUED | OUTPATIENT
Start: 2023-01-31 | End: 2023-01-31 | Stop reason: HOSPADM

## 2023-01-31 RX ORDER — ONDANSETRON 2 MG/ML
4 INJECTION INTRAMUSCULAR; INTRAVENOUS
Status: DISCONTINUED | OUTPATIENT
Start: 2023-01-31 | End: 2023-01-31 | Stop reason: HOSPADM

## 2023-01-31 RX ORDER — SODIUM CHLORIDE, SODIUM LACTATE, POTASSIUM CHLORIDE, CALCIUM CHLORIDE 600; 310; 30; 20 MG/100ML; MG/100ML; MG/100ML; MG/100ML
INJECTION, SOLUTION INTRAVENOUS CONTINUOUS
Status: DISCONTINUED | OUTPATIENT
Start: 2023-01-31 | End: 2023-01-31 | Stop reason: HOSPADM

## 2023-01-31 RX ORDER — PHENYLEPHRINE HYDROCHLORIDE 10 MG/ML
INJECTION, SOLUTION INTRAMUSCULAR; INTRAVENOUS; SUBCUTANEOUS PRN
Status: DISCONTINUED | OUTPATIENT
Start: 2023-01-31 | End: 2023-01-31 | Stop reason: SURG

## 2023-01-31 RX ORDER — DIPHENHYDRAMINE HYDROCHLORIDE 50 MG/ML
12.5 INJECTION INTRAMUSCULAR; INTRAVENOUS
Status: DISCONTINUED | OUTPATIENT
Start: 2023-01-31 | End: 2023-01-31 | Stop reason: HOSPADM

## 2023-01-31 RX ADMIN — PROPOFOL 150 MG: 10 INJECTION, EMULSION INTRAVENOUS at 08:32

## 2023-01-31 RX ADMIN — AZITHROMYCIN FOR INJECTION INJECTION, POWDER, LYOPHILIZED, FOR SOLUTION 500 MG: 500 INJECTION INTRAVENOUS at 06:24

## 2023-01-31 RX ADMIN — PHENYLEPHRINE HYDROCHLORIDE 100 MCG: 10 INJECTION INTRAVENOUS at 07:59

## 2023-01-31 RX ADMIN — PHENYLEPHRINE HYDROCHLORIDE 100 MCG: 10 INJECTION INTRAVENOUS at 08:08

## 2023-01-31 RX ADMIN — CEFTRIAXONE SODIUM 2000 MG: 10 INJECTION, POWDER, FOR SOLUTION INTRAVENOUS at 06:20

## 2023-01-31 RX ADMIN — SODIUM CHLORIDE, POTASSIUM CHLORIDE, SODIUM LACTATE AND CALCIUM CHLORIDE: 600; 310; 30; 20 INJECTION, SOLUTION INTRAVENOUS at 08:41

## 2023-01-31 RX ADMIN — PANTOPRAZOLE SODIUM 40 MG: 40 INJECTION, POWDER, FOR SOLUTION INTRAVENOUS at 06:20

## 2023-01-31 RX ADMIN — PHENYLEPHRINE HYDROCHLORIDE 100 MCG: 10 INJECTION INTRAVENOUS at 08:02

## 2023-01-31 RX ADMIN — PANTOPRAZOLE SODIUM 40 MG: 40 INJECTION, POWDER, FOR SOLUTION INTRAVENOUS at 17:19

## 2023-01-31 RX ADMIN — PHENYLEPHRINE HYDROCHLORIDE 100 MCG: 10 INJECTION INTRAVENOUS at 08:05

## 2023-01-31 ASSESSMENT — ENCOUNTER SYMPTOMS
FOCAL WEAKNESS: 0
DIAPHORESIS: 0
BLURRED VISION: 0
WEAKNESS: 0
COUGH: 0
DIZZINESS: 0
PALPITATIONS: 0
RESPIRATORY NEGATIVE: 1
ABDOMINAL PAIN: 0
MYALGIAS: 0
PSYCHIATRIC NEGATIVE: 1
BRUISES/BLEEDS EASILY: 0
DEPRESSION: 0
EYES NEGATIVE: 1
FEVER: 0
SHORTNESS OF BREATH: 0
SORE THROAT: 0
MUSCULOSKELETAL NEGATIVE: 1
CARDIOVASCULAR NEGATIVE: 1
NAUSEA: 0
HEADACHES: 0
VOMITING: 0
WEIGHT LOSS: 0
NEUROLOGICAL NEGATIVE: 1
CHILLS: 0

## 2023-01-31 ASSESSMENT — LIFESTYLE VARIABLES: SUBSTANCE_ABUSE: 0

## 2023-01-31 ASSESSMENT — FIBROSIS 4 INDEX: FIB4 SCORE: 3.32

## 2023-01-31 NOTE — DIETARY
Nutrition Services: Heart Failure Diet Education Consult   Day 2 of admit.  Robert Mcdonnell is a 72 y.o. male with admitting DX of Fluid overload [E87.70]  Acute congestive heart failure, unspecified heart failure type (HCC) [I50.9]    RD able to visit pt at bedside to provide heart failure diet education. Pt declined RD teaching, but accepted [facility] CHF educational booklet. Pt denies questions at this time. RD encouraged pt to request RD follow up if any questions arise during admit.     No other education needs identified at this time. Consider referral to outpatient nutrition services for continuation of education as indicated or per pt preferences.     Please re-consult RD as indicated.

## 2023-01-31 NOTE — PROCEDURES
OPERATIVE REPORT    PATIENT:   Robert Mcdonnell   1950       PREOPERATIVE DIAGNOSES/INDICATIONS: History of melena, anemia    POSTOPERATIVE DIAGNOSIS: Incomplete exam (se anesthesia note), nonbleeding single gastric angioectasia    PROCEDURE:  ESOPHAGOGASTRODUODENOSCOPY    PHYSICIAN:  oJy Mcnair MD    ANESTHESIA:  Per anesthesiologist.    LOCATION: [facility]    CONSENT:  OBTAINED. The risks, benefits and alternatives of the procedure were discussed in details. The risks include and are not limited to bleeding, infection, perforation, missed lesions, and sedations risks (cardiopulmonary compromise and allergic reaction to medications).    DESCRIPTION: The patient presented to the procedure room.  After routine checkup was performed, patient was brought into the endoscopy suite.  Patient was placed on his left lateral decubitus position. A bite block was placed in patient's mouth. Patient was sedated by anesthesia.  Vital signs were monitored throughout the procedure.  Oxygenation support was provided throughout the procedure. Upper endoscope was inserted into patient's mouth and advanced to the second portion of the duodenum under direct visualization.      Once the site was reached and examined, the upper endoscope was withdrawn.  Retroflexion was made within the stomach.  The stomach was decompressed, scope was withdrawn and the procedure was terminated.     The patient tolerated the procedure well.  There were no immediate complications.    OPERATIVE FINDINGS:    1. Esophagus: normal  2. Stomach: no blood in lumen, 2mm red spot and probable nonbleeding angioectasia.  At this point, patient went into rapid afib 160's and then bradycardia to 30's.  Scope removed and procedure aborted.  Please see anethesthia note.  3. Duodenum: not visualized    RECOMMENDATIONS:  Patient not a candidate for anesthesia at this time.  Recommend supportive care and will need to review with patient about medications such as  aspirin or other NSAIDs.  Will also review alcohol consumption.  Full liquid diet today and adv as long as hemoglobin stable.

## 2023-01-31 NOTE — PROGRESS NOTES
Hospital Medicine Daily Progress Note    Date of Service  1/31/2023    Chief Complaint  Robert Mcdonnell is a 72 y.o. male admitted 1/28/2023 with chest pain and shortness of breath    Hospital Course    Robert Mcdonnell is a 72 y.o. male who presented 1/28/2023 with progressively worsening shortness of breath. He has a history of longstanding tobacco use, CHF with reduced ejection fraction, atrial fibrillation on Coumadin. He reportedly ran out of his medications several days prior to admission and had not taken anything since then. He denied fever, chills, nausea , vomiting,diarrhea  and abdominal pain. States that he smokes a pack of cigarettes a day for the last 30 years. He reported he had never been checked for COPD.      In the ED, patient found to be tachycardic.  Pertinent labs include neutrophil leukocytosis, hemoglobin 8.5, prerenal azotemia, non-anion gap metabolic acidosis, troponin 30, , INR 1.31.  Chest x-ray showing no acute cardiopulmonary abnormality. EKG showing atrial fibrillation with atypical left bundle branch block (not new).      Pt was admitted for a suspected decompensated CHF from med non-compliant and chest pain observation. With elevated procal and leukocytosis, empiric treatment for CAP was also started. He was also found to be anemia with occult positive stool. Dr Lozoya of GI was consulted and PPI started.     Interval Problem Update  Axox3, went for EGD and procedure was aborted, reportedly as the scope began he became tachy in the 170's then dropped to 30's. Currently he is stable hr 80, on 2L, cxr clear. He was sedated for the procedure and does not recall the events. Axox3, denies pain, discussed with cardiology they will come eval him      I have discussed this patient's plan of care and discharge plan at IDT rounds today with Case Management, Nursing, Nursing leadership, and other members of the IDT team.    Consultants/Specialty  GI  Cardiology Viry Cavazos  Status  Full Code    Disposition  Patient is not medically cleared for discharge.   Anticipate discharge to to home with close outpatient follow-up.  I have placed the appropriate orders for post-discharge needs.    Review of Systems  Review of Systems   Constitutional:  Positive for malaise/fatigue. Negative for chills, diaphoresis, fever and weight loss.   HENT: Negative.  Negative for hearing loss and sore throat.    Eyes: Negative.  Negative for blurred vision.   Respiratory: Negative.  Negative for cough and shortness of breath.    Cardiovascular: Negative.  Negative for chest pain, palpitations and leg swelling.   Gastrointestinal:  Positive for melena. Negative for abdominal pain, nausea and vomiting.   Genitourinary: Negative.  Negative for dysuria.   Musculoskeletal: Negative.  Negative for myalgias.   Skin: Negative.  Negative for itching and rash.   Neurological: Negative.  Negative for dizziness, focal weakness, weakness and headaches.   Endo/Heme/Allergies: Negative.  Does not bruise/bleed easily.   Psychiatric/Behavioral: Negative.  Negative for depression, substance abuse and suicidal ideas.    All other systems reviewed and are negative.     Physical Exam  Temp:  [36.5 °C (97.7 °F)-37.2 °C (99 °F)] 36.7 °C (98.1 °F)  Pulse:  [] 93  Resp:  [16-25] 16  BP: ()/(55-86) 127/60  SpO2:  [92 %-100 %] 100 %    Physical Exam  Vitals and nursing note reviewed. Exam conducted with a chaperone present.   Constitutional:       General: He is not in acute distress.     Appearance: Normal appearance. He is not diaphoretic.   HENT:      Head: Normocephalic and atraumatic.      Right Ear: Tympanic membrane normal.      Left Ear: Tympanic membrane normal.      Nose: Nose normal.      Mouth/Throat:      Mouth: Mucous membranes are moist.   Eyes:      Extraocular Movements: Extraocular movements intact.      Pupils: Pupils are equal, round, and reactive to light.   Cardiovascular:      Rate and Rhythm: Normal  rate and regular rhythm.      Pulses: Normal pulses.      Heart sounds: Normal heart sounds.   Pulmonary:      Effort: Pulmonary effort is normal. No respiratory distress.      Breath sounds: Normal breath sounds.   Abdominal:      General: Abdomen is flat. Bowel sounds are normal. There is no distension.      Palpations: Abdomen is soft.   Musculoskeletal:         General: No swelling or deformity. Normal range of motion.      Cervical back: Normal range of motion.      Right lower leg: Edema present.      Left lower leg: Edema present.   Skin:     General: Skin is warm and dry.      Capillary Refill: Capillary refill takes less than 2 seconds.   Neurological:      General: No focal deficit present.      Mental Status: He is alert and oriented to person, place, and time. Mental status is at baseline.      Cranial Nerves: No cranial nerve deficit.   Psychiatric:         Mood and Affect: Mood normal.         Behavior: Behavior normal.       Fluids    Intake/Output Summary (Last 24 hours) at 1/31/2023 1437  Last data filed at 1/31/2023 1136  Gross per 24 hour   Intake 220 ml   Output 500 ml   Net -280 ml       Laboratory  Recent Labs     01/30/23 2248 01/31/23  0431 01/31/23  1022   WBC 7.8 7.4 8.4   RBC 2.56* 2.60* 2.65*   HEMOGLOBIN 7.4* 7.4* 7.5*   HEMATOCRIT 23.8* 23.7* 24.2*   MCV 93.0 91.2 91.3   MCH 28.9 28.5 28.3   MCHC 31.1* 31.2* 31.0*   RDW 54.7* 53.4* 54.0*   PLATELETCT 142* 148* 149*   MPV 11.6 11.7 11.1     Recent Labs     01/28/23 2137 01/30/23  0042 01/31/23  0431   SODIUM 145 142 142   POTASSIUM 4.3 3.7 3.7   CHLORIDE 113* 106 107   CO2 19* 25 26   GLUCOSE 124* 115* 97   BUN 52* 33* 20   CREATININE 1.24 1.44* 1.28   CALCIUM 8.7 8.5 8.3*     Recent Labs     01/28/23 2137 01/30/23  0042 01/31/23  0431   INR 1.31* 1.77* 1.43*               Imaging  DX-CHEST-PORTABLE (1 VIEW)   Final Result      No acute cardiac or pulmonary abnormalities are identified.      EC-ECHOCARDIOGRAM COMPLETE W/O CONT   Final  Result      DX-CHEST-PORTABLE (1 VIEW)   Final Result      No acute cardiopulmonary abnormality.           Assessment/Plan  * Acute exacerbation of CHF (congestive heart failure) ([facility])  Assessment & Plan  Ran out of medication several days ago.  Has a history of EF 30% based on echo in April 2020.  Repeat echocardiogram with improved EF  Reportedly acute exacerbation on admit - currently euvolemic  following      NIRMAL (acute kidney injury) ([facility])  Assessment & Plan  Likely prerenal  Resolved  following      Upper GI bleed  Assessment & Plan    Monitor CBC Q6H and transfuse as necessary  Insert to wide pore peripheral IV accesses    PPI therapy  Avoid non-steroidal anti-inflammatory drugs, anti-platelets, anticoagulants  GI following, see above             Tobacco use- (present on admission)  Assessment & Plan  Smokes a pack of cigarettes a day for the last 30 years.  Has never been checked for COPD.  Encouraged to follow-up with primary care doctor to be evaluated for COPD upon discharge.    Sepsis ([facility])  Assessment & Plan  This is Sepsis Present on admission  SIRS criteria identified on my evaluation include: Tachycardia, with heart rate greater than 90 BPM, Tachypnea, with respirations greater than 20 per minute and Leukocytosis, with WBC greater than 12,000  Source is Possible PNA  Sepsis protocol initiated  Fluid resuscitation ordered per protocol  IV antibiotics as appropriate for source of sepsis  Reassessment: I have reassessed the patient's hemodynamic status    Resolving  Bc so far negative  Leukocytosis resolved  Empiric abx stopped  following    Morbid obesity ([facility])  Assessment & Plan  Will need counseling when clinically appropriate    AF (atrial fibrillation) ([facility])  Assessment & Plan  Known history of atrial fibrillation, INR subtherapeutic    Hold Coumadin for upper GI bleed    Chronic systolic heart failure ([facility])- (present on admission)  Assessment & Plan  No evidence of acute  exacerbation  Following  Continue aldactone, demadex etc as tolerated    Atrial fibrillation with rapid ventricular response (HCC)- (present on admission)  Assessment & Plan  Query SSS  Tachy up to 170s then 30's - see above  Cardiology to eval   Continue BB as tolerated   coumdadin held for possible gi bleed  following      Acute hypoxemic respiratory failure (HCC)- (present on admission)  Assessment & Plan  Now resolved  cxr clear  Suspect was due to atelectasis during EGD  following    Tobacco dependence- (present on admission)  Assessment & Plan  Cessation discussed and recommended    Anemia- (present on admission)  Assessment & Plan  Likely in setting of upper GI bleed  Positive occult blood feces  Transfuse if hbg less 7   Hold coumadin  EGD cancelled - see above           VTE prophylaxis: SCDs/TEDs  Chemical prophylaxis on hold given GI bleed    I have performed a physical exam and reviewed and updated ROS and Plan today (1/31/2023). In review of yesterday's note (1/30/2023), there are no changes except as documented above.

## 2023-01-31 NOTE — OR NURSING
0821 Patient arrived to PACU from OR. Report received. Patient attached to monitoring. VSS. Patient oxygenating well on 6 L via mask.     0847 Report given to Lauren BHATIA and JANNETTE lugo. All questions answered.     0900 Patient then transferred back to room via [facility]. Oxygen and monitors attached. Patient accompanied by two Rns. Patient returned safetly back to room.

## 2023-01-31 NOTE — CARE PLAN
Problem: Pain - Standard  Goal: Alleviation of pain or a reduction in pain to the patient’s comfort goal  Outcome: Progressing     Problem: Knowledge Deficit - Standard  Goal: Patient and family/care givers will demonstrate understanding of plan of care, disease process/condition, diagnostic tests and medications  Outcome: Progressing     Problem: Hemodynamics  Goal: Patient's hemodynamics, fluid balance and neurologic status will be stable or improve  Outcome: Progressing     Problem: Fluid Volume  Goal: Fluid volume balance will be maintained  Outcome: Progressing     Problem: Urinary - Renal Perfusion  Goal: Ability to achieve and maintain adequate renal perfusion and functioning will improve  Outcome: Progressing     Problem: Respiratory  Goal: Patient will achieve/maintain optimum respiratory ventilation and gas exchange  Outcome: Progressing     Problem: Mechanical Ventilation  Goal: Safe management of artificial airway and ventilation  Outcome: Progressing  Goal: Successful weaning off mechanical ventilator, spontaneously maintains adequate gas exchange  Outcome: Progressing  Goal: Patient will be able to express needs and understand communication  Outcome: Progressing     Problem: Physical Regulation  Goal: Diagnostic test results will improve  Outcome: Progressing  Goal: Signs and symptoms of infection will decrease  Outcome: Progressing   The patient is Stable - Low risk of patient condition declining or worsening    Shift Goals  Clinical Goals: vitals stable, monitor labs  Patient Goals: rest, comfort  Family Goals: MARCOS    Progress made toward(s) clinical / shift goals:  Patient NPO for EGD today. Patient rested comfortably overnight.    Patient is not progressing towards the following goals:

## 2023-01-31 NOTE — ANESTHESIA TIME REPORT
Anesthesia Start and Stop Event Times     Date Time Event    1/31/2023 0741 Ready for Procedure     0757 Anesthesia Start     0841 Anesthesia Stop        Responsible Staff  01/31/23    Name Role Begin End    Kuldeep Barajas M.D. Anesth 0757 0841        Overtime Reason:  no overtime (within assigned shift)    Comments:

## 2023-01-31 NOTE — ANESTHESIA POSTPROCEDURE EVALUATION
Patient: Robert Mcdonnell    Procedure Summary     Date: 01/31/23 Room / Location: [facility] ROOM 26 / SURGERY SAME DAY [facility]    Anesthesia Start: 0757 Anesthesia Stop: 0841    Procedure: GASTROSCOPY (Esophagus) Diagnosis: (gastric AVMs)    Surgeons: Joy Mcnair M.D. Responsible Provider: Kuldeep Barajas M.D.    Anesthesia Type: MAC ASA Status: 3          Final Anesthesia Type: MAC  Last vitals  BP   Blood Pressure : (!) 171/86    Temp   36.9 °C (98.4 °F)    Pulse   83   Resp   (!) 24    SpO2   95 %      Anesthesia Post Evaluation    Patient location during evaluation: PACU  Patient participation: complete - patient participated  Level of consciousness: awake and alert    Airway patency: patent  Anesthetic complications: no  Cardiovascular status: hemodynamically stable  Respiratory status: acceptable  Hydration status: euvolemic    PONV: none          There were no known notable events for this encounter.     Nurse Pain Score: 0 (NPRS)

## 2023-01-31 NOTE — HEART FAILURE PROGRAM
AF warfarin is being held for GI bleed. Patient has a history of EF of 30% which improved to 45%. He's had no contact with cardiology in system, other than during an admission in 2020.    It looks like pt is being treated for GI Bleed and he is noted to be in decompensated heart failure.     Notes indicating that patient is an active nicotine user but no one has documented cessation counseling only that he should be checked for COPD.    Previous pneumococcal   Influenza indicated per admit navigator but it's not on th MAR, messaged Pharmacist and RN.      Nurses: Please document HF education in the education tab and populate the HF Care Plan. These tools will guide day to day care of the HF patient.    Providers: below are Guideline Directed Medical Therapy (GDMT) for HFrEF. If any cannot be prescribed by discharge, would you please note the clinical reason for each in your discharge summary? Thanks! Francisca  Evidence Based Beta Blocker (bisoprolol, carvedilol, or toprol xl), for EF of 40% or less  CLEMENTINA - I, for EF of 40% or less ARNI is preferred If not cost prohibitive for patient  SGLT2 inhibitor with proven ASCVD, HF, or DKD benefit Jardiance/empagliflozin): If not cost prohibitive for patient  Aldosterone antagonist, for EF of 40% or less  Anticoagulation for atrial arrhythmia, if applicable  Glycemic control for DM + HF, if applicable  Lipid lowering medication for DM + HF, if applicable  Hydralazine Hydrochloride/Isosorbide Dinitrate. The combination of hydralazine and isosorbide- dinitrate is recommended to reduce morbidity and mortality for patients self-described  Americans with NYHA class III-IV HFrEF (EF 40% or less), receiving optimal therapy with ACE inhibitors and beta blockers, unless contraindicated (Class I, MARY: A).  Pneumococcal vaccine, if not previously received  Influenza vaccine for current season, if not previously received  Nicotine cessation counseling documented, if applicable  Device  screening, if applicable  Referral to disease management program specializing in heart failure care- requested that schedulers notify me if patient cannot be scheduled at the Heart Failure Clinic  Referral to Cardiac Rehab: Patient Criteria: Stable, chronic heart failure patients who can receive Medicare coverage are defined as patients with left ventricular ejection fraction of 35% or less and New York Heart Association (NYHA) Class II to IV symptoms on optimal heart failure therapy for at least six weeks.  7 calendar day f/u appointment scheduled prior to discharge, appearing on signed after visit summary.      T709  HUNTER STEELE  : 50  Hi, Dr. Kingsley. We are missing documentation of nicotine cessation counseling for Mr. Steele. Would you be able to document that, please?

## 2023-01-31 NOTE — ANESTHESIA PREPROCEDURE EVALUATION
Case: [id number] Date/Time: 01/31/23 0815    Procedure: GASTROSCOPY (Esophagus)    Anesthesia type: General    Pre-op diagnosis: upper GI bleed    Location: [facility] ROOM 26 / SURGERY SAME DAY [facility]    Surgeons: Joy Mcnair M.D.          Relevant Problems   CARDIAC   (positive) AF (atrial fibrillation) ([facility])   (positive) Acute congestive heart failure, unspecified heart failure type ([facility])   (positive) Acute exacerbation of CHF (congestive heart failure) ([facility])   (positive) Atrial fibrillation with rapid ventricular response ([facility])   (positive) Hypertension         (positive) NIRMAL (acute kidney injury) ([facility])       Physical Exam    Airway   Mallampati: II  TM distance: >3 FB  Neck ROM: full       Cardiovascular - normal exam  Rhythm: regular  Rate: normal  (-) murmur     Dental - normal exam           Pulmonary - normal exam  Breath sounds clear to auscultation     Abdominal    Neurological - normal exam                 Anesthesia Plan    ASA 3   ASA physical status 3 criteria: moderate reduction of ejection fraction    Plan - MAC               Induction: intravenous    Postoperative Plan: Postoperative administration of opioids is intended.    Pertinent diagnostic labs and testing reviewed    Informed Consent:    Anesthetic plan and risks discussed with patient.    Use of blood products discussed with: patient whom consented to blood products.

## 2023-02-01 DIAGNOSIS — R00.1 BRADYCARDIA: ICD-10-CM

## 2023-02-01 DIAGNOSIS — I48.91 ATRIAL FIBRILLATION WITH RAPID VENTRICULAR RESPONSE (HCC): Chronic | ICD-10-CM

## 2023-02-01 DIAGNOSIS — I48.19 PERSISTENT ATRIAL FIBRILLATION (HCC): ICD-10-CM

## 2023-02-01 LAB
ANION GAP SERPL CALC-SCNC: 11 MMOL/L (ref 7–16)
BASOPHILS # BLD AUTO: 0.3 % (ref 0–1.8)
BASOPHILS # BLD AUTO: 0.3 % (ref 0–1.8)
BASOPHILS # BLD AUTO: 0.4 % (ref 0–1.8)
BASOPHILS # BLD AUTO: 0.6 % (ref 0–1.8)
BASOPHILS # BLD: 0.02 K/UL (ref 0–0.12)
BASOPHILS # BLD: 0.02 K/UL (ref 0–0.12)
BASOPHILS # BLD: 0.03 K/UL (ref 0–0.12)
BASOPHILS # BLD: 0.04 K/UL (ref 0–0.12)
BUN SERPL-MCNC: 14 MG/DL (ref 8–22)
CALCIUM SERPL-MCNC: 8.3 MG/DL (ref 8.5–10.5)
CHLORIDE SERPL-SCNC: 105 MMOL/L (ref 96–112)
CO2 SERPL-SCNC: 23 MMOL/L (ref 20–33)
CREAT SERPL-MCNC: 1.22 MG/DL (ref 0.5–1.4)
EKG IMPRESSION: NORMAL
EKG IMPRESSION: NORMAL
EOSINOPHIL # BLD AUTO: 0.2 K/UL (ref 0–0.51)
EOSINOPHIL # BLD AUTO: 0.22 K/UL (ref 0–0.51)
EOSINOPHIL # BLD AUTO: 0.23 K/UL (ref 0–0.51)
EOSINOPHIL # BLD AUTO: 0.24 K/UL (ref 0–0.51)
EOSINOPHIL NFR BLD: 3.1 % (ref 0–6.9)
EOSINOPHIL NFR BLD: 3.2 % (ref 0–6.9)
EOSINOPHIL NFR BLD: 3.3 % (ref 0–6.9)
EOSINOPHIL NFR BLD: 3.7 % (ref 0–6.9)
ERYTHROCYTE [DISTWIDTH] IN BLOOD BY AUTOMATED COUNT: 53.6 FL (ref 35.9–50)
ERYTHROCYTE [DISTWIDTH] IN BLOOD BY AUTOMATED COUNT: 53.7 FL (ref 35.9–50)
ERYTHROCYTE [DISTWIDTH] IN BLOOD BY AUTOMATED COUNT: 54.1 FL (ref 35.9–50)
ERYTHROCYTE [DISTWIDTH] IN BLOOD BY AUTOMATED COUNT: 55.5 FL (ref 35.9–50)
GFR SERPLBLD CREATININE-BSD FMLA CKD-EPI: 63 ML/MIN/1.73 M 2
GLUCOSE SERPL-MCNC: 93 MG/DL (ref 65–99)
HCT VFR BLD AUTO: 23.4 % (ref 42–52)
HCT VFR BLD AUTO: 24.8 % (ref 42–52)
HCT VFR BLD AUTO: 25.2 % (ref 42–52)
HCT VFR BLD AUTO: 25.6 % (ref 42–52)
HGB BLD-MCNC: 7.2 G/DL (ref 14–18)
HGB BLD-MCNC: 7.7 G/DL (ref 14–18)
HGB BLD-MCNC: 7.9 G/DL (ref 14–18)
HGB BLD-MCNC: 7.9 G/DL (ref 14–18)
IMM GRANULOCYTES # BLD AUTO: 0.02 K/UL (ref 0–0.11)
IMM GRANULOCYTES # BLD AUTO: 0.02 K/UL (ref 0–0.11)
IMM GRANULOCYTES # BLD AUTO: 0.03 K/UL (ref 0–0.11)
IMM GRANULOCYTES # BLD AUTO: 0.05 K/UL (ref 0–0.11)
IMM GRANULOCYTES NFR BLD AUTO: 0.3 % (ref 0–0.9)
IMM GRANULOCYTES NFR BLD AUTO: 0.3 % (ref 0–0.9)
IMM GRANULOCYTES NFR BLD AUTO: 0.4 % (ref 0–0.9)
IMM GRANULOCYTES NFR BLD AUTO: 0.7 % (ref 0–0.9)
INR PPP: 1.41 (ref 0.87–1.13)
LYMPHOCYTES # BLD AUTO: 1.37 K/UL (ref 1–4.8)
LYMPHOCYTES # BLD AUTO: 1.55 K/UL (ref 1–4.8)
LYMPHOCYTES # BLD AUTO: 1.61 K/UL (ref 1–4.8)
LYMPHOCYTES # BLD AUTO: 1.89 K/UL (ref 1–4.8)
LYMPHOCYTES NFR BLD: 21.2 % (ref 22–41)
LYMPHOCYTES NFR BLD: 22 % (ref 22–41)
LYMPHOCYTES NFR BLD: 25.7 % (ref 22–41)
LYMPHOCYTES NFR BLD: 27 % (ref 22–41)
MCH RBC QN AUTO: 28.4 PG (ref 27–33)
MCH RBC QN AUTO: 28.5 PG (ref 27–33)
MCH RBC QN AUTO: 28.7 PG (ref 27–33)
MCH RBC QN AUTO: 29.2 PG (ref 27–33)
MCHC RBC AUTO-ENTMCNC: 30.8 G/DL (ref 33.7–35.3)
MCHC RBC AUTO-ENTMCNC: 30.9 G/DL (ref 33.7–35.3)
MCHC RBC AUTO-ENTMCNC: 31 G/DL (ref 33.7–35.3)
MCHC RBC AUTO-ENTMCNC: 31.3 G/DL (ref 33.7–35.3)
MCV RBC AUTO: 91.5 FL (ref 81.4–97.8)
MCV RBC AUTO: 92.5 FL (ref 81.4–97.8)
MCV RBC AUTO: 93 FL (ref 81.4–97.8)
MCV RBC AUTO: 93.1 FL (ref 81.4–97.8)
MONOCYTES # BLD AUTO: 0.54 K/UL (ref 0–0.85)
MONOCYTES # BLD AUTO: 0.59 K/UL (ref 0–0.85)
MONOCYTES # BLD AUTO: 0.63 K/UL (ref 0–0.85)
MONOCYTES # BLD AUTO: 0.64 K/UL (ref 0–0.85)
MONOCYTES NFR BLD AUTO: 8.4 % (ref 0–13.4)
MONOCYTES NFR BLD AUTO: 8.6 % (ref 0–13.4)
MONOCYTES NFR BLD AUTO: 9.1 % (ref 0–13.4)
MONOCYTES NFR BLD AUTO: 9.7 % (ref 0–13.4)
NEUTROPHILS # BLD AUTO: 3.85 K/UL (ref 1.82–7.42)
NEUTROPHILS # BLD AUTO: 4.19 K/UL (ref 1.82–7.42)
NEUTROPHILS # BLD AUTO: 4.21 K/UL (ref 1.82–7.42)
NEUTROPHILS # BLD AUTO: 4.6 K/UL (ref 1.82–7.42)
NEUTROPHILS NFR BLD: 60.1 % (ref 44–72)
NEUTROPHILS NFR BLD: 61.6 % (ref 44–72)
NEUTROPHILS NFR BLD: 64.8 % (ref 44–72)
NEUTROPHILS NFR BLD: 65.2 % (ref 44–72)
NRBC # BLD AUTO: 0.02 K/UL
NRBC # BLD AUTO: 0.03 K/UL
NRBC BLD-RTO: 0.3 /100 WBC
NRBC BLD-RTO: 0.4 /100 WBC
PLATELET # BLD AUTO: 154 K/UL (ref 164–446)
PLATELET # BLD AUTO: 166 K/UL (ref 164–446)
PLATELET # BLD AUTO: 170 K/UL (ref 164–446)
PLATELET # BLD AUTO: 171 K/UL (ref 164–446)
PMV BLD AUTO: 10.8 FL (ref 9–12.9)
PMV BLD AUTO: 11.2 FL (ref 9–12.9)
POTASSIUM SERPL-SCNC: 3.9 MMOL/L (ref 3.6–5.5)
PROTHROMBIN TIME: 17 SEC (ref 12–14.6)
RBC # BLD AUTO: 2.53 M/UL (ref 4.7–6.1)
RBC # BLD AUTO: 2.71 M/UL (ref 4.7–6.1)
RBC # BLD AUTO: 2.71 M/UL (ref 4.7–6.1)
RBC # BLD AUTO: 2.75 M/UL (ref 4.7–6.1)
SODIUM SERPL-SCNC: 139 MMOL/L (ref 135–145)
WBC # BLD AUTO: 6.3 K/UL (ref 4.8–10.8)
WBC # BLD AUTO: 6.5 K/UL (ref 4.8–10.8)
WBC # BLD AUTO: 7 K/UL (ref 4.8–10.8)
WBC # BLD AUTO: 7.1 K/UL (ref 4.8–10.8)

## 2023-02-01 PROCEDURE — A9270 NON-COVERED ITEM OR SERVICE: HCPCS | Performed by: HOSPITALIST

## 2023-02-01 PROCEDURE — 85025 COMPLETE CBC W/AUTO DIFF WBC: CPT | Mod: 91

## 2023-02-01 PROCEDURE — 93010 ELECTROCARDIOGRAM REPORT: CPT | Mod: 77 | Performed by: INTERNAL MEDICINE

## 2023-02-01 PROCEDURE — 80048 BASIC METABOLIC PNL TOTAL CA: CPT

## 2023-02-01 PROCEDURE — 700102 HCHG RX REV CODE 250 W/ 637 OVERRIDE(OP): Performed by: HOSPITALIST

## 2023-02-01 PROCEDURE — 700111 HCHG RX REV CODE 636 W/ 250 OVERRIDE (IP): Performed by: STUDENT IN AN ORGANIZED HEALTH CARE EDUCATION/TRAINING PROGRAM

## 2023-02-01 PROCEDURE — 99233 SBSQ HOSP IP/OBS HIGH 50: CPT | Performed by: HOSPITALIST

## 2023-02-01 PROCEDURE — 770020 HCHG ROOM/CARE - TELE (206)

## 2023-02-01 PROCEDURE — C9113 INJ PANTOPRAZOLE SODIUM, VIA: HCPCS | Performed by: STUDENT IN AN ORGANIZED HEALTH CARE EDUCATION/TRAINING PROGRAM

## 2023-02-01 PROCEDURE — 97116 GAIT TRAINING THERAPY: CPT

## 2023-02-01 PROCEDURE — A9270 NON-COVERED ITEM OR SERVICE: HCPCS | Performed by: STUDENT IN AN ORGANIZED HEALTH CARE EDUCATION/TRAINING PROGRAM

## 2023-02-01 PROCEDURE — 93010 ELECTROCARDIOGRAM REPORT: CPT | Performed by: INTERNAL MEDICINE

## 2023-02-01 PROCEDURE — 85610 PROTHROMBIN TIME: CPT

## 2023-02-01 PROCEDURE — 99231 SBSQ HOSP IP/OBS SF/LOW 25: CPT | Performed by: INTERNAL MEDICINE

## 2023-02-01 PROCEDURE — 700102 HCHG RX REV CODE 250 W/ 637 OVERRIDE(OP): Performed by: STUDENT IN AN ORGANIZED HEALTH CARE EDUCATION/TRAINING PROGRAM

## 2023-02-01 PROCEDURE — 93005 ELECTROCARDIOGRAM TRACING: CPT | Performed by: HOSPITALIST

## 2023-02-01 RX ORDER — METOPROLOL SUCCINATE 50 MG/1
100 TABLET, EXTENDED RELEASE ORAL EVERY EVENING
Status: DISCONTINUED | OUTPATIENT
Start: 2023-02-01 | End: 2023-02-02 | Stop reason: HOSPADM

## 2023-02-01 RX ADMIN — PANTOPRAZOLE SODIUM 40 MG: 40 INJECTION, POWDER, FOR SOLUTION INTRAVENOUS at 18:00

## 2023-02-01 RX ADMIN — PANTOPRAZOLE SODIUM 40 MG: 40 INJECTION, POWDER, FOR SOLUTION INTRAVENOUS at 05:35

## 2023-02-01 RX ADMIN — SPIRONOLACTONE 25 MG: 25 TABLET ORAL at 05:35

## 2023-02-01 RX ADMIN — METOPROLOL SUCCINATE 100 MG: 50 TABLET, EXTENDED RELEASE ORAL at 17:33

## 2023-02-01 ASSESSMENT — ENCOUNTER SYMPTOMS
MYALGIAS: 0
DIAPHORESIS: 0
NEUROLOGICAL NEGATIVE: 1
CHILLS: 0
SHORTNESS OF BREATH: 0
WEAKNESS: 0
PSYCHIATRIC NEGATIVE: 1
WEIGHT LOSS: 0
CARDIOVASCULAR NEGATIVE: 1
VOMITING: 0
DIZZINESS: 0
FOCAL WEAKNESS: 0
HEADACHES: 0
FEVER: 0
DEPRESSION: 0
BRUISES/BLEEDS EASILY: 0
RESPIRATORY NEGATIVE: 1
NAUSEA: 0
COUGH: 0
ABDOMINAL PAIN: 0
BLURRED VISION: 0
PALPITATIONS: 0
MUSCULOSKELETAL NEGATIVE: 1
SORE THROAT: 0
EYES NEGATIVE: 1

## 2023-02-01 ASSESSMENT — COGNITIVE AND FUNCTIONAL STATUS - GENERAL
MOBILITY SCORE: 19
WALKING IN HOSPITAL ROOM: A LITTLE
SUGGESTED CMS G CODE MODIFIER MOBILITY: CK
TURNING FROM BACK TO SIDE WHILE IN FLAT BAD: A LITTLE
CLIMB 3 TO 5 STEPS WITH RAILING: A LITTLE
MOVING FROM LYING ON BACK TO SITTING ON SIDE OF FLAT BED: A LITTLE
MOVING TO AND FROM BED TO CHAIR: A LITTLE

## 2023-02-01 ASSESSMENT — GAIT ASSESSMENTS
DISTANCE (FEET): 150
ASSISTIVE DEVICE: OTHER (COMMENTS)
DEVIATION: SHUFFLED GAIT;DECREASED HEEL STRIKE;DECREASED TOE OFF;BRADYKINETIC
GAIT LEVEL OF ASSIST: SUPERVISED

## 2023-02-01 ASSESSMENT — LIFESTYLE VARIABLES: SUBSTANCE_ABUSE: 0

## 2023-02-01 ASSESSMENT — PAIN DESCRIPTION - PAIN TYPE: TYPE: ACUTE PAIN

## 2023-02-01 NOTE — DISCHARGE PLANNING
Case Management Discharge Planning    Admission Date: 1/28/2023  GMLOS: 3.9  ALOS: 3    6-Clicks ADL Score: 24  6-Clicks Mobility Score: 24      Anticipated Discharge Dispo: Discharge Disposition: Discharged to home/self care (01)  PT/OT recs are for HH, RNCM to complete assessment    Medically Clear: {YES / NO:71475}    Next Steps: ***    Barriers to Discharge: {DC Barriers:39413}    Is the patient up for discharge tomorrow: {Is Patient Up for Discharge Tomorrow:28631}

## 2023-02-01 NOTE — PROGRESS NOTES
Patient: : Robert Mcdonnell  MRN: [id number]    Dx: r/o upper GI bleeding.     Saw the patient at the bedside in the morning.  The patient was having breakfast and sitting on the bed.  The patient denies any active GI symptoms or sign.  No bowel movement overnight and no evidence of more GI bleeding.   Due to high risk of sedation, the GI team will suggest supportive care with PPI.  If no brisk bleeding during this admission, the GI team will suggest outpatient follow-up without further endoscopy.     GI signing off February 1, please call us back if any brisk bleeding is found.  The patient should have a referral from his primary doctor upon discharge to see either [facility] or [facility] in the local community.    Labs:  Recent Labs     01/31/23  1621 01/31/23 2222 02/01/23 0407   WBC 7.8 6.7 6.3   RBC 2.56* 2.62* 2.53*   HEMOGLOBIN 7.3* 7.3* 7.2*   HEMATOCRIT 23.7* 24.2* 23.4*   MCV 92.6 92.4 92.5   MCH 28.5 27.9 28.5   MCHC 30.8* 30.2* 30.8*   RDW 55.1* 54.2* 53.6*   PLATELETCT 151* 149* 154*   MPV 11.5 11.3 11.2     Recent Labs     01/30/23 0042 01/31/23 0431 02/01/23 0407   SODIUM 142 142 139   POTASSIUM 3.7 3.7 3.9   CHLORIDE 106 107 105   CO2 25 26 23   GLUCOSE 115* 97 93   BUN 33* 20 14     Recent Labs     01/30/23 0042 01/31/23 0431 02/01/23 0407   INR 1.77* 1.43* 1.41*       Recent Labs     01/30/23 0042 01/31/23  0431 02/01/23 0407   ASTSGOT  --  27  --    ALTSGPT  --  15  --    TBILIRUBIN  --  0.5  --    ALKPHOSPHAT  --  63  --    GLOBULIN  --  2.5  --    INR 1.77* 1.43* 1.41*

## 2023-02-01 NOTE — CARE PLAN
Problem: Pain - Standard  Goal: Alleviation of pain or a reduction in pain to the patient’s comfort goal  Outcome: Progressing     Problem: Knowledge Deficit - Standard  Goal: Patient and family/care givers will demonstrate understanding of plan of care, disease process/condition, diagnostic tests and medications  Outcome: Progressing     Problem: Hemodynamics  Goal: Patient's hemodynamics, fluid balance and neurologic status will be stable or improve  Outcome: Progressing   The patient is Watcher - Medium risk of patient condition declining or worsening    Shift Goals  Clinical Goals: monitor labs, vitals and possible egd  Patient Goals: rest  Family Goals: MARCOS    Progress made toward(s) clinical / shift goals:      Patient is not progressing towards the following goals:

## 2023-02-01 NOTE — CONSULTS
Cardiology Consult Note:    Timothy Baires M.D.  Date & Time note created:    1/31/2023   4:04 PM     Referring MD:  Dr. Otto    Patient ID:   Name:             Robert Mcdonnell   YOB: 1950  Age:                 72 y.o.  male   MRN:               [id number]                                                             Reason for Consult:      bradycardia    History of Present Illness:    72-year-old male 72-year-old male with a past medical history of cardiomyopathy either from atrial fibrillation or alcohol with most recent EF of 45%.  He was admitted for symptoms of GI bleed and taken for EGD today.  He has chronic atrial fibrillation was on metoprolol which was held.  He is having for borderline rapid ventricular response.  During induction of his Anesthesia and passing a probe he reportedly had a bradycardia down to 30 the procedure was aborted and he was transferred back to the floor.  He is been having no chest pain shortness of breath or PND.  His heart rates been controlled.  He does not follow with a cardiologist.    Review of Systems:      Constitutional: Denies fevers, Denies weight changes  Eyes: Denies changes in vision, no eye pain  Ears/Nose/Throat/Mouth: Denies nasal congestion or sore throat   Cardiovascular: no chest pain, no palpitations   Respiratory: no shortness of breath , Denies cough  Gastrointestinal/Hepatic: Denies abdominal pain, nausea, vomiting, diarrhea, constipation or GI bleeding   Genitourinary: Denies dysuria or frequency  Musculoskeletal/Rheum: Denies  joint pain and swelling, no edema  Skin: Denies rash  Neurological: Denies headache, confusion, memory loss or focal weakness/parasthesias  Psychiatric: denies mood disorder   Endocrine: Sahra thyroid problems  Heme/Oncology/Lymph Nodes: Denies enlarged lymph nodes, denies brusing or known bleeding disorder  All other systems were reviewed and are negative (AMA/CMS criteria)                Past Medical  History:   Past Medical History:   Diagnosis Date    CHF (congestive heart failure) (HCC)     Congestive heart failure (HCC)     Hypertension      Active Hospital Problems    Diagnosis     Upper GI bleed [K92.2]     NIRMAL (acute kidney injury) ([facility]) [N17.9]     Sepsis (HCC) [A41.9]     Tobacco use [Z72.0]     Acute congestive heart failure, unspecified heart failure type ([facility]) [I50.9]     Morbid obesity ([facility]) [E66.01]     Acute exacerbation of CHF (congestive heart failure) ([facility]) [I50.9]     Fluid overload [E87.70]     AF (atrial fibrillation) ([facility]) [I48.91]     Chronic systolic heart failure ([facility]) [I50.22]     Atrial fibrillation with rapid ventricular response ([facility]) [I48.91]     Acute hypoxemic respiratory failure ([facility]) [J96.01]     Tobacco dependence [F17.200]     Anemia [D64.9]        Past Surgical History:  Past Surgical History:   Procedure Laterality Date    [place] UPPER GI ENDOSCOPY,DIAGNOSIS N/A 1/31/2023    Procedure: GASTROSCOPY;  Surgeon: Joy Mcnair M.D.;  Location: SURGERY SAME DAY [facility];  Service: Gastroenterology       Hospital Medications:  Current Facility-Administered Medications   Medication Dose    menthol (Halls) lozenge 1 Lozenge  1 Lozenge    pantoprazole (Protonix) injection 40 mg  40 mg    influenza Vac High-Dose Quad (Fluzone) injection 0.7 mL  0.7 mL    acetaminophen (Tylenol) tablet 650 mg  650 mg    albuterol inhaler 2 Puff  2 Puff    [Held by provider] lisinopril (PRINIVIL) tablet 40 mg  40 mg    spironolactone (ALDACTONE) tablet 25 mg  25 mg    [Held by provider] MD Alert...Warfarin per Pharmacy      [Held by provider] warfarin (COUMADIN) tablet 7.5 mg  7.5 mg    benzonatate (TESSALON) capsule 100 mg  100 mg         Current Outpatient Medications:  Medications Prior to Admission   Medication Sig Dispense Refill Last Dose    warfarin (COUMADIN) 7.5 MG Tab Take 7.5 mg by mouth every day.   >3 days at [facility]    lisinopril (PRINIVIL) 40 MG tablet Take 1 tablet by mouth once daily 30  "Tablet 0 >3 days at [facility]    spironolactone (ALDACTONE) 25 MG Tab Take 1 tablet by mouth once daily 30 Tablet 0 >3 days at [facility]    torsemide (DEMADEX) 20 MG Tab Take 1 tablet by mouth once daily 30 Tablet 0 >3 days at [facility]    metoprolol SR (TOPROL XL) 100 MG TABLET SR 24 HR Take 1 tablet by mouth every evening. 30 tablet 11 >3 days at [facility]    albuterol 108 (90 Base) MCG/ACT Aero Soln inhalation aerosol Inhale 2 Puffs every four hours as needed for Shortness of Breath. 1 Each 0 >3 days at [facility]       Medication Allergy:  No Known Allergies    Family History:  Family History   Problem Relation Age of Onset    Heart Disease Mother     Heart Disease Father        Social History:  Social History     Socioeconomic History    Marital status:      Spouse name: Not on file    Number of children: Not on file    Years of education: Not on file    Highest education level: Not on file   Occupational History    Not on file   Tobacco Use    Smoking status: Every Day     Packs/day: 1.00     Types: Cigarettes    Smokeless tobacco: Never   Vaping Use    Vaping Use: Never used   Substance and Sexual Activity    Alcohol use: Yes     Comment: occ    Drug use: Not Currently    Sexual activity: Not on file   Other Topics Concern    Not on file   Social History Narrative    ** Merged History Encounter **          Social Determinants of Health     Financial Resource Strain: Not on file   Food Insecurity: Not on file   Transportation Needs: Not on file   Physical Activity: Not on file   Stress: Not on file   Social Connections: Not on file   Intimate Partner Violence: Not on file   Housing Stability: Not on file         Physical Exam:  Vitals/ General Appearance:   Weight/BMI: Body mass index is 36.37 kg/m².  BP 97/55   Pulse (!) 103   Temp 36.5 °C (97.7 °F) (Temporal)   Resp 16   Ht 1.854 m (6' 0.99\")   Wt 125 kg (275 lb 9.2 oz)   SpO2 90%   Vitals:    01/31/23 0855 01/31/23 0923 01/31/23 1134 01/31/23 1539   BP: 106/55 130/58 " 127/60 97/55   Pulse: 95 (!) 110 93 (!) 103   Resp: (!) 22 16 16 16   Temp:  36.5 °C (97.7 °F) 36.7 °C (98.1 °F) 36.5 °C (97.7 °F)   TempSrc:  Temporal Temporal Temporal   SpO2: 92% 98% 100% 90%   Weight:       Height:         Oxygen Therapy:  Pulse Oximetry: 90 %, O2 (LPM): 0, O2 Delivery Device: Room air w/o2 available    Constitutional:   Well developed, Well nourished, No acute distress  HENMT:  Normocephalic, Atraumatic, Oropharynx moist mucous membranes, No oral exudates, Nose normal.  No thyromegaly.  Eyes:  EOMI, Conjunctiva normal, No discharge.  Neck:  Normal range of motion, No cervical tenderness,  no JVD.  Cardiovascular:  Normal heart rate, Normal rhythm, No murmurs, No rubs, No gallops.   Extremitites with intact distal pulses, no cyanosis, or edema.  Lungs:  Normal breath sounds, breath sounds clear to auscultation bilaterally,  no crackles, no wheezing.   Abdomen: Bowel sounds normal, Soft, No tenderness, No guarding, No rebound, No masses, No hepatosplenomegaly.  Skin: Warm, Dry, No erythema, No rash, no induration.  Neurologic: Alert & oriented x 3, No focal deficits noted, cranial nerves II through X are grossly intact.  Psychiatric: Affect normal, Judgment normal, Mood normal.      MDM (Data Review):     Records reviewed and summarized in current documentation    Lab Data Review:  Recent Results (from the past 24 hour(s))   CBC WITH DIFFERENTIAL    Collection Time: 01/30/23  4:31 PM   Result Value Ref Range    WBC 8.2 4.8 - 10.8 K/uL    RBC 2.64 (L) 4.70 - 6.10 M/uL    Hemoglobin 7.6 (L) 14.0 - 18.0 g/dL    Hematocrit 24.2 (L) 42.0 - 52.0 %    MCV 91.7 81.4 - 97.8 fL    MCH 28.8 27.0 - 33.0 pg    MCHC 31.4 (L) 33.7 - 35.3 g/dL    RDW 54.3 (H) 35.9 - 50.0 fL    Platelet Count 155 (L) 164 - 446 K/uL    MPV 11.1 9.0 - 12.9 fL    Neutrophils-Polys 70.00 44.00 - 72.00 %    Lymphocytes 17.40 (L) 22.00 - 41.00 %    Monocytes 9.50 0.00 - 13.40 %    Eosinophils 2.20 0.00 - 6.90 %    Basophils 0.40 0.00 -  1.80 %    Immature Granulocytes 0.50 0.00 - 0.90 %    Nucleated RBC 0.20 /100 WBC    Neutrophils (Absolute) 5.75 1.82 - 7.42 K/uL    Lymphs (Absolute) 1.43 1.00 - 4.80 K/uL    Monos (Absolute) 0.78 0.00 - 0.85 K/uL    Eos (Absolute) 0.18 0.00 - 0.51 K/uL    Baso (Absolute) 0.03 0.00 - 0.12 K/uL    Immature Granulocytes (abs) 0.04 0.00 - 0.11 K/uL    NRBC (Absolute) 0.02 K/uL   CBC WITH DIFFERENTIAL    Collection Time: 01/30/23 10:48 PM   Result Value Ref Range    WBC 7.8 4.8 - 10.8 K/uL    RBC 2.56 (L) 4.70 - 6.10 M/uL    Hemoglobin 7.4 (L) 14.0 - 18.0 g/dL    Hematocrit 23.8 (L) 42.0 - 52.0 %    MCV 93.0 81.4 - 97.8 fL    MCH 28.9 27.0 - 33.0 pg    MCHC 31.1 (L) 33.7 - 35.3 g/dL    RDW 54.7 (H) 35.9 - 50.0 fL    Platelet Count 142 (L) 164 - 446 K/uL    MPV 11.6 9.0 - 12.9 fL    Neutrophils-Polys 67.30 44.00 - 72.00 %    Lymphocytes 19.40 (L) 22.00 - 41.00 %    Monocytes 10.00 0.00 - 13.40 %    Eosinophils 2.60 0.00 - 6.90 %    Basophils 0.30 0.00 - 1.80 %    Immature Granulocytes 0.40 0.00 - 0.90 %    Nucleated RBC 0.50 /100 WBC    Neutrophils (Absolute) 5.25 1.82 - 7.42 K/uL    Lymphs (Absolute) 1.51 1.00 - 4.80 K/uL    Monos (Absolute) 0.78 0.00 - 0.85 K/uL    Eos (Absolute) 0.20 0.00 - 0.51 K/uL    Baso (Absolute) 0.02 0.00 - 0.12 K/uL    Immature Granulocytes (abs) 0.03 0.00 - 0.11 K/uL    NRBC (Absolute) 0.04 K/uL   Prothrombin Time    Collection Time: 01/31/23  4:31 AM   Result Value Ref Range    PT 17.2 (H) 12.0 - 14.6 sec    INR 1.43 (H) 0.87 - 1.13   Comp Metabolic Panel    Collection Time: 01/31/23  4:31 AM   Result Value Ref Range    Sodium 142 135 - 145 mmol/L    Potassium 3.7 3.6 - 5.5 mmol/L    Chloride 107 96 - 112 mmol/L    Co2 26 20 - 33 mmol/L    Anion Gap 9.0 7.0 - 16.0    Glucose 97 65 - 99 mg/dL    Bun 20 8 - 22 mg/dL    Creatinine 1.28 0.50 - 1.40 mg/dL    Calcium 8.3 (L) 8.5 - 10.5 mg/dL    AST(SGOT) 27 12 - 45 U/L    ALT(SGPT) 15 2 - 50 U/L    Alkaline Phosphatase 63 30 - 99 U/L    Total  Bilirubin 0.5 0.1 - 1.5 mg/dL    Albumin 3.6 3.2 - 4.9 g/dL    Total Protein 6.1 6.0 - 8.2 g/dL    Globulin 2.5 1.9 - 3.5 g/dL    A-G Ratio 1.4 g/dL   CBC WITH DIFFERENTIAL    Collection Time: 01/31/23  4:31 AM   Result Value Ref Range    WBC 7.4 4.8 - 10.8 K/uL    RBC 2.60 (L) 4.70 - 6.10 M/uL    Hemoglobin 7.4 (L) 14.0 - 18.0 g/dL    Hematocrit 23.7 (L) 42.0 - 52.0 %    MCV 91.2 81.4 - 97.8 fL    MCH 28.5 27.0 - 33.0 pg    MCHC 31.2 (L) 33.7 - 35.3 g/dL    RDW 53.4 (H) 35.9 - 50.0 fL    Platelet Count 148 (L) 164 - 446 K/uL    MPV 11.7 9.0 - 12.9 fL    Neutrophils-Polys 65.70 44.00 - 72.00 %    Lymphocytes 21.80 (L) 22.00 - 41.00 %    Monocytes 8.50 0.00 - 13.40 %    Eosinophils 3.00 0.00 - 6.90 %    Basophils 0.50 0.00 - 1.80 %    Immature Granulocytes 0.50 0.00 - 0.90 %    Nucleated RBC 0.40 /100 WBC    Neutrophils (Absolute) 4.83 1.82 - 7.42 K/uL    Lymphs (Absolute) 1.61 1.00 - 4.80 K/uL    Monos (Absolute) 0.63 0.00 - 0.85 K/uL    Eos (Absolute) 0.22 0.00 - 0.51 K/uL    Baso (Absolute) 0.04 0.00 - 0.12 K/uL    Immature Granulocytes (abs) 0.04 0.00 - 0.11 K/uL    NRBC (Absolute) 0.03 K/uL   CORRECTED CALCIUM    Collection Time: 01/31/23  4:31 AM   Result Value Ref Range    Correct Calcium 8.6 8.5 - 10.5 mg/dL   ESTIMATED GFR    Collection Time: 01/31/23  4:31 AM   Result Value Ref Range    GFR (CKD-EPI) 59 (A) >60 mL/min/1.73 m 2   CBC WITH DIFFERENTIAL    Collection Time: 01/31/23 10:22 AM   Result Value Ref Range    WBC 8.4 4.8 - 10.8 K/uL    RBC 2.65 (L) 4.70 - 6.10 M/uL    Hemoglobin 7.5 (L) 14.0 - 18.0 g/dL    Hematocrit 24.2 (L) 42.0 - 52.0 %    MCV 91.3 81.4 - 97.8 fL    MCH 28.3 27.0 - 33.0 pg    MCHC 31.0 (L) 33.7 - 35.3 g/dL    RDW 54.0 (H) 35.9 - 50.0 fL    Platelet Count 149 (L) 164 - 446 K/uL    MPV 11.1 9.0 - 12.9 fL    Neutrophils-Polys 74.70 (H) 44.00 - 72.00 %    Lymphocytes 14.60 (L) 22.00 - 41.00 %    Monocytes 7.80 0.00 - 13.40 %    Eosinophils 1.90 0.00 - 6.90 %    Basophils 0.40 0.00 -  1.80 %    Immature Granulocytes 0.60 0.00 - 0.90 %    Nucleated RBC 0.50 /100 WBC    Neutrophils (Absolute) 6.31 1.82 - 7.42 K/uL    Lymphs (Absolute) 1.23 1.00 - 4.80 K/uL    Monos (Absolute) 0.66 0.00 - 0.85 K/uL    Eos (Absolute) 0.16 0.00 - 0.51 K/uL    Baso (Absolute) 0.03 0.00 - 0.12 K/uL    Immature Granulocytes (abs) 0.05 0.00 - 0.11 K/uL    NRBC (Absolute) 0.04 K/uL       Imaging/Procedures Review:    Chest Xray:  Reviewed    EKG:   Dated 1/29/2023 personally viewed inter myself showing atrial fibrillation with controlled ventricular response.    ECHO:  Echocardiogram: Dated 1/29/2023 personally viewed inter myself showing low normal LV ejection fraction no valvular heart disease.  MDM (Assessment and Plan):     Active Hospital Problems    Diagnosis     Upper GI bleed [K92.2]     NIRMAL (acute kidney injury) ([facility]) [N17.9]     Sepsis ([facility]) [A41.9]     Tobacco use [Z72.0]     Acute congestive heart failure, unspecified heart failure type ([facility]) [I50.9]     Morbid obesity ([facility]) [E66.01]     Acute exacerbation of CHF (congestive heart failure) ([facility]) [I50.9]     Fluid overload [E87.70]     AF (atrial fibrillation) ([facility]) [I48.91]     Chronic systolic heart failure ([facility]) [I50.22]     Atrial fibrillation with rapid ventricular response ([facility]) [I48.91]     Acute hypoxemic respiratory failure ([facility]) [J96.01]     Tobacco dependence [F17.200]     Anemia [D64.9]      72-year-old male with anemia of unknown etiology with bradycardia during a GI procedure.  I do not feel that he is any higher risk for repeat procedure.  I would have medications on hand to raise his heart rate this is likely response to either propofol or the placement of the probe.  We have held his metoprolol.  I would avoid propofol if possible.  I would not recommend a pacemaker be placed for this procedure given the odds of him doing well with things such as atropine other medications to avoid bradycardia and avoiding medications that would also exacerbate  bradycardia.          show

## 2023-02-01 NOTE — THERAPY
Physical Therapy   Daily Treatment     Patient Name: Robert Mcdonnell  Age:  72 y.o., Sex:  male  Medical Record #: [id number]  Today's Date: 2/1/2023     Precautions  Precautions: Fall Risk    Assessment    Pt seen for follow up PT tx. Pt reports no concerns for dc home. He did have 1 LOB in room due to shoes slipping. Education provided on better footwear to prevent shuffling therefore decrease fall risk. Pt declined use of FWW but ambulated household distances with use of handrail. He states he will use his 4WW at home. No further acute PT needs.     Plan    Physical Therapy Treatment Plan  Physical Therapy Treatment Plan: Continue Current Treatment Plan    DC Equipment Recommendations: None (has 4WW)  Discharge Recommendations: Recommend home health for continued physical therapy services         02/01/23 1007   Pain 0 - 10 Group   Therapist Pain Assessment During Activity;0  (pt reports stiffness not pain)   Other Treatments   Other Treatments Provided pt reports no concern with dc home   Balance   Sitting Balance (Static) Fair +   Sitting Balance (Dynamic) Fair +   Standing Balance (Static) Fair   Standing Balance (Dynamic) Fair -   Weight Shift Sitting Good   Weight Shift Standing Fair   Skilled Intervention Verbal Cuing   Comments pt declined AD and used handrail in hallway. 1 LOB in room due to shoe slipping   Bed Mobility    Sit to Supine Supervised   Scooting Supervised   Comments left EOB   Gait Analysis   Gait Level Of Assist Supervised   Assistive Device Other (Comments)  (handrail in hallway)   Distance (Feet) 150   # of Times Distance was Traveled 1   Deviation Shuffled Gait;Decreased Heel Strike;Decreased Toe Off;Bradykinetic   Weight Bearing Status no restrictions   Skilled Intervention Verbal Cuing   Comments pt declined FWW, opted for handrail in hallway   Functional Mobility   Sit to Stand Supervised   Bed, Chair, Wheelchair Transfer Supervised   Transfer Method Stand Step   Mobility in room and  hallway with use of handrail   Short Term Goals    Short Term Goal # 1 The pt will demo supine<>sit EOB w/ hob flat and no rails spv in 6 visits for IND w/ bed mobility.   Goal Outcome # 1 Goal met   Short Term Goal # 2 The pt will demo STS EOB w/ FWW spv in 6 visits to prepare for OOB mobility.   Goal Outcome # 2 Goal met   Short Term Goal # 3 The pt will demo gait >150' using FWW spv in 6 visits for household ambulation.   Goal Outcome # 3 Other (see comments)  (declined FWW, used handrail in hallway)   Anticipated Discharge Equipment and Recommendations   DC Equipment Recommendations None  (has 4WW)   Discharge Recommendations Recommend home health for continued physical therapy services

## 2023-02-01 NOTE — PROGRESS NOTES
Hospital Medicine Daily Progress Note    Date of Service  2/1/2023    Chief Complaint  Robert Mcdonnell is a 72 y.o. male admitted 1/28/2023 with chest pain and shortness of breath    Hospital Course    Robert Mcdonnell is a 72 y.o. male who presented 1/28/2023 with progressively worsening shortness of breath. He has a history of longstanding tobacco use, CHF with reduced ejection fraction, atrial fibrillation on Coumadin. He reportedly ran out of his medications several days prior to admission and had not taken anything since then. He denied fever, chills, nausea , vomiting,diarrhea  and abdominal pain. States that he smokes a pack of cigarettes a day for the last 30 years. He reported he had never been checked for COPD.      In the ED, patient found to be tachycardic.  Pertinent labs include neutrophil leukocytosis, hemoglobin 8.5, prerenal azotemia, non-anion gap metabolic acidosis, troponin 30, , INR 1.31.  Chest x-ray showing no acute cardiopulmonary abnormality. EKG showing atrial fibrillation with atypical left bundle branch block (not new).      Pt was admitted for a suspected decompensated CHF from med non-compliant and chest pain observation. With elevated procal and leukocytosis, empiric treatment for CAP was also started. He was also found to be anemia with occult positive stool. Dr Lozoya of GI was consulted and PPI started.     Interval Problem Update  Axox3, afib rvr up to 130's earlier, pt denied associated sx, I discussed with Dr. Baires of cardiology and he recommended that his previous metoprolol be restarted and he will arrange a ziopatch at d/c. No sob, no melena or hematochezia. ROS otherwise negative      I have discussed this patient's plan of care and discharge plan at IDT rounds today with Case Management, Nursing, Nursing leadership, and other members of the IDT team.    Consultants/Specialty  GI  Cardiology Vir[facility]    Code Status  Full Code    Disposition  Patient is not medically  cleared for discharge.   Anticipate discharge to to home with close outpatient follow-up.  I have placed the appropriate orders for post-discharge needs.    Review of Systems  Review of Systems   Constitutional:  Positive for malaise/fatigue. Negative for chills, diaphoresis, fever and weight loss.   HENT: Negative.  Negative for hearing loss and sore throat.    Eyes: Negative.  Negative for blurred vision.   Respiratory: Negative.  Negative for cough and shortness of breath.    Cardiovascular: Negative.  Negative for chest pain, palpitations and leg swelling.   Gastrointestinal:  Negative for abdominal pain, melena, nausea and vomiting.   Genitourinary: Negative.  Negative for dysuria.   Musculoskeletal: Negative.  Negative for myalgias.   Skin: Negative.  Negative for itching and rash.   Neurological: Negative.  Negative for dizziness, focal weakness, weakness and headaches.   Endo/Heme/Allergies: Negative.  Does not bruise/bleed easily.   Psychiatric/Behavioral: Negative.  Negative for depression, substance abuse and suicidal ideas.    All other systems reviewed and are negative.     Physical Exam  Temp:  [36.5 °C (97.7 °F)-37.1 °C (98.8 °F)] (P) 37 °C (98.6 °F)  Pulse:  [] (P) 77  Resp:  [16-19] (P) 18  BP: ()/(55-77) (P) 134/66  SpO2:  [90 %-93 %] (P) 90 %    Physical Exam  Vitals and nursing note reviewed. Exam conducted with a chaperone present.   Constitutional:       General: He is not in acute distress.     Appearance: Normal appearance. He is not diaphoretic.   HENT:      Head: Normocephalic and atraumatic.      Right Ear: Tympanic membrane normal.      Left Ear: Tympanic membrane normal.      Nose: Nose normal.      Mouth/Throat:      Mouth: Mucous membranes are moist.   Eyes:      Extraocular Movements: Extraocular movements intact.      Pupils: Pupils are equal, round, and reactive to light.   Cardiovascular:      Rate and Rhythm: Normal rate and regular rhythm.      Pulses: Normal pulses.       Heart sounds: Normal heart sounds.   Pulmonary:      Effort: Pulmonary effort is normal. No respiratory distress.      Breath sounds: Normal breath sounds.   Abdominal:      General: Abdomen is flat. Bowel sounds are normal. There is no distension.      Palpations: Abdomen is soft.   Musculoskeletal:         General: No swelling or deformity. Normal range of motion.      Cervical back: Normal range of motion.      Right lower leg: Edema present.      Left lower leg: Edema present.   Skin:     General: Skin is warm and dry.      Capillary Refill: Capillary refill takes less than 2 seconds.   Neurological:      General: No focal deficit present.      Mental Status: He is alert and oriented to person, place, and time. Mental status is at baseline.      Cranial Nerves: No cranial nerve deficit.   Psychiatric:         Mood and Affect: Mood normal.         Behavior: Behavior normal.       Fluids    Intake/Output Summary (Last 24 hours) at 2/1/2023 1416  Last data filed at 1/31/2023 1539  Gross per 24 hour   Intake 480 ml   Output 275 ml   Net 205 ml       Laboratory  Recent Labs     01/31/23  2222 02/01/23  0407 02/01/23  1037   WBC 6.7 6.3 7.0   RBC 2.62* 2.53* 2.71*   HEMOGLOBIN 7.3* 7.2* 7.7*   HEMATOCRIT 24.2* 23.4* 24.8*   MCV 92.4 92.5 91.5   MCH 27.9 28.5 28.4   MCHC 30.2* 30.8* 31.0*   RDW 54.2* 53.6* 54.1*   PLATELETCT 149* 154* 166   MPV 11.3 11.2 11.2     Recent Labs     01/30/23  0042 01/31/23  0431 02/01/23  0407   SODIUM 142 142 139   POTASSIUM 3.7 3.7 3.9   CHLORIDE 106 107 105   CO2 25 26 23   GLUCOSE 115* 97 93   BUN 33* 20 14   CREATININE 1.44* 1.28 1.22   CALCIUM 8.5 8.3* 8.3*     Recent Labs     01/30/23  0042 01/31/23  0431 02/01/23  0407   INR 1.77* 1.43* 1.41*               Imaging  DX-CHEST-PORTABLE (1 VIEW)   Final Result      No acute cardiac or pulmonary abnormalities are identified.      EC-ECHOCARDIOGRAM COMPLETE W/O CONT   Final Result      DX-CHEST-PORTABLE (1 VIEW)   Final Result       No acute cardiopulmonary abnormality.           Assessment/Plan  * Acute exacerbation of CHF (congestive heart failure) ([facility])  Assessment & Plan  Ran out of medication several days ago.  Has a history of EF 30% based on echo in April 2020.  Repeat echocardiogram with improved EF  Reportedly acute exacerbation on admit - currently euvolemic  following      NIRMAL (acute kidney injury) ([facility])  Assessment & Plan  Likely prerenal  Resolved  following      Upper GI bleed  Assessment & Plan    Monitor CBC Q6H and transfuse as necessary  Insert to wide pore peripheral IV accesses    PPI therapy  Avoid non-steroidal anti-inflammatory drugs, anti-platelets, anticoagulants  GI following, see above             Tobacco use- (present on admission)  Assessment & Plan  Smokes a pack of cigarettes a day for the last 30 years.  Has never been checked for COPD.  Encouraged to follow-up with primary care doctor to be evaluated for COPD upon discharge.    Sepsis ([facility])  Assessment & Plan  This is Sepsis Present on admission  SIRS criteria identified on my evaluation include: Tachycardia, with heart rate greater than 90 BPM, Tachypnea, with respirations greater than 20 per minute and Leukocytosis, with WBC greater than 12,000  Source is Possible PNA  Sepsis protocol initiated  Fluid resuscitation ordered per protocol  IV antibiotics as appropriate for source of sepsis  Reassessment: I have reassessed the patient's hemodynamic status    Resolving  Bc so far negative  Leukocytosis resolved  Empiric abx stopped  following    Morbid obesity ([facility])  Assessment & Plan  Will need counseling when clinically appropriate    AF (atrial fibrillation) ([facility])  Assessment & Plan  Known history of atrial fibrillation, INR subtherapeutic    Hold Coumadin for upper GI bleed    Chronic systolic heart failure (HCC)- (present on admission)  Assessment & Plan  No evidence of acute exacerbation  Following  Continue aldactone, demadex etc as tolerated    Atrial  fibrillation with rapid ventricular response (HCC)- (present on admission)  Assessment & Plan  Query SSS  Tachy up to 170s then 30's - see cardiology note, likely due to scope - now rvr today, discussed with cardiology and I will resume his previous BB   coumdadin held for possible gi bleed  following      Acute hypoxemic respiratory failure (HCC)- (present on admission)  Assessment & Plan  Now resolved  cxr clear  Suspect was due to atelectasis during EGD  Continue to follow    Tobacco dependence- (present on admission)  Assessment & Plan  Cessation discussed and recommended    Anemia- (present on admission)  Assessment & Plan  Likely in setting of upper GI bleed  Positive occult blood feces  Transfuse if hbg less 7 , continue to follow  Hold coumadin  EGD cancelled - see above           VTE prophylaxis: SCDs/TEDs  Chemical prophylaxis on hold given GI bleed    I have performed a physical exam and reviewed and updated ROS and Plan today (2/1/2023). In review of yesterday's note (1/31/2023), there are no changes except as documented above.

## 2023-02-02 ENCOUNTER — PHARMACY VISIT (OUTPATIENT)
Dept: PHARMACY | Facility: MEDICAL CENTER | Age: 73
End: 2023-02-02
Payer: COMMERCIAL

## 2023-02-02 VITALS
HEIGHT: 73 IN | OXYGEN SATURATION: 93 % | WEIGHT: 275.8 LBS | HEART RATE: 78 BPM | TEMPERATURE: 97.9 F | DIASTOLIC BLOOD PRESSURE: 68 MMHG | RESPIRATION RATE: 17 BRPM | BODY MASS INDEX: 36.55 KG/M2 | SYSTOLIC BLOOD PRESSURE: 129 MMHG

## 2023-02-02 PROBLEM — K92.2 UPPER GI BLEED: Status: RESOLVED | Noted: 2023-01-30 | Resolved: 2023-02-02

## 2023-02-02 PROBLEM — E87.70 FLUID OVERLOAD: Status: RESOLVED | Noted: 2023-01-28 | Resolved: 2023-02-02

## 2023-02-02 PROBLEM — N17.9 AKI (ACUTE KIDNEY INJURY) (HCC): Status: RESOLVED | Noted: 2023-01-30 | Resolved: 2023-02-02

## 2023-02-02 PROBLEM — I48.91 ATRIAL FIBRILLATION WITH RAPID VENTRICULAR RESPONSE (HCC): Chronic | Status: RESOLVED | Noted: 2020-04-26 | Resolved: 2023-02-02

## 2023-02-02 PROBLEM — A41.9 SEPSIS (HCC): Status: RESOLVED | Noted: 2023-01-29 | Resolved: 2023-02-02

## 2023-02-02 PROBLEM — J96.01 ACUTE HYPOXEMIC RESPIRATORY FAILURE (HCC): Chronic | Status: RESOLVED | Noted: 2020-04-26 | Resolved: 2023-02-02

## 2023-02-02 LAB
ANION GAP SERPL CALC-SCNC: 9 MMOL/L (ref 7–16)
BASOPHILS # BLD AUTO: 0.4 % (ref 0–1.8)
BASOPHILS # BLD AUTO: 0.5 % (ref 0–1.8)
BASOPHILS # BLD: 0.03 K/UL (ref 0–0.12)
BASOPHILS # BLD: 0.04 K/UL (ref 0–0.12)
BUN SERPL-MCNC: 12 MG/DL (ref 8–22)
CALCIUM SERPL-MCNC: 8.6 MG/DL (ref 8.5–10.5)
CHLORIDE SERPL-SCNC: 102 MMOL/L (ref 96–112)
CO2 SERPL-SCNC: 23 MMOL/L (ref 20–33)
CREAT SERPL-MCNC: 1.29 MG/DL (ref 0.5–1.4)
EOSINOPHIL # BLD AUTO: 0.27 K/UL (ref 0–0.51)
EOSINOPHIL # BLD AUTO: 0.28 K/UL (ref 0–0.51)
EOSINOPHIL NFR BLD: 3.5 % (ref 0–6.9)
EOSINOPHIL NFR BLD: 3.6 % (ref 0–6.9)
ERYTHROCYTE [DISTWIDTH] IN BLOOD BY AUTOMATED COUNT: 53.3 FL (ref 35.9–50)
ERYTHROCYTE [DISTWIDTH] IN BLOOD BY AUTOMATED COUNT: 53.9 FL (ref 35.9–50)
GFR SERPLBLD CREATININE-BSD FMLA CKD-EPI: 59 ML/MIN/1.73 M 2
GLUCOSE SERPL-MCNC: 95 MG/DL (ref 65–99)
HCT VFR BLD AUTO: 24.5 % (ref 42–52)
HCT VFR BLD AUTO: 24.8 % (ref 42–52)
HGB BLD-MCNC: 7.5 G/DL (ref 14–18)
HGB BLD-MCNC: 7.6 G/DL (ref 14–18)
IMM GRANULOCYTES # BLD AUTO: 0.03 K/UL (ref 0–0.11)
IMM GRANULOCYTES # BLD AUTO: 0.04 K/UL (ref 0–0.11)
IMM GRANULOCYTES NFR BLD AUTO: 0.4 % (ref 0–0.9)
IMM GRANULOCYTES NFR BLD AUTO: 0.5 % (ref 0–0.9)
LYMPHOCYTES # BLD AUTO: 1.94 K/UL (ref 1–4.8)
LYMPHOCYTES # BLD AUTO: 1.98 K/UL (ref 1–4.8)
LYMPHOCYTES NFR BLD: 24.8 % (ref 22–41)
LYMPHOCYTES NFR BLD: 25.4 % (ref 22–41)
MCH RBC QN AUTO: 28 PG (ref 27–33)
MCH RBC QN AUTO: 28.3 PG (ref 27–33)
MCHC RBC AUTO-ENTMCNC: 30.6 G/DL (ref 33.7–35.3)
MCHC RBC AUTO-ENTMCNC: 30.6 G/DL (ref 33.7–35.3)
MCV RBC AUTO: 91.4 FL (ref 81.4–97.8)
MCV RBC AUTO: 92.2 FL (ref 81.4–97.8)
MONOCYTES # BLD AUTO: 0.72 K/UL (ref 0–0.85)
MONOCYTES # BLD AUTO: 0.78 K/UL (ref 0–0.85)
MONOCYTES NFR BLD AUTO: 10 % (ref 0–13.4)
MONOCYTES NFR BLD AUTO: 9.2 % (ref 0–13.4)
NEUTROPHILS # BLD AUTO: 4.69 K/UL (ref 1.82–7.42)
NEUTROPHILS # BLD AUTO: 4.81 K/UL (ref 1.82–7.42)
NEUTROPHILS NFR BLD: 60.2 % (ref 44–72)
NEUTROPHILS NFR BLD: 61.5 % (ref 44–72)
NRBC # BLD AUTO: 0.02 K/UL
NRBC # BLD AUTO: 0.03 K/UL
NRBC BLD-RTO: 0.3 /100 WBC
NRBC BLD-RTO: 0.4 /100 WBC
PLATELET # BLD AUTO: 169 K/UL (ref 164–446)
PLATELET # BLD AUTO: 172 K/UL (ref 164–446)
PMV BLD AUTO: 10.9 FL (ref 9–12.9)
PMV BLD AUTO: 11.1 FL (ref 9–12.9)
POTASSIUM SERPL-SCNC: 4.5 MMOL/L (ref 3.6–5.5)
RBC # BLD AUTO: 2.68 M/UL (ref 4.7–6.1)
RBC # BLD AUTO: 2.69 M/UL (ref 4.7–6.1)
SODIUM SERPL-SCNC: 134 MMOL/L (ref 135–145)
WBC # BLD AUTO: 7.8 K/UL (ref 4.8–10.8)
WBC # BLD AUTO: 7.8 K/UL (ref 4.8–10.8)

## 2023-02-02 PROCEDURE — RXMED WILLOW AMBULATORY MEDICATION CHARGE: Performed by: HOSPITALIST

## 2023-02-02 PROCEDURE — 85025 COMPLETE CBC W/AUTO DIFF WBC: CPT | Mod: 91

## 2023-02-02 PROCEDURE — 700111 HCHG RX REV CODE 636 W/ 250 OVERRIDE (IP): Performed by: STUDENT IN AN ORGANIZED HEALTH CARE EDUCATION/TRAINING PROGRAM

## 2023-02-02 PROCEDURE — 700102 HCHG RX REV CODE 250 W/ 637 OVERRIDE(OP): Performed by: STUDENT IN AN ORGANIZED HEALTH CARE EDUCATION/TRAINING PROGRAM

## 2023-02-02 PROCEDURE — C9113 INJ PANTOPRAZOLE SODIUM, VIA: HCPCS | Performed by: STUDENT IN AN ORGANIZED HEALTH CARE EDUCATION/TRAINING PROGRAM

## 2023-02-02 PROCEDURE — A9270 NON-COVERED ITEM OR SERVICE: HCPCS | Performed by: STUDENT IN AN ORGANIZED HEALTH CARE EDUCATION/TRAINING PROGRAM

## 2023-02-02 PROCEDURE — 80048 BASIC METABOLIC PNL TOTAL CA: CPT

## 2023-02-02 PROCEDURE — 99239 HOSP IP/OBS DSCHRG MGMT >30: CPT | Performed by: HOSPITALIST

## 2023-02-02 RX ORDER — PANTOPRAZOLE SODIUM 20 MG/1
20 TABLET, DELAYED RELEASE ORAL DAILY
Qty: 30 TABLET | Refills: 1 | Status: SHIPPED | OUTPATIENT
Start: 2023-02-02 | End: 2023-02-16 | Stop reason: SDUPTHER

## 2023-02-02 RX ORDER — LISINOPRIL 40 MG/1
20 TABLET ORAL DAILY
Qty: 30 TABLET | Refills: 0 | Status: SHIPPED | OUTPATIENT
Start: 2023-02-02

## 2023-02-02 RX ADMIN — PANTOPRAZOLE SODIUM 40 MG: 40 INJECTION, POWDER, FOR SOLUTION INTRAVENOUS at 04:33

## 2023-02-02 RX ADMIN — SPIRONOLACTONE 25 MG: 25 TABLET ORAL at 04:33

## 2023-02-02 NOTE — PROGRESS NOTES
Received bedside report from Doris BHATIA. Patient sitting comfortably at bedside on room air, denies pain (0/10). Plan of care updated with patient. Education reinforced about fall prevention and using call light. Bed locked and in lowest position, call light in reach.

## 2023-02-02 NOTE — DISCHARGE SUMMARY
Discharge Summary    CHIEF COMPLAINT ON ADMISSION  Chief Complaint   Patient presents with    Chest Pain    Shortness of Breath     Pt chest pain, SOB and productive cough x1 day. Chest pain non radiating but a constant pain       Reason for Admission  EMS     Admission Date  1/28/2023    CODE STATUS  Full Code    HPI & HOSPITAL COURSE  Robert Mcdonnell is a 72 y.o. male who presented 1/28/2023 with progressively worsening shortness of breath. He has a history of longstanding tobacco use, CHF with reduced ejection fraction, atrial fibrillation on Coumadin. He reportedly ran out of his medications several days prior to admission and had not taken anything since then. He denied fever, chills, nausea , vomiting,diarrhea  and abdominal pain. States that he smokes a pack of cigarettes a day for the last 30 years. He reported he had never been checked for COPD.      In the ED, patient found to be tachycardic.  Pertinent labs include neutrophil leukocytosis, hemoglobin 8.5, prerenal azotemia, non-anion gap metabolic acidosis, troponin 30, , INR 1.31.  Chest x-ray showing no acute cardiopulmonary abnormality. EKG showing atrial fibrillation with atypical left bundle branch block (not new).      Pt was admitted for a suspected decompensated CHF from med non-compliant and chest pain observation. With elevated procal and leukocytosis, empiric treatment for CAP was also started. He was also found to be anemia with occult positive stool. Dr Lozoya of GI was consulted and PPI started.   An EGD was attempted on 1/31/23, however as the scope was advanced to the second portion of the duodenum the patient went into rapid afib in the 160's and then jak in the 30's. The procedure was aborted and patient was seen by cardiology. Cardiology recommend that his chronic beta blocker be held and the next day when he went back into rvr, they recommended that the previous dose be resumed. He was monitored back on that dose and did  well, no further jak or rvr.     He had no further bleeding and his hemoglobin remained stable. He agreed to stay off of his previous coumadin for at least one week and have a repeat cbc with his pcp in one week, if stable, he and his pcp will discuss restarting the coumadin at that time.    Cardiology placed a zio patch prior to discharged and will follow up with him in clinic. Patient will also follow up with [facility] as an outpatient. Smoking cessation was discussed and recommended and he agrees to close outpatient follow up and to return to the er if needed.     Therefore, he is discharged in fair and stable condition to home with close outpatient follow-up.    The patient met 2-midnight criteria for an inpatient stay at the time of discharge.    Discharge Date  2/2/23    FOLLOW UP ITEMS POST DISCHARGE  Pcp  Cardiology  GI    DISCHARGE DIAGNOSES  Principal Problem:    Acute exacerbation of CHF (congestive heart failure) (HCC) POA: Unknown  Active Problems:    Anemia POA: Yes    Tobacco dependence POA: Yes    Chronic systolic heart failure (HCC) (Chronic) POA: Yes    AF (atrial fibrillation) (HCC) POA: Unknown    Morbid obesity (HCC) POA: Unknown    Tobacco use POA: Yes    Acute congestive heart failure, unspecified heart failure type (HCC) POA: Yes  Resolved Problems:    Acute hypoxemic respiratory failure (HCC) (Chronic) POA: Yes    Atrial fibrillation with rapid ventricular response (HCC) (Chronic) POA: Yes    Fluid overload POA: Yes    Sepsis (HCC) POA: Unknown    Upper GI bleed POA: Unknown    NIRMAL (acute kidney injury) (HCC) POA: Unknown      FOLLOW UP  No future appointments.  Ruben Guzman M.D.  [address] [address]  [phone]    Go on 4/4/2023  Please go to your follow up appointment with Ruben Guzman M.D. on Tuesday April 4, 2023 check in at 1:15pm for a 1:45pm appointment.    Torey Ferguson M.D.  [address] [place] [address]  [phone]    Go on  2/8/2023  Please go to your follow up appointment with Torey Ferguson M.D. on Wednesday February 8, 2023 check in at 8:15am for a 8:45am appointment.    Santi Chery M.D.  [address]  [facility] [address]  [phone]    Follow up in 2 week(s)      Pati Faulkner  [facility]  [address]  [facility] [address]  [phone]          [facility]  [address]  [facility] [address]  [phone]          MEDICATIONS ON DISCHARGE     Medication List        START taking these medications        Instructions   pantoprazole 20 MG tablet  Commonly known as: PROTONIX   Take 1 Tablet by mouth every day.  Dose: 20 mg            CHANGE how you take these medications        Instructions   lisinopril 40 MG tablet  What changed: how much to take  Commonly known as: PRINIVIL   Doctor's comments: Hold sbp less 105  Take 0.5 Tablet by mouth every day. Hold sbp less 105  Dose: 20 mg            CONTINUE taking these medications        Instructions   albuterol 108 (90 Base) MCG/ACT Aers inhalation aerosol   Doctor's comments: Give albuterol that is patient or insurance preference please  Inhale 2 Puffs every four hours as needed for Shortness of Breath.  Dose: 2 Puff     metoprolol  MG Tb24  Commonly known as: TOPROL XL   Take 1 tablet by mouth every evening.  Dose: 100 mg     spironolactone 25 MG Tabs  Commonly known as: ALDACTONE   Take 1 tablet by mouth once daily     torsemide 20 MG Tabs  Commonly known as: DEMADEX   Take 1 tablet by mouth once daily            STOP taking these medications      warfarin 7.5 MG Tabs  Commonly known as: COUMADIN              Allergies  No Known Allergies    DIET  Orders Placed This Encounter   Procedures    Diet Order Diet: Full Liquid; Miscellaneous modifications: (optional): No Red     Standing Status:   Standing     Number of Occurrences:   1     Order Specific Question:   Diet:     Answer:   Full Liquid [11]     Order Specific Question:    Miscellaneous modifications: (optional)     Answer:   No Red [61]       ACTIVITY  As tolerated.  Weight bearing as tolerated    CONSULTATIONS  [place] [facility]  [facility]    PROCEDURES  DX-CHEST-PORTABLE (1 VIEW)   Final Result      No acute cardiac or pulmonary abnormalities are identified.      EC-ECHOCARDIOGRAM COMPLETE W/O CONT   Final Result      DX-CHEST-PORTABLE (1 VIEW)   Final Result      No acute cardiopulmonary abnormality.            LABORATORY  Lab Results   Component Value Date    SODIUM 134 (L) 02/02/2023    POTASSIUM 4.5 02/02/2023    CHLORIDE 102 02/02/2023    CO2 23 02/02/2023    GLUCOSE 95 02/02/2023    BUN 12 02/02/2023    CREATININE 1.29 02/02/2023        Lab Results   Component Value Date    WBC 7.8 02/02/2023    WBC 7.8 02/02/2023    HEMOGLOBIN 7.6 (L) 02/02/2023    HEMOGLOBIN 7.5 (L) 02/02/2023    HEMATOCRIT 24.8 (L) 02/02/2023    HEMATOCRIT 24.5 (L) 02/02/2023    PLATELETCT 169 02/02/2023    PLATELETCT 172 02/02/2023        Total time of the discharge process exceeds 45 minutes.

## 2023-02-02 NOTE — DISCHARGE PLANNING
Received Choice form at 0832  Agency/Facility Name: [facility]   Referral sent per Choice form @ 0858, via Communication Management     0928-  Agency/Facility Name: [facility]  Spoke To: Diana  Outcome: DPA requested update on referral, informed that  is reviewing it. HH to verify PCP and insurance, and call DPA back with update.     0939-  Agency/Facility Name: [facility]  Spoke To: Diana  Outcome:  confirmed they have accepted Pt. DPA confirmed Pt will DC today. HH to contact the Pt later this evening or tomorrow morning.     RN CM notified.     0946-  Agency/Facility Name: [facility]  Spoke To: Diana  Outcome:  requesting that DPA fax HH order and facesheet.     0954-  Agency/Facility Name: [facility]  Spoke To: Diana  Outcome: DPA informed that  completed insurance verification process, and determined that Pt's insurance is inactive.     RN CM notified.

## 2023-02-02 NOTE — DISCHARGE INSTRUCTIONS
HF Patient Discharge Instructions  Monitor your weight daily, and maintain a weight chart, to track your weight changes.   Activity as tolerated, unless your Doctor has ordered otherwise.  Follow a low fat, low cholesterol, low salt diet unless instructed otherwise by your Doctor. Read the labels on the back of food products and track your intake of fat, cholesterol and salt.   Fluid Restriction No. If a Fluid Restriction has been ordered by your Doctor, measure fluids with a measuring cup to ensure that you are not exceeding the restriction.   No smoking.  Oxygen No. If your Doctor has ordered that you wear Oxygen at home, it is important to wear it as ordered.  Did you receive an explanation from staff on the importance of taking each of your medications and why it is necessary to keep taking them unless your doctor says to stop? Yes  Were all of your questions answered about how to manage your heart failure and what to do if you have increased signs and symptoms after you go home? Yes  Do you feel like your heart failure care team involved you in the care treatment plan and allowed you to make decisions regarding your care while in the hospital and addressed any discharge needs you might have? Yes    See the educational handout provided at discharge for more information on monitoring your daily weight, activity and diet. This also explains more about Heart Failure, symptoms of a flare-up and some of the tests that you have undergone.     Warning Signs of a Flare-Up include:  Swelling in the ankles or lower legs.  Shortness of breath, while at rest, or while doing normal activities.   Shortness of breath at night when in bed, or coughing in bed.   Requiring more pillows to sleep at night, or needing to sit up at night to sleep.  Feeling weak, dizzy or fatigued.     When to call your Doctor:  Call [employer] seven days a week from 8:00 a.m. to 8:00 p.m. for medical questions [phone].  Call your  Primary Care Physician or Cardiologist if:   You experience any pain radiating to your jaw or neck.  You have any difficulty breathing.  You experience weight gain of 3 lbs in a day or 5 lbs in a week.   You feel any palpitations or irregular heartbeats.  You become dizzy or lose consciousness.   If you have had an angiogram or had a pacemaker or AICD placed, and experience:  Bleeding, drainage or swelling at the surgical / puncture site.  Fever greater than 100.0 F  Shock from internal defibrillator.  Cool and / or numb extremities.     Please access the [facility]HA My HF Guide/Heart Failure Interactive Workbook:   [email or web address]

## 2023-02-02 NOTE — FACE TO FACE
Face to Face Supporting Documentation - Home Health    The encounter with this patient was in whole or in part the primary reason for home health admission.    Date of encounter:   Patient:                    MRN:                       YOB: 2023  Robert Mcdonnell  [id number]  1950     Home health to see patient for:  Skilled Nursing care for assessment, interventions & education    Skilled need for:  Exacerbation of Chronic Disease State gi bleed, afib    Skilled nursing interventions to include:  Comment: exam, vitals, pt and ot    Homebound status evidenced by:  Needs the assistance of another person in order to leave the home. Leaving home requires a considerable and taxing effort. There is a normal inability to leave the home.    Community Physician to provide follow up care: Ming Stinson M.D.     Optional Interventions? No      I certify the face to face encounter for this home health care referral meets the CMS requirements and the encounter/clinical assessment with the patient was, in whole, or in part, for the medical condition(s) listed above, which is the primary reason for home health care. Based on my clinical findings: the service(s) are medically necessary, support the need for home health care, and the homebound criteria are met.  I certify that this patient has had a face to face encounter by myself.  Shaina Otto M.D. - NPI: [id number]

## 2023-02-06 NOTE — DOCUMENTATION QUERY
[facility]                                                                       Query Response Note      PATIENT:               HUNTER STEELE  ACCT #:                  [id number]  MRN:                     [id number]  :                      1950  ADMIT DATE:       2023 9:12 PM  DISCH DATE:        2023 10:27 AM  RESPONDING  PROVIDER #:        [id number]           QUERY TEXT:    Sepsis with source of possible pneumonia is documented in the Medical Record.  Lab Results show  WBC 13.4 ->  WBC 12 ->11.5.   Empiric antibiotics were stopped on .   After further study, can the status of the diagnosis of sepsis be clarified?    Sepsis - real or suspected infection plus 2 or more SIRS criteria  Temp <96.8 or >101  HR >90  RR >20  WBC <4,000 or > 12,000  >10% bandemia         The patient's Clinical Indicators include:  H&P and Progress Notes:  Sepsis POA, SIRS include: tachycardia , tachypnea, and Leukocytosis.  Source is possible PNA.  Empiric abx stopped. Leukocytosis resolved.   WBC 13.4 ,  WBC 12.0, 11.5   Procal 0.28   131/82, 110, 16, 97.7F, 96%  Risk Factors: possible pna, atrial fibrillation  Treatment: Zithromax, Rocephin, IVF    Thank you,  Anthony Saini RN, BSN  Clinical   Connect via [employer]  Options provided:   -- Sepsis has been ruled out   -- Sepsis confirmed, please clarify if associated with organ dysfunction   -- Other explantion, please specify   -- Unable to provide additional clarity regarding the diagnosis      Query created by: Anthony Saini on 2023 1:12 PM    RESPONSE TEXT:    sepsis confirmed pneumonia with tachycardia and tachypnea          Electronically signed by:  HATTIE BRODERICK MD 2023 2:02 PM

## 2023-02-16 ENCOUNTER — OFFICE VISIT (OUTPATIENT)
Dept: MEDICAL GROUP | Facility: MEDICAL CENTER | Age: 73
End: 2023-02-16
Payer: MEDICARE

## 2023-02-16 ENCOUNTER — TELEPHONE (OUTPATIENT)
Dept: VASCULAR LAB | Facility: MEDICAL CENTER | Age: 73
End: 2023-02-16

## 2023-02-16 VITALS
WEIGHT: 249 LBS | HEART RATE: 108 BPM | TEMPERATURE: 97.2 F | OXYGEN SATURATION: 96 % | HEIGHT: 72 IN | RESPIRATION RATE: 16 BRPM | DIASTOLIC BLOOD PRESSURE: 72 MMHG | SYSTOLIC BLOOD PRESSURE: 144 MMHG | BODY MASS INDEX: 33.72 KG/M2

## 2023-02-16 DIAGNOSIS — E66.01 MORBID OBESITY (HCC): ICD-10-CM

## 2023-02-16 DIAGNOSIS — Z79.01 ON COUMADIN FOR ATRIAL FIBRILLATION (HCC): ICD-10-CM

## 2023-02-16 DIAGNOSIS — Z72.0 TOBACCO USE: ICD-10-CM

## 2023-02-16 DIAGNOSIS — K92.2 UPPER GI BLEED: ICD-10-CM

## 2023-02-16 DIAGNOSIS — E66.9 OBESITY (BMI 30-39.9): ICD-10-CM

## 2023-02-16 DIAGNOSIS — I50.23 ACUTE ON CHRONIC SYSTOLIC CONGESTIVE HEART FAILURE (HCC): ICD-10-CM

## 2023-02-16 DIAGNOSIS — I48.91 ATRIAL FIBRILLATION, UNSPECIFIED TYPE (HCC): ICD-10-CM

## 2023-02-16 DIAGNOSIS — I48.91 ON COUMADIN FOR ATRIAL FIBRILLATION (HCC): ICD-10-CM

## 2023-02-16 DIAGNOSIS — Z91.199 NONCOMPLIANCE: ICD-10-CM

## 2023-02-16 DIAGNOSIS — I50.22 CHRONIC SYSTOLIC HEART FAILURE (HCC): Chronic | ICD-10-CM

## 2023-02-16 PROCEDURE — 99214 OFFICE O/P EST MOD 30 MIN: CPT | Performed by: STUDENT IN AN ORGANIZED HEALTH CARE EDUCATION/TRAINING PROGRAM

## 2023-02-16 RX ORDER — LISINOPRIL 20 MG/1
20 TABLET ORAL DAILY
Qty: 90 TABLET | Refills: 3 | Status: SHIPPED | OUTPATIENT
Start: 2023-02-16

## 2023-02-16 RX ORDER — SPIRONOLACTONE 25 MG/1
25 TABLET ORAL DAILY
Qty: 90 TABLET | Refills: 3 | Status: SHIPPED | OUTPATIENT
Start: 2023-02-16

## 2023-02-16 RX ORDER — LISINOPRIL 40 MG/1
20 TABLET ORAL DAILY
Qty: 30 TABLET | Refills: 0 | Status: CANCELLED | OUTPATIENT
Start: 2023-02-16

## 2023-02-16 RX ORDER — METOPROLOL SUCCINATE 100 MG/1
100 TABLET, EXTENDED RELEASE ORAL EVERY EVENING
Qty: 90 TABLET | Refills: 3 | Status: SHIPPED | OUTPATIENT
Start: 2023-02-16

## 2023-02-16 RX ORDER — TORSEMIDE 20 MG/1
20 TABLET ORAL DAILY
Qty: 90 TABLET | Refills: 3 | Status: SHIPPED | OUTPATIENT
Start: 2023-02-16

## 2023-02-16 RX ORDER — PANTOPRAZOLE SODIUM 20 MG/1
20 TABLET, DELAYED RELEASE ORAL DAILY
Qty: 90 TABLET | Refills: 1 | Status: SHIPPED | OUTPATIENT
Start: 2023-02-16

## 2023-02-16 ASSESSMENT — FIBROSIS 4 INDEX: FIB4 SCORE: 2.92

## 2023-02-16 NOTE — PROGRESS NOTES
Subjective:     Chief Complaint   Patient presents with    Hospital Follow-up    Medication Refill         HPI:   Robert presents today with    Hospital follow-up  Patient hospitalized 1/28 through 2/2 for chest pain and shortness of breath.  Patient had ran out of his medications and was found to be tachycardic.  Patient was admitted for suspected decompensated CHF from med noncompliance and for further evaluation of chest pain.  Patient was found to have anemia with occult positive stool, GI consulted, PPI started.  Patient went into rapid A-fib during EGD attempt.  Due to bleeding, was recommended to stay off Coumadin x1 week, needs repeat CBC.  CBC stable, recommend restarting on Coumadin.  CBC ordered today.  Patient advised importance of getting CBC, patient never withheld Coumadin.  Patient notes that he is not having black stool.    Radiology placed a Zio patch prior to discharge and we will follow-up with him in clinic.  Patient has not yet followed up with cardiology.  No scheduled appointment.  Unclear if patient completed Zio patch.  Discussed with patient the importance of following up with cardiology.  Patient provided phone numbers and discussed importance of scheduling follow-up today.    Needs outpatient follow-up with GI consultants.        Patient with poor noncompliance.  Patient declined home health.  Patient states that he does not need any assistance.  Patient was seen in ED due to being out of his medications, patient has not since picked up medications.  Denies chest pain, shortness of breath or leg swelling today.  Patient is not receptive to discussing his noncompliance or difficulties adhering to care.          ROS:  Gen: no fevers/chills  Pulm: no sob, no cough  CV: no chest pain, no palpitations  GI: no nausea/vomiting, no diarrhea        Objective:     Exam:  BP (!) 144/72 (BP Location: Left arm, Patient Position: Sitting, BP Cuff Size: Adult)   Pulse (!) 108   Temp 36.2 °C (97.2 °F)  (Temporal)   Resp 16   Ht 1.829 m (6')   Wt 113 kg (249 lb)   SpO2 96%   BMI 33.77 kg/m²  Body mass index is 33.77 kg/m².    Gen: Alert and oriented, No apparent distress.  Neck: Neck is supple without lymphadenopathy.  Lungs: Normal effort, CTA bilaterally, no wheezes, rhonchi, or rales  CV: Regular rate and rhythm. No murmurs, rubs, or gallops.  Ext: No clubbing, cyanosis, edema.        Assessment & Plan:     72 y.o. male with the following -     1. Noncompliance  - REFERRAL TO CARE MANAGEMENT    2. On Coumadin for atrial fibrillation (HCC)  - CBC WITH DIFFERENTIAL; Future  - Referral to Vascular Medicine    3. Upper GI bleed  - pantoprazole (PROTONIX) 20 MG tablet; Take 1 Tablet by mouth every day.  Dispense: 90 Tablet; Refill: 1    4. Acute on chronic systolic congestive heart failure (HCC)      5. Atrial fibrillation, unspecified type (HCC)  - metoprolol SR (TOPROL XL) 100 MG TABLET SR 24 HR; Take 1 Tablet by mouth every evening.  Dispense: 90 Tablet; Refill: 3    6. Chronic systolic heart failure (HCC)  - metoprolol SR (TOPROL XL) 100 MG TABLET SR 24 HR; Take 1 Tablet by mouth every evening.  Dispense: 90 Tablet; Refill: 3  - spironolactone (ALDACTONE) 25 MG Tab; Take 1 Tablet by mouth every day.  Dispense: 90 Tablet; Refill: 3  - torsemide (DEMADEX) 20 MG Tab; Take 1 Tablet by mouth every day.  Dispense: 90 Tablet; Refill: 3  - lisinopril (PRINIVIL) 20 MG Tab; Take 1 Tablet by mouth every day.  Dispense: 90 Tablet; Refill: 3    7. Morbid obesity (HCC)  8. Obesity (BMI 30-39.9)  Chronic, uncontrolled.  BMI of 33.    9. Tobacco use  Chronic, uncontrolled.  Patient continues to use tobacco.    No follow-ups on file.    Please note that this dictation was created using voice recognition software. I have made every reasonable attempt to correct obvious errors, but I expect that there are errors of grammar and possibly content that I did not discover before finalizing the note.

## 2023-02-16 NOTE — TELEPHONE ENCOUNTER
[facility]    Received referral for anticoagulation.  Unable to leave  today, will try again at a later time.    Maxi Lazo, PharmD

## 2023-02-16 NOTE — LETTER
February 16, 2023        Robert Guzman M.D.  [address] [id number] [phone]     Go on 4/4/2023  Please go to your follow up appointment with Ruben Guzman M.D. on Tuesday April 4, 2023 check in at 1:15pm for a 1:45pm appointment.     Torey Ferguson M.D.  [address] [facility] [address]  [phone]     Go on 2/8/2023  Please go to your follow up appointment with Torey Ferguson M.D. on Wednesday February 8, 2023 check in at 8:15am for a 8:45am appointment.     Santi Chery M.D.  [address] [facility] [address]  [phone]     Follow up in 2 week(s)                           Torri Razo P.A.-C.

## 2023-02-17 ENCOUNTER — PATIENT OUTREACH (OUTPATIENT)
Dept: HEALTH INFORMATION MANAGEMENT | Facility: OTHER | Age: 73
End: 2023-02-17
Payer: MEDICARE

## 2023-02-17 NOTE — PROGRESS NOTES
"2-17-23  ANYI received referral for Robert from provider, Torri Razo P.A.-C for \"noncompliance\".   @[id number] ANYI called Robert. Robert was not available and his voicemail box was full. ANYI will follow up.  2-21-23  @[id number] ANYI called Robert. Robert was not available and his voicemail box was full.   "

## 2023-02-22 ENCOUNTER — TELEPHONE (OUTPATIENT)
Dept: VASCULAR LAB | Facility: MEDICAL CENTER | Age: 73
End: 2023-02-22
Payer: MEDICARE

## 2023-02-22 NOTE — TELEPHONE ENCOUNTER
[facility] and Vascular Health and Pharmacotherapy Programs    Received anticoagulation referral for restarting warfarin from Torri Razo PA-C on 2/16    Pt is not new to our clinic. He was d/c'd for noncompliance  2nd call  Unable to lvm as vm was full  Will send letter    Insurance: [employer] - confirmed w/ PAR - Unable to be seen in [facility]  Locations to be seen: Can manage warfarin via phone?    [facility] Anticoagulation/Pharmacotherapy Clinic at [phone], fax [phone]    Maya Dominguez, PharmD

## 2023-02-22 NOTE — LETTER
February 22, 2023          Robert Mcdonnell  [address]  [place] [address]      Dear Robert Mcdonnell ,    We have been unsuccessful in our attempts to contact you regarding your Anticoagulation Service referral.  Anticoagulants are medications that can cause potential harmful side effects when not monitored properly. Without proper monitoring there is potential for serious, sometimes life-threatening bleeding problems or life-threatening blood clots or stroke could result.    Please contact our clinic so we may assist you.  We are open Monday-Friday 8 am until 5 pm.  You may reach our Service at [phone].    To monitor your anticoagulant effectively, we need to be able to communicate with you.  This is a requirement to be followed by our Service.  Please contact the clinic as soon as possible to establish care and provide your current contact information.  Thank you.        Sincerely,        Maxi Lazo PharmD, [facility]  Pharmacy Clinical Supervisor  [facility]  Outpatient Anticoagulation Service

## 2023-02-27 ENCOUNTER — PATIENT OUTREACH (OUTPATIENT)
Dept: HEALTH INFORMATION MANAGEMENT | Facility: OTHER | Age: 73
End: 2023-02-27
Payer: MEDICARE

## 2023-02-27 ENCOUNTER — TELEPHONE (OUTPATIENT)
Dept: VASCULAR LAB | Facility: MEDICAL CENTER | Age: 73
End: 2023-02-27
Payer: MEDICARE

## 2023-02-27 NOTE — PROGRESS NOTES
2-27-23  @[id number] ANYI called Robert. ANYI was unable to leave a voicemail as the mailbox was full.  @[id number] ANYI mailed Robert a closure letter that has the ANYI contact info in case Robert would like to reach out to ANYI.

## 2023-02-27 NOTE — TELEPHONE ENCOUNTER
[facility] Heart and Vascular Health and Pharmacotherapy Programs     Received anticoagulation referral for restarting warfarin from Torri Razo PA-C on 2/16     Pt is not new to our clinic. He was d/c'd for noncompliance  3rd call  Unable to lvm as vm was full  Letter was sent last week     Insurance: [employer] - confirmed w/ PAR - Unable to be seen in [facility]  Locations to be seen: Can manage warfarin via phone?     [facility] Anticoagulation/Pharmacotherapy Clinic at [phone], fax [phone]     Maya Dominguez, PharmD

## 2023-03-06 ENCOUNTER — TELEPHONE (OUTPATIENT)
Dept: VASCULAR LAB | Facility: MEDICAL CENTER | Age: 73
End: 2023-03-06
Payer: MEDICARE

## 2023-03-06 ENCOUNTER — TELEPHONE (OUTPATIENT)
Dept: MEDICAL GROUP | Facility: MEDICAL CENTER | Age: 73
End: 2023-03-06
Payer: MEDICARE

## 2023-03-06 DIAGNOSIS — I48.91 ON COUMADIN FOR ATRIAL FIBRILLATION (HCC): ICD-10-CM

## 2023-03-06 DIAGNOSIS — Z79.01 ON COUMADIN FOR ATRIAL FIBRILLATION (HCC): ICD-10-CM

## 2023-03-06 NOTE — TELEPHONE ENCOUNTER
VOICEMAIL  1. Caller Name: [facility]                       Call Back Number: [phone]    2. Message: LVM stating that the referral could no be completed for the clinical reasons listed. They suggest referring patient to cardiology instead.     3. Patient approves office to leave a detailed voicemail/MyChart message: N/A

## 2023-03-06 NOTE — TELEPHONE ENCOUNTER
[facility] Heart and Vascular Health and Pharmacotherapy Programs     Received anticoagulation referral for restarting warfarin from Torri Razo PA-C on 2/16     Pt is not new to our clinic. He was d/c'd for noncompliance  3rd call  Unable to lvm as number on file now out of service    Called pt's emergency contact Tabitha - left VM asking if she has pt's updated contact info     Insurance: [employer] - confirmed w/ PAR - Unable to be seen in [facility]  Locations to be seen: Can manage warfarin via phone?     [facility] Anticoagulation/Pharmacotherapy Clinic at [phone], fax [phone]     Maya Dominguez, PharmD

## 2023-03-13 ENCOUNTER — TELEPHONE (OUTPATIENT)
Dept: VASCULAR LAB | Facility: MEDICAL CENTER | Age: 73
End: 2023-03-13
Payer: MEDICARE

## 2023-03-13 NOTE — TELEPHONE ENCOUNTER
[facility] Heart and Vascular Health and Pharmacotherapy Programs     Received anticoagulation referral for restarting warfarin from Torri Razo PA-C on 2/16     Pt is not new to our clinic. He was d/c'd for noncompliance    4th call  Unable to lvm as number on file now out of service     Called pt's emergency contact Tabitha - left VM asking if she has pt's updated contact info    Pt has not responded to multiple attempts/calls to establish care. Will send letter & await pt contact.     Insurance: [employer] - confirmed w/ PAR - Unable to be seen in [facility]  Locations to be seen: Can manage warfarin via phone?     [facility] Anticoagulation/Pharmacotherapy Clinic at [phone], fax [phone]    Maya Dominguez, PharmD    CC sent FYI to referring provided Torri Razo PA-C

## 2023-03-22 ENCOUNTER — PATIENT OUTREACH (OUTPATIENT)
Dept: HEALTH INFORMATION MANAGEMENT | Facility: OTHER | Age: 73
End: 2023-03-22
Payer: MEDICARE

## 2023-03-22 NOTE — PROGRESS NOTES
3-22-23  @[id number] ANYI called Robert. ANYI received busy signal.  ANYI made multiple phone attempts and sent a letter attempting to reach Robert. ANYI was unsuccessful. ANYI closed CCM referral.

## 2024-01-05 ENCOUNTER — TELEPHONE (OUTPATIENT)
Dept: HEALTH INFORMATION MANAGEMENT | Facility: OTHER | Age: 74
End: 2024-01-05

## 2024-09-07 ENCOUNTER — APPOINTMENT (OUTPATIENT)
Dept: RADIOLOGY | Facility: MEDICAL CENTER | Age: 74
DRG: 871 | End: 2024-09-07
Attending: EMERGENCY MEDICINE
Payer: MEDICARE

## 2024-09-07 ENCOUNTER — HOSPITAL ENCOUNTER (INPATIENT)
Facility: MEDICAL CENTER | Age: 74
LOS: 11 days | DRG: 871 | End: 2024-09-18
Attending: EMERGENCY MEDICINE | Admitting: STUDENT IN AN ORGANIZED HEALTH CARE EDUCATION/TRAINING PROGRAM
Payer: MEDICARE

## 2024-09-07 DIAGNOSIS — I10 PRIMARY HYPERTENSION: ICD-10-CM

## 2024-09-07 DIAGNOSIS — R65.10 SIRS (SYSTEMIC INFLAMMATORY RESPONSE SYNDROME) (HCC): ICD-10-CM

## 2024-09-07 DIAGNOSIS — I48.91 ATRIAL FIBRILLATION WITH RVR (HCC): ICD-10-CM

## 2024-09-07 DIAGNOSIS — J18.9 PNEUMONIA OF LEFT LOWER LOBE DUE TO INFECTIOUS ORGANISM: ICD-10-CM

## 2024-09-07 DIAGNOSIS — I50.22 CHRONIC SYSTOLIC HEART FAILURE (HCC): Chronic | ICD-10-CM

## 2024-09-07 DIAGNOSIS — N17.9 AKI (ACUTE KIDNEY INJURY) (HCC): ICD-10-CM

## 2024-09-07 DIAGNOSIS — E78.5 DYSLIPIDEMIA: ICD-10-CM

## 2024-09-07 DIAGNOSIS — R11.2 NAUSEA AND VOMITING, UNSPECIFIED VOMITING TYPE: ICD-10-CM

## 2024-09-07 DIAGNOSIS — E87.20 METABOLIC ACIDOSIS: ICD-10-CM

## 2024-09-07 DIAGNOSIS — K92.1 GASTROINTESTINAL HEMORRHAGE WITH MELENA: ICD-10-CM

## 2024-09-07 PROBLEM — D72.829 LEUKOCYTOSIS: Status: ACTIVE | Noted: 2024-09-07

## 2024-09-07 PROBLEM — Z71.6 TOBACCO ABUSE COUNSELING: Status: ACTIVE | Noted: 2024-09-07

## 2024-09-07 PROBLEM — J44.9 COPD (CHRONIC OBSTRUCTIVE PULMONARY DISEASE) (HCC): Status: ACTIVE | Noted: 2024-09-07

## 2024-09-07 PROBLEM — Z71.89 ACP (ADVANCE CARE PLANNING): Status: ACTIVE | Noted: 2024-09-07

## 2024-09-07 PROBLEM — R10.9 AP (ABDOMINAL PAIN): Status: ACTIVE | Noted: 2024-09-07

## 2024-09-07 LAB
ACANTHOCYTES BLD QL SMEAR: NORMAL
ALBUMIN SERPL BCP-MCNC: 4.2 G/DL (ref 3.2–4.9)
ALBUMIN/GLOB SERPL: 1 G/DL
ALP SERPL-CCNC: 94 U/L (ref 30–99)
ALT SERPL-CCNC: 6 U/L (ref 2–50)
AMPHET UR QL SCN: NEGATIVE
ANION GAP SERPL CALC-SCNC: 24 MMOL/L (ref 7–16)
ANISOCYTOSIS BLD QL SMEAR: ABNORMAL
APPEARANCE UR: ABNORMAL
APTT PPP: 24.2 SEC (ref 24.7–36)
APTT PPP: 25.7 SEC (ref 24.7–36)
AST SERPL-CCNC: 15 U/L (ref 12–45)
B-OH-BUTYR SERPL-MCNC: 1.73 MMOL/L (ref 0.02–0.27)
BACTERIA #/AREA URNS HPF: ABNORMAL /HPF
BARBITURATES UR QL SCN: NEGATIVE
BASOPHILS # BLD AUTO: 0.8 % (ref 0–1.8)
BASOPHILS # BLD: 0.13 K/UL (ref 0–0.12)
BENZODIAZ UR QL SCN: NEGATIVE
BILIRUB SERPL-MCNC: 1.3 MG/DL (ref 0.1–1.5)
BILIRUB UR QL STRIP.AUTO: NEGATIVE
BUN SERPL-MCNC: 60 MG/DL (ref 8–22)
BZE UR QL SCN: NEGATIVE
CALCIUM ALBUM COR SERPL-MCNC: 10.1 MG/DL (ref 8.5–10.5)
CALCIUM SERPL-MCNC: 10.3 MG/DL (ref 8.5–10.5)
CANNABINOIDS UR QL SCN: NEGATIVE
CHLORIDE SERPL-SCNC: 103 MMOL/L (ref 96–112)
CO2 SERPL-SCNC: 18 MMOL/L (ref 20–33)
COLOR UR: YELLOW
CORTIS SERPL-MCNC: 39.5 UG/DL (ref 0–23)
CREAT SERPL-MCNC: 2.24 MG/DL (ref 0.5–1.4)
CRP SERPL HS-MCNC: 2.13 MG/DL (ref 0–0.75)
EOSINOPHIL # BLD AUTO: 0 K/UL (ref 0–0.51)
EOSINOPHIL NFR BLD: 0 % (ref 0–6.9)
EPI CELLS #/AREA URNS HPF: ABNORMAL /HPF
ERYTHROCYTE [DISTWIDTH] IN BLOOD BY AUTOMATED COUNT: 48.3 FL (ref 35.9–50)
ERYTHROCYTE [SEDIMENTATION RATE] IN BLOOD BY WESTERGREN METHOD: 3 MM/HOUR (ref 0–20)
ETHANOL BLD-MCNC: <10.1 MG/DL
FENTANYL UR QL: NEGATIVE
GFR SERPLBLD CREATININE-BSD FMLA CKD-EPI: 30 ML/MIN/1.73 M 2
GLOBULIN SER CALC-MCNC: 4.4 G/DL (ref 1.9–3.5)
GLUCOSE SERPL-MCNC: 141 MG/DL (ref 65–99)
GLUCOSE UR STRIP.AUTO-MCNC: NEGATIVE MG/DL
HCT VFR BLD AUTO: 51.7 % (ref 42–52)
HGB BLD-MCNC: 17 G/DL (ref 14–18)
HYALINE CASTS #/AREA URNS LPF: ABNORMAL /LPF
INR PPP: 1.26 (ref 0.87–1.13)
INR PPP: 1.28 (ref 0.87–1.13)
KETONES UR STRIP.AUTO-MCNC: 15 MG/DL
LACTATE SERPL-SCNC: 1.8 MMOL/L (ref 0.5–2)
LACTATE SERPL-SCNC: 2.9 MMOL/L (ref 0.5–2)
LACTATE SERPL-SCNC: 3.5 MMOL/L (ref 0.5–2)
LEUKOCYTE ESTERASE UR QL STRIP.AUTO: NEGATIVE
LIPASE SERPL-CCNC: 22 U/L (ref 11–82)
LIPASE SERPL-CCNC: 23 U/L (ref 11–82)
LYMPHOCYTES # BLD AUTO: 0.67 K/UL (ref 1–4.8)
LYMPHOCYTES NFR BLD: 4.2 % (ref 22–41)
MACROCYTES BLD QL SMEAR: ABNORMAL
MAGNESIUM SERPL-MCNC: 2.3 MG/DL (ref 1.5–2.5)
MANUAL DIFF BLD: NORMAL
MCH RBC QN AUTO: 27.9 PG (ref 27–33)
MCHC RBC AUTO-ENTMCNC: 32.9 G/DL (ref 32.3–36.5)
MCV RBC AUTO: 84.8 FL (ref 81.4–97.8)
METHADONE UR QL SCN: NEGATIVE
MICRO URNS: ABNORMAL
MONOCYTES # BLD AUTO: 0.94 K/UL (ref 0–0.85)
MONOCYTES NFR BLD AUTO: 5.9 % (ref 0–13.4)
MORPHOLOGY BLD-IMP: NORMAL
MUCOUS THREADS #/AREA URNS HPF: ABNORMAL /HPF
NEUTROPHILS # BLD AUTO: 14.26 K/UL (ref 1.82–7.42)
NEUTROPHILS NFR BLD: 89.1 % (ref 44–72)
NITRITE UR QL STRIP.AUTO: NEGATIVE
NRBC # BLD AUTO: 0 K/UL
NRBC BLD-RTO: 0 /100 WBC (ref 0–0.2)
OPIATES UR QL SCN: NEGATIVE
OVALOCYTES BLD QL SMEAR: NORMAL
OXYCODONE UR QL SCN: NEGATIVE
PCP UR QL SCN: NEGATIVE
PH UR STRIP.AUTO: 5.5 [PH] (ref 5–8)
PHOSPHATE SERPL-MCNC: 4.5 MG/DL (ref 2.5–4.5)
PLATELET # BLD AUTO: 205 K/UL (ref 164–446)
PLATELET BLD QL SMEAR: NORMAL
PMV BLD AUTO: 10.9 FL (ref 9–12.9)
POIKILOCYTOSIS BLD QL SMEAR: NORMAL
POTASSIUM SERPL-SCNC: 4 MMOL/L (ref 3.6–5.5)
PROCALCITONIN SERPL-MCNC: 0.24 NG/ML
PROPOXYPH UR QL SCN: NEGATIVE
PROT SERPL-MCNC: 8.6 G/DL (ref 6–8.2)
PROT UR QL STRIP: 30 MG/DL
PROTHROMBIN TIME: 16 SEC (ref 12–14.6)
PROTHROMBIN TIME: 16.1 SEC (ref 12–14.6)
RBC # BLD AUTO: 6.1 M/UL (ref 4.7–6.1)
RBC # URNS HPF: ABNORMAL /HPF
RBC BLD AUTO: PRESENT
RBC UR QL AUTO: NEGATIVE
SODIUM SERPL-SCNC: 145 MMOL/L (ref 135–145)
SP GR UR STRIP.AUTO: 1.02
UFH PPP CHRO-ACNC: <0.1 IU/ML
UROBILINOGEN UR STRIP.AUTO-MCNC: 1 MG/DL
WBC # BLD AUTO: 16 K/UL (ref 4.8–10.8)
WBC #/AREA URNS HPF: ABNORMAL /HPF

## 2024-09-07 PROCEDURE — 85007 BL SMEAR W/DIFF WBC COUNT: CPT

## 2024-09-07 PROCEDURE — 99406 BEHAV CHNG SMOKING 3-10 MIN: CPT | Performed by: STUDENT IN AN ORGANIZED HEALTH CARE EDUCATION/TRAINING PROGRAM

## 2024-09-07 PROCEDURE — 81001 URINALYSIS AUTO W/SCOPE: CPT

## 2024-09-07 PROCEDURE — 99285 EMERGENCY DEPT VISIT HI MDM: CPT

## 2024-09-07 PROCEDURE — 82010 KETONE BODYS QUAN: CPT

## 2024-09-07 PROCEDURE — 700111 HCHG RX REV CODE 636 W/ 250 OVERRIDE (IP): Mod: JZ | Performed by: EMERGENCY MEDICINE

## 2024-09-07 PROCEDURE — 85652 RBC SED RATE AUTOMATED: CPT

## 2024-09-07 PROCEDURE — 84145 PROCALCITONIN (PCT): CPT

## 2024-09-07 PROCEDURE — 700101 HCHG RX REV CODE 250: Performed by: STUDENT IN AN ORGANIZED HEALTH CARE EDUCATION/TRAINING PROGRAM

## 2024-09-07 PROCEDURE — 83690 ASSAY OF LIPASE: CPT | Mod: 91

## 2024-09-07 PROCEDURE — 80307 DRUG TEST PRSMV CHEM ANLYZR: CPT

## 2024-09-07 PROCEDURE — 87086 URINE CULTURE/COLONY COUNT: CPT

## 2024-09-07 PROCEDURE — 700105 HCHG RX REV CODE 258: Performed by: EMERGENCY MEDICINE

## 2024-09-07 PROCEDURE — 85730 THROMBOPLASTIN TIME PARTIAL: CPT | Mod: 91

## 2024-09-07 PROCEDURE — 85610 PROTHROMBIN TIME: CPT | Mod: 91

## 2024-09-07 PROCEDURE — 83605 ASSAY OF LACTIC ACID: CPT

## 2024-09-07 PROCEDURE — 96365 THER/PROPH/DIAG IV INF INIT: CPT

## 2024-09-07 PROCEDURE — 80053 COMPREHEN METABOLIC PANEL: CPT

## 2024-09-07 PROCEDURE — 85520 HEPARIN ASSAY: CPT

## 2024-09-07 PROCEDURE — 84100 ASSAY OF PHOSPHORUS: CPT

## 2024-09-07 PROCEDURE — 83735 ASSAY OF MAGNESIUM: CPT

## 2024-09-07 PROCEDURE — 36415 COLL VENOUS BLD VENIPUNCTURE: CPT

## 2024-09-07 PROCEDURE — 82077 ASSAY SPEC XCP UR&BREATH IA: CPT

## 2024-09-07 PROCEDURE — 700105 HCHG RX REV CODE 258: Performed by: STUDENT IN AN ORGANIZED HEALTH CARE EDUCATION/TRAINING PROGRAM

## 2024-09-07 PROCEDURE — A9270 NON-COVERED ITEM OR SERVICE: HCPCS | Performed by: STUDENT IN AN ORGANIZED HEALTH CARE EDUCATION/TRAINING PROGRAM

## 2024-09-07 PROCEDURE — 96368 THER/DIAG CONCURRENT INF: CPT

## 2024-09-07 PROCEDURE — 71045 X-RAY EXAM CHEST 1 VIEW: CPT

## 2024-09-07 PROCEDURE — 700102 HCHG RX REV CODE 250 W/ 637 OVERRIDE(OP): Performed by: STUDENT IN AN ORGANIZED HEALTH CARE EDUCATION/TRAINING PROGRAM

## 2024-09-07 PROCEDURE — 700111 HCHG RX REV CODE 636 W/ 250 OVERRIDE (IP): Performed by: STUDENT IN AN ORGANIZED HEALTH CARE EDUCATION/TRAINING PROGRAM

## 2024-09-07 PROCEDURE — 93005 ELECTROCARDIOGRAM TRACING: CPT | Performed by: STUDENT IN AN ORGANIZED HEALTH CARE EDUCATION/TRAINING PROGRAM

## 2024-09-07 PROCEDURE — 96367 TX/PROPH/DG ADDL SEQ IV INF: CPT

## 2024-09-07 PROCEDURE — 82533 TOTAL CORTISOL: CPT

## 2024-09-07 PROCEDURE — 99497 ADVNCD CARE PLAN 30 MIN: CPT | Mod: 25 | Performed by: STUDENT IN AN ORGANIZED HEALTH CARE EDUCATION/TRAINING PROGRAM

## 2024-09-07 PROCEDURE — 74176 CT ABD & PELVIS W/O CONTRAST: CPT

## 2024-09-07 PROCEDURE — 85027 COMPLETE CBC AUTOMATED: CPT

## 2024-09-07 PROCEDURE — 99223 1ST HOSP IP/OBS HIGH 75: CPT | Mod: 25,AI | Performed by: STUDENT IN AN ORGANIZED HEALTH CARE EDUCATION/TRAINING PROGRAM

## 2024-09-07 PROCEDURE — 86140 C-REACTIVE PROTEIN: CPT

## 2024-09-07 PROCEDURE — 96375 TX/PRO/DX INJ NEW DRUG ADDON: CPT

## 2024-09-07 PROCEDURE — 770020 HCHG ROOM/CARE - TELE (206)

## 2024-09-07 RX ORDER — SODIUM CHLORIDE, SODIUM LACTATE, POTASSIUM CHLORIDE, AND CALCIUM CHLORIDE .6; .31; .03; .02 G/100ML; G/100ML; G/100ML; G/100ML
500 INJECTION, SOLUTION INTRAVENOUS
Status: DISCONTINUED | OUTPATIENT
Start: 2024-09-07 | End: 2024-09-18 | Stop reason: HOSPADM

## 2024-09-07 RX ORDER — ONDANSETRON 2 MG/ML
4 INJECTION INTRAMUSCULAR; INTRAVENOUS ONCE
Status: COMPLETED | OUTPATIENT
Start: 2024-09-07 | End: 2024-09-07

## 2024-09-07 RX ORDER — ACETAMINOPHEN 325 MG/1
650 TABLET ORAL EVERY 6 HOURS
Status: DISPENSED | OUTPATIENT
Start: 2024-09-07 | End: 2024-09-12

## 2024-09-07 RX ORDER — NICOTINE 21 MG/24HR
21 PATCH, TRANSDERMAL 24 HOURS TRANSDERMAL
Status: DISCONTINUED | OUTPATIENT
Start: 2024-09-07 | End: 2024-09-18 | Stop reason: HOSPADM

## 2024-09-07 RX ORDER — HEPARIN SODIUM 1000 [USP'U]/ML
40 INJECTION, SOLUTION INTRAVENOUS; SUBCUTANEOUS PRN
Status: DISCONTINUED | OUTPATIENT
Start: 2024-09-07 | End: 2024-09-09

## 2024-09-07 RX ORDER — OXYCODONE HYDROCHLORIDE 5 MG/1
5 TABLET ORAL
Status: DISCONTINUED | OUTPATIENT
Start: 2024-09-07 | End: 2024-09-18 | Stop reason: HOSPADM

## 2024-09-07 RX ORDER — ACETAMINOPHEN 325 MG/1
650 TABLET ORAL EVERY 6 HOURS PRN
Status: DISCONTINUED | OUTPATIENT
Start: 2024-09-07 | End: 2024-09-07

## 2024-09-07 RX ORDER — METOPROLOL TARTRATE 1 MG/ML
5 INJECTION, SOLUTION INTRAVENOUS
Status: DISCONTINUED | OUTPATIENT
Start: 2024-09-07 | End: 2024-09-18 | Stop reason: HOSPADM

## 2024-09-07 RX ORDER — ONDANSETRON 2 MG/ML
4 INJECTION INTRAMUSCULAR; INTRAVENOUS EVERY 4 HOURS PRN
Status: DISCONTINUED | OUTPATIENT
Start: 2024-09-07 | End: 2024-09-18 | Stop reason: HOSPADM

## 2024-09-07 RX ORDER — OXYCODONE HYDROCHLORIDE 10 MG/1
10 TABLET ORAL
Status: DISCONTINUED | OUTPATIENT
Start: 2024-09-07 | End: 2024-09-18 | Stop reason: HOSPADM

## 2024-09-07 RX ORDER — METRONIDAZOLE 500 MG/100ML
500 INJECTION, SOLUTION INTRAVENOUS EVERY 12 HOURS
Status: DISCONTINUED | OUTPATIENT
Start: 2024-09-07 | End: 2024-09-08

## 2024-09-07 RX ORDER — SODIUM CHLORIDE, SODIUM LACTATE, POTASSIUM CHLORIDE, CALCIUM CHLORIDE 600; 310; 30; 20 MG/100ML; MG/100ML; MG/100ML; MG/100ML
1000 INJECTION, SOLUTION INTRAVENOUS ONCE
Status: COMPLETED | OUTPATIENT
Start: 2024-09-07 | End: 2024-09-07

## 2024-09-07 RX ORDER — ACETAMINOPHEN 325 MG/1
650 TABLET ORAL EVERY 6 HOURS PRN
Status: DISCONTINUED | OUTPATIENT
Start: 2024-09-12 | End: 2024-09-18 | Stop reason: HOSPADM

## 2024-09-07 RX ORDER — ONDANSETRON 4 MG/1
4 TABLET, ORALLY DISINTEGRATING ORAL EVERY 6 HOURS PRN
COMMUNITY
Start: 2024-07-10

## 2024-09-07 RX ORDER — IBUPROFEN 200 MG
400 TABLET ORAL DAILY
Status: ON HOLD | COMMUNITY
End: 2024-09-18

## 2024-09-07 RX ORDER — ATORVASTATIN CALCIUM 40 MG/1
40 TABLET, FILM COATED ORAL EVERY EVENING
Status: DISCONTINUED | OUTPATIENT
Start: 2024-09-07 | End: 2024-09-18 | Stop reason: HOSPADM

## 2024-09-07 RX ORDER — SODIUM CHLORIDE 9 MG/ML
INJECTION, SOLUTION INTRAVENOUS CONTINUOUS
Status: DISCONTINUED | OUTPATIENT
Start: 2024-09-07 | End: 2024-09-15

## 2024-09-07 RX ORDER — LABETALOL HYDROCHLORIDE 5 MG/ML
10 INJECTION, SOLUTION INTRAVENOUS EVERY 4 HOURS PRN
Status: DISCONTINUED | OUTPATIENT
Start: 2024-09-07 | End: 2024-09-18 | Stop reason: HOSPADM

## 2024-09-07 RX ORDER — HEPARIN SODIUM 5000 [USP'U]/100ML
0-30 INJECTION, SOLUTION INTRAVENOUS CONTINUOUS
Status: DISCONTINUED | OUTPATIENT
Start: 2024-09-07 | End: 2024-09-09

## 2024-09-07 RX ORDER — ONDANSETRON 4 MG/1
4 TABLET, ORALLY DISINTEGRATING ORAL EVERY 4 HOURS PRN
Status: DISCONTINUED | OUTPATIENT
Start: 2024-09-07 | End: 2024-09-18 | Stop reason: HOSPADM

## 2024-09-07 RX ORDER — HYDROMORPHONE HYDROCHLORIDE 1 MG/ML
0.5 INJECTION, SOLUTION INTRAMUSCULAR; INTRAVENOUS; SUBCUTANEOUS
Status: DISCONTINUED | OUTPATIENT
Start: 2024-09-07 | End: 2024-09-18 | Stop reason: HOSPADM

## 2024-09-07 RX ORDER — PANTOPRAZOLE SODIUM 40 MG/10ML
40 INJECTION, POWDER, LYOPHILIZED, FOR SOLUTION INTRAVENOUS 2 TIMES DAILY
Status: COMPLETED | OUTPATIENT
Start: 2024-09-07 | End: 2024-09-10

## 2024-09-07 RX ORDER — SODIUM CHLORIDE, SODIUM LACTATE, POTASSIUM CHLORIDE, CALCIUM CHLORIDE 600; 310; 30; 20 MG/100ML; MG/100ML; MG/100ML; MG/100ML
INJECTION, SOLUTION INTRAVENOUS CONTINUOUS
Status: DISCONTINUED | OUTPATIENT
Start: 2024-09-07 | End: 2024-09-07

## 2024-09-07 RX ORDER — MAGNESIUM SULFATE 1 G/100ML
1 INJECTION INTRAVENOUS ONCE
Status: COMPLETED | OUTPATIENT
Start: 2024-09-07 | End: 2024-09-07

## 2024-09-07 RX ORDER — METOPROLOL TARTRATE 50 MG
50 TABLET ORAL 2 TIMES DAILY
Status: DISCONTINUED | OUTPATIENT
Start: 2024-09-07 | End: 2024-09-11

## 2024-09-07 RX ORDER — METOCLOPRAMIDE HYDROCHLORIDE 5 MG/ML
10 INJECTION INTRAMUSCULAR; INTRAVENOUS EVERY 6 HOURS
Status: COMPLETED | OUTPATIENT
Start: 2024-09-07 | End: 2024-09-08

## 2024-09-07 RX ADMIN — HEPARIN SODIUM 18 UNITS/KG/HR: 5000 INJECTION, SOLUTION INTRAVENOUS at 18:00

## 2024-09-07 RX ADMIN — CEFTRIAXONE SODIUM 2000 MG: 10 INJECTION, POWDER, FOR SOLUTION INTRAVENOUS at 17:36

## 2024-09-07 RX ADMIN — SODIUM CHLORIDE, POTASSIUM CHLORIDE, SODIUM LACTATE AND CALCIUM CHLORIDE 1000 ML: 600; 310; 30; 20 INJECTION, SOLUTION INTRAVENOUS at 14:00

## 2024-09-07 RX ADMIN — ONDANSETRON 4 MG: 2 INJECTION INTRAMUSCULAR; INTRAVENOUS at 13:52

## 2024-09-07 RX ADMIN — METOPROLOL TARTRATE 50 MG: 50 TABLET, FILM COATED ORAL at 17:36

## 2024-09-07 RX ADMIN — METRONIDAZOLE 500 MG: 500 INJECTION, SOLUTION INTRAVENOUS at 17:50

## 2024-09-07 RX ADMIN — PANTOPRAZOLE SODIUM 40 MG: 40 INJECTION, POWDER, FOR SOLUTION INTRAVENOUS at 17:37

## 2024-09-07 RX ADMIN — SODIUM CHLORIDE, POTASSIUM CHLORIDE, SODIUM LACTATE AND CALCIUM CHLORIDE 1000 ML: 600; 310; 30; 20 INJECTION, SOLUTION INTRAVENOUS at 16:15

## 2024-09-07 RX ADMIN — ATORVASTATIN CALCIUM 40 MG: 40 TABLET, FILM COATED ORAL at 19:53

## 2024-09-07 RX ADMIN — METOCLOPRAMIDE 10 MG: 5 INJECTION, SOLUTION INTRAMUSCULAR; INTRAVENOUS at 23:50

## 2024-09-07 RX ADMIN — MAGNESIUM SULFATE IN DEXTROSE 1 G: 10 INJECTION, SOLUTION INTRAVENOUS at 17:48

## 2024-09-07 RX ADMIN — ACETAMINOPHEN 650 MG: 325 TABLET ORAL at 23:50

## 2024-09-07 RX ADMIN — ACETAMINOPHEN 650 MG: 325 TABLET ORAL at 17:36

## 2024-09-07 RX ADMIN — SODIUM CHLORIDE: 9 INJECTION, SOLUTION INTRAVENOUS at 19:48

## 2024-09-07 RX ADMIN — NICOTINE TRANSDERMAL SYSTEM 21 MG: 21 PATCH, EXTENDED RELEASE TRANSDERMAL at 17:36

## 2024-09-07 RX ADMIN — METOCLOPRAMIDE 10 MG: 5 INJECTION, SOLUTION INTRAMUSCULAR; INTRAVENOUS at 17:39

## 2024-09-07 ASSESSMENT — PAIN DESCRIPTION - PAIN TYPE
TYPE: ACUTE PAIN
TYPE: ACUTE PAIN

## 2024-09-07 ASSESSMENT — ENCOUNTER SYMPTOMS
ABDOMINAL PAIN: 1
WEAKNESS: 1
CHILLS: 0
HEADACHES: 0
VOMITING: 1
BRUISES/BLEEDS EASILY: 0
FEVER: 0
MYALGIAS: 0
SHORTNESS OF BREATH: 0
COUGH: 0
FOCAL WEAKNESS: 0
BLURRED VISION: 0
DIZZINESS: 0
HEARTBURN: 1
NAUSEA: 1
PALPITATIONS: 0
DEPRESSION: 0
BLOOD IN STOOL: 0

## 2024-09-07 ASSESSMENT — LIFESTYLE VARIABLES: SUBSTANCE_ABUSE: 0

## 2024-09-07 ASSESSMENT — FIBROSIS 4 INDEX
FIB4 SCORE: 2.65
FIB4 SCORE: 2.21

## 2024-09-07 NOTE — ASSESSMENT & PLAN NOTE
Due to gastric ulcer, see above - suspect gastric cancer   resolved  Following  Supportive care  PPI  Gastric biopsy showed dysplasia, repeat edg and biopsy 9/13- still awaiting new biopsy results  Continue PPI and carafate  following

## 2024-09-07 NOTE — ASSESSMENT & PLAN NOTE
Remains euvolemic, no acute exacerbation  Following closely   Echo Moderately reduced left ventricular systolic function.  The right ventricle is dilated with reduced right ventricular systolic   function.  Thickened mitral valve leaflets with focal restriction vs cleft of at   the P2-P3 junction.  Moderate to severe mitral regurgitation.  Moderate to severe tricuspid regurgitation  He agrees to follow up with cardiology as outpatient, continue current cardiac regimen as tolerated  Will titrate back on chronic cardiac meds as his bp has been stable and acute gi bleed has resolved- following closely

## 2024-09-07 NOTE — ASSESSMENT & PLAN NOTE
BUN 60, creatinine 2.24, bicarb 18  Suspect secondary to intractable nausea vomiting  Resolved  Avoid nephro toxins  Recheck bmp in am

## 2024-09-07 NOTE — ASSESSMENT & PLAN NOTE
This is Sepsis Present on admission  SIRS criteria identified on my evaluation include: Tachycardia, with heart rate greater than 90 BPM, Leukocytosis, with WBC greater than 12,000, and Bandemia, greater than 10% bands  Clinical indicators of end organ dysfunction include Lactic Acid greater than 2 and Acute On Chronic Renal Failure, with creatinine >0.5 above baseline level  Source is possible GI, pulm (LLL)  Sepsis protocol initiated  Crystalloid Fluid Administration: Fluid resuscitation ordered per standard protocol - 30 mL/kg per current or ideal body weight  IV antibiotics as appropriate for source of sepsis  Reassessment: I have reassessed the patient's hemodynamic status possible    WBC 16.0, lactic acid 3.5, creatinine 2.24  Possible GI source versus LLL infiltrate (aspiration pneumonitis versus pneumonia from nausea vomiting)  IVF  Resolving, see above, discussed with ID, monitoring off abx

## 2024-09-07 NOTE — ED PROVIDER NOTES
ED Provider Note    CHIEF COMPLAINT  Chief Complaint   Patient presents with    Abdominal Pain    N/V     BIB [facility] complaining of abd pain, n/v that increased over the last 3 days. Patient reports having GI complications x 1 year.        EXTERNAL RECORDS REVIEWED  External ED Note patient was seen at [facility] emergency department 7/10/2020 for for abdominal pain nausea and vomiting.  He has a history of congestive heart failure and acid reflux disease GI cocktail has improved his symptoms in the past and he was placed on omeprazole he had run out of his omeprazole and he just felt like the last time he had pain.  His physical exam was unremarkable.  He was discharged home with omeprazole and outpatient follow-up for acid reflux disease    HPI/ROS  LIMITATION TO HISTORY   Select: : None  OUTSIDE HISTORIAN(S):  none    Robert Mcdonnell is a 74 y.o. male who presents complaining of abdominal pain nausea and vomiting and is being grossly worse over the last 3 days.  The patient states that he has had these problems for at least a year intermittently.  He states that he ran out of his omeprazole and when he does not take this medicine it usually makes his symptoms worse.  He states he has not kept any food down in the last 3 days because he keeps vomiting it back up.  He denies any diarrhea.  He thinks he might have had some weight loss.  He is taking his heart failure medication and denies any increase swelling in his legs chest pain or shortness of breath.  The patient states that he has not smoked for about a week.  He denies any alcohol or drug use.  He currently lives at [facility].  He reports that he has never had an EGD or seen a GI doctor but it looks like he did have an EGD in January 31, 2023 where he was found to have a normal esophagus 2 mm red spot and nonbleeding angioectasia in the stomach patient went into atrial fibrillation in the 160s and then bradycardic into the 30s during the procedure  so the duodenum was not visualized and the procedure was aborted.    PAST MEDICAL HISTORY   has a past medical history of CHF (congestive heart failure) (HCC), Congestive heart failure (HCC), and Hypertension.    SURGICAL HISTORY   has a past surgical history that includes upper gi endoscopy,diagnosis (N/A, 1/31/2023).    FAMILY HISTORY  Family History   Problem Relation Age of Onset    Heart Disease Mother     Heart Disease Father        SOCIAL HISTORY  Social History     Tobacco Use    Smoking status: Every Day     Current packs/day: 1.00     Types: Cigarettes    Smokeless tobacco: Never   Vaping Use    Vaping status: Never Used   Substance and Sexual Activity    Alcohol use: Yes     Comment: occ    Drug use: Not Currently    Sexual activity: Not on file       CURRENT MEDICATIONS  Home Medications       Reviewed by Aixa Vázquez (Pharmacy Tech) on 09/07/24 at 1518  Med List Status: Complete     Medication Last Dose Status   lisinopril (PRINIVIL) 20 MG Tab 9/5/2024 Active   metoprolol SR (TOPROL XL) 100 MG TABLET SR 24 HR 9/5/2024 Active   omeprazole (PRILOSEC) 20 MG delayed-release capsule Unk Active   ondansetron (ZOFRAN ODT) 4 MG TABLET DISPERSIBLE Unk Active   spironolactone (ALDACTONE) 25 MG Tab 9/5/2024 Active   torsemide (DEMADEX) 20 MG Tab 9/5/2024 Active                  Audit from Redirected Encounters    **Home medications have not yet been reviewed for this encounter**         ALLERGIES  No Known Allergies    PHYSICAL EXAM  VITAL SIGNS: BP 98/61   Pulse 68   Temp 36.2 °C (97.2 °F) (Temporal)   Resp 17   Wt 95.3 kg (210 lb)   SpO2 95%   BMI 28.48 kg/m²      Constitutional: Well developed,  dehydrated and cachectic non-toxic appearance.   HEENT: Normocephalic, Atraumatic,  external ears normal, pharynx pink,  Mucous  Membranes moist, No rhinorrhea or mucosal edema  Eyes: PERRL, EOMI, Conjunctiva normal, No discharge.   Neck: Normal range of motion, No tenderness, Supple, No stridor.    Lymphatic: No lymphadenopathy    Cardiovascular: Regular Rate and Rhythm, No murmurs,  rubs, or gallops.   Thorax & Lungs: Lungs clear to auscultation bilaterally, No respiratory distress, No wheezes, rhales or rhonchi, No chest wall tenderness.   Abdomen: Abdomen soft but diffusely tender with some palpable masses in his bilateral upper quadrant areas no rebound or peritoneal signs   Skin: Warm, Dry, No erythema, No rash,   Back:  No CVA tenderness,  No spinal tenderness, bony crepitance step offs or instability.   Extremities: Equal, intact distal pulses, No cyanosis, clubbing or edema,  No tenderness.   Musculoskeletal: Good range of motion in all major joints. No tenderness to palpation or major deformities noted.   Neurologic: Alert & oriented No focal deficits noted.  Psychiatric: Affect normal, Judgment normal, Mood normal.      EKG/LABS  Results for orders placed or performed during the hospital encounter of 09/07/24   CBC WITH DIFFERENTIAL   Result Value Ref Range    WBC 16.0 (H) 4.8 - 10.8 K/uL    RBC 6.10 4.70 - 6.10 M/uL    Hemoglobin 17.0 14.0 - 18.0 g/dL    Hematocrit 51.7 42.0 - 52.0 %    MCV 84.8 81.4 - 97.8 fL    MCH 27.9 27.0 - 33.0 pg    MCHC 32.9 32.3 - 36.5 g/dL    RDW 48.3 35.9 - 50.0 fL    Platelet Count 205 164 - 446 K/uL    MPV 10.9 9.0 - 12.9 fL    Neutrophils-Polys 89.10 (H) 44.00 - 72.00 %    Lymphocytes 4.20 (L) 22.00 - 41.00 %    Monocytes 5.90 0.00 - 13.40 %    Eosinophils 0.00 0.00 - 6.90 %    Basophils 0.80 0.00 - 1.80 %    Nucleated RBC 0.00 0.00 - 0.20 /100 WBC    Neutrophils (Absolute) 14.26 (H) 1.82 - 7.42 K/uL    Lymphs (Absolute) 0.67 (L) 1.00 - 4.80 K/uL    Monos (Absolute) 0.94 (H) 0.00 - 0.85 K/uL    Eos (Absolute) 0.00 0.00 - 0.51 K/uL    Baso (Absolute) 0.13 (H) 0.00 - 0.12 K/uL    NRBC (Absolute) 0.00 K/uL    Anisocytosis 1+     Macrocytosis 3+ (A)    COMP METABOLIC PANEL   Result Value Ref Range    Sodium 145 135 - 145 mmol/L    Potassium 4.0 3.6 - 5.5 mmol/L     Chloride 103 96 - 112 mmol/L    Co2 18 (L) 20 - 33 mmol/L    Anion Gap 24.0 (H) 7.0 - 16.0    Glucose 141 (H) 65 - 99 mg/dL    Bun 60 (H) 8 - 22 mg/dL    Creatinine 2.24 (H) 0.50 - 1.40 mg/dL    Calcium 10.3 8.5 - 10.5 mg/dL    Correct Calcium 10.1 8.5 - 10.5 mg/dL    AST(SGOT) 15 12 - 45 U/L    ALT(SGPT) 6 2 - 50 U/L    Alkaline Phosphatase 94 30 - 99 U/L    Total Bilirubin 1.3 0.1 - 1.5 mg/dL    Albumin 4.2 3.2 - 4.9 g/dL    Total Protein 8.6 (H) 6.0 - 8.2 g/dL    Globulin 4.4 (H) 1.9 - 3.5 g/dL    A-G Ratio 1.0 g/dL   LIPASE   Result Value Ref Range    Lipase 22 11 - 82 U/L   PT/INR   Result Value Ref Range    PT 16.0 (H) 12.0 - 14.6 sec    INR 1.26 (H) 0.87 - 1.13   PTT   Result Value Ref Range    APTT 24.2 (L) 24.7 - 36.0 sec   ESTIMATED GFR   Result Value Ref Range    GFR (CKD-EPI) 30 (A) >60 mL/min/1.73 m 2   DIFFERENTIAL MANUAL   Result Value Ref Range    Manual Diff Status PERFORMED    PERIPHERAL SMEAR REVIEW   Result Value Ref Range    Peripheral Smear Review see below    PLATELET ESTIMATE   Result Value Ref Range    Plt Estimation Normal    MORPHOLOGY   Result Value Ref Range    RBC Morphology Present     Poikilocytosis 1+     Ovalocytes 1+     Acanthocytes 1+    Lactic Acid   Result Value Ref Range    Lactic Acid 3.5 (H) 0.5 - 2.0 mmol/L   BETA-HYDROXYBUTYRIC ACID   Result Value Ref Range    beta-Hydroxybutyric Acid 1.73 (H) 0.02 - 0.27 mmol/L       I have independently interpreted this EKG    RADIOLOGY/PROCEDURES   I have independently interpreted the diagnostic imaging associated with this visit and am waiting the final reading from the radiologist.   My preliminary interpretation is as follows: CT abdomen pelvis no obstruction or colitis    Radiologist interpretation:  CT-ABDOMEN-PELVIS W/O   Final Result      1.  Opacification left lung base at costophrenic angle is identified which could be due to scarring or atelectasis.      2.  Cardiomegaly.      3.  Otherwise no acute abnormalities are noted in  the abdomen or pelvis.      DX-CHEST-PORTABLE (1 VIEW)   Final Result         1.  Possible small left pleural effusion.      2.  Minimal atelectasis left lung base.      3.  No consolidations identified.          COURSE & MEDICAL DECISION MAKING    ASSESSMENT, COURSE AND PLAN  Care Narrative: Robert Mcdonnell is a 74 y.o. male who presents complaining of abdominal pain nausea and vomiting and is being grossly worse over the last 3 days.  The patient states that he has had these problems for at least a year intermittently.  He states that he ran out of his omeprazole and when he does not take this medicine it usually makes his symptoms worse.  He states he has not kept any food down in the last 3 days because he keeps vomiting it back up.  He denies any diarrhea.  He thinks he might have had some weight loss.  He is taking his heart failure medication and denies any increase swelling in his legs chest pain or shortness of breath.  The patient states that he has not smoked for about a week.  He denies any alcohol or drug use.  He currently lives at [facility].  He reports that he has never had an EGD or seen a GI doctor but it looks like he did have an EGD in January 31, 2023 where he was found to have a normal esophagus 2 mm red spot and nonbleeding angioectasia in the stomach patient went into atrial fibrillation in the 160s and then bradycardic into the 30s during the procedure so the duodenum was not visualized and the procedure was aborted.  On physical exam he has diffuse abdominal tenderness with some masses palpable in his bilateral upper quadrants.  His mucous membranes are dry.  His blood pressure is 98/61.    Hydration: Based on the patient's presentation of Acute Vomiting the patient was given IV fluids. IV Hydration was used because oral hydration was not adequate alone. Upon recheck following hydration, the patient was improved.          ADDITIONAL PROBLEMS MANAGED  History of congestive heart  failure  History of A-fib followed by bradycardia during the last EGD January 2023    DISPOSITION AND DISCUSSIONS    Patient's white count is elevated at 16 hemoglobin 17 platelet count 205 with 89% neutrophils and 0.13 basophil's he is abnormal indices.  Comprehensive metabolic panel shows acute kidney injury with a BUN of 60 creatinine 2.24 CO2 is low at 18 anion gap is elevated at 24 glucose is elevated at 141.  Liver function tests are normal.  Calcium is normal at 10.3.  I ordered a lactic acid as well as a beta hydroxybutyric acid to evaluate the cause of his acidosis.  Lipase is normal at 22.  INR is slightly evaded at 1.26.    I initially ordered a CT with contrast but because of the patient's acute kidney injury I changed it to a noncontrast CT.  The CT showed no intra-abdominal pathology but he does have a left lower lobe consolidation.    Beta hydroxybutyric acid is elevated at 1.73.  Lactate is elevated at 3.5.  I discussed antibiotic choice with pharmacy and they recommend IV Zosyn.  I also ordered another lactated ringer bolus for him.    The patient will be admitted to the hospital in guarded condition.  Chest x-ray shows left lower lobe atelectasis versus infiltrate.    I spoke with Dr. Mehta who accepts the patient for admission.    I have discussed management of the patient with the following physicians and BRANDON's:  hospitalist Dr Mehta    Discussion of management with other [facility] or appropriate source(s): Pharmacy regarding antibiotic choice      Escalation of care considered, and ultimately not performed:none    Barriers to care at this time, including but not limited to: Patient is homeless.     Decision tools and prescription drugs considered including, but not limited to: Antibiotics zosyn .    CRITICAL CARE  The very real possibilty of a deterioration of this patient's condition required the highest level of my preparedness for sudden, emergent intervention.  I provided critical care services,  which included medication orders, frequent reevaluations of the patient's condition and response to treatment, ordering and reviewing test results, and discussing the case with various consultants.  The critical care time associated with the care of the patient was 45 minutes. Review chart for interventions. This time is exclusive of any other billable procedures.      Will be admitted in serious condition.  FINAL DIAGNOSIS  1. Nausea and vomiting, unspecified vomiting type    2. NIRMAL (acute kidney injury) (HCC)    3. Metabolic acidosis    4. Pneumonia of left lower lobe due to infectious organism    5. SIRS (systemic inflammatory response syndrome) (HCC)         Electronically signed by: Orquidea Ortega M.D., 9/7/2024 1:27 PM

## 2024-09-07 NOTE — ASSESSMENT & PLAN NOTE
Goal of care discussed with patient in emergency room.  He stated he is agreeable for noninvasive, as well as invasive/heroic life-sustaining measures-including CPR/defibrillation/intubation or mechanical ventilation.  He is also agreeable for further treatment with IVF, IV antibiotic, imaging study with echocardiogram renal ultrasound, as well as the need to quit smoking and to follow-up with anticoagulation clinic outpatient once discharged.  Diagnosis, prognosis, question and concern addressed.  Full CODE STATUS confirmed.  ACP: 16 minutes

## 2024-09-07 NOTE — ED NOTES
Med rec is complete per patient at bedside  Outpatient antibiotics within the last 30 days: NONE  Anticoagulants: NONE  Patient's home pharmacy - [facility] ([phone])    Judith Dueñas, PhT

## 2024-09-07 NOTE — ASSESSMENT & PLAN NOTE
Known history of A-fib  He did not follow through with anticoagulation clinic last time, high risk on this med, does not want to take it and high risk for falls and recurrent GI bleed  Chads score only 2  Pt opts to remain off anticoagulation   See above, due to bp adjusting/ decreasing dose of metoprolol, monitoring a fib on this new dose  Will check associated electrolytes in am   Continue tele

## 2024-09-07 NOTE — ED TRIAGE NOTES
Chief Complaint   Patient presents with    Abdominal Pain    N/V     BIB [facility] complaining of abd pain, n/v that increased over the last 3 days. Patient reports having GI complications x 1 year.      Vitals:    09/07/24 1309   BP: 98/61   Pulse: 68   Resp: 17   Temp: 36.2 °C (97.2 °F)   SpO2: 95%

## 2024-09-07 NOTE — ASSESSMENT & PLAN NOTE
Reported smoking more than 1 pack of cigarettes daily  Stated he has been try to quit for the past week  Tobacco smoking cessation counseling provided: 5 minutes  We discussed tobacco smoking cessation given concern for COPD, as well as cardiovascular health benefits, he expressed understanding.  Offered patient with nicotine patch, nicotine gum, Chantix as alternative.  Provided patient with standard tobacco cessation information per protocol

## 2024-09-07 NOTE — H&P
Hospital Medicine History & Physical Note    Date of Service  9/7/2024    Primary Care Physician  Torri Razo P.A.-C.    Consultants  None    Code Status  Full Code    Chief Complaint  Chief Complaint   Patient presents with    Abdominal Pain    N/V     BIB [facility] 11 complaining of abd pain, n/v that increased over the last 3 days. Patient reports having GI complications x 1 year.        History of Presenting Illness  Robert Mcdonnell is a 74 y.o. homeless male from [facility] with history of chronic A-fib not on anticoagulation due to medical noncompliance, tobacco abuse, COPD, CHF, hypertension, hyperlipidemia who presented 9/7/2024 with evaluation for intractable nausea and vomiting.  Patient reported ongoing symptoms for at least the past 3 to 4 days, reported epigastric discomfort associate with nausea and vomiting.  In ER, found to have leukocytosis, lactic acid of 3.5 and NIRMAL with creatinine of 2.24.  Initially thought to have bowel perforation, no perforation seen on CTAP, however noted to have left lung base opacity.  Patient was given IVF boluses per sepsis protocol and Zosyn empirically by ERP.  Admission requested by ERP.  Therefore, admitted to medicine service for further evaluation and treatment.    I discussed the plan of care with patient, bedside RN, and pharmacy.    Review of Systems  Review of Systems   Constitutional:  Positive for malaise/fatigue. Negative for chills and fever.   HENT:  Negative for hearing loss and tinnitus.    Eyes:  Negative for blurred vision.   Respiratory:  Negative for cough and shortness of breath.    Cardiovascular:  Negative for chest pain and palpitations.   Gastrointestinal:  Positive for abdominal pain, heartburn, nausea and vomiting. Negative for blood in stool.   Genitourinary:  Negative for dysuria and urgency.   Musculoskeletal:  Negative for joint pain and myalgias.   Skin:  Negative for itching and rash.   Neurological:  Positive for weakness.  Negative for dizziness, focal weakness and headaches.   Endo/Heme/Allergies:  Negative for environmental allergies. Does not bruise/bleed easily.   Psychiatric/Behavioral:  Negative for depression and substance abuse.    All other systems reviewed and are negative.      Past Medical History   has a past medical history of CHF (congestive heart failure) (HCC), Congestive heart failure (HCC), and Hypertension.    Surgical History   has a past surgical history that includes pr upper gi endoscopy,diagnosis (N/A, 1/31/2023).     Family History  family history includes Heart Disease in his father and mother.   Family history reviewed with patient. There is family history that is pertinent to the chief complaint.     Social History   reports that he has been smoking cigarettes. He has never used smokeless tobacco. He reports current alcohol use. He reports that he does not currently use drugs.    Allergies  No Known Allergies    Medications  Prior to Admission Medications   Prescriptions Last Dose Informant Patient Reported? Taking?   ibuprofen (MOTRIN) 200 MG Tab 9/6/2024 at [facility] Patient Yes Yes   Sig: Take 400 mg by mouth every day.   lisinopril (PRINIVIL) 20 MG Tab 9/5/2024 at AM Patient, Patient's Home Pharmacy No No   Sig: Take 1 Tablet by mouth every day.   metoprolol SR (TOPROL XL) 100 MG TABLET SR 24 HR 9/5/2024 at AM Patient, Patient's Home Pharmacy No No   Sig: Take 1 Tablet by mouth every evening.   omeprazole (PRILOSEC) 20 MG delayed-release capsule Unk at [facility] Patient's Home Pharmacy Yes Yes   Sig: Take 20 mg by mouth every day.   ondansetron (ZOFRAN ODT) 4 MG TABLET DISPERSIBLE Unk at [facility] Patient's Home Pharmacy Yes Yes   Sig: Take 4 mg by mouth every 6 hours as needed for Nausea/Vomiting.   spironolactone (ALDACTONE) 25 MG Tab 9/5/2024 at AM Patient, Patient's Home Pharmacy No No   Sig: Take 1 Tablet by mouth every day.   torsemide (DEMADEX) 20 MG Tab 9/5/2024 at AM Patient, Patient's Home Pharmacy No No    Sig: Take 1 Tablet by mouth every day.      Facility-Administered Medications: None       Physical Exam  Temp:  [36.2 °C (97.2 °F)] 36.2 °C (97.2 °F)  Pulse:  [] 120  Resp:  [12-19] 12  BP: ()/(61-90) 144/89  SpO2:  [94 %-95 %] 94 %  Blood Pressure : (!) 154/90   Temperature: 36.2 °C (97.2 °F)   Pulse: 78   Respiration: 19   Pulse Oximetry: 95 %       Physical Exam  Vitals and nursing note reviewed.   Constitutional:       General: He is not in acute distress.     Appearance: He is ill-appearing.   HENT:      Head: Normocephalic and atraumatic.      Nose: Nose normal.      Mouth/Throat:      Mouth: Mucous membranes are dry.      Pharynx: Oropharynx is clear.   Eyes:      General: No scleral icterus.     Extraocular Movements: Extraocular movements intact.   Cardiovascular:      Rate and Rhythm: Tachycardia present. Rhythm irregular.      Pulses: Normal pulses.      Heart sounds:      No friction rub.   Pulmonary:      Effort: No respiratory distress.      Breath sounds: Rales present. No wheezing.   Chest:      Chest wall: No tenderness.   Abdominal:      General: There is distension.      Tenderness: There is no abdominal tenderness. There is no guarding or rebound.   Musculoskeletal:         General: Normal range of motion.      Cervical back: Neck supple. No tenderness.      Right lower leg: No edema.      Left lower leg: No edema.   Skin:     General: Skin is warm and dry.      Capillary Refill: Capillary refill takes less than 2 seconds.   Neurological:      General: No focal deficit present.      Mental Status: He is alert and oriented to person, place, and time.   Psychiatric:         Mood and Affect: Mood normal.         Laboratory:  Recent Labs     09/07/24  1315   WBC 16.0*   RBC 6.10   HEMOGLOBIN 17.0   HEMATOCRIT 51.7   MCV 84.8   MCH 27.9   MCHC 32.9   RDW 48.3   PLATELETCT 205   MPV 10.9     Recent Labs     09/07/24  1315   SODIUM 145   POTASSIUM 4.0   CHLORIDE 103   CO2 18*   GLUCOSE  "141*   BUN 60*   CREATININE 2.24*   CALCIUM 10.3     Recent Labs     09/07/24  1315   ALTSGPT 6   ASTSGOT 15   ALKPHOSPHAT 94   TBILIRUBIN 1.3   LIPASE 23  22   GLUCOSE 141*     Recent Labs     09/07/24  1315   APTT 24.2*   INR 1.26*     No results for input(s): \"NTPROBNP\" in the last 72 hours.      No results for input(s): \"TROPONINT\" in the last 72 hours.    Imaging:  CT-ABDOMEN-PELVIS W/O   Final Result      1.  Opacification left lung base at costophrenic angle is identified which could be due to scarring or atelectasis.      2.  Cardiomegaly.      3.  Otherwise no acute abnormalities are noted in the abdomen or pelvis.      DX-CHEST-PORTABLE (1 VIEW)   Final Result         1.  Possible small left pleural effusion.      2.  Minimal atelectasis left lung base.      3.  No consolidations identified.      EC-ECHOCARDIOGRAM COMPLETE W/O CONT    (Results Pending)       X-Ray:  I have personally reviewed the images and compared with prior images.  EKG:  I have personally reviewed the images and compared with prior images.    Assessment/Plan:  Justification for Admission Status  I anticipate this patient will require at least 2 midnights of hospitalization, therefore appropriate for inpatient status.      * Intractable nausea and vomiting- (present on admission)  Assessment & Plan  IVF  CLD, advance diet as tolerated  Trial of IV Protonix, IV Reglan  Aspiration precautions  Support antiemetic    Atrial fibrillation with RVR (HCC)- (present on admission)  Assessment & Plan  Known history of A-fib  He did not follow through with anticoagulation clinic last time  Advised patient that he will have to follow-up with outpatient anticoagulation clinic after discharge    Metoprolol for rate control-titrate up as tolerated  As needed additional IV metoprolol with parameter  Initiate on heparin drip, transition to DOAC when appropriate  Maintain K>4.0 and Mg>2.0  Check echo    Sepsis (HCC)- (present on admission)  Assessment & " Plan  This is Sepsis Present on admission  SIRS criteria identified on my evaluation include: Tachycardia, with heart rate greater than 90 BPM, Leukocytosis, with WBC greater than 12,000, and Bandemia, greater than 10% bands  Clinical indicators of end organ dysfunction include Lactic Acid greater than 2 and Acute On Chronic Renal Failure, with creatinine >0.5 above baseline level  Source is possible GI, pulm (LLL)  Sepsis protocol initiated  Crystalloid Fluid Administration: Fluid resuscitation ordered per standard protocol - 30 mL/kg per current or ideal body weight  IV antibiotics as appropriate for source of sepsis  Reassessment: I have reassessed the patient's hemodynamic status possible    WBC 16.0, lactic acid 3.5, creatinine 2.24  Possible GI source versus LLL infiltrate (aspiration pneumonitis versus pneumonia from nausea vomiting)  IVF  Given Zosyn empirically in ER  Antibiotic: Ceftriaxone, Flagyl  De-escalate as appropriate    Pneumonia due to infectious organism  Assessment & Plan  ?aspirated  LLL opacity, WBC 16, LA 3.5  IVF  Abx: ceftriaxone, flagyl  -s/p Zosyn in ER  Follow sputum Cx    AP (abdominal pain)  Assessment & Plan  CT AP completed, no perforation  Trial of IV protonix  Supportive pain control    Lactic acidosis  Assessment & Plan  Likely secondary sepsis  IVF, antibiotic  High-dose IV thiamine  Trend    Leukocytosis  Assessment & Plan  IVF, antibiotic  Trend    COPD (chronic obstructive pulmonary disease) (HCC)  Assessment & Plan  Currently not in acute exacerbation  Pulmonary hygiene  Smoking cessation advised    Acute kidney injury (HCC)- (present on admission)  Assessment & Plan  BUN 60, creatinine 2.24, bicarb 18  Suspect secondary to intractable nausea vomiting  IVF  Minimize nephrotoxic agents, renally dose meds  Check FeNa, renal US    Chronic systolic heart failure (HCC)- (present on admission)  Assessment & Plan  Currently not in acute exacerbation  Currently appears  hypovolemic  Provide IVF  Resume CHF meds when appropriate    ACP (advance care planning)  Assessment & Plan  Goal of care discussed with patient in emergency room.  He stated he is agreeable for noninvasive, as well as invasive/heroic life-sustaining measures-including CPR/defibrillation/intubation or mechanical ventilation.  He is also agreeable for further treatment with IVF, IV antibiotic, imaging study with echocardiogram renal ultrasound, as well as the need to quit smoking and to follow-up with anticoagulation clinic outpatient once discharged.  Diagnosis, prognosis, question and concern addressed.  Full CODE STATUS confirmed.  ACP: 16 minutes    Tobacco abuse counseling  Assessment & Plan  Reported smoking more than 1 pack of cigarettes daily  Stated he has been try to quit for the past week  Tobacco smoking cessation counseling provided: 5 minutes  We discussed tobacco smoking cessation given concern for COPD, as well as cardiovascular health benefits, he expressed understanding.  Offered patient with nicotine patch, nicotine gum, Chantix as alternative.  Provided patient with standard tobacco cessation information per protocol        VTE prophylaxis: therapeutic anticoagulation with heparin gtt    Patient is critically ill due to sepsis with endorgan damage --NIRMAL, A-fib with RVR, lactic acidosis --requiring aggressive medical management and close hemodynamic monitoring.

## 2024-09-08 LAB
ALBUMIN SERPL BCP-MCNC: 3.6 G/DL (ref 3.2–4.9)
ALBUMIN/GLOB SERPL: 1.1 G/DL
ALP SERPL-CCNC: 69 U/L (ref 30–99)
ALT SERPL-CCNC: 8 U/L (ref 2–50)
ANION GAP SERPL CALC-SCNC: 14 MMOL/L (ref 7–16)
AST SERPL-CCNC: 14 U/L (ref 12–45)
BASOPHILS # BLD AUTO: 0.3 % (ref 0–1.8)
BASOPHILS # BLD: 0.04 K/UL (ref 0–0.12)
BILIRUB SERPL-MCNC: 0.8 MG/DL (ref 0.1–1.5)
BUN SERPL-MCNC: 53 MG/DL (ref 8–22)
CALCIUM ALBUM COR SERPL-MCNC: 9.1 MG/DL (ref 8.5–10.5)
CALCIUM SERPL-MCNC: 8.8 MG/DL (ref 8.5–10.5)
CHLORIDE SERPL-SCNC: 106 MMOL/L (ref 96–112)
CHOLEST SERPL-MCNC: 153 MG/DL (ref 100–199)
CO2 SERPL-SCNC: 23 MMOL/L (ref 20–33)
CREAT SERPL-MCNC: 1.79 MG/DL (ref 0.5–1.4)
EKG IMPRESSION: NORMAL
EKG IMPRESSION: NORMAL
EOSINOPHIL # BLD AUTO: 0.01 K/UL (ref 0–0.51)
EOSINOPHIL NFR BLD: 0.1 % (ref 0–6.9)
ERYTHROCYTE [DISTWIDTH] IN BLOOD BY AUTOMATED COUNT: 48.3 FL (ref 35.9–50)
EST. AVERAGE GLUCOSE BLD GHB EST-MCNC: 123 MG/DL
GFR SERPLBLD CREATININE-BSD FMLA CKD-EPI: 39 ML/MIN/1.73 M 2
GLOBULIN SER CALC-MCNC: 3.3 G/DL (ref 1.9–3.5)
GLUCOSE SERPL-MCNC: 143 MG/DL (ref 65–99)
HBA1C MFR BLD: 5.9 % (ref 4–5.6)
HCT VFR BLD AUTO: 44.9 % (ref 42–52)
HDLC SERPL-MCNC: 33 MG/DL
HGB BLD-MCNC: 14.6 G/DL (ref 14–18)
IMM GRANULOCYTES # BLD AUTO: 0.1 K/UL (ref 0–0.11)
IMM GRANULOCYTES NFR BLD AUTO: 0.7 % (ref 0–0.9)
LACTATE SERPL-SCNC: 1.5 MMOL/L (ref 0.5–2)
LDLC SERPL CALC-MCNC: 109 MG/DL
LYMPHOCYTES # BLD AUTO: 1.38 K/UL (ref 1–4.8)
LYMPHOCYTES NFR BLD: 9.5 % (ref 22–41)
MAGNESIUM SERPL-MCNC: 2.4 MG/DL (ref 1.5–2.5)
MCH RBC QN AUTO: 27.6 PG (ref 27–33)
MCHC RBC AUTO-ENTMCNC: 32.5 G/DL (ref 32.3–36.5)
MCV RBC AUTO: 84.9 FL (ref 81.4–97.8)
MONOCYTES # BLD AUTO: 1.09 K/UL (ref 0–0.85)
MONOCYTES NFR BLD AUTO: 7.5 % (ref 0–13.4)
NEUTROPHILS # BLD AUTO: 11.87 K/UL (ref 1.82–7.42)
NEUTROPHILS NFR BLD: 81.9 % (ref 44–72)
NRBC # BLD AUTO: 0 K/UL
NRBC BLD-RTO: 0 /100 WBC (ref 0–0.2)
PHOSPHATE SERPL-MCNC: 3.1 MG/DL (ref 2.5–4.5)
PLATELET # BLD AUTO: 147 K/UL (ref 164–446)
PMV BLD AUTO: 10.7 FL (ref 9–12.9)
POTASSIUM SERPL-SCNC: 3.7 MMOL/L (ref 3.6–5.5)
PROT SERPL-MCNC: 6.9 G/DL (ref 6–8.2)
RBC # BLD AUTO: 5.29 M/UL (ref 4.7–6.1)
SCCMEC + MECA PNL NOSE NAA+PROBE: NEGATIVE
SODIUM SERPL-SCNC: 143 MMOL/L (ref 135–145)
TRIGL SERPL-MCNC: 57 MG/DL (ref 0–149)
UFH PPP CHRO-ACNC: 0.96 IU/ML
UFH PPP CHRO-ACNC: >1.1 IU/ML
UFH PPP CHRO-ACNC: >1.1 IU/ML
WBC # BLD AUTO: 14.5 K/UL (ref 4.8–10.8)

## 2024-09-08 PROCEDURE — 700105 HCHG RX REV CODE 258: Performed by: INTERNAL MEDICINE

## 2024-09-08 PROCEDURE — 700111 HCHG RX REV CODE 636 W/ 250 OVERRIDE (IP): Mod: JZ | Performed by: INTERNAL MEDICINE

## 2024-09-08 PROCEDURE — 93005 ELECTROCARDIOGRAM TRACING: CPT | Performed by: STUDENT IN AN ORGANIZED HEALTH CARE EDUCATION/TRAINING PROGRAM

## 2024-09-08 PROCEDURE — 93010 ELECTROCARDIOGRAM REPORT: CPT | Performed by: INTERNAL MEDICINE

## 2024-09-08 PROCEDURE — 80053 COMPREHEN METABOLIC PANEL: CPT

## 2024-09-08 PROCEDURE — 700111 HCHG RX REV CODE 636 W/ 250 OVERRIDE (IP): Mod: JG | Performed by: STUDENT IN AN ORGANIZED HEALTH CARE EDUCATION/TRAINING PROGRAM

## 2024-09-08 PROCEDURE — 84100 ASSAY OF PHOSPHORUS: CPT

## 2024-09-08 PROCEDURE — 99407 BEHAV CHNG SMOKING > 10 MIN: CPT

## 2024-09-08 PROCEDURE — 99233 SBSQ HOSP IP/OBS HIGH 50: CPT | Performed by: INTERNAL MEDICINE

## 2024-09-08 PROCEDURE — 87641 MR-STAPH DNA AMP PROBE: CPT

## 2024-09-08 PROCEDURE — 700102 HCHG RX REV CODE 250 W/ 637 OVERRIDE(OP): Performed by: STUDENT IN AN ORGANIZED HEALTH CARE EDUCATION/TRAINING PROGRAM

## 2024-09-08 PROCEDURE — A9270 NON-COVERED ITEM OR SERVICE: HCPCS | Performed by: STUDENT IN AN ORGANIZED HEALTH CARE EDUCATION/TRAINING PROGRAM

## 2024-09-08 PROCEDURE — 85520 HEPARIN ASSAY: CPT

## 2024-09-08 PROCEDURE — 85025 COMPLETE CBC W/AUTO DIFF WBC: CPT

## 2024-09-08 PROCEDURE — 700101 HCHG RX REV CODE 250: Performed by: STUDENT IN AN ORGANIZED HEALTH CARE EDUCATION/TRAINING PROGRAM

## 2024-09-08 PROCEDURE — 83036 HEMOGLOBIN GLYCOSYLATED A1C: CPT

## 2024-09-08 PROCEDURE — 83605 ASSAY OF LACTIC ACID: CPT

## 2024-09-08 PROCEDURE — 36415 COLL VENOUS BLD VENIPUNCTURE: CPT

## 2024-09-08 PROCEDURE — 83735 ASSAY OF MAGNESIUM: CPT

## 2024-09-08 PROCEDURE — 770020 HCHG ROOM/CARE - TELE (206)

## 2024-09-08 PROCEDURE — 80061 LIPID PANEL: CPT

## 2024-09-08 RX ADMIN — SODIUM CHLORIDE: 9 INJECTION, SOLUTION INTRAVENOUS at 14:15

## 2024-09-08 RX ADMIN — AMPICILLIN SODIUM, SULBACTAM SODIUM 3 G: 2; 1 INJECTION, POWDER, FOR SOLUTION INTRAMUSCULAR; INTRAVENOUS at 23:18

## 2024-09-08 RX ADMIN — CEFTRIAXONE SODIUM 2000 MG: 10 INJECTION, POWDER, FOR SOLUTION INTRAVENOUS at 04:02

## 2024-09-08 RX ADMIN — METRONIDAZOLE 500 MG: 500 INJECTION, SOLUTION INTRAVENOUS at 04:27

## 2024-09-08 RX ADMIN — ATORVASTATIN CALCIUM 40 MG: 40 TABLET, FILM COATED ORAL at 16:57

## 2024-09-08 RX ADMIN — ACETAMINOPHEN 650 MG: 325 TABLET ORAL at 12:06

## 2024-09-08 RX ADMIN — PANTOPRAZOLE SODIUM 40 MG: 40 INJECTION, POWDER, FOR SOLUTION INTRAVENOUS at 16:57

## 2024-09-08 RX ADMIN — ACETAMINOPHEN 650 MG: 325 TABLET ORAL at 23:13

## 2024-09-08 RX ADMIN — METOCLOPRAMIDE 10 MG: 5 INJECTION, SOLUTION INTRAMUSCULAR; INTRAVENOUS at 12:07

## 2024-09-08 RX ADMIN — ONDANSETRON 4 MG: 4 TABLET, ORALLY DISINTEGRATING ORAL at 23:13

## 2024-09-08 RX ADMIN — PANTOPRAZOLE SODIUM 40 MG: 40 INJECTION, POWDER, FOR SOLUTION INTRAVENOUS at 04:02

## 2024-09-08 RX ADMIN — METOCLOPRAMIDE 10 MG: 5 INJECTION, SOLUTION INTRAMUSCULAR; INTRAVENOUS at 04:26

## 2024-09-08 RX ADMIN — NICOTINE TRANSDERMAL SYSTEM 21 MG: 21 PATCH, EXTENDED RELEASE TRANSDERMAL at 04:03

## 2024-09-08 RX ADMIN — HEPARIN SODIUM 13 UNITS/KG/HR: 5000 INJECTION, SOLUTION INTRAVENOUS at 11:31

## 2024-09-08 RX ADMIN — ACETAMINOPHEN 650 MG: 325 TABLET ORAL at 04:02

## 2024-09-08 SDOH — ECONOMIC STABILITY: TRANSPORTATION INSECURITY
IN THE PAST 12 MONTHS, HAS THE LACK OF TRANSPORTATION KEPT YOU FROM MEDICAL APPOINTMENTS OR FROM GETTING MEDICATIONS?: NO

## 2024-09-08 SDOH — ECONOMIC STABILITY: TRANSPORTATION INSECURITY
IN THE PAST 12 MONTHS, HAS LACK OF RELIABLE TRANSPORTATION KEPT YOU FROM MEDICAL APPOINTMENTS, MEETINGS, WORK OR FROM GETTING THINGS NEEDED FOR DAILY LIVING?: NO

## 2024-09-08 ASSESSMENT — ENCOUNTER SYMPTOMS
HEADACHES: 0
BLURRED VISION: 0
FEVER: 0
MYALGIAS: 0
FOCAL WEAKNESS: 0
CHILLS: 0
COUGH: 0
NAUSEA: 1
BLOOD IN STOOL: 0
DIZZINESS: 0
ABDOMINAL PAIN: 1
VOMITING: 1
HEARTBURN: 1
BRUISES/BLEEDS EASILY: 0
PALPITATIONS: 0
DEPRESSION: 0
SHORTNESS OF BREATH: 0
WEAKNESS: 1

## 2024-09-08 ASSESSMENT — PATIENT HEALTH QUESTIONNAIRE - PHQ9
SUM OF ALL RESPONSES TO PHQ QUESTIONS 1-9: 4
5. POOR APPETITE OR OVEREATING: NOT AT ALL
9. THOUGHTS THAT YOU WOULD BE BETTER OFF DEAD, OR OF HURTING YOURSELF: NOT AT ALL
4. FEELING TIRED OR HAVING LITTLE ENERGY: SEVERAL DAYS
3. TROUBLE FALLING OR STAYING ASLEEP OR SLEEPING TOO MUCH: NOT AT ALL
2. FEELING DOWN, DEPRESSED, IRRITABLE, OR HOPELESS: SEVERAL DAYS
1. LITTLE INTEREST OR PLEASURE IN DOING THINGS: SEVERAL DAYS
7. TROUBLE CONCENTRATING ON THINGS, SUCH AS READING THE NEWSPAPER OR WATCHING TELEVISION: SEVERAL DAYS
6. FEELING BAD ABOUT YOURSELF - OR THAT YOU ARE A FAILURE OR HAVE LET YOURSELF OR YOUR FAMILY DOWN: NOT AL ALL
SUM OF ALL RESPONSES TO PHQ9 QUESTIONS 1 AND 2: 2
8. MOVING OR SPEAKING SO SLOWLY THAT OTHER PEOPLE COULD HAVE NOTICED. OR THE OPPOSITE, BEING SO FIGETY OR RESTLESS THAT YOU HAVE BEEN MOVING AROUND A LOT MORE THAN USUAL: NOT AT ALL

## 2024-09-08 ASSESSMENT — SOCIAL DETERMINANTS OF HEALTH (SDOH)
WITHIN THE LAST YEAR, HAVE YOU BEEN AFRAID OF YOUR PARTNER OR EX-PARTNER?: NO
IN THE PAST 12 MONTHS, HAS THE ELECTRIC, GAS, OIL, OR WATER COMPANY THREATENED TO SHUT OFF SERVICE IN YOUR HOME?: NO
WITHIN THE PAST 12 MONTHS, YOU WORRIED THAT YOUR FOOD WOULD RUN OUT BEFORE YOU GOT THE MONEY TO BUY MORE: NEVER TRUE
WITHIN THE LAST YEAR, HAVE YOU BEEN AFRAID OF YOUR PARTNER OR EX-PARTNER?: NO
WITHIN THE LAST YEAR, HAVE TO BEEN RAPED OR FORCED TO HAVE ANY KIND OF SEXUAL ACTIVITY BY YOUR PARTNER OR EX-PARTNER?: NO
WITHIN THE LAST YEAR, HAVE YOU BEEN HUMILIATED OR EMOTIONALLY ABUSED IN OTHER WAYS BY YOUR PARTNER OR EX-PARTNER?: NO
WITHIN THE LAST YEAR, HAVE TO BEEN RAPED OR FORCED TO HAVE ANY KIND OF SEXUAL ACTIVITY BY YOUR PARTNER OR EX-PARTNER?: NO
WITHIN THE LAST YEAR, HAVE YOU BEEN KICKED, HIT, SLAPPED, OR OTHERWISE PHYSICALLY HURT BY YOUR PARTNER OR EX-PARTNER?: NO
WITHIN THE LAST YEAR, HAVE YOU BEEN HUMILIATED OR EMOTIONALLY ABUSED IN OTHER WAYS BY YOUR PARTNER OR EX-PARTNER?: NO
WITHIN THE PAST 12 MONTHS, THE FOOD YOU BOUGHT JUST DIDN'T LAST AND YOU DIDN'T HAVE MONEY TO GET MORE: NEVER TRUE

## 2024-09-08 ASSESSMENT — COGNITIVE AND FUNCTIONAL STATUS - GENERAL
WALKING IN HOSPITAL ROOM: A LITTLE
SUGGESTED CMS G CODE MODIFIER MOBILITY: CJ
CLIMB 3 TO 5 STEPS WITH RAILING: A LITTLE
MOBILITY SCORE: 22
SUGGESTED CMS G CODE MODIFIER DAILY ACTIVITY: CH
DAILY ACTIVITIY SCORE: 24

## 2024-09-08 ASSESSMENT — PAIN DESCRIPTION - PAIN TYPE
TYPE: ACUTE PAIN
TYPE: ACUTE PAIN

## 2024-09-08 ASSESSMENT — LIFESTYLE VARIABLES
TOTAL SCORE: 3
TOTAL SCORE: 3
DOES PATIENT WANT TO TALK TO SOMEONE ABOUT QUITTING: YES
EVER FELT BAD OR GUILTY ABOUT YOUR DRINKING: YES
SUBSTANCE_ABUSE: 0
EVER HAD A DRINK FIRST THING IN THE MORNING TO STEADY YOUR NERVES TO GET RID OF A HANGOVER: NO
CONSUMPTION TOTAL: POSITIVE
AVERAGE NUMBER OF DAYS PER WEEK YOU HAVE A DRINK CONTAINING ALCOHOL: 2
DOES PATIENT WANT TO STOP DRINKING: YES
ALCOHOL_USE: YES
TOTAL SCORE: 3
HOW MANY TIMES IN THE PAST YEAR HAVE YOU HAD 5 OR MORE DRINKS IN A DAY: 0
HAVE YOU EVER FELT YOU SHOULD CUT DOWN ON YOUR DRINKING: YES
ON A TYPICAL DAY WHEN YOU DRINK ALCOHOL HOW MANY DRINKS DO YOU HAVE: 2
HAVE PEOPLE ANNOYED YOU BY CRITICIZING YOUR DRINKING: YES

## 2024-09-08 ASSESSMENT — COPD QUESTIONNAIRES
IN THE PAST 12 MONTHS DO YOU DO LESS THAN YOU USED TO BECAUSE OF YOUR BREATHING PROBLEMS: AGREE
DURING THE PAST 4 WEEKS HOW MUCH DID YOU FEEL SHORT OF BREATH: SOME OF THE TIME
COPD SCREENING SCORE: 7
HAVE YOU SMOKED AT LEAST 100 CIGARETTES IN YOUR ENTIRE LIFE: YES
DO YOU EVER COUGH UP ANY MUCUS OR PHLEGM?: YES, A FEW DAYS A WEEK OR MONTH

## 2024-09-08 NOTE — PROGRESS NOTES
4 Eyes Skin Assessment Completed by JANNETTE Patel and JANNETTE Martinez.    Head WDL  Ears WDL  Nose WDL  Mouth missing teeth  Neck WDL  Breast/Chest WDL  Shoulder Blades WDL  Spine WDL  (R) Arm/Elbow/Hand WDL  (L) Arm/Elbow/Hand WDL  Abdomen WDL  Groin WDL  Scrotum/Coccyx/Buttocks Redness and Blanching in buttocks  (R) Leg Discoloration, dry, cracked  (L) Leg Discoloration, dry, cracked  (R) Heel/Foot/Toe Dry, cracked  (L) Heel/Foot/Toe Dry, cracked          Devices In Places Tele Box, Blood Pressure Cuff, and Pulse Ox      Interventions In Place Pillows    Possible Skin Injury No    Pictures Uploaded Into Epic N/A  Wound Consult Placed N/A  RN Wound Prevention Protocol Ordered No

## 2024-09-08 NOTE — CARE PLAN
The patient is Stable - Low risk of patient condition declining or worsening    Shift Goals  Clinical Goals: heparin drip, hemodynamic stability, ABX  Patient Goals: sleep, food  Family Goals: MARCOS    Progress made toward(s) clinical / shift goals:    Problem: Pain - Standard  Goal: Alleviation of pain or a reduction in pain to the patient’s comfort goal  Outcome: Progressing     Problem: Knowledge Deficit - COPD  Goal: Patient/significant other demonstrates understanding of disease process, utilization of the Action Plan, medications and discharge instruction  Outcome: Progressing     Problem: Nutrition - Advanced  Goal: Patient will display progressive weight gain toward goal have adequate food and fluid intake  Outcome: Progressing     Problem: Ineffective Airway Clearance  Goal: Patient will maintain patent airway with clear/clearing breath sounds  Outcome: Progressing     Problem: Hemodynamics  Goal: Patient's hemodynamics, fluid balance and neurologic status will be stable or improve  Outcome: Progressing       Patient is not progressing towards the following goals:    Pt verbalizes understanding of care plan. Pt has been tolerating clear liquid diet with no c/o n/v. Pt currently infusing heparin, titrated per Xa protocol. Pt has been in afib throughout shift. Lactic and creatinine trending down.

## 2024-09-08 NOTE — PROGRESS NOTES
Hospital Medicine Daily Progress Note    Date of Service  9/8/2024    Chief Complaint  Robert Mcdonnell is a 74 y.o. male admitted 9/7/2024 with abdominal pain and N/V    Hospital Course  This is a 74 y.o. homeless male from [facility] with history of chronic A-fib not on anticoagulation due to medical noncompliance, tobacco abuse, COPD, CHF, hypertension, hyperlipidemia who presented 9/7/2024 with evaluation for intractable nausea and vomiting.  Patient reported ongoing symptoms for at least the past 3 to 4 days, reported epigastric discomfort associate with nausea and vomiting.  In ER, found to have leukocytosis, lactic acid of 3.5 and NIRMAL with creatinine of 2.24.  Initially thought to have bowel perforation, no perforation seen on CTAP, however noted to have left lung base opacity.  Patient was given IVF boluses per sepsis protocol and Zosyn empirically by ERP.   Patient admitted for further treatment     Interval Problem Update  Patient seen and examined, afebrile, still with some nausea, switching abx to Unasyn today .  Follow up cultures.  Close monitor on tele for decompensation     I have discussed this patient's plan of care and discharge plan at IDT rounds today with Case Management, Nursing, Nursing leadership, and other members of the IDT team.    Consultants/Specialty  None     Code Status  Full Code    Disposition  The patient is not medically cleared for discharge to home or a post-acute facility.      I have placed the appropriate orders for post-discharge needs.    Review of Systems  Review of Systems   Constitutional:  Positive for malaise/fatigue. Negative for chills and fever.   HENT:  Negative for hearing loss and tinnitus.    Eyes:  Negative for blurred vision.   Respiratory:  Negative for cough and shortness of breath.    Cardiovascular:  Negative for chest pain and palpitations.   Gastrointestinal:  Positive for abdominal pain, heartburn, nausea and vomiting. Negative for blood in stool.    Genitourinary:  Negative for dysuria and urgency.   Musculoskeletal:  Negative for joint pain and myalgias.   Skin:  Negative for itching and rash.   Neurological:  Positive for weakness. Negative for dizziness, focal weakness and headaches.   Endo/Heme/Allergies:  Negative for environmental allergies. Does not bruise/bleed easily.   Psychiatric/Behavioral:  Negative for depression and substance abuse.    All other systems reviewed and are negative.       Physical Exam  Temp:  [36.4 °C (97.5 °F)-36.7 °C (98.1 °F)] 36.6 °C (97.9 °F)  Pulse:  [] 92  Resp:  [12-19] 17  BP: ()/() 113/75  SpO2:  [92 %-95 %] 95 %    Physical Exam  Vitals and nursing note reviewed.   Constitutional:       General: He is not in acute distress.     Appearance: He is ill-appearing.   HENT:      Head: Normocephalic and atraumatic.      Nose: Nose normal.      Mouth/Throat:      Mouth: Mucous membranes are dry.      Pharynx: Oropharynx is clear.   Eyes:      General: No scleral icterus.     Extraocular Movements: Extraocular movements intact.   Cardiovascular:      Rate and Rhythm: Tachycardia present. Rhythm irregular.      Pulses: Normal pulses.      Heart sounds:      No friction rub.   Pulmonary:      Effort: No respiratory distress.      Breath sounds: Rales present. No wheezing.   Chest:      Chest wall: No tenderness.   Abdominal:      General: There is distension.      Tenderness: There is no abdominal tenderness. There is no guarding or rebound.   Musculoskeletal:         General: Normal range of motion.      Cervical back: Neck supple. No tenderness.      Right lower leg: No edema.      Left lower leg: No edema.   Skin:     General: Skin is warm and dry.      Capillary Refill: Capillary refill takes less than 2 seconds.   Neurological:      General: No focal deficit present.      Mental Status: He is alert and oriented to person, place, and time.   Psychiatric:         Mood and Affect: Mood normal.          Fluids    Intake/Output Summary (Last 24 hours) at 9/8/2024 1403  Last data filed at 9/8/2024 0148  Gross per 24 hour   Intake 400 ml   Output --   Net 400 ml        Laboratory  Recent Labs     09/07/24  1315 09/08/24  0024   WBC 16.0* 14.5*   RBC 6.10 5.29   HEMOGLOBIN 17.0 14.6   HEMATOCRIT 51.7 44.9   MCV 84.8 84.9   MCH 27.9 27.6   MCHC 32.9 32.5   RDW 48.3 48.3   PLATELETCT 205 147*   MPV 10.9 10.7     Recent Labs     09/07/24  1315 09/08/24  0024   SODIUM 145 143   POTASSIUM 4.0 3.7   CHLORIDE 103 106   CO2 18* 23   GLUCOSE 141* 143*   BUN 60* 53*   CREATININE 2.24* 1.79*   CALCIUM 10.3 8.8     Recent Labs     09/07/24  1315 09/07/24  1703   APTT 24.2* 25.7   INR 1.26* 1.28*         Recent Labs     09/08/24  0024   TRIGLYCERIDE 57   HDL 33*   *       Imaging  CT-ABDOMEN-PELVIS W/O   Final Result      1.  Opacification left lung base at costophrenic angle is identified which could be due to scarring or atelectasis.      2.  Cardiomegaly.      3.  Otherwise no acute abnormalities are noted in the abdomen or pelvis.      DX-CHEST-PORTABLE (1 VIEW)   Final Result         1.  Possible small left pleural effusion.      2.  Minimal atelectasis left lung base.      3.  No consolidations identified.      EC-ECHOCARDIOGRAM COMPLETE W/O CONT    (Results Pending)        Assessment/Plan  * Intractable nausea and vomiting- (present on admission)  Assessment & Plan  IVF  CLD, advance diet as tolerated  Trial of IV Protonix, IV Reglan  Aspiration precautions  Support antiemetic    Pneumonia due to infectious organism  Assessment & Plan  ?aspirated  LLL opacity, WBC 16, LA 3.5  IVF  Abx: ceftriaxone, flagyl  -s/p Zosyn in ER  Follow sputum Cx    AP (abdominal pain)  Assessment & Plan  CT AP completed, no perforation  Trial of IV protonix  Supportive pain control    Lactic acidosis  Assessment & Plan  Likely secondary sepsis  IVF, antibiotic  High-dose IV thiamine  Trend    Leukocytosis  Assessment & Plan  IVF,  antibiotic  Trend    ACP (advance care planning)  Assessment & Plan  Goal of care discussed with patient in emergency room.  He stated he is agreeable for noninvasive, as well as invasive/heroic life-sustaining measures-including CPR/defibrillation/intubation or mechanical ventilation.  He is also agreeable for further treatment with IVF, IV antibiotic, imaging study with echocardiogram renal ultrasound, as well as the need to quit smoking and to follow-up with anticoagulation clinic outpatient once discharged.  Diagnosis, prognosis, question and concern addressed.  Full CODE STATUS confirmed.  ACP: 16 minutes    COPD (chronic obstructive pulmonary disease) (HCC)  Assessment & Plan  Currently not in acute exacerbation  Pulmonary hygiene  Smoking cessation advised    Tobacco abuse counseling  Assessment & Plan  Reported smoking more than 1 pack of cigarettes daily  Stated he has been try to quit for the past week  Tobacco smoking cessation counseling provided: 5 minutes  We discussed tobacco smoking cessation given concern for COPD, as well as cardiovascular health benefits, he expressed understanding.  Offered patient with nicotine patch, nicotine gum, Chantix as alternative.  Provided patient with standard tobacco cessation information per protocol    Acute kidney injury (HCC)- (present on admission)  Assessment & Plan  BUN 60, creatinine 2.24, bicarb 18  Suspect secondary to intractable nausea vomiting  Cont on IVF   Check BMP in AM   Minimize nephrotoxic agents, renally dose meds      Sepsis (HCC)- (present on admission)  Assessment & Plan  This is Sepsis Present on admission  SIRS criteria identified on my evaluation include: Tachycardia, with heart rate greater than 90 BPM, Leukocytosis, with WBC greater than 12,000, and Bandemia, greater than 10% bands  Clinical indicators of end organ dysfunction include Lactic Acid greater than 2 and Acute On Chronic Renal Failure, with creatinine >0.5 above baseline  level  Source is possible GI, pulm (LLL)  Sepsis protocol initiated  Crystalloid Fluid Administration: Fluid resuscitation ordered per standard protocol - 30 mL/kg per current or ideal body weight  IV antibiotics as appropriate for source of sepsis  Reassessment: I have reassessed the patient's hemodynamic status possible    WBC 16.0, lactic acid 3.5, creatinine 2.24  Possible GI source versus LLL infiltrate (aspiration pneumonitis versus pneumonia from nausea vomiting)  IVF  Given Zosyn empirically in ER  Antibiotic: switched to Unasyn       Atrial fibrillation with RVR (HCC)- (present on admission)  Assessment & Plan  Known history of A-fib  He did not follow through with anticoagulation clinic last time  Advised patient that he will have to follow-up with outpatient anticoagulation clinic after discharge    Metoprolol for rate control-titrate up as tolerated  As needed additional IV metoprolol with parameter   HR better controlled   Cont on heparin   Close monitor on tele for decompensation     Chronic systolic heart failure (HCC)- (present on admission)  Assessment & Plan  Currently not in acute exacerbation  Currently appears hypovolemic  Provide IVF  Resume CHF meds when appropriate         VTE prophylaxis: scd    Greater than 51 minutes spent prepping to see patient (e.g. review of tests) obtaining and/or reviewing separately obtained history. Performing a medically appropriate examination and/ evaluation.  Counseling and educating the patient/family/caregiver.  Ordering medications, tests, or procedures.  Referring and communicating with other health care professionals.  Documenting clinical information in EPIC.  Independently interpreting results and communicating results to patient/family/caregiver.  Care coordination.      I have performed a physical exam and reviewed and updated ROS and Plan today (9/8/2024). In review of yesterday's note (9/7/2024), there are no changes except as documented  above.

## 2024-09-08 NOTE — RESPIRATORY CARE
"COPD EDUCATION by COPD CLINICAL EDUCATOR  9/8/2024 at 1:42 PM by Magno Mackay, RRT     Patient interviewed by COPD education team. Patient refused COPD program at this time.          Smoking Cessation Intervention and education completed, 13 minutes spent on smoking cessation education with patient.  Provided smoking cessation packet with \"Tips to Quit\" and brochure for \"Free Smoking Cessation Classes\".    Patient denies COPD hx, does not take respiratory medication or use home oxygen.      COPD Screen  COPD Risk Screening  Do you have a history of COPD?: No  COPD Population Screener  During the past 4 weeks, how much did you feel short of breath?: Some of the time  Do you ever cough up any mucus or phlegm?: Yes, a few days a week or month  In the past 12 months, you do less than you used to because of your breathing problems: Agree  Have you smoked at least 100 cigarettes in your entire life?: Yes  How old are you?: 60+  COPD Screening Score: 7  COPD Coordinator Recommended: Yes    COPD Assessment  COPD Clinical Specialists ONLY  COPD Education Initiated: Yes--Short Intervention (Patient denies COPD dx, Smoking cessation complete, refused COPD education.)  Is this a COPD exacerbation patient?: No  DME Company: none  DME Equipment Type: none  Physician Name: none  Pulmonologist Name: none  Referrals Initiated: Yes  Smoking Cessation: Yes  $ Smoking Cessation >10 Minutes: Symptomatic  Interdisciplinary Rounds: Attendance at Rounds (30 Min)    PFT Results        Meds to Beds  [facility] provides bedside medication delivery for all eligible patients at discharge and you have been automatically enrolled in the Meds to Beds Program. Would you like to opt out of this program for any reason?: No - Stay Opted In     MY COPD ACTION PLAN     It is recommended that patients and physicians /healthcare providers complete this action plan together. This plan should be discussed at each physician visit and updated as " "needed.    The green, yellow and red zones show groups of symptoms of COPD. This list of symptoms is not comprehensive, and you may experience other symptoms. In the \"Actions\" column, your healthcare provider has recommended actions for you to take based on your symptoms.    Patient Name: Robert Mcdonnell   YOB: 1950   Last Updated on:     Green Zone:  I am doing well today Actions     Usual activitiy and exercise level   Take daily medications     Usual amounts of cough and phlegm/mucus   Use oxygen as prescribed     Sleep well at night   Continue regular exercise/diet plan     Appetite is good   At all times avoid cigarette smoke, inhaled irritants     Daily Medications (these medications are taken every day):                Yellow Zone:  I am having a bad day or a COPD flare Actions     More breathless than usual   Continue daily medications     I have less energy for my daily activities   Use quick relief inhaler as ordered     Increased or thicker phlegm/mucus   Use oxygen as prescribed     Using quick relief inhaler/nebulizer more often   Get plenty of rest     Swelling of ankles more than usual   Use pursed lip breathing     More coughing than usual   At all times avoid cigarette smoke, inhaled irritants     I feel like I have a \"chest cold\"     Poor sleep and my symptoms woke me up     My appetite is not good     My medicine is not helping      Call provider immediately if symptoms don’t improve     Continue daily medications, add rescue medications:               Medications to be used during a flare up, (as Discussed with Provider):              Red Zone:  I need urgent medical care Actions     Severe shortness of breath even at rest   Call 911 or seek medical care immediately     Not able to do any activity because of breathing      Fever or shaking chills      Feeling confused or very drowsy       Chest pains      Coughing up blood                  "

## 2024-09-08 NOTE — PROGRESS NOTES
Monitor summary:        Rhythm: a fib   Rate: 86-97  Ectopy: (o)couplet PVC  Measurements: -/.12/-

## 2024-09-08 NOTE — PROGRESS NOTES
Report received from An ARMSTRONG Patient transported to room via sean SPRAGUE. Patient placed on tele. On room air. A&O x4. Oriented to room. Educated on fall risk, all fall precautions in place. Call light within reach, bed locked and in lowest position, denied other needs at this time.

## 2024-09-08 NOTE — ED NOTES
Pt transported off unit in [facility] with all personal belongings. Pt A&Ox4, on RA in [facility] on monitors.

## 2024-09-08 NOTE — CARE PLAN
The patient is Stable - Low risk of patient condition declining or worsening    Shift Goals  Clinical Goals: heparin drip, hemodynamic stability, ABX  Patient Goals: sleep, food  Family Goals: MARCOS    Progress made toward(s) clinical / shift goals:    Problem: Pain - Standard  Goal: Alleviation of pain or a reduction in pain to the patient’s comfort goal  Outcome: Progressing     Problem: Hemodynamics  Goal: Patient's hemodynamics, fluid balance and neurologic status will be stable or improve  Outcome: Progressing     Problem: Knowledge Deficit - Standard  Goal: Patient and family/care givers will demonstrate understanding of plan of care, disease process/condition, diagnostic tests and medications  Outcome: Progressing       Patient is not progressing towards the following goals:

## 2024-09-09 PROBLEM — K92.1 GASTROINTESTINAL HEMORRHAGE WITH MELENA: Status: ACTIVE | Noted: 2024-09-09

## 2024-09-09 LAB
ANION GAP SERPL CALC-SCNC: 13 MMOL/L (ref 7–16)
BUN SERPL-MCNC: 31 MG/DL (ref 8–22)
CALCIUM SERPL-MCNC: 8.2 MG/DL (ref 8.5–10.5)
CHLORIDE SERPL-SCNC: 104 MMOL/L (ref 96–112)
CO2 SERPL-SCNC: 23 MMOL/L (ref 20–33)
CREAT SERPL-MCNC: 1.01 MG/DL (ref 0.5–1.4)
ERYTHROCYTE [DISTWIDTH] IN BLOOD BY AUTOMATED COUNT: 48.6 FL (ref 35.9–50)
GFR SERPLBLD CREATININE-BSD FMLA CKD-EPI: 78 ML/MIN/1.73 M 2
GLUCOSE SERPL-MCNC: 83 MG/DL (ref 65–99)
HCT VFR BLD AUTO: 35.3 % (ref 42–52)
HCT VFR BLD AUTO: 38.3 % (ref 42–52)
HGB BLD-MCNC: 11 G/DL (ref 14–18)
HGB BLD-MCNC: 12.4 G/DL (ref 14–18)
MCH RBC QN AUTO: 28.1 PG (ref 27–33)
MCHC RBC AUTO-ENTMCNC: 32.4 G/DL (ref 32.3–36.5)
MCV RBC AUTO: 86.7 FL (ref 81.4–97.8)
PLATELET # BLD AUTO: 127 K/UL (ref 164–446)
PMV BLD AUTO: 11.9 FL (ref 9–12.9)
POTASSIUM SERPL-SCNC: 3.4 MMOL/L (ref 3.6–5.5)
RBC # BLD AUTO: 4.42 M/UL (ref 4.7–6.1)
SODIUM SERPL-SCNC: 140 MMOL/L (ref 135–145)
UFH PPP CHRO-ACNC: 0.44 IU/ML
UFH PPP CHRO-ACNC: 0.7 IU/ML
WBC # BLD AUTO: 11.9 K/UL (ref 4.8–10.8)

## 2024-09-09 PROCEDURE — 85014 HEMATOCRIT: CPT

## 2024-09-09 PROCEDURE — 700105 HCHG RX REV CODE 258: Performed by: INTERNAL MEDICINE

## 2024-09-09 PROCEDURE — 700111 HCHG RX REV CODE 636 W/ 250 OVERRIDE (IP): Mod: JZ,JG | Performed by: INTERNAL MEDICINE

## 2024-09-09 PROCEDURE — 99233 SBSQ HOSP IP/OBS HIGH 50: CPT | Performed by: INTERNAL MEDICINE

## 2024-09-09 PROCEDURE — A9270 NON-COVERED ITEM OR SERVICE: HCPCS | Performed by: INTERNAL MEDICINE

## 2024-09-09 PROCEDURE — 85018 HEMOGLOBIN: CPT

## 2024-09-09 PROCEDURE — 700102 HCHG RX REV CODE 250 W/ 637 OVERRIDE(OP): Mod: JZ

## 2024-09-09 PROCEDURE — 700102 HCHG RX REV CODE 250 W/ 637 OVERRIDE(OP): Performed by: INTERNAL MEDICINE

## 2024-09-09 PROCEDURE — 80048 BASIC METABOLIC PNL TOTAL CA: CPT

## 2024-09-09 PROCEDURE — 770020 HCHG ROOM/CARE - TELE (206)

## 2024-09-09 PROCEDURE — A9270 NON-COVERED ITEM OR SERVICE: HCPCS | Mod: JZ

## 2024-09-09 PROCEDURE — 85027 COMPLETE CBC AUTOMATED: CPT

## 2024-09-09 PROCEDURE — 85520 HEPARIN ASSAY: CPT | Mod: 91

## 2024-09-09 PROCEDURE — 700102 HCHG RX REV CODE 250 W/ 637 OVERRIDE(OP): Performed by: STUDENT IN AN ORGANIZED HEALTH CARE EDUCATION/TRAINING PROGRAM

## 2024-09-09 PROCEDURE — 700111 HCHG RX REV CODE 636 W/ 250 OVERRIDE (IP): Performed by: STUDENT IN AN ORGANIZED HEALTH CARE EDUCATION/TRAINING PROGRAM

## 2024-09-09 PROCEDURE — A9270 NON-COVERED ITEM OR SERVICE: HCPCS | Performed by: STUDENT IN AN ORGANIZED HEALTH CARE EDUCATION/TRAINING PROGRAM

## 2024-09-09 PROCEDURE — 700105 HCHG RX REV CODE 258: Performed by: STUDENT IN AN ORGANIZED HEALTH CARE EDUCATION/TRAINING PROGRAM

## 2024-09-09 PROCEDURE — 36415 COLL VENOUS BLD VENIPUNCTURE: CPT

## 2024-09-09 PROCEDURE — 99222 1ST HOSP IP/OBS MODERATE 55: CPT | Mod: AI | Performed by: NURSE PRACTITIONER

## 2024-09-09 RX ORDER — POTASSIUM CHLORIDE 1500 MG/1
40 TABLET, EXTENDED RELEASE ORAL ONCE
Status: COMPLETED | OUTPATIENT
Start: 2024-09-09 | End: 2024-09-09

## 2024-09-09 RX ADMIN — PROTHROMBIN, COAGULATION FACTOR VII HUMAN, COAGULATION FACTOR IX HUMAN, COAGULATION FACTOR X HUMAN, PROTEIN C, PROTEIN S HUMAN, AND WATER 2000 UNITS: KIT at 17:51

## 2024-09-09 RX ADMIN — THIAMINE HYDROCHLORIDE 500 MG: 100 INJECTION, SOLUTION INTRAMUSCULAR; INTRAVENOUS at 04:52

## 2024-09-09 RX ADMIN — ATORVASTATIN CALCIUM 40 MG: 40 TABLET, FILM COATED ORAL at 17:53

## 2024-09-09 RX ADMIN — ACETAMINOPHEN 650 MG: 325 TABLET ORAL at 12:10

## 2024-09-09 RX ADMIN — AMPICILLIN SODIUM, SULBACTAM SODIUM 3 G: 2; 1 INJECTION, POWDER, FOR SOLUTION INTRAMUSCULAR; INTRAVENOUS at 23:56

## 2024-09-09 RX ADMIN — SODIUM CHLORIDE: 9 INJECTION, SOLUTION INTRAVENOUS at 18:00

## 2024-09-09 RX ADMIN — NICOTINE TRANSDERMAL SYSTEM 21 MG: 21 PATCH, EXTENDED RELEASE TRANSDERMAL at 04:42

## 2024-09-09 RX ADMIN — APIXABAN 5 MG: 5 TABLET, FILM COATED ORAL at 13:08

## 2024-09-09 RX ADMIN — ACETAMINOPHEN 650 MG: 325 TABLET ORAL at 17:53

## 2024-09-09 RX ADMIN — ACETAMINOPHEN 650 MG: 325 TABLET ORAL at 04:41

## 2024-09-09 RX ADMIN — PANTOPRAZOLE SODIUM 40 MG: 40 INJECTION, POWDER, FOR SOLUTION INTRAVENOUS at 17:53

## 2024-09-09 RX ADMIN — POTASSIUM CHLORIDE 40 MEQ: 1500 TABLET, EXTENDED RELEASE ORAL at 01:39

## 2024-09-09 RX ADMIN — AMPICILLIN SODIUM, SULBACTAM SODIUM 3 G: 2; 1 INJECTION, POWDER, FOR SOLUTION INTRAMUSCULAR; INTRAVENOUS at 18:02

## 2024-09-09 RX ADMIN — PANTOPRAZOLE SODIUM 40 MG: 40 INJECTION, POWDER, FOR SOLUTION INTRAVENOUS at 04:43

## 2024-09-09 RX ADMIN — HEPARIN SODIUM 10 UNITS/KG/HR: 5000 INJECTION, SOLUTION INTRAVENOUS at 12:12

## 2024-09-09 RX ADMIN — AMPICILLIN SODIUM, SULBACTAM SODIUM 3 G: 2; 1 INJECTION, POWDER, FOR SOLUTION INTRAMUSCULAR; INTRAVENOUS at 12:09

## 2024-09-09 RX ADMIN — AMPICILLIN SODIUM, SULBACTAM SODIUM 3 G: 2; 1 INJECTION, POWDER, FOR SOLUTION INTRAMUSCULAR; INTRAVENOUS at 06:05

## 2024-09-09 RX ADMIN — POTASSIUM CHLORIDE 40 MEQ: 1500 TABLET, EXTENDED RELEASE ORAL at 09:42

## 2024-09-09 ASSESSMENT — ENCOUNTER SYMPTOMS
FOCAL WEAKNESS: 0
DIARRHEA: 1
BLURRED VISION: 0
MYALGIAS: 0
COUGH: 0
INSOMNIA: 0
NAUSEA: 1
NAUSEA: 0
VOMITING: 0
WEAKNESS: 1
ABDOMINAL PAIN: 1
NERVOUS/ANXIOUS: 0
PALPITATIONS: 0
DIZZINESS: 0
HEADACHES: 0
WEAKNESS: 0
CHILLS: 0
BLOOD IN STOOL: 0
VOMITING: 1
SHORTNESS OF BREATH: 0
FEVER: 0
FLANK PAIN: 0
DEPRESSION: 0
BRUISES/BLEEDS EASILY: 0
HEARTBURN: 1
CONSTIPATION: 0
FALLS: 0
SORE THROAT: 0

## 2024-09-09 ASSESSMENT — FIBROSIS 4 INDEX: FIB4 SCORE: 2.88

## 2024-09-09 ASSESSMENT — LIFESTYLE VARIABLES: SUBSTANCE_ABUSE: 0

## 2024-09-09 ASSESSMENT — PAIN DESCRIPTION - PAIN TYPE
TYPE: ACUTE PAIN
TYPE: ACUTE PAIN

## 2024-09-09 NOTE — CARE PLAN
The patient is Watcher - Medium risk of patient condition declining or worsening    Shift Goals  Clinical Goals: heparin gtt, VSS  Patient Goals: rest  Family Goals: MARCOS    Progress made toward(s) clinical / shift goals:      Problem: Pain - Standard  Goal: Alleviation of pain or a reduction in pain to the patient’s comfort goal  Description: Target End Date:  Prior to discharge or change in level of care    Document on Vitals flowsheet    1.  Document pain using the appropriate pain scale per order or unit policy  2.  Educate and implement non-pharmacologic comfort measures (i.e. relaxation, distraction, massage, cold/heat therapy, etc.)  3.  Pain management medications as ordered  4.  Reassess pain after pain med administration per policy  5.  If opiods administered assess patient's response to pain medication is appropriate per POSS sedation scale  6.  Follow pain management plan developed in collaboration with patient and interdisciplinary team (including palliative care or pain specialists if applicable)  Outcome: Progressing     Problem: Knowledge Deficit - COPD  Goal: Patient/significant other demonstrates understanding of disease process, utilization of the Action Plan, medications and discharge instruction  Description: Target End Date:  1-3 days or as soon as patient condition allows    Document in Patient Education    1.  Discuss the importance of medical follow-up care, periodic chest x-rays, sputum cultures  2.  Review worsening signs and symptoms of COPD flare and exacerbation and its management  3.  Discuss respiratory medications, side effects, adverse reactions  4.  Demonstrate technique for using a metered-dose inhaler (MDI) and utilization of a spacer  5.  Review the COPD Action Plan  6.  Instruct and reinforce the rationale for breathing exercises, coughing effectively, and general conditioning exercises  7.  Stress importance of oral care and dental hygiene  8.  Discuss the importance of  avoiding people with active respiratory infections and need for routine influenza and pneumococcal vaccinations  9.  Discuss factors that may trigger condition and encourage patient/significant other to explore ways to control these factors in and around the home and work setting  10. Review the harmful effects of smoking and advise cessation of smoking  11. Provide information about activity limitations and alternating activities with rest periods to prevent fatigue  12. Instruct asthmatic patient of use of peak flow meter, as appropriate  13. Review oxygen requirements and dosage, safe use of oxygen, and refer to the supplier as indicated  14. Educate patient/family/caregiver on the use of Nasal Intermittent Positive Pressure Ventilation (NIPPV) and possible adverse reactions  Outcome: Progressing     Problem: Nutrition - Advanced  Goal: Patient will display progressive weight gain toward goal have adequate food and fluid intake  Description: Target End Date:  Prior to discharge or change in level of care    1.  Daily weights  2.  Monitor dietary intake  3.  Promote systemic fluid hydration within cardiac tolerance  4.  Albumin and PreAlbumin per provider order  5.  Enteral and parenteral nutrition per provider order  6.  Calorie count  7.  Provide oral care  8.  Collaborate with Clinical Dietician  9.  Consider Speech Therapy consult for swallowing difficulties  10. Administer supplemental oxygen during meals as indicated  Outcome: Progressing     Problem: Care Map:  Day 3 Optimal Outcome for the Heart Failure Patient  Goal: Day 3:  Optimal Care of the heart failure patient  Description: Target End Date:  end of day 3  Outcome: Progressing       Patient is not progressing towards the following goals:

## 2024-09-09 NOTE — H&P (VIEW-ONLY)
Date of Consultation:  9/9/2024    Patient: : Robert Mcdonnell  MRN: [id number]    Referring Physician: Eliana Black MD     GI:SARAH Archer     Reason for Consultation: Melena, epigastric pain    History of Present Illness:     This is a 74-year-old male with a past medical history of chronic atrial fibrillation (not on anticoagulation due to medical noncompliance; controlled on metoprolol), heart failure reduced ejection fraction (EF 30-45), hypertension, hyperlipidemia, COPD (no O2 at baseline) who presented to [facility] on 9/7/2024 for evaluation of epigastric pain.  Patient reports epigastric pain for the past 3-4 days prior to arrival with associated nausea, vomiting.  He denies pain radiating.  No relieving or exacerbating factors.  He further endorses dark, soft/liquid stools in addition to heartburn/reflux  In the emergency department, he was found to have a leukocytosis, lactic acid of 3.5, NIRMAL with creatinine 2.24.  He was initially suspected of having a bowel perforation however CT abdomen pelvis was obtained showing no bowel perforation but patient with left lung base opacity consistent with pneumonia.  He was given IV fluid boluses for sepsis protocol, started on empiric Zosyn, and admitted for further evaluation.  Initial hemoglobin 17 but is down trended to 12.4 on day of consult.  MCV normocytic.  Platelets 127.  BUN elevated at 60 in setting of NIRMAL on arrival.  This is down trended to 31.   Denies fever, chills, weight loss, change in appetite, odynophagia, hematemesis, hematochezia.      Tobacco use: Denies tobacco use products in the last few weeks.  Otherwise was previously regular smoker  Alcohol use: Denies alcohol use in the past month  Illicit drug use: Denies    Last EGD: Denies prior EGD  Last colonoscopy: Denies prior colonoscopy   NSAID/ASA use: Ibuprofen 400 mg p.o. daily for aches and pains  Anticoagulation use: Patient was started on Eliquis 5 mg twice  daily 9/9/2024.  Had single/first dose 9/9/2024 at 1308.      Past Medical History:   Diagnosis Date    CHF (congestive heart failure) (HCC)     Congestive heart failure (HCC)     Hypertension          Past Surgical History:   Procedure Laterality Date    AL UPPER GI ENDOSCOPY,DIAGNOSIS N/A 1/31/2023    Procedure: GASTROSCOPY;  Surgeon: Joy Mcnair M.D.;  Location: SURGERY SAME DAY [facility];  Service: Gastroenterology       Family History   Problem Relation Age of Onset    Heart Disease Mother     Heart Disease Father        Social History     Socioeconomic History    Marital status:    Tobacco Use    Smoking status: Every Day     Current packs/day: 1.00     Types: Cigarettes    Smokeless tobacco: Never   Vaping Use    Vaping status: Never Used   Substance and Sexual Activity    Alcohol use: Yes     Comment: occ    Drug use: Not Currently   Social History Narrative    ** Merged History Encounter **          Social Determinants of Health     Food Insecurity: No Food Insecurity (9/8/2024)    Hunger Vital Sign     Worried About Running Out of Food in the Last Year: Never true     Ran Out of Food in the Last Year: Never true   Transportation Needs: No Transportation Needs (9/8/2024)    PRAPARE - Transportation     Lack of Transportation (Medical): No     Lack of Transportation (Non-Medical): No   Intimate Partner Violence: Not At Risk (9/8/2024)    Humiliation, Afraid, Rape, and Kick questionnaire     Fear of Current or Ex-Partner: No     Emotionally Abused: No     Physically Abused: No     Sexually Abused: No   Housing Stability: High Risk (9/8/2024)    Housing Stability Vital Sign     Unable to Pay for Housing in the Last Year: No     Number of Times Moved in the Last Year: 1     Homeless in the Last Year: Yes       Review of systems:  Review of Systems   Constitutional:  Negative for chills, fever and malaise/fatigue.   HENT:  Negative for congestion and sore throat.    Respiratory:  Negative for cough  and shortness of breath.    Cardiovascular:  Negative for chest pain and leg swelling.   Gastrointestinal:  Positive for abdominal pain (epigastric), diarrhea and melena. Negative for blood in stool, constipation, nausea (resolved) and vomiting (resolved).   Genitourinary:  Negative for dysuria and flank pain.   Musculoskeletal:  Negative for falls and myalgias.   Neurological:  Negative for weakness and headaches.   Psychiatric/Behavioral:  Negative for substance abuse. The patient is not nervous/anxious and does not have insomnia.    All other systems reviewed and are negative.        Physical Exam:  Vitals:    09/09/24 0439 09/09/24 0737 09/09/24 1122 09/09/24 1529   BP: 92/54 101/67 95/53 93/52   Pulse: 95 (!) 101 94 94   Resp:  17 18 17   Temp:  36.7 °C (98 °F) 36.5 °C (97.7 °F) 36.1 °C (97 °F)   TempSrc:  Temporal Temporal Temporal   SpO2:  93% 94% 95%   Weight:       Height:           Physical Exam  Vitals and nursing note reviewed.   Constitutional:       General: He is not in acute distress.     Appearance: He is normal weight. He is ill-appearing (Chronically ill-appearing). He is not toxic-appearing.   HENT:      Head: Normocephalic and atraumatic.      Nose: Nose normal. No congestion.      Mouth/Throat:      Mouth: Mucous membranes are moist.      Pharynx: Oropharynx is clear. No oropharyngeal exudate.   Eyes:      General: No scleral icterus.     Extraocular Movements: Extraocular movements intact.      Conjunctiva/sclera: Conjunctivae normal.   Cardiovascular:      Rate and Rhythm: Normal rate. Rhythm irregular.      Pulses: Normal pulses.      Heart sounds: Normal heart sounds. No murmur heard.  Pulmonary:      Effort: Pulmonary effort is normal. No respiratory distress.      Breath sounds: Rales present.   Abdominal:      General: Abdomen is flat. Bowel sounds are normal. There is no distension.      Palpations: Abdomen is soft. There is no mass.      Tenderness: There is no abdominal tenderness.  There is no guarding.      Hernia: No hernia is present.   Musculoskeletal:      Right lower leg: No edema.      Left lower leg: No edema.   Skin:     General: Skin is warm and dry.      Capillary Refill: Capillary refill takes less than 2 seconds.      Coloration: Skin is not jaundiced or pale.   Neurological:      General: No focal deficit present.      Mental Status: He is alert and oriented to person, place, and time. Mental status is at baseline.   Psychiatric:         Mood and Affect: Mood normal.         Behavior: Behavior normal.           Labs:  Recent Labs     09/07/24  1315 09/08/24  0024 09/09/24  0019   WBC 16.0* 14.5* 11.9*   RBC 6.10 5.29 4.42*   HEMOGLOBIN 17.0 14.6 12.4*   HEMATOCRIT 51.7 44.9 38.3*   MCV 84.8 84.9 86.7   MCH 27.9 27.6 28.1   MCHC 32.9 32.5 32.4   RDW 48.3 48.3 48.6   PLATELETCT 205 147* 127*   MPV 10.9 10.7 11.9     Recent Labs     09/07/24  1315 09/08/24  0024 09/09/24  0019   SODIUM 145 143 140   POTASSIUM 4.0 3.7 3.4*   CHLORIDE 103 106 104   CO2 18* 23 23   GLUCOSE 141* 143* 83   BUN 60* 53* 31*     Recent Labs     09/07/24 1315 09/07/24  1703   APTT 24.2* 25.7   INR 1.26* 1.28*       Recent Labs     09/07/24  1315 09/07/24  1703 09/08/24  0024   ASTSGOT 15  --  14   ALTSGPT 6  --  8   TBILIRUBIN 1.3  --  0.8   ALKPHOSPHAT 94  --  69   GLOBULIN 4.4*  --  3.3   INR 1.26* 1.28*  --          Imaging:  CT-ABDOMEN-PELVIS W/O  Narrative: 9/7/2024 3:33 PM    HISTORY/REASON FOR EXAM:  Abdominal pain.    TECHNIQUE/EXAM DESCRIPTION:  CT scan of the abdomen and pelvis without contrast.    Noncontrast helical scanning was obtained from the diaphragmatic domes through the pubic symphysis.    Low dose optimization technique was utilized for this CT exam including automated exposure control and adjustment of the mA and/or kV according to patient size.    COMPARISON: 04/26/2020    FINDINGS:  Lower Chest: Mild opacification is noted posterior left lung base.. Heart size enlarged    Liver:  Unremarkable with punctate well-defined low-attenuation lesions measuring less than 1 cm are again noted..    Spleen: Unremarkable.    Pancreas: Unremarkable.    Gallbladder: No calcified stones.    Biliary: Nondilated.    Adrenal glands: Normal.    Kidneys: Unremarkable without hydronephrosis.    Bowel: No obstruction or acute inflammation.    Lymph nodes: No adenopathy.    Vasculature: Unremarkable.    Peritoneum: Unremarkable without ascites.    Pelvis: No adenopathy or free fluid.    Musculoskeletal: No acute or destructive process.  Impression: 1.  Opacification left lung base at costophrenic angle is identified which could be due to scarring or atelectasis.    2.  Cardiomegaly.    3.  Otherwise no acute abnormalities are noted in the abdomen or pelvis.  DX-CHEST-PORTABLE (1 VIEW)  Narrative: 9/7/2024 3:27 PM    HISTORY/REASON FOR EXAM:  Sepsis    TECHNIQUE/EXAM DESCRIPTION AND NUMBER OF VIEWS:  Single portable view of the chest.    COMPARISON: 01/31/2023    FINDINGS:    Heart size is within normal limits.  No focal infiltrates or consolidations are identified in the lungs. Minimal linear opacification is noted in left lower lung.  There is mild blunting of left costophrenic angle. No pneumothorax is appreciated.  Impression: 1.  Possible small left pleural effusion.    2.  Minimal atelectasis left lung base.    3.  No consolidations identified.            Impressions:  GI bleeding with melena  Nausea/vomiting  Normocytic anemia  Atrial fibrillation-received single dose of Eliquis 9/9/2024 at 1308.  NIRMAL-improving  Community-acquired pneumonia  Lactic acidosis-resolved  Tobacco use  Heart failure reduced ejection fraction-EF 30-45%.  Repeat echocardiogram pending.  Medical noncompliance    MDM:  This is a 74-year-old male with a past medical history as listed above who presented to [facility] on 9/7/2024 with epigastric pain with associated nausea, vomiting, melena.  There were initial  concerns for bowel perforation given elevated lactic acid at 3.5.  CT abdomen pelvis negative for bowel perforation.  He is previously noncompliant with anticoagulation for his atrial fibrillation and was given a single dose of Eliquis on 9/8/2024 at 1308.  He has had ongoing/subsequent melena.  Discussed concern for GI bleeding with patient and further evaluation with EGD.  He is agreeable to proceed.  His blood pressure is mildly soft with systolics .  Heart rate 80s-101 with atrial fibrillation.  Remains on room air without distress.   Due to Eliquis administration, recommend monitoring melena tonight.  If melena worsens, consider reversal of anticoagulation,  Reevaluate labs in the morning to determine timing of EGD.      Recommendations:  Hold Eliquis  Monitor H/H and for signs of rebleeding  If worsening melena tonight, consider anticoagulation reversal agent  PPI twice daily  Repeat echocardiogram pending  Continue metoprolol for rate control  Avoid NSAIDs.   Clear liquid diet (no reds); n.p.o. at midnight  Timing of EGD to be determined    Will reevaluate patient tomorrow morning.    Discussed with patient, nursing at bedside, Dr. Gutierres, Dr. Black.      This note was generated using voice recognition software which has a small chance of producing errors of grammar and possibly content. I have made every reasonable attempt to find and correct any obvious errors, but expect that some may not be found prior to finalization of this note.

## 2024-09-09 NOTE — DISCHARGE PLANNING
CHW attempted to introduced community care management to the patient.   Pt was asleep.   CHW will attempt again.

## 2024-09-09 NOTE — DISCHARGE PLANNING
In the case of an emergency, pt's legal NOK is brother Clyde Mcdonnell     RNCM met with pt at bedside and obtained the information used in this assessment. Pt verified accuracy of facesheet. Pt lives at the [facility]. Prior to current hospitalization, pt was completely independent in ADLS/IADLS. Pt earns approximately $1237.00/mo SSI.  Pt has no support system. Pt denies any hx of substance use, states he has been sober X1 mo and denies any dx of mh. No DME.    Care Transition Team Assessment    Information Source:pt  Orientation Level: Oriented X4  Information Given By: Patient  Informant's Name: Robert  Who is responsible for making decisions for patient? : Patient         Elopement Risk  Legal Hold: No  Ambulatory or Self Mobile in Wheelchair: Yes  Disoriented: No  Psychiatric Symptoms: None  History of Wandering: No  Elopement this Admit: No  Vocalizing Wanting to Leave: No  Displays Behaviors, Body Language Wanting to Leave: No-Not at Risk for Elopement  Elopement Risk: Not at Risk for Elopement    Interdisciplinary Discharge Planning  Does Admitting Nurse Feel This Could be a Complex Discharge?: No  Primary Care Physician: none  Lives with - Patient's Self Care Capacity: Unrelated Adult  Patient or legal guardian wants to designate a caregiver: No  Support Systems: Other (Comments) ()  Housing / Facility: Homeless  Name of Care Facility: [facility]  Do You Take your Prescribed Medications Regularly: Yes  Mobility Issues: No  Prior Services: None  Durable Medical Equipment: Not Applicable    Discharge Preparedness  What is your plan after discharge?: Home with help  Prior Functional Level: Ambulatory, Independent with Activities of Daily Living, Independent with Medication Management  Difficulity with ADLs: None  Difficulity with IADLs: None    Functional Assesment  Prior Functional Level: Ambulatory, Independent with Activities of Daily Living, Independent with Medication  Management    Finances  Prescription Coverage: Yes    Vision / Hearing Impairment  Vision Impairment : No  Hearing Impairment : No              Domestic Abuse  Have you ever been the victim of abuse or violence?: No  Possible Abuse/Neglect Reported to:: Not Applicable    Psychological Assessment  History of Substance Abuse: Alcohol  Date Last Used - Alcohol: 08/01/2024  Substance Abuse Comments: states hes been sober 1 mo  History of Psychiatric Problems: No         Anticipated Discharge Information  Discharge Disposition: Discharged to home/self care (01)

## 2024-09-09 NOTE — PROGRESS NOTES
Patient having black tarry stools. Patient unsure if this is the first time this has happened. MD notified. New orders received.

## 2024-09-09 NOTE — CONSULTS
Date of Consultation:  9/9/2024    Patient: : Robert Mcdonnell  MRN: [id number]    Referring Physician: Eliana Black MD     GI:SARAH Archer     Reason for Consultation: Melena, epigastric pain    History of Present Illness:     This is a 74-year-old male with a past medical history of chronic atrial fibrillation (not on anticoagulation due to medical noncompliance; controlled on metoprolol), heart failure reduced ejection fraction (EF 30-45), hypertension, hyperlipidemia, COPD (no O2 at baseline) who presented to [facility] on 9/7/2024 for evaluation of epigastric pain.  Patient reports epigastric pain for the past 3-4 days prior to arrival with associated nausea, vomiting.  He denies pain radiating.  No relieving or exacerbating factors.  He further endorses dark, soft/liquid stools in addition to heartburn/reflux  In the emergency department, he was found to have a leukocytosis, lactic acid of 3.5, NIRMAL with creatinine 2.24.  He was initially suspected of having a bowel perforation however CT abdomen pelvis was obtained showing no bowel perforation but patient with left lung base opacity consistent with pneumonia.  He was given IV fluid boluses for sepsis protocol, started on empiric Zosyn, and admitted for further evaluation.  Initial hemoglobin 17 but is down trended to 12.4 on day of consult.  MCV normocytic.  Platelets 127.  BUN elevated at 60 in setting of NIRMAL on arrival.  This is down trended to 31.   Denies fever, chills, weight loss, change in appetite, odynophagia, hematemesis, hematochezia.      Tobacco use: Denies tobacco use products in the last few weeks.  Otherwise was previously regular smoker  Alcohol use: Denies alcohol use in the past month  Illicit drug use: Denies    Last EGD: Denies prior EGD  Last colonoscopy: Denies prior colonoscopy   NSAID/ASA use: Ibuprofen 400 mg p.o. daily for aches and pains  Anticoagulation use: Patient was started on Eliquis 5 mg twice  daily 9/9/2024.  Had single/first dose 9/9/2024 at 1308.      Past Medical History:   Diagnosis Date    CHF (congestive heart failure) (HCC)     Congestive heart failure (HCC)     Hypertension          Past Surgical History:   Procedure Laterality Date    [place] UPPER GI ENDOSCOPY,DIAGNOSIS N/A 1/31/2023    Procedure: GASTROSCOPY;  Surgeon: Joy Mcnair M.D.;  Location: SURGERY SAME DAY [facility];  Service: Gastroenterology       Family History   Problem Relation Age of Onset    Heart Disease Mother     Heart Disease Father        Social History     Socioeconomic History    Marital status:    Tobacco Use    Smoking status: Every Day     Current packs/day: 1.00     Types: Cigarettes    Smokeless tobacco: Never   Vaping Use    Vaping status: Never Used   Substance and Sexual Activity    Alcohol use: Yes     Comment: occ    Drug use: Not Currently   Social History Narrative    ** Merged History Encounter **          Social Determinants of Health     Food Insecurity: No Food Insecurity (9/8/2024)    Hunger Vital Sign     Worried About Running Out of Food in the Last Year: Never true     Ran Out of Food in the Last Year: Never true   Transportation Needs: No Transportation Needs (9/8/2024)    PRAPARE - Transportation     Lack of Transportation (Medical): No     Lack of Transportation (Non-Medical): No   Intimate Partner Violence: Not At Risk (9/8/2024)    Humiliation, Afraid, Rape, and Kick questionnaire     Fear of Current or Ex-Partner: No     Emotionally Abused: No     Physically Abused: No     Sexually Abused: No   Housing Stability: High Risk (9/8/2024)    Housing Stability Vital Sign     Unable to Pay for Housing in the Last Year: No     Number of Times Moved in the Last Year: 1     Homeless in the Last Year: Yes       Review of systems:  Review of Systems   Constitutional:  Negative for chills, fever and malaise/fatigue.   HENT:  Negative for congestion and sore throat.    Respiratory:  Negative for cough  and shortness of breath.    Cardiovascular:  Negative for chest pain and leg swelling.   Gastrointestinal:  Positive for abdominal pain (epigastric), diarrhea and melena. Negative for blood in stool, constipation, nausea (resolved) and vomiting (resolved).   Genitourinary:  Negative for dysuria and flank pain.   Musculoskeletal:  Negative for falls and myalgias.   Neurological:  Negative for weakness and headaches.   Psychiatric/Behavioral:  Negative for substance abuse. The patient is not nervous/anxious and does not have insomnia.    All other systems reviewed and are negative.        Physical Exam:  Vitals:    09/09/24 0439 09/09/24 0737 09/09/24 1122 09/09/24 1529   BP: 92/54 101/67 95/53 93/52   Pulse: 95 (!) 101 94 94   Resp:  17 18 17   Temp:  36.7 °C (98 °F) 36.5 °C (97.7 °F) 36.1 °C (97 °F)   TempSrc:  Temporal Temporal Temporal   SpO2:  93% 94% 95%   Weight:       Height:           Physical Exam  Vitals and nursing note reviewed.   Constitutional:       General: He is not in acute distress.     Appearance: He is normal weight. He is ill-appearing (Chronically ill-appearing). He is not toxic-appearing.   HENT:      Head: Normocephalic and atraumatic.      Nose: Nose normal. No congestion.      Mouth/Throat:      Mouth: Mucous membranes are moist.      Pharynx: Oropharynx is clear. No oropharyngeal exudate.   Eyes:      General: No scleral icterus.     Extraocular Movements: Extraocular movements intact.      Conjunctiva/sclera: Conjunctivae normal.   Cardiovascular:      Rate and Rhythm: Normal rate. Rhythm irregular.      Pulses: Normal pulses.      Heart sounds: Normal heart sounds. No murmur heard.  Pulmonary:      Effort: Pulmonary effort is normal. No respiratory distress.      Breath sounds: Rales present.   Abdominal:      General: Abdomen is flat. Bowel sounds are normal. There is no distension.      Palpations: Abdomen is soft. There is no mass.      Tenderness: There is no abdominal tenderness.  There is no guarding.      Hernia: No hernia is present.   Musculoskeletal:      Right lower leg: No edema.      Left lower leg: No edema.   Skin:     General: Skin is warm and dry.      Capillary Refill: Capillary refill takes less than 2 seconds.      Coloration: Skin is not jaundiced or pale.   Neurological:      General: No focal deficit present.      Mental Status: He is alert and oriented to person, place, and time. Mental status is at baseline.   Psychiatric:         Mood and Affect: Mood normal.         Behavior: Behavior normal.           Labs:  Recent Labs     09/07/24  1315 09/08/24  0024 09/09/24  0019   WBC 16.0* 14.5* 11.9*   RBC 6.10 5.29 4.42*   HEMOGLOBIN 17.0 14.6 12.4*   HEMATOCRIT 51.7 44.9 38.3*   MCV 84.8 84.9 86.7   MCH 27.9 27.6 28.1   MCHC 32.9 32.5 32.4   RDW 48.3 48.3 48.6   PLATELETCT 205 147* 127*   MPV 10.9 10.7 11.9     Recent Labs     09/07/24  1315 09/08/24  0024 09/09/24  0019   SODIUM 145 143 140   POTASSIUM 4.0 3.7 3.4*   CHLORIDE 103 106 104   CO2 18* 23 23   GLUCOSE 141* 143* 83   BUN 60* 53* 31*     Recent Labs     09/07/24 1315 09/07/24  1703   APTT 24.2* 25.7   INR 1.26* 1.28*       Recent Labs     09/07/24  1315 09/07/24  1703 09/08/24  0024   ASTSGOT 15  --  14   ALTSGPT 6  --  8   TBILIRUBIN 1.3  --  0.8   ALKPHOSPHAT 94  --  69   GLOBULIN 4.4*  --  3.3   INR 1.26* 1.28*  --          Imaging:  CT-ABDOMEN-PELVIS W/O  Narrative: 9/7/2024 3:33 PM    HISTORY/REASON FOR EXAM:  Abdominal pain.    TECHNIQUE/EXAM DESCRIPTION:  CT scan of the abdomen and pelvis without contrast.    Noncontrast helical scanning was obtained from the diaphragmatic domes through the pubic symphysis.    Low dose optimization technique was utilized for this CT exam including automated exposure control and adjustment of the mA and/or kV according to patient size.    COMPARISON: 04/26/2020    FINDINGS:  Lower Chest: Mild opacification is noted posterior left lung base.. Heart size enlarged    Liver:  Unremarkable with punctate well-defined low-attenuation lesions measuring less than 1 cm are again noted..    Spleen: Unremarkable.    Pancreas: Unremarkable.    Gallbladder: No calcified stones.    Biliary: Nondilated.    Adrenal glands: Normal.    Kidneys: Unremarkable without hydronephrosis.    Bowel: No obstruction or acute inflammation.    Lymph nodes: No adenopathy.    Vasculature: Unremarkable.    Peritoneum: Unremarkable without ascites.    Pelvis: No adenopathy or free fluid.    Musculoskeletal: No acute or destructive process.  Impression: 1.  Opacification left lung base at costophrenic angle is identified which could be due to scarring or atelectasis.    2.  Cardiomegaly.    3.  Otherwise no acute abnormalities are noted in the abdomen or pelvis.  DX-CHEST-PORTABLE (1 VIEW)  Narrative: 9/7/2024 3:27 PM    HISTORY/REASON FOR EXAM:  Sepsis    TECHNIQUE/EXAM DESCRIPTION AND NUMBER OF VIEWS:  Single portable view of the chest.    COMPARISON: 01/31/2023    FINDINGS:    Heart size is within normal limits.  No focal infiltrates or consolidations are identified in the lungs. Minimal linear opacification is noted in left lower lung.  There is mild blunting of left costophrenic angle. No pneumothorax is appreciated.  Impression: 1.  Possible small left pleural effusion.    2.  Minimal atelectasis left lung base.    3.  No consolidations identified.            Impressions:  GI bleeding with melena  Nausea/vomiting  Normocytic anemia  Atrial fibrillation-received single dose of Eliquis 9/9/2024 at 1308.  NIRMAL-improving  Community-acquired pneumonia  Lactic acidosis-resolved  Tobacco use  Heart failure reduced ejection fraction-EF 30-45%.  Repeat echocardiogram pending.  Medical noncompliance    MDM:  This is a 74-year-old male with a past medical history as listed above who presented to [facility] on 9/7/2024 with epigastric pain with associated nausea, vomiting, melena.  There were initial  concerns for bowel perforation given elevated lactic acid at 3.5.  CT abdomen pelvis negative for bowel perforation.  He is previously noncompliant with anticoagulation for his atrial fibrillation and was given a single dose of Eliquis on 9/8/2024 at 1308.  He has had ongoing/subsequent melena.  Discussed concern for GI bleeding with patient and further evaluation with EGD.  He is agreeable to proceed.  His blood pressure is mildly soft with systolics .  Heart rate 80s-101 with atrial fibrillation.  Remains on room air without distress.   Due to Eliquis administration, recommend monitoring melena tonight.  If melena worsens, consider reversal of anticoagulation,  Reevaluate labs in the morning to determine timing of EGD.      Recommendations:  Hold Eliquis  Monitor H/H and for signs of rebleeding  If worsening melena tonight, consider anticoagulation reversal agent  PPI twice daily  Repeat echocardiogram pending  Continue metoprolol for rate control  Avoid NSAIDs.   Clear liquid diet (no reds); n.p.o. at midnight  Timing of EGD to be determined    Will reevaluate patient tomorrow morning.    Discussed with patient, nursing at bedside, Dr. Gutierres, Dr. Black.      This note was generated using voice recognition software which has a small chance of producing errors of grammar and possibly content. I have made every reasonable attempt to find and correct any obvious errors, but expect that some may not be found prior to finalization of this note.

## 2024-09-09 NOTE — CARE PLAN
The patient is Stable - Low risk of patient condition declining or worsening    Shift Goals  Clinical Goals: heparin drip, hemodynamic stability  Patient Goals: rest  Family Goals: MARCOS    Progress made toward(s) clinical / shift goals:    Problem: Pain - Standard  Goal: Alleviation of pain or a reduction in pain to the patient’s comfort goal  Outcome: Progressing     Problem: Knowledge Deficit - COPD  Goal: Patient/significant other demonstrates understanding of disease process, utilization of the Action Plan, medications and discharge instruction  Outcome: Progressing     Problem: Hemodynamics  Goal: Patient's hemodynamics, fluid balance and neurologic status will be stable or improve  Outcome: Progressing     Problem: Knowledge Deficit - Standard  Goal: Patient and family/care givers will demonstrate understanding of plan of care, disease process/condition, diagnostic tests and medications  Outcome: Progressing     Problem: Care Map:  Day 2 Optimal Outcome for the Heart Failure Patient  Goal: Day 2:  Optimal Care of the heart failure patient  Outcome: Progressing       Patient is not progressing towards the following goals:    Pt verbalizes understanding of care plan. Heparin infusion titrated via Xa protocol; last Xa no therapeutic. Pt is afib rate controlled, has scheduled metoprolol. Had c/o nausea during shift, administered sublingual zofran and administered tylenol for abdominal pain. Urine output measured via urinal. Last lactic lab stable, WBC remains elevated. Pt has scheduled unasyn. Pending daily weight.

## 2024-09-09 NOTE — DISCHARGE PLANNING
Case Management Discharge Planning    Admission Date: 9/7/2024  GMLOS: 5.1  ALOS: 2    6-Clicks ADL Score: 24  6-Clicks Mobility Score: 22      Anticipated Discharge Dispo: Discharge Disposition: Discharged to home/self care (01)    DME Needed: No    Action(s) Taken: Updated Provider/Nurse on Discharge Plan and DC Assessment Complete (See below)    Escalations Completed: None    Medically Clear: No    Next Steps: Pt lives at [facility] and will return there when medically cleared.    Barriers to Discharge: Medical clearance    Is the patient up for discharge tomorrow: No

## 2024-09-09 NOTE — PROGRESS NOTES
Hospital Medicine Daily Progress Note    Date of Service  9/9/2024    Chief Complaint  Robert Mcdonnell is a 74 y.o. male admitted 9/7/2024 with abdominal pain and N/V    Hospital Course  This is a 74 y.o. homeless male from [facility] with history of chronic A-fib not on anticoagulation due to medical noncompliance, tobacco abuse, COPD, CHF, hypertension, hyperlipidemia who presented 9/7/2024 with evaluation for intractable nausea and vomiting.  Patient reported ongoing symptoms for at least the past 3 to 4 days, reported epigastric discomfort associate with nausea and vomiting.  In ER, found to have leukocytosis, lactic acid of 3.5 and NIRMAL with creatinine of 2.24.  Initially thought to have bowel perforation, no perforation seen on CTAP, however noted to have left lung base opacity.  Patient was given IVF boluses per sepsis protocol and Zosyn empirically by ERP.   Patient admitted for further treatment     Interval Problem Update  Patient seen and examined, afebrile,on unasyn for possible pneumonia, nausea has improved.  He was on anticoagulation for his A.fibb but started having black tarry stool and his hemoglobin has dropped down to 12 from 14 yesterday and was 17 on admission.  I have consulted GI appreciate rec.   Holding anticoagulation     I have discussed this patient's plan of care and discharge plan at IDT rounds today with Case Management, Nursing, Nursing leadership, and other members of the IDT team.    Consultants/Specialty  GI    Code Status  Full Code    Disposition  The patient is not medically cleared for discharge to home or a post-acute facility.      I have placed the appropriate orders for post-discharge needs.    Review of Systems  Review of Systems   Constitutional:  Positive for malaise/fatigue. Negative for chills and fever.   HENT:  Negative for hearing loss and tinnitus.    Eyes:  Negative for blurred vision.   Respiratory:  Negative for cough and shortness of breath.     Cardiovascular:  Negative for chest pain and palpitations.   Gastrointestinal:  Positive for abdominal pain, heartburn, melena, nausea and vomiting. Negative for blood in stool.   Genitourinary:  Negative for dysuria and urgency.   Musculoskeletal:  Negative for joint pain and myalgias.   Skin:  Negative for itching and rash.   Neurological:  Positive for weakness. Negative for dizziness, focal weakness and headaches.   Endo/Heme/Allergies:  Negative for environmental allergies. Does not bruise/bleed easily.   Psychiatric/Behavioral:  Negative for depression and substance abuse.    All other systems reviewed and are negative.       Physical Exam  Temp:  [36.3 °C (97.3 °F)-37.1 °C (98.8 °F)] 36.5 °C (97.7 °F)  Pulse:  [] 94  Resp:  [17-18] 18  BP: ()/(53-85) 95/53  SpO2:  [92 %-96 %] 94 %    Physical Exam  Vitals and nursing note reviewed.   Constitutional:       General: He is not in acute distress.     Appearance: He is ill-appearing.   HENT:      Head: Normocephalic and atraumatic.      Nose: Nose normal.      Mouth/Throat:      Mouth: Mucous membranes are dry.      Pharynx: Oropharynx is clear.   Eyes:      General: No scleral icterus.     Extraocular Movements: Extraocular movements intact.   Cardiovascular:      Rate and Rhythm: Tachycardia present. Rhythm irregular.      Pulses: Normal pulses.      Heart sounds:      No friction rub.   Pulmonary:      Effort: No respiratory distress.      Breath sounds: Rales present. No wheezing.   Chest:      Chest wall: No tenderness.   Abdominal:      General: There is distension.      Tenderness: There is no abdominal tenderness. There is no guarding or rebound.   Musculoskeletal:         General: Normal range of motion.      Cervical back: Neck supple. No tenderness.      Right lower leg: No edema.      Left lower leg: No edema.   Skin:     General: Skin is warm and dry.      Capillary Refill: Capillary refill takes less than 2 seconds.   Neurological:       General: No focal deficit present.      Mental Status: He is alert and oriented to person, place, and time.   Psychiatric:         Mood and Affect: Mood normal.         Fluids    Intake/Output Summary (Last 24 hours) at 9/9/2024 1515  Last data filed at 9/9/2024 1311  Gross per 24 hour   Intake 440 ml   Output 1250 ml   Net -810 ml        Laboratory  Recent Labs     09/07/24  1315 09/08/24  0024 09/09/24  0019   WBC 16.0* 14.5* 11.9*   RBC 6.10 5.29 4.42*   HEMOGLOBIN 17.0 14.6 12.4*   HEMATOCRIT 51.7 44.9 38.3*   MCV 84.8 84.9 86.7   MCH 27.9 27.6 28.1   MCHC 32.9 32.5 32.4   RDW 48.3 48.3 48.6   PLATELETCT 205 147* 127*   MPV 10.9 10.7 11.9     Recent Labs     09/07/24  1315 09/08/24  0024 09/09/24  0019   SODIUM 145 143 140   POTASSIUM 4.0 3.7 3.4*   CHLORIDE 103 106 104   CO2 18* 23 23   GLUCOSE 141* 143* 83   BUN 60* 53* 31*   CREATININE 2.24* 1.79* 1.01   CALCIUM 10.3 8.8 8.2*     Recent Labs     09/07/24  1315 09/07/24  1703   APTT 24.2* 25.7   INR 1.26* 1.28*         Recent Labs     09/08/24  0024   TRIGLYCERIDE 57   HDL 33*   *       Imaging  CT-ABDOMEN-PELVIS W/O   Final Result      1.  Opacification left lung base at costophrenic angle is identified which could be due to scarring or atelectasis.      2.  Cardiomegaly.      3.  Otherwise no acute abnormalities are noted in the abdomen or pelvis.      DX-CHEST-PORTABLE (1 VIEW)   Final Result         1.  Possible small left pleural effusion.      2.  Minimal atelectasis left lung base.      3.  No consolidations identified.      EC-ECHOCARDIOGRAM COMPLETE W/O CONT    (Results Pending)        Assessment/Plan  * Intractable nausea and vomiting- (present on admission)  Assessment & Plan  IVF  CLD, advance diet as tolerated  Trial of IV Protonix, IV Reglan  Aspiration precautions  Support antiemetic    Gastrointestinal hemorrhage with melena  Assessment & Plan  Now having black tarry stool   Holding anticoagulation  I have consulted GI   Appreciate  rec.  Close monitor transfuse if hemoglobin below 7     Pneumonia due to infectious organism  Assessment & Plan  ?aspirated  LLL opacity,   IVF  Abx: unasyn   Follow sputum Cx    AP (abdominal pain)  Assessment & Plan  CT AP completed, no perforation  Trial of IV protonix  Supportive pain control    Lactic acidosis  Assessment & Plan  Likely secondary sepsis  IVF, antibiotic  High-dose IV thiamine  Trend    Leukocytosis  Assessment & Plan  IVF, antibiotic  Trend    ACP (advance care planning)  Assessment & Plan  Goal of care discussed with patient in emergency room.  He stated he is agreeable for noninvasive, as well as invasive/heroic life-sustaining measures-including CPR/defibrillation/intubation or mechanical ventilation.  He is also agreeable for further treatment with IVF, IV antibiotic, imaging study with echocardiogram renal ultrasound, as well as the need to quit smoking and to follow-up with anticoagulation clinic outpatient once discharged.  Diagnosis, prognosis, question and concern addressed.  Full CODE STATUS confirmed.  ACP: 16 minutes    COPD (chronic obstructive pulmonary disease) (HCC)  Assessment & Plan  Currently not in acute exacerbation  Pulmonary hygiene  Smoking cessation advised    Tobacco abuse counseling  Assessment & Plan  Reported smoking more than 1 pack of cigarettes daily  Stated he has been try to quit for the past week  Tobacco smoking cessation counseling provided: 5 minutes  We discussed tobacco smoking cessation given concern for COPD, as well as cardiovascular health benefits, he expressed understanding.  Offered patient with nicotine patch, nicotine gum, Chantix as alternative.  Provided patient with standard tobacco cessation information per protocol    Acute kidney injury (HCC)- (present on admission)  Assessment & Plan  BUN 60, creatinine 2.24, bicarb 18  Suspect secondary to intractable nausea vomiting  Cont on IVF   Check BMP in AM   Minimize nephrotoxic agents, renally  dose meds      Sepsis (HCC)- (present on admission)  Assessment & Plan  This is Sepsis Present on admission  SIRS criteria identified on my evaluation include: Tachycardia, with heart rate greater than 90 BPM, Leukocytosis, with WBC greater than 12,000, and Bandemia, greater than 10% bands  Clinical indicators of end organ dysfunction include Lactic Acid greater than 2 and Acute On Chronic Renal Failure, with creatinine >0.5 above baseline level  Source is possible GI, pulm (LLL)  Sepsis protocol initiated  Crystalloid Fluid Administration: Fluid resuscitation ordered per standard protocol - 30 mL/kg per current or ideal body weight  IV antibiotics as appropriate for source of sepsis  Reassessment: I have reassessed the patient's hemodynamic status possible    WBC 16.0, lactic acid 3.5, creatinine 2.24  Possible GI source versus LLL infiltrate (aspiration pneumonitis versus pneumonia from nausea vomiting)  IVF  Given Zosyn empirically in ER  Antibiotic: switched to Unasyn       Atrial fibrillation with RVR (HCC)- (present on admission)  Assessment & Plan  Known history of A-fib  He did not follow through with anticoagulation clinic last time  Advised patient that he will have to follow-up with outpatient anticoagulation clinic after discharge    Metoprolol for rate control-titrate up as tolerated  As needed additional IV metoprolol with parameter   HR better controlled   Holding anticoagulation for now as he is having black tarry stool     Chronic systolic heart failure (HCC)- (present on admission)  Assessment & Plan  Currently not in acute exacerbation  Currently appears hypovolemic  Provide IVF  Resume CHF meds when appropriate       .time    VTE prophylaxis: scd      I have performed a physical exam and reviewed and updated ROS and Plan today (9/9/2024). In review of yesterday's note (9/8/2024), there are no changes except as documented above.

## 2024-09-09 NOTE — PROGRESS NOTES
Monitor summary:        Rhythm: afib  Rate: 83-99  Ectopy: (R)PVC, couplet, trigeminy  Measurements: -/.09/.45        12hr chart check          Strip unavailable

## 2024-09-09 NOTE — PROGRESS NOTES
Received bedside report from RN, pt care assumed, VSS, pt assessment complete. Pt AAOx4, with 0/10 of pain at this time. No signs of acute distress noted at this time. Plan of care discussed with pt and verbalizes no questions. Pt denies any additional needs at this time. Bed locked/in lowest position, bed alarm on, pt educated on fall risk and verbalized understanding, call light within reach, hourly rounding initiated.

## 2024-09-09 NOTE — DIETARY
"Nutrition Services: Initial Assessment  Day 2 of admit.  Robert Mcdonnell is a 74 y.o. male with admitting DX of Intractable nausea and vomiting.     Consult received for Malnutrition Screening Tool score of 5 for unplanned weight loss of 34 or more lb in >1 year and poor appetite.     Current hospital problems list:  Principal Problem:    Intractable nausea and vomiting (POA: Yes)  Active Problems:    Chronic systolic heart failure (HCC) (Chronic) (POA: Yes)    Atrial fibrillation with RVR (HCC) (POA: Yes)    Sepsis (HCC) (POA: Yes)    Acute kidney injury (HCC) (POA: Yes)    Tobacco abuse counseling (POA: Unknown)    COPD (chronic obstructive pulmonary disease) (HCC) (POA: Unknown)    ACP (advance care planning) (POA: Unknown)    Leukocytosis (POA: Unknown)    Lactic acidosis (POA: Unknown)    AP (abdominal pain) (POA: Unknown)    Pneumonia due to infectious organism (POA: Unknown)     Nutrition Assessment:   Height: 185.4 cm (6' 1\")  Weight: 71.3 kg (157 lb 3 oz)   Body mass index is 20.74 kg/m²., BMI classification: Normal      Objective:   Pertinent labs 9/9: K+ 3.4 (L). 9/8 Alb 3.6  Pertinent meds: Unasyn, Lipitor, Protonix, Thiamine  Skin/wounds: No skin injury per RN skin assessment.  Food Allergies: No known food allergies.  Last BM: 9/7  Pt admitted due to 3-4 days of intractable nausea and vomiting, but reports ongoing GI problems intermittently for at least a year per MD notes.      Current diet order/intake: Full liquid     Subjective:   Patient reported UBW: 235 lb over one year ago.   Dietary recall/ Energy Intake: Pt reports unable to keep food down and has lost a lot of weight over the past year. Full liquid breakfast at bedside with <25% consumed at time of visit. Pt is agreeable to addition of Ensure supplements.   Nutrition Focused Physical Exam (NFPE)   Weight loss: Per chart review, weight at ED admit on 5/22/24 = 102 kg. Pt with 26.4% weight loss x 3.5 months which is severe.  Muscle mass:  " severe loss at clavicle and temples.  Subcutaneous fat: mild loss orbitals.   Fluid Accumulation: None noted  Reduced  Strength N/A in acute care setting.      Nutrition Diagnosis: (PES)      Severe malnutrition in context of chronic illness related to intractable nausea and vomiting as evidenced by 26.4% weight loss in 3.5 months per chart review, severe muscle loss at temple, clavicle. Voalte message sent to MD.     Nutrition Interventions:   Ensure Plus with meals.  Encourage PO intake >50% of meals, supplements.  Patient aware of active plan of care as appropriate.     Nutrition Monitoring and Evaluation:   Monitor nutrition POC  Monitor vital signs pertinent to nutrition       RD following

## 2024-09-09 NOTE — ASSESSMENT & PLAN NOTE
See above  Due to gastric ulcer and anticoag  Biopsy results pending  GI following, repeat egd 9/13  Plts decreased some, h/h also, cbc in am   Transfuse further as needed  Iv ppi

## 2024-09-10 ENCOUNTER — ANESTHESIA EVENT (OUTPATIENT)
Dept: SURGERY | Facility: MEDICAL CENTER | Age: 74
DRG: 871 | End: 2024-09-10
Payer: MEDICARE

## 2024-09-10 ENCOUNTER — ANESTHESIA (OUTPATIENT)
Dept: SURGERY | Facility: MEDICAL CENTER | Age: 74
DRG: 871 | End: 2024-09-10
Payer: MEDICARE

## 2024-09-10 LAB
ANION GAP SERPL CALC-SCNC: 10 MMOL/L (ref 7–16)
BACTERIA UR CULT: NORMAL
BUN SERPL-MCNC: 25 MG/DL (ref 8–22)
CALCIUM SERPL-MCNC: 8 MG/DL (ref 8.5–10.5)
CHLORIDE SERPL-SCNC: 107 MMOL/L (ref 96–112)
CO2 SERPL-SCNC: 22 MMOL/L (ref 20–33)
CREAT SERPL-MCNC: 0.88 MG/DL (ref 0.5–1.4)
ERYTHROCYTE [DISTWIDTH] IN BLOOD BY AUTOMATED COUNT: 49.1 FL (ref 35.9–50)
GFR SERPLBLD CREATININE-BSD FMLA CKD-EPI: 90 ML/MIN/1.73 M 2
GLUCOSE BLD STRIP.AUTO-MCNC: 70 MG/DL (ref 65–99)
GLUCOSE BLD STRIP.AUTO-MCNC: 73 MG/DL (ref 65–99)
GLUCOSE SERPL-MCNC: 62 MG/DL (ref 65–99)
HCT VFR BLD AUTO: 27.3 % (ref 42–52)
HCT VFR BLD AUTO: 31.3 % (ref 42–52)
HCT VFR BLD AUTO: 31.9 % (ref 42–52)
HGB BLD-MCNC: 10 G/DL (ref 14–18)
HGB BLD-MCNC: 10.1 G/DL (ref 14–18)
HGB BLD-MCNC: 8.8 G/DL (ref 14–18)
MCH RBC QN AUTO: 28.2 PG (ref 27–33)
MCHC RBC AUTO-ENTMCNC: 31.7 G/DL (ref 32.3–36.5)
MCV RBC AUTO: 89.1 FL (ref 81.4–97.8)
PATHOLOGY CONSULT NOTE: NORMAL
PLATELET # BLD AUTO: 114 K/UL (ref 164–446)
PMV BLD AUTO: 12.2 FL (ref 9–12.9)
POTASSIUM SERPL-SCNC: 4.5 MMOL/L (ref 3.6–5.5)
RBC # BLD AUTO: 3.58 M/UL (ref 4.7–6.1)
SIGNIFICANT IND 70042: NORMAL
SITE SITE: NORMAL
SODIUM SERPL-SCNC: 139 MMOL/L (ref 135–145)
SOURCE SOURCE: NORMAL
WBC # BLD AUTO: 12.5 K/UL (ref 4.8–10.8)

## 2024-09-10 PROCEDURE — 0DB68ZX EXCISION OF STOMACH, VIA NATURAL OR ARTIFICIAL OPENING ENDOSCOPIC, DIAGNOSTIC: ICD-10-PCS | Performed by: INTERNAL MEDICINE

## 2024-09-10 PROCEDURE — 700105 HCHG RX REV CODE 258: Performed by: STUDENT IN AN ORGANIZED HEALTH CARE EDUCATION/TRAINING PROGRAM

## 2024-09-10 PROCEDURE — 700101 HCHG RX REV CODE 250: Performed by: STUDENT IN AN ORGANIZED HEALTH CARE EDUCATION/TRAINING PROGRAM

## 2024-09-10 PROCEDURE — 99233 SBSQ HOSP IP/OBS HIGH 50: CPT | Performed by: HOSPITALIST

## 2024-09-10 PROCEDURE — 36415 COLL VENOUS BLD VENIPUNCTURE: CPT

## 2024-09-10 PROCEDURE — 85018 HEMOGLOBIN: CPT

## 2024-09-10 PROCEDURE — 160002 HCHG RECOVERY MINUTES (STAT): Performed by: INTERNAL MEDICINE

## 2024-09-10 PROCEDURE — 700111 HCHG RX REV CODE 636 W/ 250 OVERRIDE (IP): Performed by: STUDENT IN AN ORGANIZED HEALTH CARE EDUCATION/TRAINING PROGRAM

## 2024-09-10 PROCEDURE — 0W3P8ZZ CONTROL BLEEDING IN GASTROINTESTINAL TRACT, VIA NATURAL OR ARTIFICIAL OPENING ENDOSCOPIC: ICD-10-PCS | Performed by: INTERNAL MEDICINE

## 2024-09-10 PROCEDURE — 700105 HCHG RX REV CODE 258: Performed by: INTERNAL MEDICINE

## 2024-09-10 PROCEDURE — 85014 HEMATOCRIT: CPT | Mod: 91

## 2024-09-10 PROCEDURE — 700111 HCHG RX REV CODE 636 W/ 250 OVERRIDE (IP): Mod: JZ | Performed by: INTERNAL MEDICINE

## 2024-09-10 PROCEDURE — 3E0G8GC INTRODUCTION OF OTHER THERAPEUTIC SUBSTANCE INTO UPPER GI, VIA NATURAL OR ARTIFICIAL OPENING ENDOSCOPIC: ICD-10-PCS | Performed by: INTERNAL MEDICINE

## 2024-09-10 PROCEDURE — 160009 HCHG ANES TIME/MIN: Performed by: INTERNAL MEDICINE

## 2024-09-10 PROCEDURE — 88305 TISSUE EXAM BY PATHOLOGIST: CPT

## 2024-09-10 PROCEDURE — 82962 GLUCOSE BLOOD TEST: CPT

## 2024-09-10 PROCEDURE — 80048 BASIC METABOLIC PNL TOTAL CA: CPT

## 2024-09-10 PROCEDURE — A9270 NON-COVERED ITEM OR SERVICE: HCPCS | Performed by: STUDENT IN AN ORGANIZED HEALTH CARE EDUCATION/TRAINING PROGRAM

## 2024-09-10 PROCEDURE — 88312 SPECIAL STAINS GROUP 1: CPT

## 2024-09-10 PROCEDURE — 700102 HCHG RX REV CODE 250 W/ 637 OVERRIDE(OP): Performed by: STUDENT IN AN ORGANIZED HEALTH CARE EDUCATION/TRAINING PROGRAM

## 2024-09-10 PROCEDURE — 160203 HCHG ENDO MINUTES - 1ST 30 MINS LEVEL 4: Performed by: INTERNAL MEDICINE

## 2024-09-10 PROCEDURE — 85027 COMPLETE CBC AUTOMATED: CPT

## 2024-09-10 PROCEDURE — 43255 EGD CONTROL BLEEDING ANY: CPT | Performed by: INTERNAL MEDICINE

## 2024-09-10 PROCEDURE — 160048 HCHG OR STATISTICAL LEVEL 1-5: Performed by: INTERNAL MEDICINE

## 2024-09-10 PROCEDURE — 160035 HCHG PACU - 1ST 60 MINS PHASE I: Performed by: INTERNAL MEDICINE

## 2024-09-10 PROCEDURE — 700111 HCHG RX REV CODE 636 W/ 250 OVERRIDE (IP): Performed by: INTERNAL MEDICINE

## 2024-09-10 PROCEDURE — C1889 IMPLANT/INSERT DEVICE, NOC: HCPCS | Performed by: INTERNAL MEDICINE

## 2024-09-10 PROCEDURE — 770020 HCHG ROOM/CARE - TELE (206)

## 2024-09-10 RX ORDER — SODIUM CHLORIDE, SODIUM LACTATE, POTASSIUM CHLORIDE, CALCIUM CHLORIDE 600; 310; 30; 20 MG/100ML; MG/100ML; MG/100ML; MG/100ML
INJECTION, SOLUTION INTRAVENOUS
Status: DISCONTINUED | OUTPATIENT
Start: 2024-09-10 | End: 2024-09-10 | Stop reason: SURG

## 2024-09-10 RX ORDER — LIDOCAINE HYDROCHLORIDE 20 MG/ML
INJECTION, SOLUTION EPIDURAL; INFILTRATION; INTRACAUDAL; PERINEURAL PRN
Status: DISCONTINUED | OUTPATIENT
Start: 2024-09-10 | End: 2024-09-10 | Stop reason: SURG

## 2024-09-10 RX ORDER — ONDANSETRON 2 MG/ML
4 INJECTION INTRAMUSCULAR; INTRAVENOUS
Status: DISCONTINUED | OUTPATIENT
Start: 2024-09-10 | End: 2024-09-10 | Stop reason: HOSPADM

## 2024-09-10 RX ORDER — EPINEPHRINE 0.1 MG/ML
SYRINGE (ML) INJECTION
Status: DISCONTINUED | OUTPATIENT
Start: 2024-09-10 | End: 2024-09-10 | Stop reason: HOSPADM

## 2024-09-10 RX ORDER — DEXTROSE MONOHYDRATE 25 G/50ML
25 INJECTION, SOLUTION INTRAVENOUS
Status: DISCONTINUED | OUTPATIENT
Start: 2024-09-10 | End: 2024-09-18 | Stop reason: HOSPADM

## 2024-09-10 RX ORDER — HALOPERIDOL 5 MG/ML
1 INJECTION INTRAMUSCULAR
Status: DISCONTINUED | OUTPATIENT
Start: 2024-09-10 | End: 2024-09-10 | Stop reason: HOSPADM

## 2024-09-10 RX ORDER — DIPHENHYDRAMINE HYDROCHLORIDE 50 MG/ML
12.5 INJECTION INTRAMUSCULAR; INTRAVENOUS
Status: DISCONTINUED | OUTPATIENT
Start: 2024-09-10 | End: 2024-09-10 | Stop reason: HOSPADM

## 2024-09-10 RX ORDER — DEXTROSE MONOHYDRATE 25 G/50ML
25 INJECTION, SOLUTION INTRAVENOUS ONCE
Status: COMPLETED | OUTPATIENT
Start: 2024-09-10 | End: 2024-09-10

## 2024-09-10 RX ORDER — PHENYLEPHRINE HCL IN 0.9% NACL 1 MG/10 ML
SYRINGE (ML) INTRAVENOUS PRN
Status: DISCONTINUED | OUTPATIENT
Start: 2024-09-10 | End: 2024-09-10 | Stop reason: HOSPADM

## 2024-09-10 RX ADMIN — AMPICILLIN SODIUM, SULBACTAM SODIUM 3 G: 2; 1 INJECTION, POWDER, FOR SOLUTION INTRAMUSCULAR; INTRAVENOUS at 18:23

## 2024-09-10 RX ADMIN — PANTOPRAZOLE SODIUM 40 MG: 40 INJECTION, POWDER, FOR SOLUTION INTRAVENOUS at 05:41

## 2024-09-10 RX ADMIN — SODIUM CHLORIDE, POTASSIUM CHLORIDE, SODIUM LACTATE AND CALCIUM CHLORIDE: 600; 310; 30; 20 INJECTION, SOLUTION INTRAVENOUS at 07:41

## 2024-09-10 RX ADMIN — FENTANYL CITRATE 50 MCG: 50 INJECTION, SOLUTION INTRAMUSCULAR; INTRAVENOUS at 07:41

## 2024-09-10 RX ADMIN — SODIUM CHLORIDE: 9 INJECTION, SOLUTION INTRAVENOUS at 12:01

## 2024-09-10 RX ADMIN — PROPOFOL 20 MG: 10 INJECTION, EMULSION INTRAVENOUS at 07:50

## 2024-09-10 RX ADMIN — AMPICILLIN SODIUM, SULBACTAM SODIUM 3 G: 2; 1 INJECTION, POWDER, FOR SOLUTION INTRAMUSCULAR; INTRAVENOUS at 23:51

## 2024-09-10 RX ADMIN — PROPOFOL 40 MG: 10 INJECTION, EMULSION INTRAVENOUS at 07:47

## 2024-09-10 RX ADMIN — Medication 200 MCG: at 07:49

## 2024-09-10 RX ADMIN — Medication 200 MCG: at 08:00

## 2024-09-10 RX ADMIN — Medication 200 MCG: at 07:46

## 2024-09-10 RX ADMIN — ACETAMINOPHEN 650 MG: 325 TABLET ORAL at 05:39

## 2024-09-10 RX ADMIN — LIDOCAINE HYDROCHLORIDE 100 MG: 20 INJECTION, SOLUTION EPIDURAL; INFILTRATION; INTRACAUDAL at 07:47

## 2024-09-10 RX ADMIN — PROPOFOL 20 MG: 10 INJECTION, EMULSION INTRAVENOUS at 08:00

## 2024-09-10 RX ADMIN — AMPICILLIN SODIUM, SULBACTAM SODIUM 3 G: 2; 1 INJECTION, POWDER, FOR SOLUTION INTRAMUSCULAR; INTRAVENOUS at 12:04

## 2024-09-10 RX ADMIN — ACETAMINOPHEN 650 MG: 325 TABLET ORAL at 11:58

## 2024-09-10 RX ADMIN — Medication 200 MCG: at 07:52

## 2024-09-10 RX ADMIN — ACETAMINOPHEN 650 MG: 325 TABLET ORAL at 18:16

## 2024-09-10 RX ADMIN — DEXTROSE MONOHYDRATE 25 ML: 25 INJECTION, SOLUTION INTRAVENOUS at 07:00

## 2024-09-10 RX ADMIN — AMPICILLIN SODIUM, SULBACTAM SODIUM 3 G: 2; 1 INJECTION, POWDER, FOR SOLUTION INTRAMUSCULAR; INTRAVENOUS at 05:42

## 2024-09-10 RX ADMIN — ATORVASTATIN CALCIUM 40 MG: 40 TABLET, FILM COATED ORAL at 18:16

## 2024-09-10 RX ADMIN — NICOTINE TRANSDERMAL SYSTEM 21 MG: 21 PATCH, EXTENDED RELEASE TRANSDERMAL at 05:39

## 2024-09-10 RX ADMIN — PROPOFOL 20 MG: 10 INJECTION, EMULSION INTRAVENOUS at 07:56

## 2024-09-10 ASSESSMENT — ENCOUNTER SYMPTOMS
NAUSEA: 0
PALPITATIONS: 0
WEAKNESS: 1
HEADACHES: 0
FEVER: 0
HEARTBURN: 1
BLURRED VISION: 0
SHORTNESS OF BREATH: 0
COUGH: 0
VOMITING: 0
MYALGIAS: 0
BRUISES/BLEEDS EASILY: 0
ABDOMINAL PAIN: 0
BLOOD IN STOOL: 0
CHILLS: 0
DIZZINESS: 0
FOCAL WEAKNESS: 0
DEPRESSION: 0

## 2024-09-10 ASSESSMENT — FIBROSIS 4 INDEX: FIB4 SCORE: 3.21

## 2024-09-10 ASSESSMENT — PAIN SCALES - GENERAL: PAIN_LEVEL: 0

## 2024-09-10 ASSESSMENT — PAIN DESCRIPTION - PAIN TYPE
TYPE: ACUTE PAIN;SURGICAL PAIN
TYPE: SURGICAL PAIN
TYPE: ACUTE PAIN
TYPE: SURGICAL PAIN
TYPE: SURGICAL PAIN

## 2024-09-10 ASSESSMENT — LIFESTYLE VARIABLES: SUBSTANCE_ABUSE: 0

## 2024-09-10 NOTE — PROGRESS NOTES
Monitor summary:        Rhythm: afib  Rate: , highest 160  Ectopy: (R)PVC,   Measurements: -/.13/-        12hr chart check

## 2024-09-10 NOTE — CARE PLAN
The patient is Stable - Low risk of patient condition declining or worsening    Shift Goals  Clinical Goals: hemodynamic stability, monitor BM for blood, monitor hgb  Patient Goals: rest, comfort, pain  Family Goals: MARCOS    Progress made toward(s) clinical / shift goals:      Problem: Pain - Standard  Goal: Alleviation of pain or a reduction in pain to the patient’s comfort goal  Description: Target End Date:  Prior to discharge or change in level of care    Document on Vitals flowsheet    1.  Document pain using the appropriate pain scale per order or unit policy  2.  Educate and implement non-pharmacologic comfort measures (i.e. relaxation, distraction, massage, cold/heat therapy, etc.)  3.  Pain management medications as ordered  4.  Reassess pain after pain med administration per policy  5.  If opiods administered assess patient's response to pain medication is appropriate per POSS sedation scale  6.  Follow pain management plan developed in collaboration with patient and interdisciplinary team (including palliative care or pain specialists if applicable)  Outcome: Progressing     Problem: Knowledge Deficit - COPD  Goal: Patient/significant other demonstrates understanding of disease process, utilization of the Action Plan, medications and discharge instruction  Description: Target End Date:  1-3 days or as soon as patient condition allows    Document in Patient Education    1.  Discuss the importance of medical follow-up care, periodic chest x-rays, sputum cultures  2.  Review worsening signs and symptoms of COPD flare and exacerbation and its management  3.  Discuss respiratory medications, side effects, adverse reactions  4.  Demonstrate technique for using a metered-dose inhaler (MDI) and utilization of a spacer  5.  Review the COPD Action Plan  6.  Instruct and reinforce the rationale for breathing exercises, coughing effectively, and general conditioning exercises  7.  Stress importance of oral care and  dental hygiene  8.  Discuss the importance of avoiding people with active respiratory infections and need for routine influenza and pneumococcal vaccinations  9.  Discuss factors that may trigger condition and encourage patient/significant other to explore ways to control these factors in and around the home and work setting  10. Review the harmful effects of smoking and advise cessation of smoking  11. Provide information about activity limitations and alternating activities with rest periods to prevent fatigue  12. Instruct asthmatic patient of use of peak flow meter, as appropriate  13. Review oxygen requirements and dosage, safe use of oxygen, and refer to the supplier as indicated  14. Educate patient/family/caregiver on the use of Nasal Intermittent Positive Pressure Ventilation (NIPPV) and possible adverse reactions  Outcome: Progressing     Problem: Care Map:  Day 3 Optimal Outcome for the Heart Failure Patient  Goal: Day 3:  Optimal Care of the heart failure patient  Description: Target End Date:  end of day 3  Outcome: Progressing       Patient is not progressing towards the following goals:

## 2024-09-10 NOTE — PROGRESS NOTES
Hospital Medicine Daily Progress Note    Date of Service  9/10/2024    Chief Complaint  Robert Mcdonnell is a 74 y.o. male admitted 9/7/2024 with abdominal pain and N/V    Hospital Course  This is a 74 y.o. homeless male from [facility] with history of chronic A-fib not on anticoagulation due to medical noncompliance, tobacco abuse, COPD, CHF, hypertension, hyperlipidemia who presented 9/7/2024 with evaluation for intractable nausea and vomiting.  Patient reported ongoing symptoms for at least the past 3 to 4 days, reported epigastric discomfort associate with nausea and vomiting.  In ER, found to have leukocytosis, lactic acid of 3.5 and NIRMAL with creatinine of 2.24.  Initially thought to have bowel perforation, no perforation seen on CTAP, however noted to have left lung base opacity.  Patient was given IVF boluses per sepsis protocol and Zosyn empirically by ERP.   Patient admitted for further treatment. He was on anticoagulation for his A.fib but started having black tarry stool and his hemoglobin dropped down to 12 from 14 so anticoagulation was stopped (had been non complaint with this at home anyway) and GI was consulted    Interval Problem Update  Axox3, no further melena, back from EGD, tolerated well, reports some mild gas pain 2/10, no n/v, denies cp, no sob. Discussed with GI, biopsy results pending and may need repeat egd in 72 hours. Hypoglycemia this am, no associated sx, due to being npo, following, on clears, hypoglycemic protocol    I have discussed this patient's plan of care and discharge plan at IDT rounds today with Case Management, Nursing, Nursing leadership, and other members of the IDT team.    Consultants/Specialty  GI    Code Status  Full Code    Disposition  The patient is not medically cleared for discharge to home or a post-acute facility.      I have placed the appropriate orders for post-discharge needs.    Review of Systems  Review of Systems   Constitutional:  Positive for  malaise/fatigue. Negative for chills and fever.   HENT:  Negative for hearing loss and tinnitus.    Eyes:  Negative for blurred vision.   Respiratory:  Negative for cough and shortness of breath.    Cardiovascular:  Negative for chest pain and palpitations.   Gastrointestinal:  Positive for heartburn and melena. Negative for abdominal pain, blood in stool, nausea and vomiting.   Genitourinary:  Negative for dysuria and urgency.   Musculoskeletal:  Negative for joint pain and myalgias.   Skin:  Negative for itching and rash.   Neurological:  Positive for weakness. Negative for dizziness, focal weakness and headaches.   Endo/Heme/Allergies:  Negative for environmental allergies. Does not bruise/bleed easily.   Psychiatric/Behavioral:  Negative for depression and substance abuse.    All other systems reviewed and are negative.       Physical Exam  Temp:  [36.5 °C (97.7 °F)-36.7 °C (98.1 °F)] 36.7 °C (98.1 °F)  Pulse:  [] 100  Resp:  [14-20] 17  BP: ()/(51-87) 101/63  SpO2:  [93 %-100 %] 95 %    Physical Exam  Vitals and nursing note reviewed.   Constitutional:       General: He is not in acute distress.     Appearance: He is ill-appearing.   HENT:      Head: Normocephalic and atraumatic.      Nose: Nose normal.      Mouth/Throat:      Pharynx: Oropharynx is clear.   Eyes:      General: No scleral icterus.     Extraocular Movements: Extraocular movements intact.   Cardiovascular:      Rate and Rhythm: Tachycardia present. Rhythm irregular.      Pulses: Normal pulses.      Heart sounds:      No friction rub.   Pulmonary:      Effort: No respiratory distress.      Breath sounds: No wheezing or rales.   Chest:      Chest wall: No tenderness.   Abdominal:      General: There is no distension.      Tenderness: There is no abdominal tenderness. There is no guarding or rebound.   Musculoskeletal:         General: Normal range of motion.      Cervical back: Neck supple. No tenderness.      Right lower leg: No edema.       Left lower leg: No edema.   Skin:     General: Skin is warm and dry.      Capillary Refill: Capillary refill takes less than 2 seconds.   Neurological:      General: No focal deficit present.      Mental Status: He is alert and oriented to person, place, and time.   Psychiatric:         Mood and Affect: Mood normal.         Fluids    Intake/Output Summary (Last 24 hours) at 9/10/2024 1715  Last data filed at 9/10/2024 0810  Gross per 24 hour   Intake 1000 ml   Output 500 ml   Net 500 ml        Laboratory  Recent Labs     09/08/24  0024 09/09/24  0019 09/09/24  1712 09/10/24  0110 09/10/24  1001   WBC 14.5* 11.9*  --  12.5*  --    RBC 5.29 4.42*  --  3.58*  --    HEMOGLOBIN 14.6 12.4* 11.0* 10.1* 10.0*   HEMATOCRIT 44.9 38.3* 35.3* 31.9* 31.3*   MCV 84.9 86.7  --  89.1  --    MCH 27.6 28.1  --  28.2  --    MCHC 32.5 32.4  --  31.7*  --    RDW 48.3 48.6  --  49.1  --    PLATELETCT 147* 127*  --  114*  --    MPV 10.7 11.9  --  12.2  --      Recent Labs     09/08/24  0024 09/09/24  0019 09/10/24  0110   SODIUM 143 140 139   POTASSIUM 3.7 3.4* 4.5   CHLORIDE 106 104 107   CO2 23 23 22   GLUCOSE 143* 83 62*   BUN 53* 31* 25*   CREATININE 1.79* 1.01 0.88   CALCIUM 8.8 8.2* 8.0*               Recent Labs     09/08/24  0024   TRIGLYCERIDE 57   HDL 33*   *       Imaging  CT-ABDOMEN-PELVIS W/O   Final Result      1.  Opacification left lung base at costophrenic angle is identified which could be due to scarring or atelectasis.      2.  Cardiomegaly.      3.  Otherwise no acute abnormalities are noted in the abdomen or pelvis.      DX-CHEST-PORTABLE (1 VIEW)   Final Result         1.  Possible small left pleural effusion.      2.  Minimal atelectasis left lung base.      3.  No consolidations identified.      EC-ECHOCARDIOGRAM COMPLETE W/O CONT    (Results Pending)        Assessment/Plan  * Intractable nausea and vomiting- (present on admission)  Assessment & Plan  IVF  CLD, advance diet as tolerated  Trial of IV  Protonix, IV Reglan  Aspiration precautions  Support antiemetic    Gastrointestinal hemorrhage with melena  Assessment & Plan  See above  Due to gastric ulcer and anticoag  Biopsy results pending  GI following, will need repeat egd, likely within 72 hours  Serial h/h  Transfuse further as needed  Iv ppi    Pneumonia due to infectious organism  Assessment & Plan  ?aspirated  LLL opacity,   IVF  Abx: unasyn   Follow sputum Cx    AP (abdominal pain)  Assessment & Plan  Due to gastric ulcer  Much improved  Following  Supportive care  PPI    Lactic acidosis  Assessment & Plan  Likely secondary sepsis  IVF, antibiotic  High-dose IV thiamine  Trend    Leukocytosis  Assessment & Plan  IVF, antibiotic  Trend    ACP (advance care planning)  Assessment & Plan  Goal of care discussed with patient in emergency room.  He stated he is agreeable for noninvasive, as well as invasive/heroic life-sustaining measures-including CPR/defibrillation/intubation or mechanical ventilation.  He is also agreeable for further treatment with IVF, IV antibiotic, imaging study with echocardiogram renal ultrasound, as well as the need to quit smoking and to follow-up with anticoagulation clinic outpatient once discharged.  Diagnosis, prognosis, question and concern addressed.  Full CODE STATUS confirmed.  ACP: 16 minutes    COPD (chronic obstructive pulmonary disease) (HCC)  Assessment & Plan  Currently not in acute exacerbation  Pulmonary hygiene  Smoking cessation advised    Tobacco abuse counseling  Assessment & Plan  Reported smoking more than 1 pack of cigarettes daily  Stated he has been try to quit for the past week  Tobacco smoking cessation counseling provided: 5 minutes  We discussed tobacco smoking cessation given concern for COPD, as well as cardiovascular health benefits, he expressed understanding.  Offered patient with nicotine patch, nicotine gum, Chantix as alternative.  Provided patient with standard tobacco cessation information  per protocol    Acute kidney injury (HCC)- (present on admission)  Assessment & Plan  BUN 60, creatinine 2.24, bicarb 18  Suspect secondary to intractable nausea vomiting  Resolving  Avoid nephro toxins  Bmp in am  Iv fluids      Sepsis (HCC)- (present on admission)  Assessment & Plan  This is Sepsis Present on admission  SIRS criteria identified on my evaluation include: Tachycardia, with heart rate greater than 90 BPM, Leukocytosis, with WBC greater than 12,000, and Bandemia, greater than 10% bands  Clinical indicators of end organ dysfunction include Lactic Acid greater than 2 and Acute On Chronic Renal Failure, with creatinine >0.5 above baseline level  Source is possible GI, pulm (LLL)  Sepsis protocol initiated  Crystalloid Fluid Administration: Fluid resuscitation ordered per standard protocol - 30 mL/kg per current or ideal body weight  IV antibiotics as appropriate for source of sepsis  Reassessment: I have reassessed the patient's hemodynamic status possible    WBC 16.0, lactic acid 3.5, creatinine 2.24  Possible GI source versus LLL infiltrate (aspiration pneumonitis versus pneumonia from nausea vomiting)  IVF  Given Zosyn empirically in ER  Antibiotic: switched to Unasyn       Atrial fibrillation with RVR (HCC)- (present on admission)  Assessment & Plan  Known history of A-fib  He did not follow through with anticoagulation clinic last time, high risk on this med, does not want to take it and high risk for falls and recurrent GI bleed  Chads score only 2  Pt opts to remain off anticoagulation   Continue metoprolol as tolerated  Mild rvr this am, following    Thrombocytopenia (HCC)- (present on admission)  Assessment & Plan  Slowly decreasing  Likely consumptive from gi bleed  Following  Cbc in am     Chronic systolic heart failure (HCC)- (present on admission)  Assessment & Plan  Currently not in acute exacerbation  Currently appears hypovolemic  Following closely        VTE prophylaxis: scd      I have  performed a physical exam and reviewed and updated ROS and Plan today (9/10/2024). In review of yesterday's note (9/9/2024), there are no changes except as documented above.

## 2024-09-10 NOTE — ANESTHESIA POSTPROCEDURE EVALUATION
Patient: Robert Mcdonnell    Procedure Summary       Date: 09/10/24 Room / Location: [facility] ROOM 26 / SURGERY SAME DAY [facility]    Anesthesia Start: 0741 Anesthesia Stop: 0811    Procedures:       GASTROSCOPY, WITH BIOPSY (Esophagus)      EGD, WITH CAUTERIZATION (Esophagus)      GASTROSCOPY, WITH SCLEROTHERAPY (Esophagus) Diagnosis: (Gastric Ulcer)    Surgeons: Demond Gutierres M.D. Responsible Provider: Kulwant Ruiz M.D.    Anesthesia Type: MAC ASA Status: 3            Final Anesthesia Type: MAC  Last vitals  BP   Blood Pressure : 129/75    Temp   36.6 °C (97.8 °F)    Pulse   96   Resp   18    SpO2   100 %      Anesthesia Post Evaluation    Patient location during evaluation: PACU  Patient participation: complete - patient participated  Level of consciousness: awake and alert  Pain score: 0    Airway patency: patent  Anesthetic complications: no  Cardiovascular status: hemodynamically stable  Respiratory status: acceptable  Hydration status: euvolemic    PONV: none          No notable events documented.     Nurse Pain Score: 0 (NPRS)

## 2024-09-10 NOTE — OP REPORT
PreOp Diagnosis: Upper GI bleed      PostOp Diagnosis: Gastric ulcer, lesser curvature the stomach.      Procedure(s):  GASTROSCOPY, WITH BIOPSY - Wound Class: Clean Contaminated  EGD, WITH CAUTERIZATION - Wound Class: Clean Contaminated  GASTROSCOPY, WITH SCLEROTHERAPY - Wound Class: Clean Contaminated    Surgeon(s):  Demond Gutierres M.D.    Anesthesiologist/Type of Anesthesia:  Anesthesiologist: Kulwant Ruiz M.D./NEETU    Surgical Staff:  Endoscopy Technician: Linda Phillips; Yoshi Zacarias  Endoscopy Nurse: Willa Saba R.N.; Jose Miguel Ordonez R.N.    Specimens removed if any:  ID Type Source Tests Collected by Time Destination   A : Ulcer base of lessure curve of stomach Ulcer Gastric PATHOLOGY SPECIMEN Demond Gutierres M.D. 9/10/2024  8:02 AM        CONSENT: The risks, benefits and alternatives of the procedure were discussed in detail. The risks include and are not limited to bleeding, infection, perforation, missed lesions, and sedations risks (cardiopulmonary compromise and allergic reaction to medications).    DESCRIPTION:   The patient presented to the operating room.  A time out was performed prior to beginning the procedure.   The patient was placed in the left lateral position.   Patient was sedated by anesthesia: Propofol.    OPERATIVE FINDINGS:    Esophagus: Normal.  Stomach: Large deeply ulcerated 2 cm x 2 cm ulcer on the lesser curvature of the stomach approximately 6 cm proximal to the incisura.  Abel classification IIA. clot present.  Oozing on the periphery of the ulcer.  Mildly heaped up margins.  Concern for malignancy.  Epinephrine 1-10,000 was injected into 3 separate areas of the ulcer bed for total of 3 cc.  Using bipolar cautery the large clot was displaced and the underlying vessels were cauterized.  The periphery of the ulcer was biopsied.  No bleeding at conclusion.  The remainder of the stomach was otherwise unremarkable.  Duodenum: Normal to second portion.    Blood  loss: Less than 10 cc    The patient tolerated the procedure well.      There were no immediate complications.    IMPRESSION:  Large deeply ulcerated lesion on the lesser curvature of the stomach (2 cm x 2 cm). Abel IIa. The underlying ulcer bed was treated with epinephrine injection and bipolar cautery.  The edge of the ulcer was biopsied.    RECOMMENDATIONS:  Advance diet  Await biopsies  Twice daily PPI therapy  I do not recommend resumption of anticoagulation until relook endoscopy can be pursued  Relook endoscopy in 72 hours or sooner if needed.  Would pursue CT abdomen / pelvis with IV contrast in the interim to assess for malignancy.

## 2024-09-10 NOTE — CARE PLAN
The patient is Watcher - Medium risk of patient condition declining or worsening    Shift Goals  Clinical Goals: monitor hemoglobin and bowel movements, hemodynamic stability  Patient Goals: sleep  Family Goals: MARCOS    Progress made toward(s) clinical / shift goals:    Problem: Pain - Standard  Goal: Alleviation of pain or a reduction in pain to the patient’s comfort goal  Outcome: Progressing     Problem: Knowledge Deficit - COPD  Goal: Patient/significant other demonstrates understanding of disease process, utilization of the Action Plan, medications and discharge instruction  Outcome: Progressing     Problem: Knowledge Deficit - Standard  Goal: Patient and family/care givers will demonstrate understanding of plan of care, disease process/condition, diagnostic tests and medications  Outcome: Progressing     Problem: Care Map:  Day 2 Optimal Outcome for the Heart Failure Patient  Goal: Day 2:  Optimal Care of the heart failure patient  Outcome: Progressing       Patient is not progressing towards the following goals:      Problem: Hemodynamics  Goal: Patient's hemodynamics, fluid balance and neurologic status will be stable or improve  Outcome: Not Progressing   Pt verbalizes understanding of care plan. Pt had 1 large, bloody bowel movement during shift. Pt had no c/o pain, no neuro changes noted. Vitals have been stable and consistent. Pt NPO since 0000 for anticipated procedure. Last hemoglobin lab was 11.

## 2024-09-10 NOTE — INTERVAL H&P NOTE
Seen and evaluated in pre-op  No changes.  The risk, benefits, and alternatives were discussed in detail. Risks include bowel perforation, procedure related bleeding event, infection, inability to safely complete the exam, sedation related complications. The patient, understanding the discussion, consents to proceed forward.

## 2024-09-10 NOTE — DISCHARGE PLANNING
CHW attempted to introduced community care management to the patient.   Pt was with provider.   CHW will attempt again.

## 2024-09-10 NOTE — PROGRESS NOTES
0807- Patient arrived in PACU and was connected to monitors. Report received from anesthesiologist and OR RN. Vitals signs are stable. Patient is on 8L O2 via mask.    0820- Patient is starting to wake up. Vitals signs are stable. Pain is 0/10 this time. Patient reports no nausea.    0830- Patient ambulated to the restroom. Patient had dark bloody stools.     0845- Patient meets criteria to return to inpatient room. Report given to JANNETTE Phan. Patient waiting for transport.     0906- Patient transported to [facility] via transport.

## 2024-09-10 NOTE — ANESTHESIA PREPROCEDURE EVALUATION
Case: [id number] Date/Time: 09/10/24 0730    Procedure: GASTROSCOPY (Esophagus)    Anesthesia type: MAC    Pre-op diagnosis: Melena, epigastric pain    Location: [facility] ROOM 26 / SURGERY SAME DAY [facility]    Surgeons: Demond Gutierres M.D.            Relevant Problems   PULMONARY   (positive) COPD (chronic obstructive pulmonary disease) ([facility])   (positive) Pneumonia due to infectious organism      CARDIAC   (positive) Acute congestive heart failure, unspecified heart failure type ([facility])   (positive) Acute exacerbation of CHF (congestive heart failure) ([facility])   (positive) Atrial fibrillation with RVR ([facility])   (positive) Hypertension         (positive) Acute kidney injury ([facility])       Physical Exam    Airway   Mallampati: II  TM distance: >3 FB  Neck ROM: full       Cardiovascular - normal exam  Rhythm: regular  Rate: normal  (-) murmur     Dental       Very poor dentition     Pulmonary - normal exam  Breath sounds clear to auscultation     Abdominal    Neurological - normal exam                   Anesthesia Plan    ASA 3   ASA physical status 3 criteria: moderate reduction of ejection fraction, COPD - poorly controlled and hypertension - poorly controlled    Plan - MAC               Induction: intravenous    Postoperative Plan: Postoperative administration of opioids is intended.    Pertinent diagnostic labs and testing reviewed    Informed Consent:    Anesthetic plan and risks discussed with patient.    Use of blood products discussed with: patient whom consented to blood products.

## 2024-09-10 NOTE — ANESTHESIA TIME REPORT
Anesthesia Start and Stop Event Times       Date Time Event    9/10/2024 0731 Ready for Procedure     0741 Anesthesia Start     0811 Anesthesia Stop          Responsible Staff  09/10/24      Name Role Begin End    Kulwant Ruiz M.D. Anesth 0741 0811          Overtime Reason:  no overtime (within assigned shift)    Comments:

## 2024-09-11 ENCOUNTER — APPOINTMENT (OUTPATIENT)
Dept: RADIOLOGY | Facility: MEDICAL CENTER | Age: 74
DRG: 871 | End: 2024-09-11
Attending: NURSE PRACTITIONER
Payer: MEDICARE

## 2024-09-11 PROBLEM — E43 SEVERE PROTEIN-CALORIE MALNUTRITION (HCC): Status: ACTIVE | Noted: 2024-09-11

## 2024-09-11 PROBLEM — I95.9 HYPOTENSION: Status: ACTIVE | Noted: 2024-09-11

## 2024-09-11 LAB
ANION GAP SERPL CALC-SCNC: 8 MMOL/L (ref 7–16)
BASOPHILS # BLD AUTO: 0.2 % (ref 0–1.8)
BASOPHILS # BLD: 0.02 K/UL (ref 0–0.12)
BUN SERPL-MCNC: 9 MG/DL (ref 8–22)
CALCIUM SERPL-MCNC: 7.6 MG/DL (ref 8.5–10.5)
CHLORIDE SERPL-SCNC: 107 MMOL/L (ref 96–112)
CO2 SERPL-SCNC: 23 MMOL/L (ref 20–33)
CREAT SERPL-MCNC: 0.74 MG/DL (ref 0.5–1.4)
EOSINOPHIL # BLD AUTO: 0.1 K/UL (ref 0–0.51)
EOSINOPHIL NFR BLD: 1.1 % (ref 0–6.9)
ERYTHROCYTE [DISTWIDTH] IN BLOOD BY AUTOMATED COUNT: 47.1 FL (ref 35.9–50)
FERRITIN SERPL-MCNC: 211 NG/ML (ref 22–322)
GFR SERPLBLD CREATININE-BSD FMLA CKD-EPI: 95 ML/MIN/1.73 M 2
GLUCOSE SERPL-MCNC: 137 MG/DL (ref 65–99)
HCT VFR BLD AUTO: 25.3 % (ref 42–52)
HCT VFR BLD AUTO: 25.5 % (ref 42–52)
HCT VFR BLD AUTO: 25.7 % (ref 42–52)
HGB BLD-MCNC: 8 G/DL (ref 14–18)
HGB BLD-MCNC: 8.1 G/DL (ref 14–18)
HGB BLD-MCNC: 8.1 G/DL (ref 14–18)
IMM GRANULOCYTES # BLD AUTO: 0.04 K/UL (ref 0–0.11)
IMM GRANULOCYTES NFR BLD AUTO: 0.4 % (ref 0–0.9)
IRON SATN MFR SERPL: 34 % (ref 15–55)
IRON SERPL-MCNC: 63 UG/DL (ref 50–180)
LYMPHOCYTES # BLD AUTO: 1.38 K/UL (ref 1–4.8)
LYMPHOCYTES NFR BLD: 15.1 % (ref 22–41)
MCH RBC QN AUTO: 27.6 PG (ref 27–33)
MCHC RBC AUTO-ENTMCNC: 32 G/DL (ref 32.3–36.5)
MCV RBC AUTO: 86.1 FL (ref 81.4–97.8)
MONOCYTES # BLD AUTO: 0.64 K/UL (ref 0–0.85)
MONOCYTES NFR BLD AUTO: 7 % (ref 0–13.4)
NEUTROPHILS # BLD AUTO: 6.96 K/UL (ref 1.82–7.42)
NEUTROPHILS NFR BLD: 76.2 % (ref 44–72)
NRBC # BLD AUTO: 0 K/UL
NRBC BLD-RTO: 0 /100 WBC (ref 0–0.2)
PLATELET # BLD AUTO: 98 K/UL (ref 164–446)
PLATELETS.RETICULATED NFR BLD AUTO: 13.6 % (ref 0.6–13.1)
PMV BLD AUTO: 10.6 FL (ref 9–12.9)
POTASSIUM SERPL-SCNC: 3.4 MMOL/L (ref 3.6–5.5)
RBC # BLD AUTO: 2.94 M/UL (ref 4.7–6.1)
SODIUM SERPL-SCNC: 138 MMOL/L (ref 135–145)
TIBC SERPL-MCNC: 186 UG/DL (ref 250–450)
UIBC SERPL-MCNC: 123 UG/DL (ref 110–370)
WBC # BLD AUTO: 9.1 K/UL (ref 4.8–10.8)

## 2024-09-11 PROCEDURE — A9270 NON-COVERED ITEM OR SERVICE: HCPCS | Performed by: HOSPITALIST

## 2024-09-11 PROCEDURE — A9270 NON-COVERED ITEM OR SERVICE: HCPCS | Mod: JZ

## 2024-09-11 PROCEDURE — 99232 SBSQ HOSP IP/OBS MODERATE 35: CPT | Performed by: NURSE PRACTITIONER

## 2024-09-11 PROCEDURE — 770020 HCHG ROOM/CARE - TELE (206)

## 2024-09-11 PROCEDURE — 74177 CT ABD & PELVIS W/CONTRAST: CPT

## 2024-09-11 PROCEDURE — 700111 HCHG RX REV CODE 636 W/ 250 OVERRIDE (IP): Mod: JZ | Performed by: INTERNAL MEDICINE

## 2024-09-11 PROCEDURE — 99233 SBSQ HOSP IP/OBS HIGH 50: CPT | Performed by: HOSPITALIST

## 2024-09-11 PROCEDURE — 85014 HEMATOCRIT: CPT

## 2024-09-11 PROCEDURE — 700102 HCHG RX REV CODE 250 W/ 637 OVERRIDE(OP): Performed by: HOSPITALIST

## 2024-09-11 PROCEDURE — 85055 RETICULATED PLATELET ASSAY: CPT

## 2024-09-11 PROCEDURE — 700117 HCHG RX CONTRAST REV CODE 255: Performed by: NURSE PRACTITIONER

## 2024-09-11 PROCEDURE — 700105 HCHG RX REV CODE 258: Performed by: HOSPITALIST

## 2024-09-11 PROCEDURE — 82728 ASSAY OF FERRITIN: CPT

## 2024-09-11 PROCEDURE — 700102 HCHG RX REV CODE 250 W/ 637 OVERRIDE(OP): Performed by: STUDENT IN AN ORGANIZED HEALTH CARE EDUCATION/TRAINING PROGRAM

## 2024-09-11 PROCEDURE — 85025 COMPLETE CBC W/AUTO DIFF WBC: CPT

## 2024-09-11 PROCEDURE — 83540 ASSAY OF IRON: CPT

## 2024-09-11 PROCEDURE — 700105 HCHG RX REV CODE 258: Performed by: INTERNAL MEDICINE

## 2024-09-11 PROCEDURE — 36415 COLL VENOUS BLD VENIPUNCTURE: CPT

## 2024-09-11 PROCEDURE — 700111 HCHG RX REV CODE 636 W/ 250 OVERRIDE (IP): Mod: JZ | Performed by: STUDENT IN AN ORGANIZED HEALTH CARE EDUCATION/TRAINING PROGRAM

## 2024-09-11 PROCEDURE — 700102 HCHG RX REV CODE 250 W/ 637 OVERRIDE(OP): Mod: JZ

## 2024-09-11 PROCEDURE — 85018 HEMOGLOBIN: CPT

## 2024-09-11 PROCEDURE — A9270 NON-COVERED ITEM OR SERVICE: HCPCS | Performed by: STUDENT IN AN ORGANIZED HEALTH CARE EDUCATION/TRAINING PROGRAM

## 2024-09-11 PROCEDURE — 83550 IRON BINDING TEST: CPT

## 2024-09-11 PROCEDURE — 80048 BASIC METABOLIC PNL TOTAL CA: CPT

## 2024-09-11 RX ORDER — METOPROLOL TARTRATE 25 MG/1
25 TABLET, FILM COATED ORAL 2 TIMES DAILY
Status: DISCONTINUED | OUTPATIENT
Start: 2024-09-11 | End: 2024-09-18 | Stop reason: HOSPADM

## 2024-09-11 RX ORDER — POTASSIUM CHLORIDE 1500 MG/1
20 TABLET, EXTENDED RELEASE ORAL ONCE
Status: COMPLETED | OUTPATIENT
Start: 2024-09-11 | End: 2024-09-11

## 2024-09-11 RX ORDER — POTASSIUM CHLORIDE 1500 MG/1
40 TABLET, EXTENDED RELEASE ORAL ONCE
Status: COMPLETED | OUTPATIENT
Start: 2024-09-11 | End: 2024-09-11

## 2024-09-11 RX ADMIN — METOPROLOL TARTRATE 25 MG: 25 TABLET, FILM COATED ORAL at 17:47

## 2024-09-11 RX ADMIN — POTASSIUM CHLORIDE 40 MEQ: 1500 TABLET, EXTENDED RELEASE ORAL at 04:53

## 2024-09-11 RX ADMIN — SODIUM CHLORIDE: 9 INJECTION, SOLUTION INTRAVENOUS at 04:56

## 2024-09-11 RX ADMIN — ACETAMINOPHEN 650 MG: 325 TABLET ORAL at 17:47

## 2024-09-11 RX ADMIN — ATORVASTATIN CALCIUM 40 MG: 40 TABLET, FILM COATED ORAL at 17:47

## 2024-09-11 RX ADMIN — ACETAMINOPHEN 650 MG: 325 TABLET ORAL at 12:55

## 2024-09-11 RX ADMIN — NICOTINE TRANSDERMAL SYSTEM 21 MG: 21 PATCH, EXTENDED RELEASE TRANSDERMAL at 04:55

## 2024-09-11 RX ADMIN — IOHEXOL 50 ML: 240 INJECTION, SOLUTION INTRATHECAL; INTRAVASCULAR; INTRAVENOUS; ORAL at 18:41

## 2024-09-11 RX ADMIN — POTASSIUM CHLORIDE 20 MEQ: 1500 TABLET, EXTENDED RELEASE ORAL at 17:47

## 2024-09-11 RX ADMIN — ONDANSETRON 4 MG: 2 INJECTION INTRAMUSCULAR; INTRAVENOUS at 23:32

## 2024-09-11 RX ADMIN — IOHEXOL 100 ML: 350 INJECTION, SOLUTION INTRAVENOUS at 18:40

## 2024-09-11 RX ADMIN — AMPICILLIN SODIUM, SULBACTAM SODIUM 3 G: 2; 1 INJECTION, POWDER, FOR SOLUTION INTRAMUSCULAR; INTRAVENOUS at 05:00

## 2024-09-11 RX ADMIN — AMPICILLIN SODIUM, SULBACTAM SODIUM 3 G: 2; 1 INJECTION, POWDER, FOR SOLUTION INTRAMUSCULAR; INTRAVENOUS at 12:58

## 2024-09-11 ASSESSMENT — PAIN DESCRIPTION - PAIN TYPE
TYPE: ACUTE PAIN;CHRONIC PAIN
TYPE: ACUTE PAIN
TYPE: ACUTE PAIN;CHRONIC PAIN
TYPE: ACUTE PAIN;CHRONIC PAIN

## 2024-09-11 ASSESSMENT — ENCOUNTER SYMPTOMS
FOCAL WEAKNESS: 0
ABDOMINAL PAIN: 0
BACK PAIN: 0
CONSTIPATION: 0
DIZZINESS: 0
HEADACHES: 0
CHILLS: 0
VOMITING: 0
BRUISES/BLEEDS EASILY: 0
WEAKNESS: 1
BLURRED VISION: 0
DIARRHEA: 0
HEARTBURN: 0
FEVER: 0
NAUSEA: 0
MYALGIAS: 0
SHORTNESS OF BREATH: 0
BLOOD IN STOOL: 0
COUGH: 0
PALPITATIONS: 0
DEPRESSION: 0

## 2024-09-11 ASSESSMENT — LIFESTYLE VARIABLES: SUBSTANCE_ABUSE: 0

## 2024-09-11 ASSESSMENT — FIBROSIS 4 INDEX: FIB4 SCORE: 3.74

## 2024-09-11 NOTE — DIETARY
"Nutrition Update: Follow-up for Monitoring Adequacy of PO intake  Day 4 of admit.  Robert Mcdonnell is a 74 y.o. male with admitting DX of Intractable nausea and vomiting [R11.2].    Current Diet: Full liquid w/ supplements TID.     Pt's diet was transitioned to full liquid this am. Per RN, pt ate ~20% of meal this am. RN mentioned that pt said he would eat more if diet was advanced. RN to advance diet to GI soft.    Malnutrition risk: Per RD assessment on 9/9: \"Severe malnutrition in context of chronic illness related to intractable nausea and vomiting as evidenced by 26.4% weight loss in 3.5 months per chart review, severe muscle loss at temple, clavicle.\"     Problem: Nutritional:  Goal: Achieve adequate nutritional intake  Description: Patient will consume >50% of meals  Outcome: Progressing slowly.    Advance diet as tolerated.     RD following.    "

## 2024-09-11 NOTE — ANTIMICROBIAL STEWARDSHIP
Antimicrobial Stewardship Rounds Note    Date  9/11/2024    Assessment  Patient chart reviewed during antimicrobial stewardship rounds with antimicrobial stewardship medical director Dr. Cristofer Ryan.     Patient is a 74 y.o male with PMH significant for atrial fibrillation, HFrEF, COPD, tobacco use disorder on day 4 of ampicillin-sulbactam, but day 5 of total antibiotics including prior ceftriaxone and metronidazole, for presumed aspiration pneumonia.    The patient came in with upper GI bleed and was found to have a large deeply ulcerated lesion on the lesser curvature of the stomach, which was biopsied. Chest X-ray showed possible small left pleural effusion, no consolidation. CT scan of the abdomen revealed opacification in the left lung base which could be scarring vs. atelectasis. The patient has remained afebrile on room air with tachycardia and one episode of hypotension 88/54 although his baseline blood pressure seems to be low. His WBC was initially elevated, which could be due to GI bleed and inflammation from the ulcer, and has normalized. MRSA nares was negative.     Recommendation  Based on the assessment above, the antimicrobial stewardship team recommends to discontinue anitbiotics. Discussed with Dr. Otto, who agreed. Orders were updated in the chart by this pharmacist    Rosalba Polk, PharmD  Title: PGY-2 Infectious Diseases Pharmacy Resident

## 2024-09-11 NOTE — CARE PLAN
The patient is Watcher - Medium risk of patient condition declining or worsening    Shift Goals  Clinical Goals: hemodynamic stability, monitor BM and hemoglobin  Patient Goals: rest, food  Family Goals: MARCOS    Progress made toward(s) clinical / shift goals:    Problem: Pain - Standard  Goal: Alleviation of pain or a reduction in pain to the patient’s comfort goal  9/10/2024 2209 by Michelle Ulrich R.N.  Outcome: Progressing  Note: Pt has no c/o abdominal pain currently, RN instructed pt to call for appropriate intervention  9/10/2024 2200 by Michelle Ulrich R.N.  Outcome: Progressing     Problem: Nutrition - Advanced  Goal: Patient will display progressive weight gain toward goal have adequate food and fluid intake  Outcome: Progressing  Note: Pt consumes meals and additional snacks throughout shift. Tolerating clear liquid diet     Problem: Self Care  Goal: Patient will have the ability to perform ADLs independently or with assistance (bathe, groom, dress, toilet and feed)  Outcome: Progressing  Note: Pt requires minimal assistance with ambulation and toileting     Problem: Hemodynamics  Goal: Patient's hemodynamics, fluid balance and neurologic status will be stable or improve  Outcome: Progressing  Note: Blood pressures ranging from 90s to 100s, MAP > 60. Heart rate increases to 100s with ambulation, remains in afib     Problem: Care Map:  Day 3 Optimal Outcome for the Heart Failure Patient  Goal: Day 3:  Optimal Care of the heart failure patient  Outcome: Progressing  Intervention: Start Heart Failure education booklet, provide writing utensils and notepad for questions  Note: HF book given to pt  Intervention: Assess edema every shift (peripheral, sacral, periorbital, perineal/scrotal, and abdominal)  Note: No edema present  Intervention: If patient is HFrEF, review for guideline medications (evidence based beta blocker (Toprol XL, carvedilol, bisprolol), ACEI/ARB/ARNI, Aldosterone receptor antagonist).  If  not ordered request provider contraindication documentation.  Note: Pt has scheduled metoprolol, PRN labetalol/metoprolol, no diuretics, atorvostatin     Problem: Risk for Bleeding  Goal: Patient will take measures to prevent bleeding and recognizes signs of bleeding that need to be reported immediately to a health care professional  Outcome: Progressing  Goal: Patient will not experience bleeding as evidenced by normal blood pressure, stable hematocrit and hemoglobin levels and desired ranges for coagulation profiles  Outcome: Progressing  Note: No scheduled anticoagulation medications, last hemoglobin 8.8, hold antihypertensives per parameters, pt running NS 75 ml/hr       Patient is not progressing towards the following goals:

## 2024-09-11 NOTE — PROGRESS NOTES
Hospital Medicine Daily Progress Note    Date of Service  9/11/2024    Chief Complaint  Robert Mcdonnell is a 74 y.o. male admitted 9/7/2024 with abdominal pain and N/V    Hospital Course  This is a 74 y.o. homeless male from [facility] with history of chronic A-fib not on anticoagulation due to medical noncompliance, tobacco abuse, COPD, CHF, hypertension, hyperlipidemia who presented 9/7/2024 with evaluation for intractable nausea and vomiting.  Patient reported ongoing symptoms for at least the past 3 to 4 days, reported epigastric discomfort associate with nausea and vomiting.  In ER, found to have leukocytosis, lactic acid of 3.5 and NIRMAL with creatinine of 2.24.  Initially thought to have bowel perforation, no perforation seen on CTAP, however noted to have left lung base opacity.  Patient was given IVF boluses per sepsis protocol and Zosyn empirically by ERP.   Patient admitted for further treatment. He was on anticoagulation for his A.fib but started having black tarry stool and his hemoglobin dropped down to 12 from 14 so anticoagulation was stopped (had been non complaint with this at home anyway) and GI was consulted    Interval Problem Update  Axox3, no bm overnight, denies abdominal pain, no n/v today, tolerating clears, denies chest pain, denies sob, no cough, stable on room air. I discussed patient with ID who recommends no further abx. Some hypotension last night but no associated sx, appears to be related to gi bleed with beta blockers - I have decreased dose and changed parameters, continue to follow closely. gI continues to follow, repeat egd pending. Exam stable. ROS otherwise negative.     I have discussed this patient's plan of care and discharge plan at IDT rounds today with Case Management, Nursing, Nursing leadership, and other members of the IDT team.    Consultants/Specialty  GI    Code Status  Full Code    Disposition  The patient is not medically cleared for discharge to home or a  post-acute facility.      I have placed the appropriate orders for post-discharge needs.    Review of Systems  Review of Systems   Constitutional:  Positive for malaise/fatigue. Negative for chills and fever.   HENT:  Negative for hearing loss and tinnitus.    Eyes:  Negative for blurred vision.   Respiratory:  Negative for cough and shortness of breath.    Cardiovascular:  Negative for chest pain and palpitations.   Gastrointestinal:  Negative for abdominal pain, blood in stool, heartburn, melena, nausea and vomiting.   Genitourinary:  Negative for dysuria and urgency.   Musculoskeletal:  Negative for joint pain and myalgias.   Skin:  Negative for itching and rash.   Neurological:  Positive for weakness. Negative for dizziness, focal weakness and headaches.   Endo/Heme/Allergies:  Negative for environmental allergies. Does not bruise/bleed easily.   Psychiatric/Behavioral:  Negative for depression and substance abuse.    All other systems reviewed and are negative.       Physical Exam  Temp:  [36.6 °C (97.9 °F)-36.8 °C (98.2 °F)] 36.6 °C (97.9 °F)  Pulse:  [] 96  Resp:  [18] 18  BP: ()/(54-74) 117/74  SpO2:  [94 %-98 %] 96 %    Physical Exam  Vitals and nursing note reviewed.   Constitutional:       General: He is not in acute distress.     Appearance: He is ill-appearing.   HENT:      Head: Normocephalic and atraumatic.      Nose: Nose normal.      Mouth/Throat:      Pharynx: Oropharynx is clear.   Eyes:      General: No scleral icterus.     Extraocular Movements: Extraocular movements intact.   Cardiovascular:      Rate and Rhythm: Rhythm irregular.      Pulses: Normal pulses.      Heart sounds:      No friction rub.   Pulmonary:      Effort: No respiratory distress.      Breath sounds: No wheezing or rales.   Chest:      Chest wall: No tenderness.   Abdominal:      General: There is no distension.      Tenderness: There is no abdominal tenderness. There is no guarding or rebound.   Musculoskeletal:          General: Normal range of motion.      Cervical back: Neck supple. No tenderness.      Right lower leg: No edema.      Left lower leg: No edema.   Skin:     General: Skin is warm and dry.      Capillary Refill: Capillary refill takes less than 2 seconds.   Neurological:      General: No focal deficit present.      Mental Status: He is alert and oriented to person, place, and time.   Psychiatric:         Mood and Affect: Mood normal.         Fluids    Intake/Output Summary (Last 24 hours) at 9/11/2024 1609  Last data filed at 9/11/2024 0433  Gross per 24 hour   Intake 240 ml   Output 1000 ml   Net -760 ml        Laboratory  Recent Labs     09/09/24  0019 09/09/24  1712 09/10/24  0110 09/10/24  1001 09/10/24  1750 09/11/24  0121 09/11/24  0848   WBC 11.9*  --  12.5*  --   --  9.1  --    RBC 4.42*  --  3.58*  --   --  2.94*  --    HEMOGLOBIN 12.4*   < > 10.1*   < > 8.8* 8.1* 8.0*   HEMATOCRIT 38.3*   < > 31.9*   < > 27.3* 25.3* 25.5*   MCV 86.7  --  89.1  --   --  86.1  --    MCH 28.1  --  28.2  --   --  27.6  --    MCHC 32.4  --  31.7*  --   --  32.0*  --    RDW 48.6  --  49.1  --   --  47.1  --    PLATELETCT 127*  --  114*  --   --  98*  --    MPV 11.9  --  12.2  --   --  10.6  --     < > = values in this interval not displayed.     Recent Labs     09/09/24  0019 09/10/24  0110 09/11/24  0121   SODIUM 140 139 138   POTASSIUM 3.4* 4.5 3.4*   CHLORIDE 104 107 107   CO2 23 22 23   GLUCOSE 83 62* 137*   BUN 31* 25* 9   CREATININE 1.01 0.88 0.74   CALCIUM 8.2* 8.0* 7.6*                       Imaging  CT-ABDOMEN-PELVIS W/O   Final Result      1.  Opacification left lung base at costophrenic angle is identified which could be due to scarring or atelectasis.      2.  Cardiomegaly.      3.  Otherwise no acute abnormalities are noted in the abdomen or pelvis.      DX-CHEST-PORTABLE (1 VIEW)   Final Result         1.  Possible small left pleural effusion.      2.  Minimal atelectasis left lung base.      3.  No  consolidations identified.      EC-ECHOCARDIOGRAM COMPLETE W/O CONT    (Results Pending)   CT-ABDOMEN-PELVIS WITH    (Results Pending)        Assessment/Plan  * Intractable nausea and vomiting- (present on admission)  Assessment & Plan  IVF  CLD, advance diet as tolerated  Trial of IV Protonix, IV Reglan  Aspiration precautions  resolved    Severe protein-calorie malnutrition (HCC)  Assessment & Plan  Significant recent weight loss, cirrhosis contributing  Nutritionist following    Hypotension  Assessment & Plan  See above  Appears related to BB, adjusting dose  Following    Gastrointestinal hemorrhage with melena  Assessment & Plan  See above  Due to gastric ulcer and anticoag  Biopsy results pending  GI following, will need repeat egd, likely within 72 hours  Serial h/h  Transfuse further as needed  Iv ppi    Pneumonia due to infectious organism  Assessment & Plan  ?aspirated  LLL opacity,   IVF  Abx: unasyn   Follow sputum Cx    AP (abdominal pain)  Assessment & Plan  Due to gastric ulcer  resolved  Following  Supportive care  PPI    Lactic acidosis  Assessment & Plan  Likely secondary sepsis  IVF, antibiotic  High-dose IV thiamine      Leukocytosis  Assessment & Plan  resolved    ACP (advance care planning)  Assessment & Plan  Goal of care discussed with patient in emergency room.  He stated he is agreeable for noninvasive, as well as invasive/heroic life-sustaining measures-including CPR/defibrillation/intubation or mechanical ventilation.  He is also agreeable for further treatment with IVF, IV antibiotic, imaging study with echocardiogram renal ultrasound, as well as the need to quit smoking and to follow-up with anticoagulation clinic outpatient once discharged.  Diagnosis, prognosis, question and concern addressed.  Full CODE STATUS confirmed.  ACP: 16 minutes    COPD (chronic obstructive pulmonary disease) (HCC)  Assessment & Plan  Currently not in acute exacerbation  Pulmonary hygiene  Smoking cessation  advised    Tobacco abuse counseling  Assessment & Plan  Reported smoking more than 1 pack of cigarettes daily  Stated he has been try to quit for the past week  Tobacco smoking cessation counseling provided: 5 minutes  We discussed tobacco smoking cessation given concern for COPD, as well as cardiovascular health benefits, he expressed understanding.  Offered patient with nicotine patch, nicotine gum, Chantix as alternative.  Provided patient with standard tobacco cessation information per protocol    Acute kidney injury (HCC)- (present on admission)  Assessment & Plan  BUN 60, creatinine 2.24, bicarb 18  Suspect secondary to intractable nausea vomiting  Resolved  Avoid nephro toxins  Continue iv fluids  Bmp in am       Sepsis ([facility])- (present on admission)  Assessment & Plan  This is Sepsis Present on admission  SIRS criteria identified on my evaluation include: Tachycardia, with heart rate greater than 90 BPM, Leukocytosis, with WBC greater than 12,000, and Bandemia, greater than 10% bands  Clinical indicators of end organ dysfunction include Lactic Acid greater than 2 and Acute On Chronic Renal Failure, with creatinine >0.5 above baseline level  Source is possible GI, pulm (LLL)  Sepsis protocol initiated  Crystalloid Fluid Administration: Fluid resuscitation ordered per standard protocol - 30 mL/kg per current or ideal body weight  IV antibiotics as appropriate for source of sepsis  Reassessment: I have reassessed the patient's hemodynamic status possible    WBC 16.0, lactic acid 3.5, creatinine 2.24  Possible GI source versus LLL infiltrate (aspiration pneumonitis versus pneumonia from nausea vomiting)  IVF  Resolving, see above, discussed with ID, stop abx and monitor      Atrial fibrillation with RVR ([facility])- (present on admission)  Assessment & Plan  Known history of A-fib  He did not follow through with anticoagulation clinic last time, high risk on this med, does not want to take it and high risk for falls  and recurrent GI bleed  Chads score only 2  Pt opts to remain off anticoagulation   See above, due to bp adjusting/ decreasing dose of metoprolol, monitoring a fib on this new dose  Check associated electrolytes in am   Continue tele    Thrombocytopenia (HCC)- (present on admission)  Assessment & Plan  Slowly decreasing  Likely consumptive from gi bleed  Following  Cbc in am     Chronic systolic heart failure (HCC)- (present on admission)  Assessment & Plan  Currently not in acute exacerbation  Currently appears hypovolemic  Following closely     Hypokalemia- (present on admission)  Assessment & Plan  Replacing  Bmp and mag in am        VTE prophylaxis: scd      I have performed a physical exam and reviewed and updated ROS and Plan today (9/11/2024). In review of yesterday's note (9/10/2024), there are no changes except as documented above.

## 2024-09-11 NOTE — CARE PLAN
Problem: Pain - Standard  Goal: Alleviation of pain or a reduction in pain to the patient’s comfort goal  Outcome: Progressing     Problem: Knowledge Deficit - COPD  Goal: Patient/significant other demonstrates understanding of disease process, utilization of the Action Plan, medications and discharge instruction  Outcome: Progressing     Problem: Risk for Infection - COPD  Goal: Patient will remain free from signs and symptoms of infection  Outcome: Progressing     Problem: Nutrition - Advanced  Goal: Patient will display progressive weight gain toward goal have adequate food and fluid intake  Outcome: Progressing     Problem: Ineffective Airway Clearance  Goal: Patient will maintain patent airway with clear/clearing breath sounds  Outcome: Progressing     Problem: Impaired Gas Exchange  Goal: Patient will demonstrate improved ventilation and adequate oxygenation and participate in treatment regimen within the level of ability/situation.  Outcome: Progressing     Problem: Risk for Aspiration  Goal: Patient's risk for aspiration will be absent or decrease  Outcome: Progressing     Problem: Self Care  Goal: Patient will have the ability to perform ADLs independently or with assistance (bathe, groom, dress, toilet and feed)  Outcome: Progressing     Problem: Hemodynamics  Goal: Patient's hemodynamics, fluid balance and neurologic status will be stable or improve  Outcome: Progressing     Problem: Fluid Volume  Goal: Fluid volume balance will be maintained  Outcome: Progressing     Problem: Urinary - Renal Perfusion  Goal: Ability to achieve and maintain adequate renal perfusion and functioning will improve  Outcome: Progressing     Problem: Respiratory  Goal: Patient will achieve/maintain optimum respiratory ventilation and gas exchange  Outcome: Progressing     Problem: Mechanical Ventilation  Goal: Safe management of artificial airway and ventilation  Outcome: Progressing  Goal: Successful weaning off mechanical  ventilator, spontaneously maintains adequate gas exchange  Outcome: Progressing  Goal: Patient will be able to express needs and understand communication  Outcome: Progressing     Problem: Physical Regulation  Goal: Diagnostic test results will improve  Outcome: Progressing  Goal: Signs and symptoms of infection will decrease  Outcome: Progressing     Problem: Knowledge Deficit - Standard  Goal: Patient and family/care givers will demonstrate understanding of plan of care, disease process/condition, diagnostic tests and medications  Outcome: Progressing     Problem: Care Map:  Day 1 Optimal Outcome for the Heart Failure Patient  Goal: Day 1:  Optimal Care of the heart failure patient  Outcome: Progressing     Problem: Care Map:  Day 2 Optimal Outcome for the Heart Failure Patient  Goal: Day 2:  Optimal Care of the heart failure patient  Outcome: Progressing     Problem: Care Map:  Day 3 Optimal Outcome for the Heart Failure Patient  Goal: Day 3:  Optimal Care of the heart failure patient  Outcome: Progressing     Problem: Care Map:  Day Before Discharge Optimal Outcome for the Heart Failure Patient  Goal: Day Before Discharge:  Optimal Care of the heart failure patient  Outcome: Progressing     Problem: Risk for Bleeding  Goal: Patient will take measures to prevent bleeding and recognizes signs of bleeding that need to be reported immediately to a health care professional  Outcome: Progressing  Goal: Patient will not experience bleeding as evidenced by normal blood pressure, stable hematocrit and hemoglobin levels and desired ranges for coagulation profiles  Outcome: Progressing   The patient is Watcher - Medium risk of patient condition declining or worsening    Shift Goals  Clinical Goals: VSS, safety, rest  Patient Goals: Rest  Family Goals: MARCOS    Progress made toward(s) clinical / shift goals:  VSS, safety, rest    Patient is not progressing towards the following goals:

## 2024-09-11 NOTE — ASSESSMENT & PLAN NOTE
With dizziness  Mild dehydration - holding diuretics, small bolus of LR, following  Also due to cardiac meds - will decreased dose of lisinopril, following, may need to decrease dose of bb - currently has parameters - following and will make further adjustments as needed  Less likely due to gi bleed as h/h has been increasing but will continue to trend h/h

## 2024-09-11 NOTE — DOCUMENTATION QUERY
[facility]                                                                       Query Response Note      PATIENT:               HUNTER STEELE  ACCT #:                  [id number]  MRN:                     [id number]  :                      1950  ADMIT DATE:       2024 1:03 PM  DISCH DATE:          RESPONDING  PROVIDER #:        [id number]           QUERY TEXT:    Malnutrition is documented in the Medical Record.  Please specify the severity.    The patient's Clinical Indicators include:  Findings:  Dietary: Severe malnutrition in context of chronic illness related to intractable nausea and vomiting as evidenced by 26.4% weight loss in 3.5 months per chart review, severe muscle loss at temple, clavicle BMI 20.74  Pt with 26.4% weight loss x 3.5 months which is severe.    Treatment: dietary consult  ensure plus with meals    Risk Factors: History of CHF, COPD, tobacco abuse , here with intractable nausea and vomiting past 3-4 days    Sailaja Alberts RN BSN  Clinical Documentation   [email or web address]  Connect via [email or web address]    Note: If you agree with a diagnosis listed above, please remember to include it in your concurrent daily documentation and onto the Discharge Summary.  Options provided:   -- Severe protein calorie malnutrition   -- Other explanation, (please specify other explanation)      Query created by: Sailaja Deutsch on 2024 8:59 AM    RESPONSE TEXT:    Severe protein calorie malnutrition          Electronically signed by:  HATTIE BRODERICK MD 2024 3:46 PM

## 2024-09-11 NOTE — PROGRESS NOTES
Monitor summary:        Rhythm: afib  Rate:   Ectopy: (O)PVC  Measurements: -/.12/-        12hr chart check

## 2024-09-11 NOTE — PROGRESS NOTES
..Gastroenterology Progress Note               Author:  Willa Sun, DNP,  APRN Date & Time Created: 9/11/2024 8:27 AM       Patient ID:  Name:             Robert Mcdonnell  YOB: 1950  Age:                 74 y.o.  male  MRN:               [id number]        Medical Decision Making, by Problem:  Active Hospital Problems    Diagnosis     Gastrointestinal hemorrhage with melena [K92.1]     Intractable nausea and vomiting [R11.2]     Tobacco abuse counseling [Z71.6]     COPD (chronic obstructive pulmonary disease) (HCC) [J44.9]     ACP (advance care planning) [Z71.89]     Leukocytosis [D72.829]     Lactic acidosis [E87.20]     AP (abdominal pain) [R10.9]     Pneumonia due to infectious organism [J18.9]     Acute kidney injury (HCC) [N17.9]     Sepsis (HCC) [A41.9]     Atrial fibrillation with RVR (HCC) [I48.91]     Chronic systolic heart failure (HCC) [I50.22]     Thrombocytopenia (HCC) [D69.6]      Presenting Chief Complaint:  Melena, epigastric pain     History of Present Illness:      This is a 74-year-old male with a past medical history of chronic atrial fibrillation (not on anticoagulation due to medical noncompliance; controlled on metoprolol), heart failure reduced ejection fraction (EF 30-45), hypertension, hyperlipidemia, COPD (no O2 at baseline) who presented to [facility] on 9/7/2024 for evaluation of epigastric pain.  Patient reports epigastric pain for the past 3-4 days prior to arrival with associated nausea, vomiting.  He denies pain radiating.  No relieving or exacerbating factors.  He further endorses dark, soft/liquid stools in addition to heartburn/reflux  In the emergency department, he was found to have a leukocytosis, lactic acid of 3.5, NIRMAL with creatinine 2.24.  He was initially suspected of having a bowel perforation however CT abdomen pelvis was obtained showing no bowel perforation but patient with left lung base opacity consistent with  pneumonia.  He was given IV fluid boluses for sepsis protocol, started on empiric Zosyn, and admitted for further evaluation.  Initial hemoglobin 17 but is down trended to 12.4 on day of consult.  MCV normocytic.  Platelets 127.  BUN elevated at 60 in setting of NIRMAL on arrival.  This is down trended to 31.     9/10/2024: EGD:  IMPRESSION:  Large deeply ulcerated lesion on the lesser curvature of the stomach (2 cm x 2 cm). Abel IIa. The underlying ulcer bed was treated with epinephrine injection and bipolar cautery.  The edge of the ulcer was biopsied.    Interval History:  9/11/2024: Patient seen and examined. Had one BM, unclear of color. Hgb 8.1<8.8. Iron studies consistent with anemia of chronic disease. Path pending    Hospital Medications:  Current Facility-Administered Medications   Medication Dose Frequency Provider Last Rate Last Admin    metoprolol tartrate (Lopressor) tablet 25 mg  25 mg BID Shaina Otto M.D.        dextrose 50% (D50W) injection 25 g  25 g Q15 MIN PRN Shaina Otto M.D.        ampicillin/sulbactam (Unasyn) 3 g in  mL IVPB  3 g Q6HRS Eliana Black M.D.   Stopped at 09/11/24 0530    LR (Bolus) infusion 500 mL  500 mL Once PRN Aaron Mehta M.D.        labetalol (Normodyne/Trandate) injection 10 mg  10 mg Q4HRS PRN Aaron Mehta M.D.        ondansetron (Zofran) syringe/vial injection 4 mg  4 mg Q4HRS PRN Aaron Mehta M.D.        ondansetron (Zofran ODT) dispertab 4 mg  4 mg Q4HRS PRN Aaron Mehta M.D.   4 mg at 09/08/24 2313    nicotine (Nicoderm) 21 MG/24HR 21 mg  21 mg Daily-0600 Aaron Mehta M.D.   21 mg at 09/11/24 0455    And    nicotine polacrilex (Nicorette) 2 MG piece 2 mg  2 mg Q HOUR PRN Aaron Mehta M.D.        Pharmacy Consult Request ...Pain Management Review 1 Each  1 Each PHARMACY TO DOSE Aaron Mehta M.D.        acetaminophen (Tylenol) tablet 650 mg  650 mg Q6HRS Aaron Mehta M.D.   650 mg at 09/10/24 1816    Followed by    [START ON 9/12/2024] acetaminophen  "(Tylenol) tablet 650 mg  650 mg Q6HRS PRN Aaron Mehta M.D.        oxyCODONE immediate-release (Roxicodone) tablet 5 mg  5 mg Q3HRS PRN Aaron Mehta M.D.        Or    oxyCODONE immediate release (Roxicodone) tablet 10 mg  10 mg Q3HRS PRN Aaron Mehta M.D.        Or    HYDROmorphone (Dilaudid) injection 0.5 mg  0.5 mg Q3HRS PRN Aaron Mehta M.D.        Metoprolol Tartrate (Lopressor) injection 5 mg  5 mg Q5 MIN PRN Aaron Mehta M.D.        atorvastatin (Lipitor) tablet 40 mg  40 mg Q EVENING Aaron Mehta M.D.   40 mg at 09/10/24 1816    NS infusion   Continuous Shaina Otto M.D. 75 mL/hr at 09/11/24 0456 New Bag at 09/11/24 0456   Last reviewed on 9/10/2024  7:05 AM by Anyi Lopez R.N.       Review of Systems:  Review of Systems   Constitutional:  Negative for chills, fever and malaise/fatigue.   HENT:  Negative for hearing loss.    Eyes:  Negative for blurred vision.   Respiratory:  Negative for cough and shortness of breath.    Cardiovascular:  Negative for chest pain and leg swelling.   Gastrointestinal:  Negative for abdominal pain, blood in stool, constipation, diarrhea, heartburn, melena, nausea and vomiting.   Genitourinary:  Negative for dysuria.   Musculoskeletal:  Negative for back pain.   Skin:  Negative for rash.   Neurological:  Positive for weakness. Negative for dizziness.   Psychiatric/Behavioral:  Negative for depression.    All other systems reviewed and are negative.        Vital signs:  Weight/BMI: Body mass index is 22.4 kg/m².  /72   Pulse 94   Temp 36.8 °C (98.2 °F) (Temporal)   Resp 18   Ht 1.854 m (6' 1\")   Wt 77 kg (169 lb 12.1 oz)   SpO2 98%   Vitals:    09/11/24 0000 09/11/24 0433 09/11/24 0452 09/11/24 0801   BP: 93/57 92/57 (!) 88/54 118/72   Pulse: 93 98 (!) 113 94   Resp: 18 18  18   Temp:    36.8 °C (98.2 °F)   TempSrc: Temporal Temporal  Temporal   SpO2: 95% 94%  98%   Weight:  77 kg (169 lb 12.1 oz)     Height:         Oxygen Therapy:  Pulse Oximetry: 98 %, O2 " (LPM): 0, O2 Delivery Device: None - Room Air    Intake/Output Summary (Last 24 hours) at 9/11/2024 0821  Last data filed at 9/11/2024 0433  Gross per 24 hour   Intake 240 ml   Output 1000 ml   Net -760 ml         Physical Exam:  Physical Exam  Vitals and nursing note reviewed.   Constitutional:       General: He is not in acute distress.     Appearance: Normal appearance. He is not ill-appearing.   HENT:      Head: Normocephalic and atraumatic.      Right Ear: External ear normal.      Left Ear: External ear normal.      Nose: Nose normal.      Mouth/Throat:      Mouth: Mucous membranes are moist.      Pharynx: Oropharynx is clear.   Eyes:      General: No scleral icterus.  Cardiovascular:      Rate and Rhythm: Normal rate and regular rhythm.      Pulses: Normal pulses.      Heart sounds: Normal heart sounds.   Pulmonary:      Effort: Pulmonary effort is normal.      Breath sounds: Normal breath sounds.   Abdominal:      General: Abdomen is flat. Bowel sounds are normal. There is no distension.      Palpations: Abdomen is soft.   Musculoskeletal:         General: Normal range of motion.      Cervical back: Normal range of motion and neck supple.   Skin:     General: Skin is warm.      Capillary Refill: Capillary refill takes less than 2 seconds.   Neurological:      Mental Status: He is alert and oriented to person, place, and time.      Motor: Weakness present.   Psychiatric:         Mood and Affect: Mood normal.         Behavior: Behavior normal.             Labs:  Recent Labs     09/09/24  0019 09/10/24  0110 09/11/24  0121   SODIUM 140 139 138   POTASSIUM 3.4* 4.5 3.4*   CHLORIDE 104 107 107   CO2 23 22 23   BUN 31* 25* 9   CREATININE 1.01 0.88 0.74   CALCIUM 8.2* 8.0* 7.6*     Recent Labs     09/09/24  0019 09/10/24  0110 09/11/24  0121   GLUCOSE 83 62* 137*     Recent Labs     09/09/24  0019 09/10/24  0110 09/11/24  0121   WBC 11.9* 12.5* 9.1   NEUTSPOLYS  --   --  76.20*   LYMPHOCYTES  --   --  15.10*    MONOCYTES  --   --  7.00   EOSINOPHILS  --   --  1.10   BASOPHILS  --   --  0.20     Recent Labs     09/09/24  0019 09/09/24  1712 09/10/24  0110 09/10/24  1001 09/10/24  1750 09/11/24  0121   RBC 4.42*  --  3.58*  --   --  2.94*   HEMOGLOBIN 12.4*   < > 10.1* 10.0* 8.8* 8.1*   HEMATOCRIT 38.3*   < > 31.9* 31.3* 27.3* 25.3*   PLATELETCT 127*  --  114*  --   --  98*    < > = values in this interval not displayed.     Recent Results (from the past 24 hour(s))   HEMOGLOBIN AND HEMATOCRIT    Collection Time: 09/10/24 10:01 AM   Result Value Ref Range    Hemoglobin 10.0 (L) 14.0 - 18.0 g/dL    Hematocrit 31.3 (L) 42.0 - 52.0 %   HEMOGLOBIN AND HEMATOCRIT    Collection Time: 09/10/24  5:50 PM   Result Value Ref Range    Hemoglobin 8.8 (L) 14.0 - 18.0 g/dL    Hematocrit 27.3 (L) 42.0 - 52.0 %   CBC WITH DIFFERENTIAL    Collection Time: 09/11/24  1:21 AM   Result Value Ref Range    WBC 9.1 4.8 - 10.8 K/uL    RBC 2.94 (L) 4.70 - 6.10 M/uL    Hemoglobin 8.1 (L) 14.0 - 18.0 g/dL    Hematocrit 25.3 (L) 42.0 - 52.0 %    MCV 86.1 81.4 - 97.8 fL    MCH 27.6 27.0 - 33.0 pg    MCHC 32.0 (L) 32.3 - 36.5 g/dL    RDW 47.1 35.9 - 50.0 fL    Platelet Count 98 (L) 164 - 446 K/uL    MPV 10.6 9.0 - 12.9 fL    Neutrophils-Polys 76.20 (H) 44.00 - 72.00 %    Lymphocytes 15.10 (L) 22.00 - 41.00 %    Monocytes 7.00 0.00 - 13.40 %    Eosinophils 1.10 0.00 - 6.90 %    Basophils 0.20 0.00 - 1.80 %    Immature Granulocytes 0.40 0.00 - 0.90 %    Nucleated RBC 0.00 0.00 - 0.20 /100 WBC    Neutrophils (Absolute) 6.96 1.82 - 7.42 K/uL    Lymphs (Absolute) 1.38 1.00 - 4.80 K/uL    Monos (Absolute) 0.64 0.00 - 0.85 K/uL    Eos (Absolute) 0.10 0.00 - 0.51 K/uL    Baso (Absolute) 0.02 0.00 - 0.12 K/uL    Immature Granulocytes (abs) 0.04 0.00 - 0.11 K/uL    NRBC (Absolute) 0.00 K/uL   Basic Metabolic Panel    Collection Time: 09/11/24  1:21 AM   Result Value Ref Range    Sodium 138 135 - 145 mmol/L    Potassium 3.4 (L) 3.6 - 5.5 mmol/L    Chloride 107 96 -  112 mmol/L    Co2 23 20 - 33 mmol/L    Glucose 137 (H) 65 - 99 mg/dL    Bun 9 8 - 22 mg/dL    Creatinine 0.74 0.50 - 1.40 mg/dL    Calcium 7.6 (L) 8.5 - 10.5 mg/dL    Anion Gap 8.0 7.0 - 16.0   IMMATURE PLT FRACTION    Collection Time: 09/11/24  1:21 AM   Result Value Ref Range    Imm. Plt Fraction 13.6 (H) 0.6 - 13.1 %   ESTIMATED GFR    Collection Time: 09/11/24  1:21 AM   Result Value Ref Range    GFR (CKD-EPI) 95 >60 mL/min/1.73 m 2       Radiology Review:  CT-ABDOMEN-PELVIS W/O   Final Result      1.  Opacification left lung base at costophrenic angle is identified which could be due to scarring or atelectasis.      2.  Cardiomegaly.      3.  Otherwise no acute abnormalities are noted in the abdomen or pelvis.      DX-CHEST-PORTABLE (1 VIEW)   Final Result         1.  Possible small left pleural effusion.      2.  Minimal atelectasis left lung base.      3.  No consolidations identified.      EC-ECHOCARDIOGRAM COMPLETE W/O CONT    (Results Pending)         MDM (Data Review):   -Records reviewed and summarized in current documentation  -I personally reviewed and interpreted the laboratory results  -I personally reviewed the radiology images    Assessment/Recommendations:  ASSESSMENT:  GI bleeding with melena  Large ulcerated stomach lesion  Nausea/vomiting  Normocytic anemia  Atrial fibrillation-received single dose of Eliquis 9/9/2024 at 1308.  NIRMAL-improving  Community-acquired pneumonia  Lactic acidosis-resolved  Tobacco use  Heart failure reduced ejection fraction-EF 30-45%.  Repeat echocardiogram pending.  Medical noncompliance  CT abdomen/pelvis negative for malignancy    RECOMMENDATIONS:   Await biopsies  Twice daily PPI therapy  I do not recommend resumption of anticoagulation until relook endoscopy can be pursued  Relook endoscopy in 72 hours or sooner if needed.  CT scan was completed without contrast, reordered with both oral and IV contrast   Will round on patient in am and decide timing of  endoscopy  Monitor for bleeding    Discussed with patient and Dr. Gutierres    ..Willa Sun, OSCAR,  APRN    Core Quality Measures   Reviewed items::  Labs, Medications and Radiology reports reviewed

## 2024-09-12 LAB
ANION GAP SERPL CALC-SCNC: 10 MMOL/L (ref 7–16)
BASOPHILS # BLD AUTO: 0.3 % (ref 0–1.8)
BASOPHILS # BLD: 0.04 K/UL (ref 0–0.12)
BUN SERPL-MCNC: 7 MG/DL (ref 8–22)
CALCIUM SERPL-MCNC: 7.7 MG/DL (ref 8.5–10.5)
CHLORIDE SERPL-SCNC: 108 MMOL/L (ref 96–112)
CO2 SERPL-SCNC: 22 MMOL/L (ref 20–33)
CREAT SERPL-MCNC: 0.94 MG/DL (ref 0.5–1.4)
EKG IMPRESSION: NORMAL
EOSINOPHIL # BLD AUTO: 0.16 K/UL (ref 0–0.51)
EOSINOPHIL NFR BLD: 1.4 % (ref 0–6.9)
ERYTHROCYTE [DISTWIDTH] IN BLOOD BY AUTOMATED COUNT: 48.7 FL (ref 35.9–50)
GFR SERPLBLD CREATININE-BSD FMLA CKD-EPI: 85 ML/MIN/1.73 M 2
GLUCOSE SERPL-MCNC: 107 MG/DL (ref 65–99)
HCT VFR BLD AUTO: 26.2 % (ref 42–52)
HCT VFR BLD AUTO: 26.4 % (ref 42–52)
HCT VFR BLD AUTO: 27.3 % (ref 42–52)
HGB BLD-MCNC: 8.3 G/DL (ref 14–18)
HGB BLD-MCNC: 8.3 G/DL (ref 14–18)
HGB BLD-MCNC: 8.6 G/DL (ref 14–18)
IMM GRANULOCYTES # BLD AUTO: 0.05 K/UL (ref 0–0.11)
IMM GRANULOCYTES NFR BLD AUTO: 0.4 % (ref 0–0.9)
LYMPHOCYTES # BLD AUTO: 1.92 K/UL (ref 1–4.8)
LYMPHOCYTES NFR BLD: 16.5 % (ref 22–41)
MAGNESIUM SERPL-MCNC: 1.4 MG/DL (ref 1.5–2.5)
MCH RBC QN AUTO: 27.7 PG (ref 27–33)
MCHC RBC AUTO-ENTMCNC: 31.5 G/DL (ref 32.3–36.5)
MCV RBC AUTO: 87.8 FL (ref 81.4–97.8)
MONOCYTES # BLD AUTO: 0.85 K/UL (ref 0–0.85)
MONOCYTES NFR BLD AUTO: 7.3 % (ref 0–13.4)
NEUTROPHILS # BLD AUTO: 8.64 K/UL (ref 1.82–7.42)
NEUTROPHILS NFR BLD: 74.1 % (ref 44–72)
NRBC # BLD AUTO: 0 K/UL
NRBC BLD-RTO: 0 /100 WBC (ref 0–0.2)
PLATELET # BLD AUTO: 132 K/UL (ref 164–446)
PMV BLD AUTO: 13.1 FL (ref 9–12.9)
POTASSIUM SERPL-SCNC: 4.3 MMOL/L (ref 3.6–5.5)
RBC # BLD AUTO: 3.11 M/UL (ref 4.7–6.1)
SODIUM SERPL-SCNC: 140 MMOL/L (ref 135–145)
WBC # BLD AUTO: 11.7 K/UL (ref 4.8–10.8)

## 2024-09-12 PROCEDURE — 85018 HEMOGLOBIN: CPT | Mod: 91

## 2024-09-12 PROCEDURE — 700111 HCHG RX REV CODE 636 W/ 250 OVERRIDE (IP): Performed by: HOSPITALIST

## 2024-09-12 PROCEDURE — 99233 SBSQ HOSP IP/OBS HIGH 50: CPT | Performed by: HOSPITALIST

## 2024-09-12 PROCEDURE — 770020 HCHG ROOM/CARE - TELE (206)

## 2024-09-12 PROCEDURE — 80048 BASIC METABOLIC PNL TOTAL CA: CPT

## 2024-09-12 PROCEDURE — 99232 SBSQ HOSP IP/OBS MODERATE 35: CPT | Performed by: NURSE PRACTITIONER

## 2024-09-12 PROCEDURE — 85014 HEMATOCRIT: CPT | Mod: 91

## 2024-09-12 PROCEDURE — 83735 ASSAY OF MAGNESIUM: CPT

## 2024-09-12 PROCEDURE — 36415 COLL VENOUS BLD VENIPUNCTURE: CPT

## 2024-09-12 PROCEDURE — 700111 HCHG RX REV CODE 636 W/ 250 OVERRIDE (IP): Mod: JZ | Performed by: STUDENT IN AN ORGANIZED HEALTH CARE EDUCATION/TRAINING PROGRAM

## 2024-09-12 PROCEDURE — 700102 HCHG RX REV CODE 250 W/ 637 OVERRIDE(OP): Performed by: HOSPITALIST

## 2024-09-12 PROCEDURE — 93005 ELECTROCARDIOGRAM TRACING: CPT | Performed by: HOSPITALIST

## 2024-09-12 PROCEDURE — A9270 NON-COVERED ITEM OR SERVICE: HCPCS | Performed by: STUDENT IN AN ORGANIZED HEALTH CARE EDUCATION/TRAINING PROGRAM

## 2024-09-12 PROCEDURE — 700102 HCHG RX REV CODE 250 W/ 637 OVERRIDE(OP): Performed by: STUDENT IN AN ORGANIZED HEALTH CARE EDUCATION/TRAINING PROGRAM

## 2024-09-12 PROCEDURE — 93010 ELECTROCARDIOGRAM REPORT: CPT | Performed by: INTERNAL MEDICINE

## 2024-09-12 PROCEDURE — 85025 COMPLETE CBC W/AUTO DIFF WBC: CPT

## 2024-09-12 PROCEDURE — A9270 NON-COVERED ITEM OR SERVICE: HCPCS | Performed by: HOSPITALIST

## 2024-09-12 RX ORDER — MAGNESIUM SULFATE HEPTAHYDRATE 40 MG/ML
4 INJECTION, SOLUTION INTRAVENOUS ONCE
Status: COMPLETED | OUTPATIENT
Start: 2024-09-12 | End: 2024-09-12

## 2024-09-12 RX ADMIN — ACETAMINOPHEN 650 MG: 325 TABLET ORAL at 11:17

## 2024-09-12 RX ADMIN — NICOTINE TRANSDERMAL SYSTEM 21 MG: 21 PATCH, EXTENDED RELEASE TRANSDERMAL at 05:07

## 2024-09-12 RX ADMIN — HYDROMORPHONE HYDROCHLORIDE 0.5 MG: 1 INJECTION, SOLUTION INTRAMUSCULAR; INTRAVENOUS; SUBCUTANEOUS at 00:54

## 2024-09-12 RX ADMIN — MAGNESIUM SULFATE HEPTAHYDRATE 4 G: 4 INJECTION, SOLUTION INTRAVENOUS at 08:39

## 2024-09-12 RX ADMIN — METOPROLOL TARTRATE 25 MG: 25 TABLET, FILM COATED ORAL at 18:03

## 2024-09-12 RX ADMIN — ONDANSETRON 4 MG: 2 INJECTION INTRAMUSCULAR; INTRAVENOUS at 23:19

## 2024-09-12 RX ADMIN — METOPROLOL TARTRATE 25 MG: 25 TABLET, FILM COATED ORAL at 05:07

## 2024-09-12 RX ADMIN — ATORVASTATIN CALCIUM 40 MG: 40 TABLET, FILM COATED ORAL at 18:03

## 2024-09-12 RX ADMIN — ACETAMINOPHEN 650 MG: 325 TABLET ORAL at 05:09

## 2024-09-12 ASSESSMENT — ENCOUNTER SYMPTOMS
FOCAL WEAKNESS: 0
DIZZINESS: 0
CONSTIPATION: 0
VOMITING: 0
MYALGIAS: 0
NAUSEA: 0
DEPRESSION: 0
COUGH: 0
WEAKNESS: 1
BLURRED VISION: 0
DIARRHEA: 0
BRUISES/BLEEDS EASILY: 0
PALPITATIONS: 0
BLOOD IN STOOL: 0
HEARTBURN: 0
CHILLS: 0
HEADACHES: 0
BACK PAIN: 0
FEVER: 0
ABDOMINAL PAIN: 0
SHORTNESS OF BREATH: 0

## 2024-09-12 ASSESSMENT — PAIN DESCRIPTION - PAIN TYPE
TYPE: ACUTE PAIN
TYPE: ACUTE PAIN;CHRONIC PAIN

## 2024-09-12 ASSESSMENT — LIFESTYLE VARIABLES: SUBSTANCE_ABUSE: 0

## 2024-09-12 ASSESSMENT — FIBROSIS 4 INDEX: FIB4 SCORE: 2.77

## 2024-09-12 NOTE — PROGRESS NOTES
Monitor Summary    Rate   Rhythm Afib  Ectopy rare PVCs, frequent PVCs, occasional PVCs, HR 150s-170s  Measurements .12/.36

## 2024-09-12 NOTE — CARE PLAN
Problem: Pain - Standard  Goal: Alleviation of pain or a reduction in pain to the patient’s comfort goal  Outcome: Progressing     Problem: Knowledge Deficit - COPD  Goal: Patient/significant other demonstrates understanding of disease process, utilization of the Action Plan, medications and discharge instruction  Outcome: Progressing     Problem: Risk for Infection - COPD  Goal: Patient will remain free from signs and symptoms of infection  Outcome: Progressing     Problem: Nutrition - Advanced  Goal: Patient will display progressive weight gain toward goal have adequate food and fluid intake  Outcome: Progressing     Problem: Ineffective Airway Clearance  Goal: Patient will maintain patent airway with clear/clearing breath sounds  Outcome: Progressing     Problem: Impaired Gas Exchange  Goal: Patient will demonstrate improved ventilation and adequate oxygenation and participate in treatment regimen within the level of ability/situation.  Outcome: Progressing     Problem: Risk for Aspiration  Goal: Patient's risk for aspiration will be absent or decrease  Outcome: Progressing     Problem: Self Care  Goal: Patient will have the ability to perform ADLs independently or with assistance (bathe, groom, dress, toilet and feed)  Outcome: Progressing     Problem: Hemodynamics  Goal: Patient's hemodynamics, fluid balance and neurologic status will be stable or improve  Outcome: Progressing     Problem: Fluid Volume  Goal: Fluid volume balance will be maintained  Outcome: Progressing     Problem: Urinary - Renal Perfusion  Goal: Ability to achieve and maintain adequate renal perfusion and functioning will improve  Outcome: Progressing     Problem: Respiratory  Goal: Patient will achieve/maintain optimum respiratory ventilation and gas exchange  Outcome: Progressing     Problem: Mechanical Ventilation  Goal: Safe management of artificial airway and ventilation  Outcome: Progressing  Goal: Successful weaning off mechanical  ventilator, spontaneously maintains adequate gas exchange  Outcome: Progressing  Goal: Patient will be able to express needs and understand communication  Outcome: Progressing     Problem: Physical Regulation  Goal: Diagnostic test results will improve  Outcome: Progressing  Goal: Signs and symptoms of infection will decrease  Outcome: Progressing     Problem: Knowledge Deficit - Standard  Goal: Patient and family/care givers will demonstrate understanding of plan of care, disease process/condition, diagnostic tests and medications  Outcome: Progressing     Problem: Care Map:  Day 1 Optimal Outcome for the Heart Failure Patient  Goal: Day 1:  Optimal Care of the heart failure patient  Outcome: Progressing     Problem: Care Map:  Day 2 Optimal Outcome for the Heart Failure Patient  Goal: Day 2:  Optimal Care of the heart failure patient  Outcome: Progressing     Problem: Care Map:  Day 3 Optimal Outcome for the Heart Failure Patient  Goal: Day 3:  Optimal Care of the heart failure patient  Outcome: Progressing     Problem: Care Map:  Day Before Discharge Optimal Outcome for the Heart Failure Patient  Goal: Day Before Discharge:  Optimal Care of the heart failure patient  Outcome: Progressing     Problem: Risk for Bleeding  Goal: Patient will take measures to prevent bleeding and recognizes signs of bleeding that need to be reported immediately to a health care professional  Outcome: Progressing  Goal: Patient will not experience bleeding as evidenced by normal blood pressure, stable hematocrit and hemoglobin levels and desired ranges for coagulation profiles  Outcome: Progressing     Problem: Gastrointestinal Irritability  Goal: Nausea and vomiting will be absent or improve  Outcome: Progressing  Goal: Diarrhea will be absent or improved  Outcome: Progressing   The patient is Watcher - Medium risk of patient condition declining or worsening    Shift Goals  Clinical Goals: VSS,safety, rest, H&H  Patient Goals:  Rest  Family Goals: nilson    Progress made toward(s) clinical / shift goals:  VSS, safety, rest, H&H monitoring    Patient is not progressing towards the following goals:

## 2024-09-12 NOTE — PROGRESS NOTES
Hospital Medicine Daily Progress Note    Date of Service  9/12/2024    Chief Complaint  Robert Mcdonnell is a 74 y.o. male admitted 9/7/2024 with abdominal pain and N/V    Hospital Course  This is a 74 y.o. homeless male from [facility] with history of chronic A-fib not on anticoagulation due to medical noncompliance, tobacco abuse, COPD, CHF, hypertension, hyperlipidemia who presented 9/7/2024 with evaluation for intractable nausea and vomiting.  Patient reported ongoing symptoms for at least the past 3 to 4 days, reported epigastric discomfort associate with nausea and vomiting.  In ER, found to have leukocytosis, lactic acid of 3.5 and NIRMAL with creatinine of 2.24.  Initially thought to have bowel perforation, no perforation seen on CTAP, however noted to have left lung base opacity.  Patient was given IVF boluses per sepsis protocol and Zosyn empirically by ERP.   Patient admitted for further treatment. He was on anticoagulation for his A.fib but started having black tarry stool and his hemoglobin dropped down to 12 from 14 so anticoagulation was stopped (had been non complaint with this at home anyway) and GI was consulted    Interval Problem Update  Axox3, some nausea and non bloody emesis overnight, feeling much better today, denies pain and nausea currently, no cp, no dizziness. In afib, rate controlled, h/h stable overnight, no melena overnight, ros otherwise negative, discussed with GI, repeat endoscopy still pending, continue to follow closely, ros otherwise negative    I have discussed this patient's plan of care and discharge plan at IDT rounds today with Case Management, Nursing, Nursing leadership, and other members of the IDT team.    Consultants/Specialty  GI    Code Status  Full Code    Disposition  The patient is not medically cleared for discharge to home or a post-acute facility.      I have placed the appropriate orders for post-discharge needs.    Review of Systems  Review of Systems    Constitutional:  Positive for malaise/fatigue. Negative for chills and fever.   HENT:  Negative for hearing loss and tinnitus.    Eyes:  Negative for blurred vision.   Respiratory:  Negative for cough and shortness of breath.    Cardiovascular:  Negative for chest pain and palpitations.   Gastrointestinal:  Negative for abdominal pain, blood in stool, heartburn, melena, nausea and vomiting.   Genitourinary:  Negative for dysuria and urgency.   Musculoskeletal:  Negative for joint pain and myalgias.   Skin:  Negative for itching and rash.   Neurological:  Positive for weakness. Negative for dizziness, focal weakness and headaches.   Endo/Heme/Allergies:  Negative for environmental allergies. Does not bruise/bleed easily.   Psychiatric/Behavioral:  Negative for depression and substance abuse.    All other systems reviewed and are negative.       Physical Exam  Temp:  [36.3 °C (97.3 °F)-36.9 °C (98.4 °F)] 36.5 °C (97.7 °F)  Pulse:  [] 87  Resp:  [18] 18  BP: (107-136)/(68-88) 113/73  SpO2:  [92 %-96 %] 96 %    Physical Exam  Vitals and nursing note reviewed.   Constitutional:       General: He is not in acute distress.     Appearance: He is ill-appearing.   HENT:      Head: Normocephalic and atraumatic.      Nose: Nose normal.      Mouth/Throat:      Pharynx: Oropharynx is clear.   Eyes:      General: No scleral icterus.     Extraocular Movements: Extraocular movements intact.   Cardiovascular:      Rate and Rhythm: Rhythm irregular.      Pulses: Normal pulses.      Heart sounds:      No friction rub.   Pulmonary:      Effort: No respiratory distress.      Breath sounds: No wheezing or rales.   Chest:      Chest wall: No tenderness.   Abdominal:      General: There is no distension.      Tenderness: There is no abdominal tenderness. There is no guarding or rebound.   Musculoskeletal:         General: Normal range of motion.      Cervical back: Neck supple. No tenderness.      Right lower leg: No edema.       Left lower leg: No edema.   Skin:     General: Skin is warm and dry.      Capillary Refill: Capillary refill takes less than 2 seconds.   Neurological:      General: No focal deficit present.      Mental Status: He is alert and oriented to person, place, and time.   Psychiatric:         Mood and Affect: Mood normal.         Fluids    Intake/Output Summary (Last 24 hours) at 9/12/2024 1504  Last data filed at 9/12/2024 0614  Gross per 24 hour   Intake 300 ml   Output 1050 ml   Net -750 ml        Laboratory  Recent Labs     09/10/24  0110 09/10/24  1001 09/11/24  0121 09/11/24  0848 09/11/24  1640 09/12/24  0053 09/12/24  0830   WBC 12.5*  --  9.1  --   --  11.7*  --    RBC 3.58*  --  2.94*  --   --  3.11*  --    HEMOGLOBIN 10.1*   < > 8.1*   < > 8.1* 8.6* 8.3*   HEMATOCRIT 31.9*   < > 25.3*   < > 25.7* 27.3* 26.2*   MCV 89.1  --  86.1  --   --  87.8  --    MCH 28.2  --  27.6  --   --  27.7  --    MCHC 31.7*  --  32.0*  --   --  31.5*  --    RDW 49.1  --  47.1  --   --  48.7  --    PLATELETCT 114*  --  98*  --   --  132*  --    MPV 12.2  --  10.6  --   --  13.1*  --     < > = values in this interval not displayed.     Recent Labs     09/10/24  0110 09/11/24  0121 09/12/24  0053   SODIUM 139 138 140   POTASSIUM 4.5 3.4* 4.3   CHLORIDE 107 107 108   CO2 22 23 22   GLUCOSE 62* 137* 107*   BUN 25* 9 7*   CREATININE 0.88 0.74 0.94   CALCIUM 8.0* 7.6* 7.7*                       Imaging  CT-ABDOMEN-PELVIS WITH   Final Result      1.  Distal stomach is decompressed and not adequately evaluated. No definitive mass in the proximal stomach are correlated with direct visualization if continued concern for gastric mass.   2.  New small left pleural effusion.   3.  Cardiomegaly.   4.  Multiple too small to characterize hypodense lesions within the liver      CT-ABDOMEN-PELVIS W/O   Final Result      1.  Opacification left lung base at costophrenic angle is identified which could be due to scarring or atelectasis.      2.   Cardiomegaly.      3.  Otherwise no acute abnormalities are noted in the abdomen or pelvis.      DX-CHEST-PORTABLE (1 VIEW)   Final Result         1.  Possible small left pleural effusion.      2.  Minimal atelectasis left lung base.      3.  No consolidations identified.      EC-ECHOCARDIOGRAM COMPLETE W/O CONT    (Results Pending)        Assessment/Plan  * Intractable nausea and vomiting- (present on admission)  Assessment & Plan  IVF  CLD  Trial of IV Protonix, IV Reglan  Aspiration precautions  resolved    Severe protein-calorie malnutrition (HCC)  Assessment & Plan  Significant recent weight loss, cirrhosis contributing  Nutritionist following    Hypotension  Assessment & Plan  See above  Tolerating decreased dose of bb today, afib remains rate controlled  Following    Gastrointestinal hemorrhage with melena  Assessment & Plan  See above  Due to gastric ulcer and anticoag  Biopsy results pending  GI following, repeat egd pending  Continue serial h/h  Transfuse further as needed  Iv ppi    Pneumonia due to infectious organism  Assessment & Plan  ?aspirated  LLL opacity,   IVF  Abx: unasyn   Follow sputum Cx    AP (abdominal pain)  Assessment & Plan  Due to gastric ulcer  resolved  Following  Supportive care  PPI  Gastric biopsy results pending    Lactic acidosis  Assessment & Plan  Likely secondary sepsis  IVF, antibiotic  High-dose IV thiamine      Leukocytosis  Assessment & Plan  resolved    ACP (advance care planning)  Assessment & Plan  Goal of care discussed with patient in emergency room.  He stated he is agreeable for noninvasive, as well as invasive/heroic life-sustaining measures-including CPR/defibrillation/intubation or mechanical ventilation.  He is also agreeable for further treatment with IVF, IV antibiotic, imaging study with echocardiogram renal ultrasound, as well as the need to quit smoking and to follow-up with anticoagulation clinic outpatient once discharged.  Diagnosis, prognosis, question  and concern addressed.  Full CODE STATUS confirmed.  ACP: 16 minutes    COPD (chronic obstructive pulmonary disease) ([facility])  Assessment & Plan  Currently not in acute exacerbation  Pulmonary hygiene  Smoking cessation advised    Tobacco abuse counseling  Assessment & Plan  Reported smoking more than 1 pack of cigarettes daily  Stated he has been try to quit for the past week  Tobacco smoking cessation counseling provided: 5 minutes  We discussed tobacco smoking cessation given concern for COPD, as well as cardiovascular health benefits, he expressed understanding.  Offered patient with nicotine patch, nicotine gum, Chantix as alternative.  Provided patient with standard tobacco cessation information per protocol    Acute kidney injury ([facility])- (present on admission)  Assessment & Plan  BUN 60, creatinine 2.24, bicarb 18  Suspect secondary to intractable nausea vomiting  Resolved  Avoid nephro toxins  Continue iv fluids        Sepsis ([facility])- (present on admission)  Assessment & Plan  This is Sepsis Present on admission  SIRS criteria identified on my evaluation include: Tachycardia, with heart rate greater than 90 BPM, Leukocytosis, with WBC greater than 12,000, and Bandemia, greater than 10% bands  Clinical indicators of end organ dysfunction include Lactic Acid greater than 2 and Acute On Chronic Renal Failure, with creatinine >0.5 above baseline level  Source is possible GI, pulm (LLL)  Sepsis protocol initiated  Crystalloid Fluid Administration: Fluid resuscitation ordered per standard protocol - 30 mL/kg per current or ideal body weight  IV antibiotics as appropriate for source of sepsis  Reassessment: I have reassessed the patient's hemodynamic status possible    WBC 16.0, lactic acid 3.5, creatinine 2.24  Possible GI source versus LLL infiltrate (aspiration pneumonitis versus pneumonia from nausea vomiting)  IVF  Resolving, see above, discussed with ID, monitoring off abx      Atrial fibrillation with RVR ([facility])-  (present on admission)  Assessment & Plan  Known history of A-fib  He did not follow through with anticoagulation clinic last time, high risk on this med, does not want to take it and high risk for falls and recurrent GI bleed  Chads score only 2  Pt opts to remain off anticoagulation   See above, due to bp adjusting/ decreasing dose of metoprolol, monitoring a fib on this new dose  Will check associated electrolytes in am   Continue tele    Thrombocytopenia (HCC)- (present on admission)  Assessment & Plan  improving  Following  Cbc in am     Chronic systolic heart failure (HCC)- (present on admission)  Assessment & Plan  Remains euvolemic, no acute exacerbation  Following closely     Hypokalemia- (present on admission)  Assessment & Plan  Replaced  Resolved  following       VTE prophylaxis: scd      I have performed a physical exam and reviewed and updated ROS and Plan today (9/12/2024). In review of yesterday's note (9/11/2024), there are no changes except as documented above.

## 2024-09-12 NOTE — PROGRESS NOTES
..Gastroenterology Progress Note               Author:  Willa Sun, OSCAR,  APRN Date & Time Created: 9/12/2024 1:23 PM       Patient ID:  Name:             Robert Mcdonnell  YOB: 1950  Age:                 74 y.o.  male  MRN:               [id number]        Medical Decision Making, by Problem:  Active Hospital Problems    Diagnosis     Hypotension [I95.9]     Severe protein-calorie malnutrition (HCC) [E43]     Gastrointestinal hemorrhage with melena [K92.1]     Intractable nausea and vomiting [R11.2]     Tobacco abuse counseling [Z71.6]     COPD (chronic obstructive pulmonary disease) (HCC) [J44.9]     ACP (advance care planning) [Z71.89]     Leukocytosis [D72.829]     Lactic acidosis [E87.20]     AP (abdominal pain) [R10.9]     Pneumonia due to infectious organism [J18.9]     Acute kidney injury (HCC) [N17.9]     Sepsis (HCC) [A41.9]     Atrial fibrillation with RVR (HCC) [I48.91]     Chronic systolic heart failure (HCC) [I50.22]     Thrombocytopenia (HCC) [D69.6]     Hypokalemia [E87.6]      Presenting Chief Complaint:  Melena, epigastric pain     History of Present Illness:      This is a 74-year-old male with a past medical history of chronic atrial fibrillation (not on anticoagulation due to medical noncompliance; controlled on metoprolol), heart failure reduced ejection fraction (EF 30-45), hypertension, hyperlipidemia, COPD (no O2 at baseline) who presented to [facility] on 9/7/2024 for evaluation of epigastric pain.  Patient reports epigastric pain for the past 3-4 days prior to arrival with associated nausea, vomiting.  He denies pain radiating.  No relieving or exacerbating factors.  He further endorses dark, soft/liquid stools in addition to heartburn/reflux  In the emergency department, he was found to have a leukocytosis, lactic acid of 3.5, NIRMAL with creatinine 2.24.  He was initially suspected of having a bowel perforation however CT abdomen pelvis was  obtained showing no bowel perforation but patient with left lung base opacity consistent with pneumonia.  He was given IV fluid boluses for sepsis protocol, started on empiric Zosyn, and admitted for further evaluation.  Initial hemoglobin 17 but is down trended to 12.4 on day of consult.  MCV normocytic.  Platelets 127.  BUN elevated at 60 in setting of NIRMAL on arrival.  This is down trended to 31.     9/10/2024: EGD:  IMPRESSION:  Large deeply ulcerated lesion on the lesser curvature of the stomach (2 cm x 2 cm). Abel IIa. The underlying ulcer bed was treated with epinephrine injection and bipolar cautery.  The edge of the ulcer was biopsied.    Interval History:  9/11/2024: Patient seen and examined. Had one BM, unclear of color. Hgb 8.1<8.8. Iron studies consistent with anemia of chronic disease. Path pending    9/12/2024: Patient seen. Not tolerating full liquids due to nausea. Hgb 8.3. Path with high grade dysplasia    Hospital Medications:  Current Facility-Administered Medications   Medication Dose Frequency Provider Last Rate Last Admin    metoprolol tartrate (Lopressor) tablet 25 mg  25 mg BID Shaina Otto M.D.   25 mg at 09/12/24 0507    dextrose 50% (D50W) injection 25 g  25 g Q15 MIN PRN Shaina Otto M.D.        LR (Bolus) infusion 500 mL  500 mL Once PRN Aaron Mehta M.D.        labetalol (Normodyne/Trandate) injection 10 mg  10 mg Q4HRS PRN Aaron Mehta M.D.        ondansetron (Zofran) syringe/vial injection 4 mg  4 mg Q4HRS PRN Aaron Mehta M.D.   4 mg at 09/11/24 2332    ondansetron (Zofran ODT) dispertab 4 mg  4 mg Q4HRS PRN Aaron Mehta M.D.   4 mg at 09/08/24 2313    nicotine (Nicoderm) 21 MG/24HR 21 mg  21 mg Daily-0600 Aaron Mehta M.D.   21 mg at 09/12/24 0507    And    nicotine polacrilex (Nicorette) 2 MG piece 2 mg  2 mg Q HOUR PRN Aaron Chaung, M.D.        Pharmacy Consult Request ...Pain Management Review 1 Each  1 Each PHARMACY TO DOSE Aaron Mehta M.D.        acetaminophen  "(Tylenol) tablet 650 mg  650 mg Q6HRS Aaron Mehta M.D.   650 mg at 09/12/24 1117    Followed by    acetaminophen (Tylenol) tablet 650 mg  650 mg Q6HRS PRN Aaron Mehta M.D.        oxyCODONE immediate-release (Roxicodone) tablet 5 mg  5 mg Q3HRS PRN Aaron Mehta M.D.        Or    oxyCODONE immediate release (Roxicodone) tablet 10 mg  10 mg Q3HRS PRN Aaron Mehta M.D.        Or    HYDROmorphone (Dilaudid) injection 0.5 mg  0.5 mg Q3HRS PRN Aaron Mehta M.D.   0.5 mg at 09/12/24 0054    Metoprolol Tartrate (Lopressor) injection 5 mg  5 mg Q5 MIN PRN Aaron Mehta M.D.        atorvastatin (Lipitor) tablet 40 mg  40 mg Q EVENING Aaron Mehta M.D.   40 mg at 09/11/24 1747    NS infusion   Continuous Shaina Otto M.D. 50 mL/hr at 09/11/24 1555 Rate Change at 09/11/24 1555   Last reviewed on 9/10/2024  7:05 AM by Anyi Lopez R.N.       Review of Systems:  Review of Systems   Constitutional:  Negative for chills, fever and malaise/fatigue.   HENT:  Negative for hearing loss.    Eyes:  Negative for blurred vision.   Respiratory:  Negative for cough and shortness of breath.    Cardiovascular:  Negative for chest pain and leg swelling.   Gastrointestinal:  Negative for abdominal pain, blood in stool, constipation, diarrhea, heartburn, melena, nausea and vomiting.   Genitourinary:  Negative for dysuria.   Musculoskeletal:  Negative for back pain.   Skin:  Negative for rash.   Neurological:  Positive for weakness. Negative for dizziness.   Psychiatric/Behavioral:  Negative for depression.    All other systems reviewed and are negative.        Vital signs:  Weight/BMI: Body mass index is 21.67 kg/m².  /73   Pulse 87   Temp 36.5 °C (97.7 °F) (Temporal)   Resp 18   Ht 1.854 m (6' 1\")   Wt 74.5 kg (164 lb 3.9 oz)   SpO2 96%   Vitals:    09/12/24 0507 09/12/24 0614 09/12/24 0759 09/12/24 1102   BP: 134/88 107/68 124/88 113/73   Pulse: 98 (!) 106 87 87   Resp:  18 18 18   Temp:  36.5 °C (97.7 °F) 36.5 °C (97.7 " °F) 36.5 °C (97.7 °F)   TempSrc:  Temporal Temporal Temporal   SpO2:  92% 94% 96%   Weight:  74.5 kg (164 lb 3.9 oz)     Height:         Oxygen Therapy:  Pulse Oximetry: 96 %, O2 (LPM): 0, O2 Delivery Device: None - Room Air    Intake/Output Summary (Last 24 hours) at 9/12/2024 1323  Last data filed at 9/12/2024 0614  Gross per 24 hour   Intake 300 ml   Output 1050 ml   Net -750 ml       Physical Exam  Vitals and nursing note reviewed.   Constitutional:       General: He is not in acute distress.     Appearance: Normal appearance. He is not ill-appearing.   HENT:      Head: Normocephalic and atraumatic.      Right Ear: External ear normal.      Left Ear: External ear normal.      Nose: Nose normal.      Mouth/Throat:      Mouth: Mucous membranes are moist.      Pharynx: Oropharynx is clear.   Eyes:      General: No scleral icterus.  Cardiovascular:      Rate and Rhythm: Normal rate and regular rhythm.      Pulses: Normal pulses.      Heart sounds: Normal heart sounds.   Pulmonary:      Effort: Pulmonary effort is normal.      Breath sounds: Normal breath sounds.   Abdominal:      General: Abdomen is flat. Bowel sounds are normal. There is no distension.      Palpations: Abdomen is soft.   Musculoskeletal:         General: Normal range of motion.      Cervical back: Normal range of motion and neck supple.   Skin:     General: Skin is warm.      Capillary Refill: Capillary refill takes less than 2 seconds.   Neurological:      Mental Status: He is alert and oriented to person, place, and time.      Motor: Weakness present.   Psychiatric:         Mood and Affect: Mood normal.         Behavior: Behavior normal.             Labs:  Recent Labs     09/10/24  0110 09/11/24  0121 09/12/24  0053   SODIUM 139 138 140   POTASSIUM 4.5 3.4* 4.3   CHLORIDE 107 107 108   CO2 22 23 22   BUN 25* 9 7*   CREATININE 0.88 0.74 0.94   MAGNESIUM  --   --  1.4*   CALCIUM 8.0* 7.6* 7.7*     Recent Labs     09/10/24  0110 09/11/24  0121  24  0053   GLUCOSE 62* 137* 107*     Recent Labs     09/10/24  0110 24  0121 24  0053   WBC 12.5* 9.1 11.7*   NEUTSPOLYS  --  76.20* 74.10*   LYMPHOCYTES  --  15.10* 16.50*   MONOCYTES  --  7.00 7.30   EOSINOPHILS  --  1.10 1.40   BASOPHILS  --  0.20 0.30     Recent Labs     09/10/24  0110 09/10/24  1001 24  0121 24  0848 24  1640 24  0053 24  0830   RBC 3.58*  --  2.94*  --   --  3.11*  --    HEMOGLOBIN 10.1*   < > 8.1* 8.0* 8.1* 8.6* 8.3*   HEMATOCRIT 31.9*   < > 25.3* 25.5* 25.7* 27.3* 26.2*   PLATELETCT 114*  --  98*  --   --  132*  --    IRON  --   --   --  63  --   --   --    FERRITIN  --   --   --  211.0  --   --   --    TOTIRONBC  --   --   --  186*  --   --   --     < > = values in this interval not displayed.     Recent Results (from the past 24 hour(s))   HEMOGLOBIN AND HEMATOCRIT    Collection Time: 24  4:40 PM   Result Value Ref Range    Hemoglobin 8.1 (L) 14.0 - 18.0 g/dL    Hematocrit 25.7 (L) 42.0 - 52.0 %   EKG    Collection Time: 24 12:24 AM   Result Value Ref Range    Report       [facility]    Test Date:  2024  Pt Name:    HUNTER STEELE                 Department: 171  MRN:        [id number]                      Room:       T712  Gender:     Male                         Technician: KARLIG  :        1950                   Requested By:HATTIE BRODERICK  Order #:    [id number]                    Naif MD: Liliya Rahman MD    Measurements  Intervals                                Axis  Rate:       105                          P:          0  IA:         0                            QRS:        -51  QRSD:       144                          T:          93  QT:         386  QTc:        511    Interpretive Statements  Atrial fibrillation  IVCD, consider atypical LBBB  Compared to ECG 2024 00:46:58  Ventricular premature complex(es) no longer present  Electronically Signed On 2024 03:26:32 PDT by Liliya Rahman,  MD     CBC WITH DIFFERENTIAL    Collection Time: 09/12/24 12:53 AM   Result Value Ref Range    WBC 11.7 (H) 4.8 - 10.8 K/uL    RBC 3.11 (L) 4.70 - 6.10 M/uL    Hemoglobin 8.6 (L) 14.0 - 18.0 g/dL    Hematocrit 27.3 (L) 42.0 - 52.0 %    MCV 87.8 81.4 - 97.8 fL    MCH 27.7 27.0 - 33.0 pg    MCHC 31.5 (L) 32.3 - 36.5 g/dL    RDW 48.7 35.9 - 50.0 fL    Platelet Count 132 (L) 164 - 446 K/uL    MPV 13.1 (H) 9.0 - 12.9 fL    Neutrophils-Polys 74.10 (H) 44.00 - 72.00 %    Lymphocytes 16.50 (L) 22.00 - 41.00 %    Monocytes 7.30 0.00 - 13.40 %    Eosinophils 1.40 0.00 - 6.90 %    Basophils 0.30 0.00 - 1.80 %    Immature Granulocytes 0.40 0.00 - 0.90 %    Nucleated RBC 0.00 0.00 - 0.20 /100 WBC    Neutrophils (Absolute) 8.64 (H) 1.82 - 7.42 K/uL    Lymphs (Absolute) 1.92 1.00 - 4.80 K/uL    Monos (Absolute) 0.85 0.00 - 0.85 K/uL    Eos (Absolute) 0.16 0.00 - 0.51 K/uL    Baso (Absolute) 0.04 0.00 - 0.12 K/uL    Immature Granulocytes (abs) 0.05 0.00 - 0.11 K/uL    NRBC (Absolute) 0.00 K/uL   Basic Metabolic Panel    Collection Time: 09/12/24 12:53 AM   Result Value Ref Range    Sodium 140 135 - 145 mmol/L    Potassium 4.3 3.6 - 5.5 mmol/L    Chloride 108 96 - 112 mmol/L    Co2 22 20 - 33 mmol/L    Glucose 107 (H) 65 - 99 mg/dL    Bun 7 (L) 8 - 22 mg/dL    Creatinine 0.94 0.50 - 1.40 mg/dL    Calcium 7.7 (L) 8.5 - 10.5 mg/dL    Anion Gap 10.0 7.0 - 16.0   MAGNESIUM    Collection Time: 09/12/24 12:53 AM   Result Value Ref Range    Magnesium 1.4 (L) 1.5 - 2.5 mg/dL   ESTIMATED GFR    Collection Time: 09/12/24 12:53 AM   Result Value Ref Range    GFR (CKD-EPI) 85 >60 mL/min/1.73 m 2   HEMOGLOBIN AND HEMATOCRIT    Collection Time: 09/12/24  8:30 AM   Result Value Ref Range    Hemoglobin 8.3 (L) 14.0 - 18.0 g/dL    Hematocrit 26.2 (L) 42.0 - 52.0 %       Radiology Review:  CT-ABDOMEN-PELVIS WITH   Final Result      1.  Distal stomach is decompressed and not adequately evaluated. No definitive mass in the proximal stomach are  correlated with direct visualization if continued concern for gastric mass.   2.  New small left pleural effusion.   3.  Cardiomegaly.   4.  Multiple too small to characterize hypodense lesions within the liver      CT-ABDOMEN-PELVIS W/O   Final Result      1.  Opacification left lung base at costophrenic angle is identified which could be due to scarring or atelectasis.      2.  Cardiomegaly.      3.  Otherwise no acute abnormalities are noted in the abdomen or pelvis.      DX-CHEST-PORTABLE (1 VIEW)   Final Result         1.  Possible small left pleural effusion.      2.  Minimal atelectasis left lung base.      3.  No consolidations identified.      EC-ECHOCARDIOGRAM COMPLETE W/O CONT    (Results Pending)         MDM (Data Review):   -Records reviewed and summarized in current documentation  -I personally reviewed and interpreted the laboratory results  -I personally reviewed the radiology images    Assessment/Recommendations:  ASSESSMENT:  GI bleeding with melena  Large ulcerated stomach lesion  Nausea/vomiting  Normocytic anemia  Atrial fibrillation-received single dose of Eliquis 9/9/2024 at 1308.  NIRMAL-improving  Community-acquired pneumonia  Lactic acidosis-resolved  Tobacco use  Heart failure reduced ejection fraction-EF 30-45%.  Repeat echocardiogram pending.  Medical noncompliance  CT abdomen/pelvis with contrast comments on distal stomach decompression but unable to adequately evaluate  Small left pleural effusion  Cardiomegaly  Multiple hypodense lesions within the liver    RECOMMENDATIONS:   Biopsies with high-grade dysplasia  Plan for relook endoscopy with additional biopsies tomorrow  Continue twice daily PPI therapy  Continue to hold anticoagulation  May have diet as tolerated today  N.p.o. at midnight    Discussed with patient, primary team, Dr. Lozoya    ..Willa Sun, OSCAR,  APRN    Core Quality Measures   Reviewed items::  Labs, Medications and Radiology reports reviewed

## 2024-09-12 NOTE — CARE PLAN
The patient is Watcher - Medium risk of patient condition declining or worsening    Shift Goals  Clinical Goals: Monitor hemoglobin and vital signs  Patient Goals: rest  Family Goals: nilson    Progress made toward(s) clinical / shift goals:  Patient abdominal pain still persisting. Pain from 8/10 to 6/10 after medication administration but per patient still is not the relief his is hoping for.     Problem: Pain - Standard  Goal: Alleviation of pain or a reduction in pain to the patient’s comfort goal  9/12/2024 0306 by Madison Gaspar R.N.  Outcome: Progressing    Patient is not progressing towards the following goals: Patient had 3 bouts of emesis this evening and nauseous with no relief from medication. Patient diet had been advanced to regular which began at dinner this evening, discussed with patient about returning to full liquid diet to which he agreed.    Problem: Gastrointestinal Irritability  Goal: Nausea and vomiting will be absent or improve  Outcome: Not Progressing

## 2024-09-13 ENCOUNTER — ANESTHESIA EVENT (OUTPATIENT)
Dept: SURGERY | Facility: MEDICAL CENTER | Age: 74
DRG: 871 | End: 2024-09-13
Payer: MEDICARE

## 2024-09-13 ENCOUNTER — APPOINTMENT (OUTPATIENT)
Dept: CARDIOLOGY | Facility: MEDICAL CENTER | Age: 74
DRG: 871 | End: 2024-09-13
Attending: HOSPITALIST
Payer: MEDICARE

## 2024-09-13 ENCOUNTER — ANESTHESIA (OUTPATIENT)
Dept: SURGERY | Facility: MEDICAL CENTER | Age: 74
DRG: 871 | End: 2024-09-13
Payer: MEDICARE

## 2024-09-13 LAB
ANION GAP SERPL CALC-SCNC: 8 MMOL/L (ref 7–16)
BASOPHILS # BLD AUTO: 0 % (ref 0–1.8)
BASOPHILS # BLD: 0 K/UL (ref 0–0.12)
BUN SERPL-MCNC: 6 MG/DL (ref 8–22)
CALCIUM SERPL-MCNC: 7.8 MG/DL (ref 8.5–10.5)
CHLORIDE SERPL-SCNC: 108 MMOL/L (ref 96–112)
CO2 SERPL-SCNC: 23 MMOL/L (ref 20–33)
CREAT SERPL-MCNC: 0.88 MG/DL (ref 0.5–1.4)
EOSINOPHIL # BLD AUTO: 0 K/UL (ref 0–0.51)
EOSINOPHIL NFR BLD: 0 % (ref 0–6.9)
ERYTHROCYTE [DISTWIDTH] IN BLOOD BY AUTOMATED COUNT: 47.4 FL (ref 35.9–50)
GFR SERPLBLD CREATININE-BSD FMLA CKD-EPI: 90 ML/MIN/1.73 M 2
GLUCOSE SERPL-MCNC: 90 MG/DL (ref 65–99)
HCT VFR BLD AUTO: 24.3 % (ref 42–52)
HCT VFR BLD AUTO: 24.8 % (ref 42–52)
HGB BLD-MCNC: 7.7 G/DL (ref 14–18)
HGB BLD-MCNC: 8 G/DL (ref 14–18)
LV EJECT FRACT  99904: 33
LV EJECT FRACT MOD 2C 99903: 48.84
LV EJECT FRACT MOD 4C 99902: 33.55
LV EJECT FRACT MOD BP 99901: 42.23
LYMPHOCYTES # BLD AUTO: 0.57 K/UL (ref 1–4.8)
LYMPHOCYTES NFR BLD: 6.1 % (ref 22–41)
MAGNESIUM SERPL-MCNC: 2 MG/DL (ref 1.5–2.5)
MANUAL DIFF BLD: NORMAL
MCH RBC QN AUTO: 28.1 PG (ref 27–33)
MCHC RBC AUTO-ENTMCNC: 32.9 G/DL (ref 32.3–36.5)
MCV RBC AUTO: 85.3 FL (ref 81.4–97.8)
MONOCYTES # BLD AUTO: 0.16 K/UL (ref 0–0.85)
MONOCYTES NFR BLD AUTO: 1.7 % (ref 0–13.4)
MORPHOLOGY BLD-IMP: NORMAL
NEUTROPHILS # BLD AUTO: 8.67 K/UL (ref 1.82–7.42)
NEUTROPHILS NFR BLD: 92.2 % (ref 44–72)
NRBC # BLD AUTO: 0 K/UL
NRBC BLD-RTO: 0 /100 WBC (ref 0–0.2)
OVALOCYTES BLD QL SMEAR: NORMAL
PATHOLOGY CONSULT NOTE: NORMAL
PLATELET # BLD AUTO: 125 K/UL (ref 164–446)
PLATELET BLD QL SMEAR: NORMAL
PMV BLD AUTO: 11.6 FL (ref 9–12.9)
POIKILOCYTOSIS BLD QL SMEAR: NORMAL
POTASSIUM SERPL-SCNC: 4.2 MMOL/L (ref 3.6–5.5)
RBC # BLD AUTO: 2.85 M/UL (ref 4.7–6.1)
RBC BLD AUTO: PRESENT
SODIUM SERPL-SCNC: 139 MMOL/L (ref 135–145)
WBC # BLD AUTO: 9.4 K/UL (ref 4.8–10.8)

## 2024-09-13 PROCEDURE — 43239 EGD BIOPSY SINGLE/MULTIPLE: CPT | Performed by: SPECIALIST

## 2024-09-13 PROCEDURE — 85018 HEMOGLOBIN: CPT

## 2024-09-13 PROCEDURE — 93306 TTE W/DOPPLER COMPLETE: CPT

## 2024-09-13 PROCEDURE — 88305 TISSUE EXAM BY PATHOLOGIST: CPT | Mod: 59

## 2024-09-13 PROCEDURE — 700105 HCHG RX REV CODE 258: Performed by: ANESTHESIOLOGY

## 2024-09-13 PROCEDURE — 160203 HCHG ENDO MINUTES - 1ST 30 MINS LEVEL 4: Performed by: SPECIALIST

## 2024-09-13 PROCEDURE — 700102 HCHG RX REV CODE 250 W/ 637 OVERRIDE(OP): Performed by: STUDENT IN AN ORGANIZED HEALTH CARE EDUCATION/TRAINING PROGRAM

## 2024-09-13 PROCEDURE — 88312 SPECIAL STAINS GROUP 1: CPT

## 2024-09-13 PROCEDURE — A9270 NON-COVERED ITEM OR SERVICE: HCPCS | Performed by: STUDENT IN AN ORGANIZED HEALTH CARE EDUCATION/TRAINING PROGRAM

## 2024-09-13 PROCEDURE — 36415 COLL VENOUS BLD VENIPUNCTURE: CPT

## 2024-09-13 PROCEDURE — 160009 HCHG ANES TIME/MIN: Performed by: SPECIALIST

## 2024-09-13 PROCEDURE — 85014 HEMATOCRIT: CPT

## 2024-09-13 PROCEDURE — 700102 HCHG RX REV CODE 250 W/ 637 OVERRIDE(OP): Performed by: HOSPITALIST

## 2024-09-13 PROCEDURE — 88342 IMHCHEM/IMCYTCHM 1ST ANTB: CPT

## 2024-09-13 PROCEDURE — A9270 NON-COVERED ITEM OR SERVICE: HCPCS | Performed by: HOSPITALIST

## 2024-09-13 PROCEDURE — C1889 IMPLANT/INSERT DEVICE, NOC: HCPCS | Performed by: SPECIALIST

## 2024-09-13 PROCEDURE — 80048 BASIC METABOLIC PNL TOTAL CA: CPT

## 2024-09-13 PROCEDURE — 160048 HCHG OR STATISTICAL LEVEL 1-5: Performed by: SPECIALIST

## 2024-09-13 PROCEDURE — 93306 TTE W/DOPPLER COMPLETE: CPT | Mod: 26 | Performed by: INTERNAL MEDICINE

## 2024-09-13 PROCEDURE — 700105 HCHG RX REV CODE 258: Performed by: HOSPITALIST

## 2024-09-13 PROCEDURE — 700111 HCHG RX REV CODE 636 W/ 250 OVERRIDE (IP): Performed by: ANESTHESIOLOGY

## 2024-09-13 PROCEDURE — 99233 SBSQ HOSP IP/OBS HIGH 50: CPT | Performed by: HOSPITALIST

## 2024-09-13 PROCEDURE — 85007 BL SMEAR W/DIFF WBC COUNT: CPT

## 2024-09-13 PROCEDURE — 83735 ASSAY OF MAGNESIUM: CPT

## 2024-09-13 PROCEDURE — 160002 HCHG RECOVERY MINUTES (STAT): Performed by: SPECIALIST

## 2024-09-13 PROCEDURE — 0DB98ZX EXCISION OF DUODENUM, VIA NATURAL OR ARTIFICIAL OPENING ENDOSCOPIC, DIAGNOSTIC: ICD-10-PCS | Performed by: SPECIALIST

## 2024-09-13 PROCEDURE — 160035 HCHG PACU - 1ST 60 MINS PHASE I: Performed by: SPECIALIST

## 2024-09-13 PROCEDURE — 700117 HCHG RX CONTRAST REV CODE 255: Performed by: HOSPITALIST

## 2024-09-13 PROCEDURE — 0DB68ZX EXCISION OF STOMACH, VIA NATURAL OR ARTIFICIAL OPENING ENDOSCOPIC, DIAGNOSTIC: ICD-10-PCS | Performed by: SPECIALIST

## 2024-09-13 PROCEDURE — 85027 COMPLETE CBC AUTOMATED: CPT

## 2024-09-13 PROCEDURE — 770020 HCHG ROOM/CARE - TELE (206)

## 2024-09-13 PROCEDURE — 700111 HCHG RX REV CODE 636 W/ 250 OVERRIDE (IP): Mod: JZ | Performed by: STUDENT IN AN ORGANIZED HEALTH CARE EDUCATION/TRAINING PROGRAM

## 2024-09-13 RX ORDER — CALCIUM CARBONATE 500 MG/1
500 TABLET, CHEWABLE ORAL ONCE
Status: COMPLETED | OUTPATIENT
Start: 2024-09-13 | End: 2024-09-13

## 2024-09-13 RX ORDER — MIDAZOLAM HYDROCHLORIDE 1 MG/ML
INJECTION INTRAMUSCULAR; INTRAVENOUS PRN
Status: DISCONTINUED | OUTPATIENT
Start: 2024-09-13 | End: 2024-09-13 | Stop reason: SURG

## 2024-09-13 RX ORDER — SODIUM CHLORIDE, SODIUM LACTATE, POTASSIUM CHLORIDE, CALCIUM CHLORIDE 600; 310; 30; 20 MG/100ML; MG/100ML; MG/100ML; MG/100ML
INJECTION, SOLUTION INTRAVENOUS
Status: DISCONTINUED | OUTPATIENT
Start: 2024-09-13 | End: 2024-09-13 | Stop reason: SURG

## 2024-09-13 RX ORDER — SODIUM CHLORIDE, SODIUM LACTATE, POTASSIUM CHLORIDE, CALCIUM CHLORIDE 600; 310; 30; 20 MG/100ML; MG/100ML; MG/100ML; MG/100ML
INJECTION, SOLUTION INTRAVENOUS CONTINUOUS
Status: DISCONTINUED | OUTPATIENT
Start: 2024-09-13 | End: 2024-09-13 | Stop reason: HOSPADM

## 2024-09-13 RX ORDER — OXYCODONE HCL 5 MG/5 ML
10 SOLUTION, ORAL ORAL
Status: DISCONTINUED | OUTPATIENT
Start: 2024-09-13 | End: 2024-09-13 | Stop reason: HOSPADM

## 2024-09-13 RX ORDER — ONDANSETRON 2 MG/ML
4 INJECTION INTRAMUSCULAR; INTRAVENOUS
Status: DISCONTINUED | OUTPATIENT
Start: 2024-09-13 | End: 2024-09-13 | Stop reason: HOSPADM

## 2024-09-13 RX ORDER — HALOPERIDOL 5 MG/ML
1 INJECTION INTRAMUSCULAR
Status: DISCONTINUED | OUTPATIENT
Start: 2024-09-13 | End: 2024-09-13 | Stop reason: HOSPADM

## 2024-09-13 RX ORDER — OXYCODONE HCL 5 MG/5 ML
5 SOLUTION, ORAL ORAL
Status: DISCONTINUED | OUTPATIENT
Start: 2024-09-13 | End: 2024-09-13 | Stop reason: HOSPADM

## 2024-09-13 RX ADMIN — MIDAZOLAM HYDROCHLORIDE 1 MG: 2 INJECTION, SOLUTION INTRAMUSCULAR; INTRAVENOUS at 08:12

## 2024-09-13 RX ADMIN — METOPROLOL TARTRATE 25 MG: 25 TABLET, FILM COATED ORAL at 16:23

## 2024-09-13 RX ADMIN — PROPOFOL 10 MG: 10 INJECTION, EMULSION INTRAVENOUS at 08:20

## 2024-09-13 RX ADMIN — SODIUM CHLORIDE: 9 INJECTION, SOLUTION INTRAVENOUS at 10:24

## 2024-09-13 RX ADMIN — OXYCODONE HYDROCHLORIDE 5 MG: 5 TABLET ORAL at 16:28

## 2024-09-13 RX ADMIN — FENTANYL CITRATE 50 MCG: 50 INJECTION, SOLUTION INTRAMUSCULAR; INTRAVENOUS at 08:12

## 2024-09-13 RX ADMIN — PROPOFOL 10 MG: 10 INJECTION, EMULSION INTRAVENOUS at 08:16

## 2024-09-13 RX ADMIN — ONDANSETRON 4 MG: 2 INJECTION INTRAMUSCULAR; INTRAVENOUS at 14:53

## 2024-09-13 RX ADMIN — ATORVASTATIN CALCIUM 40 MG: 40 TABLET, FILM COATED ORAL at 16:23

## 2024-09-13 RX ADMIN — ANTACID TABLETS 500 MG: 500 TABLET, CHEWABLE ORAL at 16:22

## 2024-09-13 RX ADMIN — PROPOFOL 50 MG: 10 INJECTION, EMULSION INTRAVENOUS at 08:14

## 2024-09-13 RX ADMIN — HUMAN ALBUMIN MICROSPHERES AND PERFLUTREN 3 ML: 10; .22 INJECTION, SOLUTION INTRAVENOUS at 15:12

## 2024-09-13 RX ADMIN — SODIUM CHLORIDE, POTASSIUM CHLORIDE, SODIUM LACTATE AND CALCIUM CHLORIDE: 600; 310; 30; 20 INJECTION, SOLUTION INTRAVENOUS at 08:10

## 2024-09-13 RX ADMIN — ACETAMINOPHEN 650 MG: 325 TABLET ORAL at 00:26

## 2024-09-13 RX ADMIN — NICOTINE TRANSDERMAL SYSTEM 21 MG: 21 PATCH, EXTENDED RELEASE TRANSDERMAL at 05:34

## 2024-09-13 RX ADMIN — METOPROLOL TARTRATE 25 MG: 25 TABLET, FILM COATED ORAL at 05:33

## 2024-09-13 ASSESSMENT — ENCOUNTER SYMPTOMS
MYALGIAS: 0
BRUISES/BLEEDS EASILY: 0
DIZZINESS: 0
PALPITATIONS: 0
DEPRESSION: 0
VOMITING: 0
SHORTNESS OF BREATH: 0
BLURRED VISION: 0
CHILLS: 0
FOCAL WEAKNESS: 0
HEADACHES: 0
COUGH: 0
BLOOD IN STOOL: 0
WEAKNESS: 1
HEARTBURN: 0
FEVER: 0
NAUSEA: 0
ABDOMINAL PAIN: 0

## 2024-09-13 ASSESSMENT — PAIN DESCRIPTION - PAIN TYPE
TYPE: SURGICAL PAIN
TYPE: ACUTE PAIN
TYPE: SURGICAL PAIN
TYPE: ACUTE PAIN

## 2024-09-13 ASSESSMENT — FIBROSIS 4 INDEX: FIB4 SCORE: 2.93

## 2024-09-13 ASSESSMENT — LIFESTYLE VARIABLES: SUBSTANCE_ABUSE: 0

## 2024-09-13 NOTE — PROGRESS NOTES
Hospital Medicine Daily Progress Note    Date of Service  9/13/2024    Chief Complaint  Robert Mcdonnell is a 74 y.o. male admitted 9/7/2024 with abdominal pain and N/V    Hospital Course  This is a 74 y.o. homeless male from [facility] with history of chronic A-fib not on anticoagulation due to medical noncompliance, tobacco abuse, COPD, CHF, hypertension, hyperlipidemia who presented 9/7/2024 with evaluation for intractable nausea and vomiting.  Patient reported ongoing symptoms for at least the past 3 to 4 days, reported epigastric discomfort associate with nausea and vomiting.  In ER, found to have leukocytosis, lactic acid of 3.5 and NIRMAL with creatinine of 2.24.  Initially thought to have bowel perforation, no perforation seen on CTAP, however noted to have left lung base opacity.  Patient was given IVF boluses per sepsis protocol and Zosyn empirically by ERP.   Patient admitted for further treatment. He was on anticoagulation for his A.fib but started having black tarry stool and his hemoglobin dropped down to 12 from 14 so anticoagulation was stopped (had been non complaint with this at home anyway) and GI was consulted    Interval Problem Update  Axox3, he continues to do well, denies abdominal pain, no further nausea or vomiting, no dizziness or chest pain. Discussed with GI, repeat EGD today. Exam and vitals stable    I have discussed this patient's plan of care and discharge plan at IDT rounds today with Case Management, Nursing, Nursing leadership, and other members of the IDT team.    Consultants/Specialty  GI    Code Status  Full Code    Disposition  The patient is not medically cleared for discharge to home or a post-acute facility.      I have placed the appropriate orders for post-discharge needs.    Review of Systems  Review of Systems   Constitutional:  Positive for malaise/fatigue. Negative for chills and fever.   HENT:  Negative for hearing loss and tinnitus.    Eyes:  Negative for blurred  vision.   Respiratory:  Negative for cough and shortness of breath.    Cardiovascular:  Negative for chest pain and palpitations.   Gastrointestinal:  Negative for abdominal pain, blood in stool, heartburn, melena, nausea and vomiting.   Genitourinary:  Negative for dysuria and urgency.   Musculoskeletal:  Negative for joint pain and myalgias.   Skin:  Negative for itching and rash.   Neurological:  Positive for weakness. Negative for dizziness, focal weakness and headaches.   Endo/Heme/Allergies:  Negative for environmental allergies. Does not bruise/bleed easily.   Psychiatric/Behavioral:  Negative for depression and substance abuse.    All other systems reviewed and are negative.       Physical Exam  Temp:  [36.1 °C (97 °F)-37.1 °C (98.8 °F)] 36.6 °C (97.9 °F)  Pulse:  [] 93  Resp:  [14-21] 16  BP: (106-148)/(64-86) 148/83  SpO2:  [94 %-100 %] 94 %    Physical Exam  Vitals and nursing note reviewed.   Constitutional:       General: He is not in acute distress.     Appearance: He is ill-appearing.   HENT:      Head: Normocephalic and atraumatic.      Nose: Nose normal.      Mouth/Throat:      Pharynx: Oropharynx is clear.   Eyes:      General: No scleral icterus.     Extraocular Movements: Extraocular movements intact.   Cardiovascular:      Rate and Rhythm: Rhythm irregular.      Pulses: Normal pulses.      Heart sounds:      No friction rub.   Pulmonary:      Effort: No respiratory distress.      Breath sounds: No wheezing or rales.   Chest:      Chest wall: No tenderness.   Abdominal:      General: There is no distension.      Tenderness: There is no abdominal tenderness. There is no guarding or rebound.   Musculoskeletal:         General: Normal range of motion.      Cervical back: Neck supple. No tenderness.      Right lower leg: No edema.      Left lower leg: No edema.   Skin:     General: Skin is warm and dry.      Capillary Refill: Capillary refill takes less than 2 seconds.   Neurological:       General: No focal deficit present.      Mental Status: He is alert and oriented to person, place, and time.   Psychiatric:         Mood and Affect: Mood normal.         Fluids    Intake/Output Summary (Last 24 hours) at 9/13/2024 1445  Last data filed at 9/13/2024 0945  Gross per 24 hour   Intake 130 ml   Output 401 ml   Net -271 ml        Laboratory  Recent Labs     09/11/24  0121 09/11/24  0848 09/12/24  0053 09/12/24  0830 09/12/24  2040 09/13/24  0325 09/13/24  1019   WBC 9.1  --  11.7*  --   --  9.4  --    RBC 2.94*  --  3.11*  --   --  2.85*  --    HEMOGLOBIN 8.1*   < > 8.6*   < > 8.3* 8.0* 7.7*   HEMATOCRIT 25.3*   < > 27.3*   < > 26.4* 24.3* 24.8*   MCV 86.1  --  87.8  --   --  85.3  --    MCH 27.6  --  27.7  --   --  28.1  --    MCHC 32.0*  --  31.5*  --   --  32.9  --    RDW 47.1  --  48.7  --   --  47.4  --    PLATELETCT 98*  --  132*  --   --  125*  --    MPV 10.6  --  13.1*  --   --  11.6  --     < > = values in this interval not displayed.     Recent Labs     09/11/24  0121 09/12/24  0053 09/13/24  0325   SODIUM 138 140 139   POTASSIUM 3.4* 4.3 4.2   CHLORIDE 107 108 108   CO2 23 22 23   GLUCOSE 137* 107* 90   BUN 9 7* 6*   CREATININE 0.74 0.94 0.88   CALCIUM 7.6* 7.7* 7.8*                       Imaging  CT-ABDOMEN-PELVIS WITH   Final Result      1.  Distal stomach is decompressed and not adequately evaluated. No definitive mass in the proximal stomach are correlated with direct visualization if continued concern for gastric mass.   2.  New small left pleural effusion.   3.  Cardiomegaly.   4.  Multiple too small to characterize hypodense lesions within the liver      CT-ABDOMEN-PELVIS W/O   Final Result      1.  Opacification left lung base at costophrenic angle is identified which could be due to scarring or atelectasis.      2.  Cardiomegaly.      3.  Otherwise no acute abnormalities are noted in the abdomen or pelvis.      DX-CHEST-PORTABLE (1 VIEW)   Final Result         1.  Possible small left  pleural effusion.      2.  Minimal atelectasis left lung base.      3.  No consolidations identified.      EC-ECHOCARDIOGRAM COMPLETE W/O CONT    (Results Pending)        Assessment/Plan  * Intractable nausea and vomiting- (present on admission)  Assessment & Plan  IVF  CLD  Trial of IV Protonix, IV Reglan  Aspiration precautions  resolved    Severe protein-calorie malnutrition (HCC)  Assessment & Plan  Significant recent weight loss, cirrhosis contributing  Nutritionist following    Hypotension  Assessment & Plan  See above  Tolerating decreased dose of bb, afib remains rate controlled - continue to monitor on tele  Following    Gastrointestinal hemorrhage with melena  Assessment & Plan  See above  Due to gastric ulcer and anticoag  Biopsy results pending  GI following, repeat egd 9/13  Plts decreased some, h/h also, cbc in am   Transfuse further as needed  Iv ppi    Pneumonia due to infectious organism  Assessment & Plan  ?aspirated  LLL opacity,   IVF  Abx: unasyn   Follow sputum Cx    AP (abdominal pain)  Assessment & Plan  Due to gastric ulcer  resolved  Following  Supportive care  PPI  Gastric biopsy showed dysplasia, repeat edg and biopsy today- awaiting new biopsy results  Continue PPI  following    Lactic acidosis  Assessment & Plan  Likely secondary sepsis  IVF, antibiotic  High-dose IV thiamine      Leukocytosis  Assessment & Plan  resolved    ACP (advance care planning)  Assessment & Plan  Goal of care discussed with patient in emergency room.  He stated he is agreeable for noninvasive, as well as invasive/heroic life-sustaining measures-including CPR/defibrillation/intubation or mechanical ventilation.  He is also agreeable for further treatment with IVF, IV antibiotic, imaging study with echocardiogram renal ultrasound, as well as the need to quit smoking and to follow-up with anticoagulation clinic outpatient once discharged.  Diagnosis, prognosis, question and concern addressed.  Full CODE STATUS  confirmed.  ACP: 16 minutes    COPD (chronic obstructive pulmonary disease) ([facility])  Assessment & Plan  Currently not in acute exacerbation  Pulmonary hygiene  Smoking cessation advised    Tobacco abuse counseling  Assessment & Plan  Reported smoking more than 1 pack of cigarettes daily  Stated he has been try to quit for the past week  Tobacco smoking cessation counseling provided: 5 minutes  We discussed tobacco smoking cessation given concern for COPD, as well as cardiovascular health benefits, he expressed understanding.  Offered patient with nicotine patch, nicotine gum, Chantix as alternative.  Provided patient with standard tobacco cessation information per protocol    Acute kidney injury ([facility])- (present on admission)  Assessment & Plan  BUN 60, creatinine 2.24, bicarb 18  Suspect secondary to intractable nausea vomiting  Resolved  Avoid nephro toxins  Continue iv fluids        Sepsis ([facility])- (present on admission)  Assessment & Plan  This is Sepsis Present on admission  SIRS criteria identified on my evaluation include: Tachycardia, with heart rate greater than 90 BPM, Leukocytosis, with WBC greater than 12,000, and Bandemia, greater than 10% bands  Clinical indicators of end organ dysfunction include Lactic Acid greater than 2 and Acute On Chronic Renal Failure, with creatinine >0.5 above baseline level  Source is possible GI, pulm (LLL)  Sepsis protocol initiated  Crystalloid Fluid Administration: Fluid resuscitation ordered per standard protocol - 30 mL/kg per current or ideal body weight  IV antibiotics as appropriate for source of sepsis  Reassessment: I have reassessed the patient's hemodynamic status possible    WBC 16.0, lactic acid 3.5, creatinine 2.24  Possible GI source versus LLL infiltrate (aspiration pneumonitis versus pneumonia from nausea vomiting)  IVF  Resolving, see above, discussed with ID, monitoring off abx      Atrial fibrillation with RVR ([facility])- (present on admission)  Assessment &  Plan  Known history of A-fib  He did not follow through with anticoagulation clinic last time, high risk on this med, does not want to take it and high risk for falls and recurrent GI bleed  Chads score only 2  Pt opts to remain off anticoagulation   See above, due to bp adjusting/ decreasing dose of metoprolol, monitoring a fib on this new dose  Will check associated electrolytes in am   Continue tele    Thrombocytopenia (HCC)- (present on admission)  Assessment & Plan  improving  Following  Cbc in am     Chronic systolic heart failure (HCC)- (present on admission)  Assessment & Plan  Remains euvolemic, no acute exacerbation  Following closely   Echo pending    Hypokalemia- (present on admission)  Assessment & Plan  Replaced  Resolved  following       VTE prophylaxis: scd      I have performed a physical exam and reviewed and updated ROS and Plan today (9/13/2024). In review of yesterday's note (9/12/2024), there are no changes except as documented above.

## 2024-09-13 NOTE — OR NURSING
0829-Pt to PACU from OR. Report from anesthesiologist and OR RN. Arrived on 4L mask at 100% with an oral airway in place. Respirations even and unlabored. VSS.     0848- Oral airway removed. Patient declines any pain or nausea.    0902- Report given to JANNETTE Patel. Patient to transfer to [place] [id number]. Verified orders that patient is allowed to travel back to room off monitor.     0909-Patient tolerating fluids well.     0928-Transfer orders received. Meets transfer criteria at this time. Pt transferred to [facility]/ via [facility]ney with pt transport. O2 tank full. All belongings sent with patient.

## 2024-09-13 NOTE — PROGRESS NOTES
Monitor summary:        Rhythm: SR   Rate: 61-68  Ectopy: (O)PAC, (O) PVC, (R) PVC,   Measurements: .16/.07/.43        12hr chart check

## 2024-09-13 NOTE — ANESTHESIA POSTPROCEDURE EVALUATION
Patient: Robert Mcdonnell    Procedure Summary       Date: 09/13/24 Room / Location: [facility] ROOM 26 / SURGERY SAME DAY [facility]    Anesthesia Start: 0810 Anesthesia Stop: 0828    Procedures:       GASTROSCOPY (Esophagus)      GASTROSCOPY, WITH BIOPSY (Esophagus)      GASTROSCOPY, WITH SCLEROTHERAPY (Esophagus) Diagnosis: (gastric ulcer)    Surgeons: Sarah Lozoya M.D. Responsible Provider: Tobey Gansert, M.D.    Anesthesia Type: MAC ASA Status: 3            Final Anesthesia Type: MAC  Last vitals  BP   Blood Pressure : 126/75    Temp   36.4 °C (97.5 °F)    Pulse   80   Resp   18    SpO2   94 %      Anesthesia Post Evaluation    Patient location during evaluation: PACU  Patient participation: complete - patient participated  Level of consciousness: awake and alert    Airway patency: patent  Anesthetic complications: no  Cardiovascular status: hemodynamically stable  Respiratory status: acceptable  Hydration status: euvolemic    PONV: none          No notable events documented.     Nurse Pain Score: 0 (NPRS)

## 2024-09-13 NOTE — PROGRESS NOTES
Monitor summary:        Rhythm: afib  Rate:   Ectopy: (O)PAC, (R) PVC, (O) PVC  Measurements: 12./.09/.41        12hr chart check

## 2024-09-13 NOTE — PROCEDURES
OPERATIVE REPORT    PATIENT:   Robert Mcdonnell   1950       PREOPERATIVE DIAGNOSES/INDICATIONS: Gastric ulcer    POSTOPERATIVE DIAGNOSIS: Gastric ulcer, s/p biopsy and tattoo    PROCEDURE:  ESOPHAGOGASTRODUODENOSCOPY with biopsy and tattoo    PHYSICIAN:  Sarah Lozoya MD    ANESTHESIA:  Per anesthesiologist.    LOCATION: [facility]    CONSENT:  OBTAINED. The risks, benefits and alternatives of the procedure were discussed in details. The risks include and are not limited to bleeding, infection, perforation, missed lesions, and sedations risks (cardiopulmonary compromise and allergic reaction to medications).    DESCRIPTION: The patient presented to the procedure room.  After routine checkup was performed, patient was brought into the endoscopy suite.  Patient was placed on his left lateral decubitus position. A bite block was placed in patient's mouth. Patient was sedated by anesthesia.  Vital signs were monitored throughout the procedure.  Oxygenation support was provided throughout the procedure. Upper endoscope was inserted into patient's mouth and advanced to the second portion of the duodenum under direct visualization.      Once the site was reached and examined, the upper endoscope was withdrawn.  Retroflexion was made within the stomach.  The stomach was decompressed, scope was withdrawn and the procedure was terminated.     The patient tolerated the procedure well.  There were no immediate complications.    OPERATIVE FINDINGS:    1. Esophagus: Normal  2. Stomach: Large deeply ulcerated 2 cm x 2 cm ulcer on the lesser curvature of the stomach approximately 6 cm proximal to incisura and 6 cm from cardia.   Heaped up margins.  Concern for malignancy.  The periphery and the base of the ulcer was biopsied.  There was no bleeding during procedure.   The remainder of the stomach was otherwise unremarkable. The proximal and distal portion of the ulcer was tattooed  Duodenum: bulb, 3 mm polyp versus  ampulla(would be in abnormal area) biopsied, otherwise normal to third portion.        IMPRESSION/RECOMMENDATIONS:  Large deeply ulcerated lesion on the lesser curvature of the stomach (2 cm x 2 cm )  Duodenal polyp  Await biopsy - dysplasia was seen in first biopsy  PPI  Consult surgery/oncology if biopsy positive for malignancy    This note has been transcribed with digital voice recognition software and although it has been reviewed may contain grammatical or word errors

## 2024-09-13 NOTE — ANESTHESIA TIME REPORT
Anesthesia Start and Stop Event Times       Date Time Event    9/13/2024 0757 Ready for Procedure     0810 Anesthesia Start     0828 Anesthesia Stop          Responsible Staff  09/13/24      Name Role Begin End    Tobey Gansert, M.D. Anesth 0810 0828          Overtime Reason:  no overtime (within assigned shift)    Comments:

## 2024-09-13 NOTE — ANESTHESIA PREPROCEDURE EVALUATION
Case: [id number] Date/Time: 09/13/24 0750    Procedure: GASTROSCOPY (Esophagus)    Anesthesia type: MAC    Pre-op diagnosis: Melena, epigastric pain    Location: [facility] ROOM 26 / SURGERY SAME DAY [facility]    Surgeons: Sarah Lozoya M.D.            Relevant Problems   PULMONARY   (positive) COPD (chronic obstructive pulmonary disease) ([facility])   (positive) Pneumonia due to infectious organism      CARDIAC   (positive) Acute congestive heart failure, unspecified heart failure type ([facility])   (positive) Acute exacerbation of CHF (congestive heart failure) ([facility])   (positive) Atrial fibrillation with RVR ([facility])   (positive) Hypertension         (positive) Acute kidney injury (HCC)       Physical Exam    Airway   Mallampati: II  TM distance: >3 FB  Neck ROM: full       Cardiovascular - normal exam  Rhythm: regular  Rate: normal  (-) murmur     Dental - normal exam           Pulmonary - normal exam  Breath sounds clear to auscultation     Abdominal    Neurological - normal exam                   Anesthesia Plan    ASA 3       Plan - MAC               Induction: intravenous    Postoperative Plan: Postoperative administration of opioids is intended.    Pertinent diagnostic labs and testing reviewed    Informed Consent:    Anesthetic plan and risks discussed with patient.    Use of blood products discussed with: patient whom consented to blood products.

## 2024-09-13 NOTE — CARE PLAN
The patient is Watcher - Medium risk of patient condition declining or worsening    Shift Goals  Clinical Goals: EGD, monitor hemoglobin  Patient Goals: sleep  Family Goals: AMRCOS    Progress made toward(s) clinical / shift goals:    Problem: Pain - Standard  Goal: Alleviation of pain or a reduction in pain to the patient’s comfort goal  Outcome: Progressing     Problem: Knowledge Deficit - COPD  Goal: Patient/significant other demonstrates understanding of disease process, utilization of the Action Plan, medications and discharge instruction  Outcome: Progressing     Problem: Hemodynamics  Goal: Patient's hemodynamics, fluid balance and neurologic status will be stable or improve  Outcome: Progressing  Note: Systolic blood pressures up to 130s, occasionally tachycardic, remains in afib     Problem: Knowledge Deficit - Standard  Goal: Patient and family/care givers will demonstrate understanding of plan of care, disease process/condition, diagnostic tests and medications  Outcome: Progressing     Problem: Care Map:  Day 3 Optimal Outcome for the Heart Failure Patient  Goal: Day 3:  Optimal Care of the heart failure patient  Outcome: Progressing  Note: Pt has HF booklet and educated on contents. Pt has scheduled metoprolol, no diuretics, pending daily weight for shift. I/O monitored via urinal output.      Problem: Risk for Bleeding  Goal: Patient will not experience bleeding as evidenced by normal blood pressure, stable hematocrit and hemoglobin levels and desired ranges for coagulation profiles  Outcome: Progressing  Note: Last hemoglobin 8.3. Pt scheduled for gastroscopy this AM; NPO since 0000       Patient is not progressing towards the following goals:      Problem: Gastrointestinal Irritability  Goal: Nausea and vomiting will be absent or improve  Outcome: Not Progressing  Note: Pt had 1 episode of emesis associated with nausea; administered IV zofran. Pt reported relief after administration.

## 2024-09-14 LAB
BASOPHILS # BLD AUTO: 0 % (ref 0–1.8)
BASOPHILS # BLD: 0 K/UL (ref 0–0.12)
EOSINOPHIL # BLD AUTO: 0 K/UL (ref 0–0.51)
EOSINOPHIL NFR BLD: 0 % (ref 0–6.9)
ERYTHROCYTE [DISTWIDTH] IN BLOOD BY AUTOMATED COUNT: 47.5 FL (ref 35.9–50)
HCT VFR BLD AUTO: 24.4 % (ref 42–52)
HGB BLD-MCNC: 7.7 G/DL (ref 14–18)
HYPOCHROMIA BLD QL SMEAR: ABNORMAL
LYMPHOCYTES # BLD AUTO: 0.78 K/UL (ref 1–4.8)
LYMPHOCYTES NFR BLD: 8.7 % (ref 22–41)
MANUAL DIFF BLD: NORMAL
MCH RBC QN AUTO: 26.9 PG (ref 27–33)
MCHC RBC AUTO-ENTMCNC: 31.6 G/DL (ref 32.3–36.5)
MCV RBC AUTO: 85.3 FL (ref 81.4–97.8)
MONOCYTES # BLD AUTO: 0.39 K/UL (ref 0–0.85)
MONOCYTES NFR BLD AUTO: 4.3 % (ref 0–13.4)
MORPHOLOGY BLD-IMP: NORMAL
NEUTROPHILS # BLD AUTO: 7.83 K/UL (ref 1.82–7.42)
NEUTROPHILS NFR BLD: 87 % (ref 44–72)
NRBC # BLD AUTO: 0 K/UL
NRBC BLD-RTO: 0 /100 WBC (ref 0–0.2)
OVALOCYTES BLD QL SMEAR: NORMAL
PLATELET # BLD AUTO: 163 K/UL (ref 164–446)
PLATELET BLD QL SMEAR: NORMAL
PMV BLD AUTO: 11.4 FL (ref 9–12.9)
POIKILOCYTOSIS BLD QL SMEAR: NORMAL
RBC # BLD AUTO: 2.86 M/UL (ref 4.7–6.1)
RBC BLD AUTO: PRESENT
WBC # BLD AUTO: 9 K/UL (ref 4.8–10.8)

## 2024-09-14 PROCEDURE — A9270 NON-COVERED ITEM OR SERVICE: HCPCS | Performed by: HOSPITALIST

## 2024-09-14 PROCEDURE — 85027 COMPLETE CBC AUTOMATED: CPT

## 2024-09-14 PROCEDURE — A9270 NON-COVERED ITEM OR SERVICE: HCPCS | Performed by: STUDENT IN AN ORGANIZED HEALTH CARE EDUCATION/TRAINING PROGRAM

## 2024-09-14 PROCEDURE — 99233 SBSQ HOSP IP/OBS HIGH 50: CPT | Performed by: HOSPITALIST

## 2024-09-14 PROCEDURE — 700102 HCHG RX REV CODE 250 W/ 637 OVERRIDE(OP): Performed by: NURSE PRACTITIONER

## 2024-09-14 PROCEDURE — A9270 NON-COVERED ITEM OR SERVICE: HCPCS | Performed by: NURSE PRACTITIONER

## 2024-09-14 PROCEDURE — 700102 HCHG RX REV CODE 250 W/ 637 OVERRIDE(OP): Performed by: HOSPITALIST

## 2024-09-14 PROCEDURE — 700102 HCHG RX REV CODE 250 W/ 637 OVERRIDE(OP): Performed by: STUDENT IN AN ORGANIZED HEALTH CARE EDUCATION/TRAINING PROGRAM

## 2024-09-14 PROCEDURE — 99232 SBSQ HOSP IP/OBS MODERATE 35: CPT | Performed by: NURSE PRACTITIONER

## 2024-09-14 PROCEDURE — 700105 HCHG RX REV CODE 258: Performed by: HOSPITALIST

## 2024-09-14 PROCEDURE — 85007 BL SMEAR W/DIFF WBC COUNT: CPT

## 2024-09-14 PROCEDURE — 36415 COLL VENOUS BLD VENIPUNCTURE: CPT

## 2024-09-14 PROCEDURE — 770020 HCHG ROOM/CARE - TELE (206)

## 2024-09-14 PROCEDURE — 700111 HCHG RX REV CODE 636 W/ 250 OVERRIDE (IP): Performed by: NURSE PRACTITIONER

## 2024-09-14 RX ORDER — PANTOPRAZOLE SODIUM 40 MG/10ML
40 INJECTION, POWDER, LYOPHILIZED, FOR SOLUTION INTRAVENOUS 2 TIMES DAILY
Status: DISCONTINUED | OUTPATIENT
Start: 2024-09-14 | End: 2024-09-18 | Stop reason: HOSPADM

## 2024-09-14 RX ORDER — SUCRALFATE ORAL 1 G/10ML
1 SUSPENSION ORAL EVERY 6 HOURS
Status: DISCONTINUED | OUTPATIENT
Start: 2024-09-14 | End: 2024-09-18 | Stop reason: HOSPADM

## 2024-09-14 RX ADMIN — METOPROLOL TARTRATE 25 MG: 25 TABLET, FILM COATED ORAL at 04:13

## 2024-09-14 RX ADMIN — SUCRALFATE 1 G: 1 SUSPENSION ORAL at 12:04

## 2024-09-14 RX ADMIN — SUCRALFATE 1 G: 1 SUSPENSION ORAL at 16:39

## 2024-09-14 RX ADMIN — PANTOPRAZOLE SODIUM 40 MG: 40 INJECTION, POWDER, FOR SOLUTION INTRAVENOUS at 16:39

## 2024-09-14 RX ADMIN — PANTOPRAZOLE SODIUM 40 MG: 40 INJECTION, POWDER, FOR SOLUTION INTRAVENOUS at 12:04

## 2024-09-14 RX ADMIN — OXYCODONE HYDROCHLORIDE 5 MG: 5 TABLET ORAL at 09:15

## 2024-09-14 RX ADMIN — SODIUM CHLORIDE: 9 INJECTION, SOLUTION INTRAVENOUS at 04:18

## 2024-09-14 RX ADMIN — SODIUM CHLORIDE: 9 INJECTION, SOLUTION INTRAVENOUS at 22:25

## 2024-09-14 RX ADMIN — NICOTINE TRANSDERMAL SYSTEM 21 MG: 21 PATCH, EXTENDED RELEASE TRANSDERMAL at 04:13

## 2024-09-14 RX ADMIN — ATORVASTATIN CALCIUM 40 MG: 40 TABLET, FILM COATED ORAL at 16:39

## 2024-09-14 ASSESSMENT — ENCOUNTER SYMPTOMS
WEAKNESS: 1
HEADACHES: 0
BACK PAIN: 0
DEPRESSION: 0
SHORTNESS OF BREATH: 0
HEARTBURN: 0
BLOOD IN STOOL: 0
COUGH: 0
DIARRHEA: 0
PALPITATIONS: 0
NAUSEA: 0
FEVER: 0
ABDOMINAL PAIN: 0
CHILLS: 0
FOCAL WEAKNESS: 0
VOMITING: 0
BRUISES/BLEEDS EASILY: 0
MYALGIAS: 0
CONSTIPATION: 0
ABDOMINAL PAIN: 1
BLURRED VISION: 0
DIZZINESS: 0

## 2024-09-14 ASSESSMENT — PAIN DESCRIPTION - PAIN TYPE
TYPE: ACUTE PAIN

## 2024-09-14 ASSESSMENT — LIFESTYLE VARIABLES: SUBSTANCE_ABUSE: 0

## 2024-09-14 ASSESSMENT — FIBROSIS 4 INDEX: FIB4 SCORE: 2.25

## 2024-09-14 NOTE — PROGRESS NOTES
Bedside report received, assumed care of patient. Resting in bed, complained of abdominal pain, medicated per MAR. A&O x4. Tele monitoring in place. Educated on fall risk, all fall precautions in place. Call light within reach, bed locked and in lowest position, denied other needs at this time.

## 2024-09-14 NOTE — PROGRESS NOTES
Hospital Medicine Daily Progress Note    Date of Service  9/14/2024    Chief Complaint  Robert Mcdonnell is a 74 y.o. male admitted 9/7/2024 with abdominal pain and N/V    Hospital Course  This is a 74 y.o. homeless male from [facility] with history of chronic A-fib not on anticoagulation due to medical noncompliance, tobacco abuse, COPD, CHF, hypertension, hyperlipidemia who presented 9/7/2024 with evaluation for intractable nausea and vomiting.  Patient reported ongoing symptoms for at least the past 3 to 4 days, reported epigastric discomfort associate with nausea and vomiting.  In ER, found to have leukocytosis, lactic acid of 3.5 and NIRMAL with creatinine of 2.24.  Initially thought to have bowel perforation, no perforation seen on CTAP, however noted to have left lung base opacity.  Patient was given IVF boluses per sepsis protocol and Zosyn empirically by ERP.   Patient admitted for further treatment. He was on anticoagulation for his A.fib but started having black tarry stool and his hemoglobin dropped down to 12 from 14 so anticoagulation was stopped (had been non complaint with this at home anyway) and GI was consulted    Interval Problem Update  Axox3, he reports 3/10 abdominal pain with eating, no further n/v, no melena or hematochezia overnight, exam stable. Hbg stable, plts improving, awaiting biopsy results. Cbc in am     I have discussed this patient's plan of care and discharge plan at IDT rounds today with Case Management, Nursing, Nursing leadership, and other members of the IDT team.    Consultants/Specialty  GI    Code Status  Full Code    Disposition  The patient is not medically cleared for discharge to home or a post-acute facility.      I have placed the appropriate orders for post-discharge needs.    Review of Systems  Review of Systems   Constitutional:  Positive for malaise/fatigue. Negative for chills and fever.   HENT:  Negative for hearing loss and tinnitus.    Eyes:  Negative for  blurred vision.   Respiratory:  Negative for cough and shortness of breath.    Cardiovascular:  Negative for chest pain and palpitations.   Gastrointestinal:  Negative for abdominal pain, blood in stool, heartburn, melena, nausea and vomiting.   Genitourinary:  Negative for dysuria and urgency.   Musculoskeletal:  Negative for joint pain and myalgias.   Skin:  Negative for itching and rash.   Neurological:  Positive for weakness. Negative for dizziness, focal weakness and headaches.   Endo/Heme/Allergies:  Negative for environmental allergies. Does not bruise/bleed easily.   Psychiatric/Behavioral:  Negative for depression and substance abuse.    All other systems reviewed and are negative.       Physical Exam  Temp:  [36.5 °C (97.7 °F)-36.8 °C (98.2 °F)] 36.5 °C (97.7 °F)  Pulse:  [] 96  Resp:  [16] 16  BP: (111-149)/(70-94) 115/76  SpO2:  [90 %-94 %] 94 %    Physical Exam  Vitals and nursing note reviewed.   Constitutional:       General: He is not in acute distress.     Appearance: He is ill-appearing.   HENT:      Head: Normocephalic and atraumatic.      Nose: Nose normal.      Mouth/Throat:      Pharynx: Oropharynx is clear.   Eyes:      General: No scleral icterus.     Extraocular Movements: Extraocular movements intact.   Cardiovascular:      Rate and Rhythm: Rhythm irregular.      Pulses: Normal pulses.      Heart sounds:      No friction rub.   Pulmonary:      Effort: No respiratory distress.      Breath sounds: No wheezing or rales.   Chest:      Chest wall: No tenderness.   Abdominal:      General: There is no distension.      Tenderness: There is no abdominal tenderness. There is no guarding or rebound.   Musculoskeletal:         General: Normal range of motion.      Cervical back: Neck supple. No tenderness.      Right lower leg: No edema.      Left lower leg: No edema.   Skin:     General: Skin is warm and dry.      Capillary Refill: Capillary refill takes less than 2 seconds.   Neurological:       General: No focal deficit present.      Mental Status: He is alert and oriented to person, place, and time.   Psychiatric:         Mood and Affect: Mood normal.         Fluids    Intake/Output Summary (Last 24 hours) at 9/14/2024 1537  Last data filed at 9/14/2024 1421  Gross per 24 hour   Intake 840 ml   Output 4000 ml   Net -3160 ml        Laboratory  Recent Labs     09/12/24  0053 09/12/24  0830 09/13/24  0325 09/13/24  1019 09/14/24  0235   WBC 11.7*  --  9.4  --  9.0   RBC 3.11*  --  2.85*  --  2.86*   HEMOGLOBIN 8.6*   < > 8.0* 7.7* 7.7*   HEMATOCRIT 27.3*   < > 24.3* 24.8* 24.4*   MCV 87.8  --  85.3  --  85.3   MCH 27.7  --  28.1  --  26.9*   MCHC 31.5*  --  32.9  --  31.6*   RDW 48.7  --  47.4  --  47.5   PLATELETCT 132*  --  125*  --  163*   MPV 13.1*  --  11.6  --  11.4    < > = values in this interval not displayed.     Recent Labs     09/12/24 0053 09/13/24  0325   SODIUM 140 139   POTASSIUM 4.3 4.2   CHLORIDE 108 108   CO2 22 23   GLUCOSE 107* 90   BUN 7* 6*   CREATININE 0.94 0.88   CALCIUM 7.7* 7.8*                       Imaging  EC-ECHOCARDIOGRAM COMPLETE W/ CONT   Final Result      CT-ABDOMEN-PELVIS WITH   Final Result      1.  Distal stomach is decompressed and not adequately evaluated. No definitive mass in the proximal stomach are correlated with direct visualization if continued concern for gastric mass.   2.  New small left pleural effusion.   3.  Cardiomegaly.   4.  Multiple too small to characterize hypodense lesions within the liver      CT-ABDOMEN-PELVIS W/O   Final Result      1.  Opacification left lung base at costophrenic angle is identified which could be due to scarring or atelectasis.      2.  Cardiomegaly.      3.  Otherwise no acute abnormalities are noted in the abdomen or pelvis.      DX-CHEST-PORTABLE (1 VIEW)   Final Result         1.  Possible small left pleural effusion.      2.  Minimal atelectasis left lung base.      3.  No consolidations identified.            Assessment/Plan  * Intractable nausea and vomiting- (present on admission)  Assessment & Plan  IVF  CLD  Trial of IV Protonix, IV Reglan  Aspiration precautions  resolved    Severe protein-calorie malnutrition (HCC)  Assessment & Plan  Significant recent weight loss, cirrhosis contributing  Nutritionist following    Hypotension  Assessment & Plan  See above  Tolerating decreased dose of bb, afib remains rate controlled - continue to monitor on tele  Following    Gastrointestinal hemorrhage with melena  Assessment & Plan  See above  Due to gastric ulcer and anticoag  Biopsy results pending  GI following, repeat egd 9/13  Plts decreased some, h/h also, cbc in am   Transfuse further as needed  Iv ppi    Pneumonia due to infectious organism  Assessment & Plan  ?aspirated  LLL opacity,   IVF  Abx: unasyn   Follow sputum Cx    AP (abdominal pain)  Assessment & Plan  Due to gastric ulcer  resolved  Following  Supportive care  PPI  Gastric biopsy showed dysplasia, repeat edg and biopsy 9/13- awaiting new biopsy results  Continue PPI and carafate  following    Lactic acidosis  Assessment & Plan  Likely secondary sepsis  IVF, antibiotic  High-dose IV thiamine      Leukocytosis  Assessment & Plan  resolved    ACP (advance care planning)  Assessment & Plan  Goal of care discussed with patient in emergency room.  He stated he is agreeable for noninvasive, as well as invasive/heroic life-sustaining measures-including CPR/defibrillation/intubation or mechanical ventilation.  He is also agreeable for further treatment with IVF, IV antibiotic, imaging study with echocardiogram renal ultrasound, as well as the need to quit smoking and to follow-up with anticoagulation clinic outpatient once discharged.  Diagnosis, prognosis, question and concern addressed.  Full CODE STATUS confirmed.  ACP: 16 minutes    COPD (chronic obstructive pulmonary disease) (HCC)  Assessment & Plan  Currently not in acute exacerbation  Pulmonary  hygiene  Smoking cessation advised    Tobacco abuse counseling  Assessment & Plan  Reported smoking more than 1 pack of cigarettes daily  Stated he has been try to quit for the past week  Tobacco smoking cessation counseling provided: 5 minutes  We discussed tobacco smoking cessation given concern for COPD, as well as cardiovascular health benefits, he expressed understanding.  Offered patient with nicotine patch, nicotine gum, Chantix as alternative.  Provided patient with standard tobacco cessation information per protocol    Acute kidney injury (HCC)- (present on admission)  Assessment & Plan  BUN 60, creatinine 2.24, bicarb 18  Suspect secondary to intractable nausea vomiting  Resolved  Avoid nephro toxins  Following bmp intermittently        Sepsis (HCC)- (present on admission)  Assessment & Plan  This is Sepsis Present on admission  SIRS criteria identified on my evaluation include: Tachycardia, with heart rate greater than 90 BPM, Leukocytosis, with WBC greater than 12,000, and Bandemia, greater than 10% bands  Clinical indicators of end organ dysfunction include Lactic Acid greater than 2 and Acute On Chronic Renal Failure, with creatinine >0.5 above baseline level  Source is possible GI, pulm (LLL)  Sepsis protocol initiated  Crystalloid Fluid Administration: Fluid resuscitation ordered per standard protocol - 30 mL/kg per current or ideal body weight  IV antibiotics as appropriate for source of sepsis  Reassessment: I have reassessed the patient's hemodynamic status possible    WBC 16.0, lactic acid 3.5, creatinine 2.24  Possible GI source versus LLL infiltrate (aspiration pneumonitis versus pneumonia from nausea vomiting)  IVF  Resolving, see above, discussed with ID, monitoring off abx      Atrial fibrillation with RVR ([facility])- (present on admission)  Assessment & Plan  Known history of A-fib  He did not follow through with anticoagulation clinic last time, high risk on this med, does not want to take it  and high risk for falls and recurrent GI bleed  Chads score only 2  Pt opts to remain off anticoagulation   See above, due to bp adjusting/ decreasing dose of metoprolol, monitoring a fib on this new dose  Will check associated electrolytes in am   Continue tele    Thrombocytopenia (HCC)- (present on admission)  Assessment & Plan  improving  Following  Cbc in am     Chronic systolic heart failure (HCC)- (present on admission)  Assessment & Plan  Remains euvolemic, no acute exacerbation  Following closely   Echo pending    Hypokalemia- (present on admission)  Assessment & Plan  Replaced  Resolved  following       VTE prophylaxis: scd      I have performed a physical exam and reviewed and updated ROS and Plan today (9/14/2024). In review of yesterday's note (9/13/2024), there are no changes except as documented above.

## 2024-09-14 NOTE — CARE PLAN
Pleasant and cooperative with care. Voiding in urinal. Tolerating clear liquids without prn nausea medication this shift. NS @ 50ml/hr.    The patient is Stable - Low risk of patient condition declining or worsening    Shift Goals  Clinical Goals: maintain hemodynamic stability  Patient Goals: Comfort  Family Goals: MARCOS    Progress made toward(s) clinical / shift goals:    Problem: Pain - Standard  Goal: Alleviation of pain or a reduction in pain to the patient’s comfort goal  Outcome: Progressing     Problem: Knowledge Deficit - COPD  Goal: Patient/significant other demonstrates understanding of disease process, utilization of the Action Plan, medications and discharge instruction  Outcome: Progressing     Problem: Risk for Infection - COPD  Goal: Patient will remain free from signs and symptoms of infection  Outcome: Progressing     Problem: Nutrition - Advanced  Goal: Patient will display progressive weight gain toward goal have adequate food and fluid intake  Outcome: Progressing     Problem: Ineffective Airway Clearance  Goal: Patient will maintain patent airway with clear/clearing breath sounds  Outcome: Progressing     Problem: Impaired Gas Exchange  Goal: Patient will demonstrate improved ventilation and adequate oxygenation and participate in treatment regimen within the level of ability/situation.  Outcome: Progressing     Problem: Risk for Aspiration  Goal: Patient's risk for aspiration will be absent or decrease  Outcome: Progressing     Problem: Self Care  Goal: Patient will have the ability to perform ADLs independently or with assistance (bathe, groom, dress, toilet and feed)  Outcome: Progressing     Problem: Hemodynamics  Goal: Patient's hemodynamics, fluid balance and neurologic status will be stable or improve  Outcome: Progressing     Problem: Fluid Volume  Goal: Fluid volume balance will be maintained  Outcome: Progressing     Problem: Urinary - Renal Perfusion  Goal: Ability to achieve and  maintain adequate renal perfusion and functioning will improve  Outcome: Progressing     Problem: Respiratory  Goal: Patient will achieve/maintain optimum respiratory ventilation and gas exchange  Outcome: Progressing     Problem: Mechanical Ventilation  Goal: Safe management of artificial airway and ventilation  Outcome: Progressing  Goal: Successful weaning off mechanical ventilator, spontaneously maintains adequate gas exchange  Outcome: Progressing  Goal: Patient will be able to express needs and understand communication  Outcome: Progressing     Problem: Physical Regulation  Goal: Diagnostic test results will improve  Outcome: Progressing  Goal: Signs and symptoms of infection will decrease  Outcome: Progressing     Problem: Knowledge Deficit - Standard  Goal: Patient and family/care givers will demonstrate understanding of plan of care, disease process/condition, diagnostic tests and medications  Outcome: Progressing     Problem: Care Map:  Day 1 Optimal Outcome for the Heart Failure Patient  Goal: Day 1:  Optimal Care of the heart failure patient  Outcome: Progressing     Problem: Care Map:  Day 2 Optimal Outcome for the Heart Failure Patient  Goal: Day 2:  Optimal Care of the heart failure patient  Outcome: Progressing     Problem: Care Map:  Day 3 Optimal Outcome for the Heart Failure Patient  Goal: Day 3:  Optimal Care of the heart failure patient  Outcome: Progressing     Problem: Care Map:  Day Before Discharge Optimal Outcome for the Heart Failure Patient  Goal: Day Before Discharge:  Optimal Care of the heart failure patient  Outcome: Progressing     Problem: Risk for Bleeding  Goal: Patient will take measures to prevent bleeding and recognizes signs of bleeding that need to be reported immediately to a health care professional  Outcome: Progressing  Goal: Patient will not experience bleeding as evidenced by normal blood pressure, stable hematocrit and hemoglobin levels and desired ranges for  coagulation profiles  Outcome: Progressing     Problem: Gastrointestinal Irritability  Goal: Nausea and vomiting will be absent or improve  Outcome: Progressing  Goal: Diarrhea will be absent or improved  Outcome: Progressing

## 2024-09-14 NOTE — PROGRESS NOTES
Monitor summary:        Rhythm: afib  Rate: 80s- 100  Ectopy: frequent PVCs,   Measurements: - /.09/ -        12hr chart check    Strip unavailable

## 2024-09-14 NOTE — CARE PLAN
The patient is Stable - Low risk of patient condition declining or worsening    Shift Goals  Clinical Goals: maintain hemodynamic stability  Patient Goals: Comfort  Family Goals: MARCOS    Progress made toward(s) clinical / shift goals:    Problem: Pain - Standard  Goal: Alleviation of pain or a reduction in pain to the patient’s comfort goal  Outcome: Progressing  Note: Pain assessed Q2 hours and as needed. Patient complained of abdominal pain. Oxycodone 5mg given per MAR. Patient tolerated well.     Problem: Knowledge Deficit - COPD  Goal: Patient/significant other demonstrates understanding of disease process, utilization of the Action Plan, medications and discharge instruction  Outcome: Progressing     Problem: Hemodynamics  Goal: Patient's hemodynamics, fluid balance and neurologic status will be stable or improve  Outcome: Progressing     Problem: Gastrointestinal Irritability  Goal: Nausea and vomiting will be absent or improve  Outcome: Progressing  Note: Patient had one episode of nausea with no vomiting associated. Administered IV Zofran per MAR. Patient reported moderate relief of nausea.       Patient is not progressing towards the following goals:

## 2024-09-14 NOTE — PROGRESS NOTES
..Gastroenterology Progress Note               Author:  Willa Sun, DNP,  APRN Date & Time Created: 9/14/2024 8:26 AM       Patient ID:  Name:             Robert Mcdonnell  YOB: 1950  Age:                 74 y.o.  male  MRN:               [id number]        Medical Decision Making, by Problem:  Active Hospital Problems    Diagnosis     Hypotension [I95.9]     Severe protein-calorie malnutrition (HCC) [E43]     Gastrointestinal hemorrhage with melena [K92.1]     Intractable nausea and vomiting [R11.2]     Tobacco abuse counseling [Z71.6]     COPD (chronic obstructive pulmonary disease) (HCC) [J44.9]     ACP (advance care planning) [Z71.89]     Leukocytosis [D72.829]     Lactic acidosis [E87.20]     AP (abdominal pain) [R10.9]     Pneumonia due to infectious organism [J18.9]     Acute kidney injury (HCC) [N17.9]     Sepsis (HCC) [A41.9]     Atrial fibrillation with RVR (HCC) [I48.91]     Chronic systolic heart failure (HCC) [I50.22]     Thrombocytopenia (HCC) [D69.6]     Hypokalemia [E87.6]      Presenting Chief Complaint:  Melena, epigastric pain     History of Present Illness:      This is a 74-year-old male with a past medical history of chronic atrial fibrillation (not on anticoagulation due to medical noncompliance; controlled on metoprolol), heart failure reduced ejection fraction (EF 30-45), hypertension, hyperlipidemia, COPD (no O2 at baseline) who presented to [facility] on 9/7/2024 for evaluation of epigastric pain.  Patient reports epigastric pain for the past 3-4 days prior to arrival with associated nausea, vomiting.  He denies pain radiating.  No relieving or exacerbating factors.  He further endorses dark, soft/liquid stools in addition to heartburn/reflux  In the emergency department, he was found to have a leukocytosis, lactic acid of 3.5, NIRMAL with creatinine 2.24.  He was initially suspected of having a bowel perforation however CT abdomen pelvis was  obtained showing no bowel perforation but patient with left lung base opacity consistent with pneumonia.  He was given IV fluid boluses for sepsis protocol, started on empiric Zosyn, and admitted for further evaluation.  Initial hemoglobin 17 but is down trended to 12.4 on day of consult.  MCV normocytic.  Platelets 127.  BUN elevated at 60 in setting of NIRMAL on arrival.  This is down trended to 31.     9/10/2024: EGD:  IMPRESSION:  Large deeply ulcerated lesion on the lesser curvature of the stomach (2 cm x 2 cm). Abel IIa. The underlying ulcer bed was treated with epinephrine injection and bipolar cautery.  The edge of the ulcer was biopsied.    13 2024: EGD: Gastric ulcer status postbiopsy and tattoo, pathology pending    Interval History:  9/11/2024: Patient seen and examined. Had one BM, unclear of color. Hgb 8.1<8.8. Iron studies consistent with anemia of chronic disease. Path pending    9/12/2024: Patient seen. Not tolerating full liquids due to nausea. Hgb 8.3. Path with high grade dysplasia    9/14/2024: Still having difficulty eating solid food with increased abdominal pain. Hemoglobin 7.7 platelets 163. Last BM yesterday, patient reports normal.     Hospital Medications:  Current Facility-Administered Medications   Medication Dose Frequency Provider Last Rate Last Admin    metoprolol tartrate (Lopressor) tablet 25 mg  25 mg BID Shaina Otto M.D.   25 mg at 09/14/24 0413    dextrose 50% (D50W) injection 25 g  25 g Q15 MIN PRN Shaina Otto M.D.        LR (Bolus) infusion 500 mL  500 mL Once PRN Aaron Mehta M.D.        labetalol (Normodyne/Trandate) injection 10 mg  10 mg Q4HRS PRN Aaron Mehta M.D.        ondansetron (Zofran) syringe/vial injection 4 mg  4 mg Q4HRS PRN Aaron Mehta M.D.   4 mg at 09/13/24 1453    ondansetron (Zofran ODT) dispertab 4 mg  4 mg Q4HRS PRN Aaron Mehta M.D.   4 mg at 09/08/24 2313    nicotine (Nicoderm) 21 MG/24HR 21 mg  21 mg Daily-0600 Aaron Mehta M.D.   21 mg at  "09/14/24 0413    And    nicotine polacrilex (Nicorette) 2 MG piece 2 mg  2 mg Q HOUR PRN Aaron Mehta M.D.        Pharmacy Consult Request ...Pain Management Review 1 Each  1 Each PHARMACY TO DOSE Aaron Mehta M.D.        acetaminophen (Tylenol) tablet 650 mg  650 mg Q6HRS PRN Aaron Mehta M.D.   650 mg at 09/13/24 0026    oxyCODONE immediate-release (Roxicodone) tablet 5 mg  5 mg Q3HRS PRN Aaron Mehta M.D.   5 mg at 09/13/24 1628    Or    oxyCODONE immediate release (Roxicodone) tablet 10 mg  10 mg Q3HRS PRN Aaron Mehta M.D.        Or    HYDROmorphone (Dilaudid) injection 0.5 mg  0.5 mg Q3HRS PRN Aaron Mehta M.D.   0.5 mg at 09/12/24 0054    Metoprolol Tartrate (Lopressor) injection 5 mg  5 mg Q5 MIN PRN Aaron Mehta M.D.        atorvastatin (Lipitor) tablet 40 mg  40 mg Q EVENING Aaron Mehta M.D.   40 mg at 09/13/24 1623    NS infusion   Continuous Shaina Otto M.D. 50 mL/hr at 09/14/24 0418 New Bag at 09/14/24 0418   Last reviewed on 9/13/2024  7:32 AM by Beckie Gallego R.N.       Review of Systems:  Review of Systems   Constitutional:  Negative for chills, fever and malaise/fatigue.   HENT:  Negative for hearing loss.    Eyes:  Negative for blurred vision.   Respiratory:  Negative for cough and shortness of breath.    Cardiovascular:  Negative for chest pain and leg swelling.   Gastrointestinal:  Positive for abdominal pain. Negative for blood in stool, constipation, diarrhea, heartburn, melena, nausea and vomiting.   Genitourinary:  Negative for dysuria.   Musculoskeletal:  Negative for back pain.   Skin:  Negative for rash.   Neurological:  Positive for weakness. Negative for dizziness.   Psychiatric/Behavioral:  Negative for depression.    All other systems reviewed and are negative.        Vital signs:  Weight/BMI: Body mass index is 21.67 kg/m².  /70   Pulse 86   Temp 36.6 °C (97.9 °F) (Temporal)   Resp 16   Ht 1.854 m (6' 1\")   Wt 74.5 kg (164 lb 3.9 oz)   SpO2 90%   Vitals:    " 09/13/24 2319 09/14/24 0330 09/14/24 0418 09/14/24 0711   BP: (!) 140/81 111/75  115/70   Pulse: (!) 106 100  86   Resp: 16 16  16   Temp: 36.5 °C (97.7 °F) 36.7 °C (98.1 °F)  36.6 °C (97.9 °F)   TempSrc: Temporal Temporal  Temporal   SpO2: 93% 90%     Weight:   74.5 kg (164 lb 3.9 oz)    Height:         Oxygen Therapy:  Pulse Oximetry: 90 %, O2 (LPM): 0, O2 Delivery Device: None - Room Air    Intake/Output Summary (Last 24 hours) at 9/14/2024 0826  Last data filed at 9/14/2024 0700  Gross per 24 hour   Intake 840 ml   Output 2900 ml   Net -2060 ml       Physical Exam  Vitals and nursing note reviewed.   Constitutional:       General: He is not in acute distress.     Appearance: Normal appearance. He is not ill-appearing.   HENT:      Head: Normocephalic and atraumatic.      Right Ear: External ear normal.      Left Ear: External ear normal.      Nose: Nose normal.      Mouth/Throat:      Mouth: Mucous membranes are moist.      Pharynx: Oropharynx is clear.   Eyes:      General: No scleral icterus.  Cardiovascular:      Rate and Rhythm: Normal rate and regular rhythm.      Pulses: Normal pulses.      Heart sounds: Normal heart sounds.   Pulmonary:      Effort: Pulmonary effort is normal.      Breath sounds: Normal breath sounds.   Abdominal:      General: Abdomen is flat. Bowel sounds are normal. There is no distension.      Palpations: Abdomen is soft.   Musculoskeletal:         General: Normal range of motion.      Cervical back: Normal range of motion and neck supple.   Skin:     General: Skin is warm and dry.      Capillary Refill: Capillary refill takes less than 2 seconds.   Neurological:      Mental Status: He is alert and oriented to person, place, and time.      Motor: Weakness present.   Psychiatric:         Mood and Affect: Mood normal.         Behavior: Behavior normal.       Labs:  Recent Labs     09/12/24  0053 09/13/24  0325   SODIUM 140 139   POTASSIUM 4.3 4.2   CHLORIDE 108 108   CO2 22 23   BUN 7*  6*   CREATININE 0.94 0.88   MAGNESIUM 1.4* 2.0   CALCIUM 7.7* 7.8*     Recent Labs     09/12/24  0053 09/13/24 0325   GLUCOSE 107* 90     Recent Labs     09/12/24  0053 09/13/24 0325 09/14/24  0235   WBC 11.7* 9.4 9.0   NEUTSPOLYS 74.10* 92.20* 87.00*   LYMPHOCYTES 16.50* 6.10* 8.70*   MONOCYTES 7.30 1.70 4.30   EOSINOPHILS 1.40 0.00 0.00   BASOPHILS 0.30 0.00 0.00     Recent Labs     09/11/24  0848 09/11/24  1640 09/12/24  0053 09/12/24  0830 09/13/24 0325 09/13/24  1019 09/14/24  0235   RBC  --   --  3.11*  --  2.85*  --  2.86*   HEMOGLOBIN 8.0*   < > 8.6*   < > 8.0* 7.7* 7.7*   HEMATOCRIT 25.5*   < > 27.3*   < > 24.3* 24.8* 24.4*   PLATELETCT  --   --  132*  --  125*  --  163*   IRON 63  --   --   --   --   --   --    FERRITIN 211.0  --   --   --   --   --   --    TOTIRONBC 186*  --   --   --   --   --   --     < > = values in this interval not displayed.     Recent Results (from the past 24 hour(s))   HEMOGLOBIN AND HEMATOCRIT    Collection Time: 09/13/24 10:19 AM   Result Value Ref Range    Hemoglobin 7.7 (L) 14.0 - 18.0 g/dL    Hematocrit 24.8 (L) 42.0 - 52.0 %   EC-ECHOCARDIOGRAM COMPLETE W/ CONT    Collection Time: 09/13/24  3:08 PM   Result Value Ref Range    Eject.Frac. MOD BP 42.23     Eject.Frac. MOD 4C 33.55     Eject.Frac. MOD 2C 48.84     Left Ventrical Ejection Fraction 33    CBC WITH DIFFERENTIAL    Collection Time: 09/14/24  2:35 AM   Result Value Ref Range    WBC 9.0 4.8 - 10.8 K/uL    RBC 2.86 (L) 4.70 - 6.10 M/uL    Hemoglobin 7.7 (L) 14.0 - 18.0 g/dL    Hematocrit 24.4 (L) 42.0 - 52.0 %    MCV 85.3 81.4 - 97.8 fL    MCH 26.9 (L) 27.0 - 33.0 pg    MCHC 31.6 (L) 32.3 - 36.5 g/dL    RDW 47.5 35.9 - 50.0 fL    Platelet Count 163 (L) 164 - 446 K/uL    MPV 11.4 9.0 - 12.9 fL    Neutrophils-Polys 87.00 (H) 44.00 - 72.00 %    Lymphocytes 8.70 (L) 22.00 - 41.00 %    Monocytes 4.30 0.00 - 13.40 %    Eosinophils 0.00 0.00 - 6.90 %    Basophils 0.00 0.00 - 1.80 %    Nucleated RBC 0.00 0.00 - 0.20 /100  WBC    Neutrophils (Absolute) 7.83 (H) 1.82 - 7.42 K/uL    Lymphs (Absolute) 0.78 (L) 1.00 - 4.80 K/uL    Monos (Absolute) 0.39 0.00 - 0.85 K/uL    Eos (Absolute) 0.00 0.00 - 0.51 K/uL    Baso (Absolute) 0.00 0.00 - 0.12 K/uL    NRBC (Absolute) 0.00 K/uL    Hypochromia 1+    DIFFERENTIAL MANUAL    Collection Time: 09/14/24  2:35 AM   Result Value Ref Range    Manual Diff Status PERFORMED    PERIPHERAL SMEAR REVIEW    Collection Time: 09/14/24  2:35 AM   Result Value Ref Range    Peripheral Smear Review see below    PLATELET ESTIMATE    Collection Time: 09/14/24  2:35 AM   Result Value Ref Range    Plt Estimation Normal    MORPHOLOGY    Collection Time: 09/14/24  2:35 AM   Result Value Ref Range    RBC Morphology Present     Poikilocytosis 1+     Ovalocytes 1+        Radiology Review:  EC-ECHOCARDIOGRAM COMPLETE W/ CONT   Final Result      CT-ABDOMEN-PELVIS WITH   Final Result      1.  Distal stomach is decompressed and not adequately evaluated. No definitive mass in the proximal stomach are correlated with direct visualization if continued concern for gastric mass.   2.  New small left pleural effusion.   3.  Cardiomegaly.   4.  Multiple too small to characterize hypodense lesions within the liver      CT-ABDOMEN-PELVIS W/O   Final Result      1.  Opacification left lung base at costophrenic angle is identified which could be due to scarring or atelectasis.      2.  Cardiomegaly.      3.  Otherwise no acute abnormalities are noted in the abdomen or pelvis.      DX-CHEST-PORTABLE (1 VIEW)   Final Result         1.  Possible small left pleural effusion.      2.  Minimal atelectasis left lung base.      3.  No consolidations identified.            MDM (Data Review):   -Records reviewed and summarized in current documentation  -I personally reviewed and interpreted the laboratory results  -I personally reviewed the radiology images    Assessment/Recommendations:  ASSESSMENT:  GI bleeding with melena  Large ulcerated  stomach lesion  Nausea/vomiting  Normocytic anemia  Atrial fibrillation-received single dose of Eliquis 9/9/2024 at 1308.  NIRMAL-improving  Community-acquired pneumonia  Lactic acidosis-resolved  Tobacco use  Heart failure reduced ejection fraction-EF 30-45%.  Repeat echocardiogram pending.  Medical noncompliance  CT abdomen/pelvis with contrast comments on distal stomach decompression but unable to adequately evaluate  Small left pleural effusion  Cardiomegaly  Multiple hypodense lesions within the liver    RECOMMENDATIONS:   Biopsies with high-grade dysplasia, biopsies pending from repeat EGD yesterday  Sucralfate q6 hrs   Continue twice daily PPI therapy  Continue to hold anticoagulation    Discussed with patient, primary team, Dr. Lozoya    ..Willa Sun, OSCAR,  APRN    Core Quality Measures   Reviewed items::  Labs, Medications and Radiology reports reviewed

## 2024-09-15 LAB
ANION GAP SERPL CALC-SCNC: 7 MMOL/L (ref 7–16)
BASOPHILS # BLD AUTO: 0.3 % (ref 0–1.8)
BASOPHILS # BLD: 0.03 K/UL (ref 0–0.12)
BUN SERPL-MCNC: 4 MG/DL (ref 8–22)
CALCIUM SERPL-MCNC: 7.7 MG/DL (ref 8.5–10.5)
CHLORIDE SERPL-SCNC: 106 MMOL/L (ref 96–112)
CO2 SERPL-SCNC: 23 MMOL/L (ref 20–33)
CREAT SERPL-MCNC: 0.94 MG/DL (ref 0.5–1.4)
EOSINOPHIL # BLD AUTO: 0.14 K/UL (ref 0–0.51)
EOSINOPHIL NFR BLD: 1.6 % (ref 0–6.9)
ERYTHROCYTE [DISTWIDTH] IN BLOOD BY AUTOMATED COUNT: 47.8 FL (ref 35.9–50)
GFR SERPLBLD CREATININE-BSD FMLA CKD-EPI: 85 ML/MIN/1.73 M 2
GLUCOSE SERPL-MCNC: 91 MG/DL (ref 65–99)
HCT VFR BLD AUTO: 24.2 % (ref 42–52)
HGB BLD-MCNC: 7.8 G/DL (ref 14–18)
IMM GRANULOCYTES # BLD AUTO: 0.04 K/UL (ref 0–0.11)
IMM GRANULOCYTES NFR BLD AUTO: 0.5 % (ref 0–0.9)
LYMPHOCYTES # BLD AUTO: 1.33 K/UL (ref 1–4.8)
LYMPHOCYTES NFR BLD: 15.4 % (ref 22–41)
MAGNESIUM SERPL-MCNC: 1.6 MG/DL (ref 1.5–2.5)
MCH RBC QN AUTO: 27.9 PG (ref 27–33)
MCHC RBC AUTO-ENTMCNC: 32.2 G/DL (ref 32.3–36.5)
MCV RBC AUTO: 86.4 FL (ref 81.4–97.8)
MONOCYTES # BLD AUTO: 0.83 K/UL (ref 0–0.85)
MONOCYTES NFR BLD AUTO: 9.6 % (ref 0–13.4)
NEUTROPHILS # BLD AUTO: 6.28 K/UL (ref 1.82–7.42)
NEUTROPHILS NFR BLD: 72.6 % (ref 44–72)
NRBC # BLD AUTO: 0 K/UL
NRBC BLD-RTO: 0 /100 WBC (ref 0–0.2)
PLATELET # BLD AUTO: 167 K/UL (ref 164–446)
PMV BLD AUTO: 13.2 FL (ref 9–12.9)
POTASSIUM SERPL-SCNC: 3.9 MMOL/L (ref 3.6–5.5)
RBC # BLD AUTO: 2.8 M/UL (ref 4.7–6.1)
SODIUM SERPL-SCNC: 136 MMOL/L (ref 135–145)
WBC # BLD AUTO: 8.7 K/UL (ref 4.8–10.8)

## 2024-09-15 PROCEDURE — 83735 ASSAY OF MAGNESIUM: CPT

## 2024-09-15 PROCEDURE — 700111 HCHG RX REV CODE 636 W/ 250 OVERRIDE (IP): Performed by: HOSPITALIST

## 2024-09-15 PROCEDURE — 770020 HCHG ROOM/CARE - TELE (206)

## 2024-09-15 PROCEDURE — 700102 HCHG RX REV CODE 250 W/ 637 OVERRIDE(OP): Performed by: STUDENT IN AN ORGANIZED HEALTH CARE EDUCATION/TRAINING PROGRAM

## 2024-09-15 PROCEDURE — A9270 NON-COVERED ITEM OR SERVICE: HCPCS | Performed by: STUDENT IN AN ORGANIZED HEALTH CARE EDUCATION/TRAINING PROGRAM

## 2024-09-15 PROCEDURE — 700111 HCHG RX REV CODE 636 W/ 250 OVERRIDE (IP): Performed by: NURSE PRACTITIONER

## 2024-09-15 PROCEDURE — 85025 COMPLETE CBC W/AUTO DIFF WBC: CPT

## 2024-09-15 PROCEDURE — 700102 HCHG RX REV CODE 250 W/ 637 OVERRIDE(OP): Performed by: NURSE PRACTITIONER

## 2024-09-15 PROCEDURE — 36415 COLL VENOUS BLD VENIPUNCTURE: CPT

## 2024-09-15 PROCEDURE — 80048 BASIC METABOLIC PNL TOTAL CA: CPT

## 2024-09-15 PROCEDURE — 99233 SBSQ HOSP IP/OBS HIGH 50: CPT | Performed by: HOSPITALIST

## 2024-09-15 PROCEDURE — 99232 SBSQ HOSP IP/OBS MODERATE 35: CPT | Performed by: NURSE PRACTITIONER

## 2024-09-15 PROCEDURE — 700102 HCHG RX REV CODE 250 W/ 637 OVERRIDE(OP): Performed by: HOSPITALIST

## 2024-09-15 PROCEDURE — A9270 NON-COVERED ITEM OR SERVICE: HCPCS | Performed by: NURSE PRACTITIONER

## 2024-09-15 PROCEDURE — A9270 NON-COVERED ITEM OR SERVICE: HCPCS | Performed by: HOSPITALIST

## 2024-09-15 RX ORDER — MAGNESIUM SULFATE HEPTAHYDRATE 40 MG/ML
2 INJECTION, SOLUTION INTRAVENOUS ONCE
Status: COMPLETED | OUTPATIENT
Start: 2024-09-15 | End: 2024-09-15

## 2024-09-15 RX ORDER — TORSEMIDE 20 MG/1
20 TABLET ORAL DAILY
Status: DISCONTINUED | OUTPATIENT
Start: 2024-09-15 | End: 2024-09-18 | Stop reason: HOSPADM

## 2024-09-15 RX ORDER — LISINOPRIL 20 MG/1
20 TABLET ORAL DAILY
Status: DISCONTINUED | OUTPATIENT
Start: 2024-09-15 | End: 2024-09-16

## 2024-09-15 RX ORDER — SPIRONOLACTONE 25 MG/1
25 TABLET ORAL DAILY
Status: DISCONTINUED | OUTPATIENT
Start: 2024-09-15 | End: 2024-09-18 | Stop reason: HOSPADM

## 2024-09-15 RX ORDER — METOPROLOL SUCCINATE 25 MG/1
25 TABLET, EXTENDED RELEASE ORAL EVERY EVENING
Status: DISCONTINUED | OUTPATIENT
Start: 2024-09-15 | End: 2024-09-15

## 2024-09-15 RX ADMIN — LISINOPRIL 20 MG: 20 TABLET ORAL at 16:19

## 2024-09-15 RX ADMIN — ATORVASTATIN CALCIUM 40 MG: 40 TABLET, FILM COATED ORAL at 18:04

## 2024-09-15 RX ADMIN — MAGNESIUM SULFATE HEPTAHYDRATE 2 G: 2 INJECTION, SOLUTION INTRAVENOUS at 16:17

## 2024-09-15 RX ADMIN — SUCRALFATE 1 G: 1 SUSPENSION ORAL at 13:08

## 2024-09-15 RX ADMIN — TORSEMIDE 20 MG: 20 TABLET ORAL at 16:19

## 2024-09-15 RX ADMIN — METOPROLOL TARTRATE 25 MG: 25 TABLET, FILM COATED ORAL at 05:40

## 2024-09-15 RX ADMIN — MAGNESIUM SULFATE HEPTAHYDRATE 2 G: 2 INJECTION, SOLUTION INTRAVENOUS at 13:09

## 2024-09-15 RX ADMIN — PANTOPRAZOLE SODIUM 40 MG: 40 INJECTION, POWDER, FOR SOLUTION INTRAVENOUS at 05:39

## 2024-09-15 RX ADMIN — SUCRALFATE 1 G: 1 SUSPENSION ORAL at 18:04

## 2024-09-15 RX ADMIN — SUCRALFATE 1 G: 1 SUSPENSION ORAL at 05:39

## 2024-09-15 RX ADMIN — SUCRALFATE 1 G: 1 SUSPENSION ORAL at 23:58

## 2024-09-15 RX ADMIN — SUCRALFATE 1 G: 1 SUSPENSION ORAL at 00:50

## 2024-09-15 RX ADMIN — NICOTINE TRANSDERMAL SYSTEM 21 MG: 21 PATCH, EXTENDED RELEASE TRANSDERMAL at 05:40

## 2024-09-15 RX ADMIN — SPIRONOLACTONE 25 MG: 25 TABLET ORAL at 16:19

## 2024-09-15 RX ADMIN — PANTOPRAZOLE SODIUM 40 MG: 40 INJECTION, POWDER, FOR SOLUTION INTRAVENOUS at 18:04

## 2024-09-15 ASSESSMENT — ENCOUNTER SYMPTOMS
FOCAL WEAKNESS: 0
HEADACHES: 0
DIZZINESS: 0
FEVER: 0
MYALGIAS: 0
ABDOMINAL PAIN: 1
BLOOD IN STOOL: 0
VOMITING: 0
CHILLS: 0
HEARTBURN: 0
CONSTIPATION: 0
DIARRHEA: 0
COUGH: 0
WEAKNESS: 1
NAUSEA: 0
DEPRESSION: 0
BRUISES/BLEEDS EASILY: 0
ABDOMINAL PAIN: 0
SHORTNESS OF BREATH: 0
BLURRED VISION: 0
PALPITATIONS: 0
BACK PAIN: 0

## 2024-09-15 ASSESSMENT — LIFESTYLE VARIABLES: SUBSTANCE_ABUSE: 0

## 2024-09-15 ASSESSMENT — FIBROSIS 4 INDEX: FIB4 SCORE: 2.19

## 2024-09-15 NOTE — CARE PLAN
The patient is Stable - Low risk of patient condition declining or worsening    Shift Goals  Clinical Goals: hemodynamic stability, pain management  Patient Goals: Comfort  Family Goals: MARCOS    Progress made toward(s) clinical / shift goals:    Problem: Pain - Standard  Goal: Alleviation of pain or a reduction in pain to the patient’s comfort goal  Outcome: Progressing  Note: Pain assessed Q2 hrs and as needed. Patient complained of abdominal pain. Medicated with Oxycodone per MAR. Tolerated medication well. Patient reported complete relief of pain. Pain well controlled with this PRN medication.     Problem: Ineffective Airway Clearance  Goal: Patient will maintain patent airway with clear/clearing breath sounds  Outcome: Progressing     Problem: Hemodynamics  Goal: Patient's hemodynamics, fluid balance and neurologic status will be stable or improve  Outcome: Progressing     Problem: Risk for Bleeding  Goal: Patient will not experience bleeding as evidenced by normal blood pressure, stable hematocrit and hemoglobin levels and desired ranges for coagulation profiles  Outcome: Progressing     Problem: Gastrointestinal Irritability  Goal: Nausea and vomiting will be absent or improve  Outcome: Progressing       Patient is not progressing towards the following goals:

## 2024-09-15 NOTE — PROGRESS NOTES
"Received care of pt from NOC RN this am. Pt is A+O x 4, denies needs for pain medication. Up to chair this am. Diet advanced to cardiac per orders. Pt says this am: \"I don't drink ensure.\" Notified kitchen.   " see above for Axis I, II, III

## 2024-09-15 NOTE — PROGRESS NOTES
Bedside report received from Day RN and assumed patient care. No signs of distress, Pt A/Ox4, RA, 0/10 pain, telemetry box in place. Educated RE: fall risk, bed alarm on, bed locked/lowest position. Call light and possessions within reach. Pt. Denies additional needs at this time.

## 2024-09-15 NOTE — CARE PLAN
The patient is Stable - Low risk of patient condition declining or worsening    Shift Goals  Clinical Goals: mobility, awaiting BX results  Patient Goals: mobility, awaiting biopsy results  Family Goals: MARCOS    Progress made toward(s) clinical / shift goals:  no biopsy results today, up to chair    Patient is not progressing towards the following goals: no biopsy results today       Problem: Knowledge Deficit - Standard  Goal: Patient and family/care givers will demonstrate understanding of plan of care, disease process/condition, diagnostic tests and medications  Outcome: Progressing  Note: Discussed plan of care for today w/ pt this am, he is A+O.x4, he verbalizes understanding of his plan of care, no questions. Emotional support given. Reinforcing education as necessary.     Problem: Care Map:  Day Before Discharge Optimal Outcome for the Heart Failure Patient  Goal: Day Before Discharge:  Optimal Care of the heart failure patient  Outcome: Progressing  Note: Discussed heart failure education w/ pt. Pt would benefit from further education reinforcement.

## 2024-09-15 NOTE — PROGRESS NOTES
..Gastroenterology Progress Note               Author:  Willa Sun, DNP,  APRN Date & Time Created: 9/15/2024 8:20 AM       Patient ID:  Name:             Robert Mcdonnell  YOB: 1950  Age:                 74 y.o.  male  MRN:               [id number]        Medical Decision Making, by Problem:  Active Hospital Problems    Diagnosis     Hypotension [I95.9]     Severe protein-calorie malnutrition (HCC) [E43]     Gastrointestinal hemorrhage with melena [K92.1]     Intractable nausea and vomiting [R11.2]     Tobacco abuse counseling [Z71.6]     COPD (chronic obstructive pulmonary disease) (HCC) [J44.9]     ACP (advance care planning) [Z71.89]     Leukocytosis [D72.829]     Lactic acidosis [E87.20]     AP (abdominal pain) [R10.9]     Pneumonia due to infectious organism [J18.9]     Acute kidney injury (HCC) [N17.9]     Sepsis (HCC) [A41.9]     Atrial fibrillation with RVR (HCC) [I48.91]     Chronic systolic heart failure (HCC) [I50.22]     Thrombocytopenia (HCC) [D69.6]     Hypokalemia [E87.6]      Presenting Chief Complaint:  Melena, epigastric pain     History of Present Illness:      This is a 74-year-old male with a past medical history of chronic atrial fibrillation (not on anticoagulation due to medical noncompliance; controlled on metoprolol), heart failure reduced ejection fraction (EF 30-45), hypertension, hyperlipidemia, COPD (no O2 at baseline) who presented to [facility] on 9/7/2024 for evaluation of epigastric pain.  Patient reports epigastric pain for the past 3-4 days prior to arrival with associated nausea, vomiting.  He denies pain radiating.  No relieving or exacerbating factors.  He further endorses dark, soft/liquid stools in addition to heartburn/reflux  In the emergency department, he was found to have a leukocytosis, lactic acid of 3.5, NIRMAL with creatinine 2.24.  He was initially suspected of having a bowel perforation however CT abdomen pelvis was  obtained showing no bowel perforation but patient with left lung base opacity consistent with pneumonia.  He was given IV fluid boluses for sepsis protocol, started on empiric Zosyn, and admitted for further evaluation.  Initial hemoglobin 17 but is down trended to 12.4 on day of consult.  MCV normocytic.  Platelets 127.  BUN elevated at 60 in setting of NIRMAL on arrival.  This is down trended to 31.     9/10/2024: EGD:  IMPRESSION:  Large deeply ulcerated lesion on the lesser curvature of the stomach (2 cm x 2 cm). Abel IIa. The underlying ulcer bed was treated with epinephrine injection and bipolar cautery.  The edge of the ulcer was biopsied.    9/13/2024: EGD: Gastric ulcer status postbiopsy and tattoo, pathology pending    Interval History:  9/11/2024: Patient seen and examined. Had one BM, unclear of color. Hgb 8.1<8.8. Iron studies consistent with anemia of chronic disease. Path pending    9/12/2024: Patient seen. Not tolerating full liquids due to nausea. Hgb 8.3. Path with high grade dysplasia    9/14/2024: Still having difficulty eating solid food with increased abdominal pain. Hemoglobin 7.7 platelets 163. Last BM yesterday, patient reports normal.     9/15/2024: Patient seen, sitting in chair.  Would like to have a shower.  Tolerating his diet and would like to eat real food.  Pathology pending.    Hospital Medications:  Current Facility-Administered Medications   Medication Dose Frequency Provider Last Rate Last Admin    pantoprazole (Protonix) injection 40 mg  40 mg BID Willa Sun DNP,  APRN   40 mg at 09/15/24 0539    sucralfate (Carafate) 1 GM/10ML suspension 1 g  1 g Q6HRS Willa Sun DNP,  APRN   1 g at 09/15/24 0539    metoprolol tartrate (Lopressor) tablet 25 mg  25 mg BID Shaina Otto M.D.   25 mg at 09/15/24 0540    dextrose 50% (D50W) injection 25 g  25 g Q15 MIN PRN Shaina Otto M.D.        LR (Bolus) infusion 500 mL  500 mL Once PRN Aaron Mehta M.D.        labetalol  (Normodyne/Trandate) injection 10 mg  10 mg Q4HRS PRN Aaron Mehta M.D.        ondansetron (Zofran) syringe/vial injection 4 mg  4 mg Q4HRS PRN Aaron Mehta M.D.   4 mg at 09/13/24 1453    ondansetron (Zofran ODT) dispertab 4 mg  4 mg Q4HRS PRN Aaron Mehta M.D.   4 mg at 09/08/24 2313    nicotine (Nicoderm) 21 MG/24HR 21 mg  21 mg Daily-0600 Aaron Mehta M.D.   21 mg at 09/15/24 0540    And    nicotine polacrilex (Nicorette) 2 MG piece 2 mg  2 mg Q HOUR PRN Aaron Mehta M.D.        Pharmacy Consult Request ...Pain Management Review 1 Each  1 Each PHARMACY TO DOSE Aaron Mehta M.D.        acetaminophen (Tylenol) tablet 650 mg  650 mg Q6HRS PRN Aaron Mehta M.D.   650 mg at 09/13/24 0026    oxyCODONE immediate-release (Roxicodone) tablet 5 mg  5 mg Q3HRS PRN Aaron Mehta M.D.   5 mg at 09/14/24 0915    Or    oxyCODONE immediate release (Roxicodone) tablet 10 mg  10 mg Q3HRS PRN Aaron Mehta M.D.        Or    HYDROmorphone (Dilaudid) injection 0.5 mg  0.5 mg Q3HRS PRN Aaron Mehta M.D.   0.5 mg at 09/12/24 0054    Metoprolol Tartrate (Lopressor) injection 5 mg  5 mg Q5 MIN PRN Aaron Mehta M.D.        atorvastatin (Lipitor) tablet 40 mg  40 mg Q EVENING Aaron Mehta M.D.   40 mg at 09/14/24 1639    NS infusion   Continuous Shaina Otto M.D. 50 mL/hr at 09/14/24 2225 New Bag at 09/14/24 2225   Last reviewed on 9/13/2024  7:32 AM by Beckie Gallego RJANETT       Review of Systems:  Review of Systems   Constitutional:  Negative for chills, fever and malaise/fatigue.   HENT:  Negative for hearing loss.    Eyes:  Negative for blurred vision.   Respiratory:  Negative for cough and shortness of breath.    Cardiovascular:  Negative for chest pain and leg swelling.   Gastrointestinal:  Positive for abdominal pain. Negative for blood in stool, constipation, diarrhea, heartburn, melena, nausea and vomiting.   Genitourinary:  Negative for dysuria.   Musculoskeletal:  Negative for back pain.   Skin:  Negative for  "rash.   Neurological:  Positive for weakness. Negative for dizziness.   Psychiatric/Behavioral:  Negative for depression.    All other systems reviewed and are negative.        Vital signs:  Weight/BMI: Body mass index is 21.32 kg/m².  /68   Pulse 83   Temp 36.7 °C (98.1 °F) (Temporal)   Resp 14   Ht 1.854 m (6' 1\")   Wt 73.3 kg (161 lb 9.6 oz)   SpO2 93%   Vitals:    09/15/24 0309 09/15/24 0400 09/15/24 0540 09/15/24 0704   BP: 106/67  111/69 113/68   Pulse: 92   83   Resp: 12   14   Temp: 36.6 °C (97.9 °F)   36.7 °C (98.1 °F)   TempSrc: Temporal   Temporal   SpO2: 93%   93%   Weight:  73.3 kg (161 lb 9.6 oz)     Height:         Oxygen Therapy:  Pulse Oximetry: 93 %, O2 (LPM): 0, O2 Delivery Device: None - Room Air    Intake/Output Summary (Last 24 hours) at 9/15/2024 0820  Last data filed at 9/15/2024 0400  Gross per 24 hour   Intake 640 ml   Output 2100 ml   Net -1460 ml       Physical Exam  Vitals and nursing note reviewed.   Constitutional:       General: He is not in acute distress.     Appearance: Normal appearance. He is not ill-appearing.   HENT:      Head: Normocephalic and atraumatic.      Right Ear: External ear normal.      Left Ear: External ear normal.      Nose: Nose normal.      Mouth/Throat:      Mouth: Mucous membranes are moist.      Pharynx: Oropharynx is clear.   Eyes:      General: No scleral icterus.  Cardiovascular:      Rate and Rhythm: Normal rate and regular rhythm.      Pulses: Normal pulses.      Heart sounds: Normal heart sounds.   Pulmonary:      Effort: Pulmonary effort is normal.      Breath sounds: Normal breath sounds.   Abdominal:      General: Abdomen is flat. Bowel sounds are normal. There is no distension.      Palpations: Abdomen is soft.   Musculoskeletal:         General: Normal range of motion.      Cervical back: Normal range of motion and neck supple.   Skin:     General: Skin is warm and dry.      Capillary Refill: Capillary refill takes less than 2 seconds. "   Neurological:      Mental Status: He is alert and oriented to person, place, and time.      Motor: Weakness present.   Psychiatric:         Mood and Affect: Mood normal.         Behavior: Behavior normal.       Labs:  Recent Labs     09/13/24  0325 09/15/24  0124   SODIUM 139 136   POTASSIUM 4.2 3.9   CHLORIDE 108 106   CO2 23 23   BUN 6* 4*   CREATININE 0.88 0.94   MAGNESIUM 2.0 1.6   CALCIUM 7.8* 7.7*     Recent Labs     09/13/24 0325 09/15/24  0124   GLUCOSE 90 91     Recent Labs     09/13/24 0325 09/14/24  0235 09/15/24  0124   WBC 9.4 9.0 8.7   NEUTSPOLYS 92.20* 87.00* 72.60*   LYMPHOCYTES 6.10* 8.70* 15.40*   MONOCYTES 1.70 4.30 9.60   EOSINOPHILS 0.00 0.00 1.60   BASOPHILS 0.00 0.00 0.30     Recent Labs     09/13/24 0325 09/13/24  1019 09/14/24  0235 09/15/24  0124   RBC 2.85*  --  2.86* 2.80*   HEMOGLOBIN 8.0* 7.7* 7.7* 7.8*   HEMATOCRIT 24.3* 24.8* 24.4* 24.2*   PLATELETCT 125*  --  163* 167     Recent Results (from the past 24 hour(s))   CBC WITH DIFFERENTIAL    Collection Time: 09/15/24  1:24 AM   Result Value Ref Range    WBC 8.7 4.8 - 10.8 K/uL    RBC 2.80 (L) 4.70 - 6.10 M/uL    Hemoglobin 7.8 (L) 14.0 - 18.0 g/dL    Hematocrit 24.2 (L) 42.0 - 52.0 %    MCV 86.4 81.4 - 97.8 fL    MCH 27.9 27.0 - 33.0 pg    MCHC 32.2 (L) 32.3 - 36.5 g/dL    RDW 47.8 35.9 - 50.0 fL    Platelet Count 167 164 - 446 K/uL    MPV 13.2 (H) 9.0 - 12.9 fL    Neutrophils-Polys 72.60 (H) 44.00 - 72.00 %    Lymphocytes 15.40 (L) 22.00 - 41.00 %    Monocytes 9.60 0.00 - 13.40 %    Eosinophils 1.60 0.00 - 6.90 %    Basophils 0.30 0.00 - 1.80 %    Immature Granulocytes 0.50 0.00 - 0.90 %    Nucleated RBC 0.00 0.00 - 0.20 /100 WBC    Neutrophils (Absolute) 6.28 1.82 - 7.42 K/uL    Lymphs (Absolute) 1.33 1.00 - 4.80 K/uL    Monos (Absolute) 0.83 0.00 - 0.85 K/uL    Eos (Absolute) 0.14 0.00 - 0.51 K/uL    Baso (Absolute) 0.03 0.00 - 0.12 K/uL    Immature Granulocytes (abs) 0.04 0.00 - 0.11 K/uL    NRBC (Absolute) 0.00 K/uL   Basic  Metabolic Panel    Collection Time: 09/15/24  1:24 AM   Result Value Ref Range    Sodium 136 135 - 145 mmol/L    Potassium 3.9 3.6 - 5.5 mmol/L    Chloride 106 96 - 112 mmol/L    Co2 23 20 - 33 mmol/L    Glucose 91 65 - 99 mg/dL    Bun 4 (L) 8 - 22 mg/dL    Creatinine 0.94 0.50 - 1.40 mg/dL    Calcium 7.7 (L) 8.5 - 10.5 mg/dL    Anion Gap 7.0 7.0 - 16.0   MAGNESIUM    Collection Time: 09/15/24  1:24 AM   Result Value Ref Range    Magnesium 1.6 1.5 - 2.5 mg/dL   ESTIMATED GFR    Collection Time: 09/15/24  1:24 AM   Result Value Ref Range    GFR (CKD-EPI) 85 >60 mL/min/1.73 m 2       Radiology Review:  EC-ECHOCARDIOGRAM COMPLETE W/ CONT   Final Result      CT-ABDOMEN-PELVIS WITH   Final Result      1.  Distal stomach is decompressed and not adequately evaluated. No definitive mass in the proximal stomach are correlated with direct visualization if continued concern for gastric mass.   2.  New small left pleural effusion.   3.  Cardiomegaly.   4.  Multiple too small to characterize hypodense lesions within the liver      CT-ABDOMEN-PELVIS W/O   Final Result      1.  Opacification left lung base at costophrenic angle is identified which could be due to scarring or atelectasis.      2.  Cardiomegaly.      3.  Otherwise no acute abnormalities are noted in the abdomen or pelvis.      DX-CHEST-PORTABLE (1 VIEW)   Final Result         1.  Possible small left pleural effusion.      2.  Minimal atelectasis left lung base.      3.  No consolidations identified.            MDM (Data Review):   -Records reviewed and summarized in current documentation  -I personally reviewed and interpreted the laboratory results  -I personally reviewed the radiology images    Assessment/Recommendations:  ASSESSMENT:  GI bleeding with melena  Large ulcerated stomach lesion  Nausea/vomiting  Normocytic anemia  Atrial fibrillation-received single dose of Eliquis 9/9/2024 at 1308.  NIRMAL-improving  Community-acquired pneumonia  Lactic  acidosis-resolved  Tobacco use  Heart failure reduced ejection fraction-EF 30-45%.  Repeat echocardiogram confirms EF 33%  CT abdomen/pelvis with contrast comments on distal stomach decompression but unable to adequately evaluate  Small left pleural effusion  Cardiomegaly  Multiple hypodense lesions within the liver    RECOMMENDATIONS:   Biopsies with high-grade dysplasia, biopsies pending from repeat EGD yesterday  Sucralfate q6 hrs   Continue twice daily PPI therapy  Continue to hold anticoagulation    Discussed with patient, primary team, Dr. Lopez    ..Willa Sun DNP,  APRN    Core Quality Measures   Reviewed items::  Labs, Medications and Radiology reports reviewed

## 2024-09-15 NOTE — CARE PLAN
The patient is Stable - Low risk of patient condition declining or worsening    Shift Goals  Clinical Goals: comfort, sleep, VSS  Patient Goals: sleep  Family Goals: MARCOS    Progress made toward(s) clinical / shift goals:      Problem: Knowledge Deficit - COPD  Goal: Patient/significant other demonstrates understanding of disease process, utilization of the Action Plan, medications and discharge instruction  Outcome: Progressing     Problem: Risk for Infection - COPD  Goal: Patient will remain free from signs and symptoms of infection  Outcome: Progressing  Flowsheets (Taken 9/15/2024 0128)  Standard Infection Interventions:   Assessed for signs and symptoms of infection   Implemented standard precautions   Provided education on proper hand hygiene and infection prevention measures     Problem: Self Care  Goal: Patient will have the ability to perform ADLs independently or with assistance (bathe, groom, dress, toilet and feed)  Outcome: Progressing  Note: Encouraged self-care   No assistive devices required     Problem: Care Map:  Day Before Discharge Optimal Outcome for the Heart Failure Patient  Goal: Day Before Discharge:  Optimal Care of the heart failure patient  Outcome: Progressing       Patient is not progressing towards the following goals:

## 2024-09-15 NOTE — PROGRESS NOTES
Monitor Summary  Rhythm: Afib  Rate: 90-95  Ectopy: rare pvc's, rare bigem  Measurements: ---/0.10/---  ---12 hr Chart Review---

## 2024-09-15 NOTE — PROGRESS NOTES
Hospital Medicine Daily Progress Note    Date of Service  9/15/2024    Chief Complaint  Robert Mcdonnell is a 74 y.o. male admitted 9/7/2024 with abdominal pain and N/V    Hospital Course  This is a 74 y.o. homeless male from [facility] with history of chronic A-fib not on anticoagulation due to medical noncompliance, tobacco abuse, COPD, CHF, hypertension, hyperlipidemia who presented 9/7/2024 with evaluation for intractable nausea and vomiting.  Patient reported ongoing symptoms for at least the past 3 to 4 days, reported epigastric discomfort associate with nausea and vomiting.  In ER, found to have leukocytosis, lactic acid of 3.5 and NIRMAL with creatinine of 2.24.  Initially thought to have bowel perforation, no perforation seen on CTAP, however noted to have left lung base opacity.  Patient was given IVF boluses per sepsis protocol and Zosyn empirically by ERP.   Patient admitted for further treatment. He was on anticoagulation for his A.fib but started having black tarry stool and his hemoglobin dropped down to 12 from 14 so anticoagulation was stopped (had been non complaint with this at home anyway) and GI was consulted    Interval Problem Update  Axox3, continues to do well, he denies abdominal pain, tolerating diet, h/h stable, biopsy results still pending, I discussed plan with GI, continue to wait for biopsy results due to high suspicion of gastric cancer and poor follow up     I have discussed this patient's plan of care and discharge plan at IDT rounds today with Case Management, Nursing, Nursing leadership, and other members of the IDT team.    Consultants/Specialty  GI    Code Status  Full Code    Disposition  The patient is not medically cleared for discharge to home or a post-acute facility.      I have placed the appropriate orders for post-discharge needs.    Review of Systems  Review of Systems   Constitutional:  Negative for chills, fever and malaise/fatigue.   HENT:  Negative for hearing  loss and tinnitus.    Eyes:  Negative for blurred vision.   Respiratory:  Negative for cough and shortness of breath.    Cardiovascular:  Negative for chest pain and palpitations.   Gastrointestinal:  Negative for abdominal pain, blood in stool, heartburn, melena, nausea and vomiting.   Genitourinary:  Negative for dysuria and urgency.   Musculoskeletal:  Negative for joint pain and myalgias.   Skin:  Negative for itching and rash.   Neurological:  Positive for weakness. Negative for dizziness, focal weakness and headaches.   Endo/Heme/Allergies:  Negative for environmental allergies. Does not bruise/bleed easily.   Psychiatric/Behavioral:  Negative for depression and substance abuse.    All other systems reviewed and are negative.       Physical Exam  Temp:  [36.4 °C (97.5 °F)-36.7 °C (98.1 °F)] 36.7 °C (98.1 °F)  Pulse:  [77-98] 87  Resp:  [12-16] 16  BP: ()/(53-76) 124/76  SpO2:  [90 %-96 %] 96 %    Physical Exam  Vitals and nursing note reviewed.   Constitutional:       General: He is not in acute distress.     Appearance: He is not ill-appearing.   HENT:      Head: Normocephalic and atraumatic.      Nose: Nose normal.      Mouth/Throat:      Pharynx: Oropharynx is clear.   Eyes:      General: No scleral icterus.     Extraocular Movements: Extraocular movements intact.   Cardiovascular:      Rate and Rhythm: Rhythm irregular.      Pulses: Normal pulses.      Heart sounds:      No friction rub.   Pulmonary:      Effort: No respiratory distress.      Breath sounds: No wheezing or rales.   Chest:      Chest wall: No tenderness.   Abdominal:      General: There is no distension.      Tenderness: There is no abdominal tenderness. There is no guarding or rebound.   Musculoskeletal:         General: Normal range of motion.      Cervical back: Neck supple. No tenderness.      Right lower leg: No edema.      Left lower leg: No edema.   Skin:     General: Skin is warm and dry.      Capillary Refill: Capillary refill  takes less than 2 seconds.   Neurological:      General: No focal deficit present.      Mental Status: He is alert and oriented to person, place, and time.   Psychiatric:         Mood and Affect: Mood normal.         Fluids    Intake/Output Summary (Last 24 hours) at 9/15/2024 1550  Last data filed at 9/15/2024 1008  Gross per 24 hour   Intake 1020 ml   Output 1500 ml   Net -480 ml        Laboratory  Recent Labs     09/13/24  0325 09/13/24  1019 09/14/24  0235 09/15/24  0124   WBC 9.4  --  9.0 8.7   RBC 2.85*  --  2.86* 2.80*   HEMOGLOBIN 8.0* 7.7* 7.7* 7.8*   HEMATOCRIT 24.3* 24.8* 24.4* 24.2*   MCV 85.3  --  85.3 86.4   MCH 28.1  --  26.9* 27.9   MCHC 32.9  --  31.6* 32.2*   RDW 47.4  --  47.5 47.8   PLATELETCT 125*  --  163* 167   MPV 11.6  --  11.4 13.2*     Recent Labs     09/13/24  0325 09/15/24  0124   SODIUM 139 136   POTASSIUM 4.2 3.9   CHLORIDE 108 106   CO2 23 23   GLUCOSE 90 91   BUN 6* 4*   CREATININE 0.88 0.94   CALCIUM 7.8* 7.7*                       Imaging  EC-ECHOCARDIOGRAM COMPLETE W/ CONT   Final Result      CT-ABDOMEN-PELVIS WITH   Final Result      1.  Distal stomach is decompressed and not adequately evaluated. No definitive mass in the proximal stomach are correlated with direct visualization if continued concern for gastric mass.   2.  New small left pleural effusion.   3.  Cardiomegaly.   4.  Multiple too small to characterize hypodense lesions within the liver      CT-ABDOMEN-PELVIS W/O   Final Result      1.  Opacification left lung base at costophrenic angle is identified which could be due to scarring or atelectasis.      2.  Cardiomegaly.      3.  Otherwise no acute abnormalities are noted in the abdomen or pelvis.      DX-CHEST-PORTABLE (1 VIEW)   Final Result         1.  Possible small left pleural effusion.      2.  Minimal atelectasis left lung base.      3.  No consolidations identified.           Assessment/Plan  * Intractable nausea and vomiting- (present on  admission)  Assessment & Plan  IVF  CLD  Trial of IV Protonix, IV Reglan  Aspiration precautions  resolved    Severe protein-calorie malnutrition (HCC)  Assessment & Plan  Significant recent weight loss, cirrhosis contributing  Nutritionist following    Hypotension  Assessment & Plan  See above  Tolerating decreased dose of bb, afib remains rate controlled - continue to monitor on tele  Following    Gastrointestinal hemorrhage with melena  Assessment & Plan  See above  Due to gastric ulcer and anticoag  Biopsy results pending  GI following, repeat egd 9/13  Plts decreased some, h/h also, cbc in am   Transfuse further as needed  Iv ppi    Pneumonia due to infectious organism  Assessment & Plan  ?aspirated  LLL opacity,   IVF  Completed course of abx  Follow sputum Cx    AP (abdominal pain)  Assessment & Plan  Due to gastric ulcer, see above - suspect gastric cancer   resolved  Following  Supportive care  PPI  Gastric biopsy showed dysplasia, repeat edg and biopsy 9/13- awaiting new biopsy results  Continue PPI and carafate  following    Lactic acidosis  Assessment & Plan  Likely secondary sepsis  IVF, antibiotic  High-dose IV thiamine      Leukocytosis  Assessment & Plan  resolved    ACP (advance care planning)  Assessment & Plan  Goal of care discussed with patient in emergency room.  He stated he is agreeable for noninvasive, as well as invasive/heroic life-sustaining measures-including CPR/defibrillation/intubation or mechanical ventilation.  He is also agreeable for further treatment with IVF, IV antibiotic, imaging study with echocardiogram renal ultrasound, as well as the need to quit smoking and to follow-up with anticoagulation clinic outpatient once discharged.  Diagnosis, prognosis, question and concern addressed.  Full CODE STATUS confirmed.  ACP: 16 minutes    COPD (chronic obstructive pulmonary disease) (HCC)  Assessment & Plan  Currently not in acute exacerbation  Pulmonary hygiene  Smoking cessation  advised    Tobacco abuse counseling  Assessment & Plan  Reported smoking more than 1 pack of cigarettes daily  Stated he has been try to quit for the past week  Tobacco smoking cessation counseling provided: 5 minutes  We discussed tobacco smoking cessation given concern for COPD, as well as cardiovascular health benefits, he expressed understanding.  Offered patient with nicotine patch, nicotine gum, Chantix as alternative.  Provided patient with standard tobacco cessation information per protocol    Acute kidney injury (HCC)- (present on admission)  Assessment & Plan  BUN 60, creatinine 2.24, bicarb 18  Suspect secondary to intractable nausea vomiting  Resolved  Avoid nephro toxins  Following bmp intermittently        Sepsis (HCC)- (present on admission)  Assessment & Plan  This is Sepsis Present on admission  SIRS criteria identified on my evaluation include: Tachycardia, with heart rate greater than 90 BPM, Leukocytosis, with WBC greater than 12,000, and Bandemia, greater than 10% bands  Clinical indicators of end organ dysfunction include Lactic Acid greater than 2 and Acute On Chronic Renal Failure, with creatinine >0.5 above baseline level  Source is possible GI, pulm (LLL)  Sepsis protocol initiated  Crystalloid Fluid Administration: Fluid resuscitation ordered per standard protocol - 30 mL/kg per current or ideal body weight  IV antibiotics as appropriate for source of sepsis  Reassessment: I have reassessed the patient's hemodynamic status possible    WBC 16.0, lactic acid 3.5, creatinine 2.24  Possible GI source versus LLL infiltrate (aspiration pneumonitis versus pneumonia from nausea vomiting)  IVF  Resolving, see above, discussed with ID, monitoring off abx      Atrial fibrillation with RVR ([facility])- (present on admission)  Assessment & Plan  Known history of A-fib  He did not follow through with anticoagulation clinic last time, high risk on this med, does not want to take it and high risk for falls and  recurrent GI bleed  Chads score only 2  Pt opts to remain off anticoagulation   See above, due to bp adjusting/ decreasing dose of metoprolol, monitoring a fib on this new dose  Will check associated electrolytes in am   Continue tele    Thrombocytopenia (HCC)- (present on admission)  Assessment & Plan  improving  Following  Cbc in am     Chronic systolic heart failure (HCC)- (present on admission)  Assessment & Plan  Remains euvolemic, no acute exacerbation  Following closely   Echo Moderately reduced left ventricular systolic function.  The right ventricle is dilated with reduced right ventricular systolic   function.  Thickened mitral valve leaflets with focal restriction vs cleft of at   the P2-P3 junction.  Moderate to severe mitral regurgitation.  Moderate to severe tricuspid regurgitation  He agrees to follow up with cardiology as outpatient, continue current cardiac regimen as tolerated    Hypokalemia- (present on admission)  Assessment & Plan  Replaced  Resolved  following       VTE prophylaxis: scd      I have performed a physical exam and reviewed and updated ROS and Plan today (9/15/2024). In review of yesterday's note (9/14/2024), there are no changes except as documented above.

## 2024-09-16 ENCOUNTER — PATIENT OUTREACH (OUTPATIENT)
Dept: HEALTH INFORMATION MANAGEMENT | Facility: OTHER | Age: 74
End: 2024-09-16
Payer: MEDICARE

## 2024-09-16 LAB
ANION GAP SERPL CALC-SCNC: 12 MMOL/L (ref 7–16)
BASOPHILS # BLD AUTO: 0.3 % (ref 0–1.8)
BASOPHILS # BLD: 0.03 K/UL (ref 0–0.12)
BUN SERPL-MCNC: 4 MG/DL (ref 8–22)
CALCIUM SERPL-MCNC: 8 MG/DL (ref 8.5–10.5)
CHLORIDE SERPL-SCNC: 99 MMOL/L (ref 96–112)
CO2 SERPL-SCNC: 26 MMOL/L (ref 20–33)
CREAT SERPL-MCNC: 1.06 MG/DL (ref 0.5–1.4)
EOSINOPHIL # BLD AUTO: 0.14 K/UL (ref 0–0.51)
EOSINOPHIL NFR BLD: 1.6 % (ref 0–6.9)
ERYTHROCYTE [DISTWIDTH] IN BLOOD BY AUTOMATED COUNT: 47.1 FL (ref 35.9–50)
GFR SERPLBLD CREATININE-BSD FMLA CKD-EPI: 74 ML/MIN/1.73 M 2
GLUCOSE SERPL-MCNC: 116 MG/DL (ref 65–99)
HCT VFR BLD AUTO: 26.2 % (ref 42–52)
HGB BLD-MCNC: 8.6 G/DL (ref 14–18)
IMM GRANULOCYTES # BLD AUTO: 0.02 K/UL (ref 0–0.11)
IMM GRANULOCYTES NFR BLD AUTO: 0.2 % (ref 0–0.9)
LYMPHOCYTES # BLD AUTO: 1.1 K/UL (ref 1–4.8)
LYMPHOCYTES NFR BLD: 12.2 % (ref 22–41)
MAGNESIUM SERPL-MCNC: 2.1 MG/DL (ref 1.5–2.5)
MCH RBC QN AUTO: 28.3 PG (ref 27–33)
MCHC RBC AUTO-ENTMCNC: 32.8 G/DL (ref 32.3–36.5)
MCV RBC AUTO: 86.2 FL (ref 81.4–97.8)
MONOCYTES # BLD AUTO: 0.87 K/UL (ref 0–0.85)
MONOCYTES NFR BLD AUTO: 9.7 % (ref 0–13.4)
NEUTROPHILS # BLD AUTO: 6.85 K/UL (ref 1.82–7.42)
NEUTROPHILS NFR BLD: 76 % (ref 44–72)
NRBC # BLD AUTO: 0 K/UL
NRBC BLD-RTO: 0 /100 WBC (ref 0–0.2)
PLATELET # BLD AUTO: 225 K/UL (ref 164–446)
PMV BLD AUTO: 11.4 FL (ref 9–12.9)
POTASSIUM SERPL-SCNC: 3.4 MMOL/L (ref 3.6–5.5)
RBC # BLD AUTO: 3.04 M/UL (ref 4.7–6.1)
SODIUM SERPL-SCNC: 137 MMOL/L (ref 135–145)
WBC # BLD AUTO: 9 K/UL (ref 4.8–10.8)

## 2024-09-16 PROCEDURE — 36415 COLL VENOUS BLD VENIPUNCTURE: CPT

## 2024-09-16 PROCEDURE — A9270 NON-COVERED ITEM OR SERVICE: HCPCS | Performed by: STUDENT IN AN ORGANIZED HEALTH CARE EDUCATION/TRAINING PROGRAM

## 2024-09-16 PROCEDURE — 80048 BASIC METABOLIC PNL TOTAL CA: CPT

## 2024-09-16 PROCEDURE — 700111 HCHG RX REV CODE 636 W/ 250 OVERRIDE (IP): Performed by: NURSE PRACTITIONER

## 2024-09-16 PROCEDURE — 700102 HCHG RX REV CODE 250 W/ 637 OVERRIDE(OP): Mod: JZ | Performed by: HOSPITALIST

## 2024-09-16 PROCEDURE — 85025 COMPLETE CBC W/AUTO DIFF WBC: CPT

## 2024-09-16 PROCEDURE — 700105 HCHG RX REV CODE 258: Performed by: HOSPITALIST

## 2024-09-16 PROCEDURE — 700102 HCHG RX REV CODE 250 W/ 637 OVERRIDE(OP): Performed by: NURSE PRACTITIONER

## 2024-09-16 PROCEDURE — 83735 ASSAY OF MAGNESIUM: CPT

## 2024-09-16 PROCEDURE — 99233 SBSQ HOSP IP/OBS HIGH 50: CPT | Performed by: HOSPITALIST

## 2024-09-16 PROCEDURE — A9270 NON-COVERED ITEM OR SERVICE: HCPCS | Performed by: NURSE PRACTITIONER

## 2024-09-16 PROCEDURE — 700102 HCHG RX REV CODE 250 W/ 637 OVERRIDE(OP): Performed by: STUDENT IN AN ORGANIZED HEALTH CARE EDUCATION/TRAINING PROGRAM

## 2024-09-16 PROCEDURE — 770020 HCHG ROOM/CARE - TELE (206)

## 2024-09-16 PROCEDURE — A9270 NON-COVERED ITEM OR SERVICE: HCPCS | Mod: JZ | Performed by: HOSPITALIST

## 2024-09-16 RX ORDER — SODIUM CHLORIDE, SODIUM LACTATE, POTASSIUM CHLORIDE, AND CALCIUM CHLORIDE .6; .31; .03; .02 G/100ML; G/100ML; G/100ML; G/100ML
250 INJECTION, SOLUTION INTRAVENOUS ONCE
Status: DISCONTINUED | OUTPATIENT
Start: 2024-09-16 | End: 2024-09-16

## 2024-09-16 RX ORDER — LISINOPRIL 5 MG/1
5 TABLET ORAL DAILY
Status: DISCONTINUED | OUTPATIENT
Start: 2024-09-17 | End: 2024-09-18 | Stop reason: HOSPADM

## 2024-09-16 RX ORDER — POTASSIUM CHLORIDE 1500 MG/1
20 TABLET, EXTENDED RELEASE ORAL ONCE
Status: COMPLETED | OUTPATIENT
Start: 2024-09-16 | End: 2024-09-16

## 2024-09-16 RX ORDER — SODIUM CHLORIDE, SODIUM LACTATE, POTASSIUM CHLORIDE, AND CALCIUM CHLORIDE .6; .31; .03; .02 G/100ML; G/100ML; G/100ML; G/100ML
250 INJECTION, SOLUTION INTRAVENOUS ONCE
Status: COMPLETED | OUTPATIENT
Start: 2024-09-16 | End: 2024-09-16

## 2024-09-16 RX ADMIN — SUCRALFATE 1 G: 1 SUSPENSION ORAL at 06:44

## 2024-09-16 RX ADMIN — PANTOPRAZOLE SODIUM 40 MG: 40 INJECTION, POWDER, FOR SOLUTION INTRAVENOUS at 06:44

## 2024-09-16 RX ADMIN — ATORVASTATIN CALCIUM 40 MG: 40 TABLET, FILM COATED ORAL at 17:26

## 2024-09-16 RX ADMIN — SUCRALFATE 1 G: 1 SUSPENSION ORAL at 17:26

## 2024-09-16 RX ADMIN — PANTOPRAZOLE SODIUM 40 MG: 40 INJECTION, POWDER, FOR SOLUTION INTRAVENOUS at 17:26

## 2024-09-16 RX ADMIN — NICOTINE TRANSDERMAL SYSTEM 21 MG: 21 PATCH, EXTENDED RELEASE TRANSDERMAL at 06:44

## 2024-09-16 RX ADMIN — SODIUM CHLORIDE, POTASSIUM CHLORIDE, SODIUM LACTATE AND CALCIUM CHLORIDE 250 ML: 600; 310; 30; 20 INJECTION, SOLUTION INTRAVENOUS at 16:00

## 2024-09-16 RX ADMIN — POTASSIUM CHLORIDE 20 MEQ: 1500 TABLET, EXTENDED RELEASE ORAL at 08:45

## 2024-09-16 RX ADMIN — SUCRALFATE 1 G: 1 SUSPENSION ORAL at 11:53

## 2024-09-16 ASSESSMENT — COGNITIVE AND FUNCTIONAL STATUS - GENERAL
MOBILITY SCORE: 24
SUGGESTED CMS G CODE MODIFIER DAILY ACTIVITY: CH
DAILY ACTIVITIY SCORE: 24
SUGGESTED CMS G CODE MODIFIER MOBILITY: CH

## 2024-09-16 ASSESSMENT — ENCOUNTER SYMPTOMS
BLURRED VISION: 0
SHORTNESS OF BREATH: 0
HEADACHES: 0
WEAKNESS: 1
FEVER: 0
DEPRESSION: 0
BLOOD IN STOOL: 0
ABDOMINAL PAIN: 0
HEARTBURN: 0
MYALGIAS: 0
BRUISES/BLEEDS EASILY: 0
CHILLS: 0
PALPITATIONS: 0
FOCAL WEAKNESS: 0
NAUSEA: 0
DIZZINESS: 0
VOMITING: 0
COUGH: 0

## 2024-09-16 ASSESSMENT — PAIN DESCRIPTION - PAIN TYPE: TYPE: ACUTE PAIN

## 2024-09-16 ASSESSMENT — FIBROSIS 4 INDEX: FIB4 SCORE: 1.63

## 2024-09-16 ASSESSMENT — LIFESTYLE VARIABLES: SUBSTANCE_ABUSE: 0

## 2024-09-16 NOTE — CARE PLAN
The patient is Stable - Low risk of patient condition declining or worsening    Shift Goals  Clinical Goals: awaiting biopsy results, VSS, rest  Patient Goals: rest  Family Goals: MARCOS    Progress made toward(s) clinical / shift goals:      Problem: Self Care  Goal: Patient will have the ability to perform ADLs independently or with assistance (bathe, groom, dress, toilet and feed)  Outcome: Progressing  Note: Encourage self-care  Provide assistance only when needed     Problem: Risk for Bleeding  Goal: Patient will not experience bleeding as evidenced by normal blood pressure, stable hematocrit and hemoglobin levels and desired ranges for coagulation profiles  Outcome: Progressing     Problem: Gastrointestinal Irritability  Goal: Nausea and vomiting will be absent or improve  Outcome: Progressing     Problem: Gastrointestinal Irritability  Goal: Diarrhea will be absent or improved  Outcome: Progressing  Note: Assess for abdominal discomfort, pain, cramping, frequency, urgency, loose or liquid stools, and hyperactive bowel sensations.   Evaluate pattern of defecation      Problem: Fall Risk  Goal: Patient will remain free from falls  Outcome: Progressing       Patient is not progressing towards the following goals:

## 2024-09-16 NOTE — PROGRESS NOTES
Hospital Medicine Daily Progress Note    Date of Service  9/16/2024    Chief Complaint  Robert Mcdonnell is a 74 y.o. male admitted 9/7/2024 with abdominal pain and N/V    Hospital Course  This is a 74 y.o. homeless male from [facility] with history of chronic A-fib not on anticoagulation due to medical noncompliance, tobacco abuse, COPD, CHF, hypertension, hyperlipidemia who presented 9/7/2024 with evaluation for intractable nausea and vomiting.  Patient reported ongoing symptoms for at least the past 3 to 4 days, reported epigastric discomfort associate with nausea and vomiting.  In ER, found to have leukocytosis, lactic acid of 3.5 and NIRMAL with creatinine of 2.24.  Initially thought to have bowel perforation, no perforation seen on CTAP, however noted to have left lung base opacity.  Patient was given IVF boluses per sepsis protocol and Zosyn empirically by ERP.   Patient admitted for further treatment. He was on anticoagulation for his A.fib but started having black tarry stool and his hemoglobin dropped down to 12 from 14 so anticoagulation was stopped (had been non complaint with this at home anyway) and GI was consulted    Interval Problem Update  Axox3, mild dizziness with hypotension today, resolved with iv fluids, hbg increasing, no further melena, denies abdominal pain. Following. Pathology still pending. Replacing mild hypokalemia. ROS otherwise negative    I have discussed this patient's plan of care and discharge plan at IDT rounds today with Case Management, Nursing, Nursing leadership, and other members of the IDT team.    Consultants/Specialty  GI    Code Status  Full Code    Disposition  The patient is not medically cleared for discharge to home or a post-acute facility.      I have placed the appropriate orders for post-discharge needs.    Review of Systems  Review of Systems   Constitutional:  Negative for chills, fever and malaise/fatigue.   HENT:  Negative for hearing loss and tinnitus.     Eyes:  Negative for blurred vision.   Respiratory:  Negative for cough and shortness of breath.    Cardiovascular:  Negative for chest pain and palpitations.   Gastrointestinal:  Negative for abdominal pain, blood in stool, heartburn, melena, nausea and vomiting.   Genitourinary:  Negative for dysuria and urgency.   Musculoskeletal:  Negative for joint pain and myalgias.   Skin:  Negative for itching and rash.   Neurological:  Positive for weakness. Negative for dizziness, focal weakness and headaches.   Endo/Heme/Allergies:  Negative for environmental allergies. Does not bruise/bleed easily.   Psychiatric/Behavioral:  Negative for depression and substance abuse.    All other systems reviewed and are negative.       Physical Exam  Temp:  [36.6 °C (97.9 °F)-36.7 °C (98.1 °F)] 36.6 °C (97.9 °F)  Pulse:  [] 96  Resp:  [14-20] 16  BP: ()/(53-78) 88/53  SpO2:  [90 %-98 %] 96 %    Physical Exam  Vitals and nursing note reviewed.   Constitutional:       General: He is not in acute distress.     Appearance: He is not ill-appearing.   HENT:      Head: Normocephalic and atraumatic.      Nose: Nose normal.      Mouth/Throat:      Pharynx: Oropharynx is clear.   Eyes:      General: No scleral icterus.     Extraocular Movements: Extraocular movements intact.   Cardiovascular:      Rate and Rhythm: Rhythm irregular.      Pulses: Normal pulses.      Heart sounds:      No friction rub.   Pulmonary:      Effort: No respiratory distress.      Breath sounds: No wheezing or rales.   Chest:      Chest wall: No tenderness.   Abdominal:      General: There is no distension.      Tenderness: There is no abdominal tenderness. There is no guarding or rebound.   Musculoskeletal:         General: Normal range of motion.      Cervical back: Neck supple. No tenderness.      Right lower leg: No edema.      Left lower leg: No edema.   Skin:     General: Skin is warm and dry.      Capillary Refill: Capillary refill takes less than 2  seconds.   Neurological:      General: No focal deficit present.      Mental Status: He is alert and oriented to person, place, and time.   Psychiatric:         Mood and Affect: Mood normal.         Fluids    Intake/Output Summary (Last 24 hours) at 9/16/2024 1609  Last data filed at 9/16/2024 0753  Gross per 24 hour   Intake 440 ml   Output 4100 ml   Net -3660 ml        Laboratory  Recent Labs     09/14/24  0235 09/15/24  0124 09/16/24  0126   WBC 9.0 8.7 9.0   RBC 2.86* 2.80* 3.04*   HEMOGLOBIN 7.7* 7.8* 8.6*   HEMATOCRIT 24.4* 24.2* 26.2*   MCV 85.3 86.4 86.2   MCH 26.9* 27.9 28.3   MCHC 31.6* 32.2* 32.8   RDW 47.5 47.8 47.1   PLATELETCT 163* 167 225   MPV 11.4 13.2* 11.4     Recent Labs     09/15/24  0124 09/16/24  0126   SODIUM 136 137   POTASSIUM 3.9 3.4*   CHLORIDE 106 99   CO2 23 26   GLUCOSE 91 116*   BUN 4* 4*   CREATININE 0.94 1.06   CALCIUM 7.7* 8.0*                       Imaging  EC-ECHOCARDIOGRAM COMPLETE W/ CONT   Final Result      CT-ABDOMEN-PELVIS WITH   Final Result      1.  Distal stomach is decompressed and not adequately evaluated. No definitive mass in the proximal stomach are correlated with direct visualization if continued concern for gastric mass.   2.  New small left pleural effusion.   3.  Cardiomegaly.   4.  Multiple too small to characterize hypodense lesions within the liver      CT-ABDOMEN-PELVIS W/O   Final Result      1.  Opacification left lung base at costophrenic angle is identified which could be due to scarring or atelectasis.      2.  Cardiomegaly.      3.  Otherwise no acute abnormalities are noted in the abdomen or pelvis.      DX-CHEST-PORTABLE (1 VIEW)   Final Result         1.  Possible small left pleural effusion.      2.  Minimal atelectasis left lung base.      3.  No consolidations identified.           Assessment/Plan  * Intractable nausea and vomiting- (present on admission)  Assessment & Plan    Aspiration precautions  resolved    Severe protein-calorie  malnutrition (HCC)  Assessment & Plan  Significant recent weight loss, cirrhosis contributing  Nutritionist following    Hypotension  Assessment & Plan  With dizziness  Mild dehydration - holding diuretics, small bolus of LR, following  Also due to cardiac meds - will decreased dose of lisinopril, following, may need to decrease dose of bb - currently has parameters - following and will make further adjustments as needed  Less likely due to gi bleed as h/h has been increasing but will continue to trend h/h    Gastrointestinal hemorrhage with melena  Assessment & Plan  See above  Due to gastric ulcer and anticoag  Biopsy results pending  GI following, repeat egd 9/13  Plts decreased some, h/h also, cbc in am   Transfuse further as needed  Iv ppi    Pneumonia due to infectious organism  Assessment & Plan  Possibly aspirated  LLL opacity,   IVF  Completed course of abx      AP (abdominal pain)  Assessment & Plan  Due to gastric ulcer, see above - suspect gastric cancer   resolved  Following  Supportive care  PPI  Gastric biopsy showed dysplasia, repeat edg and biopsy 9/13- still awaiting new biopsy results  Continue PPI and carafate  following    Lactic acidosis  Assessment & Plan  Likely secondary sepsis  IVF, antibiotic  High-dose IV thiamine      Leukocytosis  Assessment & Plan  resolved    ACP (advance care planning)  Assessment & Plan  Goal of care discussed with patient in emergency room.  He stated he is agreeable for noninvasive, as well as invasive/heroic life-sustaining measures-including CPR/defibrillation/intubation or mechanical ventilation.  He is also agreeable for further treatment with IVF, IV antibiotic, imaging study with echocardiogram renal ultrasound, as well as the need to quit smoking and to follow-up with anticoagulation clinic outpatient once discharged.  Diagnosis, prognosis, question and concern addressed.  Full CODE STATUS confirmed.  ACP: 16 minutes    COPD (chronic obstructive pulmonary  disease) ([facility])  Assessment & Plan  Currently not in acute exacerbation  Pulmonary hygiene  Smoking cessation advised    Tobacco abuse counseling  Assessment & Plan  Reported smoking more than 1 pack of cigarettes daily  Stated he has been try to quit for the past week  Tobacco smoking cessation counseling provided: 5 minutes  We discussed tobacco smoking cessation given concern for COPD, as well as cardiovascular health benefits, he expressed understanding.  Offered patient with nicotine patch, nicotine gum, Chantix as alternative.  Provided patient with standard tobacco cessation information per protocol    Acute kidney injury ([facility])- (present on admission)  Assessment & Plan  BUN 60, creatinine 2.24, bicarb 18  Suspect secondary to intractable nausea vomiting  Resolved  Avoid nephro toxins  Recheck bmp in am         Sepsis ([facility])- (present on admission)  Assessment & Plan  This is Sepsis Present on admission  SIRS criteria identified on my evaluation include: Tachycardia, with heart rate greater than 90 BPM, Leukocytosis, with WBC greater than 12,000, and Bandemia, greater than 10% bands  Clinical indicators of end organ dysfunction include Lactic Acid greater than 2 and Acute On Chronic Renal Failure, with creatinine >0.5 above baseline level  Source is possible GI, pulm (LLL)  Sepsis protocol initiated  Crystalloid Fluid Administration: Fluid resuscitation ordered per standard protocol - 30 mL/kg per current or ideal body weight  IV antibiotics as appropriate for source of sepsis  Reassessment: I have reassessed the patient's hemodynamic status possible    WBC 16.0, lactic acid 3.5, creatinine 2.24  Possible GI source versus LLL infiltrate (aspiration pneumonitis versus pneumonia from nausea vomiting)  IVF  Resolving, see above, discussed with ID, monitoring off abx      Atrial fibrillation with RVR ([facility])- (present on admission)  Assessment & Plan  Known history of A-fib  He did not follow through with  anticoagulation clinic last time, high risk on this med, does not want to take it and high risk for falls and recurrent GI bleed  Chads score only 2  Pt opts to remain off anticoagulation   See above, due to bp adjusting/ decreasing dose of metoprolol, monitoring a fib on this new dose  Will check associated electrolytes in am   Continue tele    Thrombocytopenia (HCC)- (present on admission)  Assessment & Plan  improving  Following  Cbc in am     Chronic systolic heart failure (HCC)- (present on admission)  Assessment & Plan  Remains euvolemic, no acute exacerbation  Following closely   Echo Moderately reduced left ventricular systolic function.  The right ventricle is dilated with reduced right ventricular systolic   function.  Thickened mitral valve leaflets with focal restriction vs cleft of at   the P2-P3 junction.  Moderate to severe mitral regurgitation.  Moderate to severe tricuspid regurgitation  He agrees to follow up with cardiology as outpatient, continue current cardiac regimen as tolerated  Will titrate back on chronic cardiac meds as his bp has been stable and acute gi bleed has resolved- following closely     Hypokalemia- (present on admission)  Assessment & Plan  Replaced  Resolved  following       VTE prophylaxis: scd      I have performed a physical exam and reviewed and updated ROS and Plan today (9/16/2024). In review of yesterday's note (9/15/2024), there are no changes except as documented above.

## 2024-09-16 NOTE — PROGRESS NOTES
GI interim update:    Chart reviewed, pathology remains pending.    Will round with patient when biopsy has resulted.    ..Willa Sun, DNP,  APRN

## 2024-09-16 NOTE — DISCHARGE PLANNING
Community Health Worker Intake    Identified barriers to housing, food, PCP.  Resources provided to [facility] food pant[facility].  Contact information provided to Robert Mcdonnell   Has PCP appointment scheduled for   Inpatient assessment completed.    CHW met with pt at bedside to introduce Community Care Management. Pt states he has been living at the [facility] for over three and a half months now. Pt states he is working with Vlad Alba at [facility] to get into permanent housing. Pt states he relies on public transportation for getting to appointments. Pt states he no longer sees  Torri Razo P.A.-C. As his PCP and has been looking for another one. Pt is agreeable to CHW finding him a new PCP and making a new patient appointment. Pt states he receives SNAP benefits which covers most of his nutrition needs. Pt states he uses local food pantries to supplement any other food needs. Pt states he does not face any barriers to getting or taking his medications. Pt states he occasionally uses a cane for stability.   Pt states his new cell phone number is [phone].         Plan:  Pt provided with [facility] food pant[employer] information.  CHW will reach out to primary care offices that take pt's insurance to schedule a new patient appointment.   CHW will follow up with pt post discharge.    1438 - CHW called [facility], verified that they accept pt's insurance and made pt a new patient appointment.

## 2024-09-16 NOTE — DIETARY
Nutrition Update: Follow-up for monitoring adequacy of PO intake.   Day 9 of admit.  Robert Mcdonnell is a 74 y.o. male with admitting DX of Intractable nausea and vomiting [R11.2].    Current Diet: Cardiac  Per ADLs, PO has improved since being on cardiac diet. PO >50% of meals x 2 meals and <25% x 1 meal. PO was mostly <50% when on CLD/full liquids.     Note that pt is no longer receiving supplements as pt not drinking them.     Problem: Nutritional:  Goal: Achieve adequate nutritional intake  Description: Patient will consume >50% of meals  Outcome: Progressing.     Recommendations/Plan:   Encourage intake of >50% of meals  Document intake of all meals as % taken in ADL's to provide interdisciplinary communication across all shifts.   Monitor weight.  Nutrition rep will continue to see patient for ongoing meal and snack preferences.     RD following.

## 2024-09-16 NOTE — CARE PLAN
Problem: Pain - Standard  Goal: Alleviation of pain or a reduction in pain to the patient’s comfort goal  Outcome: Progressing     Problem: Knowledge Deficit - COPD  Goal: Patient/significant other demonstrates understanding of disease process, utilization of the Action Plan, medications and discharge instruction  Outcome: Progressing     Problem: Risk for Infection - COPD  Goal: Patient will remain free from signs and symptoms of infection  Outcome: Progressing     Problem: Nutrition - Advanced  Goal: Patient will display progressive weight gain toward goal have adequate food and fluid intake  Outcome: Progressing     Problem: Impaired Gas Exchange  Goal: Patient will demonstrate improved ventilation and adequate oxygenation and participate in treatment regimen within the level of ability/situation.  Outcome: Progressing     Problem: Risk for Aspiration  Goal: Patient's risk for aspiration will be absent or decrease  Outcome: Progressing     Problem: Self Care  Goal: Patient will have the ability to perform ADLs independently or with assistance (bathe, groom, dress, toilet and feed)  Outcome: Progressing     Problem: Hemodynamics  Goal: Patient's hemodynamics, fluid balance and neurologic status will be stable or improve  Outcome: Progressing     Problem: Respiratory  Goal: Patient will achieve/maintain optimum respiratory ventilation and gas exchange  Outcome: Progressing     Problem: Knowledge Deficit - Standard  Goal: Patient and family/care givers will demonstrate understanding of plan of care, disease process/condition, diagnostic tests and medications  Outcome: Progressing     Problem: Physical Regulation  Goal: Diagnostic test results will improve  Outcome: Progressing  Goal: Signs and symptoms of infection will decrease  Outcome: Progressing     Problem: Risk for Bleeding  Goal: Patient will take measures to prevent bleeding and recognizes signs of bleeding that need to be reported immediately to a health  care professional  Outcome: Progressing  Goal: Patient will not experience bleeding as evidenced by normal blood pressure, stable hematocrit and hemoglobin levels and desired ranges for coagulation profiles  Outcome: Progressing     Problem: Fall Risk  Goal: Patient will remain free from falls  Outcome: Progressing   The patient is Stable - Low risk of patient condition declining or worsening    Shift Goals  Clinical Goals: awaiting biopsy results, VSS, rest  Patient Goals: rest  Family Goals: MARCOS    Progress made toward(s) clinical / shift goals:    Pt's reported no pain during this RN's shift. Pt's hgb is trending up. Pt has had a good appetite during this RN's shift. Pt uses call light. Pt's bed is in lowest position. Pt has been repositioning self in bed    Patient is not progressing towards the following goals:

## 2024-09-16 NOTE — PROGRESS NOTES
Received bedside report from Day RN and assumed patient care. Patient sitting up in bed with no signs of distress. A/Ox4, RA, telemetry box in place. Pt educated re fall risk and verbalized understanding. Possessions and call bell within reach, bed locked/lowest position. Patient denies additional needs at this time.

## 2024-09-16 NOTE — PROGRESS NOTES
Monitor Summary  Rhythm: A-FIB  Rate: 74-89  Ectopy: (R) PVC, (O) Coup  Measurements: -/-13/-  ---12 hr Chart Review---

## 2024-09-17 LAB
ANION GAP SERPL CALC-SCNC: 9 MMOL/L (ref 7–16)
BASOPHILS # BLD AUTO: 0.3 % (ref 0–1.8)
BASOPHILS # BLD: 0.03 K/UL (ref 0–0.12)
BUN SERPL-MCNC: 7 MG/DL (ref 8–22)
CALCIUM SERPL-MCNC: 8.2 MG/DL (ref 8.5–10.5)
CHLORIDE SERPL-SCNC: 103 MMOL/L (ref 96–112)
CO2 SERPL-SCNC: 28 MMOL/L (ref 20–33)
CREAT SERPL-MCNC: 1.11 MG/DL (ref 0.5–1.4)
EOSINOPHIL # BLD AUTO: 0.1 K/UL (ref 0–0.51)
EOSINOPHIL NFR BLD: 1.1 % (ref 0–6.9)
ERYTHROCYTE [DISTWIDTH] IN BLOOD BY AUTOMATED COUNT: 48.1 FL (ref 35.9–50)
GFR SERPLBLD CREATININE-BSD FMLA CKD-EPI: 70 ML/MIN/1.73 M 2
GLUCOSE SERPL-MCNC: 80 MG/DL (ref 65–99)
HCT VFR BLD AUTO: 26.9 % (ref 42–52)
HGB BLD-MCNC: 8.6 G/DL (ref 14–18)
IMM GRANULOCYTES # BLD AUTO: 0.02 K/UL (ref 0–0.11)
IMM GRANULOCYTES NFR BLD AUTO: 0.2 % (ref 0–0.9)
LYMPHOCYTES # BLD AUTO: 1.21 K/UL (ref 1–4.8)
LYMPHOCYTES NFR BLD: 13.8 % (ref 22–41)
MCH RBC QN AUTO: 27.6 PG (ref 27–33)
MCHC RBC AUTO-ENTMCNC: 32 G/DL (ref 32.3–36.5)
MCV RBC AUTO: 86.2 FL (ref 81.4–97.8)
MONOCYTES # BLD AUTO: 0.85 K/UL (ref 0–0.85)
MONOCYTES NFR BLD AUTO: 9.7 % (ref 0–13.4)
NEUTROPHILS # BLD AUTO: 6.54 K/UL (ref 1.82–7.42)
NEUTROPHILS NFR BLD: 74.9 % (ref 44–72)
NRBC # BLD AUTO: 0 K/UL
NRBC BLD-RTO: 0 /100 WBC (ref 0–0.2)
PLATELET # BLD AUTO: 262 K/UL (ref 164–446)
PMV BLD AUTO: 11 FL (ref 9–12.9)
POTASSIUM SERPL-SCNC: 4.1 MMOL/L (ref 3.6–5.5)
RBC # BLD AUTO: 3.12 M/UL (ref 4.7–6.1)
SODIUM SERPL-SCNC: 140 MMOL/L (ref 135–145)
WBC # BLD AUTO: 8.8 K/UL (ref 4.8–10.8)

## 2024-09-17 PROCEDURE — 700102 HCHG RX REV CODE 250 W/ 637 OVERRIDE(OP): Performed by: NURSE PRACTITIONER

## 2024-09-17 PROCEDURE — A9270 NON-COVERED ITEM OR SERVICE: HCPCS | Performed by: STUDENT IN AN ORGANIZED HEALTH CARE EDUCATION/TRAINING PROGRAM

## 2024-09-17 PROCEDURE — 85025 COMPLETE CBC W/AUTO DIFF WBC: CPT

## 2024-09-17 PROCEDURE — 99232 SBSQ HOSP IP/OBS MODERATE 35: CPT | Performed by: INTERNAL MEDICINE

## 2024-09-17 PROCEDURE — 770020 HCHG ROOM/CARE - TELE (206)

## 2024-09-17 PROCEDURE — 700111 HCHG RX REV CODE 636 W/ 250 OVERRIDE (IP): Performed by: NURSE PRACTITIONER

## 2024-09-17 PROCEDURE — A9270 NON-COVERED ITEM OR SERVICE: HCPCS | Performed by: HOSPITALIST

## 2024-09-17 PROCEDURE — 700102 HCHG RX REV CODE 250 W/ 637 OVERRIDE(OP): Performed by: STUDENT IN AN ORGANIZED HEALTH CARE EDUCATION/TRAINING PROGRAM

## 2024-09-17 PROCEDURE — A9270 NON-COVERED ITEM OR SERVICE: HCPCS | Performed by: NURSE PRACTITIONER

## 2024-09-17 PROCEDURE — 700102 HCHG RX REV CODE 250 W/ 637 OVERRIDE(OP): Performed by: HOSPITALIST

## 2024-09-17 PROCEDURE — 80048 BASIC METABOLIC PNL TOTAL CA: CPT

## 2024-09-17 RX ADMIN — LISINOPRIL 5 MG: 5 TABLET ORAL at 05:14

## 2024-09-17 RX ADMIN — SUCRALFATE 1 G: 1 SUSPENSION ORAL at 05:13

## 2024-09-17 RX ADMIN — METOPROLOL TARTRATE 25 MG: 25 TABLET, FILM COATED ORAL at 05:14

## 2024-09-17 RX ADMIN — PANTOPRAZOLE SODIUM 40 MG: 40 INJECTION, POWDER, FOR SOLUTION INTRAVENOUS at 05:15

## 2024-09-17 RX ADMIN — SUCRALFATE 1 G: 1 SUSPENSION ORAL at 12:54

## 2024-09-17 RX ADMIN — PANTOPRAZOLE SODIUM 40 MG: 40 INJECTION, POWDER, FOR SOLUTION INTRAVENOUS at 17:26

## 2024-09-17 RX ADMIN — METOPROLOL TARTRATE 25 MG: 25 TABLET, FILM COATED ORAL at 17:28

## 2024-09-17 RX ADMIN — SUCRALFATE 1 G: 1 SUSPENSION ORAL at 00:51

## 2024-09-17 RX ADMIN — ATORVASTATIN CALCIUM 40 MG: 40 TABLET, FILM COATED ORAL at 17:26

## 2024-09-17 RX ADMIN — NICOTINE TRANSDERMAL SYSTEM 21 MG: 21 PATCH, EXTENDED RELEASE TRANSDERMAL at 05:14

## 2024-09-17 RX ADMIN — SUCRALFATE 1 G: 1 SUSPENSION ORAL at 17:26

## 2024-09-17 ASSESSMENT — ENCOUNTER SYMPTOMS
HEADACHES: 0
BRUISES/BLEEDS EASILY: 0
DIZZINESS: 0
VOMITING: 0
FEVER: 0
WEAKNESS: 1
MYALGIAS: 0
BLOOD IN STOOL: 0
COUGH: 0
BLURRED VISION: 0
DEPRESSION: 0
SHORTNESS OF BREATH: 0
NAUSEA: 0
PALPITATIONS: 0
ABDOMINAL PAIN: 0
CHILLS: 0
HEARTBURN: 0
FOCAL WEAKNESS: 0

## 2024-09-17 ASSESSMENT — LIFESTYLE VARIABLES: SUBSTANCE_ABUSE: 0

## 2024-09-17 ASSESSMENT — PAIN DESCRIPTION - PAIN TYPE: TYPE: ACUTE PAIN

## 2024-09-17 ASSESSMENT — FIBROSIS 4 INDEX: FIB4 SCORE: 1.4

## 2024-09-17 NOTE — CARE PLAN
The patient is Stable - Low risk of patient condition declining or worsening    Shift Goals  Clinical Goals: awaiting biopsy results, VSS, Rest  Patient Goals: Sleep  Family Goals: MARCOS    Progress made toward(s) clinical / shift goals:      Problem: Risk for Infection - COPD  Goal: Patient will remain free from signs and symptoms of infection  Outcome: Progressing  Flowsheets (Taken 9/17/2024 022)  Standard Infection Interventions:   Assessed for signs and symptoms of infection   Implemented standard precautions   Instructed patient/family on signs and symptoms of infection   Provided education on proper hand hygiene and infection prevention measures     Problem: Risk for Bleeding  Goal: Patient will take measures to prevent bleeding and recognizes signs of bleeding that need to be reported immediately to a health care professional  Outcome: Progressing     Problem: Gastrointestinal Irritability  Goal: Nausea and vomiting will be absent or improve  Outcome: Progressing     Problem: Fall Risk  Goal: Patient will remain free from falls  Outcome: Progressing       Patient is not progressing towards the following goals:      Problem: Self Care  Goal: Patient will have the ability to perform ADLs independently or with assistance (bathe, groom, dress, toilet and feed)  Outcome: Not Progressing  Note: Maintain support, give positive feedback, encourage self-care allowing extra time and verbal cuing as needed   Avoid doing something for patients they can do themselves, but provide assistance as needed   Assist in anticipating/planning individual needs

## 2024-09-17 NOTE — PROGRESS NOTES
Received bedside report from JANNETTE Busch and assumed patient care. Patient sitting up in bed with no signs or symptoms of distress. A/Ox4, 0/10 pain, RA, Telemetry monitoring in place. Call bell and possessions within reach. Bed locked/lowest position. Patient denies additional needs at this time.

## 2024-09-17 NOTE — PROGRESS NOTES
Monitor Summary  Rhythm: A-fib w/BBB  Rate:   Ectopy: PVC  Measurements: -/.13/-  ---12 hr Chart Review---

## 2024-09-17 NOTE — PROGRESS NOTES
Hospital Medicine Daily Progress Note    Date of Service  9/17/2024    Chief Complaint  Robert Mcdonnell is a 74 y.o. male admitted 9/7/2024 with abdominal pain and N/V    Hospital Course  This is a 74 y.o. homeless male from [facility] with history of chronic A-fib not on anticoagulation due to medical noncompliance, tobacco abuse, COPD, CHF, hypertension, hyperlipidemia who presented 9/7/2024 with evaluation for intractable nausea and vomiting.  Patient reported ongoing symptoms for at least the past 3 to 4 days, reported epigastric discomfort associate with nausea and vomiting.  In ER, found to have leukocytosis, lactic acid of 3.5 and NIRMAL with creatinine of 2.24.  Initially thought to have bowel perforation, no perforation seen on CTAP, however noted to have left lung base opacity.  Patient was given IVF boluses per sepsis protocol and Zosyn empirically by ERP.   Patient admitted for further treatment. He was on anticoagulation for his A.fib but started having black tarry stool and his hemoglobin dropped down to 12 from 14 so anticoagulation was stopped (had been non complaint with this at home anyway) and GI was consulted    Interval Problem Update  Patient seen and examined, afebrile resting in bed, hemoglobin stable path report pending  GI following appreciate rec.    I have discussed this patient's plan of care and discharge plan at IDT rounds today with Case Management, Nursing, Nursing leadership, and other members of the IDT team.    Consultants/Specialty  GI    Code Status  Full Code    Disposition  The patient is not medically cleared for discharge to home or a post-acute facility.      I have placed the appropriate orders for post-discharge needs.    Review of Systems  Review of Systems   Constitutional:  Negative for chills, fever and malaise/fatigue.   HENT:  Negative for hearing loss and tinnitus.    Eyes:  Negative for blurred vision.   Respiratory:  Negative for cough and shortness of breath.     Cardiovascular:  Negative for chest pain and palpitations.   Gastrointestinal:  Negative for abdominal pain, blood in stool, heartburn, melena, nausea and vomiting.   Genitourinary:  Negative for dysuria and urgency.   Musculoskeletal:  Negative for joint pain and myalgias.   Skin:  Negative for itching and rash.   Neurological:  Positive for weakness. Negative for dizziness, focal weakness and headaches.   Endo/Heme/Allergies:  Negative for environmental allergies. Does not bruise/bleed easily.   Psychiatric/Behavioral:  Negative for depression and substance abuse.    All other systems reviewed and are negative.       Physical Exam  Temp:  [36.6 °C (97.9 °F)] 36.6 °C (97.9 °F)  Pulse:  [2-102] 78  Resp:  [16] 16  BP: ()/(53-78) 124/78  SpO2:  [94 %-97 %] 96 %    Physical Exam  Vitals and nursing note reviewed.   Constitutional:       General: He is not in acute distress.     Appearance: He is not ill-appearing.   HENT:      Head: Normocephalic and atraumatic.      Nose: Nose normal.      Mouth/Throat:      Pharynx: Oropharynx is clear.   Eyes:      General: No scleral icterus.     Extraocular Movements: Extraocular movements intact.   Cardiovascular:      Rate and Rhythm: Rhythm irregular.      Pulses: Normal pulses.      Heart sounds:      No friction rub.   Pulmonary:      Effort: No respiratory distress.      Breath sounds: No wheezing or rales.   Chest:      Chest wall: No tenderness.   Abdominal:      General: There is no distension.      Tenderness: There is no abdominal tenderness. There is no guarding or rebound.   Musculoskeletal:         General: Normal range of motion.      Cervical back: Neck supple. No tenderness.      Right lower leg: No edema.      Left lower leg: No edema.   Skin:     General: Skin is warm and dry.      Capillary Refill: Capillary refill takes less than 2 seconds.   Neurological:      General: No focal deficit present.      Mental Status: He is alert and oriented to person,  place, and time.   Psychiatric:         Mood and Affect: Mood normal.         Fluids    Intake/Output Summary (Last 24 hours) at 9/17/2024 1353  Last data filed at 9/17/2024 1130  Gross per 24 hour   Intake 706 ml   Output 400 ml   Net 306 ml        Laboratory  Recent Labs     09/15/24  0124 09/16/24  0126 09/17/24  0123   WBC 8.7 9.0 8.8   RBC 2.80* 3.04* 3.12*   HEMOGLOBIN 7.8* 8.6* 8.6*   HEMATOCRIT 24.2* 26.2* 26.9*   MCV 86.4 86.2 86.2   MCH 27.9 28.3 27.6   MCHC 32.2* 32.8 32.0*   RDW 47.8 47.1 48.1   PLATELETCT 167 225 262   MPV 13.2* 11.4 11.0     Recent Labs     09/15/24  0124 09/16/24  0126 09/17/24  0123   SODIUM 136 137 140   POTASSIUM 3.9 3.4* 4.1   CHLORIDE 106 99 103   CO2 23 26 28   GLUCOSE 91 116* 80   BUN 4* 4* 7*   CREATININE 0.94 1.06 1.11   CALCIUM 7.7* 8.0* 8.2*                       Imaging  EC-ECHOCARDIOGRAM COMPLETE W/ CONT   Final Result      CT-ABDOMEN-PELVIS WITH   Final Result      1.  Distal stomach is decompressed and not adequately evaluated. No definitive mass in the proximal stomach are correlated with direct visualization if continued concern for gastric mass.   2.  New small left pleural effusion.   3.  Cardiomegaly.   4.  Multiple too small to characterize hypodense lesions within the liver      CT-ABDOMEN-PELVIS W/O   Final Result      1.  Opacification left lung base at costophrenic angle is identified which could be due to scarring or atelectasis.      2.  Cardiomegaly.      3.  Otherwise no acute abnormalities are noted in the abdomen or pelvis.      DX-CHEST-PORTABLE (1 VIEW)   Final Result         1.  Possible small left pleural effusion.      2.  Minimal atelectasis left lung base.      3.  No consolidations identified.           Assessment/Plan  * Intractable nausea and vomiting- (present on admission)  Assessment & Plan    Aspiration precautions  resolved    Severe protein-calorie malnutrition (HCC)  Assessment & Plan  Significant recent weight loss, cirrhosis  contributing  Nutritionist following    Hypotension  Assessment & Plan  With dizziness  Mild dehydration - holding diuretics, small bolus of LR, following  Also due to cardiac meds - will decreased dose of lisinopril, following, may need to decrease dose of bb - currently has parameters - following and will make further adjustments as needed  Less likely due to gi bleed as h/h has been increasing but will continue to trend h/h    Gastrointestinal hemorrhage with melena  Assessment & Plan  See above  Due to gastric ulcer and anticoag  Biopsy results pending  GI following, repeat egd 9/13  Plts decreased some, h/h also, cbc in am   Transfuse further as needed  Iv ppi    Pneumonia due to infectious organism  Assessment & Plan  Possibly aspirated  LLL opacity,   IVF  Completed course of abx      AP (abdominal pain)  Assessment & Plan  Due to gastric ulcer, see above - suspect gastric cancer   resolved  Following  Supportive care  PPI  Gastric biopsy showed dysplasia, repeat edg and biopsy 9/13- still awaiting new biopsy results  Continue PPI and carafate  following    Lactic acidosis  Assessment & Plan  Likely secondary sepsis  IVF, antibiotic  High-dose IV thiamine      Leukocytosis  Assessment & Plan  resolved    ACP (advance care planning)  Assessment & Plan  Goal of care discussed with patient in emergency room.  He stated he is agreeable for noninvasive, as well as invasive/heroic life-sustaining measures-including CPR/defibrillation/intubation or mechanical ventilation.  He is also agreeable for further treatment with IVF, IV antibiotic, imaging study with echocardiogram renal ultrasound, as well as the need to quit smoking and to follow-up with anticoagulation clinic outpatient once discharged.  Diagnosis, prognosis, question and concern addressed.  Full CODE STATUS confirmed.  ACP: 16 minutes    COPD (chronic obstructive pulmonary disease) ([facility])  Assessment & Plan  Currently not in acute exacerbation  Pulmonary  hygiene  Smoking cessation advised    Tobacco abuse counseling  Assessment & Plan  Reported smoking more than 1 pack of cigarettes daily  Stated he has been try to quit for the past week  Tobacco smoking cessation counseling provided: 5 minutes  We discussed tobacco smoking cessation given concern for COPD, as well as cardiovascular health benefits, he expressed understanding.  Offered patient with nicotine patch, nicotine gum, Chantix as alternative.  Provided patient with standard tobacco cessation information per protocol    Acute kidney injury ([facility])- (present on admission)  Assessment & Plan  BUN 60, creatinine 2.24, bicarb 18  Suspect secondary to intractable nausea vomiting  Resolved  Avoid nephro toxins  Recheck bmp in am         Sepsis ([facility])- (present on admission)  Assessment & Plan  This is Sepsis Present on admission  SIRS criteria identified on my evaluation include: Tachycardia, with heart rate greater than 90 BPM, Leukocytosis, with WBC greater than 12,000, and Bandemia, greater than 10% bands  Clinical indicators of end organ dysfunction include Lactic Acid greater than 2 and Acute On Chronic Renal Failure, with creatinine >0.5 above baseline level  Source is possible GI, pulm (LLL)  Sepsis protocol initiated  Crystalloid Fluid Administration: Fluid resuscitation ordered per standard protocol - 30 mL/kg per current or ideal body weight  IV antibiotics as appropriate for source of sepsis  Reassessment: I have reassessed the patient's hemodynamic status possible    WBC 16.0, lactic acid 3.5, creatinine 2.24  Possible GI source versus LLL infiltrate (aspiration pneumonitis versus pneumonia from nausea vomiting)  IVF  Resolving, see above, discussed with ID, monitoring off abx      Atrial fibrillation with RVR ([facility])- (present on admission)  Assessment & Plan  Known history of A-fib  He did not follow through with anticoagulation clinic last time, high risk on this med, does not want to take it and high  risk for falls and recurrent GI bleed  Chads score only 2  Pt opts to remain off anticoagulation   See above, due to bp adjusting/ decreasing dose of metoprolol, monitoring a fib on this new dose  Will check associated electrolytes in am   Continue tele    Thrombocytopenia (HCC)- (present on admission)  Assessment & Plan  improving  Following  Cbc in am     Chronic systolic heart failure (HCC)- (present on admission)  Assessment & Plan  Remains euvolemic, no acute exacerbation  Following closely   Echo Moderately reduced left ventricular systolic function.  The right ventricle is dilated with reduced right ventricular systolic   function.  Thickened mitral valve leaflets with focal restriction vs cleft of at   the P2-P3 junction.  Moderate to severe mitral regurgitation.  Moderate to severe tricuspid regurgitation  He agrees to follow up with cardiology as outpatient, continue current cardiac regimen as tolerated  Will titrate back on chronic cardiac meds as his bp has been stable and acute gi bleed has resolved- following closely     Hypokalemia- (present on admission)  Assessment & Plan  Replaced  Resolved  following       VTE prophylaxis: scd      I have performed a physical exam and reviewed and updated ROS and Plan today (9/17/2024). In review of yesterday's note (9/16/2024), there are no changes except as documented above.

## 2024-09-17 NOTE — CARE PLAN
Problem: Pain - Standard  Goal: Alleviation of pain or a reduction in pain to the patient’s comfort goal  Outcome: Progressing     Problem: Knowledge Deficit - COPD  Goal: Patient/significant other demonstrates understanding of disease process, utilization of the Action Plan, medications and discharge instruction  Outcome: Progressing     Problem: Risk for Infection - COPD  Goal: Patient will remain free from signs and symptoms of infection  Outcome: Progressing     Problem: Nutrition - Advanced  Goal: Patient will display progressive weight gain toward goal have adequate food and fluid intake  Outcome: Progressing     Problem: Self Care  Goal: Patient will have the ability to perform ADLs independently or with assistance (bathe, groom, dress, toilet and feed)  Outcome: Progressing     Problem: Hemodynamics  Goal: Patient's hemodynamics, fluid balance and neurologic status will be stable or improve  Outcome: Progressing     Problem: Physical Regulation  Goal: Diagnostic test results will improve  Outcome: Progressing  Goal: Signs and symptoms of infection will decrease  Outcome: Progressing   The patient is Stable - Low risk of patient condition declining or worsening    Shift Goals  Clinical Goals: awaiting biopsy results, VSS, Rest  Patient Goals: Sleep  Family Goals: MARCOS    Progress made toward(s) clinical / shift goals:    Pt's reported no pain during this RN's shift. Pt has reported no nausea. Pt has had good PO intake during this RN's shift. Awaiting biopsy results    Patient is not progressing towards the following goals:

## 2024-09-17 NOTE — PROGRESS NOTES
GI APRN interim note:    Pathology from 9/13/2024 still pending.  No changes/active treatment from acute GI team.

## 2024-09-18 VITALS
OXYGEN SATURATION: 96 % | HEART RATE: 91 BPM | BODY MASS INDEX: 20.45 KG/M2 | RESPIRATION RATE: 16 BRPM | HEIGHT: 73 IN | DIASTOLIC BLOOD PRESSURE: 63 MMHG | TEMPERATURE: 98.1 F | WEIGHT: 154.32 LBS | SYSTOLIC BLOOD PRESSURE: 96 MMHG

## 2024-09-18 LAB
BASOPHILS # BLD AUTO: 0.4 % (ref 0–1.8)
BASOPHILS # BLD: 0.03 K/UL (ref 0–0.12)
EOSINOPHIL # BLD AUTO: 0.13 K/UL (ref 0–0.51)
EOSINOPHIL NFR BLD: 1.5 % (ref 0–6.9)
ERYTHROCYTE [DISTWIDTH] IN BLOOD BY AUTOMATED COUNT: 46.6 FL (ref 35.9–50)
HCT VFR BLD AUTO: 27.3 % (ref 42–52)
HGB BLD-MCNC: 8.7 G/DL (ref 14–18)
IMM GRANULOCYTES # BLD AUTO: 0.04 K/UL (ref 0–0.11)
IMM GRANULOCYTES NFR BLD AUTO: 0.5 % (ref 0–0.9)
LYMPHOCYTES # BLD AUTO: 1.18 K/UL (ref 1–4.8)
LYMPHOCYTES NFR BLD: 14 % (ref 22–41)
MCH RBC QN AUTO: 27.1 PG (ref 27–33)
MCHC RBC AUTO-ENTMCNC: 31.9 G/DL (ref 32.3–36.5)
MCV RBC AUTO: 85 FL (ref 81.4–97.8)
MONOCYTES # BLD AUTO: 0.87 K/UL (ref 0–0.85)
MONOCYTES NFR BLD AUTO: 10.3 % (ref 0–13.4)
NEUTROPHILS # BLD AUTO: 6.19 K/UL (ref 1.82–7.42)
NEUTROPHILS NFR BLD: 73.3 % (ref 44–72)
NRBC # BLD AUTO: 0 K/UL
NRBC BLD-RTO: 0 /100 WBC (ref 0–0.2)
PLATELET # BLD AUTO: 291 K/UL (ref 164–446)
PMV BLD AUTO: 10.4 FL (ref 9–12.9)
RBC # BLD AUTO: 3.21 M/UL (ref 4.7–6.1)
WBC # BLD AUTO: 8.4 K/UL (ref 4.8–10.8)

## 2024-09-18 PROCEDURE — A9270 NON-COVERED ITEM OR SERVICE: HCPCS | Performed by: STUDENT IN AN ORGANIZED HEALTH CARE EDUCATION/TRAINING PROGRAM

## 2024-09-18 PROCEDURE — 99232 SBSQ HOSP IP/OBS MODERATE 35: CPT | Performed by: NURSE PRACTITIONER

## 2024-09-18 PROCEDURE — 700102 HCHG RX REV CODE 250 W/ 637 OVERRIDE(OP): Performed by: HOSPITALIST

## 2024-09-18 PROCEDURE — 700111 HCHG RX REV CODE 636 W/ 250 OVERRIDE (IP): Performed by: NURSE PRACTITIONER

## 2024-09-18 PROCEDURE — 700102 HCHG RX REV CODE 250 W/ 637 OVERRIDE(OP): Performed by: NURSE PRACTITIONER

## 2024-09-18 PROCEDURE — 700102 HCHG RX REV CODE 250 W/ 637 OVERRIDE(OP): Performed by: STUDENT IN AN ORGANIZED HEALTH CARE EDUCATION/TRAINING PROGRAM

## 2024-09-18 PROCEDURE — 99239 HOSP IP/OBS DSCHRG MGMT >30: CPT | Performed by: INTERNAL MEDICINE

## 2024-09-18 PROCEDURE — 85025 COMPLETE CBC W/AUTO DIFF WBC: CPT

## 2024-09-18 PROCEDURE — A9270 NON-COVERED ITEM OR SERVICE: HCPCS | Performed by: HOSPITALIST

## 2024-09-18 PROCEDURE — A9270 NON-COVERED ITEM OR SERVICE: HCPCS | Performed by: NURSE PRACTITIONER

## 2024-09-18 RX ORDER — ATORVASTATIN CALCIUM 40 MG/1
40 TABLET, FILM COATED ORAL EVERY EVENING
Qty: 30 TABLET | Refills: 0 | Status: SHIPPED | OUTPATIENT
Start: 2024-09-18

## 2024-09-18 RX ORDER — LISINOPRIL 5 MG/1
5 TABLET ORAL DAILY
Qty: 30 TABLET | Refills: 0 | Status: SHIPPED | OUTPATIENT
Start: 2024-09-19

## 2024-09-18 RX ORDER — METOPROLOL TARTRATE 25 MG/1
25 TABLET, FILM COATED ORAL 2 TIMES DAILY
Qty: 60 TABLET | Refills: 0 | Status: SHIPPED | OUTPATIENT
Start: 2024-09-18

## 2024-09-18 RX ORDER — SUCRALFATE ORAL 1 G/10ML
1 SUSPENSION ORAL EVERY 6 HOURS
Qty: 1200 ML | Refills: 0 | Status: SHIPPED | OUTPATIENT
Start: 2024-09-18 | End: 2024-10-18

## 2024-09-18 RX ADMIN — PANTOPRAZOLE SODIUM 40 MG: 40 INJECTION, POWDER, FOR SOLUTION INTRAVENOUS at 05:03

## 2024-09-18 RX ADMIN — NICOTINE TRANSDERMAL SYSTEM 21 MG: 21 PATCH, EXTENDED RELEASE TRANSDERMAL at 05:02

## 2024-09-18 RX ADMIN — SUCRALFATE 1 G: 1 SUSPENSION ORAL at 01:01

## 2024-09-18 RX ADMIN — SUCRALFATE 1 G: 1 SUSPENSION ORAL at 05:01

## 2024-09-18 RX ADMIN — LISINOPRIL 5 MG: 5 TABLET ORAL at 05:01

## 2024-09-18 ASSESSMENT — ENCOUNTER SYMPTOMS
BLURRED VISION: 0
VOMITING: 0
CHILLS: 0
DIARRHEA: 0
BLOOD IN STOOL: 0
DIZZINESS: 0
FEVER: 0
WEAKNESS: 0
ABDOMINAL PAIN: 0
COUGH: 0
NAUSEA: 0
CONSTIPATION: 0
NERVOUS/ANXIOUS: 0
BACK PAIN: 0
HEARTBURN: 0
DEPRESSION: 0
SHORTNESS OF BREATH: 0

## 2024-09-18 ASSESSMENT — FIBROSIS 4 INDEX: FIB4 SCORE: 1.26

## 2024-09-18 ASSESSMENT — PAIN DESCRIPTION - PAIN TYPE: TYPE: ACUTE PAIN

## 2024-09-18 NOTE — DISCHARGE SUMMARY
Discharge Summary    CHIEF COMPLAINT ON ADMISSION  Chief Complaint   Patient presents with    Abdominal Pain    N/V     BIB [facility] complaining of abd pain, n/v that increased over the last 3 days. Patient reports having GI complications x 1 year.        Reason for Admission  ems     Admission Date  9/7/2024    CODE STATUS  Prior    HPI & HOSPITAL COURSE  This is a 74 y.o. male o. homeless male from [facility] with history of chronic A-fib not on anticoagulation due to medical noncompliance, tobacco abuse, COPD, CHF, hypertension, hyperlipidemia who presented 9/7/2024 with evaluation for intractable nausea and vomiting.  Patient reported ongoing symptoms for at least the past 3 to 4 days, reported epigastric discomfort associate with nausea and vomiting.  In ER, found to have leukocytosis, lactic acid of 3.5 and NIRMAL with creatinine of 2.24.  Initially thought to have bowel perforation, no perforation seen on CTAP, however noted to have left lung base opacity.  Patient was given IVF boluses per sepsis protocol and Zosyn empirically by ERP.   Patient admitted for further treatment. He was on anticoagulation for his A.fib but started having black tarry stool and his hemoglobin dropped down to 12 from 14 so anticoagulation was stopped (had been non complaint with this at home anyway) and GI was consulted. 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 Patient now doing better due to his ulcer he cannot be started on anticoagulation for his A.fibb   He will need to follow with GI as outpatient referral has been placed  Will discharge patient home today with meds to bed      The patient met 2-midnight criteria for an inpatient stay at the time of discharge.    Discharge  Date  9/18/2024    FOLLOW UP ITEMS POST DISCHARGE  GI    DISCHARGE DIAGNOSES  Principal Problem:    Intractable nausea and vomiting (POA: Yes)  Active Problems:    Hypokalemia (POA: Yes)    Chronic systolic heart failure (HCC) (Chronic) (POA: Yes)    Thrombocytopenia (HCC) (POA: Yes)    Atrial fibrillation with RVR (HCC) (POA: Yes)    Sepsis (HCC) (POA: Yes)    Acute kidney injury (HCC) (POA: Yes)    Tobacco abuse counseling (POA: Unknown)    COPD (chronic obstructive pulmonary disease) (HCC) (POA: Unknown)    ACP (advance care planning) (POA: Unknown)    Leukocytosis (POA: Unknown)    Lactic acidosis (POA: Unknown)    AP (abdominal pain) (POA: Unknown)    Pneumonia due to infectious organism (POA: Unknown)    Gastrointestinal hemorrhage with melena (POA: Unknown)    Hypotension (POA: Unknown)    Severe protein-calorie malnutrition (HCC) (POA: Unknown)  Resolved Problems:    * No resolved hospital problems. *      FOLLOW UP  No future appointments.  Dion Maldonado P.A.-C.  [address] [place] [address]  [phone]    Go on 9/19/2024  Please go to your new patient appointment with Dion Maldonado PA-C on 9/19/24 at 1:10 PM. Please arrive 30 minutes early to check in and bring your ID, insurance information, as well as any medications you are taking.      MEDICATIONS ON DISCHARGE     Medication List        START taking these medications        Instructions   atorvastatin 40 MG Tabs  Commonly known as: Lipitor   Take 1 Tablet by mouth every evening.  Dose: 40 mg     metoprolol tartrate 25 MG Tabs  Commonly known as: Lopressor   Take 1 Tablet by mouth 2 times a day.  Dose: 25 mg     sucralfate 1 GM/10ML Susp  Commonly known as: Carafate   Take 10 mL by mouth every 6 hours for 30 days.  Dose: 1 g            CHANGE how you take these medications        Instructions   lisinopril 5 MG Tabs  Start taking on: September 19, 2024  What changed:   medication strength  how much to take  Commonly known as: Prinivil   Take  1 Tablet by mouth every day.  Dose: 5 mg     omeprazole 20 MG delayed-release capsule  What changed:   how much to take  when to take this  Commonly known as: PriLOSEC   Take 2 Capsules by mouth 2 times a day for 60 days.  Dose: 40 mg            CONTINUE taking these medications        Instructions   ondansetron 4 MG Tbdp  Commonly known as: Zofran ODT   Take 4 mg by mouth every 6 hours as needed for Nausea/Vomiting.  Dose: 4 mg     spironolactone 25 MG Tabs  Commonly known as: Aldactone   Take 1 Tablet by mouth every day.  Dose: 25 mg     torsemide 20 MG Tabs  Commonly known as: Demadex   Take 1 Tablet by mouth every day.  Dose: 20 mg            STOP taking these medications      ibuprofen 200 MG Tabs  Commonly known as: Motrin     metoprolol  MG Tb24  Commonly known as: Toprol XL              Allergies  No Known Allergies    DIET  No orders of the defined types were placed in this encounter.      ACTIVITY  As tolerated.  Weight bearing as tolerated    CONSULTATIONS  GI     PROCEDURES  EGD    LABORATORY  Lab Results   Component Value Date    SODIUM 140 09/17/2024    POTASSIUM 4.1 09/17/2024    CHLORIDE 103 09/17/2024    CO2 28 09/17/2024    GLUCOSE 80 09/17/2024    BUN 7 (L) 09/17/2024    CREATININE 1.11 09/17/2024        Lab Results   Component Value Date    WBC 8.4 09/18/2024    HEMOGLOBIN 8.7 (L) 09/18/2024    HEMATOCRIT 27.3 (L) 09/18/2024    PLATELETCT 291 09/18/2024        Total time of the discharge process exceeds 35 minutes.

## 2024-09-18 NOTE — PROGRESS NOTES
..Gastroenterology Progress Note               Author:  SARAH Archer   Date & Time Created: 9/18/2024 12:46 PM       Patient ID:  Name:             Robert Mcdonnell  YOB: 1950  Age:                 74 y.o.  male  MRN:               [id number]        Medical Decision Making, by Problem:  Active Hospital Problems    Diagnosis     Hypotension [I95.9]     Severe protein-calorie malnutrition (HCC) [E43]     Gastrointestinal hemorrhage with melena [K92.1]     Intractable nausea and vomiting [R11.2]     Tobacco abuse counseling [Z71.6]     COPD (chronic obstructive pulmonary disease) (HCC) [J44.9]     ACP (advance care planning) [Z71.89]     Leukocytosis [D72.829]     Lactic acidosis [E87.20]     AP (abdominal pain) [R10.9]     Pneumonia due to infectious organism [J18.9]     Acute kidney injury (HCC) [N17.9]     Sepsis (HCC) [A41.9]     Atrial fibrillation with RVR (HCC) [I48.91]     Chronic systolic heart failure (HCC) [I50.22]     Thrombocytopenia (HCC) [D69.6]     Hypokalemia [E87.6]      Presenting Chief Complaint:  Melena, epigastric pain     History of Present Illness:      This is a 74-year-old male with a past medical history of chronic atrial fibrillation (not on anticoagulation due to medical noncompliance; controlled on metoprolol), heart failure reduced ejection fraction (EF 30-45), hypertension, hyperlipidemia, COPD (no O2 at baseline) who presented to [facility] on 9/7/2024 for evaluation of epigastric pain.  Patient reports epigastric pain for the past 3-4 days prior to arrival with associated nausea, vomiting.  He denies pain radiating.  No relieving or exacerbating factors.  He further endorses dark, soft/liquid stools in addition to heartburn/reflux  In the emergency department, he was found to have a leukocytosis, lactic acid of 3.5, NIRMAL with creatinine 2.24.  He was initially suspected of having a bowel perforation however CT abdomen pelvis was obtained  showing no bowel perforation but patient with left lung base opacity consistent with pneumonia.  He was given IV fluid boluses for sepsis protocol, started on empiric Zosyn, and admitted for further evaluation.  Initial hemoglobin 17 but is down trended to 12.4 on day of consult.  MCV normocytic.  Platelets 127.  BUN elevated at 60 in setting of NIRMAL on arrival.  This is down trended to 31.     9/10/2024: EGD:  IMPRESSION:  Large deeply ulcerated lesion on the lesser curvature of the stomach (2 cm x 2 cm). Abel IIa. The underlying ulcer bed was treated with epinephrine injection and bipolar cautery.  The edge of the ulcer was biopsied.    9/13/2024: EGD: Gastric ulcer status postbiopsy and tattoo, pathology pending    Interval History:  9/11/2024: Patient seen and examined. Had one BM, unclear of color. Hgb 8.1<8.8. Iron studies consistent with anemia of chronic disease. Path pending    9/12/2024: Patient seen. Not tolerating full liquids due to nausea. Hgb 8.3. Path with high grade dysplasia    9/14/2024: Still having difficulty eating solid food with increased abdominal pain. Hemoglobin 7.7 platelets 163. Last BM yesterday, patient reports normal.     9/15/2024: Patient seen, sitting in chair.  Would like to have a shower.  Tolerating his diet and would like to eat real food.  Pathology pending.    9/18/2024: Patient seen at bedside.  AAOx4.  Reports feeling well without nausea, vomiting, abdominal pain.  He is tolerating oral intake/diet without difficulty.  Discussed pathology results with patient as well as recommendations for PPI therapy as well as repeat EGD in 4 to 6 weeks for reassessment/repeat biopsies if needed.  Patient is agreeable to this.    Pathology 9/13/2024:    FINAL DIAGNOSIS:     A. Duodenum, biopsy:          Limited superficial fragment of duodenal mucosa with prominent           Brunner's glands and gastric foveolar metaplasia, negative for           dysplasia.          Additional deeper  levels have been examined.     B. Gastric biopsy:          Minute fragments of high-grade glandular dysplasia arising in           superficial fragments of gastric mucosa with active           inflammation, erosion, and focal intestinal metaplasia (see           comment).          No definitive invasive carcinoma is seen.          Fragments of fibrinopurulent exudate consistent with ulcer with           inflamed granulation tissue.          Negative for H. pylori organisms by Warthin-Starry special stain           (with appropriately staining controls).     Comment: Part B: The biopsy fragments show similar histologic findings   as that seen within the prior recent gastric ulcer biopsy ([id number];   9/10/2024), which has been reviewed. There is no definitive evidence of   invasive carcinoma within the sampled tissue.   Part B of this case has been reviewed by a second pathologist, Dr. Arnold, who concurs with the diagnosis.       Hospital Medications:  Current Facility-Administered Medications   Medication Dose Frequency Provider Last Rate Last Admin    lisinopril (Prinivil) tablet 5 mg  5 mg DAILY Shaina Otto M.D.   5 mg at 09/18/24 0501    [Held by provider] spironolactone (Aldactone) tablet 25 mg  25 mg DAILY Shaina Otto M.D.   25 mg at 09/15/24 1619    [Held by provider] torsemide (Demadex) tablet 20 mg  20 mg DAILY Shaina Otto M.D.   20 mg at 09/15/24 1619    pantoprazole (Protonix) injection 40 mg  40 mg BID Willa Sun DNP,  APRN   40 mg at 09/18/24 0503    sucralfate (Carafate) 1 GM/10ML suspension 1 g  1 g Q6HRS Willa Sun DNP,  APRN   1 g at 09/18/24 0501    metoprolol tartrate (Lopressor) tablet 25 mg  25 mg BID Shaina Otto M.D.   25 mg at 09/17/24 1728    dextrose 50% (D50W) injection 25 g  25 g Q15 MIN PRN Shaina Otto M.D.        LR (Bolus) infusion 500 mL  500 mL Once PRN Aaron Mehta M.D.        labetalol (Normodyne/Trandate) injection 10 mg  10 mg Q4HRS PRN  Aaron Mehta M.D.        ondansetron (Zofran) syringe/vial injection 4 mg  4 mg Q4HRS PRN Aaron Mehta M.D.   4 mg at 09/13/24 1453    ondansetron (Zofran ODT) dispertab 4 mg  4 mg Q4HRS PRN Aaron Mehta M.D.   4 mg at 09/08/24 2313    nicotine (Nicoderm) 21 MG/24HR 21 mg  21 mg Daily-0600 Aaron Mehta M.D.   21 mg at 09/18/24 0502    And    nicotine polacrilex (Nicorette) 2 MG piece 2 mg  2 mg Q HOUR PRN Aaron Mehta M.D.        Pharmacy Consult Request ...Pain Management Review 1 Each  1 Each PHARMACY TO DOSE Aaron Mehta M.D.        acetaminophen (Tylenol) tablet 650 mg  650 mg Q6HRS PRN Aaron Mehta M.D.   650 mg at 09/13/24 0026    oxyCODONE immediate-release (Roxicodone) tablet 5 mg  5 mg Q3HRS PRN Aaron Mehta M.D.   5 mg at 09/14/24 0915    Or    oxyCODONE immediate release (Roxicodone) tablet 10 mg  10 mg Q3HRS PRN Aaron Mehta M.D.        Or    HYDROmorphone (Dilaudid) injection 0.5 mg  0.5 mg Q3HRS PRN Aaron Mehta M.D.   0.5 mg at 09/12/24 0054    Metoprolol Tartrate (Lopressor) injection 5 mg  5 mg Q5 MIN PRN Aaron Mehta M.D.        atorvastatin (Lipitor) tablet 40 mg  40 mg Q EVENING Aaron Mehta M.D.   40 mg at 09/17/24 1726   Last reviewed on 9/13/2024  7:32 AM by Beckie Gallego R.N.       Review of Systems:  Review of Systems   Constitutional:  Negative for chills, fever and malaise/fatigue.   HENT:  Negative for hearing loss.    Eyes:  Negative for blurred vision.   Respiratory:  Negative for cough and shortness of breath.    Cardiovascular:  Negative for chest pain and leg swelling.   Gastrointestinal:  Negative for abdominal pain, blood in stool, constipation, diarrhea, heartburn, melena, nausea and vomiting.   Genitourinary:  Negative for dysuria.   Musculoskeletal:  Negative for back pain.   Skin:  Negative for rash.   Neurological:  Negative for dizziness and weakness.   Psychiatric/Behavioral:  Negative for depression. The patient is not nervous/anxious.    All other systems  "reviewed and are negative.        Vital signs:  Weight/BMI: Body mass index is 20.36 kg/m².  BP 96/63   Pulse 91   Temp 36.7 °C (98.1 °F) (Temporal)   Resp 16   Ht 1.854 m (6' 1\")   Wt 70 kg (154 lb 5.2 oz)   SpO2 96%   Vitals:    09/18/24 0008 09/18/24 0454 09/18/24 0500 09/18/24 0830   BP: 101/65 101/63  96/63   Pulse: 88 77  91   Resp: 16 16  16   Temp: 36.9 °C (98.4 °F) 36.8 °C (98.2 °F)  36.7 °C (98.1 °F)   TempSrc: Temporal Temporal  Temporal   SpO2: 94% 96%  96%   Weight:   70 kg (154 lb 5.2 oz)    Height:         Oxygen Therapy:  Pulse Oximetry: 96 %, O2 (LPM): 0, O2 Delivery Device: None - Room Air    Intake/Output Summary (Last 24 hours) at 9/18/2024 1246  Last data filed at 9/18/2024 0800  Gross per 24 hour   Intake 1307 ml   Output 1350 ml   Net -43 ml       Physical Exam  Vitals and nursing note reviewed.   Constitutional:       General: He is not in acute distress.     Appearance: Normal appearance. He is not ill-appearing.   HENT:      Head: Normocephalic and atraumatic.      Left Ear: External ear normal.      Nose: Nose normal.      Mouth/Throat:      Mouth: Mucous membranes are moist.      Pharynx: Oropharynx is clear.   Eyes:      General: No scleral icterus.  Cardiovascular:      Rate and Rhythm: Normal rate and regular rhythm.      Pulses: Normal pulses.      Heart sounds: Normal heart sounds.   Pulmonary:      Effort: Pulmonary effort is normal.      Breath sounds: Normal breath sounds.   Abdominal:      General: Abdomen is flat. Bowel sounds are normal. There is no distension.      Palpations: Abdomen is soft.      Tenderness: There is no abdominal tenderness. There is no guarding.   Musculoskeletal:         General: Normal range of motion.      Cervical back: Normal range of motion and neck supple.      Right lower leg: No edema.      Left lower leg: No edema.   Skin:     General: Skin is warm and dry.      Capillary Refill: Capillary refill takes less than 2 seconds.      Coloration: " Skin is not jaundiced or pale.   Neurological:      Mental Status: He is alert and oriented to person, place, and time.      Motor: Weakness present.   Psychiatric:         Mood and Affect: Mood normal.         Behavior: Behavior normal.       Labs:  Recent Labs     09/16/24 0126 09/17/24 0123   SODIUM 137 140   POTASSIUM 3.4* 4.1   CHLORIDE 99 103   CO2 26 28   BUN 4* 7*   CREATININE 1.06 1.11   MAGNESIUM 2.1  --    CALCIUM 8.0* 8.2*     Recent Labs     09/16/24 0126 09/17/24 0123   GLUCOSE 116* 80     Recent Labs     09/16/24 0126 09/17/24 0123 09/18/24 0229   WBC 9.0 8.8 8.4   NEUTSPOLYS 76.00* 74.90* 73.30*   LYMPHOCYTES 12.20* 13.80* 14.00*   MONOCYTES 9.70 9.70 10.30   EOSINOPHILS 1.60 1.10 1.50   BASOPHILS 0.30 0.30 0.40     Recent Labs     09/16/24 0126 09/17/24 0123 09/18/24 0229   RBC 3.04* 3.12* 3.21*   HEMOGLOBIN 8.6* 8.6* 8.7*   HEMATOCRIT 26.2* 26.9* 27.3*   PLATELETCT 225 262 291     Recent Results (from the past 24 hour(s))   CBC WITH DIFFERENTIAL    Collection Time: 09/18/24  2:29 AM   Result Value Ref Range    WBC 8.4 4.8 - 10.8 K/uL    RBC 3.21 (L) 4.70 - 6.10 M/uL    Hemoglobin 8.7 (L) 14.0 - 18.0 g/dL    Hematocrit 27.3 (L) 42.0 - 52.0 %    MCV 85.0 81.4 - 97.8 fL    MCH 27.1 27.0 - 33.0 pg    MCHC 31.9 (L) 32.3 - 36.5 g/dL    RDW 46.6 35.9 - 50.0 fL    Platelet Count 291 164 - 446 K/uL    MPV 10.4 9.0 - 12.9 fL    Neutrophils-Polys 73.30 (H) 44.00 - 72.00 %    Lymphocytes 14.00 (L) 22.00 - 41.00 %    Monocytes 10.30 0.00 - 13.40 %    Eosinophils 1.50 0.00 - 6.90 %    Basophils 0.40 0.00 - 1.80 %    Immature Granulocytes 0.50 0.00 - 0.90 %    Nucleated RBC 0.00 0.00 - 0.20 /100 WBC    Neutrophils (Absolute) 6.19 1.82 - 7.42 K/uL    Lymphs (Absolute) 1.18 1.00 - 4.80 K/uL    Monos (Absolute) 0.87 (H) 0.00 - 0.85 K/uL    Eos (Absolute) 0.13 0.00 - 0.51 K/uL    Baso (Absolute) 0.03 0.00 - 0.12 K/uL    Immature Granulocytes (abs) 0.04 0.00 - 0.11 K/uL    NRBC (Absolute) 0.00 K/uL        Radiology Review:  EC-ECHOCARDIOGRAM COMPLETE W/ CONT   Final Result      CT-ABDOMEN-PELVIS WITH   Final Result      1.  Distal stomach is decompressed and not adequately evaluated. No definitive mass in the proximal stomach are correlated with direct visualization if continued concern for gastric mass.   2.  New small left pleural effusion.   3.  Cardiomegaly.   4.  Multiple too small to characterize hypodense lesions within the liver      CT-ABDOMEN-PELVIS W/O   Final Result      1.  Opacification left lung base at costophrenic angle is identified which could be due to scarring or atelectasis.      2.  Cardiomegaly.      3.  Otherwise no acute abnormalities are noted in the abdomen or pelvis.      DX-CHEST-PORTABLE (1 VIEW)   Final Result         1.  Possible small left pleural effusion.      2.  Minimal atelectasis left lung base.      3.  No consolidations identified.            MDM (Data Review):   -Records reviewed and summarized in current documentation  -I personally reviewed and interpreted the laboratory results  -I personally reviewed the radiology images    Assessment/Recommendations:  ASSESSMENT:  GI bleeding with melena  Large ulcerated stomach lesion  Nausea/vomiting  Normocytic anemia  Atrial fibrillation-received single dose of Eliquis 9/9/2024 at 1308.  NIRMAL-improving  Community-acquired pneumonia  Lactic acidosis-resolved  Tobacco use  Heart failure reduced ejection fraction-EF 30-45%.  Repeat echocardiogram confirms EF 33%  CT abdomen/pelvis with contrast comments on distal stomach decompression but unable to adequately evaluate  Small left pleural effusion  Cardiomegaly  Multiple hypodense lesions within the liver    RECOMMENDATIONS:   Sucralfate q6 hrs x 2 weeks  Continue twice daily PPI therapy  Continue to hold anticoagulation  Follow-up with outpatient GI for repeat EGD ion 4-6 weeks-referral placed    No further inventions from acute GI team.  GI to sign off.  Please reconsult for any  further questions or concerns.      Discussed with patient, Dr. Black, Dr. Lopez    ..REGINA ArcherPWandyRJANETT    Core Quality Measures   Reviewed items::  Labs, Medications and Radiology reports reviewed

## 2024-09-18 NOTE — PROGRESS NOTES
Patient being discharged via [facility]. Pt educated on discharge instructions and new prescriptions, verbalized understanding. Follow up appointment to be made with PCP. PIV removed here in DCL. Patient going home via cab.     no

## 2024-09-18 NOTE — CARE PLAN
The patient is Stable - Low risk of patient condition declining or worsening    Shift Goals  Clinical Goals: awaiting biopsy results, VSS, Rest  Patient Goals: Sleep  Family Goals: MARCOS    Progress made toward(s) clinical / shift goals:   Problem: Pain - Standard  Goal: Alleviation of pain or a reduction in pain to the patient’s comfort goal  Outcome: Progressing     Problem: Knowledge Deficit - COPD  Goal: Patient/significant other demonstrates understanding of disease process, utilization of the Action Plan, medications and discharge instruction  Outcome: Progressing     Problem: Risk for Infection - COPD  Goal: Patient will remain free from signs and symptoms of infection  Outcome: Progressing     Problem: Nutrition - Advanced  Goal: Patient will display progressive weight gain toward goal have adequate food and fluid intake  Outcome: Progressing     Problem: Ineffective Airway Clearance  Goal: Patient will maintain patent airway with clear/clearing breath sounds  Outcome: Progressing     Problem: Impaired Gas Exchange  Goal: Patient will demonstrate improved ventilation and adequate oxygenation and participate in treatment regimen within the level of ability/situation.  Outcome: Progressing     Problem: Risk for Aspiration  Goal: Patient's risk for aspiration will be absent or decrease  Outcome: Progressing     Problem: Self Care  Goal: Patient will have the ability to perform ADLs independently or with assistance (bathe, groom, dress, toilet and feed)  Outcome: Progressing     Problem: Hemodynamics  Goal: Patient's hemodynamics, fluid balance and neurologic status will be stable or improve  Outcome: Progressing     Problem: Fluid Volume  Goal: Fluid volume balance will be maintained  Outcome: Progressing     Problem: Urinary - Renal Perfusion  Goal: Ability to achieve and maintain adequate renal perfusion and functioning will improve  Outcome: Progressing     Problem: Respiratory  Goal: Patient will  achieve/maintain optimum respiratory ventilation and gas exchange  Outcome: Progressing     Problem: Mechanical Ventilation  Goal: Safe management of artificial airway and ventilation  Outcome: Progressing  Goal: Successful weaning off mechanical ventilator, spontaneously maintains adequate gas exchange  Outcome: Progressing  Goal: Patient will be able to express needs and understand communication  Outcome: Progressing     Problem: Physical Regulation  Goal: Diagnostic test results will improve  Outcome: Progressing  Goal: Signs and symptoms of infection will decrease  Outcome: Progressing     Problem: Knowledge Deficit - Standard  Goal: Patient and family/care givers will demonstrate understanding of plan of care, disease process/condition, diagnostic tests and medications  Outcome: Progressing     Problem: Care Map:  Day 1 Optimal Outcome for the Heart Failure Patient  Goal: Day 1:  Optimal Care of the heart failure patient  Outcome: Progressing     Problem: Care Map:  Day 2 Optimal Outcome for the Heart Failure Patient  Goal: Day 2:  Optimal Care of the heart failure patient  Outcome: Progressing     Problem: Care Map:  Day 3 Optimal Outcome for the Heart Failure Patient  Goal: Day 3:  Optimal Care of the heart failure patient  Outcome: Progressing     Problem: Care Map:  Day Before Discharge Optimal Outcome for the Heart Failure Patient  Goal: Day Before Discharge:  Optimal Care of the heart failure patient  Outcome: Progressing     Problem: Risk for Bleeding  Goal: Patient will take measures to prevent bleeding and recognizes signs of bleeding that need to be reported immediately to a health care professional  Outcome: Progressing  Goal: Patient will not experience bleeding as evidenced by normal blood pressure, stable hematocrit and hemoglobin levels and desired ranges for coagulation profiles  Outcome: Progressing     Problem: Gastrointestinal Irritability  Goal: Nausea and vomiting will be absent or  improve  Outcome: Progressing  Goal: Diarrhea will be absent or improved  Outcome: Progressing     Problem: Fall Risk  Goal: Patient will remain free from falls  Outcome: Progressing

## 2024-09-18 NOTE — PROGRESS NOTES
Monitor Summary  Rhythm: Atrial Fibrillation  Rate: 60s-80s  Ectopy: rare PVCs, triplet, 6 beats NSVT  .- / .14 / .45

## 2024-09-18 NOTE — PROGRESS NOTES
Received bedside report from RN, pt care assumed, VSS, pt assessment complete. Pt AAOx4, with 0/10 of pain at this time. No signs of acute distress noted at this time. Plan of care discussed with pt and verbalizes no questions. Pt denies any additional needs at this time. Bed locked/in lowest position, pt educated on fall risk and verbalized understanding, call light within reach, hourly rounding initiated.

## 2024-10-03 ENCOUNTER — APPOINTMENT (OUTPATIENT)
Dept: RADIOLOGY | Facility: MEDICAL CENTER | Age: 74
DRG: 326 | End: 2024-10-03
Attending: EMERGENCY MEDICINE
Payer: MEDICARE

## 2024-10-03 ENCOUNTER — APPOINTMENT (OUTPATIENT)
Dept: RADIOLOGY | Facility: MEDICAL CENTER | Age: 74
DRG: 326 | End: 2024-10-03
Attending: INTERNAL MEDICINE
Payer: MEDICARE

## 2024-10-03 ENCOUNTER — HOSPITAL ENCOUNTER (INPATIENT)
Facility: MEDICAL CENTER | Age: 74
LOS: 12 days | DRG: 326 | End: 2024-10-15
Attending: EMERGENCY MEDICINE
Payer: MEDICARE

## 2024-10-03 DIAGNOSIS — K92.2 UGIB (UPPER GASTROINTESTINAL BLEED): ICD-10-CM

## 2024-10-03 DIAGNOSIS — K25.5 GASTRIC ULCER WITH PERFORATION, UNSPECIFIED CHRONICITY (HCC): ICD-10-CM

## 2024-10-03 DIAGNOSIS — I48.20 CHRONIC ATRIAL FIBRILLATION (HCC): ICD-10-CM

## 2024-10-03 DIAGNOSIS — C16.9 GASTRIC ADENOCARCINOMA (HCC): ICD-10-CM

## 2024-10-03 PROBLEM — Z59.00 HOMELESS: Status: ACTIVE | Noted: 2024-10-03

## 2024-10-03 PROBLEM — K74.60 CIRRHOSIS OF LIVER WITHOUT ASCITES (HCC): Status: ACTIVE | Noted: 2024-10-03

## 2024-10-03 PROBLEM — R79.89 ELEVATED TROPONIN: Status: RESOLVED | Noted: 2020-04-26 | Resolved: 2024-10-03

## 2024-10-03 PROBLEM — R57.8 HEMORRHAGIC SHOCK (HCC): Status: ACTIVE | Noted: 2024-10-03

## 2024-10-03 PROBLEM — E86.1 HYPOTENSION DUE TO HYPOVOLEMIA: Status: ACTIVE | Noted: 2024-09-11

## 2024-10-03 PROBLEM — E86.1 HYPOTENSION DUE TO HYPOVOLEMIA: Status: RESOLVED | Noted: 2024-09-11 | Resolved: 2024-10-03

## 2024-10-03 PROBLEM — D50.0 BLOOD LOSS ANEMIA: Status: ACTIVE | Noted: 2024-10-03

## 2024-10-03 LAB
ABO + RH BLD: NORMAL
ABO GROUP BLD: NORMAL
ALBUMIN SERPL BCP-MCNC: 2.3 G/DL (ref 3.2–4.9)
ALBUMIN SERPL BCP-MCNC: 2.9 G/DL (ref 3.2–4.9)
ALBUMIN/GLOB SERPL: 1.1 G/DL
ALBUMIN/GLOB SERPL: 1.1 G/DL
ALP SERPL-CCNC: 46 U/L (ref 30–99)
ALP SERPL-CCNC: 58 U/L (ref 30–99)
ALT SERPL-CCNC: 10 U/L (ref 2–50)
ALT SERPL-CCNC: 11 U/L (ref 2–50)
ANION GAP SERPL CALC-SCNC: 6 MMOL/L (ref 7–16)
ANION GAP SERPL CALC-SCNC: 9 MMOL/L (ref 7–16)
APTT PPP: 25.1 SEC (ref 24.7–36)
AST SERPL-CCNC: 22 U/L (ref 12–45)
AST SERPL-CCNC: 23 U/L (ref 12–45)
BARCODED ABORH UBTYP: 5100
BARCODED ABORH UBTYP: 7300
BARCODED PRD CODE UBPRD: NORMAL
BARCODED UNIT NUM UBUNT: NORMAL
BASOPHILS # BLD AUTO: 0.2 % (ref 0–1.8)
BASOPHILS # BLD: 0.02 K/UL (ref 0–0.12)
BILIRUB SERPL-MCNC: 0.7 MG/DL (ref 0.1–1.5)
BILIRUB SERPL-MCNC: 1.4 MG/DL (ref 0.1–1.5)
BLD GP AB SCN SERPL QL: NORMAL
BUN SERPL-MCNC: 51 MG/DL (ref 8–22)
BUN SERPL-MCNC: 53 MG/DL (ref 8–22)
CALCIUM ALBUM COR SERPL-MCNC: 8.9 MG/DL (ref 8.5–10.5)
CALCIUM ALBUM COR SERPL-MCNC: 9.3 MG/DL (ref 8.5–10.5)
CALCIUM SERPL-MCNC: 7.9 MG/DL (ref 8.5–10.5)
CALCIUM SERPL-MCNC: 8 MG/DL (ref 8.5–10.5)
CFT BLD TEG: 3.2 MIN (ref 4.6–9.1)
CFT BLD TEG: 4.4 MIN (ref 4.6–9.1)
CFT P HPASE BLD TEG: 3.2 MIN (ref 4.3–8.3)
CFT P HPASE BLD TEG: 4.3 MIN (ref 4.3–8.3)
CHLORIDE SERPL-SCNC: 110 MMOL/L (ref 96–112)
CHLORIDE SERPL-SCNC: 117 MMOL/L (ref 96–112)
CLOT ANGLE BLD TEG: 75.8 DEGREES (ref 63–78)
CLOT ANGLE BLD TEG: 79.2 DEGREES (ref 63–78)
CLOT LYSIS 30M P MA LENFR BLD TEG: 0.9 % (ref 0–2.6)
CLOT LYSIS 30M P MA LENFR BLD TEG: 1.6 % (ref 0–2.6)
CO2 SERPL-SCNC: 18 MMOL/L (ref 20–33)
CO2 SERPL-SCNC: 23 MMOL/L (ref 20–33)
COMPONENT F 8504F: NORMAL
COMPONENT F 8504F: NORMAL
COMPONENT R 8504R: NORMAL
CREAT SERPL-MCNC: 0.84 MG/DL (ref 0.5–1.4)
CREAT SERPL-MCNC: 1.01 MG/DL (ref 0.5–1.4)
CT.EXTRINSIC BLD ROTEM: 0.8 MIN (ref 0.8–2.1)
CT.EXTRINSIC BLD ROTEM: 1.2 MIN (ref 0.8–2.1)
EKG IMPRESSION: NORMAL
EKG IMPRESSION: NORMAL
EOSINOPHIL # BLD AUTO: 0.05 K/UL (ref 0–0.51)
EOSINOPHIL NFR BLD: 0.6 % (ref 0–6.9)
ERYTHROCYTE [DISTWIDTH] IN BLOOD BY AUTOMATED COUNT: 45.5 FL (ref 35.9–50)
ERYTHROCYTE [DISTWIDTH] IN BLOOD BY AUTOMATED COUNT: 45.9 FL (ref 35.9–50)
ERYTHROCYTE [DISTWIDTH] IN BLOOD BY AUTOMATED COUNT: 47.5 FL (ref 35.9–50)
ERYTHROCYTE [DISTWIDTH] IN BLOOD BY AUTOMATED COUNT: 49.8 FL (ref 35.9–50)
ERYTHROCYTE [DISTWIDTH] IN BLOOD BY AUTOMATED COUNT: 51.2 FL (ref 35.9–50)
GFR SERPLBLD CREATININE-BSD FMLA CKD-EPI: 78 ML/MIN/1.73 M 2
GFR SERPLBLD CREATININE-BSD FMLA CKD-EPI: 91 ML/MIN/1.73 M 2
GLOBULIN SER CALC-MCNC: 2.1 G/DL (ref 1.9–3.5)
GLOBULIN SER CALC-MCNC: 2.6 G/DL (ref 1.9–3.5)
GLUCOSE SERPL-MCNC: 103 MG/DL (ref 65–99)
GLUCOSE SERPL-MCNC: 111 MG/DL (ref 65–99)
HCT VFR BLD AUTO: 18.7 % (ref 42–52)
HCT VFR BLD AUTO: 19.2 % (ref 42–52)
HCT VFR BLD AUTO: 26.5 % (ref 42–52)
HCT VFR BLD AUTO: 27.8 % (ref 42–52)
HCT VFR BLD AUTO: 29.8 % (ref 42–52)
HGB BLD-MCNC: 10 G/DL (ref 14–18)
HGB BLD-MCNC: 5.9 G/DL (ref 14–18)
HGB BLD-MCNC: 6 G/DL (ref 14–18)
HGB BLD-MCNC: 6.3 G/DL (ref 14–18)
HGB BLD-MCNC: 9.3 G/DL (ref 14–18)
HGB BLD-MCNC: 9.5 G/DL (ref 14–18)
IMM GRANULOCYTES # BLD AUTO: 0.03 K/UL (ref 0–0.11)
IMM GRANULOCYTES NFR BLD AUTO: 0.4 % (ref 0–0.9)
INR PPP: 1.46 (ref 0.87–1.13)
LACTATE SERPL-SCNC: 1.1 MMOL/L (ref 0.5–2)
LACTATE SERPL-SCNC: 1.6 MMOL/L (ref 0.5–2)
LACTATE SERPL-SCNC: 2.2 MMOL/L (ref 0.5–2)
LACTATE SERPL-SCNC: 2.4 MMOL/L (ref 0.5–2)
LYMPHOCYTES # BLD AUTO: 1.18 K/UL (ref 1–4.8)
LYMPHOCYTES NFR BLD: 14.5 % (ref 22–41)
MCF BLD TEG: 51.1 MM (ref 52–69)
MCF BLD TEG: 64.5 MM (ref 52–69)
MCF.PLATELET INHIB BLD ROTEM: 19.4 MM (ref 15–32)
MCF.PLATELET INHIB BLD ROTEM: 27.5 MM (ref 15–32)
MCH RBC QN AUTO: 27.3 PG (ref 27–33)
MCH RBC QN AUTO: 27.8 PG (ref 27–33)
MCH RBC QN AUTO: 28.4 PG (ref 27–33)
MCH RBC QN AUTO: 29.1 PG (ref 27–33)
MCH RBC QN AUTO: 29.3 PG (ref 27–33)
MCHC RBC AUTO-ENTMCNC: 31.3 G/DL (ref 32.3–36.5)
MCHC RBC AUTO-ENTMCNC: 31.6 G/DL (ref 32.3–36.5)
MCHC RBC AUTO-ENTMCNC: 33.6 G/DL (ref 32.3–36.5)
MCHC RBC AUTO-ENTMCNC: 34.2 G/DL (ref 32.3–36.5)
MCHC RBC AUTO-ENTMCNC: 35.1 G/DL (ref 32.3–36.5)
MCV RBC AUTO: 82.8 FL (ref 81.4–97.8)
MCV RBC AUTO: 84.7 FL (ref 81.4–97.8)
MCV RBC AUTO: 85.8 FL (ref 81.4–97.8)
MCV RBC AUTO: 87.3 FL (ref 81.4–97.8)
MCV RBC AUTO: 88.2 FL (ref 81.4–97.8)
MONOCYTES # BLD AUTO: 0.71 K/UL (ref 0–0.85)
MONOCYTES NFR BLD AUTO: 8.7 % (ref 0–13.4)
NEUTROPHILS # BLD AUTO: 6.14 K/UL (ref 1.82–7.42)
NEUTROPHILS NFR BLD: 75.6 % (ref 44–72)
NRBC # BLD AUTO: 0 K/UL
NRBC BLD-RTO: 0 /100 WBC (ref 0–0.2)
NT-PROBNP SERPL IA-MCNC: 1332 PG/ML (ref 0–125)
PA AA BLD-ACNC: 22.5 % (ref 0–11)
PA AA BLD-ACNC: 26.6 % (ref 0–11)
PA ADP BLD-ACNC: 31.2 % (ref 0–17)
PA ADP BLD-ACNC: 71.7 % (ref 0–17)
PLATELET # BLD AUTO: 103 K/UL (ref 164–446)
PLATELET # BLD AUTO: 105 K/UL (ref 164–446)
PLATELET # BLD AUTO: 111 K/UL (ref 164–446)
PLATELET # BLD AUTO: 177 K/UL (ref 164–446)
PLATELET # BLD AUTO: 207 K/UL (ref 164–446)
PMV BLD AUTO: 10 FL (ref 9–12.9)
PMV BLD AUTO: 9.2 FL (ref 9–12.9)
PMV BLD AUTO: 9.5 FL (ref 9–12.9)
PMV BLD AUTO: 9.5 FL (ref 9–12.9)
PMV BLD AUTO: 9.9 FL (ref 9–12.9)
POTASSIUM SERPL-SCNC: 4.4 MMOL/L (ref 3.6–5.5)
POTASSIUM SERPL-SCNC: 4.8 MMOL/L (ref 3.6–5.5)
PRODUCT TYPE UPROD: NORMAL
PROT SERPL-MCNC: 4.4 G/DL (ref 6–8.2)
PROT SERPL-MCNC: 5.5 G/DL (ref 6–8.2)
PROTHROMBIN TIME: 17.8 SEC (ref 12–14.6)
RBC # BLD AUTO: 2.12 M/UL (ref 4.7–6.1)
RBC # BLD AUTO: 2.2 M/UL (ref 4.7–6.1)
RBC # BLD AUTO: 3.2 M/UL (ref 4.7–6.1)
RBC # BLD AUTO: 3.24 M/UL (ref 4.7–6.1)
RBC # BLD AUTO: 3.52 M/UL (ref 4.7–6.1)
RH BLD: NORMAL
SODIUM SERPL-SCNC: 141 MMOL/L (ref 135–145)
SODIUM SERPL-SCNC: 142 MMOL/L (ref 135–145)
TEG ALGORITHM TGALG: ABNORMAL
TEG ALGORITHM TGALG: ABNORMAL
TROPONIN T SERPL-MCNC: 53 NG/L (ref 6–19)
TROPONIN T SERPL-MCNC: 65 NG/L (ref 6–19)
UNIT STATUS USTAT: NORMAL
WBC # BLD AUTO: 10.9 K/UL (ref 4.8–10.8)
WBC # BLD AUTO: 12.6 K/UL (ref 4.8–10.8)
WBC # BLD AUTO: 8.1 K/UL (ref 4.8–10.8)
WBC # BLD AUTO: 8.3 K/UL (ref 4.8–10.8)
WBC # BLD AUTO: 9.7 K/UL (ref 4.8–10.8)

## 2024-10-03 PROCEDURE — 700111 HCHG RX REV CODE 636 W/ 250 OVERRIDE (IP): Mod: JZ | Performed by: SURGERY

## 2024-10-03 PROCEDURE — 74174 CTA ABD&PLVS W/CONTRAST: CPT

## 2024-10-03 PROCEDURE — 86850 RBC ANTIBODY SCREEN: CPT

## 2024-10-03 PROCEDURE — 700105 HCHG RX REV CODE 258: Performed by: INTERNAL MEDICINE

## 2024-10-03 PROCEDURE — B41F1ZZ FLUOROSCOPY OF RIGHT LOWER EXTREMITY ARTERIES USING LOW OSMOLAR CONTRAST: ICD-10-PCS | Performed by: RADIOLOGY

## 2024-10-03 PROCEDURE — 30233K1 TRANSFUSION OF NONAUTOLOGOUS FROZEN PLASMA INTO PERIPHERAL VEIN, PERCUTANEOUS APPROACH: ICD-10-PCS | Performed by: SURGERY

## 2024-10-03 PROCEDURE — 93010 ELECTROCARDIOGRAM REPORT: CPT | Performed by: INTERNAL MEDICINE

## 2024-10-03 PROCEDURE — 84484 ASSAY OF TROPONIN QUANT: CPT | Mod: 91

## 2024-10-03 PROCEDURE — 700102 HCHG RX REV CODE 250 W/ 637 OVERRIDE(OP): Performed by: EMERGENCY MEDICINE

## 2024-10-03 PROCEDURE — 700117 HCHG RX CONTRAST REV CODE 255: Performed by: EMERGENCY MEDICINE

## 2024-10-03 PROCEDURE — 99291 CRITICAL CARE FIRST HOUR: CPT

## 2024-10-03 PROCEDURE — 36415 COLL VENOUS BLD VENIPUNCTURE: CPT

## 2024-10-03 PROCEDURE — 700102 HCHG RX REV CODE 250 W/ 637 OVERRIDE(OP): Performed by: SURGERY

## 2024-10-03 PROCEDURE — 75774 ARTERY X-RAY EACH VESSEL: CPT | Mod: XU

## 2024-10-03 PROCEDURE — 93005 ELECTROCARDIOGRAM TRACING: CPT | Performed by: INTERNAL MEDICINE

## 2024-10-03 PROCEDURE — 36430 TRANSFUSION BLD/BLD COMPNT: CPT

## 2024-10-03 PROCEDURE — P9017 PLASMA 1 DONOR FRZ W/IN 8 HR: HCPCS

## 2024-10-03 PROCEDURE — 700101 HCHG RX REV CODE 250: Performed by: INTERNAL MEDICINE

## 2024-10-03 PROCEDURE — 85730 THROMBOPLASTIN TIME PARTIAL: CPT

## 2024-10-03 PROCEDURE — 86900 BLOOD TYPING SEROLOGIC ABO: CPT

## 2024-10-03 PROCEDURE — 85025 COMPLETE CBC W/AUTO DIFF WBC: CPT

## 2024-10-03 PROCEDURE — 75726 ARTERY X-RAYS ABDOMEN: CPT | Mod: XU

## 2024-10-03 PROCEDURE — 93005 ELECTROCARDIOGRAM TRACING: CPT | Performed by: EMERGENCY MEDICINE

## 2024-10-03 PROCEDURE — 85610 PROTHROMBIN TIME: CPT

## 2024-10-03 PROCEDURE — 700117 HCHG RX CONTRAST REV CODE 255: Performed by: RADIOLOGY

## 2024-10-03 PROCEDURE — 700111 HCHG RX REV CODE 636 W/ 250 OVERRIDE (IP): Mod: JZ | Performed by: INTERNAL MEDICINE

## 2024-10-03 PROCEDURE — 85347 COAGULATION TIME ACTIVATED: CPT

## 2024-10-03 PROCEDURE — 700111 HCHG RX REV CODE 636 W/ 250 OVERRIDE (IP): Mod: JZ

## 2024-10-03 PROCEDURE — 85384 FIBRINOGEN ACTIVITY: CPT | Mod: 91

## 2024-10-03 PROCEDURE — 99222 1ST HOSP IP/OBS MODERATE 55: CPT | Performed by: SURGERY

## 2024-10-03 PROCEDURE — 86901 BLOOD TYPING SEROLOGIC RH(D): CPT

## 2024-10-03 PROCEDURE — 83605 ASSAY OF LACTIC ACID: CPT

## 2024-10-03 PROCEDURE — 85576 BLOOD PLATELET AGGREGATION: CPT | Mod: 91

## 2024-10-03 PROCEDURE — 99291 CRITICAL CARE FIRST HOUR: CPT | Performed by: INTERNAL MEDICINE

## 2024-10-03 PROCEDURE — 770022 HCHG ROOM/CARE - ICU (200)

## 2024-10-03 PROCEDURE — 700111 HCHG RX REV CODE 636 W/ 250 OVERRIDE (IP): Performed by: EMERGENCY MEDICINE

## 2024-10-03 PROCEDURE — 85027 COMPLETE CBC AUTOMATED: CPT

## 2024-10-03 PROCEDURE — 30233N1 TRANSFUSION OF NONAUTOLOGOUS RED BLOOD CELLS INTO PERIPHERAL VEIN, PERCUTANEOUS APPROACH: ICD-10-PCS | Performed by: INTERNAL MEDICINE

## 2024-10-03 PROCEDURE — 80053 COMPREHEN METABOLIC PANEL: CPT

## 2024-10-03 PROCEDURE — 83880 ASSAY OF NATRIURETIC PEPTIDE: CPT

## 2024-10-03 PROCEDURE — 99223 1ST HOSP IP/OBS HIGH 75: CPT | Mod: AI

## 2024-10-03 PROCEDURE — 86923 COMPATIBILITY TEST ELECTRIC: CPT

## 2024-10-03 PROCEDURE — 99222 1ST HOSP IP/OBS MODERATE 55: CPT | Mod: AI | Performed by: INTERNAL MEDICINE

## 2024-10-03 PROCEDURE — A9270 NON-COVERED ITEM OR SERVICE: HCPCS | Performed by: EMERGENCY MEDICINE

## 2024-10-03 PROCEDURE — A9270 NON-COVERED ITEM OR SERVICE: HCPCS | Performed by: SURGERY

## 2024-10-03 PROCEDURE — 96374 THER/PROPH/DIAG INJ IV PUSH: CPT

## 2024-10-03 PROCEDURE — 700105 HCHG RX REV CODE 258: Performed by: SURGERY

## 2024-10-03 PROCEDURE — 700105 HCHG RX REV CODE 258

## 2024-10-03 PROCEDURE — 99153 MOD SED SAME PHYS/QHP EA: CPT

## 2024-10-03 PROCEDURE — P9016 RBC LEUKOCYTES REDUCED: HCPCS | Mod: 91

## 2024-10-03 PROCEDURE — 85018 HEMOGLOBIN: CPT

## 2024-10-03 PROCEDURE — 99292 CRITICAL CARE ADDL 30 MIN: CPT | Performed by: INTERNAL MEDICINE

## 2024-10-03 PROCEDURE — 71045 X-RAY EXAM CHEST 1 VIEW: CPT

## 2024-10-03 PROCEDURE — 36247 INS CATH ABD/L-EXT ART 3RD: CPT

## 2024-10-03 PROCEDURE — 700105 HCHG RX REV CODE 258: Performed by: EMERGENCY MEDICINE

## 2024-10-03 PROCEDURE — 04L23DZ OCCLUSION OF GASTRIC ARTERY WITH INTRALUMINAL DEVICE, PERCUTANEOUS APPROACH: ICD-10-PCS | Performed by: RADIOLOGY

## 2024-10-03 PROCEDURE — 93005 ELECTROCARDIOGRAM TRACING: CPT

## 2024-10-03 RX ORDER — PANTOPRAZOLE SODIUM 40 MG/10ML
40 INJECTION, POWDER, LYOPHILIZED, FOR SOLUTION INTRAVENOUS ONCE
Status: COMPLETED | OUTPATIENT
Start: 2024-10-03 | End: 2024-10-03

## 2024-10-03 RX ORDER — ACETAMINOPHEN 325 MG/1
650 TABLET ORAL EVERY 6 HOURS PRN
Status: DISCONTINUED | OUTPATIENT
Start: 2024-10-03 | End: 2024-10-03

## 2024-10-03 RX ORDER — MIDAZOLAM HYDROCHLORIDE 1 MG/ML
.5-2 INJECTION INTRAMUSCULAR; INTRAVENOUS PRN
Status: DISCONTINUED | OUTPATIENT
Start: 2024-10-03 | End: 2024-10-03 | Stop reason: HOSPADM

## 2024-10-03 RX ORDER — AMOXICILLIN 250 MG
2 CAPSULE ORAL EVERY EVENING
Status: DISCONTINUED | OUTPATIENT
Start: 2024-10-03 | End: 2024-10-04

## 2024-10-03 RX ORDER — SODIUM CHLORIDE, SODIUM LACTATE, POTASSIUM CHLORIDE, AND CALCIUM CHLORIDE .6; .31; .03; .02 G/100ML; G/100ML; G/100ML; G/100ML
250 INJECTION, SOLUTION INTRAVENOUS ONCE
Status: COMPLETED | OUTPATIENT
Start: 2024-10-03 | End: 2024-10-03

## 2024-10-03 RX ORDER — AMOXICILLIN 250 MG
2 CAPSULE ORAL EVERY EVENING
Status: CANCELLED | OUTPATIENT
Start: 2024-10-03

## 2024-10-03 RX ORDER — POLYETHYLENE GLYCOL 3350 17 G/17G
1 POWDER, FOR SOLUTION ORAL
Status: CANCELLED | OUTPATIENT
Start: 2024-10-03

## 2024-10-03 RX ORDER — ONDANSETRON 4 MG/1
4 TABLET, ORALLY DISINTEGRATING ORAL EVERY 4 HOURS PRN
Status: DISCONTINUED | OUTPATIENT
Start: 2024-10-03 | End: 2024-10-04

## 2024-10-03 RX ORDER — SODIUM CHLORIDE 9 MG/ML
500 INJECTION, SOLUTION INTRAVENOUS
Status: DISCONTINUED | OUTPATIENT
Start: 2024-10-03 | End: 2024-10-03 | Stop reason: HOSPADM

## 2024-10-03 RX ORDER — NOREPINEPHRINE BITARTRATE 0.03 MG/ML
0-1 INJECTION, SOLUTION INTRAVENOUS CONTINUOUS
Status: DISCONTINUED | OUTPATIENT
Start: 2024-10-03 | End: 2024-10-04

## 2024-10-03 RX ORDER — SODIUM CHLORIDE, SODIUM LACTATE, POTASSIUM CHLORIDE, CALCIUM CHLORIDE 600; 310; 30; 20 MG/100ML; MG/100ML; MG/100ML; MG/100ML
INJECTION, SOLUTION INTRAVENOUS CONTINUOUS
Status: DISCONTINUED | OUTPATIENT
Start: 2024-10-03 | End: 2024-10-05

## 2024-10-03 RX ORDER — MIDAZOLAM HYDROCHLORIDE 1 MG/ML
INJECTION INTRAMUSCULAR; INTRAVENOUS
Status: COMPLETED
Start: 2024-10-03 | End: 2024-10-03

## 2024-10-03 RX ORDER — LISINOPRIL 5 MG/1
5 TABLET ORAL DAILY
Status: DISCONTINUED | OUTPATIENT
Start: 2024-10-03 | End: 2024-10-03

## 2024-10-03 RX ORDER — SUCRALFATE ORAL 1 G/10ML
1 SUSPENSION ORAL EVERY 6 HOURS
Status: DISCONTINUED | OUTPATIENT
Start: 2024-10-03 | End: 2024-10-05

## 2024-10-03 RX ORDER — SODIUM CHLORIDE, SODIUM LACTATE, POTASSIUM CHLORIDE, CALCIUM CHLORIDE 600; 310; 30; 20 MG/100ML; MG/100ML; MG/100ML; MG/100ML
250 INJECTION, SOLUTION INTRAVENOUS ONCE
Status: COMPLETED | OUTPATIENT
Start: 2024-10-03 | End: 2024-10-03

## 2024-10-03 RX ORDER — ONDANSETRON 2 MG/ML
4 INJECTION INTRAMUSCULAR; INTRAVENOUS PRN
Status: DISCONTINUED | OUTPATIENT
Start: 2024-10-03 | End: 2024-10-03 | Stop reason: HOSPADM

## 2024-10-03 RX ORDER — METOPROLOL TARTRATE 25 MG/1
25 TABLET, FILM COATED ORAL 2 TIMES DAILY
Status: DISCONTINUED | OUTPATIENT
Start: 2024-10-03 | End: 2024-10-03

## 2024-10-03 RX ORDER — ACETAMINOPHEN 325 MG/1
650 TABLET ORAL EVERY 6 HOURS PRN
Status: DISCONTINUED | OUTPATIENT
Start: 2024-10-03 | End: 2024-10-04

## 2024-10-03 RX ORDER — ATORVASTATIN CALCIUM 40 MG/1
40 TABLET, FILM COATED ORAL EVERY EVENING
Status: DISCONTINUED | OUTPATIENT
Start: 2024-10-03 | End: 2024-10-03

## 2024-10-03 RX ORDER — SODIUM CHLORIDE 9 MG/ML
INJECTION, SOLUTION INTRAVENOUS CONTINUOUS
Status: DISCONTINUED | OUTPATIENT
Start: 2024-10-03 | End: 2024-10-03

## 2024-10-03 RX ORDER — POLYETHYLENE GLYCOL 3350 17 G/17G
1 POWDER, FOR SOLUTION ORAL
Status: DISCONTINUED | OUTPATIENT
Start: 2024-10-03 | End: 2024-10-04

## 2024-10-03 RX ORDER — ONDANSETRON 2 MG/ML
4 INJECTION INTRAMUSCULAR; INTRAVENOUS EVERY 4 HOURS PRN
Status: DISCONTINUED | OUTPATIENT
Start: 2024-10-03 | End: 2024-10-15 | Stop reason: HOSPADM

## 2024-10-03 RX ORDER — PANTOPRAZOLE SODIUM 40 MG/10ML
40 INJECTION, POWDER, LYOPHILIZED, FOR SOLUTION INTRAVENOUS 2 TIMES DAILY
Status: DISCONTINUED | OUTPATIENT
Start: 2024-10-03 | End: 2024-10-03

## 2024-10-03 RX ORDER — SODIUM CHLORIDE 9 MG/ML
INJECTION, SOLUTION INTRAVENOUS CONTINUOUS
Status: ACTIVE | OUTPATIENT
Start: 2024-10-03 | End: 2024-10-04

## 2024-10-03 RX ORDER — SODIUM CHLORIDE, SODIUM LACTATE, POTASSIUM CHLORIDE, AND CALCIUM CHLORIDE .6; .31; .03; .02 G/100ML; G/100ML; G/100ML; G/100ML
500 INJECTION, SOLUTION INTRAVENOUS ONCE
Status: COMPLETED | OUTPATIENT
Start: 2024-10-03 | End: 2024-10-03

## 2024-10-03 RX ORDER — HYDROMORPHONE HYDROCHLORIDE 1 MG/ML
1 INJECTION, SOLUTION INTRAMUSCULAR; INTRAVENOUS; SUBCUTANEOUS ONCE
Status: DISPENSED | OUTPATIENT
Start: 2024-10-03 | End: 2024-10-04

## 2024-10-03 RX ORDER — NICOTINE 21 MG/24HR
21 PATCH, TRANSDERMAL 24 HOURS TRANSDERMAL
Status: DISCONTINUED | OUTPATIENT
Start: 2024-10-03 | End: 2024-10-03

## 2024-10-03 RX ORDER — SPIRONOLACTONE 25 MG/1
25 TABLET ORAL DAILY
Status: DISCONTINUED | OUTPATIENT
Start: 2024-10-03 | End: 2024-10-03

## 2024-10-03 RX ADMIN — PANTOPRAZOLE SODIUM 40 MG: 40 INJECTION, POWDER, FOR SOLUTION INTRAVENOUS at 04:25

## 2024-10-03 RX ADMIN — PIPERACILLIN AND TAZOBACTAM 4.5 G: 4; .5 INJECTION, POWDER, FOR SOLUTION INTRAVENOUS at 20:03

## 2024-10-03 RX ADMIN — MIDAZOLAM HYDROCHLORIDE 0.5 MG: 1 INJECTION, SOLUTION INTRAMUSCULAR; INTRAVENOUS at 13:35

## 2024-10-03 RX ADMIN — SUCRALFATE ORAL 1 G: 1 SUSPENSION ORAL at 18:33

## 2024-10-03 RX ADMIN — LIDOCAINE HYDROCHLORIDE 30 ML: 20 SOLUTION ORAL; TOPICAL at 04:25

## 2024-10-03 RX ADMIN — FENTANYL CITRATE 25 MCG: 50 INJECTION, SOLUTION INTRAMUSCULAR; INTRAVENOUS at 13:35

## 2024-10-03 RX ADMIN — SODIUM CHLORIDE, POTASSIUM CHLORIDE, SODIUM LACTATE AND CALCIUM CHLORIDE 1000 ML: 600; 310; 30; 20 INJECTION, SOLUTION INTRAVENOUS at 09:06

## 2024-10-03 RX ADMIN — IOHEXOL 100 ML: 350 INJECTION, SOLUTION INTRAVENOUS at 04:40

## 2024-10-03 RX ADMIN — CALCIUM CHLORIDE 1000 MG: 100 INJECTION, SOLUTION INTRAVENOUS at 12:40

## 2024-10-03 RX ADMIN — NOREPINEPHRINE BITARTRATE 0.05 MCG/KG/MIN: 1 INJECTION, SOLUTION, CONCENTRATE INTRAVENOUS at 12:53

## 2024-10-03 RX ADMIN — SODIUM CHLORIDE: 9 INJECTION, SOLUTION INTRAVENOUS at 06:24

## 2024-10-03 RX ADMIN — PIPERACILLIN AND TAZOBACTAM 3.38 G: 3; .375 INJECTION, POWDER, FOR SOLUTION INTRAVENOUS at 09:06

## 2024-10-03 RX ADMIN — SODIUM CHLORIDE, POTASSIUM CHLORIDE, SODIUM LACTATE AND CALCIUM CHLORIDE 250 ML: 600; 310; 30; 20 INJECTION, SOLUTION INTRAVENOUS at 04:30

## 2024-10-03 RX ADMIN — SUCRALFATE ORAL 1 G: 1 SUSPENSION ORAL at 23:12

## 2024-10-03 RX ADMIN — PANTOPRAZOLE SODIUM 8 MG/HR: 40 INJECTION, POWDER, FOR SOLUTION INTRAVENOUS at 16:43

## 2024-10-03 RX ADMIN — IOHEXOL 85 ML: 300 INJECTION, SOLUTION INTRAVENOUS at 15:00

## 2024-10-03 RX ADMIN — SODIUM CHLORIDE, POTASSIUM CHLORIDE, SODIUM LACTATE AND CALCIUM CHLORIDE: 600; 310; 30; 20 INJECTION, SOLUTION INTRAVENOUS at 16:48

## 2024-10-03 RX ADMIN — MIDAZOLAM HYDROCHLORIDE 0.5 MG: 1 INJECTION INTRAMUSCULAR; INTRAVENOUS at 13:35

## 2024-10-03 RX ADMIN — SODIUM CHLORIDE, POTASSIUM CHLORIDE, SODIUM LACTATE AND CALCIUM CHLORIDE 250 ML: 600; 310; 30; 20 INJECTION, SOLUTION INTRAVENOUS at 11:45

## 2024-10-03 RX ADMIN — SODIUM CHLORIDE, POTASSIUM CHLORIDE, SODIUM LACTATE AND CALCIUM CHLORIDE: 600; 310; 30; 20 INJECTION, SOLUTION INTRAVENOUS at 08:56

## 2024-10-03 RX ADMIN — SODIUM CHLORIDE, POTASSIUM CHLORIDE, SODIUM LACTATE AND CALCIUM CHLORIDE 500 ML: 600; 310; 30; 20 INJECTION, SOLUTION INTRAVENOUS at 07:00

## 2024-10-03 RX ADMIN — PIPERACILLIN AND TAZOBACTAM 3.38 G: 3; .375 INJECTION, POWDER, FOR SOLUTION INTRAVENOUS at 14:55

## 2024-10-03 ASSESSMENT — ENCOUNTER SYMPTOMS
DIARRHEA: 0
FEVER: 0
HEADACHES: 0
NAUSEA: 0
CONSTIPATION: 0
CHILLS: 0
MYALGIAS: 0
VOMITING: 1
PALPITATIONS: 0
WHEEZING: 0
ABDOMINAL PAIN: 1
DIZZINESS: 0
SHORTNESS OF BREATH: 0
ABDOMINAL PAIN: 0
COUGH: 0
BLOOD IN STOOL: 0
BLOOD IN STOOL: 1
VOMITING: 0

## 2024-10-03 ASSESSMENT — LIFESTYLE VARIABLES
AVERAGE NUMBER OF DAYS PER WEEK YOU HAVE A DRINK CONTAINING ALCOHOL: 0
HOW MANY TIMES IN THE PAST YEAR HAVE YOU HAD 5 OR MORE DRINKS IN A DAY: 0
EVER FELT BAD OR GUILTY ABOUT YOUR DRINKING: NO
HAVE YOU EVER FELT YOU SHOULD CUT DOWN ON YOUR DRINKING: NO
ALCOHOL_USE: NO
EVER HAD A DRINK FIRST THING IN THE MORNING TO STEADY YOUR NERVES TO GET RID OF A HANGOVER: NO
DOES PATIENT WANT TO STOP DRINKING: NO
TOTAL SCORE: 0
TOTAL SCORE: 0
HAVE PEOPLE ANNOYED YOU BY CRITICIZING YOUR DRINKING: NO
ON A TYPICAL DAY WHEN YOU DRINK ALCOHOL HOW MANY DRINKS DO YOU HAVE: 0
TOTAL SCORE: 0
CONSUMPTION TOTAL: NEGATIVE

## 2024-10-03 ASSESSMENT — COGNITIVE AND FUNCTIONAL STATUS - GENERAL
HELP NEEDED FOR BATHING: TOTAL
TURNING FROM BACK TO SIDE WHILE IN FLAT BAD: A LITTLE
CLIMB 3 TO 5 STEPS WITH RAILING: A LOT
SUGGESTED CMS G CODE MODIFIER MOBILITY: CL
DRESSING REGULAR LOWER BODY CLOTHING: TOTAL
TOILETING: TOTAL
STANDING UP FROM CHAIR USING ARMS: A LOT
MOVING TO AND FROM BED TO CHAIR: A LOT
WALKING IN HOSPITAL ROOM: A LOT
DAILY ACTIVITIY SCORE: 9
MOVING FROM LYING ON BACK TO SITTING ON SIDE OF FLAT BED: A LOT
PERSONAL GROOMING: A LOT
MOBILITY SCORE: 13
EATING MEALS: A LOT
DRESSING REGULAR UPPER BODY CLOTHING: A LOT
SUGGESTED CMS G CODE MODIFIER DAILY ACTIVITY: CL

## 2024-10-03 ASSESSMENT — PAIN DESCRIPTION - PAIN TYPE
TYPE: ACUTE PAIN

## 2024-10-03 ASSESSMENT — FIBROSIS 4 INDEX
FIB4 SCORE: 2.37
FIB4 SCORE: 1.26

## 2024-10-04 ENCOUNTER — APPOINTMENT (OUTPATIENT)
Dept: RADIOLOGY | Facility: MEDICAL CENTER | Age: 74
DRG: 326 | End: 2024-10-04
Attending: SURGERY
Payer: MEDICARE

## 2024-10-04 PROBLEM — K92.2 UPPER GI BLEED: Status: ACTIVE | Noted: 2024-10-04

## 2024-10-04 LAB
ALBUMIN SERPL BCP-MCNC: 2.8 G/DL (ref 3.2–4.9)
ALBUMIN/GLOB SERPL: 1.5 G/DL
ALP SERPL-CCNC: 48 U/L (ref 30–99)
ALT SERPL-CCNC: 8 U/L (ref 2–50)
ANION GAP SERPL CALC-SCNC: 7 MMOL/L (ref 7–16)
AST SERPL-CCNC: 19 U/L (ref 12–45)
BARCODED ABORH UBTYP: 6200
BARCODED PRD CODE UBPRD: NORMAL
BARCODED UNIT NUM UBUNT: NORMAL
BASOPHILS # BLD AUTO: 0.3 % (ref 0–1.8)
BASOPHILS # BLD: 0.03 K/UL (ref 0–0.12)
BILIRUB SERPL-MCNC: 2.3 MG/DL (ref 0.1–1.5)
BODY FLD TYPE: NORMAL
BUN SERPL-MCNC: 42 MG/DL (ref 8–22)
CALCIUM ALBUM COR SERPL-MCNC: 9 MG/DL (ref 8.5–10.5)
CALCIUM SERPL-MCNC: 8 MG/DL (ref 8.5–10.5)
CHLORIDE SERPL-SCNC: 114 MMOL/L (ref 96–112)
CO2 SERPL-SCNC: 21 MMOL/L (ref 20–33)
COMPONENT P 8504P: NORMAL
CREAT SERPL-MCNC: 0.83 MG/DL (ref 0.5–1.4)
EOSINOPHIL # BLD AUTO: 0.1 K/UL (ref 0–0.51)
EOSINOPHIL NFR BLD: 0.8 % (ref 0–6.9)
ERYTHROCYTE [DISTWIDTH] IN BLOOD BY AUTOMATED COUNT: 45.9 FL (ref 35.9–50)
ERYTHROCYTE [DISTWIDTH] IN BLOOD BY AUTOMATED COUNT: 46.3 FL (ref 35.9–50)
ERYTHROCYTE [DISTWIDTH] IN BLOOD BY AUTOMATED COUNT: 47.9 FL (ref 35.9–50)
ERYTHROCYTE [DISTWIDTH] IN BLOOD BY AUTOMATED COUNT: 48.6 FL (ref 35.9–50)
GFR SERPLBLD CREATININE-BSD FMLA CKD-EPI: 92 ML/MIN/1.73 M 2
GLOBULIN SER CALC-MCNC: 1.9 G/DL (ref 1.9–3.5)
GLUCOSE SERPL-MCNC: 98 MG/DL (ref 65–99)
HAV IGM SERPL QL IA: NONREACTIVE
HBV CORE IGM SER QL: NONREACTIVE
HBV SURFACE AG SER QL: NONREACTIVE
HCT VFR BLD AUTO: 23.7 % (ref 42–52)
HCT VFR BLD AUTO: 24.7 % (ref 42–52)
HCT VFR BLD AUTO: 25.3 % (ref 42–52)
HCT VFR BLD AUTO: 25.9 % (ref 42–52)
HCV AB SER QL: NONREACTIVE
HGB BLD-MCNC: 7.8 G/DL (ref 14–18)
HGB BLD-MCNC: 8.6 G/DL (ref 14–18)
HGB BLD-MCNC: 8.7 G/DL (ref 14–18)
HGB BLD-MCNC: 8.9 G/DL (ref 14–18)
HIV 1+2 AB+HIV1 P24 AG SERPL QL IA: NORMAL
IMM GRANULOCYTES # BLD AUTO: 0.06 K/UL (ref 0–0.11)
IMM GRANULOCYTES NFR BLD AUTO: 0.5 % (ref 0–0.9)
LIPASE SERPL-CCNC: 18 U/L (ref 11–82)
LYMPHOCYTES # BLD AUTO: 1.15 K/UL (ref 1–4.8)
LYMPHOCYTES NFR BLD: 9.6 % (ref 22–41)
MAGNESIUM SERPL-MCNC: 1.9 MG/DL (ref 1.5–2.5)
MCH RBC QN AUTO: 28.3 PG (ref 27–33)
MCH RBC QN AUTO: 28.5 PG (ref 27–33)
MCH RBC QN AUTO: 28.8 PG (ref 27–33)
MCH RBC QN AUTO: 29 PG (ref 27–33)
MCHC RBC AUTO-ENTMCNC: 32.9 G/DL (ref 32.3–36.5)
MCHC RBC AUTO-ENTMCNC: 34.4 G/DL (ref 32.3–36.5)
MCHC RBC AUTO-ENTMCNC: 34.4 G/DL (ref 32.3–36.5)
MCHC RBC AUTO-ENTMCNC: 34.8 G/DL (ref 32.3–36.5)
MCV RBC AUTO: 82.4 FL (ref 81.4–97.8)
MCV RBC AUTO: 82.6 FL (ref 81.4–97.8)
MCV RBC AUTO: 84.4 FL (ref 81.4–97.8)
MCV RBC AUTO: 86.5 FL (ref 81.4–97.8)
MONOCYTES # BLD AUTO: 0.75 K/UL (ref 0–0.85)
MONOCYTES NFR BLD AUTO: 6.3 % (ref 0–13.4)
NEUTROPHILS # BLD AUTO: 9.83 K/UL (ref 1.82–7.42)
NEUTROPHILS NFR BLD: 82.5 % (ref 44–72)
NRBC # BLD AUTO: 0 K/UL
NRBC BLD-RTO: 0 /100 WBC (ref 0–0.2)
PH FLD: 4 [PH]
PLATELET # BLD AUTO: 101 K/UL (ref 164–446)
PLATELET # BLD AUTO: 104 K/UL (ref 164–446)
PLATELET # BLD AUTO: 124 K/UL (ref 164–446)
PLATELET # BLD AUTO: 134 K/UL (ref 164–446)
PMV BLD AUTO: 10.3 FL (ref 9–12.9)
PMV BLD AUTO: 10.3 FL (ref 9–12.9)
PMV BLD AUTO: 10.5 FL (ref 9–12.9)
PMV BLD AUTO: 9.9 FL (ref 9–12.9)
POTASSIUM SERPL-SCNC: 4.1 MMOL/L (ref 3.6–5.5)
PRODUCT TYPE UPROD: NORMAL
PROT SERPL-MCNC: 4.7 G/DL (ref 6–8.2)
RBC # BLD AUTO: 2.74 M/UL (ref 4.7–6.1)
RBC # BLD AUTO: 2.99 M/UL (ref 4.7–6.1)
RBC # BLD AUTO: 3.07 M/UL (ref 4.7–6.1)
RBC # BLD AUTO: 3.07 M/UL (ref 4.7–6.1)
SODIUM SERPL-SCNC: 142 MMOL/L (ref 135–145)
UNIT STATUS USTAT: NORMAL
WBC # BLD AUTO: 10.4 K/UL (ref 4.8–10.8)
WBC # BLD AUTO: 11.9 K/UL (ref 4.8–10.8)
WBC # BLD AUTO: 12.1 K/UL (ref 4.8–10.8)
WBC # BLD AUTO: 12.4 K/UL (ref 4.8–10.8)

## 2024-10-04 PROCEDURE — 99223 1ST HOSP IP/OBS HIGH 75: CPT | Performed by: SURGERY

## 2024-10-04 PROCEDURE — 99233 SBSQ HOSP IP/OBS HIGH 50: CPT | Performed by: INTERNAL MEDICINE

## 2024-10-04 PROCEDURE — 99233 SBSQ HOSP IP/OBS HIGH 50: CPT | Performed by: HOSPITALIST

## 2024-10-04 PROCEDURE — 700105 HCHG RX REV CODE 258

## 2024-10-04 PROCEDURE — 83986 ASSAY PH BODY FLUID NOS: CPT

## 2024-10-04 PROCEDURE — 83735 ASSAY OF MAGNESIUM: CPT

## 2024-10-04 PROCEDURE — 99406 BEHAV CHNG SMOKING 3-10 MIN: CPT

## 2024-10-04 PROCEDURE — P9034 PLATELETS, PHERESIS: HCPCS

## 2024-10-04 PROCEDURE — 700102 HCHG RX REV CODE 250 W/ 637 OVERRIDE(OP): Performed by: SURGERY

## 2024-10-04 PROCEDURE — 85027 COMPLETE CBC AUTOMATED: CPT | Mod: 91

## 2024-10-04 PROCEDURE — 700105 HCHG RX REV CODE 258: Performed by: SURGERY

## 2024-10-04 PROCEDURE — 85025 COMPLETE CBC W/AUTO DIFF WBC: CPT

## 2024-10-04 PROCEDURE — 700105 HCHG RX REV CODE 258: Performed by: INTERNAL MEDICINE

## 2024-10-04 PROCEDURE — 83690 ASSAY OF LIPASE: CPT

## 2024-10-04 PROCEDURE — G0475 HIV COMBINATION ASSAY: HCPCS

## 2024-10-04 PROCEDURE — 80074 ACUTE HEPATITIS PANEL: CPT

## 2024-10-04 PROCEDURE — 80053 COMPREHEN METABOLIC PANEL: CPT

## 2024-10-04 PROCEDURE — 99232 SBSQ HOSP IP/OBS MODERATE 35: CPT | Performed by: SURGERY

## 2024-10-04 PROCEDURE — 770020 HCHG ROOM/CARE - TELE (206)

## 2024-10-04 PROCEDURE — 71260 CT THORAX DX C+: CPT

## 2024-10-04 PROCEDURE — 700111 HCHG RX REV CODE 636 W/ 250 OVERRIDE (IP): Mod: JZ | Performed by: INTERNAL MEDICINE

## 2024-10-04 PROCEDURE — 71260 CT THORAX DX C+: CPT | Performed by: RADIOLOGY

## 2024-10-04 PROCEDURE — 36415 COLL VENOUS BLD VENIPUNCTURE: CPT

## 2024-10-04 PROCEDURE — 700117 HCHG RX CONTRAST REV CODE 255: Performed by: SURGERY

## 2024-10-04 PROCEDURE — 700111 HCHG RX REV CODE 636 W/ 250 OVERRIDE (IP): Mod: JZ | Performed by: SURGERY

## 2024-10-04 PROCEDURE — A9270 NON-COVERED ITEM OR SERVICE: HCPCS | Performed by: SURGERY

## 2024-10-04 PROCEDURE — 36430 TRANSFUSION BLD/BLD COMPNT: CPT

## 2024-10-04 RX ORDER — ONDANSETRON 4 MG/1
4 TABLET, ORALLY DISINTEGRATING ORAL EVERY 4 HOURS PRN
Status: DISCONTINUED | OUTPATIENT
Start: 2024-10-04 | End: 2024-10-05

## 2024-10-04 RX ORDER — MAGNESIUM SULFATE HEPTAHYDRATE 40 MG/ML
2 INJECTION, SOLUTION INTRAVENOUS ONCE
Status: COMPLETED | OUTPATIENT
Start: 2024-10-04 | End: 2024-10-04

## 2024-10-04 RX ORDER — POLYETHYLENE GLYCOL 3350 17 G/17G
1 POWDER, FOR SOLUTION ORAL
Status: DISCONTINUED | OUTPATIENT
Start: 2024-10-04 | End: 2024-10-05

## 2024-10-04 RX ORDER — ACETAMINOPHEN 325 MG/1
650 TABLET ORAL EVERY 6 HOURS PRN
Status: DISCONTINUED | OUTPATIENT
Start: 2024-10-04 | End: 2024-10-05

## 2024-10-04 RX ORDER — AMOXICILLIN 250 MG
2 CAPSULE ORAL EVERY EVENING
Status: DISCONTINUED | OUTPATIENT
Start: 2024-10-04 | End: 2024-10-05

## 2024-10-04 RX ORDER — PANTOPRAZOLE SODIUM 40 MG/10ML
40 INJECTION, POWDER, LYOPHILIZED, FOR SOLUTION INTRAVENOUS 2 TIMES DAILY
Status: DISCONTINUED | OUTPATIENT
Start: 2024-10-04 | End: 2024-10-05

## 2024-10-04 RX ORDER — SODIUM CHLORIDE 9 MG/ML
INJECTION, SOLUTION INTRAVENOUS CONTINUOUS
Status: ACTIVE | OUTPATIENT
Start: 2024-10-04 | End: 2024-10-04

## 2024-10-04 RX ADMIN — PANTOPRAZOLE SODIUM 40 MG: 40 INJECTION, POWDER, FOR SOLUTION INTRAVENOUS at 08:52

## 2024-10-04 RX ADMIN — SODIUM CHLORIDE, POTASSIUM CHLORIDE, SODIUM LACTATE AND CALCIUM CHLORIDE: 600; 310; 30; 20 INJECTION, SOLUTION INTRAVENOUS at 21:00

## 2024-10-04 RX ADMIN — IOHEXOL 75 ML: 350 INJECTION, SOLUTION INTRAVENOUS at 16:42

## 2024-10-04 RX ADMIN — SUCRALFATE ORAL 1 G: 1 SUSPENSION ORAL at 05:27

## 2024-10-04 RX ADMIN — PANTOPRAZOLE SODIUM 8 MG/HR: 40 INJECTION, POWDER, FOR SOLUTION INTRAVENOUS at 01:22

## 2024-10-04 RX ADMIN — FAMOTIDINE 20 MG: 10 INJECTION, SOLUTION INTRAVENOUS at 09:30

## 2024-10-04 RX ADMIN — PIPERACILLIN AND TAZOBACTAM 4.5 G: 4; .5 INJECTION, POWDER, FOR SOLUTION INTRAVENOUS at 20:43

## 2024-10-04 RX ADMIN — SUCRALFATE ORAL 1 G: 1 SUSPENSION ORAL at 18:03

## 2024-10-04 RX ADMIN — PHYTONADIONE 10 MG: 10 INJECTION, EMULSION INTRAMUSCULAR; INTRAVENOUS; SUBCUTANEOUS at 11:25

## 2024-10-04 RX ADMIN — FAMOTIDINE 20 MG: 10 INJECTION, SOLUTION INTRAVENOUS at 18:04

## 2024-10-04 RX ADMIN — PANTOPRAZOLE SODIUM 40 MG: 40 INJECTION, POWDER, FOR SOLUTION INTRAVENOUS at 18:04

## 2024-10-04 RX ADMIN — MAGNESIUM SULFATE HEPTAHYDRATE 2 G: 2 INJECTION, SOLUTION INTRAVENOUS at 08:57

## 2024-10-04 RX ADMIN — SUCRALFATE ORAL 1 G: 1 SUSPENSION ORAL at 11:23

## 2024-10-04 RX ADMIN — SODIUM CHLORIDE, POTASSIUM CHLORIDE, SODIUM LACTATE AND CALCIUM CHLORIDE: 600; 310; 30; 20 INJECTION, SOLUTION INTRAVENOUS at 03:05

## 2024-10-04 RX ADMIN — PIPERACILLIN AND TAZOBACTAM 4.5 G: 4; .5 INJECTION, POWDER, FOR SOLUTION INTRAVENOUS at 13:57

## 2024-10-04 RX ADMIN — SUCRALFATE ORAL 1 G: 1 SUSPENSION ORAL at 23:51

## 2024-10-04 RX ADMIN — PIPERACILLIN AND TAZOBACTAM 4.5 G: 4; .5 INJECTION, POWDER, FOR SOLUTION INTRAVENOUS at 04:24

## 2024-10-04 ASSESSMENT — ENCOUNTER SYMPTOMS
BLOOD IN STOOL: 1
NERVOUS/ANXIOUS: 1
WEAKNESS: 1
ABDOMINAL PAIN: 0
SHORTNESS OF BREATH: 0
FEVER: 0
NERVOUS/ANXIOUS: 0
CHILLS: 0
RESPIRATORY NEGATIVE: 1
BACK PAIN: 0
COUGH: 0
GASTROINTESTINAL NEGATIVE: 1
HEADACHES: 0
VOMITING: 0
MUSCULOSKELETAL NEGATIVE: 1
WEAKNESS: 0
SPUTUM PRODUCTION: 0
NAUSEA: 0
EYES NEGATIVE: 1
WHEEZING: 0
DIARRHEA: 0
MYALGIAS: 1

## 2024-10-04 ASSESSMENT — PATIENT HEALTH QUESTIONNAIRE - PHQ9
2. FEELING DOWN, DEPRESSED, IRRITABLE, OR HOPELESS: NOT AT ALL
SUM OF ALL RESPONSES TO PHQ9 QUESTIONS 1 AND 2: 0
1. LITTLE INTEREST OR PLEASURE IN DOING THINGS: NOT AT ALL

## 2024-10-04 ASSESSMENT — PAIN DESCRIPTION - PAIN TYPE
TYPE: ACUTE PAIN

## 2024-10-05 PROBLEM — D62 ANEMIA DUE TO ACUTE BLOOD LOSS: Status: ACTIVE | Noted: 2024-10-03

## 2024-10-05 LAB
ALBUMIN SERPL BCP-MCNC: 2 G/DL (ref 3.2–4.9)
ALBUMIN/GLOB SERPL: 1.1 G/DL
ALP SERPL-CCNC: 38 U/L (ref 30–99)
ALT SERPL-CCNC: 7 U/L (ref 2–50)
ANION GAP SERPL CALC-SCNC: 12 MMOL/L (ref 7–16)
AST SERPL-CCNC: 15 U/L (ref 12–45)
BILIRUB SERPL-MCNC: 1.1 MG/DL (ref 0.1–1.5)
BODY FLD TYPE: NORMAL
BUN SERPL-MCNC: 16 MG/DL (ref 8–22)
CALCIUM ALBUM COR SERPL-MCNC: 8.9 MG/DL (ref 8.5–10.5)
CALCIUM SERPL-MCNC: 7.3 MG/DL (ref 8.5–10.5)
CHLORIDE SERPL-SCNC: 109 MMOL/L (ref 96–112)
CO2 SERPL-SCNC: 16 MMOL/L (ref 20–33)
CREAT SERPL-MCNC: 0.69 MG/DL (ref 0.5–1.4)
ERYTHROCYTE [DISTWIDTH] IN BLOOD BY AUTOMATED COUNT: 47.7 FL (ref 35.9–50)
ERYTHROCYTE [DISTWIDTH] IN BLOOD BY AUTOMATED COUNT: 49.4 FL (ref 35.9–50)
ERYTHROCYTE [DISTWIDTH] IN BLOOD BY AUTOMATED COUNT: 50.4 FL (ref 35.9–50)
GFR SERPLBLD CREATININE-BSD FMLA CKD-EPI: 97 ML/MIN/1.73 M 2
GLOBULIN SER CALC-MCNC: 1.8 G/DL (ref 1.9–3.5)
GLUCOSE SERPL-MCNC: 68 MG/DL (ref 65–99)
HCT VFR BLD AUTO: 19.2 % (ref 42–52)
HCT VFR BLD AUTO: 22.3 % (ref 42–52)
HCT VFR BLD AUTO: 25.4 % (ref 42–52)
HGB BLD-MCNC: 6.3 G/DL (ref 14–18)
HGB BLD-MCNC: 7.4 G/DL (ref 14–18)
HGB BLD-MCNC: 8.5 G/DL (ref 14–18)
MAGNESIUM SERPL-MCNC: 1.6 MG/DL (ref 1.5–2.5)
MCH RBC QN AUTO: 28.5 PG (ref 27–33)
MCH RBC QN AUTO: 28.9 PG (ref 27–33)
MCH RBC QN AUTO: 28.9 PG (ref 27–33)
MCHC RBC AUTO-ENTMCNC: 32.8 G/DL (ref 32.3–36.5)
MCHC RBC AUTO-ENTMCNC: 33.2 G/DL (ref 32.3–36.5)
MCHC RBC AUTO-ENTMCNC: 33.5 G/DL (ref 32.3–36.5)
MCV RBC AUTO: 85.8 FL (ref 81.4–97.8)
MCV RBC AUTO: 86.4 FL (ref 81.4–97.8)
MCV RBC AUTO: 88.1 FL (ref 81.4–97.8)
PH FLD: 7 [PH]
PHOSPHATE SERPL-MCNC: 2.1 MG/DL (ref 2.5–4.5)
PLATELET # BLD AUTO: 124 K/UL (ref 164–446)
PLATELET # BLD AUTO: 91 K/UL (ref 164–446)
PLATELET # BLD AUTO: 95 K/UL (ref 164–446)
PLATELETS.RETICULATED NFR BLD AUTO: 2.5 % (ref 0.6–13.1)
PLATELETS.RETICULATED NFR BLD AUTO: 4.8 % (ref 0.6–13.1)
PMV BLD AUTO: 10.3 FL (ref 9–12.9)
PMV BLD AUTO: 10.5 FL (ref 9–12.9)
PMV BLD AUTO: 10.9 FL (ref 9–12.9)
POTASSIUM SERPL-SCNC: 4 MMOL/L (ref 3.6–5.5)
PROT SERPL-MCNC: 3.8 G/DL (ref 6–8.2)
RBC # BLD AUTO: 2.18 M/UL (ref 4.7–6.1)
RBC # BLD AUTO: 2.6 M/UL (ref 4.7–6.1)
RBC # BLD AUTO: 2.94 M/UL (ref 4.7–6.1)
SODIUM SERPL-SCNC: 137 MMOL/L (ref 135–145)
WBC # BLD AUTO: 7.7 K/UL (ref 4.8–10.8)
WBC # BLD AUTO: 8.8 K/UL (ref 4.8–10.8)
WBC # BLD AUTO: 9.5 K/UL (ref 4.8–10.8)

## 2024-10-05 PROCEDURE — 700102 HCHG RX REV CODE 250 W/ 637 OVERRIDE(OP): Performed by: INTERNAL MEDICINE

## 2024-10-05 PROCEDURE — 700111 HCHG RX REV CODE 636 W/ 250 OVERRIDE (IP): Performed by: INTERNAL MEDICINE

## 2024-10-05 PROCEDURE — A9270 NON-COVERED ITEM OR SERVICE: HCPCS | Performed by: INTERNAL MEDICINE

## 2024-10-05 PROCEDURE — 99233 SBSQ HOSP IP/OBS HIGH 50: CPT | Performed by: INTERNAL MEDICINE

## 2024-10-05 PROCEDURE — 36415 COLL VENOUS BLD VENIPUNCTURE: CPT

## 2024-10-05 PROCEDURE — 85055 RETICULATED PLATELET ASSAY: CPT

## 2024-10-05 PROCEDURE — 700105 HCHG RX REV CODE 258: Performed by: INTERNAL MEDICINE

## 2024-10-05 PROCEDURE — 700111 HCHG RX REV CODE 636 W/ 250 OVERRIDE (IP): Mod: JZ | Performed by: SURGERY

## 2024-10-05 PROCEDURE — 85027 COMPLETE CBC AUTOMATED: CPT

## 2024-10-05 PROCEDURE — 83986 ASSAY PH BODY FLUID NOS: CPT

## 2024-10-05 PROCEDURE — 700102 HCHG RX REV CODE 250 W/ 637 OVERRIDE(OP): Performed by: SURGERY

## 2024-10-05 PROCEDURE — 86923 COMPATIBILITY TEST ELECTRIC: CPT

## 2024-10-05 PROCEDURE — P9016 RBC LEUKOCYTES REDUCED: HCPCS

## 2024-10-05 PROCEDURE — 99231 SBSQ HOSP IP/OBS SF/LOW 25: CPT

## 2024-10-05 PROCEDURE — 770020 HCHG ROOM/CARE - TELE (206)

## 2024-10-05 PROCEDURE — 80053 COMPREHEN METABOLIC PANEL: CPT

## 2024-10-05 PROCEDURE — A9270 NON-COVERED ITEM OR SERVICE: HCPCS | Performed by: SURGERY

## 2024-10-05 PROCEDURE — 84100 ASSAY OF PHOSPHORUS: CPT

## 2024-10-05 PROCEDURE — 36430 TRANSFUSION BLD/BLD COMPNT: CPT

## 2024-10-05 PROCEDURE — 700101 HCHG RX REV CODE 250: Performed by: INTERNAL MEDICINE

## 2024-10-05 PROCEDURE — 99232 SBSQ HOSP IP/OBS MODERATE 35: CPT | Performed by: NURSE PRACTITIONER

## 2024-10-05 PROCEDURE — 83735 ASSAY OF MAGNESIUM: CPT

## 2024-10-05 RX ORDER — ACETAMINOPHEN 325 MG/1
650 TABLET ORAL EVERY 6 HOURS PRN
Status: DISCONTINUED | OUTPATIENT
Start: 2024-10-05 | End: 2024-10-15 | Stop reason: HOSPADM

## 2024-10-05 RX ORDER — SODIUM CHLORIDE 9 MG/ML
INJECTION, SOLUTION INTRAVENOUS CONTINUOUS
Status: ACTIVE | OUTPATIENT
Start: 2024-10-05 | End: 2024-10-05

## 2024-10-05 RX ORDER — THIAMINE HYDROCHLORIDE 100 MG/ML
100 INJECTION, SOLUTION INTRAMUSCULAR; INTRAVENOUS DAILY
Status: COMPLETED | OUTPATIENT
Start: 2024-10-05 | End: 2024-10-07

## 2024-10-05 RX ORDER — DEXTROSE, SODIUM CHLORIDE, SODIUM LACTATE, POTASSIUM CHLORIDE, AND CALCIUM CHLORIDE 5; .6; .31; .03; .02 G/100ML; G/100ML; G/100ML; G/100ML; G/100ML
INJECTION, SOLUTION INTRAVENOUS CONTINUOUS
Status: DISCONTINUED | OUTPATIENT
Start: 2024-10-05 | End: 2024-10-07

## 2024-10-05 RX ORDER — MAGNESIUM SULFATE HEPTAHYDRATE 40 MG/ML
4 INJECTION, SOLUTION INTRAVENOUS ONCE
Status: COMPLETED | OUTPATIENT
Start: 2024-10-05 | End: 2024-10-05

## 2024-10-05 RX ORDER — ONDANSETRON 4 MG/1
4 TABLET, ORALLY DISINTEGRATING ORAL EVERY 4 HOURS PRN
Status: DISCONTINUED | OUTPATIENT
Start: 2024-10-05 | End: 2024-10-15 | Stop reason: HOSPADM

## 2024-10-05 RX ORDER — PANTOPRAZOLE SODIUM 40 MG/10ML
40 INJECTION, POWDER, LYOPHILIZED, FOR SOLUTION INTRAVENOUS 2 TIMES DAILY
Status: DISCONTINUED | OUTPATIENT
Start: 2024-10-05 | End: 2024-10-07

## 2024-10-05 RX ORDER — SUCRALFATE ORAL 1 G/10ML
1 SUSPENSION ORAL EVERY 6 HOURS
Status: DISCONTINUED | OUTPATIENT
Start: 2024-10-05 | End: 2024-10-07

## 2024-10-05 RX ORDER — AMOXICILLIN 250 MG
2 CAPSULE ORAL EVERY EVENING
Status: DISCONTINUED | OUTPATIENT
Start: 2024-10-05 | End: 2024-10-15 | Stop reason: HOSPADM

## 2024-10-05 RX ORDER — SODIUM CHLORIDE 9 MG/ML
INJECTION, SOLUTION INTRAVENOUS CONTINUOUS
Status: DISCONTINUED | OUTPATIENT
Start: 2024-10-05 | End: 2024-10-05

## 2024-10-05 RX ORDER — POLYETHYLENE GLYCOL 3350 17 G/17G
1 POWDER, FOR SOLUTION ORAL
Status: DISCONTINUED | OUTPATIENT
Start: 2024-10-05 | End: 2024-10-07

## 2024-10-05 RX ADMIN — FOLIC ACID 1 MG: 5 INJECTION, SOLUTION INTRAMUSCULAR; INTRAVENOUS; SUBCUTANEOUS at 11:19

## 2024-10-05 RX ADMIN — PANTOPRAZOLE SODIUM 40 MG: 40 INJECTION, POWDER, FOR SOLUTION INTRAVENOUS at 17:51

## 2024-10-05 RX ADMIN — FAMOTIDINE 20 MG: 10 INJECTION, SOLUTION INTRAVENOUS at 17:51

## 2024-10-05 RX ADMIN — PANTOPRAZOLE SODIUM 40 MG: 40 INJECTION, POWDER, FOR SOLUTION INTRAVENOUS at 05:36

## 2024-10-05 RX ADMIN — SODIUM CHLORIDE, POTASSIUM CHLORIDE, SODIUM LACTATE AND CALCIUM CHLORIDE: 600; 310; 30; 20 INJECTION, SOLUTION INTRAVENOUS at 04:20

## 2024-10-05 RX ADMIN — FAMOTIDINE 20 MG: 10 INJECTION, SOLUTION INTRAVENOUS at 05:36

## 2024-10-05 RX ADMIN — MAGNESIUM SULFATE HEPTAHYDRATE 4 G: 4 INJECTION, SOLUTION INTRAVENOUS at 10:33

## 2024-10-05 RX ADMIN — THIAMINE HYDROCHLORIDE 100 MG: 100 INJECTION, SOLUTION INTRAMUSCULAR; INTRAVENOUS at 10:32

## 2024-10-05 RX ADMIN — SUCRALFATE ORAL 1 G: 1 SUSPENSION ORAL at 05:32

## 2024-10-05 RX ADMIN — PIPERACILLIN AND TAZOBACTAM 4.5 G: 4; .5 INJECTION, POWDER, FOR SOLUTION INTRAVENOUS at 20:39

## 2024-10-05 RX ADMIN — PIPERACILLIN AND TAZOBACTAM 4.5 G: 4; .5 INJECTION, POWDER, FOR SOLUTION INTRAVENOUS at 04:32

## 2024-10-05 RX ADMIN — SODIUM CHLORIDE: 9 INJECTION, SOLUTION INTRAVENOUS at 09:47

## 2024-10-05 RX ADMIN — PIPERACILLIN AND TAZOBACTAM 4.5 G: 4; .5 INJECTION, POWDER, FOR SOLUTION INTRAVENOUS at 13:01

## 2024-10-05 RX ADMIN — PHYTONADIONE 10 MG: 10 INJECTION, EMULSION INTRAMUSCULAR; INTRAVENOUS; SUBCUTANEOUS at 12:06

## 2024-10-05 RX ADMIN — PANTOPRAZOLE SODIUM 8 MG/HR: 40 INJECTION, POWDER, FOR SOLUTION INTRAVENOUS at 08:39

## 2024-10-05 RX ADMIN — SODIUM CHLORIDE: 9 INJECTION, SOLUTION INTRAVENOUS at 08:20

## 2024-10-05 RX ADMIN — SODIUM CHLORIDE, SODIUM LACTATE, POTASSIUM CHLORIDE, CALCIUM CHLORIDE AND DEXTROSE MONOHYDRATE: 5; 600; 310; 30; 20 INJECTION, SOLUTION INTRAVENOUS at 21:51

## 2024-10-05 RX ADMIN — SODIUM CHLORIDE, SODIUM LACTATE, POTASSIUM CHLORIDE, CALCIUM CHLORIDE AND DEXTROSE MONOHYDRATE: 5; 600; 310; 30; 20 INJECTION, SOLUTION INTRAVENOUS at 11:19

## 2024-10-05 RX ADMIN — SUCRALFATE ORAL 1 G: 1 SUSPENSION ORAL at 12:03

## 2024-10-05 RX ADMIN — SUCRALFATE ORAL 1 G: 1 SUSPENSION ORAL at 17:51

## 2024-10-05 SDOH — ECONOMIC STABILITY: TRANSPORTATION INSECURITY
IN THE PAST 12 MONTHS, HAS LACK OF RELIABLE TRANSPORTATION KEPT YOU FROM MEDICAL APPOINTMENTS, MEETINGS, WORK OR FROM GETTING THINGS NEEDED FOR DAILY LIVING?: YES

## 2024-10-05 SDOH — ECONOMIC STABILITY: TRANSPORTATION INSECURITY
IN THE PAST 12 MONTHS, HAS THE LACK OF TRANSPORTATION KEPT YOU FROM MEDICAL APPOINTMENTS OR FROM GETTING MEDICATIONS?: YES

## 2024-10-05 ASSESSMENT — ENCOUNTER SYMPTOMS
ABDOMINAL PAIN: 0
NAUSEA: 0
RESPIRATORY NEGATIVE: 1
SHORTNESS OF BREATH: 0
EYES NEGATIVE: 1
WEAKNESS: 1
MUSCULOSKELETAL NEGATIVE: 1
COUGH: 0
FEVER: 0
GASTROINTESTINAL NEGATIVE: 1
VOMITING: 0
NERVOUS/ANXIOUS: 1
CHILLS: 0

## 2024-10-05 ASSESSMENT — SOCIAL DETERMINANTS OF HEALTH (SDOH)
WITHIN THE LAST YEAR, HAVE YOU BEEN KICKED, HIT, SLAPPED, OR OTHERWISE PHYSICALLY HURT BY YOUR PARTNER OR EX-PARTNER?: NO
IN THE PAST 12 MONTHS, HAS THE ELECTRIC, GAS, OIL, OR WATER COMPANY THREATENED TO SHUT OFF SERVICE IN YOUR HOME?: NO
WITHIN THE LAST YEAR, HAVE TO BEEN RAPED OR FORCED TO HAVE ANY KIND OF SEXUAL ACTIVITY BY YOUR PARTNER OR EX-PARTNER?: NO
WITHIN THE PAST 12 MONTHS, YOU WORRIED THAT YOUR FOOD WOULD RUN OUT BEFORE YOU GOT THE MONEY TO BUY MORE: NEVER TRUE
WITHIN THE LAST YEAR, HAVE YOU BEEN AFRAID OF YOUR PARTNER OR EX-PARTNER?: NO
WITHIN THE LAST YEAR, HAVE YOU BEEN HUMILIATED OR EMOTIONALLY ABUSED IN OTHER WAYS BY YOUR PARTNER OR EX-PARTNER?: NO
WITHIN THE PAST 12 MONTHS, THE FOOD YOU BOUGHT JUST DIDN'T LAST AND YOU DIDN'T HAVE MONEY TO GET MORE: NEVER TRUE

## 2024-10-05 ASSESSMENT — PAIN DESCRIPTION - PAIN TYPE
TYPE: ACUTE PAIN

## 2024-10-05 ASSESSMENT — FIBROSIS 4 INDEX
FIB4 SCORE: 5.23
FIB4 SCORE: 5.23

## 2024-10-06 ENCOUNTER — ANESTHESIA EVENT (OUTPATIENT)
Dept: SURGERY | Facility: MEDICAL CENTER | Age: 74
DRG: 326 | End: 2024-10-06
Payer: MEDICARE

## 2024-10-06 LAB
ALBUMIN SERPL BCP-MCNC: 2.6 G/DL (ref 3.2–4.9)
ALBUMIN/GLOB SERPL: 1.2 G/DL
ALP SERPL-CCNC: 50 U/L (ref 30–99)
ALT SERPL-CCNC: 6 U/L (ref 2–50)
ANION GAP SERPL CALC-SCNC: 9 MMOL/L (ref 7–16)
AST SERPL-CCNC: 17 U/L (ref 12–45)
BILIRUB SERPL-MCNC: 0.9 MG/DL (ref 0.1–1.5)
BUN SERPL-MCNC: 13 MG/DL (ref 8–22)
CALCIUM ALBUM COR SERPL-MCNC: 8.7 MG/DL (ref 8.5–10.5)
CALCIUM SERPL-MCNC: 7.6 MG/DL (ref 8.5–10.5)
CHLORIDE SERPL-SCNC: 107 MMOL/L (ref 96–112)
CHOLEST SERPL-MCNC: 76 MG/DL (ref 100–199)
CO2 SERPL-SCNC: 21 MMOL/L (ref 20–33)
CREAT SERPL-MCNC: 1 MG/DL (ref 0.5–1.4)
ERYTHROCYTE [DISTWIDTH] IN BLOOD BY AUTOMATED COUNT: 48.8 FL (ref 35.9–50)
ERYTHROCYTE [DISTWIDTH] IN BLOOD BY AUTOMATED COUNT: 49.1 FL (ref 35.9–50)
GFR SERPLBLD CREATININE-BSD FMLA CKD-EPI: 79 ML/MIN/1.73 M 2
GLOBULIN SER CALC-MCNC: 2.2 G/DL (ref 1.9–3.5)
GLUCOSE SERPL-MCNC: 104 MG/DL (ref 65–99)
HCT VFR BLD AUTO: 24.6 % (ref 42–52)
HCT VFR BLD AUTO: 24.7 % (ref 42–52)
HGB BLD-MCNC: 8 G/DL (ref 14–18)
HGB BLD-MCNC: 8.1 G/DL (ref 14–18)
HGB RETIC QN AUTO: 30.7 PG/CELL (ref 29–35)
IMM RETICS NFR: 31.3 % (ref 2.6–16.1)
IRON SATN MFR SERPL: 11 % (ref 15–55)
IRON SERPL-MCNC: 25 UG/DL (ref 50–180)
MAGNESIUM SERPL-MCNC: 2.2 MG/DL (ref 1.5–2.5)
MCH RBC QN AUTO: 28.4 PG (ref 27–33)
MCH RBC QN AUTO: 29.1 PG (ref 27–33)
MCHC RBC AUTO-ENTMCNC: 32.4 G/DL (ref 32.3–36.5)
MCHC RBC AUTO-ENTMCNC: 32.9 G/DL (ref 32.3–36.5)
MCV RBC AUTO: 87.6 FL (ref 81.4–97.8)
MCV RBC AUTO: 88.5 FL (ref 81.4–97.8)
PHOSPHATE SERPL-MCNC: 2.6 MG/DL (ref 2.5–4.5)
PLATELET # BLD AUTO: 116 K/UL (ref 164–446)
PLATELET # BLD AUTO: 126 K/UL (ref 164–446)
PMV BLD AUTO: 10.6 FL (ref 9–12.9)
PMV BLD AUTO: 10.8 FL (ref 9–12.9)
POTASSIUM SERPL-SCNC: 3.5 MMOL/L (ref 3.6–5.5)
PROT SERPL-MCNC: 4.8 G/DL (ref 6–8.2)
RBC # BLD AUTO: 2.78 M/UL (ref 4.7–6.1)
RBC # BLD AUTO: 2.82 M/UL (ref 4.7–6.1)
RETICS # AUTO: 0.08 M/UL (ref 0.04–0.12)
RETICS/RBC NFR: 2.9 % (ref 0.8–2.6)
SODIUM SERPL-SCNC: 137 MMOL/L (ref 135–145)
TIBC SERPL-MCNC: 219 UG/DL (ref 250–450)
TRIGL SERPL-MCNC: 43 MG/DL (ref 0–149)
UIBC SERPL-MCNC: 194 UG/DL (ref 110–370)
WBC # BLD AUTO: 7.1 K/UL (ref 4.8–10.8)
WBC # BLD AUTO: 7.6 K/UL (ref 4.8–10.8)

## 2024-10-06 PROCEDURE — 99232 SBSQ HOSP IP/OBS MODERATE 35: CPT | Performed by: INTERNAL MEDICINE

## 2024-10-06 PROCEDURE — 700105 HCHG RX REV CODE 258: Performed by: INTERNAL MEDICINE

## 2024-10-06 PROCEDURE — 85046 RETICYTE/HGB CONCENTRATE: CPT

## 2024-10-06 PROCEDURE — 700111 HCHG RX REV CODE 636 W/ 250 OVERRIDE (IP): Performed by: INTERNAL MEDICINE

## 2024-10-06 PROCEDURE — 83735 ASSAY OF MAGNESIUM: CPT

## 2024-10-06 PROCEDURE — 700105 HCHG RX REV CODE 258: Performed by: SURGERY

## 2024-10-06 PROCEDURE — 85027 COMPLETE CBC AUTOMATED: CPT | Mod: 91

## 2024-10-06 PROCEDURE — 770020 HCHG ROOM/CARE - TELE (206)

## 2024-10-06 PROCEDURE — 83550 IRON BINDING TEST: CPT

## 2024-10-06 PROCEDURE — 80053 COMPREHEN METABOLIC PANEL: CPT

## 2024-10-06 PROCEDURE — 99231 SBSQ HOSP IP/OBS SF/LOW 25: CPT

## 2024-10-06 PROCEDURE — 36415 COLL VENOUS BLD VENIPUNCTURE: CPT

## 2024-10-06 PROCEDURE — 83540 ASSAY OF IRON: CPT

## 2024-10-06 PROCEDURE — A9270 NON-COVERED ITEM OR SERVICE: HCPCS | Performed by: INTERNAL MEDICINE

## 2024-10-06 PROCEDURE — 84478 ASSAY OF TRIGLYCERIDES: CPT

## 2024-10-06 PROCEDURE — 700111 HCHG RX REV CODE 636 W/ 250 OVERRIDE (IP): Mod: JZ | Performed by: SURGERY

## 2024-10-06 PROCEDURE — 700102 HCHG RX REV CODE 250 W/ 637 OVERRIDE(OP): Performed by: INTERNAL MEDICINE

## 2024-10-06 PROCEDURE — 84100 ASSAY OF PHOSPHORUS: CPT

## 2024-10-06 PROCEDURE — 82465 ASSAY BLD/SERUM CHOLESTEROL: CPT

## 2024-10-06 PROCEDURE — 700111 HCHG RX REV CODE 636 W/ 250 OVERRIDE (IP): Mod: JZ | Performed by: INTERNAL MEDICINE

## 2024-10-06 PROCEDURE — 700101 HCHG RX REV CODE 250: Performed by: INTERNAL MEDICINE

## 2024-10-06 RX ORDER — POTASSIUM CHLORIDE 1500 MG/1
40 TABLET, EXTENDED RELEASE ORAL ONCE
Status: COMPLETED | OUTPATIENT
Start: 2024-10-06 | End: 2024-10-06

## 2024-10-06 RX ADMIN — PANTOPRAZOLE SODIUM 40 MG: 40 INJECTION, POWDER, FOR SOLUTION INTRAVENOUS at 17:46

## 2024-10-06 RX ADMIN — PIPERACILLIN AND TAZOBACTAM 4.5 G: 4; .5 INJECTION, POWDER, FOR SOLUTION INTRAVENOUS at 05:01

## 2024-10-06 RX ADMIN — SUCRALFATE ORAL 1 G: 1 SUSPENSION ORAL at 00:13

## 2024-10-06 RX ADMIN — SUCRALFATE ORAL 1 G: 1 SUSPENSION ORAL at 13:14

## 2024-10-06 RX ADMIN — PIPERACILLIN AND TAZOBACTAM 4.5 G: 4; .5 INJECTION, POWDER, FOR SOLUTION INTRAVENOUS at 21:08

## 2024-10-06 RX ADMIN — FOLIC ACID 1 MG: 5 INJECTION, SOLUTION INTRAMUSCULAR; INTRAVENOUS; SUBCUTANEOUS at 05:04

## 2024-10-06 RX ADMIN — FAMOTIDINE 20 MG: 10 INJECTION, SOLUTION INTRAVENOUS at 17:46

## 2024-10-06 RX ADMIN — SENNOSIDES AND DOCUSATE SODIUM 2 TABLET: 50; 8.6 TABLET ORAL at 17:46

## 2024-10-06 RX ADMIN — THIAMINE HYDROCHLORIDE 100 MG: 100 INJECTION, SOLUTION INTRAMUSCULAR; INTRAVENOUS at 04:58

## 2024-10-06 RX ADMIN — PANTOPRAZOLE SODIUM 40 MG: 40 INJECTION, POWDER, FOR SOLUTION INTRAVENOUS at 04:58

## 2024-10-06 RX ADMIN — SODIUM CHLORIDE, SODIUM LACTATE, POTASSIUM CHLORIDE, CALCIUM CHLORIDE AND DEXTROSE MONOHYDRATE: 5; 600; 310; 30; 20 INJECTION, SOLUTION INTRAVENOUS at 09:41

## 2024-10-06 RX ADMIN — SUCRALFATE ORAL 1 G: 1 SUSPENSION ORAL at 17:46

## 2024-10-06 RX ADMIN — FAMOTIDINE 20 MG: 10 INJECTION, SOLUTION INTRAVENOUS at 04:58

## 2024-10-06 RX ADMIN — POTASSIUM CHLORIDE 40 MEQ: 1500 TABLET, EXTENDED RELEASE ORAL at 08:56

## 2024-10-06 RX ADMIN — SODIUM CHLORIDE, SODIUM LACTATE, POTASSIUM CHLORIDE, CALCIUM CHLORIDE AND DEXTROSE MONOHYDRATE: 5; 600; 310; 30; 20 INJECTION, SOLUTION INTRAVENOUS at 21:27

## 2024-10-06 RX ADMIN — PIPERACILLIN AND TAZOBACTAM 4.5 G: 4; .5 INJECTION, POWDER, FOR SOLUTION INTRAVENOUS at 13:14

## 2024-10-06 RX ADMIN — SODIUM CHLORIDE 250 MG: 9 INJECTION, SOLUTION INTRAVENOUS at 18:02

## 2024-10-06 RX ADMIN — SUCRALFATE ORAL 1 G: 1 SUSPENSION ORAL at 04:58

## 2024-10-06 ASSESSMENT — COGNITIVE AND FUNCTIONAL STATUS - GENERAL
CLIMB 3 TO 5 STEPS WITH RAILING: A LOT
WALKING IN HOSPITAL ROOM: A LITTLE
SUGGESTED CMS G CODE MODIFIER DAILY ACTIVITY: CJ
TURNING FROM BACK TO SIDE WHILE IN FLAT BAD: A LITTLE
STANDING UP FROM CHAIR USING ARMS: A LITTLE
HELP NEEDED FOR BATHING: A LITTLE
DRESSING REGULAR UPPER BODY CLOTHING: A LITTLE
TOILETING: A LITTLE
MOVING TO AND FROM BED TO CHAIR: A LITTLE
MOBILITY SCORE: 17
MOVING FROM LYING ON BACK TO SITTING ON SIDE OF FLAT BED: A LITTLE
DAILY ACTIVITIY SCORE: 20
SUGGESTED CMS G CODE MODIFIER MOBILITY: CK
DRESSING REGULAR LOWER BODY CLOTHING: A LITTLE

## 2024-10-06 ASSESSMENT — ENCOUNTER SYMPTOMS
NERVOUS/ANXIOUS: 0
WEAKNESS: 1
EYES NEGATIVE: 1
MUSCULOSKELETAL NEGATIVE: 1
GASTROINTESTINAL NEGATIVE: 1
RESPIRATORY NEGATIVE: 1

## 2024-10-06 ASSESSMENT — PAIN DESCRIPTION - PAIN TYPE: TYPE: ACUTE PAIN

## 2024-10-06 ASSESSMENT — FIBROSIS 4 INDEX: FIB4 SCORE: 3.62

## 2024-10-07 ENCOUNTER — ANESTHESIA (OUTPATIENT)
Dept: SURGERY | Facility: MEDICAL CENTER | Age: 74
DRG: 326 | End: 2024-10-07
Payer: MEDICARE

## 2024-10-07 PROBLEM — C16.9 GASTRIC CANCER (HCC): Status: ACTIVE | Noted: 2024-10-07

## 2024-10-07 LAB
ABO GROUP BLD: NORMAL
ABO GROUP BLD: NORMAL
ALBUMIN SERPL BCP-MCNC: 2.5 G/DL (ref 3.2–4.9)
ALBUMIN SERPL BCP-MCNC: 2.5 G/DL (ref 3.2–4.9)
ALBUMIN/GLOB SERPL: 1 G/DL
ALBUMIN/GLOB SERPL: 1.1 G/DL
ALP SERPL-CCNC: 51 U/L (ref 30–99)
ALP SERPL-CCNC: 54 U/L (ref 30–99)
ALT SERPL-CCNC: 6 U/L (ref 2–50)
ALT SERPL-CCNC: 7 U/L (ref 2–50)
ANION GAP SERPL CALC-SCNC: 11 MMOL/L (ref 7–16)
ANION GAP SERPL CALC-SCNC: 7 MMOL/L (ref 7–16)
AST SERPL-CCNC: 12 U/L (ref 12–45)
AST SERPL-CCNC: 16 U/L (ref 12–45)
BILIRUB SERPL-MCNC: 0.7 MG/DL (ref 0.1–1.5)
BILIRUB SERPL-MCNC: 0.8 MG/DL (ref 0.1–1.5)
BLD GP AB SCN SERPL QL: NORMAL
BLD GP AB SCN SERPL QL: NORMAL
BUN SERPL-MCNC: 7 MG/DL (ref 8–22)
BUN SERPL-MCNC: 8 MG/DL (ref 8–22)
CALCIUM ALBUM COR SERPL-MCNC: 9 MG/DL (ref 8.5–10.5)
CALCIUM ALBUM COR SERPL-MCNC: 9.6 MG/DL (ref 8.5–10.5)
CALCIUM SERPL-MCNC: 7.8 MG/DL (ref 8.5–10.5)
CALCIUM SERPL-MCNC: 8.4 MG/DL (ref 8.5–10.5)
CFT BLD TEG: 4.7 MIN (ref 4.6–9.1)
CFT P HPASE BLD TEG: 4.5 MIN (ref 4.3–8.3)
CHLORIDE SERPL-SCNC: 104 MMOL/L (ref 96–112)
CHLORIDE SERPL-SCNC: 107 MMOL/L (ref 96–112)
CLOT ANGLE BLD TEG: 75.4 DEGREES (ref 63–78)
CLOT LYSIS 30M P MA LENFR BLD TEG: 2.1 % (ref 0–2.6)
CO2 SERPL-SCNC: 20 MMOL/L (ref 20–33)
CO2 SERPL-SCNC: 22 MMOL/L (ref 20–33)
CREAT SERPL-MCNC: 1 MG/DL (ref 0.5–1.4)
CREAT SERPL-MCNC: 1.08 MG/DL (ref 0.5–1.4)
CT.EXTRINSIC BLD ROTEM: 1.1 MIN (ref 0.8–2.1)
ERYTHROCYTE [DISTWIDTH] IN BLOOD BY AUTOMATED COUNT: 48.9 FL (ref 35.9–50)
ERYTHROCYTE [DISTWIDTH] IN BLOOD BY AUTOMATED COUNT: 49 FL (ref 35.9–50)
GFR SERPLBLD CREATININE-BSD FMLA CKD-EPI: 72 ML/MIN/1.73 M 2
GFR SERPLBLD CREATININE-BSD FMLA CKD-EPI: 79 ML/MIN/1.73 M 2
GLOBULIN SER CALC-MCNC: 2.3 G/DL (ref 1.9–3.5)
GLOBULIN SER CALC-MCNC: 2.5 G/DL (ref 1.9–3.5)
GLUCOSE BLD STRIP.AUTO-MCNC: 107 MG/DL (ref 65–99)
GLUCOSE SERPL-MCNC: 88 MG/DL (ref 65–99)
GLUCOSE SERPL-MCNC: 91 MG/DL (ref 65–99)
HCT VFR BLD AUTO: 23.8 % (ref 42–52)
HCT VFR BLD AUTO: 32.7 % (ref 42–52)
HGB BLD-MCNC: 10.6 G/DL (ref 14–18)
HGB BLD-MCNC: 7.7 G/DL (ref 14–18)
INR PPP: 1.34 (ref 0.87–1.13)
MAGNESIUM SERPL-MCNC: 2 MG/DL (ref 1.5–2.5)
MAGNESIUM SERPL-MCNC: 2 MG/DL (ref 1.5–2.5)
MCF BLD TEG: 55.4 MM (ref 52–69)
MCF.PLATELET INHIB BLD ROTEM: 20.4 MM (ref 15–32)
MCH RBC QN AUTO: 28.7 PG (ref 27–33)
MCH RBC QN AUTO: 29 PG (ref 27–33)
MCHC RBC AUTO-ENTMCNC: 32.4 G/DL (ref 32.3–36.5)
MCHC RBC AUTO-ENTMCNC: 32.4 G/DL (ref 32.3–36.5)
MCV RBC AUTO: 88.8 FL (ref 81.4–97.8)
MCV RBC AUTO: 89.6 FL (ref 81.4–97.8)
PA AA BLD-ACNC: 5.1 % (ref 0–11)
PA ADP BLD-ACNC: 21.4 % (ref 0–17)
PATHOLOGY CONSULT NOTE: NORMAL
PHOSPHATE SERPL-MCNC: 2.2 MG/DL (ref 2.5–4.5)
PLATELET # BLD AUTO: 133 K/UL (ref 164–446)
PLATELET # BLD AUTO: 147 K/UL (ref 164–446)
PMV BLD AUTO: 10.5 FL (ref 9–12.9)
PMV BLD AUTO: 10.7 FL (ref 9–12.9)
POTASSIUM SERPL-SCNC: 3.8 MMOL/L (ref 3.6–5.5)
POTASSIUM SERPL-SCNC: 3.9 MMOL/L (ref 3.6–5.5)
PREALB SERPL-MCNC: 11.8 MG/DL (ref 18–38)
PROT SERPL-MCNC: 4.8 G/DL (ref 6–8.2)
PROT SERPL-MCNC: 5 G/DL (ref 6–8.2)
PROTHROMBIN TIME: 16.6 SEC (ref 12–14.6)
RBC # BLD AUTO: 2.68 M/UL (ref 4.7–6.1)
RBC # BLD AUTO: 3.65 M/UL (ref 4.7–6.1)
RH BLD: NORMAL
RH BLD: NORMAL
SODIUM SERPL-SCNC: 135 MMOL/L (ref 135–145)
SODIUM SERPL-SCNC: 136 MMOL/L (ref 135–145)
TEG ALGORITHM TGALG: ABNORMAL
TRIGL SERPL-MCNC: 35 MG/DL (ref 0–149)
WBC # BLD AUTO: 20.7 K/UL (ref 4.8–10.8)
WBC # BLD AUTO: 6.1 K/UL (ref 4.8–10.8)

## 2024-10-07 PROCEDURE — 700102 HCHG RX REV CODE 250 W/ 637 OVERRIDE(OP): Performed by: INTERNAL MEDICINE

## 2024-10-07 PROCEDURE — 47600 CHOLECYSTECTOMY: CPT | Performed by: SURGERY

## 2024-10-07 PROCEDURE — 38747 REMOVE ABDOMINAL LYMPH NODES: CPT | Mod: 59 | Performed by: SURGERY

## 2024-10-07 PROCEDURE — 85576 BLOOD PLATELET AGGREGATION: CPT | Mod: 91

## 2024-10-07 PROCEDURE — 76998 US GUIDE INTRAOP: CPT | Mod: 26 | Performed by: SURGERY

## 2024-10-07 PROCEDURE — 88309 TISSUE EXAM BY PATHOLOGIST: CPT

## 2024-10-07 PROCEDURE — 43633 REMOVAL OF STOMACH PARTIAL: CPT | Performed by: SURGERY

## 2024-10-07 PROCEDURE — 700111 HCHG RX REV CODE 636 W/ 250 OVERRIDE (IP): Performed by: INTERNAL MEDICINE

## 2024-10-07 PROCEDURE — 160041 HCHG SURGERY MINUTES - EA ADDL 1 MIN LEVEL 4: Performed by: SURGERY

## 2024-10-07 PROCEDURE — 84478 ASSAY OF TRIGLYCERIDES: CPT

## 2024-10-07 PROCEDURE — 07BD0ZX EXCISION OF AORTIC LYMPHATIC, OPEN APPROACH, DIAGNOSTIC: ICD-10-PCS | Performed by: SURGERY

## 2024-10-07 PROCEDURE — 700111 HCHG RX REV CODE 636 W/ 250 OVERRIDE (IP): Mod: JZ | Performed by: SURGERY

## 2024-10-07 PROCEDURE — 700111 HCHG RX REV CODE 636 W/ 250 OVERRIDE (IP): Mod: JZ | Performed by: INTERNAL MEDICINE

## 2024-10-07 PROCEDURE — 47100 WEDGE BIOPSY OF LIVER: CPT | Performed by: SURGERY

## 2024-10-07 PROCEDURE — 700105 HCHG RX REV CODE 258: Performed by: INTERNAL MEDICINE

## 2024-10-07 PROCEDURE — 49905 OMENTAL FLAP INTRA-ABDOM: CPT | Performed by: SURGERY

## 2024-10-07 PROCEDURE — 3E0T3BZ INTRODUCTION OF ANESTHETIC AGENT INTO PERIPHERAL NERVES AND PLEXI, PERCUTANEOUS APPROACH: ICD-10-PCS | Performed by: STUDENT IN AN ORGANIZED HEALTH CARE EDUCATION/TRAINING PROGRAM

## 2024-10-07 PROCEDURE — 700111 HCHG RX REV CODE 636 W/ 250 OVERRIDE (IP): Performed by: STUDENT IN AN ORGANIZED HEALTH CARE EDUCATION/TRAINING PROGRAM

## 2024-10-07 PROCEDURE — 160029 HCHG SURGERY MINUTES - 1ST 30 MINS LEVEL 4: Performed by: SURGERY

## 2024-10-07 PROCEDURE — 700101 HCHG RX REV CODE 250: Performed by: STUDENT IN AN ORGANIZED HEALTH CARE EDUCATION/TRAINING PROGRAM

## 2024-10-07 PROCEDURE — 0FB00ZX EXCISION OF LIVER, OPEN APPROACH, DIAGNOSTIC: ICD-10-PCS | Performed by: SURGERY

## 2024-10-07 PROCEDURE — C1729 CATH, DRAINAGE: HCPCS | Performed by: SURGERY

## 2024-10-07 PROCEDURE — 88307 TISSUE EXAM BY PATHOLOGIST: CPT

## 2024-10-07 PROCEDURE — 88305 TISSUE EXAM BY PATHOLOGIST: CPT | Mod: 59

## 2024-10-07 PROCEDURE — 36415 COLL VENOUS BLD VENIPUNCTURE: CPT

## 2024-10-07 PROCEDURE — 0FT40ZZ RESECTION OF GALLBLADDER, OPEN APPROACH: ICD-10-PCS | Performed by: SURGERY

## 2024-10-07 PROCEDURE — 160048 HCHG OR STATISTICAL LEVEL 1-5: Performed by: SURGERY

## 2024-10-07 PROCEDURE — 82962 GLUCOSE BLOOD TEST: CPT

## 2024-10-07 PROCEDURE — 88341 IMHCHEM/IMCYTCHM EA ADD ANTB: CPT

## 2024-10-07 PROCEDURE — 86850 RBC ANTIBODY SCREEN: CPT | Mod: 91

## 2024-10-07 PROCEDURE — 0D160ZA BYPASS STOMACH TO JEJUNUM, OPEN APPROACH: ICD-10-PCS | Performed by: SURGERY

## 2024-10-07 PROCEDURE — 700105 HCHG RX REV CODE 258: Performed by: STUDENT IN AN ORGANIZED HEALTH CARE EDUCATION/TRAINING PROGRAM

## 2024-10-07 PROCEDURE — 770001 HCHG ROOM/CARE - MED/SURG/GYN PRIV*

## 2024-10-07 PROCEDURE — 86901 BLOOD TYPING SEROLOGIC RH(D): CPT | Mod: 91

## 2024-10-07 PROCEDURE — 99232 SBSQ HOSP IP/OBS MODERATE 35: CPT | Performed by: INTERNAL MEDICINE

## 2024-10-07 PROCEDURE — 160009 HCHG ANES TIME/MIN: Performed by: SURGERY

## 2024-10-07 PROCEDURE — 160002 HCHG RECOVERY MINUTES (STAT): Performed by: SURGERY

## 2024-10-07 PROCEDURE — 84100 ASSAY OF PHOSPHORUS: CPT

## 2024-10-07 PROCEDURE — 88360 TUMOR IMMUNOHISTOCHEM/MANUAL: CPT

## 2024-10-07 PROCEDURE — 85347 COAGULATION TIME ACTIVATED: CPT

## 2024-10-07 PROCEDURE — 160035 HCHG PACU - 1ST 60 MINS PHASE I: Performed by: SURGERY

## 2024-10-07 PROCEDURE — 84134 ASSAY OF PREALBUMIN: CPT

## 2024-10-07 PROCEDURE — A9270 NON-COVERED ITEM OR SERVICE: HCPCS | Performed by: INTERNAL MEDICINE

## 2024-10-07 PROCEDURE — 83735 ASSAY OF MAGNESIUM: CPT | Mod: 91

## 2024-10-07 PROCEDURE — 80053 COMPREHEN METABOLIC PANEL: CPT | Mod: 91

## 2024-10-07 PROCEDURE — 88331 PATH CONSLTJ SURG 1 BLK 1SPC: CPT

## 2024-10-07 PROCEDURE — 99232 SBSQ HOSP IP/OBS MODERATE 35: CPT | Mod: 57 | Performed by: SURGERY

## 2024-10-07 PROCEDURE — 85384 FIBRINOGEN ACTIVITY: CPT

## 2024-10-07 PROCEDURE — 85027 COMPLETE CBC AUTOMATED: CPT | Mod: 91

## 2024-10-07 PROCEDURE — 0DB60ZZ EXCISION OF STOMACH, OPEN APPROACH: ICD-10-PCS | Performed by: SURGERY

## 2024-10-07 PROCEDURE — 700101 HCHG RX REV CODE 250: Performed by: SURGERY

## 2024-10-07 PROCEDURE — 700105 HCHG RX REV CODE 258: Performed by: SURGERY

## 2024-10-07 PROCEDURE — 700111 HCHG RX REV CODE 636 W/ 250 OVERRIDE (IP): Performed by: SURGERY

## 2024-10-07 PROCEDURE — 86900 BLOOD TYPING SEROLOGIC ABO: CPT | Mod: 91

## 2024-10-07 PROCEDURE — 700101 HCHG RX REV CODE 250: Performed by: INTERNAL MEDICINE

## 2024-10-07 PROCEDURE — 85610 PROTHROMBIN TIME: CPT

## 2024-10-07 PROCEDURE — 88313 SPECIAL STAINS GROUP 2: CPT

## 2024-10-07 PROCEDURE — 88304 TISSUE EXAM BY PATHOLOGIST: CPT

## 2024-10-07 PROCEDURE — 62320 NJX INTERLAMINAR CRV/THRC: CPT | Performed by: SURGERY

## 2024-10-07 PROCEDURE — 88342 IMHCHEM/IMCYTCHM 1ST ANTB: CPT

## 2024-10-07 RX ORDER — SODIUM CHLORIDE 9 MG/ML
250 INJECTION, SOLUTION INTRAVENOUS ONCE
Status: COMPLETED | OUTPATIENT
Start: 2024-10-07 | End: 2024-10-07

## 2024-10-07 RX ORDER — OXYCODONE HCL 5 MG/5 ML
5 SOLUTION, ORAL ORAL
Status: DISCONTINUED | OUTPATIENT
Start: 2024-10-07 | End: 2024-10-07 | Stop reason: HOSPADM

## 2024-10-07 RX ORDER — MIDAZOLAM HYDROCHLORIDE 1 MG/ML
INJECTION INTRAMUSCULAR; INTRAVENOUS PRN
Status: DISCONTINUED | OUTPATIENT
Start: 2024-10-07 | End: 2024-10-07 | Stop reason: SURG

## 2024-10-07 RX ORDER — DIPHENHYDRAMINE HYDROCHLORIDE 50 MG/ML
12.5 INJECTION INTRAMUSCULAR; INTRAVENOUS
Status: DISCONTINUED | OUTPATIENT
Start: 2024-10-07 | End: 2024-10-07 | Stop reason: HOSPADM

## 2024-10-07 RX ORDER — SODIUM CHLORIDE, SODIUM LACTATE, POTASSIUM CHLORIDE, CALCIUM CHLORIDE 600; 310; 30; 20 MG/100ML; MG/100ML; MG/100ML; MG/100ML
INJECTION, SOLUTION INTRAVENOUS CONTINUOUS
Status: DISCONTINUED | OUTPATIENT
Start: 2024-10-07 | End: 2024-10-10

## 2024-10-07 RX ORDER — DEXAMETHASONE SODIUM PHOSPHATE 4 MG/ML
INJECTION, SOLUTION INTRA-ARTICULAR; INTRALESIONAL; INTRAMUSCULAR; INTRAVENOUS; SOFT TISSUE PRN
Status: DISCONTINUED | OUTPATIENT
Start: 2024-10-07 | End: 2024-10-07 | Stop reason: SURG

## 2024-10-07 RX ORDER — HYDROMORPHONE HYDROCHLORIDE 1 MG/ML
0.4 INJECTION, SOLUTION INTRAMUSCULAR; INTRAVENOUS; SUBCUTANEOUS
Status: DISCONTINUED | OUTPATIENT
Start: 2024-10-07 | End: 2024-10-07 | Stop reason: HOSPADM

## 2024-10-07 RX ORDER — SCOLOPAMINE TRANSDERMAL SYSTEM 1 MG/1
1 PATCH, EXTENDED RELEASE TRANSDERMAL
Status: DISCONTINUED | OUTPATIENT
Start: 2024-10-07 | End: 2024-10-10

## 2024-10-07 RX ORDER — ESMOLOL HYDROCHLORIDE 10 MG/ML
INJECTION INTRAVENOUS PRN
Status: DISCONTINUED | OUTPATIENT
Start: 2024-10-07 | End: 2024-10-07 | Stop reason: SURG

## 2024-10-07 RX ORDER — VASOPRESSIN 20 U/ML
INJECTION PARENTERAL PRN
Status: DISCONTINUED | OUTPATIENT
Start: 2024-10-07 | End: 2024-10-07 | Stop reason: SURG

## 2024-10-07 RX ORDER — DEXAMETHASONE SODIUM PHOSPHATE 4 MG/ML
4 INJECTION, SOLUTION INTRA-ARTICULAR; INTRALESIONAL; INTRAMUSCULAR; INTRAVENOUS; SOFT TISSUE
Status: DISCONTINUED | OUTPATIENT
Start: 2024-10-07 | End: 2024-10-10

## 2024-10-07 RX ORDER — HALOPERIDOL 5 MG/ML
1 INJECTION INTRAMUSCULAR
Status: DISCONTINUED | OUTPATIENT
Start: 2024-10-07 | End: 2024-10-07 | Stop reason: HOSPADM

## 2024-10-07 RX ORDER — EPHEDRINE SULFATE 50 MG/ML
INJECTION, SOLUTION INTRAVENOUS PRN
Status: DISCONTINUED | OUTPATIENT
Start: 2024-10-07 | End: 2024-10-07 | Stop reason: SURG

## 2024-10-07 RX ORDER — LIDOCAINE HYDROCHLORIDE 20 MG/ML
INJECTION, SOLUTION EPIDURAL; INFILTRATION; INTRACAUDAL; PERINEURAL PRN
Status: DISCONTINUED | OUTPATIENT
Start: 2024-10-07 | End: 2024-10-07 | Stop reason: SURG

## 2024-10-07 RX ORDER — METOPROLOL TARTRATE 1 MG/ML
1 INJECTION, SOLUTION INTRAVENOUS
Status: DISCONTINUED | OUTPATIENT
Start: 2024-10-07 | End: 2024-10-07 | Stop reason: HOSPADM

## 2024-10-07 RX ORDER — ONDANSETRON 2 MG/ML
INJECTION INTRAMUSCULAR; INTRAVENOUS PRN
Status: DISCONTINUED | OUTPATIENT
Start: 2024-10-07 | End: 2024-10-07 | Stop reason: SURG

## 2024-10-07 RX ORDER — ONDANSETRON 2 MG/ML
4 INJECTION INTRAMUSCULAR; INTRAVENOUS
Status: DISCONTINUED | OUTPATIENT
Start: 2024-10-07 | End: 2024-10-07 | Stop reason: HOSPADM

## 2024-10-07 RX ORDER — HYDROMORPHONE HYDROCHLORIDE 1 MG/ML
0.2 INJECTION, SOLUTION INTRAMUSCULAR; INTRAVENOUS; SUBCUTANEOUS
Status: DISCONTINUED | OUTPATIENT
Start: 2024-10-07 | End: 2024-10-07 | Stop reason: HOSPADM

## 2024-10-07 RX ORDER — PHENYLEPHRINE HYDROCHLORIDE 10 MG/ML
INJECTION, SOLUTION INTRAMUSCULAR; INTRAVENOUS; SUBCUTANEOUS PRN
Status: DISCONTINUED | OUTPATIENT
Start: 2024-10-07 | End: 2024-10-07 | Stop reason: SURG

## 2024-10-07 RX ORDER — DIPHENHYDRAMINE HYDROCHLORIDE 50 MG/ML
25 INJECTION INTRAMUSCULAR; INTRAVENOUS EVERY 6 HOURS PRN
Status: DISCONTINUED | OUTPATIENT
Start: 2024-10-07 | End: 2024-10-10

## 2024-10-07 RX ORDER — SODIUM CHLORIDE 9 MG/ML
INJECTION, SOLUTION INTRAVENOUS
Status: DISCONTINUED | OUTPATIENT
Start: 2024-10-07 | End: 2024-10-07 | Stop reason: SURG

## 2024-10-07 RX ORDER — SODIUM CHLORIDE, SODIUM LACTATE, POTASSIUM CHLORIDE, CALCIUM CHLORIDE 600; 310; 30; 20 MG/100ML; MG/100ML; MG/100ML; MG/100ML
INJECTION, SOLUTION INTRAVENOUS
Status: DISCONTINUED | OUTPATIENT
Start: 2024-10-07 | End: 2024-10-07 | Stop reason: SURG

## 2024-10-07 RX ORDER — EPHEDRINE SULFATE 50 MG/ML
5 INJECTION, SOLUTION INTRAVENOUS
Status: DISCONTINUED | OUTPATIENT
Start: 2024-10-07 | End: 2024-10-07 | Stop reason: HOSPADM

## 2024-10-07 RX ORDER — ROCURONIUM BROMIDE 10 MG/ML
INJECTION, SOLUTION INTRAVENOUS PRN
Status: DISCONTINUED | OUTPATIENT
Start: 2024-10-07 | End: 2024-10-07 | Stop reason: SURG

## 2024-10-07 RX ORDER — PANTOPRAZOLE SODIUM 40 MG/10ML
40 INJECTION, POWDER, LYOPHILIZED, FOR SOLUTION INTRAVENOUS DAILY
Status: DISCONTINUED | OUTPATIENT
Start: 2024-10-08 | End: 2024-10-10

## 2024-10-07 RX ORDER — HYDROMORPHONE HYDROCHLORIDE 1 MG/ML
0.1 INJECTION, SOLUTION INTRAMUSCULAR; INTRAVENOUS; SUBCUTANEOUS
Status: DISCONTINUED | OUTPATIENT
Start: 2024-10-07 | End: 2024-10-07 | Stop reason: HOSPADM

## 2024-10-07 RX ORDER — ENOXAPARIN SODIUM 100 MG/ML
40 INJECTION SUBCUTANEOUS DAILY
Status: DISCONTINUED | OUTPATIENT
Start: 2024-10-08 | End: 2024-10-15 | Stop reason: HOSPADM

## 2024-10-07 RX ORDER — OXYCODONE HCL 5 MG/5 ML
10 SOLUTION, ORAL ORAL
Status: DISCONTINUED | OUTPATIENT
Start: 2024-10-07 | End: 2024-10-07 | Stop reason: HOSPADM

## 2024-10-07 RX ORDER — HALOPERIDOL 5 MG/ML
1 INJECTION INTRAMUSCULAR EVERY 6 HOURS PRN
Status: DISCONTINUED | OUTPATIENT
Start: 2024-10-07 | End: 2024-10-10

## 2024-10-07 RX ORDER — ONDANSETRON 2 MG/ML
4 INJECTION INTRAMUSCULAR; INTRAVENOUS EVERY 4 HOURS PRN
Status: DISCONTINUED | OUTPATIENT
Start: 2024-10-07 | End: 2024-10-07

## 2024-10-07 RX ADMIN — EPHEDRINE SULFATE 5 MG: 50 INJECTION, SOLUTION INTRAVENOUS at 12:27

## 2024-10-07 RX ADMIN — ROCURONIUM BROMIDE 20 MG: 50 INJECTION, SOLUTION INTRAVENOUS at 13:03

## 2024-10-07 RX ADMIN — PHENYLEPHRINE HYDROCHLORIDE 100 MCG: 10 INJECTION INTRAVENOUS at 12:15

## 2024-10-07 RX ADMIN — LIDOCAINE HYDROCHLORIDE 1 ML: 20 INJECTION, SOLUTION EPIDURAL; INFILTRATION; INTRACAUDAL; PERINEURAL at 13:56

## 2024-10-07 RX ADMIN — FENTANYL CITRATE 50 MCG: 50 INJECTION, SOLUTION INTRAMUSCULAR; INTRAVENOUS at 12:41

## 2024-10-07 RX ADMIN — HALOPERIDOL LACTATE 0.5 MG: 5 INJECTION, SOLUTION INTRAMUSCULAR at 13:46

## 2024-10-07 RX ADMIN — SODIUM CHLORIDE 250 MG: 9 INJECTION, SOLUTION INTRAVENOUS at 20:46

## 2024-10-07 RX ADMIN — VASOPRESSIN 1 UNITS: 20 INJECTION INTRAVENOUS at 13:15

## 2024-10-07 RX ADMIN — LIDOCAINE HYDROCHLORIDE 50 MG: 20 INJECTION, SOLUTION EPIDURAL; INFILTRATION; INTRACAUDAL at 12:15

## 2024-10-07 RX ADMIN — ONDANSETRON 4 MG: 2 INJECTION INTRAMUSCULAR; INTRAVENOUS at 14:28

## 2024-10-07 RX ADMIN — SUCRALFATE ORAL 1 G: 1 SUSPENSION ORAL at 05:08

## 2024-10-07 RX ADMIN — VASOPRESSIN 1 UNITS: 20 INJECTION INTRAVENOUS at 13:00

## 2024-10-07 RX ADMIN — PROPOFOL 50 MG: 10 INJECTION, EMULSION INTRAVENOUS at 12:45

## 2024-10-07 RX ADMIN — SUCRALFATE ORAL 1 G: 1 SUSPENSION ORAL at 00:24

## 2024-10-07 RX ADMIN — HYDROMORPHONE HYDROCHLORIDE: 1 INJECTION, SOLUTION INTRAMUSCULAR; INTRAVENOUS; SUBCUTANEOUS at 14:52

## 2024-10-07 RX ADMIN — LIDOCAINE HYDROCHLORIDE 1 ML: 20 INJECTION, SOLUTION EPIDURAL; INFILTRATION; INTRACAUDAL; PERINEURAL at 14:14

## 2024-10-07 RX ADMIN — SODIUM CHLORIDE, POTASSIUM CHLORIDE, SODIUM LACTATE AND CALCIUM CHLORIDE: 600; 310; 30; 20 INJECTION, SOLUTION INTRAVENOUS at 11:49

## 2024-10-07 RX ADMIN — THIAMINE HYDROCHLORIDE 100 MG: 100 INJECTION, SOLUTION INTRAMUSCULAR; INTRAVENOUS at 05:08

## 2024-10-07 RX ADMIN — FOLIC ACID 1 MG: 5 INJECTION, SOLUTION INTRAMUSCULAR; INTRAVENOUS; SUBCUTANEOUS at 06:46

## 2024-10-07 RX ADMIN — EPHEDRINE SULFATE 5 MG: 50 INJECTION, SOLUTION INTRAVENOUS at 12:33

## 2024-10-07 RX ADMIN — PHENYLEPHRINE HYDROCHLORIDE 100 MCG: 10 INJECTION INTRAVENOUS at 12:39

## 2024-10-07 RX ADMIN — FENTANYL CITRATE 50 MCG: 50 INJECTION, SOLUTION INTRAMUSCULAR; INTRAVENOUS at 12:19

## 2024-10-07 RX ADMIN — MIDAZOLAM HYDROCHLORIDE 1 MG: 2 INJECTION, SOLUTION INTRAMUSCULAR; INTRAVENOUS at 12:10

## 2024-10-07 RX ADMIN — PHENYLEPHRINE HYDROCHLORIDE 100 MCG: 10 INJECTION INTRAVENOUS at 12:22

## 2024-10-07 RX ADMIN — FAMOTIDINE 20 MG: 10 INJECTION, SOLUTION INTRAVENOUS at 05:08

## 2024-10-07 RX ADMIN — VASOPRESSIN 1 UNITS: 20 INJECTION INTRAVENOUS at 13:46

## 2024-10-07 RX ADMIN — LIDOCAINE HYDROCHLORIDE,EPINEPHRINE BITARTRATE 3 ML: 15; .005 INJECTION, SOLUTION EPIDURAL; INFILTRATION; INTRACAUDAL; PERINEURAL at 12:02

## 2024-10-07 RX ADMIN — FENTANYL CITRATE 50 MCG: 50 INJECTION, SOLUTION INTRAMUSCULAR; INTRAVENOUS at 12:15

## 2024-10-07 RX ADMIN — FENTANYL CITRATE 50 MCG: 50 INJECTION, SOLUTION INTRAMUSCULAR; INTRAVENOUS at 13:52

## 2024-10-07 RX ADMIN — PHENYLEPHRINE HYDROCHLORIDE 0.4 MCG/KG/MIN: 10 INJECTION INTRAVENOUS at 13:07

## 2024-10-07 RX ADMIN — VASOPRESSIN 1 UNITS: 20 INJECTION INTRAVENOUS at 13:52

## 2024-10-07 RX ADMIN — SUGAMMADEX 200 MG: 100 INJECTION, SOLUTION INTRAVENOUS at 14:30

## 2024-10-07 RX ADMIN — PHENYLEPHRINE HYDROCHLORIDE 100 MCG: 10 INJECTION INTRAVENOUS at 12:52

## 2024-10-07 RX ADMIN — CEFTRIAXONE SODIUM 2000 MG: 10 INJECTION, POWDER, FOR SOLUTION INTRAVENOUS at 18:35

## 2024-10-07 RX ADMIN — VASOPRESSIN 1 UNITS: 20 INJECTION INTRAVENOUS at 13:42

## 2024-10-07 RX ADMIN — Medication 100 MG: at 12:15

## 2024-10-07 RX ADMIN — DEXAMETHASONE SODIUM PHOSPHATE 8 MG: 4 INJECTION INTRA-ARTICULAR; INTRALESIONAL; INTRAMUSCULAR; INTRAVENOUS; SOFT TISSUE at 12:29

## 2024-10-07 RX ADMIN — LIDOCAINE HYDROCHLORIDE 1 ML: 20 INJECTION, SOLUTION EPIDURAL; INFILTRATION; INTRACAUDAL; PERINEURAL at 14:03

## 2024-10-07 RX ADMIN — ROCURONIUM BROMIDE 20 MG: 50 INJECTION, SOLUTION INTRAVENOUS at 12:31

## 2024-10-07 RX ADMIN — PANTOPRAZOLE SODIUM 40 MG: 40 INJECTION, POWDER, FOR SOLUTION INTRAVENOUS at 05:09

## 2024-10-07 RX ADMIN — SODIUM CHLORIDE 250 MG: 9 INJECTION, SOLUTION INTRAVENOUS at 05:30

## 2024-10-07 RX ADMIN — FENTANYL CITRATE 50 MCG: 50 INJECTION, SOLUTION INTRAMUSCULAR; INTRAVENOUS at 12:46

## 2024-10-07 RX ADMIN — SODIUM CHLORIDE, POTASSIUM CHLORIDE, SODIUM LACTATE AND CALCIUM CHLORIDE: 600; 310; 30; 20 INJECTION, SOLUTION INTRAVENOUS at 18:36

## 2024-10-07 RX ADMIN — PIPERACILLIN AND TAZOBACTAM 4.5 G: 4; .5 INJECTION, POWDER, FOR SOLUTION INTRAVENOUS at 05:30

## 2024-10-07 RX ADMIN — LIDOCAINE HYDROCHLORIDE 1 ML: 20 INJECTION, SOLUTION EPIDURAL; INFILTRATION; INTRACAUDAL; PERINEURAL at 13:55

## 2024-10-07 RX ADMIN — PHENYLEPHRINE HYDROCHLORIDE 100 MCG: 10 INJECTION INTRAVENOUS at 12:56

## 2024-10-07 RX ADMIN — SODIUM CHLORIDE 250 ML: 9 INJECTION, SOLUTION INTRAVENOUS at 17:50

## 2024-10-07 RX ADMIN — MIDAZOLAM HYDROCHLORIDE 1 MG: 2 INJECTION, SOLUTION INTRAMUSCULAR; INTRAVENOUS at 11:56

## 2024-10-07 RX ADMIN — ESMOLOL HYDROCHLORIDE 30 MG: 100 INJECTION, SOLUTION INTRAVENOUS at 12:49

## 2024-10-07 RX ADMIN — SODIUM CHLORIDE: 9 INJECTION, SOLUTION INTRAVENOUS at 12:06

## 2024-10-07 RX ADMIN — PROPOFOL 100 MG: 10 INJECTION, EMULSION INTRAVENOUS at 12:15

## 2024-10-07 RX ADMIN — VASOPRESSIN 1 UNITS: 20 INJECTION INTRAVENOUS at 13:27

## 2024-10-07 ASSESSMENT — ENCOUNTER SYMPTOMS
GASTROINTESTINAL NEGATIVE: 1
EYES NEGATIVE: 1
MUSCULOSKELETAL NEGATIVE: 1
VOMITING: 0
RESPIRATORY NEGATIVE: 1
NERVOUS/ANXIOUS: 0
ABDOMINAL PAIN: 0
CHILLS: 0
WEAKNESS: 1
SHORTNESS OF BREATH: 0
FEVER: 0
NAUSEA: 0
COUGH: 0

## 2024-10-07 ASSESSMENT — COGNITIVE AND FUNCTIONAL STATUS - GENERAL
DAILY ACTIVITIY SCORE: 20
DRESSING REGULAR UPPER BODY CLOTHING: A LITTLE
MOVING FROM LYING ON BACK TO SITTING ON SIDE OF FLAT BED: A LITTLE
HELP NEEDED FOR BATHING: A LITTLE
TURNING FROM BACK TO SIDE WHILE IN FLAT BAD: A LITTLE
WALKING IN HOSPITAL ROOM: A LITTLE
TOILETING: A LITTLE
CLIMB 3 TO 5 STEPS WITH RAILING: A LITTLE
DRESSING REGULAR LOWER BODY CLOTHING: A LITTLE
STANDING UP FROM CHAIR USING ARMS: A LITTLE
MOBILITY SCORE: 18
SUGGESTED CMS G CODE MODIFIER MOBILITY: CK
MOVING TO AND FROM BED TO CHAIR: A LITTLE
SUGGESTED CMS G CODE MODIFIER DAILY ACTIVITY: CJ

## 2024-10-07 ASSESSMENT — COPD QUESTIONNAIRES
DO YOU EVER COUGH UP ANY MUCUS OR PHLEGM?: YES, A FEW DAYS A WEEK OR MONTH
DURING THE PAST 4 WEEKS HOW MUCH DID YOU FEEL SHORT OF BREATH: SOME OF THE TIME
COPD SCREENING SCORE: 7
HAVE YOU SMOKED AT LEAST 100 CIGARETTES IN YOUR ENTIRE LIFE: YES

## 2024-10-07 ASSESSMENT — FIBROSIS 4 INDEX: FIB4 SCORE: 2.52

## 2024-10-07 ASSESSMENT — PAIN DESCRIPTION - PAIN TYPE
TYPE: SURGICAL PAIN
TYPE: SURGICAL PAIN
TYPE: ACUTE PAIN

## 2024-10-08 ENCOUNTER — ANESTHESIA EVENT (OUTPATIENT)
Dept: ANESTHESIOLOGY | Facility: MEDICAL CENTER | Age: 74
DRG: 326 | End: 2024-10-08
Payer: MEDICARE

## 2024-10-08 LAB
ALBUMIN SERPL BCP-MCNC: 2.8 G/DL (ref 3.2–4.9)
ALBUMIN/GLOB SERPL: 1.1 G/DL
ALP SERPL-CCNC: 58 U/L (ref 30–99)
ALT SERPL-CCNC: 21 U/L (ref 2–50)
ANION GAP SERPL CALC-SCNC: 8 MMOL/L (ref 7–16)
AST SERPL-CCNC: 38 U/L (ref 12–45)
BILIRUB SERPL-MCNC: 0.4 MG/DL (ref 0.1–1.5)
BUN SERPL-MCNC: 10 MG/DL (ref 8–22)
CALCIUM ALBUM COR SERPL-MCNC: 9.4 MG/DL (ref 8.5–10.5)
CALCIUM SERPL-MCNC: 8.4 MG/DL (ref 8.5–10.5)
CHLORIDE SERPL-SCNC: 104 MMOL/L (ref 96–112)
CO2 SERPL-SCNC: 24 MMOL/L (ref 20–33)
CREAT SERPL-MCNC: 1.09 MG/DL (ref 0.5–1.4)
ERYTHROCYTE [DISTWIDTH] IN BLOOD BY AUTOMATED COUNT: 48.9 FL (ref 35.9–50)
GFR SERPLBLD CREATININE-BSD FMLA CKD-EPI: 71 ML/MIN/1.73 M 2
GLOBULIN SER CALC-MCNC: 2.5 G/DL (ref 1.9–3.5)
GLUCOSE BLD STRIP.AUTO-MCNC: 82 MG/DL (ref 65–99)
GLUCOSE SERPL-MCNC: 77 MG/DL (ref 65–99)
HCT VFR BLD AUTO: 25.6 % (ref 42–52)
HGB BLD-MCNC: 8.5 G/DL (ref 14–18)
MAGNESIUM SERPL-MCNC: 2.2 MG/DL (ref 1.5–2.5)
MCH RBC QN AUTO: 30 PG (ref 27–33)
MCHC RBC AUTO-ENTMCNC: 33.2 G/DL (ref 32.3–36.5)
MCV RBC AUTO: 90.5 FL (ref 81.4–97.8)
PLATELET # BLD AUTO: 167 K/UL (ref 164–446)
PMV BLD AUTO: 10.9 FL (ref 9–12.9)
POTASSIUM SERPL-SCNC: 4.7 MMOL/L (ref 3.6–5.5)
PROT SERPL-MCNC: 5.3 G/DL (ref 6–8.2)
RBC # BLD AUTO: 2.83 M/UL (ref 4.7–6.1)
SODIUM SERPL-SCNC: 136 MMOL/L (ref 135–145)
WBC # BLD AUTO: 12.2 K/UL (ref 4.8–10.8)

## 2024-10-08 PROCEDURE — 36415 COLL VENOUS BLD VENIPUNCTURE: CPT

## 2024-10-08 PROCEDURE — 82962 GLUCOSE BLOOD TEST: CPT

## 2024-10-08 PROCEDURE — 97166 OT EVAL MOD COMPLEX 45 MIN: CPT

## 2024-10-08 PROCEDURE — 700111 HCHG RX REV CODE 636 W/ 250 OVERRIDE (IP): Mod: JZ | Performed by: SURGERY

## 2024-10-08 PROCEDURE — 700105 HCHG RX REV CODE 258: Performed by: SURGERY

## 2024-10-08 PROCEDURE — 770001 HCHG ROOM/CARE - MED/SURG/GYN PRIV*

## 2024-10-08 PROCEDURE — 85027 COMPLETE CBC AUTOMATED: CPT

## 2024-10-08 PROCEDURE — 97162 PT EVAL MOD COMPLEX 30 MIN: CPT

## 2024-10-08 PROCEDURE — 99232 SBSQ HOSP IP/OBS MODERATE 35: CPT | Performed by: HOSPITALIST

## 2024-10-08 PROCEDURE — 80053 COMPREHEN METABOLIC PANEL: CPT

## 2024-10-08 PROCEDURE — 99024 POSTOP FOLLOW-UP VISIT: CPT

## 2024-10-08 PROCEDURE — 700101 HCHG RX REV CODE 250: Performed by: SURGERY

## 2024-10-08 PROCEDURE — 97535 SELF CARE MNGMENT TRAINING: CPT

## 2024-10-08 PROCEDURE — 700111 HCHG RX REV CODE 636 W/ 250 OVERRIDE (IP): Performed by: SURGERY

## 2024-10-08 PROCEDURE — 83735 ASSAY OF MAGNESIUM: CPT

## 2024-10-08 RX ADMIN — SODIUM CHLORIDE 250 MG: 9 INJECTION, SOLUTION INTRAVENOUS at 05:04

## 2024-10-08 RX ADMIN — CEFTRIAXONE SODIUM 2000 MG: 10 INJECTION, POWDER, FOR SOLUTION INTRAVENOUS at 05:02

## 2024-10-08 RX ADMIN — SODIUM CHLORIDE, POTASSIUM CHLORIDE, SODIUM LACTATE AND CALCIUM CHLORIDE: 600; 310; 30; 20 INJECTION, SOLUTION INTRAVENOUS at 02:17

## 2024-10-08 RX ADMIN — SODIUM CHLORIDE, POTASSIUM CHLORIDE, SODIUM LACTATE AND CALCIUM CHLORIDE: 600; 310; 30; 20 INJECTION, SOLUTION INTRAVENOUS at 17:59

## 2024-10-08 RX ADMIN — ENOXAPARIN SODIUM 40 MG: 100 INJECTION SUBCUTANEOUS at 12:08

## 2024-10-08 RX ADMIN — PANTOPRAZOLE SODIUM 40 MG: 40 INJECTION, POWDER, FOR SOLUTION INTRAVENOUS at 05:02

## 2024-10-08 ASSESSMENT — ENCOUNTER SYMPTOMS
VOMITING: 0
LOSS OF CONSCIOUSNESS: 0
NAUSEA: 1
ABDOMINAL PAIN: 1
BACK PAIN: 0
CHILLS: 0
PALPITATIONS: 0
DIZZINESS: 0
WHEEZING: 0
ABDOMINAL PAIN: 0
NAUSEA: 0
FEVER: 0
DIARRHEA: 0
SHORTNESS OF BREATH: 0
HEADACHES: 0
COUGH: 0

## 2024-10-08 ASSESSMENT — PAIN DESCRIPTION - PAIN TYPE
TYPE: ACUTE PAIN
TYPE: SURGICAL PAIN
TYPE: ACUTE PAIN
TYPE: ACUTE PAIN
TYPE: SURGICAL PAIN
TYPE: ACUTE PAIN;SURGICAL PAIN
TYPE: SURGICAL PAIN

## 2024-10-08 ASSESSMENT — COGNITIVE AND FUNCTIONAL STATUS - GENERAL
TURNING FROM BACK TO SIDE WHILE IN FLAT BAD: A LITTLE
STANDING UP FROM CHAIR USING ARMS: A LITTLE
DRESSING REGULAR LOWER BODY CLOTHING: A LOT
SUGGESTED CMS G CODE MODIFIER DAILY ACTIVITY: CK
CLIMB 3 TO 5 STEPS WITH RAILING: A LITTLE
WALKING IN HOSPITAL ROOM: A LITTLE
TOILETING: A LOT
DAILY ACTIVITIY SCORE: 17
MOVING TO AND FROM BED TO CHAIR: A LITTLE
HELP NEEDED FOR BATHING: A LOT
MOBILITY SCORE: 18
DRESSING REGULAR UPPER BODY CLOTHING: A LITTLE
MOVING FROM LYING ON BACK TO SITTING ON SIDE OF FLAT BED: A LITTLE
SUGGESTED CMS G CODE MODIFIER MOBILITY: CK

## 2024-10-08 ASSESSMENT — ACTIVITIES OF DAILY LIVING (ADL): TOILETING: INDEPENDENT

## 2024-10-09 ENCOUNTER — ANESTHESIA (OUTPATIENT)
Dept: ANESTHESIOLOGY | Facility: MEDICAL CENTER | Age: 74
DRG: 326 | End: 2024-10-09
Payer: MEDICARE

## 2024-10-09 LAB
ERYTHROCYTE [DISTWIDTH] IN BLOOD BY AUTOMATED COUNT: 50.4 FL (ref 35.9–50)
HCT VFR BLD AUTO: 25.8 % (ref 42–52)
HGB BLD-MCNC: 8.3 G/DL (ref 14–18)
MAGNESIUM SERPL-MCNC: 1.8 MG/DL (ref 1.5–2.5)
MCH RBC QN AUTO: 29.1 PG (ref 27–33)
MCHC RBC AUTO-ENTMCNC: 32.2 G/DL (ref 32.3–36.5)
MCV RBC AUTO: 90.5 FL (ref 81.4–97.8)
PLATELET # BLD AUTO: 172 K/UL (ref 164–446)
PMV BLD AUTO: 10.5 FL (ref 9–12.9)
RBC # BLD AUTO: 2.85 M/UL (ref 4.7–6.1)
WBC # BLD AUTO: 9.8 K/UL (ref 4.8–10.8)

## 2024-10-09 PROCEDURE — 700105 HCHG RX REV CODE 258

## 2024-10-09 PROCEDURE — 99024 POSTOP FOLLOW-UP VISIT: CPT

## 2024-10-09 PROCEDURE — 83735 ASSAY OF MAGNESIUM: CPT

## 2024-10-09 PROCEDURE — 700102 HCHG RX REV CODE 250 W/ 637 OVERRIDE(OP): Performed by: INTERNAL MEDICINE

## 2024-10-09 PROCEDURE — 99233 SBSQ HOSP IP/OBS HIGH 50: CPT | Performed by: HOSPITALIST

## 2024-10-09 PROCEDURE — A9270 NON-COVERED ITEM OR SERVICE: HCPCS | Performed by: HOSPITALIST

## 2024-10-09 PROCEDURE — 700105 HCHG RX REV CODE 258: Performed by: SURGERY

## 2024-10-09 PROCEDURE — 85027 COMPLETE CBC AUTOMATED: CPT

## 2024-10-09 PROCEDURE — 700102 HCHG RX REV CODE 250 W/ 637 OVERRIDE(OP): Performed by: HOSPITALIST

## 2024-10-09 PROCEDURE — 36415 COLL VENOUS BLD VENIPUNCTURE: CPT

## 2024-10-09 PROCEDURE — 700101 HCHG RX REV CODE 250: Performed by: SURGERY

## 2024-10-09 PROCEDURE — 770001 HCHG ROOM/CARE - MED/SURG/GYN PRIV*

## 2024-10-09 PROCEDURE — A9270 NON-COVERED ITEM OR SERVICE: HCPCS | Performed by: INTERNAL MEDICINE

## 2024-10-09 PROCEDURE — 700111 HCHG RX REV CODE 636 W/ 250 OVERRIDE (IP): Performed by: SURGERY

## 2024-10-09 RX ORDER — METOPROLOL SUCCINATE 25 MG/1
25 TABLET, EXTENDED RELEASE ORAL
Status: DISCONTINUED | OUTPATIENT
Start: 2024-10-09 | End: 2024-10-15 | Stop reason: HOSPADM

## 2024-10-09 RX ADMIN — METOPROLOL SUCCINATE 25 MG: 25 TABLET, EXTENDED RELEASE ORAL at 12:23

## 2024-10-09 RX ADMIN — CEFTRIAXONE SODIUM 2000 MG: 10 INJECTION, POWDER, FOR SOLUTION INTRAVENOUS at 05:08

## 2024-10-09 RX ADMIN — SODIUM CHLORIDE, POTASSIUM CHLORIDE, SODIUM LACTATE AND CALCIUM CHLORIDE: 600; 310; 30; 20 INJECTION, SOLUTION INTRAVENOUS at 02:20

## 2024-10-09 RX ADMIN — SODIUM CHLORIDE, POTASSIUM CHLORIDE, SODIUM LACTATE AND CALCIUM CHLORIDE: 600; 310; 30; 20 INJECTION, SOLUTION INTRAVENOUS at 12:23

## 2024-10-09 RX ADMIN — ENOXAPARIN SODIUM 40 MG: 100 INJECTION SUBCUTANEOUS at 12:22

## 2024-10-09 RX ADMIN — PANTOPRAZOLE SODIUM 40 MG: 40 INJECTION, POWDER, FOR SOLUTION INTRAVENOUS at 05:08

## 2024-10-09 RX ADMIN — SENNOSIDES AND DOCUSATE SODIUM 2 TABLET: 50; 8.6 TABLET ORAL at 18:16

## 2024-10-09 ASSESSMENT — ENCOUNTER SYMPTOMS
FEVER: 0
NAUSEA: 0
PALPITATIONS: 0
BACK PAIN: 0
DIZZINESS: 0
HEADACHES: 0
WHEEZING: 0
LOSS OF CONSCIOUSNESS: 0
VOMITING: 0
COUGH: 0
SHORTNESS OF BREATH: 0
DIARRHEA: 0
CHILLS: 0
NAUSEA: 1
ABDOMINAL PAIN: 1

## 2024-10-09 ASSESSMENT — PAIN DESCRIPTION - PAIN TYPE
TYPE: ACUTE PAIN
TYPE: ACUTE PAIN
TYPE: ACUTE PAIN;SURGICAL PAIN

## 2024-10-10 ENCOUNTER — ANESTHESIA EVENT (OUTPATIENT)
Dept: ANESTHESIOLOGY | Facility: MEDICAL CENTER | Age: 74
DRG: 326 | End: 2024-10-10
Payer: MEDICARE

## 2024-10-10 LAB
ALBUMIN SERPL BCP-MCNC: 2.6 G/DL (ref 3.2–4.9)
ALBUMIN/GLOB SERPL: 1 G/DL
ALP SERPL-CCNC: 62 U/L (ref 30–99)
ALT SERPL-CCNC: 13 U/L (ref 2–50)
ANION GAP SERPL CALC-SCNC: 9 MMOL/L (ref 7–16)
AST SERPL-CCNC: 22 U/L (ref 12–45)
BILIRUB SERPL-MCNC: 0.5 MG/DL (ref 0.1–1.5)
BUN SERPL-MCNC: 7 MG/DL (ref 8–22)
CALCIUM ALBUM COR SERPL-MCNC: 9.6 MG/DL (ref 8.5–10.5)
CALCIUM SERPL-MCNC: 8.5 MG/DL (ref 8.5–10.5)
CHLORIDE SERPL-SCNC: 103 MMOL/L (ref 96–112)
CO2 SERPL-SCNC: 25 MMOL/L (ref 20–33)
CREAT SERPL-MCNC: 0.83 MG/DL (ref 0.5–1.4)
ERYTHROCYTE [DISTWIDTH] IN BLOOD BY AUTOMATED COUNT: 51.8 FL (ref 35.9–50)
GFR SERPLBLD CREATININE-BSD FMLA CKD-EPI: 92 ML/MIN/1.73 M 2
GLOBULIN SER CALC-MCNC: 2.5 G/DL (ref 1.9–3.5)
GLUCOSE SERPL-MCNC: 86 MG/DL (ref 65–99)
HCT VFR BLD AUTO: 25.4 % (ref 42–52)
HGB BLD-MCNC: 8.1 G/DL (ref 14–18)
MAGNESIUM SERPL-MCNC: 1.7 MG/DL (ref 1.5–2.5)
MCH RBC QN AUTO: 28.6 PG (ref 27–33)
MCHC RBC AUTO-ENTMCNC: 31.9 G/DL (ref 32.3–36.5)
MCV RBC AUTO: 89.8 FL (ref 81.4–97.8)
PLATELET # BLD AUTO: 182 K/UL (ref 164–446)
PMV BLD AUTO: 10 FL (ref 9–12.9)
POTASSIUM SERPL-SCNC: 3.6 MMOL/L (ref 3.6–5.5)
PROT SERPL-MCNC: 5.1 G/DL (ref 6–8.2)
RBC # BLD AUTO: 2.83 M/UL (ref 4.7–6.1)
SODIUM SERPL-SCNC: 137 MMOL/L (ref 135–145)
WBC # BLD AUTO: 9.4 K/UL (ref 4.8–10.8)

## 2024-10-10 PROCEDURE — A9270 NON-COVERED ITEM OR SERVICE: HCPCS

## 2024-10-10 PROCEDURE — 770001 HCHG ROOM/CARE - MED/SURG/GYN PRIV*

## 2024-10-10 PROCEDURE — 85027 COMPLETE CBC AUTOMATED: CPT

## 2024-10-10 PROCEDURE — A9270 NON-COVERED ITEM OR SERVICE: HCPCS | Performed by: INTERNAL MEDICINE

## 2024-10-10 PROCEDURE — 36415 COLL VENOUS BLD VENIPUNCTURE: CPT

## 2024-10-10 PROCEDURE — 700111 HCHG RX REV CODE 636 W/ 250 OVERRIDE (IP): Mod: JZ | Performed by: SURGERY

## 2024-10-10 PROCEDURE — 700102 HCHG RX REV CODE 250 W/ 637 OVERRIDE(OP)

## 2024-10-10 PROCEDURE — 97535 SELF CARE MNGMENT TRAINING: CPT

## 2024-10-10 PROCEDURE — 97116 GAIT TRAINING THERAPY: CPT | Mod: CQ

## 2024-10-10 PROCEDURE — 97530 THERAPEUTIC ACTIVITIES: CPT

## 2024-10-10 PROCEDURE — 80053 COMPREHEN METABOLIC PANEL: CPT

## 2024-10-10 PROCEDURE — A9270 NON-COVERED ITEM OR SERVICE: HCPCS | Performed by: HOSPITALIST

## 2024-10-10 PROCEDURE — 83735 ASSAY OF MAGNESIUM: CPT

## 2024-10-10 PROCEDURE — 700102 HCHG RX REV CODE 250 W/ 637 OVERRIDE(OP): Performed by: HOSPITALIST

## 2024-10-10 PROCEDURE — 700102 HCHG RX REV CODE 250 W/ 637 OVERRIDE(OP): Performed by: INTERNAL MEDICINE

## 2024-10-10 PROCEDURE — 700101 HCHG RX REV CODE 250: Performed by: SURGERY

## 2024-10-10 PROCEDURE — 99232 SBSQ HOSP IP/OBS MODERATE 35: CPT | Performed by: HOSPITALIST

## 2024-10-10 PROCEDURE — 99024 POSTOP FOLLOW-UP VISIT: CPT

## 2024-10-10 PROCEDURE — 97530 THERAPEUTIC ACTIVITIES: CPT | Mod: CQ

## 2024-10-10 RX ADMIN — OMEPRAZOLE 20 MG: 20 CAPSULE, DELAYED RELEASE ORAL at 12:28

## 2024-10-10 RX ADMIN — ENOXAPARIN SODIUM 40 MG: 100 INJECTION SUBCUTANEOUS at 10:53

## 2024-10-10 RX ADMIN — CEFTRIAXONE SODIUM 2000 MG: 10 INJECTION, POWDER, FOR SOLUTION INTRAVENOUS at 05:15

## 2024-10-10 RX ADMIN — PANTOPRAZOLE SODIUM 40 MG: 40 INJECTION, POWDER, FOR SOLUTION INTRAVENOUS at 05:14

## 2024-10-10 RX ADMIN — SENNOSIDES AND DOCUSATE SODIUM 2 TABLET: 50; 8.6 TABLET ORAL at 18:01

## 2024-10-10 RX ADMIN — METOPROLOL SUCCINATE 25 MG: 25 TABLET, EXTENDED RELEASE ORAL at 05:14

## 2024-10-10 ASSESSMENT — PAIN DESCRIPTION - PAIN TYPE
TYPE: ACUTE PAIN
TYPE: SURGICAL PAIN
TYPE: SURGICAL PAIN
TYPE: ACUTE PAIN
TYPE: SURGICAL PAIN
TYPE: ACUTE PAIN

## 2024-10-10 ASSESSMENT — GAIT ASSESSMENTS
DISTANCE (FEET): 150
ASSISTIVE DEVICE: FRONT WHEEL WALKER
GAIT LEVEL OF ASSIST: STANDBY ASSIST

## 2024-10-10 ASSESSMENT — ENCOUNTER SYMPTOMS
ABDOMINAL PAIN: 1
DIZZINESS: 0
HEADACHES: 0
VOMITING: 0
COUGH: 0
WHEEZING: 0
FEVER: 0
NAUSEA: 0
BACK PAIN: 0
PALPITATIONS: 0
LOSS OF CONSCIOUSNESS: 0
NAUSEA: 1
SHORTNESS OF BREATH: 0
DIARRHEA: 0
CHILLS: 0

## 2024-10-10 ASSESSMENT — COGNITIVE AND FUNCTIONAL STATUS - GENERAL
TOILETING: A LOT
TURNING FROM BACK TO SIDE WHILE IN FLAT BAD: A LITTLE
SUGGESTED CMS G CODE MODIFIER DAILY ACTIVITY: CJ
DAILY ACTIVITIY SCORE: 20
HELP NEEDED FOR BATHING: A LITTLE
MOVING FROM LYING ON BACK TO SITTING ON SIDE OF FLAT BED: A LITTLE
SUGGESTED CMS G CODE MODIFIER MOBILITY: CK
MOBILITY SCORE: 18
CLIMB 3 TO 5 STEPS WITH RAILING: A LITTLE
DRESSING REGULAR LOWER BODY CLOTHING: A LITTLE
MOVING TO AND FROM BED TO CHAIR: A LITTLE
WALKING IN HOSPITAL ROOM: A LITTLE
STANDING UP FROM CHAIR USING ARMS: A LITTLE

## 2024-10-11 ENCOUNTER — ANESTHESIA (OUTPATIENT)
Dept: ANESTHESIOLOGY | Facility: MEDICAL CENTER | Age: 74
DRG: 326 | End: 2024-10-11
Payer: MEDICARE

## 2024-10-11 PROBLEM — C16.2 MALIGNANT NEOPLASM OF BODY OF STOMACH (HCC): Status: ACTIVE | Noted: 2024-10-07

## 2024-10-11 PROBLEM — C16.9 GASTRIC ADENOCARCINOMA (HCC): Status: ACTIVE | Noted: 2024-10-11

## 2024-10-11 LAB
ERYTHROCYTE [DISTWIDTH] IN BLOOD BY AUTOMATED COUNT: 55.1 FL (ref 35.9–50)
HCT VFR BLD AUTO: 26.4 % (ref 42–52)
HGB BLD-MCNC: 8.6 G/DL (ref 14–18)
MAGNESIUM SERPL-MCNC: 1.7 MG/DL (ref 1.5–2.5)
MCH RBC QN AUTO: 29.7 PG (ref 27–33)
MCHC RBC AUTO-ENTMCNC: 32.6 G/DL (ref 32.3–36.5)
MCV RBC AUTO: 91 FL (ref 81.4–97.8)
PLATELET # BLD AUTO: 195 K/UL (ref 164–446)
PMV BLD AUTO: 9.9 FL (ref 9–12.9)
RBC # BLD AUTO: 2.9 M/UL (ref 4.7–6.1)
WBC # BLD AUTO: 7.8 K/UL (ref 4.8–10.8)

## 2024-10-11 PROCEDURE — A9270 NON-COVERED ITEM OR SERVICE: HCPCS | Performed by: HOSPITALIST

## 2024-10-11 PROCEDURE — 97530 THERAPEUTIC ACTIVITIES: CPT

## 2024-10-11 PROCEDURE — A9270 NON-COVERED ITEM OR SERVICE: HCPCS | Performed by: INTERNAL MEDICINE

## 2024-10-11 PROCEDURE — 99233 SBSQ HOSP IP/OBS HIGH 50: CPT | Performed by: STUDENT IN AN ORGANIZED HEALTH CARE EDUCATION/TRAINING PROGRAM

## 2024-10-11 PROCEDURE — 36415 COLL VENOUS BLD VENIPUNCTURE: CPT

## 2024-10-11 PROCEDURE — 97116 GAIT TRAINING THERAPY: CPT | Mod: CQ

## 2024-10-11 PROCEDURE — 700102 HCHG RX REV CODE 250 W/ 637 OVERRIDE(OP)

## 2024-10-11 PROCEDURE — 97530 THERAPEUTIC ACTIVITIES: CPT | Mod: CQ

## 2024-10-11 PROCEDURE — 770020 HCHG ROOM/CARE - TELE (206)

## 2024-10-11 PROCEDURE — 99024 POSTOP FOLLOW-UP VISIT: CPT

## 2024-10-11 PROCEDURE — 700102 HCHG RX REV CODE 250 W/ 637 OVERRIDE(OP): Performed by: HOSPITALIST

## 2024-10-11 PROCEDURE — 700102 HCHG RX REV CODE 250 W/ 637 OVERRIDE(OP): Performed by: INTERNAL MEDICINE

## 2024-10-11 PROCEDURE — A9270 NON-COVERED ITEM OR SERVICE: HCPCS

## 2024-10-11 PROCEDURE — 83735 ASSAY OF MAGNESIUM: CPT

## 2024-10-11 PROCEDURE — 85027 COMPLETE CBC AUTOMATED: CPT

## 2024-10-11 RX ORDER — POLYETHYLENE GLYCOL 3350 17 G/17G
1 POWDER, FOR SOLUTION ORAL DAILY
Status: DISCONTINUED | OUTPATIENT
Start: 2024-10-11 | End: 2024-10-15 | Stop reason: HOSPADM

## 2024-10-11 RX ORDER — HYDROMORPHONE HYDROCHLORIDE 1 MG/ML
0.5 INJECTION, SOLUTION INTRAMUSCULAR; INTRAVENOUS; SUBCUTANEOUS EVERY 4 HOURS PRN
Status: DISCONTINUED | OUTPATIENT
Start: 2024-10-11 | End: 2024-10-15 | Stop reason: HOSPADM

## 2024-10-11 RX ORDER — TRAMADOL HYDROCHLORIDE 50 MG/1
50-100 TABLET ORAL EVERY 6 HOURS PRN
Status: DISCONTINUED | OUTPATIENT
Start: 2024-10-11 | End: 2024-10-15 | Stop reason: HOSPADM

## 2024-10-11 RX ADMIN — POLYETHYLENE GLYCOL 3350 1 PACKET: 17 POWDER, FOR SOLUTION ORAL at 11:57

## 2024-10-11 RX ADMIN — METOPROLOL SUCCINATE 25 MG: 25 TABLET, EXTENDED RELEASE ORAL at 04:39

## 2024-10-11 RX ADMIN — SENNOSIDES AND DOCUSATE SODIUM 2 TABLET: 50; 8.6 TABLET ORAL at 17:12

## 2024-10-11 RX ADMIN — TRAMADOL HYDROCHLORIDE 50 MG: 50 TABLET ORAL at 17:12

## 2024-10-11 RX ADMIN — OMEPRAZOLE 20 MG: 20 CAPSULE, DELAYED RELEASE ORAL at 04:40

## 2024-10-11 ASSESSMENT — COGNITIVE AND FUNCTIONAL STATUS - GENERAL
TURNING FROM BACK TO SIDE WHILE IN FLAT BAD: A LITTLE
MOVING FROM LYING ON BACK TO SITTING ON SIDE OF FLAT BED: A LITTLE
MOBILITY SCORE: 18
MOVING TO AND FROM BED TO CHAIR: A LITTLE
CLIMB 3 TO 5 STEPS WITH RAILING: A LITTLE
WALKING IN HOSPITAL ROOM: A LITTLE
STANDING UP FROM CHAIR USING ARMS: A LITTLE
DAILY ACTIVITIY SCORE: 21
HELP NEEDED FOR BATHING: A LITTLE
TOILETING: A LITTLE
SUGGESTED CMS G CODE MODIFIER MOBILITY: CK
DRESSING REGULAR LOWER BODY CLOTHING: A LITTLE
SUGGESTED CMS G CODE MODIFIER DAILY ACTIVITY: CJ

## 2024-10-11 ASSESSMENT — GAIT ASSESSMENTS
DEVIATION: BRADYKINETIC
GAIT LEVEL OF ASSIST: STANDBY ASSIST
ASSISTIVE DEVICE: FRONT WHEEL WALKER

## 2024-10-11 ASSESSMENT — ENCOUNTER SYMPTOMS
ABDOMINAL DISTENTION: 1
NAUSEA: 0
CHILLS: 0
ENDOCRINE NEGATIVE: 1
HEMATOLOGIC/LYMPHATIC NEGATIVE: 1
ARTHRALGIAS: 1
SHORTNESS OF BREATH: 0
NEUROLOGICAL NEGATIVE: 1
ABDOMINAL PAIN: 1
FEVER: 0
COUGH: 0
FATIGUE: 1
ALLERGIC/IMMUNOLOGIC NEGATIVE: 1
CARDIOVASCULAR NEGATIVE: 1
PSYCHIATRIC NEGATIVE: 1
WHEEZING: 0
EYES NEGATIVE: 1
VOMITING: 0
RESPIRATORY NEGATIVE: 1

## 2024-10-11 ASSESSMENT — PAIN DESCRIPTION - PAIN TYPE
TYPE: ACUTE PAIN

## 2024-10-12 ENCOUNTER — ANESTHESIA EVENT (OUTPATIENT)
Dept: ANESTHESIOLOGY | Facility: MEDICAL CENTER | Age: 74
DRG: 326 | End: 2024-10-12
Payer: MEDICARE

## 2024-10-12 LAB
ERYTHROCYTE [DISTWIDTH] IN BLOOD BY AUTOMATED COUNT: 54.4 FL (ref 35.9–50)
HCT VFR BLD AUTO: 27.3 % (ref 42–52)
HGB BLD-MCNC: 8.9 G/DL (ref 14–18)
MAGNESIUM SERPL-MCNC: 1.9 MG/DL (ref 1.5–2.5)
MCH RBC QN AUTO: 29.6 PG (ref 27–33)
MCHC RBC AUTO-ENTMCNC: 32.6 G/DL (ref 32.3–36.5)
MCV RBC AUTO: 90.7 FL (ref 81.4–97.8)
PLATELET # BLD AUTO: 222 K/UL (ref 164–446)
PMV BLD AUTO: 10.4 FL (ref 9–12.9)
RBC # BLD AUTO: 3.01 M/UL (ref 4.7–6.1)
WBC # BLD AUTO: 5 K/UL (ref 4.8–10.8)

## 2024-10-12 PROCEDURE — 770020 HCHG ROOM/CARE - TELE (206)

## 2024-10-12 PROCEDURE — A9270 NON-COVERED ITEM OR SERVICE: HCPCS | Performed by: STUDENT IN AN ORGANIZED HEALTH CARE EDUCATION/TRAINING PROGRAM

## 2024-10-12 PROCEDURE — A9270 NON-COVERED ITEM OR SERVICE: HCPCS

## 2024-10-12 PROCEDURE — 700102 HCHG RX REV CODE 250 W/ 637 OVERRIDE(OP)

## 2024-10-12 PROCEDURE — 700111 HCHG RX REV CODE 636 W/ 250 OVERRIDE (IP): Performed by: STUDENT IN AN ORGANIZED HEALTH CARE EDUCATION/TRAINING PROGRAM

## 2024-10-12 PROCEDURE — 99233 SBSQ HOSP IP/OBS HIGH 50: CPT | Performed by: STUDENT IN AN ORGANIZED HEALTH CARE EDUCATION/TRAINING PROGRAM

## 2024-10-12 PROCEDURE — A9270 NON-COVERED ITEM OR SERVICE: HCPCS | Performed by: INTERNAL MEDICINE

## 2024-10-12 PROCEDURE — 90471 IMMUNIZATION ADMIN: CPT

## 2024-10-12 PROCEDURE — 3E02340 INTRODUCTION OF INFLUENZA VACCINE INTO MUSCLE, PERCUTANEOUS APPROACH: ICD-10-PCS | Performed by: STUDENT IN AN ORGANIZED HEALTH CARE EDUCATION/TRAINING PROGRAM

## 2024-10-12 PROCEDURE — 85027 COMPLETE CBC AUTOMATED: CPT

## 2024-10-12 PROCEDURE — 700102 HCHG RX REV CODE 250 W/ 637 OVERRIDE(OP): Performed by: INTERNAL MEDICINE

## 2024-10-12 PROCEDURE — 99024 POSTOP FOLLOW-UP VISIT: CPT | Performed by: SURGERY

## 2024-10-12 PROCEDURE — 700102 HCHG RX REV CODE 250 W/ 637 OVERRIDE(OP): Performed by: STUDENT IN AN ORGANIZED HEALTH CARE EDUCATION/TRAINING PROGRAM

## 2024-10-12 PROCEDURE — 36415 COLL VENOUS BLD VENIPUNCTURE: CPT

## 2024-10-12 PROCEDURE — 90677 PCV20 VACCINE IM: CPT | Performed by: STUDENT IN AN ORGANIZED HEALTH CARE EDUCATION/TRAINING PROGRAM

## 2024-10-12 PROCEDURE — 3E0234Z INTRODUCTION OF SERUM, TOXOID AND VACCINE INTO MUSCLE, PERCUTANEOUS APPROACH: ICD-10-PCS | Performed by: STUDENT IN AN ORGANIZED HEALTH CARE EDUCATION/TRAINING PROGRAM

## 2024-10-12 PROCEDURE — 700111 HCHG RX REV CODE 636 W/ 250 OVERRIDE (IP): Mod: JZ | Performed by: STUDENT IN AN ORGANIZED HEALTH CARE EDUCATION/TRAINING PROGRAM

## 2024-10-12 PROCEDURE — 90662 IIV NO PRSV INCREASED AG IM: CPT | Performed by: STUDENT IN AN ORGANIZED HEALTH CARE EDUCATION/TRAINING PROGRAM

## 2024-10-12 PROCEDURE — 83735 ASSAY OF MAGNESIUM: CPT

## 2024-10-12 RX ORDER — CYCLOBENZAPRINE HCL 10 MG
10 TABLET ORAL 3 TIMES DAILY PRN
Status: DISCONTINUED | OUTPATIENT
Start: 2024-10-12 | End: 2024-10-15 | Stop reason: HOSPADM

## 2024-10-12 RX ADMIN — ACETAMINOPHEN 650 MG: 325 TABLET ORAL at 21:11

## 2024-10-12 RX ADMIN — SENNOSIDES AND DOCUSATE SODIUM 2 TABLET: 50; 8.6 TABLET ORAL at 17:20

## 2024-10-12 RX ADMIN — ENOXAPARIN SODIUM 40 MG: 100 INJECTION SUBCUTANEOUS at 14:25

## 2024-10-12 RX ADMIN — METOPROLOL SUCCINATE 25 MG: 25 TABLET, EXTENDED RELEASE ORAL at 14:24

## 2024-10-12 RX ADMIN — POLYETHYLENE GLYCOL 3350 1 PACKET: 17 POWDER, FOR SOLUTION ORAL at 05:21

## 2024-10-12 RX ADMIN — PNEUMOCOCCAL 20-VALENT CONJUGATE VACCINE 0.5 ML
2.2; 2.2; 2.2; 2.2; 2.2; 2.2; 2.2; 2.2; 2.2; 2.2; 2.2; 2.2; 2.2; 2.2; 2.2; 2.2; 4.4; 2.2; 2.2; 2.2 INJECTION, SUSPENSION INTRAMUSCULAR at 21:11

## 2024-10-12 RX ADMIN — TRAMADOL HYDROCHLORIDE 50 MG: 50 TABLET ORAL at 17:20

## 2024-10-12 RX ADMIN — OMEPRAZOLE 20 MG: 20 CAPSULE, DELAYED RELEASE ORAL at 05:21

## 2024-10-12 RX ADMIN — TRAMADOL HYDROCHLORIDE 50 MG: 50 TABLET ORAL at 00:30

## 2024-10-12 RX ADMIN — CYCLOBENZAPRINE 10 MG: 10 TABLET, FILM COATED ORAL at 21:11

## 2024-10-12 RX ADMIN — INFLUENZA A VIRUS A/VICTORIA/4897/2022 IVR-238 (H1N1) ANTIGEN (FORMALDEHYDE INACTIVATED), INFLUENZA A VIRUS A/CALIFORNIA/122/2022 SAN-022 (H3N2) ANTIGEN (FORMALDEHYDE INACTIVATED), AND INFLUENZA B VIRUS B/MICHIGAN/01/2021 ANTIGEN (FORMALDEHYDE INACTIVATED) 0.5 ML: 60; 60; 60 INJECTION, SUSPENSION INTRAMUSCULAR at 17:58

## 2024-10-12 RX ADMIN — TRAMADOL HYDROCHLORIDE 50 MG: 50 TABLET ORAL at 12:49

## 2024-10-12 ASSESSMENT — PAIN DESCRIPTION - PAIN TYPE
TYPE: ACUTE PAIN

## 2024-10-12 ASSESSMENT — ENCOUNTER SYMPTOMS
ABDOMINAL PAIN: 1
VOMITING: 0
NAUSEA: 0

## 2024-10-13 LAB
ANION GAP SERPL CALC-SCNC: 7 MMOL/L (ref 7–16)
BUN SERPL-MCNC: 16 MG/DL (ref 8–22)
CALCIUM SERPL-MCNC: 8 MG/DL (ref 8.5–10.5)
CHLORIDE SERPL-SCNC: 107 MMOL/L (ref 96–112)
CO2 SERPL-SCNC: 23 MMOL/L (ref 20–33)
CREAT SERPL-MCNC: 0.95 MG/DL (ref 0.5–1.4)
ERYTHROCYTE [DISTWIDTH] IN BLOOD BY AUTOMATED COUNT: 53 FL (ref 35.9–50)
GFR SERPLBLD CREATININE-BSD FMLA CKD-EPI: 84 ML/MIN/1.73 M 2
GLUCOSE SERPL-MCNC: 89 MG/DL (ref 65–99)
HCT VFR BLD AUTO: 29.6 % (ref 42–52)
HGB BLD-MCNC: 9.4 G/DL (ref 14–18)
MAGNESIUM SERPL-MCNC: 1.9 MG/DL (ref 1.5–2.5)
MCH RBC QN AUTO: 28.7 PG (ref 27–33)
MCHC RBC AUTO-ENTMCNC: 31.8 G/DL (ref 32.3–36.5)
MCV RBC AUTO: 90.2 FL (ref 81.4–97.8)
PLATELET # BLD AUTO: 240 K/UL (ref 164–446)
PMV BLD AUTO: 10.1 FL (ref 9–12.9)
POTASSIUM SERPL-SCNC: 3.8 MMOL/L (ref 3.6–5.5)
RBC # BLD AUTO: 3.28 M/UL (ref 4.7–6.1)
SODIUM SERPL-SCNC: 137 MMOL/L (ref 135–145)
WBC # BLD AUTO: 4.8 K/UL (ref 4.8–10.8)

## 2024-10-13 PROCEDURE — A9270 NON-COVERED ITEM OR SERVICE: HCPCS | Performed by: STUDENT IN AN ORGANIZED HEALTH CARE EDUCATION/TRAINING PROGRAM

## 2024-10-13 PROCEDURE — 80048 BASIC METABOLIC PNL TOTAL CA: CPT

## 2024-10-13 PROCEDURE — 99024 POSTOP FOLLOW-UP VISIT: CPT | Performed by: SURGERY

## 2024-10-13 PROCEDURE — 700102 HCHG RX REV CODE 250 W/ 637 OVERRIDE(OP)

## 2024-10-13 PROCEDURE — 83735 ASSAY OF MAGNESIUM: CPT

## 2024-10-13 PROCEDURE — 51798 US URINE CAPACITY MEASURE: CPT

## 2024-10-13 PROCEDURE — 700102 HCHG RX REV CODE 250 W/ 637 OVERRIDE(OP): Performed by: STUDENT IN AN ORGANIZED HEALTH CARE EDUCATION/TRAINING PROGRAM

## 2024-10-13 PROCEDURE — 700111 HCHG RX REV CODE 636 W/ 250 OVERRIDE (IP): Mod: JZ | Performed by: STUDENT IN AN ORGANIZED HEALTH CARE EDUCATION/TRAINING PROGRAM

## 2024-10-13 PROCEDURE — A9270 NON-COVERED ITEM OR SERVICE: HCPCS

## 2024-10-13 PROCEDURE — 770020 HCHG ROOM/CARE - TELE (206)

## 2024-10-13 PROCEDURE — A9270 NON-COVERED ITEM OR SERVICE: HCPCS | Performed by: INTERNAL MEDICINE

## 2024-10-13 PROCEDURE — 700102 HCHG RX REV CODE 250 W/ 637 OVERRIDE(OP): Performed by: INTERNAL MEDICINE

## 2024-10-13 PROCEDURE — 36415 COLL VENOUS BLD VENIPUNCTURE: CPT

## 2024-10-13 PROCEDURE — 99233 SBSQ HOSP IP/OBS HIGH 50: CPT | Performed by: INTERNAL MEDICINE

## 2024-10-13 PROCEDURE — 85027 COMPLETE CBC AUTOMATED: CPT

## 2024-10-13 RX ORDER — POTASSIUM CHLORIDE 1500 MG/1
20 TABLET, EXTENDED RELEASE ORAL ONCE
Status: COMPLETED | OUTPATIENT
Start: 2024-10-13 | End: 2024-10-13

## 2024-10-13 RX ORDER — LANOLIN ALCOHOL/MO/W.PET/CERES
400 CREAM (GRAM) TOPICAL 2 TIMES DAILY
Status: COMPLETED | OUTPATIENT
Start: 2024-10-13 | End: 2024-10-13

## 2024-10-13 RX ADMIN — Medication 400 MG: at 12:51

## 2024-10-13 RX ADMIN — METOPROLOL SUCCINATE 25 MG: 25 TABLET, EXTENDED RELEASE ORAL at 04:40

## 2024-10-13 RX ADMIN — OMEPRAZOLE 20 MG: 20 CAPSULE, DELAYED RELEASE ORAL at 04:40

## 2024-10-13 RX ADMIN — SENNOSIDES AND DOCUSATE SODIUM 2 TABLET: 50; 8.6 TABLET ORAL at 17:56

## 2024-10-13 RX ADMIN — ENOXAPARIN SODIUM 40 MG: 100 INJECTION SUBCUTANEOUS at 09:04

## 2024-10-13 RX ADMIN — Medication 400 MG: at 17:57

## 2024-10-13 RX ADMIN — ACETAMINOPHEN 650 MG: 325 TABLET ORAL at 20:54

## 2024-10-13 RX ADMIN — POLYETHYLENE GLYCOL 3350 1 PACKET: 17 POWDER, FOR SOLUTION ORAL at 04:40

## 2024-10-13 RX ADMIN — POTASSIUM CHLORIDE 20 MEQ: 1500 TABLET, EXTENDED RELEASE ORAL at 12:50

## 2024-10-13 RX ADMIN — TRAMADOL HYDROCHLORIDE 50 MG: 50 TABLET ORAL at 17:57

## 2024-10-13 RX ADMIN — TRAMADOL HYDROCHLORIDE 50 MG: 50 TABLET ORAL at 04:40

## 2024-10-13 ASSESSMENT — PAIN DESCRIPTION - PAIN TYPE
TYPE: ACUTE PAIN
TYPE: CHRONIC PAIN
TYPE: ACUTE PAIN
TYPE: ACUTE PAIN;CHRONIC PAIN
TYPE: ACUTE PAIN

## 2024-10-13 ASSESSMENT — ENCOUNTER SYMPTOMS
NAUSEA: 0
VOMITING: 0
ABDOMINAL PAIN: 1

## 2024-10-14 PROBLEM — E83.39 HYPOPHOSPHATEMIA: Status: ACTIVE | Noted: 2024-10-14

## 2024-10-14 LAB
ALBUMIN SERPL BCP-MCNC: 2.6 G/DL (ref 3.2–4.9)
BUN SERPL-MCNC: 17 MG/DL (ref 8–22)
CALCIUM ALBUM COR SERPL-MCNC: 9.5 MG/DL (ref 8.5–10.5)
CALCIUM SERPL-MCNC: 8.4 MG/DL (ref 8.5–10.5)
CHLORIDE SERPL-SCNC: 108 MMOL/L (ref 96–112)
CO2 SERPL-SCNC: 23 MMOL/L (ref 20–33)
CREAT SERPL-MCNC: 0.94 MG/DL (ref 0.5–1.4)
EKG IMPRESSION: NORMAL
ERYTHROCYTE [DISTWIDTH] IN BLOOD BY AUTOMATED COUNT: 53.2 FL (ref 35.9–50)
GFR SERPLBLD CREATININE-BSD FMLA CKD-EPI: 85 ML/MIN/1.73 M 2
GLUCOSE SERPL-MCNC: 98 MG/DL (ref 65–99)
HCT VFR BLD AUTO: 29 % (ref 42–52)
HGB BLD-MCNC: 9.3 G/DL (ref 14–18)
MAGNESIUM SERPL-MCNC: 2 MG/DL (ref 1.5–2.5)
MCH RBC QN AUTO: 29.2 PG (ref 27–33)
MCHC RBC AUTO-ENTMCNC: 32.1 G/DL (ref 32.3–36.5)
MCV RBC AUTO: 90.9 FL (ref 81.4–97.8)
PHOSPHATE SERPL-MCNC: 1.4 MG/DL (ref 2.5–4.5)
PLATELET # BLD AUTO: 253 K/UL (ref 164–446)
PMV BLD AUTO: 10.3 FL (ref 9–12.9)
POTASSIUM SERPL-SCNC: 4.1 MMOL/L (ref 3.6–5.5)
RBC # BLD AUTO: 3.19 M/UL (ref 4.7–6.1)
SODIUM SERPL-SCNC: 139 MMOL/L (ref 135–145)
WBC # BLD AUTO: 4.1 K/UL (ref 4.8–10.8)

## 2024-10-14 PROCEDURE — 93005 ELECTROCARDIOGRAM TRACING: CPT | Performed by: STUDENT IN AN ORGANIZED HEALTH CARE EDUCATION/TRAINING PROGRAM

## 2024-10-14 PROCEDURE — 700102 HCHG RX REV CODE 250 W/ 637 OVERRIDE(OP)

## 2024-10-14 PROCEDURE — A9270 NON-COVERED ITEM OR SERVICE: HCPCS | Performed by: STUDENT IN AN ORGANIZED HEALTH CARE EDUCATION/TRAINING PROGRAM

## 2024-10-14 PROCEDURE — 99233 SBSQ HOSP IP/OBS HIGH 50: CPT | Performed by: STUDENT IN AN ORGANIZED HEALTH CARE EDUCATION/TRAINING PROGRAM

## 2024-10-14 PROCEDURE — 770020 HCHG ROOM/CARE - TELE (206)

## 2024-10-14 PROCEDURE — 700102 HCHG RX REV CODE 250 W/ 637 OVERRIDE(OP): Performed by: INTERNAL MEDICINE

## 2024-10-14 PROCEDURE — 700102 HCHG RX REV CODE 250 W/ 637 OVERRIDE(OP): Performed by: STUDENT IN AN ORGANIZED HEALTH CARE EDUCATION/TRAINING PROGRAM

## 2024-10-14 PROCEDURE — 700101 HCHG RX REV CODE 250: Performed by: STUDENT IN AN ORGANIZED HEALTH CARE EDUCATION/TRAINING PROGRAM

## 2024-10-14 PROCEDURE — A9270 NON-COVERED ITEM OR SERVICE: HCPCS | Performed by: INTERNAL MEDICINE

## 2024-10-14 PROCEDURE — 700111 HCHG RX REV CODE 636 W/ 250 OVERRIDE (IP): Mod: JZ | Performed by: STUDENT IN AN ORGANIZED HEALTH CARE EDUCATION/TRAINING PROGRAM

## 2024-10-14 PROCEDURE — A9270 NON-COVERED ITEM OR SERVICE: HCPCS

## 2024-10-14 PROCEDURE — 99024 POSTOP FOLLOW-UP VISIT: CPT | Performed by: NURSE PRACTITIONER

## 2024-10-14 PROCEDURE — 93010 ELECTROCARDIOGRAM REPORT: CPT | Performed by: INTERNAL MEDICINE

## 2024-10-14 PROCEDURE — 85027 COMPLETE CBC AUTOMATED: CPT

## 2024-10-14 PROCEDURE — 80069 RENAL FUNCTION PANEL: CPT

## 2024-10-14 PROCEDURE — 700105 HCHG RX REV CODE 258: Performed by: STUDENT IN AN ORGANIZED HEALTH CARE EDUCATION/TRAINING PROGRAM

## 2024-10-14 PROCEDURE — 83735 ASSAY OF MAGNESIUM: CPT

## 2024-10-14 RX ADMIN — OMEPRAZOLE 20 MG: 20 CAPSULE, DELAYED RELEASE ORAL at 04:53

## 2024-10-14 RX ADMIN — METOPROLOL SUCCINATE 25 MG: 25 TABLET, EXTENDED RELEASE ORAL at 04:53

## 2024-10-14 RX ADMIN — DIBASIC SODIUM PHOSPHATE, MONOBASIC POTASSIUM PHOSPHATE AND MONOBASIC SODIUM PHOSPHATE 500 MG: 852; 155; 130 TABLET ORAL at 08:01

## 2024-10-14 RX ADMIN — DIBASIC SODIUM PHOSPHATE, MONOBASIC POTASSIUM PHOSPHATE AND MONOBASIC SODIUM PHOSPHATE 500 MG: 852; 155; 130 TABLET ORAL at 17:32

## 2024-10-14 RX ADMIN — SENNOSIDES AND DOCUSATE SODIUM 2 TABLET: 50; 8.6 TABLET ORAL at 17:32

## 2024-10-14 RX ADMIN — ENOXAPARIN SODIUM 40 MG: 100 INJECTION SUBCUTANEOUS at 08:05

## 2024-10-14 RX ADMIN — POTASSIUM PHOSPHATE, MONOBASIC AND POTASSIUM PHOSPHATE, DIBASIC 15 MMOL: 224; 236 INJECTION, SOLUTION, CONCENTRATE INTRAVENOUS at 08:04

## 2024-10-14 RX ADMIN — MAGNESIUM HYDROXIDE 30 ML: 1200 LIQUID ORAL at 04:53

## 2024-10-14 ASSESSMENT — ENCOUNTER SYMPTOMS
NAUSEA: 0
VOMITING: 0
ABDOMINAL PAIN: 1

## 2024-10-14 ASSESSMENT — PAIN DESCRIPTION - PAIN TYPE
TYPE: ACUTE PAIN

## 2024-10-15 VITALS
RESPIRATION RATE: 17 BRPM | DIASTOLIC BLOOD PRESSURE: 67 MMHG | BODY MASS INDEX: 20.31 KG/M2 | TEMPERATURE: 97.9 F | OXYGEN SATURATION: 96 % | HEIGHT: 73 IN | WEIGHT: 153.22 LBS | HEART RATE: 79 BPM | SYSTOLIC BLOOD PRESSURE: 106 MMHG

## 2024-10-15 LAB
ANION GAP SERPL CALC-SCNC: 11 MMOL/L (ref 7–16)
BUN SERPL-MCNC: 14 MG/DL (ref 8–22)
CALCIUM SERPL-MCNC: 8 MG/DL (ref 8.5–10.5)
CHLORIDE SERPL-SCNC: 104 MMOL/L (ref 96–112)
CO2 SERPL-SCNC: 24 MMOL/L (ref 20–33)
CREAT SERPL-MCNC: 0.76 MG/DL (ref 0.5–1.4)
ERYTHROCYTE [DISTWIDTH] IN BLOOD BY AUTOMATED COUNT: 52.3 FL (ref 35.9–50)
GFR SERPLBLD CREATININE-BSD FMLA CKD-EPI: 94 ML/MIN/1.73 M 2
GLUCOSE SERPL-MCNC: 82 MG/DL (ref 65–99)
HCT VFR BLD AUTO: 30 % (ref 42–52)
HGB BLD-MCNC: 9.4 G/DL (ref 14–18)
MAGNESIUM SERPL-MCNC: 2 MG/DL (ref 1.5–2.5)
MCH RBC QN AUTO: 28.5 PG (ref 27–33)
MCHC RBC AUTO-ENTMCNC: 31.3 G/DL (ref 32.3–36.5)
MCV RBC AUTO: 90.9 FL (ref 81.4–97.8)
PHOSPHATE SERPL-MCNC: 1.4 MG/DL (ref 2.5–4.5)
PLATELET # BLD AUTO: 276 K/UL (ref 164–446)
PMV BLD AUTO: 11 FL (ref 9–12.9)
POTASSIUM SERPL-SCNC: 3.7 MMOL/L (ref 3.6–5.5)
RBC # BLD AUTO: 3.3 M/UL (ref 4.7–6.1)
SODIUM SERPL-SCNC: 139 MMOL/L (ref 135–145)
WBC # BLD AUTO: 5.4 K/UL (ref 4.8–10.8)

## 2024-10-15 PROCEDURE — A9270 NON-COVERED ITEM OR SERVICE: HCPCS | Performed by: STUDENT IN AN ORGANIZED HEALTH CARE EDUCATION/TRAINING PROGRAM

## 2024-10-15 PROCEDURE — 97530 THERAPEUTIC ACTIVITIES: CPT | Mod: CQ

## 2024-10-15 PROCEDURE — 700111 HCHG RX REV CODE 636 W/ 250 OVERRIDE (IP): Mod: JZ | Performed by: STUDENT IN AN ORGANIZED HEALTH CARE EDUCATION/TRAINING PROGRAM

## 2024-10-15 PROCEDURE — 85027 COMPLETE CBC AUTOMATED: CPT

## 2024-10-15 PROCEDURE — 84100 ASSAY OF PHOSPHORUS: CPT

## 2024-10-15 PROCEDURE — A9270 NON-COVERED ITEM OR SERVICE: HCPCS

## 2024-10-15 PROCEDURE — 97116 GAIT TRAINING THERAPY: CPT | Mod: CQ

## 2024-10-15 PROCEDURE — 700102 HCHG RX REV CODE 250 W/ 637 OVERRIDE(OP)

## 2024-10-15 PROCEDURE — 700102 HCHG RX REV CODE 250 W/ 637 OVERRIDE(OP): Performed by: STUDENT IN AN ORGANIZED HEALTH CARE EDUCATION/TRAINING PROGRAM

## 2024-10-15 PROCEDURE — 80048 BASIC METABOLIC PNL TOTAL CA: CPT

## 2024-10-15 PROCEDURE — 99239 HOSP IP/OBS DSCHRG MGMT >30: CPT | Performed by: INTERNAL MEDICINE

## 2024-10-15 PROCEDURE — 99024 POSTOP FOLLOW-UP VISIT: CPT | Performed by: SURGERY

## 2024-10-15 PROCEDURE — 83735 ASSAY OF MAGNESIUM: CPT

## 2024-10-15 RX ORDER — METOPROLOL SUCCINATE 25 MG/1
25 TABLET, EXTENDED RELEASE ORAL DAILY
Qty: 30 TABLET | Refills: 1 | Status: SHIPPED | OUTPATIENT
Start: 2024-10-16 | End: 2024-10-15

## 2024-10-15 RX ORDER — METOPROLOL SUCCINATE 25 MG/1
25 TABLET, EXTENDED RELEASE ORAL DAILY
Qty: 30 TABLET | Refills: 1 | Status: SHIPPED | OUTPATIENT
Start: 2024-10-16

## 2024-10-15 RX ADMIN — OMEPRAZOLE 20 MG: 20 CAPSULE, DELAYED RELEASE ORAL at 05:11

## 2024-10-15 RX ADMIN — DIBASIC SODIUM PHOSPHATE, MONOBASIC POTASSIUM PHOSPHATE AND MONOBASIC SODIUM PHOSPHATE 500 MG: 852; 155; 130 TABLET ORAL at 05:11

## 2024-10-15 RX ADMIN — ENOXAPARIN SODIUM 40 MG: 100 INJECTION SUBCUTANEOUS at 08:29

## 2024-10-15 ASSESSMENT — COGNITIVE AND FUNCTIONAL STATUS - GENERAL
TURNING FROM BACK TO SIDE WHILE IN FLAT BAD: A LITTLE
MOVING FROM LYING ON BACK TO SITTING ON SIDE OF FLAT BED: A LITTLE
MOBILITY SCORE: 18
CLIMB 3 TO 5 STEPS WITH RAILING: A LITTLE
MOVING TO AND FROM BED TO CHAIR: A LITTLE
STANDING UP FROM CHAIR USING ARMS: A LITTLE
WALKING IN HOSPITAL ROOM: A LITTLE
SUGGESTED CMS G CODE MODIFIER MOBILITY: CK

## 2024-10-15 ASSESSMENT — PAIN DESCRIPTION - PAIN TYPE
TYPE: ACUTE PAIN

## 2024-10-15 ASSESSMENT — GAIT ASSESSMENTS
DISTANCE (FEET): 350
GAIT LEVEL OF ASSIST: SUPERVISED
DEVIATION: DECREASED BASE OF SUPPORT
ASSISTIVE DEVICE: FRONT WHEEL WALKER

## 2024-10-23 ENCOUNTER — TELEPHONE (OUTPATIENT)
Dept: SURGICAL ONCOLOGY | Facility: MEDICAL CENTER | Age: 74
End: 2024-10-23
Payer: MEDICARE

## 2024-10-23 DIAGNOSIS — Z59.00 HOMELESSNESS: ICD-10-CM

## 2024-10-23 DIAGNOSIS — Z98.890 S/P EXPLORATORY LAPAROTOMY: ICD-10-CM

## 2024-10-23 DIAGNOSIS — C16.9 GASTRIC ADENOCARCINOMA (HCC): ICD-10-CM

## 2024-10-23 SDOH — ECONOMIC STABILITY - HOUSING INSECURITY: HOMELESSNESS UNSPECIFIED: Z59.00

## 2024-10-28 ENCOUNTER — APPOINTMENT (OUTPATIENT)
Dept: RADIOLOGY | Facility: MEDICAL CENTER | Age: 74
End: 2024-10-28
Attending: EMERGENCY MEDICINE
Payer: MEDICARE

## 2024-10-28 ENCOUNTER — PATIENT OUTREACH (OUTPATIENT)
Dept: HEALTH INFORMATION MANAGEMENT | Facility: OTHER | Age: 74
End: 2024-10-28

## 2024-10-28 ENCOUNTER — APPOINTMENT (OUTPATIENT)
Dept: RADIOLOGY | Facility: MEDICAL CENTER | Age: 74
End: 2024-10-28
Payer: MEDICARE

## 2024-10-28 ENCOUNTER — HOSPITAL ENCOUNTER (INPATIENT)
Facility: MEDICAL CENTER | Age: 74
LOS: 1 days | End: 2024-10-29
Attending: EMERGENCY MEDICINE | Admitting: STUDENT IN AN ORGANIZED HEALTH CARE EDUCATION/TRAINING PROGRAM
Payer: MEDICARE

## 2024-10-28 DIAGNOSIS — R60.0 PERIPHERAL EDEMA: ICD-10-CM

## 2024-10-28 DIAGNOSIS — I50.22 CHRONIC SYSTOLIC HEART FAILURE (HCC): Chronic | ICD-10-CM

## 2024-10-28 DIAGNOSIS — J96.01 ACUTE HYPOXIC RESPIRATORY FAILURE (HCC): ICD-10-CM

## 2024-10-28 DIAGNOSIS — J96.01 ACUTE RESPIRATORY FAILURE WITH HYPOXIA (HCC): ICD-10-CM

## 2024-10-28 DIAGNOSIS — J81.0 ACUTE PULMONARY EDEMA (HCC): ICD-10-CM

## 2024-10-28 DIAGNOSIS — J18.9 PNEUMONIA OF LEFT LOWER LOBE DUE TO INFECTIOUS ORGANISM: ICD-10-CM

## 2024-10-28 LAB
ALBUMIN SERPL BCP-MCNC: 3.3 G/DL (ref 3.2–4.9)
ALBUMIN/GLOB SERPL: 1.3 G/DL
ALP SERPL-CCNC: 117 U/L (ref 30–99)
ALT SERPL-CCNC: 21 U/L (ref 2–50)
ANION GAP SERPL CALC-SCNC: 13 MMOL/L (ref 7–16)
APPEARANCE UR: CLEAR
AST SERPL-CCNC: 35 U/L (ref 12–45)
BACTERIA #/AREA URNS HPF: NORMAL /HPF
BACTERIA BLD CULT: NORMAL
BACTERIA BLD CULT: NORMAL
BASOPHILS # BLD AUTO: 0.1 % (ref 0–1.8)
BASOPHILS # BLD: 0.02 K/UL (ref 0–0.12)
BILIRUB SERPL-MCNC: 0.4 MG/DL (ref 0.1–1.5)
BILIRUB UR QL STRIP.AUTO: NEGATIVE
BUN SERPL-MCNC: 18 MG/DL (ref 8–22)
CALCIUM ALBUM COR SERPL-MCNC: 8.5 MG/DL (ref 8.5–10.5)
CALCIUM SERPL-MCNC: 7.9 MG/DL (ref 8.5–10.5)
CASTS URNS QL MICRO: NORMAL /LPF (ref 0–2)
CHLORIDE SERPL-SCNC: 112 MMOL/L (ref 96–112)
CO2 SERPL-SCNC: 20 MMOL/L (ref 20–33)
COLOR UR: YELLOW
CREAT SERPL-MCNC: 0.74 MG/DL (ref 0.5–1.4)
EKG IMPRESSION: NORMAL
EOSINOPHIL # BLD AUTO: 0.06 K/UL (ref 0–0.51)
EOSINOPHIL NFR BLD: 0.4 % (ref 0–6.9)
EPITHELIAL CELLS 1715: NORMAL /HPF (ref 0–5)
ERYTHROCYTE [DISTWIDTH] IN BLOOD BY AUTOMATED COUNT: 55.3 FL (ref 35.9–50)
FLUAV RNA SPEC QL NAA+PROBE: NEGATIVE
FLUBV RNA SPEC QL NAA+PROBE: NEGATIVE
GFR SERPLBLD CREATININE-BSD FMLA CKD-EPI: 95 ML/MIN/1.73 M 2
GLOBULIN SER CALC-MCNC: 2.6 G/DL (ref 1.9–3.5)
GLUCOSE SERPL-MCNC: 94 MG/DL (ref 65–99)
GLUCOSE UR STRIP.AUTO-MCNC: 100 MG/DL
HCT VFR BLD AUTO: 27.3 % (ref 42–52)
HGB BLD-MCNC: 9 G/DL (ref 14–18)
IMM GRANULOCYTES # BLD AUTO: 0.06 K/UL (ref 0–0.11)
IMM GRANULOCYTES NFR BLD AUTO: 0.4 % (ref 0–0.9)
INR PPP: 1.33 (ref 0.87–1.13)
KETONES UR STRIP.AUTO-MCNC: NEGATIVE MG/DL
LACTATE SERPL-SCNC: 2 MMOL/L (ref 0.5–2)
LACTATE SERPL-SCNC: 3 MMOL/L (ref 0.5–2)
LACTATE SERPL-SCNC: 3 MMOL/L (ref 0.5–2)
LEUKOCYTE ESTERASE UR QL STRIP.AUTO: NEGATIVE
LYMPHOCYTES # BLD AUTO: 0.73 K/UL (ref 1–4.8)
LYMPHOCYTES NFR BLD: 5.3 % (ref 22–41)
MCH RBC QN AUTO: 29.2 PG (ref 27–33)
MCHC RBC AUTO-ENTMCNC: 33 G/DL (ref 32.3–36.5)
MCV RBC AUTO: 88.6 FL (ref 81.4–97.8)
MICRO URNS: ABNORMAL
MONOCYTES # BLD AUTO: 0.8 K/UL (ref 0–0.85)
MONOCYTES NFR BLD AUTO: 5.8 % (ref 0–13.4)
NEUTROPHILS # BLD AUTO: 12.2 K/UL (ref 1.82–7.42)
NEUTROPHILS NFR BLD: 88 % (ref 44–72)
NITRITE UR QL STRIP.AUTO: NEGATIVE
NRBC # BLD AUTO: 0 K/UL
NRBC BLD-RTO: 0 /100 WBC (ref 0–0.2)
NT-PROBNP SERPL IA-MCNC: 3705 PG/ML (ref 0–125)
PH UR STRIP.AUTO: 5.5 [PH] (ref 5–8)
PLATELET # BLD AUTO: 214 K/UL (ref 164–446)
PMV BLD AUTO: 10.3 FL (ref 9–12.9)
POTASSIUM SERPL-SCNC: 3.7 MMOL/L (ref 3.6–5.5)
PROT SERPL-MCNC: 5.9 G/DL (ref 6–8.2)
PROT UR QL STRIP: 30 MG/DL
PROTHROMBIN TIME: 16.5 SEC (ref 12–14.6)
RBC # BLD AUTO: 3.08 M/UL (ref 4.7–6.1)
RBC # URNS HPF: NORMAL /HPF (ref 0–2)
RBC UR QL AUTO: NEGATIVE
RSV RNA SPEC QL NAA+PROBE: NEGATIVE
SARS-COV-2 RNA RESP QL NAA+PROBE: NOTDETECTED
SIGNIFICANT IND 70042: NORMAL
SIGNIFICANT IND 70042: NORMAL
SITE SITE: NORMAL
SITE SITE: NORMAL
SODIUM SERPL-SCNC: 145 MMOL/L (ref 135–145)
SOURCE SOURCE: NORMAL
SOURCE SOURCE: NORMAL
SP GR UR STRIP.AUTO: 1.03
TROPONIN T SERPL-MCNC: 23 NG/L (ref 6–19)
TROPONIN T SERPL-MCNC: 28 NG/L (ref 6–19)
UROBILINOGEN UR STRIP.AUTO-MCNC: 1 EU/DL
WBC # BLD AUTO: 13.9 K/UL (ref 4.8–10.8)
WBC #/AREA URNS HPF: NORMAL /HPF

## 2024-10-28 PROCEDURE — 84484 ASSAY OF TROPONIN QUANT: CPT

## 2024-10-28 PROCEDURE — 81003 URINALYSIS AUTO W/O SCOPE: CPT

## 2024-10-28 PROCEDURE — 81015 MICROSCOPIC EXAM OF URINE: CPT

## 2024-10-28 PROCEDURE — 83605 ASSAY OF LACTIC ACID: CPT

## 2024-10-28 PROCEDURE — 700102 HCHG RX REV CODE 250 W/ 637 OVERRIDE(OP): Performed by: EMERGENCY MEDICINE

## 2024-10-28 PROCEDURE — 85025 COMPLETE CBC W/AUTO DIFF WBC: CPT

## 2024-10-28 PROCEDURE — 36415 COLL VENOUS BLD VENIPUNCTURE: CPT

## 2024-10-28 PROCEDURE — 71275 CT ANGIOGRAPHY CHEST: CPT

## 2024-10-28 PROCEDURE — 74177 CT ABD & PELVIS W/CONTRAST: CPT

## 2024-10-28 PROCEDURE — 99223 1ST HOSP IP/OBS HIGH 75: CPT | Mod: AI | Performed by: STUDENT IN AN ORGANIZED HEALTH CARE EDUCATION/TRAINING PROGRAM

## 2024-10-28 PROCEDURE — 83880 ASSAY OF NATRIURETIC PEPTIDE: CPT

## 2024-10-28 PROCEDURE — 700111 HCHG RX REV CODE 636 W/ 250 OVERRIDE (IP): Mod: JZ | Performed by: EMERGENCY MEDICINE

## 2024-10-28 PROCEDURE — 770020 HCHG ROOM/CARE - TELE (206)

## 2024-10-28 PROCEDURE — 93005 ELECTROCARDIOGRAM TRACING: CPT

## 2024-10-28 PROCEDURE — 96375 TX/PRO/DX INJ NEW DRUG ADDON: CPT

## 2024-10-28 PROCEDURE — 96374 THER/PROPH/DIAG INJ IV PUSH: CPT

## 2024-10-28 PROCEDURE — 700117 HCHG RX CONTRAST REV CODE 255: Performed by: EMERGENCY MEDICINE

## 2024-10-28 PROCEDURE — 87040 BLOOD CULTURE FOR BACTERIA: CPT | Mod: 91

## 2024-10-28 PROCEDURE — 99285 EMERGENCY DEPT VISIT HI MDM: CPT

## 2024-10-28 PROCEDURE — 0241U HCHG SARS-COV-2 COVID-19 NFCT DS RESP RNA 4 TRGT ED POC: CPT

## 2024-10-28 PROCEDURE — A9270 NON-COVERED ITEM OR SERVICE: HCPCS | Performed by: EMERGENCY MEDICINE

## 2024-10-28 PROCEDURE — 80053 COMPREHEN METABOLIC PANEL: CPT

## 2024-10-28 PROCEDURE — 85610 PROTHROMBIN TIME: CPT

## 2024-10-28 PROCEDURE — 700101 HCHG RX REV CODE 250: Performed by: EMERGENCY MEDICINE

## 2024-10-28 PROCEDURE — 93005 ELECTROCARDIOGRAM TRACING: CPT | Performed by: EMERGENCY MEDICINE

## 2024-10-28 PROCEDURE — 700105 HCHG RX REV CODE 258: Performed by: EMERGENCY MEDICINE

## 2024-10-28 PROCEDURE — 71045 X-RAY EXAM CHEST 1 VIEW: CPT

## 2024-10-28 PROCEDURE — 87086 URINE CULTURE/COLONY COUNT: CPT

## 2024-10-28 RX ORDER — ACETAMINOPHEN 500 MG
1000 TABLET ORAL ONCE
Status: COMPLETED | OUTPATIENT
Start: 2024-10-28 | End: 2024-10-28

## 2024-10-28 RX ORDER — CEFTRIAXONE 2 G/1
2000 INJECTION, POWDER, FOR SOLUTION INTRAMUSCULAR; INTRAVENOUS ONCE
Status: COMPLETED | OUTPATIENT
Start: 2024-10-28 | End: 2024-10-28

## 2024-10-28 RX ORDER — ACETAMINOPHEN 325 MG/1
650 TABLET ORAL EVERY 4 HOURS PRN
Status: DISCONTINUED | OUTPATIENT
Start: 2024-10-28 | End: 2024-10-29 | Stop reason: HOSPADM

## 2024-10-28 RX ADMIN — CEFTRIAXONE SODIUM 2000 MG: 2 INJECTION, POWDER, FOR SOLUTION INTRAMUSCULAR; INTRAVENOUS at 18:29

## 2024-10-28 RX ADMIN — IOHEXOL 100 ML: 350 INJECTION, SOLUTION INTRAVENOUS at 19:45

## 2024-10-28 RX ADMIN — ACETAMINOPHEN 1000 MG: 500 TABLET ORAL at 19:07

## 2024-10-28 RX ADMIN — DOXYCYCLINE 100 MG: 100 INJECTION, POWDER, LYOPHILIZED, FOR SOLUTION INTRAVENOUS at 21:10

## 2024-10-28 ASSESSMENT — FIBROSIS 4 INDEX
FIB4 SCORE: 1.64
FIB4 SCORE: 2.64

## 2024-10-28 NOTE — ED TRIAGE NOTES
Chief Complaint   Patient presents with    Shortness of Breath     Chronic, worse over the last 5 days    Leg Swelling     Bilateral leg swelling     Flu Like Symptoms     Cough, chills, and congestion x 5 days     Pt BIB EMS for above complaint.     Pt changed into gown. SOB protocol ordered.

## 2024-10-29 ENCOUNTER — PHARMACY VISIT (OUTPATIENT)
Dept: PHARMACY | Facility: MEDICAL CENTER | Age: 74
End: 2024-10-29
Payer: COMMERCIAL

## 2024-10-29 VITALS
HEIGHT: 72 IN | HEART RATE: 82 BPM | DIASTOLIC BLOOD PRESSURE: 78 MMHG | OXYGEN SATURATION: 99 % | WEIGHT: 176.37 LBS | TEMPERATURE: 97.3 F | SYSTOLIC BLOOD PRESSURE: 114 MMHG | RESPIRATION RATE: 17 BRPM | BODY MASS INDEX: 23.89 KG/M2

## 2024-10-29 LAB
ANION GAP SERPL CALC-SCNC: 7 MMOL/L (ref 7–16)
BUN SERPL-MCNC: 15 MG/DL (ref 8–22)
CALCIUM SERPL-MCNC: 7.7 MG/DL (ref 8.5–10.5)
CHLORIDE SERPL-SCNC: 113 MMOL/L (ref 96–112)
CO2 SERPL-SCNC: 23 MMOL/L (ref 20–33)
CREAT SERPL-MCNC: 0.75 MG/DL (ref 0.5–1.4)
GFR SERPLBLD CREATININE-BSD FMLA CKD-EPI: 94 ML/MIN/1.73 M 2
GLUCOSE SERPL-MCNC: 75 MG/DL (ref 65–99)
MAGNESIUM SERPL-MCNC: 1.6 MG/DL (ref 1.5–2.5)
PHOSPHATE SERPL-MCNC: 1.8 MG/DL (ref 2.5–4.5)
POTASSIUM SERPL-SCNC: 3.6 MMOL/L (ref 3.6–5.5)
SODIUM SERPL-SCNC: 143 MMOL/L (ref 135–145)
TROPONIN T SERPL-MCNC: 33 NG/L (ref 6–19)
TROPONIN T SERPL-MCNC: 35 NG/L (ref 6–19)

## 2024-10-29 PROCEDURE — 700105 HCHG RX REV CODE 258: Performed by: STUDENT IN AN ORGANIZED HEALTH CARE EDUCATION/TRAINING PROGRAM

## 2024-10-29 PROCEDURE — 700111 HCHG RX REV CODE 636 W/ 250 OVERRIDE (IP): Mod: JZ | Performed by: STUDENT IN AN ORGANIZED HEALTH CARE EDUCATION/TRAINING PROGRAM

## 2024-10-29 PROCEDURE — 83735 ASSAY OF MAGNESIUM: CPT

## 2024-10-29 PROCEDURE — 84100 ASSAY OF PHOSPHORUS: CPT

## 2024-10-29 PROCEDURE — A9270 NON-COVERED ITEM OR SERVICE: HCPCS | Performed by: STUDENT IN AN ORGANIZED HEALTH CARE EDUCATION/TRAINING PROGRAM

## 2024-10-29 PROCEDURE — RXMED WILLOW AMBULATORY MEDICATION CHARGE

## 2024-10-29 PROCEDURE — 700102 HCHG RX REV CODE 250 W/ 637 OVERRIDE(OP): Performed by: STUDENT IN AN ORGANIZED HEALTH CARE EDUCATION/TRAINING PROGRAM

## 2024-10-29 PROCEDURE — 99239 HOSP IP/OBS DSCHRG MGMT >30: CPT | Mod: GC | Performed by: INTERNAL MEDICINE

## 2024-10-29 PROCEDURE — 80048 BASIC METABOLIC PNL TOTAL CA: CPT

## 2024-10-29 PROCEDURE — 84484 ASSAY OF TROPONIN QUANT: CPT | Mod: 91

## 2024-10-29 PROCEDURE — 700101 HCHG RX REV CODE 250: Performed by: STUDENT IN AN ORGANIZED HEALTH CARE EDUCATION/TRAINING PROGRAM

## 2024-10-29 RX ORDER — FUROSEMIDE 20 MG/1
20 TABLET ORAL 2 TIMES DAILY
Qty: 60 TABLET | Refills: 0 | Status: SHIPPED | OUTPATIENT
Start: 2024-10-29

## 2024-10-29 RX ORDER — FUROSEMIDE 10 MG/ML
20 INJECTION INTRAMUSCULAR; INTRAVENOUS
Status: DISCONTINUED | OUTPATIENT
Start: 2024-10-29 | End: 2024-10-29 | Stop reason: HOSPADM

## 2024-10-29 RX ORDER — METOPROLOL SUCCINATE 25 MG/1
25 TABLET, EXTENDED RELEASE ORAL DAILY
Status: DISCONTINUED | OUTPATIENT
Start: 2024-10-29 | End: 2024-10-29 | Stop reason: HOSPADM

## 2024-10-29 RX ORDER — DOXYCYCLINE 100 MG/1
100 TABLET ORAL EVERY 12 HOURS
Status: DISCONTINUED | OUTPATIENT
Start: 2024-10-29 | End: 2024-10-29 | Stop reason: HOSPADM

## 2024-10-29 RX ORDER — DOXYCYCLINE 100 MG/1
100 CAPSULE ORAL 2 TIMES DAILY
Qty: 8 CAPSULE | Refills: 0 | Status: ACTIVE | OUTPATIENT
Start: 2024-10-29 | End: 2024-11-02

## 2024-10-29 RX ORDER — FUROSEMIDE 10 MG/ML
20 INJECTION INTRAMUSCULAR; INTRAVENOUS 2 TIMES DAILY
Qty: 30 ML | Refills: 0 | Status: SHIPPED | OUTPATIENT
Start: 2024-10-29 | End: 2024-10-29

## 2024-10-29 RX ADMIN — FUROSEMIDE 20 MG: 10 INJECTION, SOLUTION INTRAVENOUS at 05:43

## 2024-10-29 RX ADMIN — METOPROLOL SUCCINATE 25 MG: 25 TABLET, EXTENDED RELEASE ORAL at 05:44

## 2024-10-29 RX ADMIN — AMPICILLIN AND SULBACTAM 3 G: 1; 2 INJECTION, POWDER, FOR SOLUTION INTRAMUSCULAR; INTRAVENOUS at 12:42

## 2024-10-29 RX ADMIN — DOXYCYCLINE 100 MG: 100 INJECTION, POWDER, LYOPHILIZED, FOR SOLUTION INTRAVENOUS at 06:12

## 2024-10-29 RX ADMIN — OMEPRAZOLE 20 MG: 20 CAPSULE, DELAYED RELEASE ORAL at 05:44

## 2024-10-29 RX ADMIN — APIXABAN 5 MG: 5 TABLET, FILM COATED ORAL at 05:44

## 2024-10-29 RX ADMIN — AMPICILLIN AND SULBACTAM 3 G: 1; 2 INJECTION, POWDER, FOR SOLUTION INTRAMUSCULAR; INTRAVENOUS at 05:09

## 2024-10-29 ASSESSMENT — LIFESTYLE VARIABLES
ALCOHOL_USE: YES
DOES PATIENT WANT TO STOP DRINKING: NO
TOTAL SCORE: 0
CONSUMPTION TOTAL: NEGATIVE
EVER HAD A DRINK FIRST THING IN THE MORNING TO STEADY YOUR NERVES TO GET RID OF A HANGOVER: NO
EVER HAD A DRINK FIRST THING IN THE MORNING TO STEADY YOUR NERVES TO GET RID OF A HANGOVER: NO
TOTAL SCORE: 0
HAVE YOU EVER FELT YOU SHOULD CUT DOWN ON YOUR DRINKING: NO
EVER FELT BAD OR GUILTY ABOUT YOUR DRINKING: NO
CONSUMPTION TOTAL: INCOMPLETE
HAVE YOU EVER FELT YOU SHOULD CUT DOWN ON YOUR DRINKING: NO
TOTAL SCORE: 0
AVERAGE NUMBER OF DAYS PER WEEK YOU HAVE A DRINK CONTAINING ALCOHOL: 2
TOTAL SCORE: 0
ON A TYPICAL DAY WHEN YOU DRINK ALCOHOL HOW MANY DRINKS DO YOU HAVE: 2
DOES PATIENT WANT TO TALK TO SOMEONE ABOUT QUITTING: NO
DOES PATIENT WANT TO STOP DRINKING: NO
ALCOHOL_USE: YES
AVERAGE NUMBER OF DAYS PER WEEK YOU HAVE A DRINK CONTAINING ALCOHOL: 2
EVER FELT BAD OR GUILTY ABOUT YOUR DRINKING: NO
HAVE PEOPLE ANNOYED YOU BY CRITICIZING YOUR DRINKING: NO
TOTAL SCORE: 0
CONSUMPTION TOTAL: INCOMPLETE
HOW MANY TIMES IN THE PAST YEAR HAVE YOU HAD 5 OR MORE DRINKS IN A DAY: 0
TOTAL SCORE: 0
ALCOHOL_USE: YES
HAVE PEOPLE ANNOYED YOU BY CRITICIZING YOUR DRINKING: NO
HOW MANY TIMES IN THE PAST YEAR HAVE YOU HAD 5 OR MORE DRINKS IN A DAY: 0
DOES PATIENT WANT TO TALK TO SOMEONE ABOUT QUITTING: NO

## 2024-10-29 ASSESSMENT — CHA2DS2 SCORE
VASCULAR DISEASE: NO
DIABETES: NO
SEX: MALE
PRIOR STROKE OR TIA OR THROMBOEMBOLISM: NO
AGE 65 TO 74: YES
AGE 75 OR GREATER: YES
CHF OR LEFT VENTRICULAR DYSFUNCTION: YES
HYPERTENSION: YES
CHA2DS2 VASC SCORE: 5

## 2024-10-29 ASSESSMENT — COGNITIVE AND FUNCTIONAL STATUS - GENERAL
SUGGESTED CMS G CODE MODIFIER DAILY ACTIVITY: CJ
CLIMB 3 TO 5 STEPS WITH RAILING: A LITTLE
DRESSING REGULAR LOWER BODY CLOTHING: A LITTLE
WALKING IN HOSPITAL ROOM: A LITTLE
MOBILITY SCORE: 19
SUGGESTED CMS G CODE MODIFIER MOBILITY: CK
STANDING UP FROM CHAIR USING ARMS: A LITTLE
HELP NEEDED FOR BATHING: A LITTLE
MOVING FROM LYING ON BACK TO SITTING ON SIDE OF FLAT BED: A LITTLE
MOVING TO AND FROM BED TO CHAIR: A LITTLE
DAILY ACTIVITIY SCORE: 22

## 2024-10-29 ASSESSMENT — PAIN DESCRIPTION - PAIN TYPE
TYPE: ACUTE PAIN
TYPE: ACUTE PAIN

## 2024-10-29 ASSESSMENT — ENCOUNTER SYMPTOMS
SHORTNESS OF BREATH: 1
CHILLS: 1
FEVER: 1

## 2024-10-29 ASSESSMENT — SOCIAL DETERMINANTS OF HEALTH (SDOH)
WITHIN THE PAST 12 MONTHS, THE FOOD YOU BOUGHT JUST DIDN'T LAST AND YOU DIDN'T HAVE MONEY TO GET MORE: NEVER TRUE
WITHIN THE LAST YEAR, HAVE YOU BEEN KICKED, HIT, SLAPPED, OR OTHERWISE PHYSICALLY HURT BY YOUR PARTNER OR EX-PARTNER?: NO
IN THE PAST 12 MONTHS, HAS THE ELECTRIC, GAS, OIL, OR WATER COMPANY THREATENED TO SHUT OFF SERVICE IN YOUR HOME?: PATIENT UNABLE TO ANSWER
WITHIN THE PAST 12 MONTHS, YOU WORRIED THAT YOUR FOOD WOULD RUN OUT BEFORE YOU GOT THE MONEY TO BUY MORE: NEVER TRUE
IN THE PAST 12 MONTHS, HAS THE ELECTRIC, GAS, OIL, OR WATER COMPANY THREATENED TO SHUT OFF SERVICE IN YOUR HOME?: PATIENT UNABLE TO ANSWER
WITHIN THE PAST 12 MONTHS, THE FOOD YOU BOUGHT JUST DIDN'T LAST AND YOU DIDN'T HAVE MONEY TO GET MORE: NEVER TRUE
WITHIN THE LAST YEAR, HAVE TO BEEN RAPED OR FORCED TO HAVE ANY KIND OF SEXUAL ACTIVITY BY YOUR PARTNER OR EX-PARTNER?: NO
WITHIN THE PAST 12 MONTHS, YOU WORRIED THAT YOUR FOOD WOULD RUN OUT BEFORE YOU GOT THE MONEY TO BUY MORE: NEVER TRUE
WITHIN THE LAST YEAR, HAVE YOU BEEN AFRAID OF YOUR PARTNER OR EX-PARTNER?: NO
WITHIN THE LAST YEAR, HAVE YOU BEEN HUMILIATED OR EMOTIONALLY ABUSED IN OTHER WAYS BY YOUR PARTNER OR EX-PARTNER?: NO

## 2024-10-29 ASSESSMENT — FIBROSIS 4 INDEX
FIB4 SCORE: 2.64
FIB4 SCORE: 2.64

## 2024-10-29 NOTE — PROGRESS NOTES
Robertmelissa Mcdonnell has been provided discharge instructions, to include follow up care, home medications, and activity/diet reviewed. Copy of discharge instructions in patient chart, signed and reviewed. Patient verbalizes the understanding of the discharge instructions. Arm band removed. Patient did not have any home meds during admit. Meds to Beds given. Questions and concerns addressed prior to leaving the discharge lounge. Transported via taxi, voucher in hand. Patient discharge to home.

## 2024-10-29 NOTE — CARE PLAN
The patient is Stable - Low risk of patient condition declining or worsening    Shift Goals  Clinical Goals: diuresis, monitor I&O, monitor labs, collect sputum sample  Patient Goals: diuresis, mobility, comfort, rest  Family Goals: MARCOS    Progress made toward(s) clinical / shift goals:    Problem: Hemodynamics  Goal: Patient's hemodynamics, fluid balance and neurologic status will be stable or improve  Description: Target End Date:  Prior to discharge or change in level of care    Document on Assessment and I/O flowsheet templates    1.  Monitor vital signs, pulse oximetry and cardiac monitor per provider order and/or policy  2.  Maintain blood pressure per provider order  3.  Hemodynamic monitoring per provider order  4.  Manage IV fluids and IV infusions  5.  Monitor intake and output  6.  Daily weights per unit policy or provider order  7.  Assess peripheral pulses and capillary refill  8.  Assess color and body temperature  9.  Position patient for maximum circulation/cardiac output  10. Monitor for signs/symptoms of excessive bleeding  11. Assess mental status, restlessness and changes in level of consciousness  12. Monitor temperature and report fever or hypothermia to provider immediately. Consideration of targeted temperature management.  Outcome: Progressing  Note: Monitor VS, respiratory status, and I&O  Assess circulation in all extremities  Assess LOC  Report abnormal findings to provider       Problem: Fluid Volume  Goal: Fluid volume balance will be maintained  Description: Target End Date:  Prior to discharge or change in level of care    Document on I/O flowsheet    1.  Monitor intake and output as ordered  2.  Promote oral intake as appropriate  3.  Report inadequate intake or output to physician  4.  Administer IV therapy as ordered  5.  Weights per provider order  6.  Assess for signs and symptoms of bleeding  7.  Monitor for signs of fluid overload (respiratory changes, edema, weight gain,  increased abdominal girth)  8.  Monitor of signs for inadequate fluid volume (poor skin turgor, dry mucous membranes)  9.  Instruct patient on adherence to fluid restrictions  Outcome: Progressing  Note: I&O monitored and documented per unit standard and orders  Diuretics administered as ordered  Daily weight documented  Lower extremities elevated       Problem: Respiratory  Goal: Patient will achieve/maintain optimum respiratory ventilation and gas exchange  Description: Target End Date:  Prior to discharge or change in level of care    Document on Assessment flowsheet    1.  Assess and monitor rate, rhythm, depth and effort of respiration  2.  Breath sounds assessed qshift and/or as needed  3.  Assess O2 saturation, administer/titrate oxygen as ordered  4.  Position patient for maximum ventilatory efficiency  5.  Turn, cough, and deep breath with splinting to improve effectiveness  6.  Collaborate with RT to administer medication/treatments per order  7.  Encourage use of incentive spirometer and encourage patient to cough after use and utilize splinting techniques if applicable  8.  Airway suctioning  9.  Monitor sputum production for changes in color, consistency and frequency  10. Perform frequent oral hygiene  11. Alternate physical activity with rest periods  Outcome: Progressing  Note: Assess respiratory status per unit standard and as appropriate  Monitor WOB  Encourage pulmonary hygeine       Problem: Knowledge Deficit - Standard  Goal: Patient and family/care givers will demonstrate understanding of plan of care, disease process/condition, diagnostic tests and medications  Description: Target End Date:  1-3 days or as soon as patient condition allows    Document in Patient Education    1.  Patient and family/caregiver oriented to unit, equipment, visitation policy and means for communicating concern  2.  Complete/review Learning Assessment  3.  Assess knowledge level of disease process/condition,  treatment plan, diagnostic tests and medications  4.  Explain disease process/condition, treatment plan, diagnostic tests and medications  Outcome: Progressing  Note: Discuss POC with patient  Address questions and concerns, escalate as appropriate  Check for pt understanding of POC]     Problem: Fall Risk  Goal: Patient will remain free from falls  Description: Target End Date:  Prior to discharge or change in level of care    Document interventions on the Celsa Lewis Fall Risk Assessment    1.  Assess for fall risk factors  2.  Implement fall precautions  Outcome: Progressing  Note: Fall risk assessment   Fall precautions in place per unit standard and protocol; see flowsheet         Patient is not progressing towards the following goals:

## 2024-10-29 NOTE — ED NOTES
Patient observed to have low SPO2 while sleeping.  Patient easily roused to voice. Respirations even and unlabored.  Patient denies difficulty breathing. No increased work of breathing noted.  Oxygen administered via nasal cannula at 2 lpm

## 2024-10-29 NOTE — RESPIRATORY CARE
"COPD EDUCATION by COPD CLINICAL EDUCATOR  10/29/2024 at 3:17 PM by Kerrie Yanes, RRT     Patient interviewed by COPD education team. Patient denies COPD. He continues to smoke and is not on any respiratory medications.                     COPD Assessment  COPD Clinical Specialists ONLY  COPD Education Initiated: Yes--Short Intervention (pr denies COPD dx continues to smoke; no respiratory meds; has heart failure)  Is this a COPD exacerbation patient?: No  DME Company: none  Physician Name: Torri Razo P.A.-C.  Smoking Cessation: Declined  Home Health Care:  ([facility])  Interdisciplinary Rounds: Attendance at Rounds (30 Min)    PFT Results  No results found for: \"PFT\"    Meds to Beds  [facility] bedside medication delivery for all eligible patients at discharge and you have been automatically enrolled in the Meds to Beds Program. Would you like to opt out of this program for any reason?: No - Stay Opted In     MY COPD ACTION PLAN     It is recommended that patients and physicians /healthcare providers complete this action plan together. This plan should be discussed at each physician visit and updated as needed.    The green, yellow and red zones show groups of symptoms of COPD. This list of symptoms is not comprehensive, and you may experience other symptoms. In the \"Actions\" column, your healthcare provider has recommended actions for you to take based on your symptoms.    Patient Name: Robert Mcdonnell   YOB: 1950   Last Updated on:     Green Zone:  I am doing well today Actions     Usual activitiy and exercise level   Take daily medications     Usual amounts of cough and phlegm/mucus   Use oxygen as prescribed     Sleep well at night   Continue regular exercise/diet plan     Appetite is good   At all times avoid cigarette smoke, inhaled irritants     Daily Medications (these medications are taken every day):                Yellow Zone:  I am having a bad day or a COPD flare " "Actions     More breathless than usual   Continue daily medications     I have less energy for my daily activities   Use quick relief inhaler as ordered     Increased or thicker phlegm/mucus   Use oxygen as prescribed     Using quick relief inhaler/nebulizer more often   Get plenty of rest     Swelling of ankles more than usual   Use pursed lip breathing     More coughing than usual   At all times avoid cigarette smoke, inhaled irritants     I feel like I have a \"chest cold\"     Poor sleep and my symptoms woke me up     My appetite is not good     My medicine is not helping      Call provider immediately if symptoms don’t improve     Continue daily medications, add rescue medications:               Medications to be used during a flare up, (as Discussed with Provider):              Red Zone:  I need urgent medical care Actions     Severe shortness of breath even at rest   Call 911 or seek medical care immediately     Not able to do any activity because of breathing      Fever or shaking chills      Feeling confused or very drowsy       Chest pains      Coughing up blood                  "

## 2024-10-29 NOTE — DISCHARGE PLANNING
Case Management Discharge Planning    Admission Date: 10/28/2024  GMLOS: 4  ALOS: 1    6-Clicks ADL Score: 22  6-Clicks Mobility Score: 19      Anticipated Discharge Dispo: Discharge Disposition:  ([facility])    DME Needed: No    Action(s) Taken: Updated Provider/Nurse on Discharge Plan, cab voucher given to return to [facility].    Escalations Completed: None    Medically Clear: Yes    Next Steps: Case Management will continue to follow for discharge planning needs.    Barriers to Discharge: None    Is the patient up for discharge tomorrow: No    RN Case Manager met with patient at bedside and obtained the information used in this assessment. Patient verified accuracy of facesheet; patient lives at [facility].  Prior to current hospitalization, patient was independent with ADLS/IADLS. Patient has a ride and is able to attend necessary MD appointments. Patient prefers to use [facility]. Patient has no financial concerns. Patient has support from friends. Denies any history of substance use and denies any diagnosis of mental illness.    Care Transition Team Assessment    Information Source: Patient  Orientation Level: Oriented X4  Information Given By: Patient  Who is responsible for making decisions for patient? : Patient    Readmission Evaluation  Is this a readmission?: Yes - unplanned readmission    Elopement Risk  Legal Hold: No  Ambulatory or Self Mobile in Wheelchair: Yes  Disoriented: No  Psychiatric Symptoms: None  History of Wandering: No  Elopement this Admit: No  Vocalizing Wanting to Leave: No  Displays Behaviors, Body Language Wanting to Leave: No-Not at Risk for Elopement  Elopement Risk: Not at Risk for Elopement    Interdisciplinary Discharge Planning  Does Admitting Nurse Feel This Could be a Complex Discharge?: No  Primary Care Physician: MARLO OLEA P.A.-C.  Lives with - Patient's Self Care Capacity: Other (Comments) (homeless)  Patient or  legal guardian wants to designate a caregiver: No  Support Systems: None  Housing / Facility: Homeless  [facility]  Do You Take your Prescribed Medications Regularly: Yes  Able to Return to Previous ADL's: Yes  Mobility Issues: No  Prior Services: None  Patient Prefers to be Discharged to:: Shelter  Assistance Needed: No    Discharge Preparedness  What is your plan after discharge?: Other (comment) (Shelter)  Prior Functional Level: Ambulatory, Independent with Activities of Daily Living, Independent with Medication Management  Difficulity with ADLs: None  Difficulity with IADLs: Driving    Functional Assesment  Prior Functional Level: Ambulatory, Independent with Activities of Daily Living, Independent with Medication Management    Finances  Financial Barriers to Discharge: No  Prescription Coverage: Yes    Vision / Hearing Impairment  Vision Impairment : Yes  Right Eye Vision: Impaired  Left Eye Vision: Impaired  Hearing Impairment : No    Values / Beliefs / Concerns  Values / Beliefs Concerns : No    Advance Directive  Advance Directive?: None  Advance Directive offered?: AD Booklet refused    Domestic Abuse  Have you ever been the victim of abuse or violence?: No  Possible Abuse/Neglect Reported to:: Not Applicable    Psychological Assessment  History of Substance Abuse: None  History of Psychiatric Problems: No    Discharge Risks or Barriers  Discharge risks or barriers?: Homeless / couch surfing  Patient risk factors: Homeless    Anticipated Discharge Information  Discharge Disposition:  ([facility] shelter)

## 2024-10-29 NOTE — PROGRESS NOTES
Telemetry monitoring:     Rhythm: afib, 5 beats of vtach  Ectopy: pvc, couplet  Rate:     -/.12/-

## 2024-10-29 NOTE — H&P
Hospital Medicine History & Physical Note    Date of Service  10/28/2024    Primary Care Physician  Torri Razo P.A.-C.    Consultants  None     Code Status  Full Code    Chief Complaint  Chief Complaint   Patient presents with    Shortness of Breath     Chronic, worse over the last 5 days    Leg Swelling     Bilateral leg swelling     Flu Like Symptoms     Cough, chills, and congestion x 5 days       History of Presenting Illness  Robert Mcdonnell is a 74 y.o. male who presented 10/28/2024 with flu like symptoms and shortness of breath over the last 5 days. Patient reports recently having ex laparotomy subtotal gastrectomy, liver wedge resection, Miguel Angel-en-Y gastrojejunostomy, omental flap, cholecystectomy, celiac node and hepatic artery node dissection with Dr. Cabral 10/7. CTA of the chest today shows pneumonia and patient started on broad spectrum IV antibiotics.     I discussed the plan of care with patient.    Review of Systems  Review of Systems   Constitutional:  Positive for chills and fever.   Respiratory:  Positive for shortness of breath.        Past Medical History   has a past medical history of Arrhythmia, CHF (congestive heart failure) (HCC), Congestive heart failure (HCC), and Hypertension.    Surgical History   has a past surgical history that includes pr upper gi endoscopy,diagnosis (N/A, 01/31/2023); pr upper gi endoscopy,biopsy (N/A, 09/10/2024); pr upper gi endoscopy,scler inject (N/A, 09/10/2024); gastroscopy with endostat (N/A, 09/10/2024); cataract phaco with iol (Left); pr upper gi endoscopy,diagnosis (N/A, 9/13/2024); pr upper gi endoscopy,biopsy (N/A, 9/13/2024); pr upper gi endoscopy,scler inject (N/A, 9/13/2024); gastrectomy (N/A, 10/7/2024); node dissection (N/A, 10/7/2024); creation, flap, omentum (N/A, 10/7/2024); and cholecystectomy (N/A, 10/7/2024).     Family History  family history includes Heart Disease in his father and mother.   Family history reviewed with patient.  There is no family history that is pertinent to the chief complaint.     Social History   reports that he has been smoking cigarettes. He has never used smokeless tobacco. He reports current alcohol use. He reports that he does not currently use drugs.    Allergies  No Known Allergies    Medications  Prior to Admission Medications   Prescriptions Last Dose Informant Patient Reported? Taking?   apixaban (ELIQUIS) 5mg Tab   No No   Sig: Take 1 Tablet by mouth 2 times a day.   metoprolol SR (TOPROL XL) 25 MG TABLET SR 24 HR   No No   Sig: Take 1 Tablet by mouth every day.   omeprazole (PRILOSEC) 20 MG delayed-release capsule   No No   Sig: Take 1 Capsule by mouth every day.      Facility-Administered Medications: None       Physical Exam  Temp:  [37.9 °C (100.2 °F)-38.7 °C (101.6 °F)] 38.7 °C (101.6 °F)  Pulse:  [] 73  Resp:  [20-29] 26  BP: (104-164)/(59-71) 104/60  SpO2:  [92 %-95 %] 94 %  Blood Pressure : 104/60   Temperature: (!) 38.7 °C (101.6 °F)   Pulse: 73   Respiration: (!) 26   Pulse Oximetry: 94 %       Physical Exam  Vitals and nursing note reviewed.   Constitutional:       Appearance: Normal appearance.   HENT:      Head: Normocephalic and atraumatic.      Right Ear: Tympanic membrane normal.      Left Ear: Tympanic membrane normal.      Nose: Nose normal.      Mouth/Throat:      Mouth: Mucous membranes are moist.      Pharynx: Oropharynx is clear.   Eyes:      Extraocular Movements: Extraocular movements intact.      Pupils: Pupils are equal, round, and reactive to light.   Cardiovascular:      Rate and Rhythm: Normal rate and regular rhythm.      Pulses: Normal pulses.      Heart sounds: Normal heart sounds.   Pulmonary:      Effort: Pulmonary effort is normal.      Breath sounds: Rales present.   Abdominal:      General: Bowel sounds are normal. There is no distension.      Palpations: Abdomen is soft. There is no mass.   Musculoskeletal:         General: Normal range of motion.      Cervical  back: Neck supple.      Right lower leg: Edema present.      Left lower leg: Edema present.   Skin:     General: Skin is warm.      Capillary Refill: Capillary refill takes less than 2 seconds.   Neurological:      General: No focal deficit present.      Mental Status: He is alert. Mental status is at baseline.   Psychiatric:         Mood and Affect: Mood normal.         Behavior: Behavior normal.         Laboratory:  Recent Labs     10/28/24  1705   WBC 13.9*   RBC 3.08*   HEMOGLOBIN 9.0*   HEMATOCRIT 27.3*   MCV 88.6   MCH 29.2   MCHC 33.0   RDW 55.3*   PLATELETCT 214   MPV 10.3     Recent Labs     10/28/24  1705   SODIUM 145   POTASSIUM 3.7   CHLORIDE 112   CO2 20   GLUCOSE 94   BUN 18   CREATININE 0.74   CALCIUM 7.9*     Recent Labs     10/28/24  1705   ALTSGPT 21   ASTSGOT 35   ALKPHOSPHAT 117*   TBILIRUBIN 0.4   GLUCOSE 94         Recent Labs     10/28/24  1705   NTPROBNP 3705*         Recent Labs     10/28/24  1705   TROPONINT 23*       Imaging:  CT-ABDOMEN-PELVIS WITH   Final Result      1.  Multifocal airspace opacities in the left lower lobe consistent with pneumonitis.      2.  Small left pleural effusion.      3.  Multiple subcentimeter hepatic cysts.      4.  Small amount of free fluid about the liver and spleen and also noted in the pelvic cul-de-sac      5.  Moderate atherosclerotic changes of the abdominal aorta and iliac arteries.      CT-CTA CHEST PULMONARY ARTERY W/ RECONS   Final Result                  DX-CHEST-PORTABLE (1 VIEW)   Final Result      1.  No acute cardiopulmonary abnormality identified.      2.  Enlargement of the cardiac silhouette          X-Ray:  I have personally reviewed the images and compared with prior images.    Assessment/Plan:  Justification for Admission Status  I anticipate this patient will require at least two midnights for appropriate medical management, necessitating inpatient admission because acute hypoxic respiratory failure secondary to pneumonia    Patient  will need a Telemetry bed on MEDICAL service .  The need is secondary to as above.    * Acute hypoxic respiratory failure (HCC)- (present on admission)  Assessment & Plan  Likely secondary to pneumonia and CHF  Continue with Unasyn and doxycycline  Oxygen per protocol    Pneumonia- (present on admission)  Assessment & Plan  Continue with Unasyn and doxycycline  Oxygen per protocol    Acute congestive heart failure, unspecified heart failure type (HCC)- (present on admission)  Assessment & Plan  Diuresis with IV lasix   BB  Monitor intake/output        VTE prophylaxis: SCDs/TEDs and therapeutic anticoagulation with eliquis

## 2024-10-29 NOTE — DISCHARGE SUMMARY
[facility] [place] [facility] Discharge Summary    Attending: Gaye Black M.d.  Senior Resident: Dr. Han Santos  Intern:  Dr. Alex Belle  Contact Number: [phone]    CHIEF COMPLAINT ON ADMISSION  Chief Complaint   Patient presents with    Shortness of Breath     Chronic, worse over the last 5 days    Leg Swelling     Bilateral leg swelling     Flu Like Symptoms     Cough, chills, and congestion x 5 days       Reason for Admission  Acute hypoxic respiratory failure, pneumonia     Admission Date  10/28/2024    CODE STATUS  Full Code    HPI & HOSPITAL COURSE  Robert Mcdonnell is a 74 y.o. male who presented 10/28/2024 with flu like symptoms and shortness of breath over the last 5 days. Patient reports recently having ex laparotomy subtotal gastrectomy, liver wedge resection, Miguel Angel-en-Y gastrojejunostomy, omental flap, cholecystectomy, celiac node and hepatic artery node dissection with Dr. Cabral 10/7. CTA of the chest today shows pneumonia and patient started on broad spectrum IV antibiotics. He did not require any oxygen the next day and his symptoms greatly improved. His surgical staples were also removed during this admission. He was well enough to be discharged home on oral antibiotics and lasix.    Therefore, he is discharged in good and stable condition to home with close outpatient follow-up.    The patient recovered much more quickly than anticipated on admission.    Discharge Date  10/29/2024    Physical Exam on Day of Discharge  Physical Exam  Constitutional:       Appearance: Normal appearance.   HENT:      Head: Normocephalic and atraumatic.      Mouth/Throat:      Mouth: Mucous membranes are moist.      Pharynx: Oropharynx is clear.   Eyes:      Pupils: Pupils are equal, round, and reactive to light.   Cardiovascular:      Rate and Rhythm: Normal rate and regular rhythm.      Pulses: Normal pulses.      Heart sounds: Normal heart sounds.   Pulmonary:      Effort: Pulmonary effort is normal. No  respiratory distress.      Breath sounds: Rales present. No wheezing.   Abdominal:      General: Abdomen is flat.      Palpations: Abdomen is soft.   Musculoskeletal:      Right lower leg: Edema present.      Left lower leg: Edema present.   Skin:     General: Skin is warm and dry.   Neurological:      General: No focal deficit present.      Mental Status: He is alert and oriented to person, place, and time.   Psychiatric:         Mood and Affect: Mood normal.         FOLLOW UP ITEMS POST DISCHARGE  Follow up with PCP for lasix and CHF management.  Follow up with surgery oncology for possible systemic therapy.    DISCHARGE DIAGNOSES  Principal Problem:    Acute hypoxic respiratory failure (HCC) (POA: Yes)  Active Problems:    Acute congestive heart failure, unspecified heart failure type (HCC) (POA: Yes)    Pneumonia (POA: Yes)  Resolved Problems:    * No resolved hospital problems. *      FOLLOW UP  No future appointments.  No follow-up provider specified.    MEDICATIONS ON DISCHARGE     Medication List        START taking these medications        Instructions   amoxicillin-clavulanate 875-125 MG Tabs  Commonly known as: Augmentin   Take 1 Tablet by mouth 2 times a day for 6 days.  Dose: 1 Tablet     doxycycline 100 MG capsule  Commonly known as: Monodox   Take 1 Capsule by mouth 2 times a day for 4 days.  Dose: 100 mg     furosemide 10 MG/ML Soln  Commonly known as: Lasix   Infuse 2 mL into a venous catheter 2 times a day.  Dose: 20 mg            CONTINUE taking these medications        Instructions   apixaban 5mg Tabs  Commonly known as: Eliquis   Take 1 Tablet by mouth 2 times a day.  Dose: 5 mg     metoprolol SR 25 MG Tb24  Commonly known as: Toprol XL   Take 1 Tablet by mouth every day.  Dose: 25 mg     omeprazole 20 MG delayed-release capsule  Commonly known as: PriLOSEC   Take 1 Capsule by mouth every day.  Dose: 20 mg              Allergies  No Known Allergies    DIET  Orders Placed This Encounter    Procedures    Diet Order Diet: 2 Gram Sodium     Standing Status:   Standing     Number of Occurrences:   1     Order Specific Question:   Diet:     Answer:   2 Gram Sodium [7]       ACTIVITY  As tolerated.  Weight bearing as tolerated    CONSULTATIONS      PROCEDURES      LABORATORY  Lab Results   Component Value Date    SODIUM 143 10/29/2024    POTASSIUM 3.6 10/29/2024    CHLORIDE 113 (H) 10/29/2024    CO2 23 10/29/2024    GLUCOSE 75 10/29/2024    BUN 15 10/29/2024    CREATININE 0.75 10/29/2024        Lab Results   Component Value Date    WBC 13.9 (H) 10/28/2024    HEMOGLOBIN 9.0 (L) 10/28/2024    HEMATOCRIT 27.3 (L) 10/28/2024    PLATELETCT 214 10/28/2024        Total time of the discharge process exceeds 45 minutes.

## 2024-10-29 NOTE — PROGRESS NOTES
Pt arrived on floor with ER nurse on 2 L. Pt tachycardic 105 bpm, irregular. Monitor called: pt is in aFib, BP soft but stable. Admitting MD notified, no additional orders received at this point.

## 2024-10-29 NOTE — CARE PLAN
The patient is Watcher - Medium risk of patient condition declining or worsening    Shift Goals  Clinical Goals: monitor VS, labs, abx  Patient Goals: rest  Family Goals: MARCOS    Progress made toward(s) clinical / shift goals:    Problem: Hemodynamics  Goal: Patient's hemodynamics, fluid balance and neurologic status will be stable or improve  Description: Target End Date:  Prior to discharge or change in level of care    Document on Assessment and I/O flowsheet templates    1.  Monitor vital signs, pulse oximetry and cardiac monitor per provider order and/or policy  2.  Maintain blood pressure per provider order  3.  Hemodynamic monitoring per provider order  4.  Manage IV fluids and IV infusions  5.  Monitor intake and output  6.  Daily weights per unit policy or provider order  7.  Assess peripheral pulses and capillary refill  8.  Assess color and body temperature  9.  Position patient for maximum circulation/cardiac output  10. Monitor for signs/symptoms of excessive bleeding  11. Assess mental status, restlessness and changes in level of consciousness  12. Monitor temperature and report fever or hypothermia to provider immediately. Consideration of targeted temperature management.  Outcome: Progressing  Note: Pt arrived on floor in aFib. Monitor room called: pt had 5 beats of Vtach, hospitalist informed. Pt hemodynamically stable. New order for BMP, Mg and phos received.      Problem: Respiratory  Goal: Patient will achieve/maintain optimum respiratory ventilation and gas exchange  Description: Target End Date:  Prior to discharge or change in level of care    Document on Assessment flowsheet    1.  Assess and monitor rate, rhythm, depth and effort of respiration  2.  Breath sounds assessed qshift and/or as needed  3.  Assess O2 saturation, administer/titrate oxygen as ordered  4.  Position patient for maximum ventilatory efficiency  5.  Turn, cough, and deep breath with splinting to improve effectiveness  6.   Collaborate with RT to administer medication/treatments per order  7.  Encourage use of incentive spirometer and encourage patient to cough after use and utilize splinting techniques if applicable  8.  Airway suctioning  9.  Monitor sputum production for changes in color, consistency and frequency  10. Perform frequent oral hygiene  11. Alternate physical activity with rest periods  Outcome: Progressing  Note: Pt on RA, SpO2 95-97%, no subjective SOB.

## 2024-10-29 NOTE — PROGRESS NOTES
4 Eyes Skin Assessment Completed by Glenny RN and JANNETTE Dobbins.    Head WDL  Ears WDL  Nose WDL  Mouth WDL  Neck WDL  Breast/Chest WDL  Shoulder Blades WDL  Spine WDL  (R) Arm/Elbow/Hand Scab  (L) Arm/Elbow/Hand WDL  Abdomen Incision, staples in place  Groin WDL  Scrotum/Coccyx/Buttocks WDL  (R) Leg Scar, Scab, Swelling, Abrasion, and Edema, flaky, dry  (L) Leg Swelling and Edema, flaky, dry  (R) Heel/Foot/Toe Boggy, Swelling, and Edema, flaky, dry  (L) Heel/Foot/Toe Boggy, Swelling, and Edema, flaky, dry          Devices In Places Tele Box, Blood Pressure Cuff, Pulse Ox, and Nasal Cannula      Interventions In Place NC W/Ear Foams, Pillows, and Barrier Cream    Possible Skin Injury No                          Pictures Uploaded Into Epic Yes  Wound Consult Placed N/A  RN Wound Prevention Protocol Ordered No

## 2024-10-29 NOTE — DISCHARGE INSTRUCTIONS
Mr. Mcdonnell, you were hospitalized for pneumonia as well as worsening heart failure that caused you to require increased oxygen supplementation. You were placed on antibiotics and lasix which improved your symptoms.  Please continue taking the antibiotics until completion as prescribed.  Please follow up with your primary care physician to monitor your heart failure and lasix.  Return to the hospital if you have worsening fevers or worsening shortness of breath.

## 2024-10-29 NOTE — ED NOTES
Med Rec complete per historical   Allergies reviewed  Antibiotics in the past 30 days:no  Anticoagulant in past 14 days:yes  Anticoagulant:Eliquis 5 mg Last dose:10/28/24  Pharmacy patient utilizes:[facility] on [address]    Pt was unable to verify names of medications he takes however states he took his medications this morning.     Medications on med rec are from [employer] dispensed on 10/15/24 for #30 day supply at the [facility] on [address]. Medications were prescribed during discharge from [facility] on 10/15/24

## 2024-10-29 NOTE — ED PROVIDER NOTES
ER Provider Note    Scribed for Dewayne Dubon M.D. by Katina Felder. 10/28/2024   5:04 PM    Primary Care Provider: Torri Razo P.A.-C.    CHIEF COMPLAINT  Chief Complaint   Patient presents with    Shortness of Breath     Chronic, worse over the last 5 days    Leg Swelling     Bilateral leg swelling     Flu Like Symptoms     Cough, chills, and congestion x 5 days     EXTERNAL RECORDS REVIEWED  Review of records revealed that the patients last echocardiogram on September 13 showed reduced systolic function and severe MR and TR. Patient had a past cholecystectomy and gastrectomy by Dr. Cabral on October 7th. He presented with GI bleeding and had an IR embolization. Biopsy showed dysplasia. He had a past exploratory laparotomy.    HPI/ROS  LIMITATION TO HISTORY   Select: : None  OUTSIDE HISTORIAN(S):  None    Robert Mcdonnell is a 74 y.o. male who presents to the ED for evaluation of bilateral leg swelling onset a few days ago. The patient states he also has had fever and chills for one week. He chronically has shortness of breath but with worsening symptoms over the last week. No alleviating or exacerbating factors were noted. He reports being on blood thinners.      PAST MEDICAL HISTORY  Past Medical History:   Diagnosis Date    Arrhythmia     CHF (congestive heart failure) (HCC)     Congestive heart failure (HCC)     Hypertension        SURGICAL HISTORY  Past Surgical History:   Procedure Laterality Date    GASTRECTOMY N/A 10/7/2024    Procedure: LAPAROTOMY, GASTRECTOMY-SUBTOTAL, WITH INTRAOPERATIVE ULTRASOUND GUIDANCE;  Surgeon: Mt Cabral M.D.;  Location: [facility];  Service: General    NODE DISSECTION N/A 10/7/2024    Procedure: LYMPHADENECTOMY-NODE DISSECTION;  Surgeon: Mt Cabral M.D.;  Location: [facility];  Service: General    CREATION, FLAP, OMENTUM N/A 10/7/2024    Procedure: CREATION, FLAP, OMENTUM;  Surgeon: Mt Cabral  M.D.;  Location: SUR[facility]ERY [facility];  Service: General    CHOLECYSTECTOMY N/A 10/7/2024    Procedure: CHOLECYSTECTOMY;  Surgeon: Mt Cabral M.D.;  Location: SURGERY [facility];  Service: General    ND UPPER GI ENDOSCOPY,DIAGNOSIS N/A 9/13/2024    Procedure: GASTROSCOPY;  Surgeon: Sarah Lozoya M.D.;  Location: SURGERY SAME DAY [facility];  Service: Gastroenterology    ND UPPER GI ENDOSCOPY,BIOPSY N/A 9/13/2024    Procedure: GASTROSCOPY, WITH BIOPSY;  Surgeon: Sarah Lozoya M.D.;  Location: SURGERY SAME DAY [facility];  Service: Gastroenterology    ND UPPER GI ENDOSCOPY,SCLER INJECT N/A 9/13/2024    Procedure: GASTROSCOPY, WITH SCLEROTHERAPY;  Surgeon: Sarah Lozoya M.D.;  Location: SURGERY SAME DAY [facility];  Service: Gastroenterology    ND UPPER GI ENDOSCOPY,BIOPSY N/A 09/10/2024    Procedure: GASTROSCOPY, WITH BIOPSY;  Surgeon: Demond Gutierres M.D.;  Location: SURGERY SAME DAY [facility];  Service: Gastroenterology    ND UPPER GI ENDOSCOPY,SCLER INJECT N/A 09/10/2024    Procedure: GASTROSCOPY, WITH SCLEROTHERAPY;  Surgeon: Demond Gutierres M.D.;  Location: SURGERY SAME DAY [facility];  Service: Gastroenterology    GASTROSCOPY WITH ENDOSTAT N/A 09/10/2024    Procedure: EGD, WITH CAUTERIZATION;  Surgeon: Demond Gutierres M.D.;  Location: SURGERY SAME DAY [facility];  Service: Gastroenterology    ND UPPER GI ENDOSCOPY,DIAGNOSIS N/A 01/31/2023    Procedure: GASTROSCOPY;  Surgeon: Joy Mcnair M.D.;  Location: SURGERY SAME DAY [facility];  Service: Gastroenterology    CATARACT PHACO WITH IOL Left        FAMILY HISTORY  Family History   Problem Relation Age of Onset    Heart Disease Mother     Heart Disease Father        SOCIAL HISTORY   reports that he has been smoking cigarettes. He has never used smokeless tobacco. He reports current alcohol use. He reports that he does not currently use drugs.    CURRENT MEDICATIONS  Current Discharge Medication List        CONTINUE these medications which have  NOT CHANGED    Details   omeprazole (PRILOSEC) 20 MG delayed-release capsule Take 1 Capsule by mouth every day.  Qty: 30 Capsule, Refills: 1      metoprolol SR (TOPROL XL) 25 MG TABLET SR 24 HR Take 1 Tablet by mouth every day.  Qty: 30 Tablet, Refills: 1      apixaban (ELIQUIS) 5mg Tab Take 1 Tablet by mouth 2 times a day.  Qty: 60 Tablet, Refills: 1             ALLERGIES  No Known Allergies     PHYSICAL EXAM  BP (!) 164/71   Pulse 89   Temp 37.9 °C (100.2 °F) (Oral)   Resp 20   Ht 1.829 m (6')   Wt 69.4 kg (153 lb)   SpO2 92%   BMI 20.75 kg/m²      Constitutional: Chronically ill appearing  HENT: Normocephalic, Atraumatic   Eyes: PERRLA, EOMI, Conjunctiva normal, No discharge.   Neck: No tenderness, Supple, No stridor.   Cardiovascular: Irregular regular tachycardia  Thorax & Lungs: Moderate respiratory distress. Clear lungs.  Abdomen: Soft, No tenderness, No masses. Large central incision with staples which is clean dried and intact  Skin: Warm, Dry, No rash.    Musculoskeletal: No major deformities noted.  Extremities: Extensive lower extremity edema to the knees distally  Neurologic: Awake, alert. Moves all extremities spontaneously.  Psychiatric: Affect normal, Judgment normal, Mood normal.      DIAGNOSTIC STUDIES    Labs:   Results for orders placed or performed during the hospital encounter of 10/28/24   EKG    Collection Time: 10/28/24  4:38 PM   Result Value Ref Range    Report       [facility] Emergency Dept.    Test Date:  2024-10-28  Pt Name:    HUNTER STEELE                 Department: ER  MRN:        [id number]                      Room:       [facility]  Gender:     Male                         Technician: [id number]  :        1950                   Requested By:ER TRIAGE PROTOCOL  Order #:    [id number]                    Reading MD: FLORA CRISTOBAL MD    Measurements  Intervals                                Axis  Rate:       150                          P:           127  WY:         149                          QRS:        187  QRSD:       159                          T:          18  QT:         399  QTc:        631    Interpretive Statements  ATRIAL FIBRILLATION WITH CONTROLLED VENTRICULAR RESPONSE  LBBB  large amount of artifact.  Compared to ECG 10/14/2024 11:33:54  Ventricular premature complex(es) no longer present  Left bundle-branch block no longer present  Electronically Signed On 10- 20:01:07 PDT by FLORA CRISTOBAL MD     POC CoV-2, FLU A/B, RSV by PCR    Collection Time: 10/28/24  4:43 PM   Result Value Ref Range    POC Influenza A RNA, PCR Negative Negative    POC Influenza B RNA, PCR Negative Negative    POC RSV, by PCR Negative Negative    POC SARS-CoV-2, PCR NotDetected NotDetected   CBC with Differential    Collection Time: 10/28/24  5:05 PM   Result Value Ref Range    WBC 13.9 (H) 4.8 - 10.8 K/uL    RBC 3.08 (L) 4.70 - 6.10 M/uL    Hemoglobin 9.0 (L) 14.0 - 18.0 g/dL    Hematocrit 27.3 (L) 42.0 - 52.0 %    MCV 88.6 81.4 - 97.8 fL    MCH 29.2 27.0 - 33.0 pg    MCHC 33.0 32.3 - 36.5 g/dL    RDW 55.3 (H) 35.9 - 50.0 fL    Platelet Count 214 164 - 446 K/uL    MPV 10.3 9.0 - 12.9 fL    Neutrophils-Polys 88.00 (H) 44.00 - 72.00 %    Lymphocytes 5.30 (L) 22.00 - 41.00 %    Monocytes 5.80 0.00 - 13.40 %    Eosinophils 0.40 0.00 - 6.90 %    Basophils 0.10 0.00 - 1.80 %    Immature Granulocytes 0.40 0.00 - 0.90 %    Nucleated RBC 0.00 0.00 - 0.20 /100 WBC    Neutrophils (Absolute) 12.20 (H) 1.82 - 7.42 K/uL    Lymphs (Absolute) 0.73 (L) 1.00 - 4.80 K/uL    Monos (Absolute) 0.80 0.00 - 0.85 K/uL    Eos (Absolute) 0.06 0.00 - 0.51 K/uL    Baso (Absolute) 0.02 0.00 - 0.12 K/uL    Immature Granulocytes (abs) 0.06 0.00 - 0.11 K/uL    NRBC (Absolute) 0.00 K/uL   Comp Metabolic Panel    Collection Time: 10/28/24  5:05 PM   Result Value Ref Range    Sodium 145 135 - 145 mmol/L    Potassium 3.7 3.6 - 5.5 mmol/L    Chloride 112 96 - 112 mmol/L    Co2 20 20 - 33 mmol/L     Anion Gap 13.0 7.0 - 16.0    Glucose 94 65 - 99 mg/dL    Bun 18 8 - 22 mg/dL    Creatinine 0.74 0.50 - 1.40 mg/dL    Calcium 7.9 (L) 8.5 - 10.5 mg/dL    Correct Calcium 8.5 8.5 - 10.5 mg/dL    AST(SGOT) 35 12 - 45 U/L    ALT(SGPT) 21 2 - 50 U/L    Alkaline Phosphatase 117 (H) 30 - 99 U/L    Total Bilirubin 0.4 0.1 - 1.5 mg/dL    Albumin 3.3 3.2 - 4.9 g/dL    Total Protein 5.9 (L) 6.0 - 8.2 g/dL    Globulin 2.6 1.9 - 3.5 g/dL    A-G Ratio 1.3 g/dL   proBrain Natriuretic Peptide, NT    Collection Time: 10/28/24  5:05 PM   Result Value Ref Range    NT-proBNP 3705 (H) 0 - 125 pg/mL   Troponin    Collection Time: 10/28/24  5:05 PM   Result Value Ref Range    Troponin T 23 (H) 6 - 19 ng/L   Lactic Acid    Collection Time: 10/28/24  5:05 PM   Result Value Ref Range    Lactic Acid 3.0 (H) 0.5 - 2.0 mmol/L   Blood Culture - Draw one from central line and one from peripheral site    Collection Time: 10/28/24  5:05 PM    Specimen: Line; Blood   Result Value Ref Range    Significant Indicator NEG     Source BLD     Site Peripheral     Culture Result       No Growth  Note: Blood cultures are incubated for 5 days and  are monitored continuously.Positive blood cultures  are called to the RN and reported as soon as  they are identified.     ESTIMATED GFR    Collection Time: 10/28/24  5:05 PM   Result Value Ref Range    GFR (CKD-EPI) 95 >60 mL/min/1.73 m 2   Prothrombin time (INR)    Collection Time: 10/28/24  5:05 PM   Result Value Ref Range    PT 16.5 (H) 12.0 - 14.6 sec    INR 1.33 (H) 0.87 - 1.13   Urinalysis    Collection Time: 10/28/24  5:35 PM    Specimen: Urine   Result Value Ref Range    Color Yellow     Character Clear     Specific Gravity 1.026 <1.035    Ph 5.5 5.0 - 8.0    Glucose 100 (A) Negative mg/dL    Ketones Negative Negative mg/dL    Protein 30 (A) Negative mg/dL    Bilirubin Negative Negative    Urobilinogen, Urine 1.0 <=1.0 EU/dL    Nitrite Negative Negative    Leukocyte Esterase Negative Negative    Occult  Blood Negative Negative    Micro Urine Req Microscopic    URINE MICROSCOPIC (W/UA)    Collection Time: 10/28/24  5:35 PM   Result Value Ref Range    WBC 0-2 /hpf    RBC 0-2 0 - 2 /hpf    Bacteria None Seen None /hpf    Epithelial Cells 0-2 0 - 5 /hpf    Urine Casts 0-2 0 - 2 /lpf   Blood Culture - Draw one from central line and one from peripheral site    Collection Time: 10/28/24  7:06 PM    Specimen: Peripheral; Blood   Result Value Ref Range    Significant Indicator NEG     Source BLD     Site PERIPHERAL     Culture Result       No Growth  Note: Blood cultures are incubated for 5 days and  are monitored continuously.Positive blood cultures  are called to the RN and reported as soon as  they are identified.     LACTIC ACID    Collection Time: 10/28/24  7:06 PM   Result Value Ref Range    Lactic Acid 3.0 (H) 0.5 - 2.0 mmol/L   LACTIC ACID    Collection Time: 10/28/24  8:39 PM   Result Value Ref Range    Lactic Acid 2.0 0.5 - 2.0 mmol/L   TROPONIN    Collection Time: 10/28/24  8:39 PM   Result Value Ref Range    Troponin T 28 (H) 6 - 19 ng/L       Radiology:   This attending emergency physician has independently interpreted the diagnostic imaging associated with this visit and is awaiting the final reading from the radiologist.   Preliminary interpretation is a follows: Pulmonary edema  Radiologist interpretation:   CT-ABDOMEN-PELVIS WITH   Final Result      1.  Multifocal airspace opacities in the left lower lobe consistent with pneumonitis.      2.  Small left pleural effusion.      3.  Multiple subcentimeter hepatic cysts.      4.  Small amount of free fluid about the liver and spleen and also noted in the pelvic cul-de-sac      5.  Moderate atherosclerotic changes of the abdominal aorta and iliac arteries.      CT-CTA CHEST PULMONARY ARTERY W/ RECONS   Final Result                  DX-CHEST-PORTABLE (1 VIEW)   Final Result      1.  No acute cardiopulmonary abnormality identified.      2.  Enlargement of the  cardiac silhouette             COURSE & MEDICAL DECISION MAKING     Could not give the patient IV hydration due to national shortage of IV fluids.    INITIAL ASSESSMENT, COURSE AND PLAN  Differential diagnoses include but not limited to: Sepsis, pneumonia, PE, intra-abdominal infection, pulmonary edema, peripheral edema    Care Narrative: Patient is coming in sick, febrile, tachycardic, shortness of breath though not requiring any oxygen.  The patient is status post exploratory laparotomy within the last 3 weeks.  My partner performed a bedside ultrasound that showed intraperitoneal fluid for I got a CT PE study and CT abdomen pelvis.  This showed pneumonia.  Give the patient IV antibiotics.  Patient does have some fluid around his liver that is nonspecific.  Give the patient IV antibiotics, will admit the patient to hospital, discussed the case with the hospitalist for hospitalization.  She is critically ill with leukocytosis, tachycardia but no organ failure    5:04 PM - Patient was evaluated at bedside. Ordered for CT abdomen pelvis, CT CTA chest pulmonary artery with recons, DX chest, Urine microscopic, LA, UA, urine culture, blood culture, POC COV Flu A/B, CBC w/ differential, CMP, proB tate peptide, troponin, EKG  to evaluate. The patient will be medicated with Tylenol and Rocephin for his symptoms. Patient verbalizes understanding and support with my plan of care.     8:14 PM I discussed the patient's case and the above findings with Dr. Fernandez (Hospitalist) who agrees to see the patient for admission.      CRITICAL CARE  The very real possibilty of a deterioration of this patient's condition required the highest level of my preparedness for sudden, emergent intervention. I provided critical care services, which included medication orders, frequent reevaluations of the patient's condition and response to treatment, ordering and reviewing test results, and discussing the case with various consultants.  The  critical care time associated with the care of the patient was 35 minutes. Review chart for interventions. This time is exclusive of any other billable procedures.      DISPOSITION AND DISCUSSIONS    I have discussed management of the patient with the following physicians and BRANDON's:  Dr. Fernandez (Hospitalist)     Discussion of management with other [facility] or appropriate source(s): None     DISPOSITION:  Patient will be hospitalized by Dr. Fernandez in guarded condition.     FINAL DIAGNOSIS  1. Acute respiratory failure with hypoxia (HCC)    2. Pneumonia of left lower lobe due to infectious organism    3. Acute pulmonary edema (HCC)    4. Peripheral edema    5. CCT 35 Mins     IKatina (Scribe), am scribing for, and in the presence of, Dewayne Dubon M.D..    Electronically signed by: Katina Felder (Scribe), 10/28/2024    IDewayne M.D. personally performed the services described in this documentation, as scribed by Katina Felder in my presence, and it is both accurate and complete.      The note accurately reflects work and decisions made by me.  Dewayne Dubon M.D.  10/28/2024  10:16 PM

## 2024-10-29 NOTE — PROGRESS NOTES
Brief procedure note    Patient seen in S172. Discussed need to remove surgical staples, described procedure.  Patient agreeable to proceed. Surgical incision well approximated, non tender, no erythema noted. Staples removed, steri strips placed, wound care instructions provided. Patient tolerated well.    Patient encouraged to come to follow up with Surgery clinic - for surgery follow up and for discussion of possible systemic therapy. Patient agreed he would follow up.

## 2024-10-30 LAB
BACTERIA UR CULT: NORMAL
SIGNIFICANT IND 70042: NORMAL
SITE SITE: NORMAL
SOURCE SOURCE: NORMAL

## 2024-11-02 LAB
BACTERIA BLD CULT: NORMAL
BACTERIA BLD CULT: NORMAL
SIGNIFICANT IND 70042: NORMAL
SIGNIFICANT IND 70042: NORMAL
SITE SITE: NORMAL
SITE SITE: NORMAL
SOURCE SOURCE: NORMAL
SOURCE SOURCE: NORMAL

## 2024-11-04 NOTE — DOCUMENTATION QUERY
[facility]                                                                       Query Response Note      PATIENT:               HUNTER STEELE  ACCT #:                  [id number]  MRN:                     [id number]  :                      1950  ADMIT DATE:       10/28/2024 4:27 PM  DISCH DATE:        10/29/2024 3:39 PM  RESPONDING  PROVIDER #:        [id number]           QUERY TEXT:    Can the type of CHF be further clarified based on the provided clinical indicators?    The patient's Clinical Indicators include:  Findings: 10/28 ED: review of records last echo on  showed reduced systolic function     10/28 HP: Acute congestive heart failure, unspecified heart failure type     Epic Records: Echo : reduced right ventricular systolic function. Ejection Fraction 30-35% Diastolic function is difficult to   assess with arrhythmia.    Treatment: IV lasix betablocker    Risk Factors: history of congestive heart failure and hypertension    Sailaja Alberts RN BSN  Clinical Documentation   [email or web address]  Connect via [email or web address]    Note: If you agree with a diagnosis listed above, please remember to include it in your concurrent daily documentation and onto the Discharge Summary.  Options provided:   -- Systolic / HFrEF   -- Other explanation, (please specify other explanation)      Query created by: Sailaja Deutsch on 10/29/2024 6:33 AM    RESPONSE TEXT:    Systolic / HFrEF          Electronically signed by:  SHARYN MORIN MD 2024 3:24 PM

## 2024-11-05 ENCOUNTER — PATIENT OUTREACH (OUTPATIENT)
Dept: ONCOLOGY | Facility: MEDICAL CENTER | Age: 74
End: 2024-11-05
Payer: MEDICARE

## 2024-11-05 NOTE — PROGRESS NOTES
CHELSY called patient to follow up on a referral sent over by Surgical Oncology.  CHELSY  left a voicemail with my contact information.

## 2024-11-11 ENCOUNTER — PATIENT OUTREACH (OUTPATIENT)
Dept: ONCOLOGY | Facility: MEDICAL CENTER | Age: 74
End: 2024-11-11
Payer: MEDICARE

## 2024-11-29 ENCOUNTER — APPOINTMENT (OUTPATIENT)
Dept: RADIOLOGY | Facility: MEDICAL CENTER | Age: 74
End: 2024-11-29
Attending: EMERGENCY MEDICINE
Payer: MEDICARE

## 2024-11-29 ENCOUNTER — HOSPITAL ENCOUNTER (EMERGENCY)
Facility: MEDICAL CENTER | Age: 74
End: 2024-11-29
Attending: EMERGENCY MEDICINE
Payer: MEDICARE

## 2024-11-29 VITALS
WEIGHT: 176.37 LBS | HEART RATE: 98 BPM | DIASTOLIC BLOOD PRESSURE: 83 MMHG | TEMPERATURE: 98 F | SYSTOLIC BLOOD PRESSURE: 127 MMHG | BODY MASS INDEX: 23.89 KG/M2 | OXYGEN SATURATION: 98 % | RESPIRATION RATE: 16 BRPM | HEIGHT: 72 IN
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
## 2024-11-29 DIAGNOSIS — S09.90XA CLOSED HEAD INJURY, INITIAL ENCOUNTER: ICD-10-CM

## 2024-11-29 DIAGNOSIS — S01.81XA FACIAL LACERATION, INITIAL ENCOUNTER: ICD-10-CM

## 2024-11-29 PROCEDURE — 99284 EMERGENCY DEPT VISIT MOD MDM: CPT

## 2024-11-29 PROCEDURE — 700101 HCHG RX REV CODE 250: Performed by: EMERGENCY MEDICINE

## 2024-11-29 PROCEDURE — 72125 CT NECK SPINE W/O DYE: CPT

## 2024-11-29 PROCEDURE — 70450 CT HEAD/BRAIN W/O DYE: CPT

## 2024-11-29 PROCEDURE — 303747 HCHG EXTRA SUTURE

## 2024-11-29 PROCEDURE — 304217 HCHG IRRIGATION SYSTEM

## 2024-11-29 PROCEDURE — 304999 HCHG REPAIR-SIMPLE/INTERMED LEVEL 1

## 2024-11-29 RX ORDER — LIDOCAINE HYDROCHLORIDE AND EPINEPHRINE BITARTRATE 20; .01 MG/ML; MG/ML
10 INJECTION, SOLUTION SUBCUTANEOUS ONCE
Status: COMPLETED | OUTPATIENT
Start: 2024-11-29 | End: 2024-11-29

## 2024-11-29 RX ADMIN — LIDOCAINE HYDROCHLORIDE,EPINEPHRINE BITARTRATE 10 ML: 20; .01 INJECTION, SOLUTION INFILTRATION; PERINEURAL at 10:30

## 2024-11-29 ASSESSMENT — FIBROSIS 4 INDEX: FIB4 SCORE: 2.64

## 2024-11-29 NOTE — ED PROVIDER NOTES
ED Provider Note    CHIEF COMPLAINT  Chief Complaint   Patient presents with    T-5000 GLF     Pt BIB EMS after [facility]. Pt states he tripped and fell. +head strike, +thinners, -LOC. Pt has laceration on forehead that was bandaged via EMS       EXTERNAL RECORDS REVIEWED  Reviewed recent hospitalizations.    HPI/ROS  LIMITATION TO HISTORY   Select: : None  OUTSIDE HISTORIAN(S):  None.    Robert Mcdonnell is a 74 y.o. male who presents to the emergency department for evaluation of closed head injury.  The patient states he tripped and fell striking his head.  He is not lightheaded or dizzy he did not pass out this was not syncopal or near syncopal.  The patient was brought in by EMS for a facial laceration and a forehead hematoma.  The patient is on Eliquis.  Denies any other acute concerns or complaints is modified she denies any head or neck or back pain.    PAST MEDICAL HISTORY   has a past medical history of Arrhythmia, CHF (congestive heart failure) (HCC), Congestive heart failure (HCC), and Hypertension.    SURGICAL HISTORY   has a past surgical history that includes upper gi endoscopy,diagnosis (N/A, 01/31/2023); upper gi endoscopy,biopsy (N/A, 09/10/2024); upper gi endoscopy,scler inject (N/A, 09/10/2024); gastroscopy with endostat (N/A, 09/10/2024); cataract phaco with iol (Left); upper gi endoscopy,diagnosis (N/A, 9/13/2024); upper gi endoscopy,biopsy (N/A, 9/13/2024); upper gi endoscopy,scler inject (N/A, 9/13/2024); gastrectomy (N/A, 10/7/2024); node dissection (N/A, 10/7/2024); creation, flap, omentum (N/A, 10/7/2024); and cholecystectomy (N/A, 10/7/2024).    FAMILY HISTORY  Family History   Problem Relation Age of Onset    Heart Disease Mother     Heart Disease Father        SOCIAL HISTORY  Social History     Tobacco Use    Smoking status: Some Days     Current packs/day: 1.00     Types: Cigarettes    Smokeless tobacco: Never   Vaping Use    Vaping status: Never Used   Substance and Sexual Activity     "Alcohol use: Yes     Alcohol/week: 1.2 oz     Types: 2 Cans of beer per week     Comment: occ    Drug use: Not Currently    Sexual activity: Not on file       CURRENT MEDICATIONS  Home Medications       Reviewed by Tony Montiel RJANETT (Registered Nurse) on 11/29/24 at 1013  Med List Status: Not Addressed     Medication Last Dose Status   apixaban (ELIQUIS) 5mg Tab  Active   furosemide (LASIX) 20 MG Tab  Active   metoprolol SR (TOPROL XL) 25 MG TABLET SR 24 HR  Active   omeprazole (PRILOSEC) 20 MG delayed-release capsule  Active                  Audit from Redirected Encounters    **Home medications have not yet been reviewed for this encounter**         ALLERGIES  No Known Allergies    PHYSICAL EXAM  VITAL SIGNS: BP (!) 139/93   Pulse (!) 104   Temp 36.7 °C (98 °F) (Temporal)   Resp 16   Ht 1.829 m (6' 0.01\")   Wt 80 kg (176 lb 5.9 oz)   SpO2 97%   BMI 23.91 kg/m²    Constitutional: Well developed, Well nourished, No acute distress, Non-toxic appearance.   HENT: Normocephalic,  Bilateral external ears normal,  Eyes: PERRL, EOMI, Conjunctiva normal, No discharge.   Neck: Normal range of motion  Cardiovascular: Normal heart rate, Normal rhythm, No murmurs, No rubs, No gallops.   Thorax & Lungs: Normal breath sounds, No respiratory distress, No wheezing,  Abdomen: Soft, No tenderness  Skin: Warm, Dry, No erythema, No rash.   Back: No tenderness, No CVA tenderness.   Musculoskeletal: Good range of motion in all major joints.  Neurologic: Alert, No focal deficits noted.   Psychiatric: Affect normal          RADIOLOGY/PROCEDURES   I have independently interpreted the diagnostic imaging associated with this visit and am waiting the final reading from the radiologist.       Radiologist interpretation:  CT-CSPINE WITHOUT PLUS RECONS   Final Result   Impression:      1. No cervical spine fracture or subluxation to T1 evident.      2.. Straightening of the normal cervical lordosis may be related to muscle spasm or " positioning.      3. Prominent degenerative changes.                     CT-HEAD W/O   Final Result   Impression:      1. No acute process in the brain evident.      2. Forehead scalp soft tissue swelling. No fracture is visible.                      Laceration Repair Procedure Note    Indication: Laceration    Procedure: The patient was placed in the appropriate position and anesthesia around the laceration was obtained by infiltration using 2% Lidocaine with epinephrine. The area was then irrigated with normal saline. The laceration was closed with 5-0 Ethilon using interrupted sutures. There were no additional lacerations requiring repair. The wound area was then dressed with bacitracin.      Total repaired wound length: 2.5 cm.     Other Items: None and Suture count: 3    The patient tolerated the procedure well.    Complications: None          COURSE & MEDICAL DECISION MAKING    ASSESSMENT, COURSE AND PLAN  Care Narrative:     74-year-old male presents with a TBI.  He is chronically anticoagulated and had a mechanical ground-level fall striking his head.  He meets criteria for head and neck CT per protocol.  These CTs do not show any acute hemorrhage or other trauma.      The patient is a scalp laceration on the forehead that is clean and closed.      The patient is reassessed.  Denies any other pain.  Upper and lower extremities are pain-free with normal range of motion.  No other signs of trauma on his chest or abdomen he is ambulatory and he would like to leave.  Offered food and water does not want to thing he would like to go.  Will be discharged with return precautions and follow-up instructions advised have sutures removed in 7 days.          DISPOSITION AND DISCUSSIONS      Escalation of care considered, and ultimately not performed:Laboratory analysis    Barriers to care at this time, including but not limited : Patient is homeless and lacks transportation.    Decision tools and prescription drugs  considered including, but not limited to: Green head CT and Nexus criteria..    Torri Razo P.A.-C.  [address] [id number] [phone]                FINAL DIAGNOSIS  1. Fall, initial encounter    2. Closed head injury, initial encounter    3. Facial laceration, initial encounter         Electronically signed by: Lionle Nunez M.D., 11/29/2024 10:22 AM

## 2024-11-29 NOTE — DISCHARGE INSTRUCTIONS
Return to the ER in 7 days for suture removal.  Return sooner for pain, swelling, redness, or other concerns or any new medical problems or complaints.  Follow-up with your doctor

## 2024-11-29 NOTE — ED NOTES
Pts wound wrapped with gauze and coban, face cleaned of dry blood. Pt ambulatory with steady gait, states no dizziness or weakness

## 2024-11-29 NOTE — ED TRIAGE NOTES
"Chief Complaint   Patient presents with    T-5000 GLF     Pt BIB EMS after [facility]. Pt states he tripped and fell. +head strike, +thinners, -LOC. Pt has laceration on forehead that was bandaged via EMS           BP (!) 139/93   Pulse (!) 104   Temp 36.7 °C (98 °F) (Temporal)   Resp 16   Ht 1.829 m (6' 0.01\")   Wt 80 kg (176 lb 5.9 oz)   SpO2 97%   BMI 23.91 kg/m²     "

## 2024-11-29 NOTE — ED NOTES
Patient discharged home per ER MD.   Discharge instructions reviewed and discussed with patient. Patient verbalized understanding of discharge teaching and education. POC discussed.       Patient alert and oriented x 4. VSS. Patient able to ambulate off unit with steady gait. All belongings with patient at time of discharge.

## 2024-11-29 NOTE — ED NOTES
Report received from JANNETTE Rogers. Pt dressed in gown, connected to monitor with call light within reach.

## 2024-12-19 ENCOUNTER — APPOINTMENT (OUTPATIENT)
Dept: RADIOLOGY | Facility: MEDICAL CENTER | Age: 74
DRG: 871 | End: 2024-12-19
Attending: EMERGENCY MEDICINE
Payer: MEDICARE

## 2024-12-19 ENCOUNTER — HOSPITAL ENCOUNTER (INPATIENT)
Facility: MEDICAL CENTER | Age: 74
End: 2024-12-19
Attending: EMERGENCY MEDICINE | Admitting: INTERNAL MEDICINE
Payer: MEDICARE

## 2024-12-19 ENCOUNTER — APPOINTMENT (OUTPATIENT)
Dept: RADIOLOGY | Facility: MEDICAL CENTER | Age: 74
DRG: 871 | End: 2024-12-19
Attending: INTERNAL MEDICINE
Payer: MEDICARE

## 2024-12-19 ENCOUNTER — APPOINTMENT (OUTPATIENT)
Dept: CARDIOLOGY | Facility: MEDICAL CENTER | Age: 74
DRG: 871 | End: 2024-12-19
Attending: INTERNAL MEDICINE
Payer: MEDICARE

## 2024-12-19 DIAGNOSIS — E87.70 HYPERVOLEMIA, UNSPECIFIED HYPERVOLEMIA TYPE: ICD-10-CM

## 2024-12-19 DIAGNOSIS — R06.02 SHORTNESS OF BREATH: ICD-10-CM

## 2024-12-19 DIAGNOSIS — L03.116 LEFT LEG CELLULITIS: ICD-10-CM

## 2024-12-19 DIAGNOSIS — S81.802D WOUND OF LEFT LOWER EXTREMITY, SUBSEQUENT ENCOUNTER: ICD-10-CM

## 2024-12-19 DIAGNOSIS — I50.1 ACUTE LEFT-SIDED CHF (CONGESTIVE HEART FAILURE) (HCC): ICD-10-CM

## 2024-12-19 DIAGNOSIS — R65.20 SEPSIS WITH ACUTE RENAL FAILURE WITHOUT SEPTIC SHOCK, DUE TO UNSPECIFIED ORGANISM, UNSPECIFIED ACUTE RENAL FAILURE TYPE (HCC): ICD-10-CM

## 2024-12-19 DIAGNOSIS — E43 SEVERE PROTEIN-CALORIE MALNUTRITION (HCC): ICD-10-CM

## 2024-12-19 DIAGNOSIS — R78.81 STREPTOCOCCAL BACTEREMIA: ICD-10-CM

## 2024-12-19 DIAGNOSIS — B95.5 STREPTOCOCCAL BACTEREMIA: ICD-10-CM

## 2024-12-19 DIAGNOSIS — J18.9 PNEUMONIA OF LEFT LUNG DUE TO INFECTIOUS ORGANISM, UNSPECIFIED PART OF LUNG: ICD-10-CM

## 2024-12-19 DIAGNOSIS — N17.9 ACUTE KIDNEY INJURY (HCC): ICD-10-CM

## 2024-12-19 DIAGNOSIS — N17.9 SEPSIS WITH ACUTE RENAL FAILURE WITHOUT SEPTIC SHOCK, DUE TO UNSPECIFIED ORGANISM, UNSPECIFIED ACUTE RENAL FAILURE TYPE (HCC): ICD-10-CM

## 2024-12-19 DIAGNOSIS — A41.9 SEPSIS WITH ACUTE RENAL FAILURE WITHOUT SEPTIC SHOCK, DUE TO UNSPECIFIED ORGANISM, UNSPECIFIED ACUTE RENAL FAILURE TYPE (HCC): ICD-10-CM

## 2024-12-19 PROBLEM — I27.20 PULMONARY HYPERTENSION (HCC): Status: ACTIVE | Noted: 2024-12-19

## 2024-12-19 PROBLEM — K92.1 GASTROINTESTINAL HEMORRHAGE WITH MELENA: Status: RESOLVED | Noted: 2024-09-09 | Resolved: 2024-12-19

## 2024-12-19 PROBLEM — C16.9 GASTRIC ADENOCARCINOMA (HCC): Status: RESOLVED | Noted: 2024-10-11 | Resolved: 2024-12-19

## 2024-12-19 PROBLEM — I50.9 ACUTE CONGESTIVE HEART FAILURE, UNSPECIFIED HEART FAILURE TYPE (HCC): Status: RESOLVED | Noted: 2023-01-29 | Resolved: 2024-12-19

## 2024-12-19 PROBLEM — R57.8 HEMORRHAGIC SHOCK (HCC): Status: RESOLVED | Noted: 2024-10-03 | Resolved: 2024-12-19

## 2024-12-19 PROBLEM — E83.39 HYPOPHOSPHATEMIA: Status: RESOLVED | Noted: 2024-10-14 | Resolved: 2024-12-19

## 2024-12-19 PROBLEM — R10.9 AP (ABDOMINAL PAIN): Status: RESOLVED | Noted: 2024-09-07 | Resolved: 2024-12-19

## 2024-12-19 PROBLEM — Z71.6 TOBACCO ABUSE COUNSELING: Status: RESOLVED | Noted: 2024-09-07 | Resolved: 2024-12-19

## 2024-12-19 PROBLEM — E66.01 MORBID OBESITY (HCC): Status: RESOLVED | Noted: 2023-01-28 | Resolved: 2024-12-19

## 2024-12-19 PROBLEM — D62 ANEMIA DUE TO ACUTE BLOOD LOSS: Status: RESOLVED | Noted: 2024-10-03 | Resolved: 2024-12-19

## 2024-12-19 PROBLEM — E87.20 LACTIC ACIDOSIS: Status: RESOLVED | Noted: 2024-09-07 | Resolved: 2024-12-19

## 2024-12-19 PROBLEM — R11.2 INTRACTABLE NAUSEA AND VOMITING: Status: RESOLVED | Noted: 2024-09-07 | Resolved: 2024-12-19

## 2024-12-19 PROBLEM — D72.829 LEUKOCYTOSIS: Status: RESOLVED | Noted: 2024-09-07 | Resolved: 2024-12-19

## 2024-12-19 LAB
ALBUMIN SERPL BCP-MCNC: 3.1 G/DL (ref 3.2–4.9)
ALBUMIN/GLOB SERPL: 1.1 G/DL
ALP SERPL-CCNC: 76 U/L (ref 30–99)
ALT SERPL-CCNC: 17 U/L (ref 2–50)
ANION GAP SERPL CALC-SCNC: 16 MMOL/L (ref 7–16)
ANION GAP SERPL CALC-SCNC: 19 MMOL/L (ref 7–16)
ANISOCYTOSIS BLD QL SMEAR: ABNORMAL
APPEARANCE UR: ABNORMAL
AST SERPL-CCNC: 36 U/L (ref 12–45)
BACTERIA #/AREA URNS HPF: ABNORMAL /HPF
BASE EXCESS BLDA CALC-SCNC: -1 MMOL/L (ref -4–3)
BASOPHILS # BLD AUTO: 0 % (ref 0–1.8)
BASOPHILS # BLD: 0 K/UL (ref 0–0.12)
BILIRUB SERPL-MCNC: 1.1 MG/DL (ref 0.1–1.5)
BILIRUB UR QL STRIP.AUTO: ABNORMAL
BODY TEMPERATURE: ABNORMAL DEGREES
BUN SERPL-MCNC: 34 MG/DL (ref 8–22)
BUN SERPL-MCNC: 40 MG/DL (ref 8–22)
BURR CELLS BLD QL SMEAR: NORMAL
CA-I SERPL-SCNC: 1.1 MMOL/L (ref 1.1–1.3)
CALCIUM ALBUM COR SERPL-MCNC: 8.3 MG/DL (ref 8.5–10.5)
CALCIUM SERPL-MCNC: 7.6 MG/DL (ref 8.5–10.5)
CALCIUM SERPL-MCNC: 7.9 MG/DL (ref 8.5–10.5)
CASTS URNS QL MICRO: ABNORMAL /LPF (ref 0–2)
CHLORIDE SERPL-SCNC: 100 MMOL/L (ref 96–112)
CHLORIDE SERPL-SCNC: 104 MMOL/L (ref 96–112)
CK SERPL-CCNC: 392 U/L (ref 0–154)
CO2 BLDA-SCNC: 25 MMOL/L (ref 32–48)
CO2 SERPL-SCNC: 18 MMOL/L (ref 20–33)
CO2 SERPL-SCNC: 23 MMOL/L (ref 20–33)
COLOR UR: ABNORMAL
CORTIS SERPL-MCNC: 93 UG/DL (ref 0–23)
CREAT SERPL-MCNC: 2.3 MG/DL (ref 0.5–1.4)
CREAT SERPL-MCNC: 2.62 MG/DL (ref 0.5–1.4)
DELSYS IDSYS: ABNORMAL
EKG IMPRESSION: NORMAL
EKG IMPRESSION: NORMAL
EOSINOPHIL # BLD AUTO: 0 K/UL (ref 0–0.51)
EOSINOPHIL NFR BLD: 0 % (ref 0–6.9)
EPITHELIAL CELLS 1715: ABNORMAL /HPF (ref 0–5)
ERYTHROCYTE [DISTWIDTH] IN BLOOD BY AUTOMATED COUNT: 57.4 FL (ref 35.9–50)
FLUAV RNA SPEC QL NAA+PROBE: NEGATIVE
FLUBV RNA SPEC QL NAA+PROBE: NEGATIVE
GFR SERPLBLD CREATININE-BSD FMLA CKD-EPI: 25 ML/MIN/1.73 M 2
GFR SERPLBLD CREATININE-BSD FMLA CKD-EPI: 29 ML/MIN/1.73 M 2
GLOBULIN SER CALC-MCNC: 2.7 G/DL (ref 1.9–3.5)
GLUCOSE BLD STRIP.AUTO-MCNC: 51 MG/DL (ref 65–99)
GLUCOSE BLD STRIP.AUTO-MCNC: 52 MG/DL (ref 65–99)
GLUCOSE BLD STRIP.AUTO-MCNC: 62 MG/DL (ref 65–99)
GLUCOSE BLD STRIP.AUTO-MCNC: 80 MG/DL (ref 65–99)
GLUCOSE BLD STRIP.AUTO-MCNC: 82 MG/DL (ref 65–99)
GLUCOSE BLD STRIP.AUTO-MCNC: 83 MG/DL (ref 65–99)
GLUCOSE BLD STRIP.AUTO-MCNC: 88 MG/DL (ref 65–99)
GLUCOSE BLD STRIP.AUTO-MCNC: 98 MG/DL (ref 65–99)
GLUCOSE SERPL-MCNC: 53 MG/DL (ref 65–99)
GLUCOSE SERPL-MCNC: 79 MG/DL (ref 65–99)
GLUCOSE UR STRIP.AUTO-MCNC: NEGATIVE MG/DL
HCO3 BLDA-SCNC: 24.2 MMOL/L (ref 21–28)
HCT VFR BLD AUTO: 34.1 % (ref 42–52)
HGB BLD-MCNC: 10.8 G/DL (ref 14–18)
HOROWITZ INDEX BLDA+IHG-RTO: 38 MM[HG]
INR PPP: 2.79 (ref 0.87–1.13)
KETONES UR STRIP.AUTO-MCNC: ABNORMAL MG/DL
LACTATE BLD-SCNC: 3.9 MMOL/L (ref 0.5–2)
LACTATE SERPL-SCNC: 2.3 MMOL/L (ref 0.5–2)
LACTATE SERPL-SCNC: 3.8 MMOL/L (ref 0.5–2)
LACTATE SERPL-SCNC: 4.1 MMOL/L (ref 0.5–2)
LACTATE SERPL-SCNC: 5.6 MMOL/L (ref 0.5–2)
LEUKOCYTE ESTERASE UR QL STRIP.AUTO: ABNORMAL
LV EJECT FRACT  99904: 28
LV EJECT FRACT MOD 2C 99903: 44.52
LV EJECT FRACT MOD 4C 99902: 27.14
LV EJECT FRACT MOD BP 99901: 40.07
LYMPHOCYTES # BLD AUTO: 0.22 K/UL (ref 1–4.8)
LYMPHOCYTES NFR BLD: 1.7 % (ref 22–41)
MACROCYTES BLD QL SMEAR: ABNORMAL
MAGNESIUM SERPL-MCNC: 1.8 MG/DL (ref 1.5–2.5)
MANUAL DIFF BLD: ABNORMAL
MCH RBC QN AUTO: 28.5 PG (ref 27–33)
MCHC RBC AUTO-ENTMCNC: 31.7 G/DL (ref 32.3–36.5)
MCV RBC AUTO: 90 FL (ref 81.4–97.8)
METAMYELOCYTES NFR BLD MANUAL: 5 %
MICRO URNS: ABNORMAL
MONOCYTES # BLD AUTO: 0.22 K/UL (ref 0–0.85)
MONOCYTES NFR BLD AUTO: 1.7 % (ref 0–13.4)
MORPHOLOGY BLD-IMP: NORMAL
NEUTROPHILS # BLD AUTO: 11.72 K/UL (ref 1.82–7.42)
NEUTROPHILS NFR BLD: 80.7 % (ref 44–72)
NEUTS BAND NFR BLD MANUAL: 10.9 % (ref 0–10)
NITRITE UR QL STRIP.AUTO: NEGATIVE
NRBC # BLD AUTO: 0 K/UL
NRBC BLD-RTO: 0 /100 WBC (ref 0–0.2)
NT-PROBNP SERPL IA-MCNC: ABNORMAL PG/ML (ref 0–125)
O2/TOTAL GAS SETTING VFR VENT: 40 %
OVALOCYTES BLD QL SMEAR: NORMAL
PCO2 BLDA: 40.1 MMHG (ref 32–48)
PCO2 TEMP ADJ BLDA: 38.6 MMHG (ref 32–48)
PH BLDA: 7.39 [PH] (ref 7.35–7.45)
PH TEMP ADJ BLDA: 7.4 [PH] (ref 7.35–7.45)
PH UR STRIP.AUTO: 5 [PH] (ref 5–8)
PHOSPHATE SERPL-MCNC: 4.7 MG/DL (ref 2.5–4.5)
PLATELET # BLD AUTO: 193 K/UL (ref 164–446)
PLATELET BLD QL SMEAR: NORMAL
PMV BLD AUTO: 10 FL (ref 9–12.9)
PO2 BLDA: 15 MMHG (ref 83–108)
PO2 TEMP ADJ BLDA: 14 MMHG (ref 64–87)
POIKILOCYTOSIS BLD QL SMEAR: NORMAL
POTASSIUM SERPL-SCNC: 4.3 MMOL/L (ref 3.6–5.5)
POTASSIUM SERPL-SCNC: 4.3 MMOL/L (ref 3.6–5.5)
PROCALCITONIN SERPL-MCNC: 64 NG/ML
PROT SERPL-MCNC: 5.8 G/DL (ref 6–8.2)
PROT UR QL STRIP: 100 MG/DL
PROTHROMBIN TIME: 29.6 SEC (ref 12–14.6)
RBC # BLD AUTO: 3.79 M/UL (ref 4.7–6.1)
RBC # URNS HPF: ABNORMAL /HPF (ref 0–2)
RBC BLD AUTO: PRESENT
RBC UR QL AUTO: NEGATIVE
RSV RNA SPEC QL NAA+PROBE: NEGATIVE
SAO2 % BLDA: 19 % (ref 93–99)
SARS-COV-2 RNA RESP QL NAA+PROBE: NOTDETECTED
SCCMEC + MECA PNL NOSE NAA+PROBE: NEGATIVE
SODIUM SERPL-SCNC: 139 MMOL/L (ref 135–145)
SODIUM SERPL-SCNC: 141 MMOL/L (ref 135–145)
SP GR UR STRIP.AUTO: 1.02
SPECIMEN DRAWN FROM PATIENT: ABNORMAL
TROPONIN T SERPL-MCNC: 44 NG/L (ref 6–19)
TROPONIN T SERPL-MCNC: 51 NG/L (ref 6–19)
TROPONIN T SERPL-MCNC: 57 NG/L (ref 6–19)
TSH SERPL DL<=0.005 MIU/L-ACNC: 1.01 UIU/ML (ref 0.38–5.33)
UROBILINOGEN UR STRIP.AUTO-MCNC: 1 EU/DL
WBC # BLD AUTO: 12.8 K/UL (ref 4.8–10.8)
WBC #/AREA URNS HPF: ABNORMAL /HPF
WBC TOXIC VACUOLES BLD QL SMEAR: NORMAL

## 2024-12-19 PROCEDURE — 96365 THER/PROPH/DIAG IV INF INIT: CPT

## 2024-12-19 PROCEDURE — 0241U HCHG SARS-COV-2 COVID-19 NFCT DS RESP RNA 4 TRGT ED POC: CPT

## 2024-12-19 PROCEDURE — 87086 URINE CULTURE/COLONY COUNT: CPT

## 2024-12-19 PROCEDURE — 84100 ASSAY OF PHOSPHORUS: CPT

## 2024-12-19 PROCEDURE — 82803 BLOOD GASES ANY COMBINATION: CPT

## 2024-12-19 PROCEDURE — 87015 SPECIMEN INFECT AGNT CONCNTJ: CPT

## 2024-12-19 PROCEDURE — 700102 HCHG RX REV CODE 250 W/ 637 OVERRIDE(OP): Performed by: EMERGENCY MEDICINE

## 2024-12-19 PROCEDURE — 36600 WITHDRAWAL OF ARTERIAL BLOOD: CPT

## 2024-12-19 PROCEDURE — 700105 HCHG RX REV CODE 258: Performed by: EMERGENCY MEDICINE

## 2024-12-19 PROCEDURE — 83880 ASSAY OF NATRIURETIC PEPTIDE: CPT

## 2024-12-19 PROCEDURE — 85007 BL SMEAR W/DIFF WBC COUNT: CPT

## 2024-12-19 PROCEDURE — 99291 CRITICAL CARE FIRST HOUR: CPT | Performed by: INTERNAL MEDICINE

## 2024-12-19 PROCEDURE — 700111 HCHG RX REV CODE 636 W/ 250 OVERRIDE (IP): Mod: JZ | Performed by: EMERGENCY MEDICINE

## 2024-12-19 PROCEDURE — 700101 HCHG RX REV CODE 250: Performed by: INTERNAL MEDICINE

## 2024-12-19 PROCEDURE — 93005 ELECTROCARDIOGRAM TRACING: CPT | Mod: TC | Performed by: INTERNAL MEDICINE

## 2024-12-19 PROCEDURE — 82962 GLUCOSE BLOOD TEST: CPT | Mod: 91

## 2024-12-19 PROCEDURE — 81001 URINALYSIS AUTO W/SCOPE: CPT

## 2024-12-19 PROCEDURE — 700105 HCHG RX REV CODE 258: Performed by: INTERNAL MEDICINE

## 2024-12-19 PROCEDURE — 93970 EXTREMITY STUDY: CPT

## 2024-12-19 PROCEDURE — 36415 COLL VENOUS BLD VENIPUNCTURE: CPT

## 2024-12-19 PROCEDURE — 770022 HCHG ROOM/CARE - ICU (200)

## 2024-12-19 PROCEDURE — 99291 CRITICAL CARE FIRST HOUR: CPT

## 2024-12-19 PROCEDURE — 84443 ASSAY THYROID STIM HORMONE: CPT

## 2024-12-19 PROCEDURE — 87077 CULTURE AEROBIC IDENTIFY: CPT

## 2024-12-19 PROCEDURE — 93010 ELECTROCARDIOGRAM REPORT: CPT | Mod: 59 | Performed by: INTERNAL MEDICINE

## 2024-12-19 PROCEDURE — 85027 COMPLETE CBC AUTOMATED: CPT

## 2024-12-19 PROCEDURE — 93005 ELECTROCARDIOGRAM TRACING: CPT | Mod: TC | Performed by: EMERGENCY MEDICINE

## 2024-12-19 PROCEDURE — 85610 PROTHROMBIN TIME: CPT

## 2024-12-19 PROCEDURE — 71260 CT THORAX DX C+: CPT

## 2024-12-19 PROCEDURE — 700102 HCHG RX REV CODE 250 W/ 637 OVERRIDE(OP): Performed by: INTERNAL MEDICINE

## 2024-12-19 PROCEDURE — 71045 X-RAY EXAM CHEST 1 VIEW: CPT

## 2024-12-19 PROCEDURE — 700111 HCHG RX REV CODE 636 W/ 250 OVERRIDE (IP): Performed by: INTERNAL MEDICINE

## 2024-12-19 PROCEDURE — 93005 ELECTROCARDIOGRAM TRACING: CPT | Mod: TC

## 2024-12-19 PROCEDURE — 83605 ASSAY OF LACTIC ACID: CPT | Mod: 91

## 2024-12-19 PROCEDURE — 80048 BASIC METABOLIC PNL TOTAL CA: CPT

## 2024-12-19 PROCEDURE — 93306 TTE W/DOPPLER COMPLETE: CPT | Mod: 26 | Performed by: INTERNAL MEDICINE

## 2024-12-19 PROCEDURE — 93306 TTE W/DOPPLER COMPLETE: CPT

## 2024-12-19 PROCEDURE — A9270 NON-COVERED ITEM OR SERVICE: HCPCS | Performed by: EMERGENCY MEDICINE

## 2024-12-19 PROCEDURE — 83735 ASSAY OF MAGNESIUM: CPT

## 2024-12-19 PROCEDURE — 84145 PROCALCITONIN (PCT): CPT

## 2024-12-19 PROCEDURE — 87181 SC STD AGAR DILUTION PER AGT: CPT

## 2024-12-19 PROCEDURE — 84484 ASSAY OF TROPONIN QUANT: CPT | Mod: 91

## 2024-12-19 PROCEDURE — 82533 TOTAL CORTISOL: CPT

## 2024-12-19 PROCEDURE — 82550 ASSAY OF CK (CPK): CPT

## 2024-12-19 PROCEDURE — 80053 COMPREHEN METABOLIC PANEL: CPT

## 2024-12-19 PROCEDURE — 87641 MR-STAPH DNA AMP PROBE: CPT

## 2024-12-19 PROCEDURE — 82330 ASSAY OF CALCIUM: CPT

## 2024-12-19 PROCEDURE — A9270 NON-COVERED ITEM OR SERVICE: HCPCS | Performed by: INTERNAL MEDICINE

## 2024-12-19 PROCEDURE — 700117 HCHG RX CONTRAST REV CODE 255: Performed by: INTERNAL MEDICINE

## 2024-12-19 PROCEDURE — 87040 BLOOD CULTURE FOR BACTERIA: CPT

## 2024-12-19 PROCEDURE — 94660 CPAP INITIATION&MGMT: CPT

## 2024-12-19 RX ORDER — ACETAMINOPHEN 325 MG/1
650 TABLET ORAL EVERY 6 HOURS PRN
Status: DISCONTINUED | OUTPATIENT
Start: 2024-12-19 | End: 2025-01-21 | Stop reason: HOSPADM

## 2024-12-19 RX ORDER — OXYCODONE HYDROCHLORIDE 5 MG/1
2.5 TABLET ORAL
Status: DISCONTINUED | OUTPATIENT
Start: 2024-12-19 | End: 2025-01-21 | Stop reason: HOSPADM

## 2024-12-19 RX ORDER — MAGNESIUM SULFATE HEPTAHYDRATE 40 MG/ML
2 INJECTION, SOLUTION INTRAVENOUS ONCE
Status: COMPLETED | OUTPATIENT
Start: 2024-12-19 | End: 2024-12-19

## 2024-12-19 RX ORDER — NICOTINE 21 MG/24HR
14 PATCH, TRANSDERMAL 24 HOURS TRANSDERMAL
Status: DISCONTINUED | OUTPATIENT
Start: 2024-12-19 | End: 2025-01-21 | Stop reason: HOSPADM

## 2024-12-19 RX ORDER — HYDROMORPHONE HYDROCHLORIDE 1 MG/ML
0.25 INJECTION, SOLUTION INTRAMUSCULAR; INTRAVENOUS; SUBCUTANEOUS
Status: DISCONTINUED | OUTPATIENT
Start: 2024-12-19 | End: 2025-01-21 | Stop reason: HOSPADM

## 2024-12-19 RX ORDER — OXYCODONE HYDROCHLORIDE 5 MG/1
5 TABLET ORAL
Status: DISCONTINUED | OUTPATIENT
Start: 2024-12-19 | End: 2025-01-21 | Stop reason: HOSPADM

## 2024-12-19 RX ORDER — DEXTROSE MONOHYDRATE 25 G/50ML
25 INJECTION, SOLUTION INTRAVENOUS
Status: DISCONTINUED | OUTPATIENT
Start: 2024-12-19 | End: 2025-01-21 | Stop reason: HOSPADM

## 2024-12-19 RX ORDER — AMOXICILLIN 250 MG
2 CAPSULE ORAL EVERY EVENING
Status: DISCONTINUED | OUTPATIENT
Start: 2024-12-19 | End: 2024-12-23

## 2024-12-19 RX ORDER — FUROSEMIDE 10 MG/ML
80 INJECTION INTRAMUSCULAR; INTRAVENOUS ONCE
Status: COMPLETED | OUTPATIENT
Start: 2024-12-19 | End: 2024-12-19

## 2024-12-19 RX ORDER — ACETAMINOPHEN 500 MG
1000 TABLET ORAL ONCE
Status: COMPLETED | OUTPATIENT
Start: 2024-12-19 | End: 2024-12-19

## 2024-12-19 RX ORDER — NOREPINEPHRINE BITARTRATE 0.03 MG/ML
0-1 INJECTION, SOLUTION INTRAVENOUS CONTINUOUS
Status: DISCONTINUED | OUTPATIENT
Start: 2024-12-19 | End: 2024-12-20

## 2024-12-19 RX ORDER — AZITHROMYCIN 250 MG/1
500 TABLET, FILM COATED ORAL ONCE
Status: COMPLETED | OUTPATIENT
Start: 2024-12-19 | End: 2024-12-19

## 2024-12-19 RX ORDER — POLYETHYLENE GLYCOL 3350 17 G/17G
1 POWDER, FOR SOLUTION ORAL
Status: DISCONTINUED | OUTPATIENT
Start: 2024-12-19 | End: 2024-12-23

## 2024-12-19 RX ORDER — ONDANSETRON 4 MG/1
4 TABLET, ORALLY DISINTEGRATING ORAL EVERY 4 HOURS PRN
Status: DISCONTINUED | OUTPATIENT
Start: 2024-12-19 | End: 2025-01-21 | Stop reason: HOSPADM

## 2024-12-19 RX ORDER — THIAMINE HYDROCHLORIDE 100 MG/ML
250 INJECTION, SOLUTION INTRAMUSCULAR; INTRAVENOUS EVERY 12 HOURS
Status: DISCONTINUED | OUTPATIENT
Start: 2024-12-19 | End: 2024-12-20

## 2024-12-19 RX ORDER — AZITHROMYCIN 250 MG/1
500 TABLET, FILM COATED ORAL DAILY
Status: DISCONTINUED | OUTPATIENT
Start: 2024-12-20 | End: 2024-12-20

## 2024-12-19 RX ORDER — ONDANSETRON 2 MG/ML
4 INJECTION INTRAMUSCULAR; INTRAVENOUS EVERY 4 HOURS PRN
Status: DISCONTINUED | OUTPATIENT
Start: 2024-12-19 | End: 2025-01-21 | Stop reason: HOSPADM

## 2024-12-19 RX ORDER — DIGOXIN 0.25 MG/ML
250 INJECTION INTRAMUSCULAR; INTRAVENOUS ONCE
Status: COMPLETED | OUTPATIENT
Start: 2024-12-19 | End: 2024-12-19

## 2024-12-19 RX ADMIN — FUROSEMIDE 80 MG: 10 INJECTION, SOLUTION INTRAVENOUS at 09:50

## 2024-12-19 RX ADMIN — THIAMINE HYDROCHLORIDE 250 MG: 100 INJECTION, SOLUTION INTRAMUSCULAR; INTRAVENOUS at 17:16

## 2024-12-19 RX ADMIN — MAGNESIUM SULFATE HEPTAHYDRATE 2 G: 2 INJECTION, SOLUTION INTRAVENOUS at 15:45

## 2024-12-19 RX ADMIN — ACETAMINOPHEN 1000 MG: 500 TABLET ORAL at 07:11

## 2024-12-19 RX ADMIN — NICOTINE 14 MG: 14 PATCH TRANSDERMAL at 11:28

## 2024-12-19 RX ADMIN — IOHEXOL 90 ML: 350 INJECTION, SOLUTION INTRAVENOUS at 14:45

## 2024-12-19 RX ADMIN — OMEPRAZOLE 20 MG: 20 CAPSULE, DELAYED RELEASE ORAL at 11:28

## 2024-12-19 RX ADMIN — SENNOSIDES AND DOCUSATE SODIUM 2 TABLET: 50; 8.6 TABLET ORAL at 17:15

## 2024-12-19 RX ADMIN — AZITHROMYCIN DIHYDRATE 500 MG: 250 TABLET ORAL at 07:51

## 2024-12-19 RX ADMIN — DIGOXIN 250 MCG: 250 INJECTION, SOLUTION INTRAMUSCULAR; INTRAVENOUS; PARENTERAL at 11:22

## 2024-12-19 RX ADMIN — AMPICILLIN AND SULBACTAM 3 G: 1; 2 INJECTION, POWDER, FOR SOLUTION INTRAMUSCULAR; INTRAVENOUS at 13:24

## 2024-12-19 RX ADMIN — THIAMINE HYDROCHLORIDE 250 MG: 100 INJECTION, SOLUTION INTRAMUSCULAR; INTRAVENOUS at 11:24

## 2024-12-19 RX ADMIN — OXYCODONE 5 MG: 5 TABLET ORAL at 23:28

## 2024-12-19 RX ADMIN — AMPICILLIN AND SULBACTAM 3 G: 1; 2 INJECTION, POWDER, FOR SOLUTION INTRAMUSCULAR; INTRAVENOUS at 20:27

## 2024-12-19 RX ADMIN — VANCOMYCIN HYDROCHLORIDE 2000 MG: 5 INJECTION, POWDER, LYOPHILIZED, FOR SOLUTION INTRAVENOUS at 16:39

## 2024-12-19 RX ADMIN — APIXABAN 5 MG: 5 TABLET, FILM COATED ORAL at 11:28

## 2024-12-19 RX ADMIN — NOREPINEPHRINE BITARTRATE 0.05 MCG/KG/MIN: 0.03 INJECTION, SOLUTION INTRAVENOUS at 13:42

## 2024-12-19 RX ADMIN — OXYCODONE 2.5 MG: 5 TABLET ORAL at 11:28

## 2024-12-19 RX ADMIN — APIXABAN 5 MG: 5 TABLET, FILM COATED ORAL at 17:15

## 2024-12-19 RX ADMIN — AMPICILLIN AND SULBACTAM 3 G: 1; 2 INJECTION, POWDER, FOR SOLUTION INTRAMUSCULAR; INTRAVENOUS at 07:50

## 2024-12-19 RX ADMIN — ACETAMINOPHEN 650 MG: 325 TABLET ORAL at 18:04

## 2024-12-19 ASSESSMENT — COGNITIVE AND FUNCTIONAL STATUS - GENERAL
MOVING TO AND FROM BED TO CHAIR: A LOT
DRESSING REGULAR UPPER BODY CLOTHING: A LOT
HELP NEEDED FOR BATHING: A LOT
CLIMB 3 TO 5 STEPS WITH RAILING: TOTAL
DAILY ACTIVITIY SCORE: 15
PERSONAL GROOMING: A LITTLE
DRESSING REGULAR LOWER BODY CLOTHING: A LOT
SUGGESTED CMS G CODE MODIFIER DAILY ACTIVITY: CK
TURNING FROM BACK TO SIDE WHILE IN FLAT BAD: A LITTLE
MOBILITY SCORE: 13
SUGGESTED CMS G CODE MODIFIER MOBILITY: CL
MOVING FROM LYING ON BACK TO SITTING ON SIDE OF FLAT BED: A LITTLE
STANDING UP FROM CHAIR USING ARMS: A LOT
TOILETING: A LOT
WALKING IN HOSPITAL ROOM: A LOT

## 2024-12-19 ASSESSMENT — COPD QUESTIONNAIRES
COPD SCREENING SCORE: 5
DO YOU EVER COUGH UP ANY MUCUS OR PHLEGM?: NO/ONLY WITH OCCASIONAL COLDS OR INFECTIONS
DURING THE PAST 4 WEEKS HOW MUCH DID YOU FEEL SHORT OF BREATH: SOME OF THE TIME
HAVE YOU SMOKED AT LEAST 100 CIGARETTES IN YOUR ENTIRE LIFE: YES

## 2024-12-19 ASSESSMENT — PAIN DESCRIPTION - PAIN TYPE
TYPE: ACUTE PAIN

## 2024-12-19 ASSESSMENT — CHA2DS2 SCORE
CHF OR LEFT VENTRICULAR DYSFUNCTION: YES
PRIOR STROKE OR TIA OR THROMBOEMBOLISM: NO
DIABETES: NO
HYPERTENSION: YES
CHA2DS2 VASC SCORE: 3
AGE 65 TO 74: YES
VASCULAR DISEASE: NO
SEX: MALE
AGE 75 OR GREATER: NO

## 2024-12-19 ASSESSMENT — FIBROSIS 4 INDEX
FIB4 SCORE: 3.35
FIB4 SCORE: 2.64

## 2024-12-19 ASSESSMENT — PULMONARY FUNCTION TESTS: EPAP_CMH2O: 8

## 2024-12-19 NOTE — ASSESSMENT & PLAN NOTE
Continued hypoglycemia now on D10 gtt, cortisol normal, continue good nutrition intake  Malnourished, cancer, bacteremia and poor glycogen reserves  Active EtOH use  HIV, hepatitis negative 10/24  High-dose thiamine, follow electrolytes, monitor for refeeding

## 2024-12-19 NOTE — ASSESSMENT & PLAN NOTE
This is Septic shock Present on admission  SIRS criteria identified on my evaluation include: Tachycardia, with heart rate greater than 90 BPM and Leukocytosis, with WBC greater than 12,000  Clinical indicators of end organ dysfunction include Lactic Acid greater than 2, Acute Respiratory Failure - (mechanical ventilation or BiPap or PaO2/FiO2 ratio < 300), and Acute Renal Failure, with a finding of creatinine greater than 2 (patient does not have ESRD or CKD  Indicators of septic shock include: Sepsis present and initial lactate level result is greater than or equal to 4mmol/L   Sources is: Pneumonia versus skin/soft tissue versus occult abdominal source with somewhat recent surgical intervention  Sepsis protocol initiated  Crystalloid Fluid Administration: Resuscitation volume of 0 ordered. Reason that resuscitation volume of less than 30ml/kg was ordered concern for causing harm given CHF  IV antibiotics as appropriate for source of sepsis  Reassessment: I have reassessed the patient's hemodynamic status    Leukocytosis and SIRS/sepsis does remain in the differential  Source left leg and bacteremia continue vancomycin and unasyn follow up final sensitivities  Repeat blood cx tomorrow for clearance

## 2024-12-19 NOTE — ASSESSMENT & PLAN NOTE
Likely source of infection, lower ext doppler negative  Orthopedic consultation 12/20 for possible drainage of bullae and bacteremia  Low threshold for CT left lower ext -> CT today

## 2024-12-19 NOTE — ED TRIAGE NOTES
Chief Complaint   Patient presents with    Shortness of Breath           Peripheral Edema     Pt arrives via EMS from [facility] with complaint of shortness of breath and bilateral lower extremity swelling x 4 days. Pt denies chest pain. Pt reports taking medications regularly. Pt aox4, skin warm and dry, airway patent, rr tachypneic at 22.     /65   Pulse (!) 157   Temp 37.6 °C (99.6 °F) (Oral)   Resp 20   Ht 1.829 m (6')   Wt 79.8 kg (176 lb)   SpO2 98%   BMI 23.87 kg/m²

## 2024-12-19 NOTE — PROGRESS NOTES
4 Eyes Skin Assessment Completed by JANNETTE Zavaleta and   JANNETTE Balderas.    Head Laceration approximated with sutures and bruising    Ears WDL  Nose WDL  Mouth WDL  Neck WDL  Breast/Chest WDL  Shoulder Blades WDL  Spine WDL  (R) Arm/Elbow/Hand Abrasion  (L) Arm/Elbow/Hand Abrasion  Abdomen Scar  Groin Redness  Scrotum/Coccyx/Buttocks Redness, Blanching, and Excoriation    (R) Leg Redness, Scab, and Edema, Discoloration    (L) Leg Redness, Swelling, Abrasion, and Edema, Discoloration    (R) Heel/Foot/Toe Redness and Blanching  (L) Heel/Foot/Toe Redness and Blanching          Devices In Places ECG, Blood Pressure Cuff, and Pulse Ox      Interventions In Place TAP System, Pillows, Q2 Turns, Heels Loaded W/Pillows, and Pressure Redistribution Mattress    Possible Skin Injury Yes    Pictures Uploaded Into Epic Yes  Wound Consult Placed Yes  RN Wound Prevention Protocol Ordered Yes

## 2024-12-19 NOTE — ASSESSMENT & PLAN NOTE
Due to afib rvr and recovering pna  Careful force diuresis with sepsis  Digoxin IV for reduced EF and BiV failure w/ afib  Now improved off Bipap   New prep for case created. Patient is currently scheuld with Dr. Gonzales and the prep for case is under Dr Martinze

## 2024-12-19 NOTE — PROGRESS NOTES
Hypoglycemia Intervention    Hypoglycemia protocol intervention:  Blood glucose 41 at 1122.  Intervention: 8 oz of fruit juice   Repeat blood glucose 58 at 1151.  Intervention: 8 oz of fruit juice   Additional interventions needed: Recheck at 1200 FSBG 88, repeated at 1309 FSBG 98  Dr. Garcia notified of the above at 1122.

## 2024-12-19 NOTE — ED NOTES
Med Rec completed per patient   Allergies reviewed  No ORAL antibiotics in last 30 days    Patient takes Eliquis 5 mg twice a day   Last dose 12/18/2024 PM

## 2024-12-19 NOTE — CARE PLAN
The patient is Watcher - Medium risk of patient condition declining or worsening    Shift Goals  Clinical Goals: Hemodynamic stability, pain control, montior for hypoglycemia  Patient Goals: Rest, pain control, diet  Family Goals: MARCOS    Progress made toward(s) clinical / shift goals:    Problem: Knowledge Deficit - Standard  Goal: Patient and family/care givers will demonstrate understanding of plan of care, disease process/condition, diagnostic tests and medications  Outcome: Progressing     Problem: Hemodynamics  Goal: Patient's hemodynamics, fluid balance and neurologic status will be stable or improve  Outcome: Progressing     Problem: Urinary - Renal Perfusion  Goal: Ability to achieve and maintain adequate renal perfusion and functioning will improve  Outcome: Progressing     Problem: Respiratory  Goal: Patient will achieve/maintain optimum respiratory ventilation and gas exchange  Outcome: Progressing     Problem: Pain - Standard  Goal: Alleviation of pain or a reduction in pain to the patient’s comfort goal  Outcome: Progressing     Problem: Skin Integrity  Goal: Skin integrity is maintained or improved  Outcome: Progressing     Problem: Fall Risk  Goal: Patient will remain free from falls  Outcome: Progressing       Patient is not progressing towards the following goals:

## 2024-12-19 NOTE — CONSULTS
Critical Care Consultation    Date of consult: 12/19/2024    Referring Physician  Meche Thomas M.D.    Reason for Consultation  Dyspnea, acute decompensated CHF, ? Pneumonia    History of Presenting Illness  74 y.o. male, PMH homelessness, atrial fibrillation on chronic anticoagulation with apixaban, CHF, EF 30%, P.Htn, RVSP 65, remote PE, recent hospitalization 10/3 - 10/15 for perforated  (T2N0M0 invasive gastric adenocarcinoma), GDA embolization, subsequent subtotal gastrectomy, liver wedge resection, Miguel Angel-en-Y gastrojejunostomy with omental flap and cholecystectomy 10/7 with pathology showing invasive well-differentiated adenocarcinoma with associated ulceration.  There was tumor invasion into the muscularis propria and 20 lymph nodes were negative for metastatic carcinoma.  Oncology was consulted and noted no evidence of metastatic disease however MRI abdomen recommended for follow-up; no adjuvant therapy was recommended.  He presented 12/19/2024 to the ED with report of several days of increasing dyspnea, worsening bilateral lower extremity edema and mild cough.  He denies fevers or chills, abdominal pain or diarrhea.  He has had no evidence of bleeding.  He has been running out of his medication and has not taken his Lasix but has continued to take his Eliquis daily.  He denied chest pain.  He was tachycardic with irregular wide-complex rhythm, previously noted atrial fibrillation with conduction delay again noted.  Troponin 57, proBNP 21,696, lactic acid 5.6, creatinine 2.30 up from previous normal of 0.75, glucose 53.  He received D50 and was placed on BiPAP in the ED.    Code Status  Full Code    Review of Systems  Review of Systems   Unable to perform ROS: Acuity of condition       Past Medical History  Past Medical History:   Diagnosis Date    Arrhythmia     CHF (congestive heart failure) (HCC)     Congestive heart failure (HCC)     Hypertension    T2N0MO invasive gastric adenocarcinoma  Atrial  fibrillation  H/o remote PE evident on prior CT    Surgical History   has a past surgical history that includes pr upper gi endoscopy,diagnosis (N/A, 01/31/2023); pr upper gi endoscopy,biopsy (N/A, 09/10/2024); pr upper gi endoscopy,scler inject (N/A, 09/10/2024); gastroscopy with endostat (N/A, 09/10/2024); cataract phaco with iol (Left); pr upper gi endoscopy,diagnosis (N/A, 9/13/2024); pr upper gi endoscopy,biopsy (N/A, 9/13/2024); pr upper gi endoscopy,scler inject (N/A, 9/13/2024); gastrectomy (N/A, 10/7/2024); node dissection (N/A, 10/7/2024); creation, flap, omentum (N/A, 10/7/2024); and cholecystectomy (N/A, 10/7/2024).    Family History  family history includes Heart Disease in his father and mother.    Social History   reports that he has been smoking cigarettes. He has never used smokeless tobacco. He reports current alcohol use of about 1.2 oz of alcohol per week. He reports that he does not currently use drugs.    Medications  Home Medications       Reviewed by Aixa Flores (Pharmacy Tech) on 12/19/24 at 0720  Med List Status: Complete     Medication Last Dose Status   apixaban (ELIQUIS) 5mg Tab 12/18/2024 Active   furosemide (LASIX) 20 MG Tab 12/18/2024 Active   metoprolol SR (TOPROL XL) 25 MG TABLET SR 24 HR 12/18/2024 Active   omeprazole (PRILOSEC) 20 MG delayed-release capsule 12/18/2024 Active                  Audit from Redirected Encounters    **Home medications have not yet been reviewed for this encounter**       Current Facility-Administered Medications   Medication Dose Route Frequency Provider Last Rate Last Admin    Respiratory Therapy Consult   Nebulization Continuous RT Meche Thomas M.D.        apixaban (Eliquis) tablet 5 mg  5 mg Oral BID Hector Garcia M.D.        omeprazole (PriLOSEC) capsule 20 mg  20 mg Oral DAILY Hector Garcia M.D.        dextrose 50% (D50W) injection 25 g  25 g Intravenous Q15 MIN PRN Hector Garcia M.D.        acetaminophen (Tylenol) tablet 650 mg   650 mg Oral Q6HRS PRN Hector Garcia M.D.        Pharmacy Consult Request ...Pain Management Review 1 Each  1 Each Other PHARMACY TO DOSE Hector Garcia M.D.        oxyCODONE immediate-release (Roxicodone) tablet 2.5 mg  2.5 mg Oral Q3HRS PRN Hector Garcia M.D.        Or    oxyCODONE immediate-release (Roxicodone) tablet 5 mg  5 mg Oral Q3HRS PRN Hector Garcia M.D.        Or    HYDROmorphone (Dilaudid) injection 0.25 mg  0.25 mg Intravenous Q3HRS PRN Hector Garcia M.D.        ondansetron (Zofran) syringe/vial injection 4 mg  4 mg Intravenous Q4HRS PRN Hector Garcia M.D.        ondansetron (Zofran ODT) dispertab 4 mg  4 mg Oral Q4HRS PRN Hector Garcia M.D.        senna-docusate (Pericolace Or Senokot S) 8.6-50 MG per tablet 2 Tablet  2 Tablet Oral Q EVENING Hector Garcia M.D.        And    polyethylene glycol/lytes (Miralax) Packet 1 Packet  1 Packet Oral QDAY PRN Hector Garcia M.D.        nicotine (Nicoderm) 14 MG/24HR 14 mg  14 mg Transdermal Daily-0600 Hector Garcia M.D.        And    nicotine polacrilex (Nicorette) 2 MG piece 2 mg  2 mg Oral Q HOUR PRN Hector Garcia M.D.        ampicillin/sulbactam (Unasyn) 3 g in  mL IVPB  3 g Intravenous Q6HR Hector Garcia M.D.        [START ON 12/20/2024] azithromycin (Zithromax) tablet 500 mg  500 mg Oral DAILY Hector Garcia M.D.        digoxin (Lanoxin) injection 250 mcg  250 mcg Intravenous Once Hector Garcia M.D.        thiamine (B-1) injection 250 mg  250 mg Intravenous Q12HRS Hector Garcia M.D.           Allergies  No Known Allergies    Vital Signs last 24 hours  Temp:  [37.6 °C (99.6 °F)] 37.6 °C (99.6 °F)  Pulse:  [117-162] 134  Resp:  [20-37] 25  BP: ()/(50-81) 96/59  SpO2:  [79 %-100 %] 100 %    Physical Exam  Physical Exam  Constitutional:       General: He is in acute distress.      Appearance: He is ill-appearing.   HENT:      Head: Normocephalic and atraumatic.      Mouth/Throat:      Mouth: Mucous membranes are moist.    Eyes:      Pupils: Pupils are equal, round, and reactive to light.   Cardiovascular:      Rate and Rhythm: Tachycardia present. Rhythm irregular.      Pulses: Normal pulses.   Pulmonary:      Breath sounds: Rales present. No wheezing.      Comments: Moderate risk distress, on BiPAP, diminished breath sounds throughout, no wheezing  Abdominal:      General: There is distension.      Palpations: Abdomen is soft.      Tenderness: There is no abdominal tenderness. There is no guarding.      Comments: Well-healed midline incision scar   Musculoskeletal:         General: Swelling present.      Cervical back: Neck supple.   Skin:     General: Skin is warm and dry.      Capillary Refill: Capillary refill takes less than 2 seconds.      Comments: Significant lower extremity edema, knees and feet are warm and perfused bilaterally, stasis dermatitis bilaterally, small left anterior shin ulceration without purulence or fluctuance, see photos   Neurological:      General: No focal deficit present.      Mental Status: He is oriented to person, place, and time.      Cranial Nerves: No cranial nerve deficit.   Psychiatric:         Mood and Affect: Mood normal.         Fluids  No intake or output data in the 24 hours ending 12/19/24 1121    Laboratory  Recent Results (from the past 48 hours)   CBC with Differential    Collection Time: 12/19/24  5:52 AM   Result Value Ref Range    WBC 12.8 (H) 4.8 - 10.8 K/uL    RBC 3.79 (L) 4.70 - 6.10 M/uL    Hemoglobin 10.8 (L) 14.0 - 18.0 g/dL    Hematocrit 34.1 (L) 42.0 - 52.0 %    MCV 90.0 81.4 - 97.8 fL    MCH 28.5 27.0 - 33.0 pg    MCHC 31.7 (L) 32.3 - 36.5 g/dL    RDW 57.4 (H) 35.9 - 50.0 fL    Platelet Count 193 164 - 446 K/uL    MPV 10.0 9.0 - 12.9 fL    Neutrophils-Polys 80.70 (H) 44.00 - 72.00 %    Lymphocytes 1.70 (L) 22.00 - 41.00 %    Monocytes 1.70 0.00 - 13.40 %    Eosinophils 0.00 0.00 - 6.90 %    Basophils 0.00 0.00 - 1.80 %    Nucleated RBC 0.00 0.00 - 0.20 /100 WBC     Neutrophils (Absolute) 11.72 (H) 1.82 - 7.42 K/uL    Lymphs (Absolute) 0.22 (L) 1.00 - 4.80 K/uL    Monos (Absolute) 0.22 0.00 - 0.85 K/uL    Eos (Absolute) 0.00 0.00 - 0.51 K/uL    Baso (Absolute) 0.00 0.00 - 0.12 K/uL    NRBC (Absolute) 0.00 K/uL    Anisocytosis 1+     Macrocytosis 1+    Comp Metabolic Panel    Collection Time: 12/19/24  5:52 AM   Result Value Ref Range    Sodium 141 135 - 145 mmol/L    Potassium 4.3 3.6 - 5.5 mmol/L    Chloride 104 96 - 112 mmol/L    Co2 18 (L) 20 - 33 mmol/L    Anion Gap 19.0 (H) 7.0 - 16.0    Glucose 53 (LL) 65 - 99 mg/dL    Bun 34 (H) 8 - 22 mg/dL    Creatinine 2.30 (H) 0.50 - 1.40 mg/dL    Calcium 7.6 (L) 8.5 - 10.5 mg/dL    Correct Calcium 8.3 (L) 8.5 - 10.5 mg/dL    AST(SGOT) 36 12 - 45 U/L    ALT(SGPT) 17 2 - 50 U/L    Alkaline Phosphatase 76 30 - 99 U/L    Total Bilirubin 1.1 0.1 - 1.5 mg/dL    Albumin 3.1 (L) 3.2 - 4.9 g/dL    Total Protein 5.8 (L) 6.0 - 8.2 g/dL    Globulin 2.7 1.9 - 3.5 g/dL    A-G Ratio 1.1 g/dL   proBrain Natriuretic Peptide, NT    Collection Time: 12/19/24  5:52 AM   Result Value Ref Range    NT-proBNP 73133 (H) 0 - 125 pg/mL   Troponin    Collection Time: 12/19/24  5:52 AM   Result Value Ref Range    Troponin T 57 (H) 6 - 19 ng/L   DIFFERENTIAL MANUAL    Collection Time: 12/19/24  5:52 AM   Result Value Ref Range    Bands-Stabs 10.90 (H) 0.00 - 10.00 %    Metamyelocytes 5.00 %    Manual Diff Status PERFORMED    PERIPHERAL SMEAR REVIEW    Collection Time: 12/19/24  5:52 AM   Result Value Ref Range    Peripheral Smear Review see below    PLATELET ESTIMATE    Collection Time: 12/19/24  5:52 AM   Result Value Ref Range    Plt Estimation Normal    MORPHOLOGY    Collection Time: 12/19/24  5:52 AM   Result Value Ref Range    RBC Morphology Present     Poikilocytosis 1+     Ovalocytes 1+     Echinocytes 1+     Toxic Vacuoles Moderate    ESTIMATED GFR    Collection Time: 12/19/24  5:52 AM   Result Value Ref Range    GFR (CKD-EPI) 29 (A) >60 mL/min/1.73 m 2    LACTIC ACID    Collection Time: 24  5:59 AM   Result Value Ref Range    Lactic Acid 5.6 (HH) 0.5 - 2.0 mmol/L   POC CoV-2, FLU A/B, RSV by PCR    Collection Time: 24  6:05 AM   Result Value Ref Range    POC Influenza A RNA, PCR Negative Negative    POC Influenza B RNA, PCR Negative Negative    POC RSV, by PCR Negative Negative    POC SARS-CoV-2, PCR NotDetected NotDetected   EKG    Collection Time: 24  6:08 AM   Result Value Ref Range    Report       [facility]    Test Date:  2024  Pt Name:    HUNTER STEELE                 Department: ER  MRN:        [id number]                      Room:       [facility]  Gender:     Male                         Technician: [id number]  :        1950                   Requested By:ER TRIAGE PROTOCOL  Order #:    [id number]                    Reading MD: LAVELLE GANDARA MD    Measurements  Intervals                                Axis  Rate:       154                          P:          70  MD:         94                           QRS:        260  QRSD:       127                          T:          93  QT:         319  QTc:        511    Interpretive Statements  Interpretation somewhat limited by artifact from rapid breathing, rate 154,  irregular rhythm, widened QRS complex present, which was seen on prior EKG.  Prior EKG also limited by artifact.  Electronically Signed On 2024 06:08:16 PST by LAVELLE GANDARA MD     POCT arterial blood gas device results    Collection Time: 24  7:07 AM   Result Value Ref Range    Ph 7.389 7.350 - 7.450    Pco2 40.1 32.0 - 48.0 mmHg    Po2 15 (LL) 83 - 108 mmHg    Tco2 25 (L) 32 - 48 mmol/L    S02 19 (L) 93 - 99 %    Hco3 24.2 21.0 - 28.0 mmol/L    BE -1 -4 - 3 mmol/L    Body Temp 97.0 F degrees    O2 Therapy 40 %    iPF Ratio 38     Ph Temp Yamilet 7.402 7.350 - 7.450    Pco2 Temp Co 38.6 32.0 - 48.0 mmHg    Po2 Temp Cor 14 (LL) 64 - 87 mmHg    Specimen Arterial     DelSys NIV    POCT  lactate device results    Collection Time: 24  7:07 AM   Result Value Ref Range    iStat Lactate 3.9 (H) 0.5 - 2.0 mmol/L   POCT glucose device results    Collection Time: 24  7:16 AM   Result Value Ref Range    POC Glucose, Blood 51 (L) 65 - 99 mg/dL   POCT glucose device results    Collection Time: 24  7:59 AM   Result Value Ref Range    POC Glucose, Blood 83 65 - 99 mg/dL   Lactic Acid    Collection Time: 24  9:55 AM   Result Value Ref Range    Lactic Acid 4.1 (HH) 0.5 - 2.0 mmol/L   EKG    Collection Time: 24 10:52 AM   Result Value Ref Range    Report       [facility]    Test Date:  2024  Pt Name:    HUNTER STEELE                 Department: 161  MRN:        [id number]                      Room:       [facility]  Gender:     Male                         Technician: CAIT  :        1950                   Requested By:PAULINA GABRIEL  Order #:    [id number]                    Reading MD: Arie Jones MD    Measurements  Intervals                                Axis  Rate:       131                          P:          0  KY:         0                            QRS:        174  QRSD:       136                          T:          84  QT:         342  QTc:        505    Interpretive Statements  Atrial fibrillation with RVR  LBBB  Prolonged QTc    Electronically Signed On 2024 10:52:17 PST by Arie Jones MD         Imaging  US-EXTREMITY VENOUS LOWER BILAT   Final Result      DX-CHEST-PORTABLE (1 VIEW)   Final Result         1.  Hazy left pulmonary infiltrates.   2.  Small left pleural effusion   3.  Cardiomegaly      CT-CHEST,ABDOMEN,PELVIS WITH    (Results Pending)   EC-ECHOCARDIOGRAM COMPLETE W/O CONT    (Results Pending)       Assessment/Plan  * Acute exacerbation of CHF (congestive heart failure) (HCC)- (present on admission)  Assessment & Plan  Suspect mixed etiology with AF/RVR, recent noncompliance with Lasix, possible SIRS  Chronic lower extremity edema,  acute kidney injury and suspected cardiogenic pulmonary edema with respiratory failure requiring BiPAP  80 mg Lasix given acutely  Follow SBP/MAP, support with dobutamine/norepinephrine if needed to maintain endorgan perfusion  Chest pain free, doubt acute ischemia, trend troponin  Echocardiogram  Paulino catheter and strict monitoring of I/Os  Hold beta-blocker with acute exacerbation    Acute respiratory failure with hypoxia (HCC)- (present on admission)  Assessment & Plan  Increased work of breathing and hypoxia requiring placement on BiPAP in ED  Query underlying left lower lobe pneumonia, mild cardiogenic pulmonary edema  Suspect underlying COPD, cor pulmonale, pulm hypertension, currently without wheezing and doubt acute exacerbation  Receiving IV Lasix, continue intermittent noninvasive ventilation as needed  Follow-up chest imaging with CT, procalcitonin, serial CXR  Empiric course of antibiotics to cover for CAP  Monitor closely in ICU for need for escalation, intubation  Patient endorses full CODE STATUS    Acute kidney injury (HCC)- (present on admission)  Assessment & Plan  Suspect secondary to decompensated systolic heart failure  Follow-up echocardiogram, monitor intravascular volume status closely, support MAP/renal perfusion with dobutamine if needed  Avoid nephrotoxins, monitor urine output closely, place Paulino catheter  Monitor/correct electrolytes as needed    Sepsis (HCC)- (present on admission)  Assessment & Plan  This is Septic shock Present on admission  SIRS criteria identified on my evaluation include: Tachycardia, with heart rate greater than 90 BPM and Leukocytosis, with WBC greater than 12,000  Clinical indicators of end organ dysfunction include Lactic Acid greater than 2, Acute Respiratory Failure - (mechanical ventilation or BiPap or PaO2/FiO2 ratio < 300), and Acute Renal Failure, with a finding of creatinine greater than 2 (patient does not have ESRD or CKD  Indicators of septic shock  include: Sepsis present and initial lactate level result is greater than or equal to 4mmol/L   Sources is: Pneumonia versus skin/soft tissue versus occult abdominal source with somewhat recent surgical intervention  Sepsis protocol initiated  Crystalloid Fluid Administration: Resuscitation volume of 0 ordered. Reason that resuscitation volume of less than 30ml/kg was ordered concern for causing harm given CHF  IV antibiotics as appropriate for source of sepsis  Reassessment: I have reassessed the patient's hemodynamic status    Leukocytosis and SIRS/sepsis does remain in the differential  Unclear primary source, pneumonia versus skin/soft tissue versus possibly occult abdominal source with recent partial gastrectomy, Miguel Angel-en-Y and liver wedge resection  Further evaluation with echocardiogram, CT chest/abdomen/pelvis  Empiric antibiotics to cover possible pneumonia although left lower lobe airspace opacities may be more chronic given appearance and prior CXR/CT  Follow-up procalcitonin (for what it is worth) MRSA nasal screen, blood cultures and trend lactic acid  Low suspicion for DVT/PE, duplex ultrasound lower extremities negative in ED, patient remains on apixaban  Trend serial chest imaging    Pulmonary hypertension (HCC)  Assessment & Plan  Previous Echo RVSP 65, severe TR  Suspect Group 2 with LV dysfxn, ?Group 3 (no PFTs), Group 4 (?evidence of chronic PE on prior CTA)  Continue acute management as above for decompensated systolic heart failure, diuresis as tolerated  Patient is homeless and has been able to get appropriate follow-up/workup        Malignant neoplasm of body of stomach (HCC)- (present on admission)  Assessment & Plan  T2, N0, M0 invasive gastric adenocarcinoma presenting as perforated  10/2024  -GDA embolization  -10/7 subtotal gastrectomy, liver wedge resection, Miguel Angel-en-Y gastrojejunostomy with omental flap and cholecystectomy  -pathology invasive well-differentiated adenocarcinoma with  associated ulceration  -tumor invasion into the muscularis propria and 20 lymph nodes were negative for metastatic carcinoma  -Oncology was consulted and noted no evidence of metastatic disease  -MRI abdomen recommended for follow-up; no adjuvant therapy was recommended    Wound of lower extremity- (present on admission)  Assessment & Plan  Chronic stasis dermatitis and small anterior shin ulceration  Wound care, follow clinically    Severe protein-calorie malnutrition (HCC)- (present on admission)  Assessment & Plan  Malnourished  Active EtOH use  HIV, hepatitis negative 10/24  Short course high-dose thiamine, follow electrolytes, monitor for refeeding    COPD (chronic obstructive pulmonary disease) (HCC)- (present on admission)  Assessment & Plan  Current active smoker, no PFTs on file  RT protocols, bronchodilators as needed    Atrial fibrillation with RVR (HCC)- (present on admission)  Assessment & Plan  Chronic atrial fibrillation, currently with RVR  Digoxin 250 mcg now, repeat in 6 hours if needed, cautious use with NIRMAL  Continue full dose anticoagulation with apixaban  Follow-up echocardiogram    Homeless- (present on admission)  Assessment & Plan   consultation for ongoing access to medications and follow-up care as appropriate    Tobacco dependence- (present on admission)  Assessment & Plan  Nicotine placement therapy and  regarding cessation as appropriate    Hypertension- (present on admission)  Assessment & Plan  Currently mildly hypotensive with decompensated systolic heart failure, hold metoprolol        Discussed patient condition and risk of morbidity and/or mortality with RN, RT, Pharmacy, and ERP .    The patient remains critically ill.  Critical care time = 60 minutes in directly providing and coordinating critical care and extensive data review.  No time overlap and excludes procedures.

## 2024-12-19 NOTE — ASSESSMENT & PLAN NOTE
T2, N0, M0 invasive gastric adenocarcinoma presenting as perforated  10/2024  -GDA embolization  -10/7 subtotal gastrectomy, liver wedge resection, Miguel Angel-en-Y gastrojejunostomy with omental flap and cholecystectomy  -pathology invasive well-differentiated adenocarcinoma with associated ulceration  -tumor invasion into the muscularis propria and 20 lymph nodes were negative for metastatic carcinoma  -Oncology was consulted and noted no evidence of metastatic disease  -MRI abdomen recommended for follow-up; no adjuvant therapy was recommended

## 2024-12-19 NOTE — ASSESSMENT & PLAN NOTE
Chronic atrial fibrillation, currently with RVR  Digoxin 250 mcg IV monitoring closely with flip to prevent digoxin toxicity  Optimize volume status atrial stretch and electrolytes  Due to adrenergic worsened due to sepsis  Continue eliquis

## 2024-12-19 NOTE — ED NOTES
Pt resting, airway patent, pt on Bipap, no new complaints or distress noted at this time. Rails up times two, call light within reach.

## 2024-12-19 NOTE — ED PROVIDER NOTES
Emergency Physician Note    Chief Concern:  Chief Complaint   Patient presents with    Shortness of Breath           Peripheral Edema         External Records Reviewed:  Inpatient records reviewed: Internal medicine physician discharge summary reviewed from 10/29/2024.  Patient presented to the emergency department 10/28/2024 with flulike symptoms and shortness of breath ongoing for 5 days.  Noted to have a past medical history significant for subtotal gastrectomy, liver wedge resection, Miguel Angel-en-Y gastrojejunostomy, omental flap, cholecystectomy, celiac node and hepatic artery node dissection with Dr. Valle on 10/7.  CT of the chest demonstrated pneumonia, he was started on broad-spectrum antibiotics, did not require supplemental oxygen the next day, and symptoms greatly improved.     HPI/ROS     HPI:  Robert Mcdonnell is a 74 y.o. male who presents to the emergency department today for evaluation of shortness of breath, and bilateral lower extremity edema.  He has a past medical history significant for congestive heart failure, states that he has been taking all of his medications however he is starting to run low, and has been taking decreased doses to make those prescriptions last a little bit longer.  Over the last 4 days he has developed progressively worsening shortness of breath and worsening bilateral lower extremity edema.  He thinks he may have had some fevers as well, but is uncertain.  He does report mild associated intermittent cough.  He presented to the emergency department due to worsening shortness of breath.  He does not report any associated abdominal pain, no nausea, no vomiting.  He reports no prior history of DVT or pulmonary embolism.  Symptoms are exacerbated by exertion, he has no associated chest pain.    PAST MEDICAL HISTORY  Past Medical History:   Diagnosis Date    Arrhythmia     CHF (congestive heart failure) (HCC)     Congestive heart failure (HCC)     Hypertension         SURGICAL HISTORY  Past Surgical History:   Procedure Laterality Date    GASTRECTOMY N/A 10/7/2024    Procedure: LAPAROTOMY, GASTRECTOMY-SUBTOTAL, WITH INTRAOPERATIVE ULTRASOUND GUIDANCE;  Surgeon: Mt Cabral M.D.;  Location: SURGERY [facility];  Service: General    NODE DISSECTION N/A 10/7/2024    Procedure: LYMPHADENECTOMY-NODE DISSECTION;  Surgeon: Mt Cabral M.D.;  Location: SURGERY [facility];  Service: General    CREATION, FLAP, OMENTUM N/A 10/7/2024    Procedure: CREATION, FLAP, OMENTUM;  Surgeon: Mt Cabral M.D.;  Location: SURGERY [facility];  Service: General    CHOLECYSTECTOMY N/A 10/7/2024    Procedure: CHOLECYSTECTOMY;  Surgeon: Mt Cabral M.D.;  Location: SURGERY [facility];  Service: General    MI UPPER GI ENDOSCOPY,DIAGNOSIS N/A 9/13/2024    Procedure: GASTROSCOPY;  Surgeon: Sarah Lozoya M.D.;  Location: SURGERY SAME DAY [facility];  Service: Gastroenterology    MI UPPER GI ENDOSCOPY,BIOPSY N/A 9/13/2024    Procedure: GASTROSCOPY, WITH BIOPSY;  Surgeon: Sarah Lozoya M.D.;  Location: SURGERY SAME DAY [facility];  Service: Gastroenterology    MI UPPER GI ENDOSCOPY,SCLER INJECT N/A 9/13/2024    Procedure: GASTROSCOPY, WITH SCLEROTHERAPY;  Surgeon: Sarah Lozoya M.D.;  Location: SURGERY SAME DAY [facility];  Service: Gastroenterology    MI UPPER GI ENDOSCOPY,BIOPSY N/A 09/10/2024    Procedure: GASTROSCOPY, WITH BIOPSY;  Surgeon: Demond Gutierres M.D.;  Location: SURGERY SAME DAY [facility];  Service: Gastroenterology    MI UPPER GI ENDOSCOPY,SCLER INJECT N/A 09/10/2024    Procedure: GASTROSCOPY, WITH SCLEROTHERAPY;  Surgeon: Demond Gutierres M.D.;  Location: SURGERY SAME DAY [facility];  Service: Gastroenterology    GASTROSCOPY WITH ENDOSTAT N/A 09/10/2024    Procedure: EGD, WITH CAUTERIZATION;  Surgeon: Demond Gutierres M.D.;  Location: SURGERY SAME DAY [facility];  Service: Gastroenterology    MI UPPER GI ENDOSCOPY,DIAGNOSIS N/A  01/31/2023    Procedure: GASTROSCOPY;  Surgeon: Joy Mcnair M.D.;  Location: SURGERY SAME DAY [facility];  Service: Gastroenterology    CATARACT PHACO WITH IOL Left        FAMILY HISTORY  Family History   Problem Relation Age of Onset    Heart Disease Mother     Heart Disease Father        SOCIAL HISTORY   reports that he has been smoking cigarettes. He has never used smokeless tobacco. He reports current alcohol use of about 1.2 oz of alcohol per week. He reports that he does not currently use drugs.    CURRENT MEDICATIONS  Previous Medications    APIXABAN (ELIQUIS) 5MG TAB    Take 1 Tablet by mouth 2 times a day.    FUROSEMIDE (LASIX) 20 MG TAB    Take 1 Tablet by mouth 2 times a day.    METOPROLOL SR (TOPROL XL) 25 MG TABLET SR 24 HR    Take 1 Tablet by mouth every day.    OMEPRAZOLE (PRILOSEC) 20 MG DELAYED-RELEASE CAPSULE    Take 1 Capsule by mouth every day.       ALLERGIES  Patient has no known allergies.    PHYSICAL EXAM  Vital Signs: /65   Pulse (!) 157   Temp 37.6 °C (99.6 °F) (Oral)   Resp 20   Ht 1.829 m (6')   Wt 79.8 kg (176 lb)   SpO2 98%   BMI 23.87 kg/m²   Constitutional: Awake, alert, afebrile  HENT: Normocephalic, atraumatic.  Cardiovascular: Tachycardic rate, intermittently irregular rhythm, cardiac auscultation limited by ambient noise  Pulmonary: Increased work of breathing with accessory muscle usage, tachypnea with respiratory rate of 24 on my initial assessment, breath sounds are quiet bilaterally, likely due to decreased inspiratory effort  Abdomen: Soft, non tender, no peritoneal signs, well-healed overlying surgical scar  Skin: Warm, dry, no rashes or lesions excepting surgical scar as documented above  Musculoskeletal: 2+ symmetric bilateral lower extremity edema, no calf or posterior thigh tenderness to palpation.  Neurologic: Alert, oriented, normal motor function, no speech deficits    Diagnostic Studies & Procedures    Labs:  All labs reviewed by me as noted  below.    EK Lead EKG interpreted by me as noted below  Results for orders placed or performed during the hospital encounter of 24   EKG   Result Value Ref Range    Report       [facility] Emergency Dept.    Test Date:  2024  Pt Name:    HUNTER STEELE                 Department: ER  MRN:        [id number]                      Room:       [facility]  Gender:     Male                         Technician: [id number]  :        [id number]                   Requested By:ER TRIAGE PROTOCOL  Order #:    [id number]                    Reading MD: LAVELLE GANDARA MD    Measurements  Intervals                                Axis  Rate:       154                          P:          70  WV:         94                           QRS:        260  QRSD:       127                          T:          93  QT:         319  QTc:        511    Interpretive Statements  Interpretation somewhat limited by artifact from rapid breathing, rate 154,  irregular rhythm, widened QRS complex present, which was seen on prior EKG.  Prior EKG also limited by artifact.  Electronically Signed On 2024 06:08:16 PST by LAVELLE GANDARA MD       Radiology:  The attending Emergency Physician has independently interpreted the following imaging:  I independently interpreted the chest x-ray, left lower lobe opacities present.    DX-CHEST-PORTABLE (1 VIEW)   Final Result         1.  Hazy left pulmonary infiltrates.   2.  Small left pleural effusion   3.  Cardiomegaly            Course and Medical Decision Making    Initial Assessment and Plan:  Mr. Steele presents to the emergency department today for evaluation of shortness of breath and bilateral lower extremity edema.  He has a history of congestive heart failure, states he has not been on his full doses of medications recently, as he was running out of his prescriptions.  On arrival oxygen saturation is in the 90s, however he is significantly increased work of breathing with  accessory muscle usage, and marked tachycardia to the 150s.  Differential diagnosis is broad including sepsis, fluid overload, heart failure exacerbation, electrolyte abnormality, and renal insufficiency.    On laboratory evaluation viral testing was negative for COVID, flu, and RSV.  proBNP is markedly elevated to 21,000, again raising concern for fluid overload and heart failure exacerbation.  High-sensitivity troponin is 57, likely due to increased cardiac demand.  CMP is notable for creatinine of 2.3, increased from 0.752 months ago.  GFR is 29, decreased from normal 2 months ago as well.  Lactic acid resulted at 5.6, with white blood count of 12.8 indicating likely sepsis.  Hemoglobin is 10.8.    Chest x-ray demonstrates left pulmonary infiltrates and small left pleural effusion.    He was started on BiPAP ventilation on arrival with significant improvement in comfort, and work of breathing.  Heart rate responded well with this intervention, and improved to the 130s, from the 150s.  Plan at this time is for treatment of sepsis with presumed respiratory source.  Unasyn and azithromycin were administered in the emergency department.  Additionally, he showing evidence of acute kidney injury with evidence of fluid overload, likely due to decreased intravascular fluid volume and third spacing.    Plan this time is for admission to the ICU due to sepsis with presumed respiratory source, fluid overload, and acute kidney injury.    Sepsis: Infection was suspected 7:08 am. Sepsis pathway was initiated. Less than 30cc/kg because of concern for volume overload 100 mL of crystalloid was ordered. Antibiotics were given per protocol.    CRITICAL CARE  Evidence of rapid deterioration of this patient's condition is present, including acute respiratory distress, tachycardia, lactic acidosis  I provided critical care interventions, which included immediate initiation of noninvasive positive pressure ventilation, sepsis protocol  including antibiotics, interpretation of lab work and imaging, ongoing hemodynamic and pulse oximetry monitoring, and subspecialist consultation.  The critical care time associated with the care of the patient was 40 minutes.   Review chart for further details of interventions. This time is exclusive of any other billable procedures.      Additional Problems and Disposition    I have discussed management of the patient with the following physicians:   Dr. Mcgregor, Critical Care Physician    Disposition:  Admit to ICU in critical condition minimal    FINAL IMPRESSION   1. Sepsis with acute renal failure without septic shock, due to unspecified organism, unspecified acute renal failure type (HCC)    2. Acute kidney injury (HCC)    3. Shortness of breath    4. Pneumonia of left lung due to infectious organism, unspecified part of lung    5. Hypervolemia, unspecified hypervolemia type

## 2024-12-19 NOTE — ASSESSMENT & PLAN NOTE
Resolved   Maintain euvolemia and monitor fluid responsiveness (avoid NaCL and renal congestion)  MAP > 65 uses pressors or inotropic trial  Monitor urine output and I&O's  Avoid and review nephrotoxin medication  Rule out post obstruction  Consider renal U/S if no renal images  U/a and CPK

## 2024-12-19 NOTE — ED NOTES
.Bedside report received from off going RN, assumed care of patient.  POC discussed with patient. Call light within reach, all needs addressed at this time.       Fall risk interventions in place: Move the patient closer to the nurse's station and Place fall risk sign on patient's door (all applicable per [place] risk assessment)   Continuous monitoring: Cardiac Leads, Pulse Ox, or Blood Pressure  IVF/IV medications: Not Applicable   Oxygen: How many liters bipap 70% 8 peep   Bedside sitter: Not Applicable   Isolation: Not Applicable

## 2024-12-19 NOTE — ASSESSMENT & PLAN NOTE
Previous Echo RVSP 65, severe TR  Suspect mainly Group 2  RV perfusion with MAP > 65 as need norepinepinephrine  Force diuresis as able

## 2024-12-20 ENCOUNTER — APPOINTMENT (OUTPATIENT)
Dept: RADIOLOGY | Facility: MEDICAL CENTER | Age: 74
End: 2024-12-20
Attending: INTERNAL MEDICINE
Payer: MEDICARE

## 2024-12-20 PROBLEM — R78.81 BACTEREMIA: Status: ACTIVE | Noted: 2024-12-20

## 2024-12-20 LAB
ALBUMIN SERPL BCP-MCNC: 2.7 G/DL (ref 3.2–4.9)
ALBUMIN/GLOB SERPL: 1.1 G/DL
ALP SERPL-CCNC: 51 U/L (ref 30–99)
ALT SERPL-CCNC: 21 U/L (ref 2–50)
ANION GAP SERPL CALC-SCNC: 15 MMOL/L (ref 7–16)
ANISOCYTOSIS BLD QL SMEAR: ABNORMAL
AST SERPL-CCNC: 31 U/L (ref 12–45)
BASOPHILS # BLD AUTO: 0 % (ref 0–1.8)
BASOPHILS # BLD: 0 K/UL (ref 0–0.12)
BILIRUB SERPL-MCNC: 1 MG/DL (ref 0.1–1.5)
BUN SERPL-MCNC: 37 MG/DL (ref 8–22)
BURR CELLS BLD QL SMEAR: NORMAL
CALCIUM ALBUM COR SERPL-MCNC: 8.4 MG/DL (ref 8.5–10.5)
CALCIUM SERPL-MCNC: 7.4 MG/DL (ref 8.5–10.5)
CHLORIDE SERPL-SCNC: 104 MMOL/L (ref 96–112)
CO2 SERPL-SCNC: 23 MMOL/L (ref 20–33)
CORTIS SERPL-MCNC: 67.4 UG/DL (ref 0–23)
CREAT SERPL-MCNC: 2.1 MG/DL (ref 0.5–1.4)
CRP SERPL HS-MCNC: 30.02 MG/DL (ref 0–0.75)
EOSINOPHIL # BLD AUTO: 0 K/UL (ref 0–0.51)
EOSINOPHIL NFR BLD: 0 % (ref 0–6.9)
ERYTHROCYTE [DISTWIDTH] IN BLOOD BY AUTOMATED COUNT: 52.6 FL (ref 35.9–50)
ERYTHROCYTE [SEDIMENTATION RATE] IN BLOOD BY WESTERGREN METHOD: 13 MM/HOUR (ref 0–20)
GFR SERPLBLD CREATININE-BSD FMLA CKD-EPI: 32 ML/MIN/1.73 M 2
GLOBULIN SER CALC-MCNC: 2.5 G/DL (ref 1.9–3.5)
GLUCOSE BLD STRIP.AUTO-MCNC: 107 MG/DL (ref 65–99)
GLUCOSE BLD STRIP.AUTO-MCNC: 107 MG/DL (ref 65–99)
GLUCOSE BLD STRIP.AUTO-MCNC: 108 MG/DL (ref 65–99)
GLUCOSE BLD STRIP.AUTO-MCNC: 109 MG/DL (ref 65–99)
GLUCOSE BLD STRIP.AUTO-MCNC: 109 MG/DL (ref 65–99)
GLUCOSE BLD STRIP.AUTO-MCNC: 121 MG/DL (ref 65–99)
GLUCOSE BLD STRIP.AUTO-MCNC: 131 MG/DL (ref 65–99)
GLUCOSE BLD STRIP.AUTO-MCNC: 30 MG/DL (ref 65–99)
GLUCOSE BLD STRIP.AUTO-MCNC: 56 MG/DL (ref 65–99)
GLUCOSE BLD STRIP.AUTO-MCNC: 64 MG/DL (ref 65–99)
GLUCOSE BLD STRIP.AUTO-MCNC: 65 MG/DL (ref 65–99)
GLUCOSE BLD STRIP.AUTO-MCNC: 74 MG/DL (ref 65–99)
GLUCOSE BLD STRIP.AUTO-MCNC: 81 MG/DL (ref 65–99)
GLUCOSE BLD STRIP.AUTO-MCNC: 83 MG/DL (ref 65–99)
GLUCOSE BLD STRIP.AUTO-MCNC: 86 MG/DL (ref 65–99)
GLUCOSE BLD STRIP.AUTO-MCNC: 87 MG/DL (ref 65–99)
GLUCOSE BLD STRIP.AUTO-MCNC: 88 MG/DL (ref 65–99)
GLUCOSE BLD STRIP.AUTO-MCNC: 89 MG/DL (ref 65–99)
GLUCOSE BLD STRIP.AUTO-MCNC: 91 MG/DL (ref 65–99)
GLUCOSE BLD STRIP.AUTO-MCNC: 93 MG/DL (ref 65–99)
GLUCOSE BLD STRIP.AUTO-MCNC: 98 MG/DL (ref 65–99)
GLUCOSE SERPL-MCNC: 78 MG/DL (ref 65–99)
HCT VFR BLD AUTO: 31.1 % (ref 42–52)
HGB BLD-MCNC: 10 G/DL (ref 14–18)
LACTATE SERPL-SCNC: 2.1 MMOL/L (ref 0.5–2)
LYMPHOCYTES # BLD AUTO: 0.41 K/UL (ref 1–4.8)
LYMPHOCYTES NFR BLD: 4.1 % (ref 22–41)
MACROCYTES BLD QL SMEAR: ABNORMAL
MAGNESIUM SERPL-MCNC: 1.9 MG/DL (ref 1.5–2.5)
MANUAL DIFF BLD: NORMAL
MCH RBC QN AUTO: 27.6 PG (ref 27–33)
MCHC RBC AUTO-ENTMCNC: 32.2 G/DL (ref 32.3–36.5)
MCV RBC AUTO: 85.9 FL (ref 81.4–97.8)
MONOCYTES # BLD AUTO: 0.24 K/UL (ref 0–0.85)
MONOCYTES NFR BLD AUTO: 2.4 % (ref 0–13.4)
MORPHOLOGY BLD-IMP: NORMAL
NEUTROPHILS # BLD AUTO: 9.35 K/UL (ref 1.82–7.42)
NEUTROPHILS NFR BLD: 88.6 % (ref 44–72)
NEUTS BAND NFR BLD MANUAL: 4.9 % (ref 0–10)
NRBC # BLD AUTO: 0 K/UL
NRBC BLD-RTO: 0 /100 WBC (ref 0–0.2)
OVALOCYTES BLD QL SMEAR: NORMAL
PHOSPHATE SERPL-MCNC: 4.8 MG/DL (ref 2.5–4.5)
PLATELET # BLD AUTO: 157 K/UL (ref 164–446)
PLATELET BLD QL SMEAR: NORMAL
PMV BLD AUTO: 11.8 FL (ref 9–12.9)
POIKILOCYTOSIS BLD QL SMEAR: NORMAL
POTASSIUM SERPL-SCNC: 3.8 MMOL/L (ref 3.6–5.5)
PROT SERPL-MCNC: 5.2 G/DL (ref 6–8.2)
RBC # BLD AUTO: 3.62 M/UL (ref 4.7–6.1)
RBC BLD AUTO: PRESENT
SODIUM SERPL-SCNC: 142 MMOL/L (ref 135–145)
TARGETS BLD QL SMEAR: NORMAL
WBC # BLD AUTO: 10 K/UL (ref 4.8–10.8)

## 2024-12-20 PROCEDURE — 700102 HCHG RX REV CODE 250 W/ 637 OVERRIDE(OP): Performed by: INTERNAL MEDICINE

## 2024-12-20 PROCEDURE — 83605 ASSAY OF LACTIC ACID: CPT

## 2024-12-20 PROCEDURE — 82533 TOTAL CORTISOL: CPT

## 2024-12-20 PROCEDURE — 700111 HCHG RX REV CODE 636 W/ 250 OVERRIDE (IP): Performed by: INTERNAL MEDICINE

## 2024-12-20 PROCEDURE — 71045 X-RAY EXAM CHEST 1 VIEW: CPT

## 2024-12-20 PROCEDURE — 700111 HCHG RX REV CODE 636 W/ 250 OVERRIDE (IP): Mod: JZ | Performed by: INTERNAL MEDICINE

## 2024-12-20 PROCEDURE — A9270 NON-COVERED ITEM OR SERVICE: HCPCS | Performed by: INTERNAL MEDICINE

## 2024-12-20 PROCEDURE — 80053 COMPREHEN METABOLIC PANEL: CPT

## 2024-12-20 PROCEDURE — 700101 HCHG RX REV CODE 250: Performed by: INTERNAL MEDICINE

## 2024-12-20 PROCEDURE — 700105 HCHG RX REV CODE 258: Performed by: INTERNAL MEDICINE

## 2024-12-20 PROCEDURE — 83735 ASSAY OF MAGNESIUM: CPT

## 2024-12-20 PROCEDURE — 85027 COMPLETE CBC AUTOMATED: CPT

## 2024-12-20 PROCEDURE — 770022 HCHG ROOM/CARE - ICU (200)

## 2024-12-20 PROCEDURE — 700111 HCHG RX REV CODE 636 W/ 250 OVERRIDE (IP)

## 2024-12-20 PROCEDURE — 93503 INSERT/PLACE HEART CATHETER: CPT

## 2024-12-20 PROCEDURE — 99222 1ST HOSP IP/OBS MODERATE 55: CPT | Performed by: STUDENT IN AN ORGANIZED HEALTH CARE EDUCATION/TRAINING PROGRAM

## 2024-12-20 PROCEDURE — 700105 HCHG RX REV CODE 258

## 2024-12-20 PROCEDURE — 86140 C-REACTIVE PROTEIN: CPT

## 2024-12-20 PROCEDURE — 36556 INSERT NON-TUNNEL CV CATH: CPT

## 2024-12-20 PROCEDURE — 85652 RBC SED RATE AUTOMATED: CPT

## 2024-12-20 PROCEDURE — 82962 GLUCOSE BLOOD TEST: CPT | Mod: 91

## 2024-12-20 PROCEDURE — 84100 ASSAY OF PHOSPHORUS: CPT

## 2024-12-20 PROCEDURE — 85007 BL SMEAR W/DIFF WBC COUNT: CPT

## 2024-12-20 PROCEDURE — 99291 CRITICAL CARE FIRST HOUR: CPT | Performed by: INTERNAL MEDICINE

## 2024-12-20 RX ORDER — DEXTROSE MONOHYDRATE 100 MG/ML
INJECTION, SOLUTION INTRAVENOUS CONTINUOUS
Status: DISCONTINUED | OUTPATIENT
Start: 2024-12-20 | End: 2024-12-23

## 2024-12-20 RX ORDER — THIAMINE HYDROCHLORIDE 100 MG/ML
200 INJECTION, SOLUTION INTRAMUSCULAR; INTRAVENOUS EVERY 12 HOURS
Status: COMPLETED | OUTPATIENT
Start: 2024-12-20 | End: 2024-12-22

## 2024-12-20 RX ORDER — NOREPINEPHRINE BITARTRATE 0.03 MG/ML
0-1 INJECTION, SOLUTION INTRAVENOUS CONTINUOUS
Status: DISCONTINUED | OUTPATIENT
Start: 2024-12-20 | End: 2024-12-22

## 2024-12-20 RX ORDER — LINEZOLID 600 MG/1
600 TABLET, FILM COATED ORAL EVERY 12 HOURS
Status: DISCONTINUED | OUTPATIENT
Start: 2024-12-20 | End: 2024-12-22

## 2024-12-20 RX ORDER — DIGOXIN 0.25 MG/ML
250 INJECTION INTRAMUSCULAR; INTRAVENOUS ONCE
Status: COMPLETED | OUTPATIENT
Start: 2024-12-20 | End: 2024-12-20

## 2024-12-20 RX ORDER — FUROSEMIDE 10 MG/ML
40 INJECTION INTRAMUSCULAR; INTRAVENOUS ONCE
Status: COMPLETED | OUTPATIENT
Start: 2024-12-20 | End: 2024-12-20

## 2024-12-20 RX ORDER — MAGNESIUM SULFATE HEPTAHYDRATE 40 MG/ML
2 INJECTION, SOLUTION INTRAVENOUS ONCE
Status: COMPLETED | OUTPATIENT
Start: 2024-12-20 | End: 2024-12-20

## 2024-12-20 RX ADMIN — DEXTROSE MONOHYDRATE: 100 INJECTION, SOLUTION INTRAVENOUS at 04:51

## 2024-12-20 RX ADMIN — AMPICILLIN AND SULBACTAM 3 G: 1; 2 INJECTION, POWDER, FOR SOLUTION INTRAMUSCULAR; INTRAVENOUS at 14:52

## 2024-12-20 RX ADMIN — DEXTROSE MONOHYDRATE 25 G: 25 INJECTION, SOLUTION INTRAVENOUS at 04:39

## 2024-12-20 RX ADMIN — POTASSIUM BICARBONATE 25 MEQ: 978 TABLET, EFFERVESCENT ORAL at 07:42

## 2024-12-20 RX ADMIN — THIAMINE HYDROCHLORIDE 200 MG: 100 INJECTION, SOLUTION INTRAMUSCULAR; INTRAVENOUS at 17:33

## 2024-12-20 RX ADMIN — AZITHROMYCIN DIHYDRATE 500 MG: 250 TABLET ORAL at 05:39

## 2024-12-20 RX ADMIN — LINEZOLID 600 MG: 600 TABLET, FILM COATED ORAL at 13:37

## 2024-12-20 RX ADMIN — APIXABAN 5 MG: 5 TABLET, FILM COATED ORAL at 05:39

## 2024-12-20 RX ADMIN — OMEPRAZOLE 20 MG: 20 CAPSULE, DELAYED RELEASE ORAL at 05:39

## 2024-12-20 RX ADMIN — NOREPINEPHRINE BITARTRATE 0.1 MCG/KG/MIN: 1 INJECTION, SOLUTION, CONCENTRATE INTRAVENOUS at 21:10

## 2024-12-20 RX ADMIN — FUROSEMIDE 40 MG: 10 INJECTION INTRAMUSCULAR; INTRAVENOUS at 10:53

## 2024-12-20 RX ADMIN — THIAMINE HYDROCHLORIDE 250 MG: 100 INJECTION, SOLUTION INTRAMUSCULAR; INTRAVENOUS at 05:39

## 2024-12-20 RX ADMIN — NOREPINEPHRINE BITARTRATE 0.12 MCG/KG/MIN: 0.03 INJECTION, SOLUTION INTRAVENOUS at 06:48

## 2024-12-20 RX ADMIN — APIXABAN 5 MG: 5 TABLET, FILM COATED ORAL at 17:33

## 2024-12-20 RX ADMIN — AMPICILLIN AND SULBACTAM 3 G: 1; 2 INJECTION, POWDER, FOR SOLUTION INTRAMUSCULAR; INTRAVENOUS at 20:53

## 2024-12-20 RX ADMIN — ACETAMINOPHEN 650 MG: 325 TABLET ORAL at 11:12

## 2024-12-20 RX ADMIN — AMPICILLIN AND SULBACTAM 3 G: 1; 2 INJECTION, POWDER, FOR SOLUTION INTRAMUSCULAR; INTRAVENOUS at 02:02

## 2024-12-20 RX ADMIN — AMPICILLIN AND SULBACTAM 3 G: 1; 2 INJECTION, POWDER, FOR SOLUTION INTRAMUSCULAR; INTRAVENOUS at 07:41

## 2024-12-20 RX ADMIN — OXYCODONE 5 MG: 5 TABLET ORAL at 19:44

## 2024-12-20 RX ADMIN — MAGNESIUM SULFATE HEPTAHYDRATE 2 G: 2 INJECTION, SOLUTION INTRAVENOUS at 07:46

## 2024-12-20 RX ADMIN — NICOTINE 14 MG: 14 PATCH TRANSDERMAL at 05:39

## 2024-12-20 RX ADMIN — DIGOXIN 250 MCG: 250 INJECTION, SOLUTION INTRAMUSCULAR; INTRAVENOUS; PARENTERAL at 10:53

## 2024-12-20 ASSESSMENT — ENCOUNTER SYMPTOMS
HEARTBURN: 0
MYALGIAS: 0
HEADACHES: 0
DOUBLE VISION: 0
VOMITING: 0
PALPITATIONS: 0
COUGH: 0
SPUTUM PRODUCTION: 0
NECK PAIN: 0
WHEEZING: 0
FEVER: 0
WEAKNESS: 0
CHILLS: 0
DEPRESSION: 0
ORTHOPNEA: 0
ABDOMINAL PAIN: 0
SEIZURES: 0
NAUSEA: 0
DIZZINESS: 0
SHORTNESS OF BREATH: 0
HEMOPTYSIS: 0
SHORTNESS OF BREATH: 1
BLURRED VISION: 0
SENSORY CHANGE: 0

## 2024-12-20 ASSESSMENT — LIFESTYLE VARIABLES: SUBSTANCE_ABUSE: 0

## 2024-12-20 ASSESSMENT — PAIN DESCRIPTION - PAIN TYPE
TYPE: CHRONIC PAIN
TYPE: ACUTE PAIN

## 2024-12-20 NOTE — PROGRESS NOTES
[facility] Internal Medicine Daily Progress Note    Date of Service  12/20/2024      Attending: Ramu Romano M.d.  Senior Resident: Jacob Trinh  Contact Number: [phone]    Chief Complaint  Robert Mcdonnell is a 74 y.o. male admitted 12/19/2024 with CHF exacerbation     Hospital Course  74 y.o. male , He presented 12/19/2024 to the ED with report of several days of increasing dyspnea, worsening bilateral lower extremity edema and mild cough.  He denies fevers or chills, abdominal pain or diarrhea.  He has had no evidence of bleeding.  He has been running out of his medication and has not taken his Lasix but has continued to take his Eliquis daily.  He denied chest pain.  He was tachycardic with irregular wide-complex rhythm, previously noted atrial fibrillation with conduction delay again noted.  Troponin 57, proBNP 21,696, lactic acid 5.6, creatinine 2.30 up from previous normal of 0.75, glucose 53.  He received D50 and was placed on BiPAP in the ED.   PMH homelessness, atrial fibrillation on chronic anticoagulation with apixaban, CHF, EF 30%, P.Htn, RVSP 65, remote PE, recent hospitalization 10/3 - 10/15 for perforated  (T2N0M0 invasive gastric adenocarcinoma), GDA embolization, subsequent subtotal gastrectomy, liver wedge resection, Miguel Angel-en-Y gastrojejunostomy with omental flap and cholecystectomy 10/7 with pathology showing invasive well-differentiated adenocarcinoma with associated ulceration.  There was tumor invasion into the muscularis propria and 20 lymph nodes were negative for metastatic carcinoma.  Oncology was consulted and noted no evidence of metastatic disease however MRI abdomen recommended for follow-up; no adjuvant therapy was recommended.      Interval Problem Update  Patient does refer improvement of his symptoms specially the SOB (patient received lasix) with good output (-2647 ml). Night team reported patient having hypoglycemia episode for which he was started on dextrose 10% at  20cc/hr and oral juice, improving his levels. Later in the AM patient presented new episode of hypoglycemia reason why dextrose 10% reate was increased to 40 cc/hr and added a new dose of lasix. Giving another dose of digoxin and replete Mg and PO4 .     I have discussed this patient's plan of care and discharge plan at IDT rounds today with Case Management, Nursing, Nursing leadership, and other members of the IDT team.    Consultants/Specialty  critical care    Code Status  Full Code    Disposition  The patient is not medically cleared for discharge to home or a post-acute facility.      I have placed the appropriate orders for post-discharge needs.    Review of Systems  Review of Systems   Constitutional:  Negative for chills and fever.   HENT:  Negative for hearing loss and tinnitus.    Eyes:  Negative for blurred vision and double vision.   Respiratory:  Positive for shortness of breath (on NC). Negative for cough, hemoptysis, sputum production and wheezing.    Cardiovascular:  Positive for leg swelling. Negative for chest pain, palpitations and orthopnea.   Gastrointestinal:  Negative for heartburn and nausea.   Genitourinary:  Negative for dysuria and urgency.   Musculoskeletal:  Negative for myalgias and neck pain.   Neurological:  Negative for dizziness and headaches.        Physical Exam  Temp:  [37.1 °C (98.8 °F)-38 °C (100.4 °F)] 37.4 °C (99.3 °F)  Pulse:  [102-133] 113  Resp:  [12-34] 14  BP: ()/(47-83) 104/59  SpO2:  [88 %-100 %] 93 %    Physical Exam  Constitutional:       General: He is not in acute distress.     Appearance: He is not ill-appearing.   Eyes:      General:         Right eye: No discharge.         Left eye: No discharge.   Cardiovascular:      Rate and Rhythm: Tachycardia present.      Heart sounds: No murmur heard.     No gallop.   Pulmonary:      Effort: Respiratory distress (on nc) present.      Breath sounds: No stridor. Wheezing present. No rhonchi.   Abdominal:      General:  There is no distension.      Palpations: There is no mass.      Tenderness: There is no abdominal tenderness.   Musculoskeletal:         General: Swelling present.      Cervical back: No rigidity.      Right lower leg: Edema present.      Left lower leg: Edema (patient with ulcer) present.   Lymphadenopathy:      Cervical: No cervical adenopathy.   Skin:     Capillary Refill: Capillary refill takes less than 2 seconds.      Coloration: Skin is not jaundiced or pale.      Findings: No bruising.   Neurological:      Mental Status: He is oriented to person, place, and time.         Fluids    Intake/Output Summary (Last 24 hours) at 12/20/2024 1030  Last data filed at 12/20/2024 0800  Gross per 24 hour   Intake 878.65 ml   Output 3475 ml   Net -2596.35 ml       Laboratory  Recent Labs     12/19/24  0552 12/20/24  0200   WBC 12.8* 10.0   RBC 3.79* 3.62*   HEMOGLOBIN 10.8* 10.0*   HEMATOCRIT 34.1* 31.1*   MCV 90.0 85.9   MCH 28.5 27.6   MCHC 31.7* 32.2*   RDW 57.4* 52.6*   PLATELETCT 193 157*   MPV 10.0 11.8     Recent Labs     12/19/24  0552 12/19/24  1620 12/20/24  0200   SODIUM 141 139 142   POTASSIUM 4.3 4.3 3.8   CHLORIDE 104 100 104   CO2 18* 23 23   GLUCOSE 53* 79 78   BUN 34* 40* 37*   CREATININE 2.30* 2.62* 2.10*   CALCIUM 7.6* 7.9* 7.4*     Recent Labs     12/19/24  1100   INR 2.79*               Imaging  DX-CHEST-PORTABLE (1 VIEW)   Final Result         1.  Hazy left pulmonary infiltrates, similar to prior study   2.  Trace left pleural effusion, stable   3.  Cardiomegaly   4.  Atherosclerosis      EC-ECHOCARDIOGRAM COMPLETE W/O CONT   Final Result      CT-CHEST,ABDOMEN,PELVIS WITH   Final Result      1. Stable spiculated opacity in the right upper lobe, extending to the pleural surface.   2. Lingular and left lower lobe parenchymal airspace disease has improved in the interval, but has not completely resolved. There is pleural reaction, suggesting some of these areas may represent parenchymal scarring.   3.  Asymmetry of the lung volumes, small on the left than the right.   4. Cardiomegaly with atherosclerotic calcifications in the aorta and coronary arteries.   5. Postsurgical changes in the stomach.   6. Stable scattered multiple hypodense lesions throughout the liver.   7. Moderate ascites throughout the abdomen and pelvis.   8. Cachexia.   9. Enlarged left inguinal lymph nodes with central necrosis. There is edema involving the soft tissues of the upper left leg extending to the left pelvis.      US-EXTREMITY VENOUS LOWER BILAT   Final Result      DX-CHEST-PORTABLE (1 VIEW)   Final Result         1.  Hazy left pulmonary infiltrates.   2.  Small left pleural effusion   3.  Cardiomegaly           Assessment/Plan  Problem Representation:    Acute CHF exacerbation   Patient presented with afib RVR, also mentioned he had missed doses of lasix.Patient was given Bipap in the ED. Given lasix 80 mg IV in the ED. Patient output was -2647.   -Patient getting another dose lasix 40 mg IV   -monitor ins/out, with carter catheter   -daily weights   -low salt diet  -continue to monitor Cr and CO2    AHRF  Most likely from CHF exacerbation, got Bipap in the ED, patient procal also elevated concerning for CAP, patient already on empiric antibiotics   -continue another dose of lasix   -continue antibiotics Azithromycin and Unasyn   -O2 supplementation     NIRMAL   Most likely from CHF exacerbation. Cr baseline at 0.75 currently at 2.10 (improving)   -continue to monitor Cr, BUN levels, specially because patient still getting diuresis.     Sepsis  Could be secondary to skin/soft tissue vs PNA, patient procal elevated. SIRS criteria was positive.   -Empiric antibiotics  -keep trending lactic acid specially if increasing levels  -patient getting IV fluids (caution due to CHF)     Pulmonary HTN   Patient with a RVSP of 65  -Lasix for fluid overload    AFIB with RVR  -continue with another dose of digoxin (already received one during  admission)   -continue to monitor   -on apixaban     Malignant neoplasm of body of stomach  T2, N0, M0 invasive gastric adenocarcinoma presenting as perforated  10/2024  -GDA embolization  -10/7 subtotal gastrectomy, liver wedge resection, Miguel Angel-en-Y gastrojejunostomy with omental flap and cholecystectomy  -pathology invasive well-differentiated adenocarcinoma with associated ulceration  -tumor invasion into the muscularis propria and 20 lymph nodes were negative for metastatic carcinoma  -Oncology was consulted and noted no evidence of metastatic disease  -MRI abdomen recommended for follow-up; no adjuvant therapy was recommended    Wound of the lower extremity   Patient with chronic stasis   -wound care following    COPD  -RT protocol   -inhalers if needed     Tobacco dependence   Nicotine FDA treatment placed     HTN  Patient currently hypotensive, consider restarting medications once improvement       VTE prophylaxis: Apixaban     I have performed a physical exam and reviewed and updated ROS and Plan today (12/20/2024). In review of yesterday's note (12/19/2024), there are no changes except as documented above.

## 2024-12-20 NOTE — PROGRESS NOTES
12 hour chart check complete.     Monitoring Summary:  Rhythm: a. fib 110-150s bpm  Ectopy: PVC(o)

## 2024-12-20 NOTE — PROGRESS NOTES
Lab called with critical result of Procal 64.0 at 1100. Critical lab result read back to .   Dr. Garcia notified of critical lab result at 1101.  Critical lab result read back by Dr. Garcia.

## 2024-12-20 NOTE — CARE PLAN
The patient is Watcher - Medium risk of patient condition declining or worsening    Shift Goals  Clinical Goals: Hemodynamics  Patient Goals: Rest  Family Goals: MARCOS    Progress made toward(s) clinical / shift goals:    Problem: Knowledge Deficit - Standard  Goal: Patient and family/care givers will demonstrate understanding of plan of care, disease process/condition, diagnostic tests and medications  Outcome: Progressing     Problem: Hemodynamics  Goal: Patient's hemodynamics, fluid balance and neurologic status will be stable or improve  Outcome: Progressing     Problem: Fluid Volume  Goal: Fluid volume balance will be maintained  Outcome: Progressing     Problem: Pain - Standard  Goal: Alleviation of pain or a reduction in pain to the patient’s comfort goal  Outcome: Progressing     Problem: Fall Risk  Goal: Patient will remain free from falls  Outcome: Progressing       Patient is not progressing towards the following goals:

## 2024-12-20 NOTE — PROGRESS NOTES
Critical Care Progress Note    Date of admission  12/19/2024    Chief Complaint  74 y.o. male admitted 12/19/2024 with PMH homelessness, atrial fibrillation on chronic anticoagulation with apixaban, CHF, EF 30%, P.Htn, RVSP 65, remote PE, recent hospitalization 10/3 - 10/15 for perforated  (T2N0M0 invasive gastric adenocarcinoma), GDA embolization, subsequent subtotal gastrectomy, liver wedge resection, Miguel Angel-en-Y gastrojejunostomy with omental flap and cholecystectomy 10/7 with pathology showing invasive well-differentiated adenocarcinoma with associated ulceration.  There was tumor invasion into the muscularis propria and 20 lymph nodes were negative for metastatic carcinoma.  Oncology was consulted and noted no evidence of metastatic disease however MRI abdomen recommended for follow-up; no adjuvant therapy was recommended.  He presented 12/19/2024 to the ED with report of several days of increasing dyspnea, worsening bilateral lower extremity edema and mild cough.  He denies fevers or chills, abdominal pain or diarrhea.  He has had no evidence of bleeding.  He has been running out of his medication and has not taken his Lasix but has continued to take his Eliquis daily.  He denied chest pain.  He was tachycardic with irregular wide-complex rhythm, previously noted atrial fibrillation with conduction delay again noted.  Troponin 57, proBNP 21,696, lactic acid 5.6, creatinine 2.30 up from previous normal of 0.75, glucose 53.  He received D50 and was placed on BiPAP in the ED.  Taken from Dr Garcia.     Hospital Course  12/19 admitted to ICU for respiratory failure mixed shock septic and underlying cardiogenic    Interval Problem Update  Reviewed last 24 hour events:  Neuro: aox4 follows commands, left leg pain, intact  HR: afib 105-110  SBP:  norepi 0.02  Tmax: 38.9  GI: Cardiac diet, BM 12/20  UOP: 1.8L  Lines: 2 peripheral IV  Resp: 4l n/c no further need bipap  Vte: eliquis  PPI/H2: home ppi  Antibx:  Vanco loaded one dose and unasyn 2/10 GPC 2/2 blood   -1.2L net -2.6L  Mag, klyte replaced  Digoxin 250 mcg IV  Repeat Cx tomorrow  Regular diet  Orthopedic consult    Review of Systems  Review of Systems   Constitutional:  Positive for malaise/fatigue. Negative for fever.   Respiratory:  Negative for shortness of breath.    Cardiovascular:  Negative for chest pain.   Gastrointestinal:  Negative for abdominal pain, nausea and vomiting.   Musculoskeletal:  Positive for joint pain.   Neurological:  Negative for sensory change, seizures, weakness and headaches.   Psychiatric/Behavioral:  Negative for depression and substance abuse.         Vital Signs for last 24 hours   Temp:  [37.1 °C (98.8 °F)-38 °C (100.4 °F)] 37.4 °C (99.3 °F)  Pulse:  [102-132] 122  Resp:  [12-34] 14  BP: ()/(47-83) 100/59  SpO2:  [29 %-100 %] 100 %    Hemodynamic parameters for last 24 hours       Respiratory Information for the last 24 hours       Physical Exam   Physical Exam  Vitals and nursing note reviewed.   Constitutional:       Appearance: He is ill-appearing.      Comments: Chronic ill appearing male sitting up in bed on low flow oxygen   HENT:      Head: Normocephalic.      Mouth/Throat:      Mouth: Mucous membranes are moist.   Eyes:      Pupils: Pupils are equal, round, and reactive to light.   Cardiovascular:      Rate and Rhythm: Tachycardia present. Rhythm irregular.      Heart sounds: No murmur heard.  Pulmonary:      Effort: No respiratory distress.      Breath sounds: Rhonchi present. No wheezing.   Abdominal:      General: There is no distension.      Palpations: There is no mass.      Tenderness: There is no abdominal tenderness.      Hernia: No hernia is present.      Comments: Midline upper abdominal scar, no rebound or guarding   Musculoskeletal:         General: Swelling present.      Right lower leg: Edema present.      Left lower leg: Edema present.      Comments: Acute warmth and tenderness to left leg/calf with  large bulae, improved lower ext edema with wrinkles to toes   Skin:     Coloration: Skin is not jaundiced.   Neurological:      General: No focal deficit present.      Mental Status: He is alert and oriented to person, place, and time.      Cranial Nerves: No cranial nerve deficit.      Sensory: No sensory deficit.      Motor: No weakness.      Coordination: Coordination normal.   Psychiatric:         Mood and Affect: Mood normal.         Medications  Current Facility-Administered Medications   Medication Dose Route Frequency Provider Last Rate Last Admin    dextrose 10% infusion   Intravenous Continuous Kwaku Giron D.O. 40 mL/hr at 12/20/24 1051 Rate Verify at 12/20/24 1051    norepinephrine (Levophed) 8 mg in 250 mL NS infusion (premix)  0-1 mcg/kg/min Intravenous Continuous Ramu Romano M.D.   Dose not Required at 12/20/24 1000    thiamine (B-1) injection 200 mg  200 mg Intravenous Q12HRS Ramu Romano M.D.        apixaban (Eliquis) tablet 5 mg  5 mg Oral BID Hector Garcia M.D.   5 mg at 12/20/24 0539    omeprazole (PriLOSEC) capsule 20 mg  20 mg Oral DAILY Hector Garcia M.D.   20 mg at 12/20/24 0539    dextrose 50% (D50W) injection 25 g  25 g Intravenous Q15 MIN PRN Hector Garcia M.D.   25 g at 12/20/24 0439    acetaminophen (Tylenol) tablet 650 mg  650 mg Oral Q6HRS PRN Hector Garcia M.D.   650 mg at 12/20/24 1112    oxyCODONE immediate-release (Roxicodone) tablet 2.5 mg  2.5 mg Oral Q3HRS PRN Hector Garcia M.D.   2.5 mg at 12/19/24 1128    Or    oxyCODONE immediate-release (Roxicodone) tablet 5 mg  5 mg Oral Q3HRS PRN Hector Garcia M.D.   5 mg at 12/19/24 2328    Or    HYDROmorphone (Dilaudid) injection 0.25 mg  0.25 mg Intravenous Q3HRS PRN Hector Garcia M.D.        ondansetron (Zofran) syringe/vial injection 4 mg  4 mg Intravenous Q4HRS PRN Hcetor Garcia M.D.        ondansetron (Zofran ODT) dispertab 4 mg  4 mg Oral Q4HRS PRN Hector Garcia M.D.        senna-docusate (Pericolace  Or Senokot S) 8.6-50 MG per tablet 2 Tablet  2 Tablet Oral Q EVENING Hector Garcia M.D.   2 Tablet at 12/19/24 1715    And    polyethylene glycol/lytes (Miralax) Packet 1 Packet  1 Packet Oral QDAY PRN Hector Garcia M.D.        nicotine (Nicoderm) 14 MG/24HR 14 mg  14 mg Transdermal Daily-0600 Hector Garcia M.D.   14 mg at 12/20/24 0539    And    nicotine polacrilex (Nicorette) 2 MG piece 2 mg  2 mg Oral Q HOUR PRN Hector Garcia M.D.        ampicillin/sulbactam (Unasyn) 3 g in  mL IVPB  3 g Intravenous Q6HR Ramu Romano M.D.   Stopped at 12/20/24 0830       Fluids    Intake/Output Summary (Last 24 hours) at 12/20/2024 1158  Last data filed at 12/20/2024 1119  Gross per 24 hour   Intake 1090.32 ml   Output 3475 ml   Net -2384.68 ml       Laboratory  Recent Labs     12/19/24  0707   ISTATAPH 7.389   ISTATAPCO2 40.1   ISTATAPO2 15*   ISTATATCO2 25*   KFMWTXO2XBU 19*   ISTATARTHCO3 24.2   ISTATARTBE -1   ISTATTEMP 97.0 F   ISTATFIO2 40   ISTATSPEC Arterial   ISTATAPHTC 7.402   GUEXKDFT7CY 14*     Recent Labs     12/19/24  1100   CPKTOTAL 392*     Recent Labs     12/19/24  0552 12/19/24  1100 12/19/24  1620 12/20/24  0200   SODIUM 141  --  139 142   POTASSIUM 4.3  --  4.3 3.8   CHLORIDE 104  --  100 104   CO2 18*  --  23 23   BUN 34*  --  40* 37*   CREATININE 2.30*  --  2.62* 2.10*   MAGNESIUM  --  1.8  --  1.9   PHOSPHORUS  --  4.7*  --  4.8*   CALCIUM 7.6*  --  7.9* 7.4*     Recent Labs     12/19/24  0552 12/19/24  1620 12/20/24  0200   ALTSGPT 17  --  21   ASTSGOT 36  --  31   ALKPHOSPHAT 76  --  51   TBILIRUBIN 1.1  --  1.0   GLUCOSE 53* 79 78     Recent Labs     12/19/24  0552 12/20/24  0200   WBC 12.8* 10.0   NEUTSPOLYS 80.70* 88.60*   LYMPHOCYTES 1.70* 4.10*   MONOCYTES 1.70 2.40   EOSINOPHILS 0.00 0.00   BASOPHILS 0.00 0.00   ASTSGOT 36 31   ALTSGPT 17 21   ALKPHOSPHAT 76 51   TBILIRUBIN 1.1 1.0     Recent Labs     12/19/24  0552 12/19/24  1100 12/20/24  0200   RBC 3.79*  --  3.62*   HEMOGLOBIN  10.8*  --  10.0*   HEMATOCRIT 34.1*  --  31.1*   PLATELETCT 193  --  157*   PROTHROMBTM  --  29.6*  --    INR  --  2.79*  --        Imaging  X-Ray:  I have personally reviewed the images and compared with prior images.  CT:    Reviewed  Echo:   Reviewed  Ultrasound:  Reviewed    Assessment/Plan  * Acute exacerbation of CHF (congestive heart failure) (HCC)- (present on admission)  Assessment & Plan  Due to afib rvr and recovering pna  Careful force diuresis with sepsis  Digoxin IV for reduced EF and BiV failure w/ afib  Now improved off Bipap    Sepsis (HCC)- (present on admission)  Assessment & Plan  This is Septic shock Present on admission  SIRS criteria identified on my evaluation include: Tachycardia, with heart rate greater than 90 BPM and Leukocytosis, with WBC greater than 12,000  Clinical indicators of end organ dysfunction include Lactic Acid greater than 2, Acute Respiratory Failure - (mechanical ventilation or BiPap or PaO2/FiO2 ratio < 300), and Acute Renal Failure, with a finding of creatinine greater than 2 (patient does not have ESRD or CKD  Indicators of septic shock include: Sepsis present and initial lactate level result is greater than or equal to 4mmol/L   Sources is: Pneumonia versus skin/soft tissue versus occult abdominal source with somewhat recent surgical intervention  Sepsis protocol initiated  Crystalloid Fluid Administration: Resuscitation volume of 0 ordered. Reason that resuscitation volume of less than 30ml/kg was ordered concern for causing harm given CHF  IV antibiotics as appropriate for source of sepsis  Reassessment: I have reassessed the patient's hemodynamic status    Leukocytosis and SIRS/sepsis does remain in the differential  Source left leg and bacteremia continue vancomycin and unasyn follow up final sensitivities  Repeat blood cx tomorrow for clearance    Bacteremia  Assessment & Plan  Gram + cocci 2/2  Await final speciation   Repeat Cx 12/21  Vancomycin dose  given        Pulmonary hypertension (HCC)  Assessment & Plan  Previous Echo RVSP 65, severe TR  Suspect mainly Group 2  RV perfusion with MAP > 65 as need norepinepinephrine  Force diuresis as able        Acute respiratory failure with hypoxia (HCC)- (present on admission)  Assessment & Plan  Improved off Bipap    Malignant neoplasm of body of stomach (HCC)- (present on admission)  Assessment & Plan  T2, N0, M0 invasive gastric adenocarcinoma presenting as perforated  10/2024  -GDA embolization  -10/7 subtotal gastrectomy, liver wedge resection, Miguel Angel-en-Y gastrojejunostomy with omental flap and cholecystectomy  -pathology invasive well-differentiated adenocarcinoma with associated ulceration  -tumor invasion into the muscularis propria and 20 lymph nodes were negative for metastatic carcinoma  -Oncology was consulted and noted no evidence of metastatic disease  -MRI abdomen recommended for follow-up; no adjuvant therapy was recommended    Homeless- (present on admission)  Assessment & Plan   consultation for ongoing access to medications and follow-up care as appropriate    Severe protein-calorie malnutrition (HCC)- (present on admission)  Assessment & Plan  Continued hypoglycemia now on D10 gtt, cortisol normal, continue good nutrition intake  Malnourished, cancer, bacteremia and poor glycogen reserves  Active EtOH use  HIV, hepatitis negative 10/24  High-dose thiamine, follow electrolytes, monitor for refeeding    COPD (chronic obstructive pulmonary disease) (HCC)- (present on admission)  Assessment & Plan  Current active smoker, no PFTs on file  RT protocols, bronchodilators as needed    Acute kidney injury (HCC)- (present on admission)  Assessment & Plan  Maintain euvolemia and monitor fluid responsiveness (avoid NaCL and renal congestion)  MAP > 65 uses pressors or inotropic trial  Monitor urine output and I&O's  Avoid and review nephrotoxin medication  Rule out post obstruction  Consider renal  U/S if no renal images  U/a and CPK    Ddx: ATN, prerenal, AIN, JOE, obstructive, SIRS induced, embolic, vascular, pulmonary renal syndromes, GBM  Consider: lasix stress test, stop negative inotrope (BB, CaB, Ace/ARB)    Improving, judicious fluid management with lasix    Atrial fibrillation with RVR ([facility])- (present on admission)  Assessment & Plan  Chronic atrial fibrillation, currently with RVR  Digoxin 250 mcg IV monitoring closely with flip to prevent digoxin toxicity  Optimize volume status atrial stretch and electrolytes  Due to adrenergic worsened due to sepsis  Continue eliquis    Tobacco dependence- (present on admission)  Assessment & Plan  Nicotine placement therapy and  regarding cessation as appropriate    Hypertension- (present on admission)  Assessment & Plan  Currently mildly hypotensive with decompensated systolic heart failure, hold metoprolol    Wound of lower extremity- (present on admission)  Assessment & Plan  Likely source of infection, lower ext doppler negative  Orthopedic consultation 12/20 for possible drainage of bullae and bacteremia  Low threshold for CT left lower ext         VTE:  NOAC  Ulcer: PPI  Lines: Paulino Catheter  Ongoing indication addressed    I have performed a physical exam and reviewed and updated ROS and Plan today (12/20/2024). In review of yesterday's note (12/19/2024), there are no changes except as documented above.     Discussed patient condition and risk of morbidity and/or mortality with RN, RT, Pharmacy, Charge nurse / hot rounds, Patient, and orthopedics    The patient remains critically ill from sepsis and bacteremia on dextrose infusion and norepinephrine gtt.  Critical care time = 67 minutes in directly providing and coordinating critical care and extensive data review.  No time overlap and excludes procedures.

## 2024-12-20 NOTE — PROGRESS NOTES
Hypoglycemia Intervention    Hypoglycemia protocol intervention:  Blood glucose 56 at 0404.  Intervention: 8 oz of fruit juice   Repeat blood glucose 30 at 0430.  Intervention: 25 g IV dextrose per MAR   Additional interventions needed:   Dr. Giron notified of the above at 0409.     0441:

## 2024-12-20 NOTE — PROGRESS NOTES
Lab called with critical result of blood cultures positive for gram positive cocci in chains, possible strep at 1820. Critical lab result read back to .   Dr. Giron notified of critical lab result at 1820.  Critical lab result read back by Dr. Giron.

## 2024-12-20 NOTE — PROGRESS NOTES
Pharmacy Vancomycin Kinetics Note for 12/19/2024     74 y.o. male on Vancomycin day # 1     Vancomycin Indication (AUC Dosing): Sepsis    Provider specified end date: 12/24/24    Active Antibiotics (From admission, onward)      Ordered     Ordering Provider       Thu Dec 19, 2024  4:18 PM    12/19/24 1618  vancomycin (Vancocin) 2,000 mg in  mL IVPB  (vancomycin (VANCOCIN) IV (LD + Maintenance))  ONCE         Hector Garcia M.D.    12/19/24 1618  vancomycin (Vancocin) 750 mg in  mL IVPB  (vancomycin (VANCOCIN) IV (LD + Maintenance))  EVERY 24 HOURS         Hector Garcia M.D.       Thu Dec 19, 2024  3:09 PM    12/19/24 1509  MD Alert...Vancomycin per Pharmacy  (MD Alert...Vancomycin per Pharmacy)  PHARMACY TO DOSE        Question:  Indication(s) for vancomycin?  Answer:  Skin and soft tissue infection    Hector Garcia M.D.       Thu Dec 19, 2024 10:01 AM    12/19/24 1001  ampicillin/sulbactam (Unasyn) 3 g in  mL IVPB  (CAP)  EVERY 6 HOURS         Hector Garcia M.D.    12/19/24 1001  azithromycin (Zithromax) tablet 500 mg  (CAP)  DAILY         Hector Garcia M.D.            Dosing Weight: 80 kg (176 lb 5.9 oz)      Admission History: Admitted on 12/19/2024 for Acute left-sided CHF (congestive heart failure) (HCC) [I50.1]  Pertinent history: Patient admitted with concerns for respiratory failure due to infection vs heart failure exacerbation, also noted to have lower extremity wound. Empiric vancomycin requested by provider pending further work up and processing of blood cultures.    Allergies:     Patient has no known allergies.     Pertinent cultures to date:     Results       Procedure Component Value Units Date/Time    MRSA By PCR (Amp) [[id number]]     Order Status: Canceled Specimen: Respirate from Nares     MRSA By PCR (Amp) [[id number]] Collected: 12/19/24 1100    Order Status: Completed Specimen: Respirate from Nares Updated: 12/19/24 1258     MRSA by PCR Negative    Blood Culture - Draw  one from central line and one from peripheral site [[id number]] Collected: 12/19/24 0747    Order Status: Completed Specimen: Blood from Peripheral Updated: 12/19/24 1208     Significant Indicator NEG     Source BLD     Site PERIPHERAL     Culture Result No Growth  Note: Blood cultures are incubated for 5 days and  are monitored continuously.Positive blood cultures  are called to the RN and reported as soon as  they are identified.      Blood Culture - Draw one from central line and one from peripheral site [[id number]] Collected: 12/19/24 0731    Order Status: Completed Specimen: Blood from Line Updated: 12/19/24 1208     Significant Indicator NEG     Source BLD     Site Peripheral     Culture Result No Growth  Note: Blood cultures are incubated for 5 days and  are monitored continuously.Positive blood cultures  are called to the RN and reported as soon as  they are identified.      Urinalysis [[id number]]  (Abnormal) Collected: 12/19/24 1100    Order Status: Completed Specimen: Urine Updated: 12/19/24 1155     Color Dark Yellow     Character Cloudy     Specific Gravity 1.021     Ph 5.0     Glucose Negative mg/dL      Ketones Trace mg/dL      Protein 100 mg/dL      Bilirubin Small     Urobilinogen, Urine 1.0 EU/dL      Nitrite Negative     Leukocyte Esterase Trace     Occult Blood Negative     Micro Urine Req Microscopic    Urine Culture (New) [[id number]] Collected: 12/19/24 1100    Order Status: Sent Specimen: Urine Updated: 12/19/24 1125            Labs:     Estimated Creatinine Clearance: 30.9 mL/min (A) (by C-G formula based on SCr of 2.3 mg/dL (H)).  Recent Labs     12/19/24  0552   WBC 12.8*   NEUTSPOLYS 80.70*   BANDSSTABS 10.90*     Recent Labs     12/19/24  0552   BUN 34*   CREATININE 2.30*   ALBUMIN 3.1*       Intake/Output Summary (Last 24 hours) at 12/19/2024 1618  Last data filed at 12/19/2024 1600  Gross per 24 hour   Intake 18.44 ml   Output 1100 ml   Net -1081.56 ml      BP (!) 87/56   Pulse (!) 117    Temp 37.3 °C (99.2 °F) (Temporal)   Resp (!) 22   Ht 1.829 m (6')   Wt 79.8 kg (176 lb)   SpO2 99%  Temp (24hrs), Av.4 °C (99.4 °F), Min:37.3 °C (99.2 °F), Max:37.6 °C (99.6 °F)      List concerns for Vancomycin clearance:     Age;CHF;Pressors/Hypotension;Receipt of contrast dye;Malnutrition/Low albumin;NIRMAL    Pharmacokinetics:     AUC kinetics:   Ke (hr ^-1): 0.0301 hr^-1  Half life: 23.03 hr  Clearance: 1.565  Estimated TDD: 782.5  Estimated Dose: 815  Estimated interval: 25    A/P:     -  Vancomycin dose: Give vanco load 2000 mg IV x1 followed by 750 mg IV [id number]    -  Next vancomycin level(s): 2-3 days (not currently ordered)    -  Predicted vancomycin AUC from initial AUC test calculator: 479 mg·hr/L    -  Comments: Empiric vancomycin initiated for concern of sepsis with unclear source. MRSA nares negative, blood and urine cultures in process. Will start once daily vancomycin regimen per AUC kinetics and monitor trends in renal function. Patient high risk for accumulation of vancomycin and may require switch to trough-based dosing if therapy continues beyond ~48 hours. Will follow culture results and recommend de-escalation of antibiotics if continued vanco therapy is no longer indicated.    Pharmacy will continue to follow.     Cinthya Diaz, PharmD, BCCCP

## 2024-12-20 NOTE — ASSESSMENT & PLAN NOTE
Gram + cocci 2/2 ->GAS  Repeat Cx 12/21  Vancomycin dose given  Unasyn and linezolid for toxin inhibition for severe leg cellulitits

## 2024-12-20 NOTE — CARE PLAN
The patient is Watcher - Medium risk of patient condition declining or worsening    Shift Goals  Clinical Goals: Hemodynamic stability, Monitor for hypoglycemia  Patient Goals: Rest  Family Goals: MARCOS    Problem: Pain - Standard  Goal: Alleviation of pain or a reduction in pain to the patient’s comfort goal  Description: Target End Date:  Prior to discharge or change in level of care    Document on Vitals flowsheet    1.  Document pain using the appropriate pain scale per order or unit policy  2.  Educate and implement non-pharmacologic comfort measures (i.e. relaxation, distraction, massage, cold/heat therapy, etc.)  3.  Pain management medications as ordered  4.  Reassess pain after pain med administration per policy  5.  If opiods administered assess patient's response to pain medication is appropriate per POSS sedation scale  6.  Follow pain management plan developed in collaboration with patient and interdisciplinary team (including palliative care or pain specialists if applicable)  Outcome: Progressing

## 2024-12-20 NOTE — PROGRESS NOTES
Notified by nursing staff that patient is having recurrent episodes of hypoglycemia, with recent BG in the 50s, provided juice and rechecked in 15 minutes now at 30.  Given multiple hypoglycemic spells with persistent hypoglycemia, will give D50 push and begin judicious D10 infusion at 20 cc/h (in the setting of concern for CHF exacerbation). Also will check BG hourly.

## 2024-12-21 ENCOUNTER — APPOINTMENT (OUTPATIENT)
Dept: RADIOLOGY | Facility: MEDICAL CENTER | Age: 74
End: 2024-12-21
Attending: INTERNAL MEDICINE
Payer: MEDICARE

## 2024-12-21 ENCOUNTER — APPOINTMENT (OUTPATIENT)
Dept: RADIOLOGY | Facility: MEDICAL CENTER | Age: 74
DRG: 871 | End: 2024-12-21
Attending: INTERNAL MEDICINE
Payer: MEDICARE

## 2024-12-21 LAB
ALBUMIN SERPL BCP-MCNC: 2.4 G/DL (ref 3.2–4.9)
ALBUMIN/GLOB SERPL: 0.9 G/DL
ALP SERPL-CCNC: 60 U/L (ref 30–99)
ALT SERPL-CCNC: 15 U/L (ref 2–50)
ANION GAP SERPL CALC-SCNC: 12 MMOL/L (ref 7–16)
ANISOCYTOSIS BLD QL SMEAR: ABNORMAL
AST SERPL-CCNC: 19 U/L (ref 12–45)
BACTERIA BLD CULT: ABNORMAL
BACTERIA UR CULT: NORMAL
BASO STIPL BLD QL SMEAR: NORMAL
BASOPHILS # BLD AUTO: 0 % (ref 0–1.8)
BASOPHILS # BLD: 0 K/UL (ref 0–0.12)
BILIRUB SERPL-MCNC: 1.1 MG/DL (ref 0.1–1.5)
BUN SERPL-MCNC: 31 MG/DL (ref 8–22)
BURR CELLS BLD QL SMEAR: NORMAL
CALCIUM ALBUM COR SERPL-MCNC: 9.1 MG/DL (ref 8.5–10.5)
CALCIUM SERPL-MCNC: 7.8 MG/DL (ref 8.5–10.5)
CHLORIDE SERPL-SCNC: 102 MMOL/L (ref 96–112)
CO2 SERPL-SCNC: 25 MMOL/L (ref 20–33)
CREAT SERPL-MCNC: 1.3 MG/DL (ref 0.5–1.4)
EOSINOPHIL # BLD AUTO: 0 K/UL (ref 0–0.51)
EOSINOPHIL NFR BLD: 0 % (ref 0–6.9)
ERYTHROCYTE [DISTWIDTH] IN BLOOD BY AUTOMATED COUNT: 52.6 FL (ref 35.9–50)
ETEST SENSITIVITY ETEST: NORMAL
GFR SERPLBLD CREATININE-BSD FMLA CKD-EPI: 57 ML/MIN/1.73 M 2
GLOBULIN SER CALC-MCNC: 2.6 G/DL (ref 1.9–3.5)
GLUCOSE BLD STRIP.AUTO-MCNC: 101 MG/DL (ref 65–99)
GLUCOSE BLD STRIP.AUTO-MCNC: 106 MG/DL (ref 65–99)
GLUCOSE BLD STRIP.AUTO-MCNC: 107 MG/DL (ref 65–99)
GLUCOSE BLD STRIP.AUTO-MCNC: 113 MG/DL (ref 65–99)
GLUCOSE BLD STRIP.AUTO-MCNC: 116 MG/DL (ref 65–99)
GLUCOSE BLD STRIP.AUTO-MCNC: 120 MG/DL (ref 65–99)
GLUCOSE BLD STRIP.AUTO-MCNC: 167 MG/DL (ref 65–99)
GLUCOSE BLD STRIP.AUTO-MCNC: 68 MG/DL (ref 65–99)
GLUCOSE BLD STRIP.AUTO-MCNC: 71 MG/DL (ref 65–99)
GLUCOSE BLD STRIP.AUTO-MCNC: 79 MG/DL (ref 65–99)
GLUCOSE BLD STRIP.AUTO-MCNC: 82 MG/DL (ref 65–99)
GLUCOSE BLD STRIP.AUTO-MCNC: 82 MG/DL (ref 65–99)
GLUCOSE BLD STRIP.AUTO-MCNC: 89 MG/DL (ref 65–99)
GLUCOSE BLD STRIP.AUTO-MCNC: 95 MG/DL (ref 65–99)
GLUCOSE SERPL-MCNC: 84 MG/DL (ref 65–99)
HCT VFR BLD AUTO: 30.5 % (ref 42–52)
HGB BLD-MCNC: 10 G/DL (ref 14–18)
LYMPHOCYTES # BLD AUTO: 0.2 K/UL (ref 1–4.8)
LYMPHOCYTES NFR BLD: 1.7 % (ref 22–41)
MACROCYTES BLD QL SMEAR: ABNORMAL
MAGNESIUM SERPL-MCNC: 1.9 MG/DL (ref 1.5–2.5)
MANUAL DIFF BLD: NORMAL
MCH RBC QN AUTO: 27.9 PG (ref 27–33)
MCHC RBC AUTO-ENTMCNC: 32.8 G/DL (ref 32.3–36.5)
MCV RBC AUTO: 85 FL (ref 81.4–97.8)
MONOCYTES # BLD AUTO: 0.2 K/UL (ref 0–0.85)
MONOCYTES NFR BLD AUTO: 1.7 % (ref 0–13.4)
MORPHOLOGY BLD-IMP: NORMAL
NEUTROPHILS # BLD AUTO: 11.4 K/UL (ref 1.82–7.42)
NEUTROPHILS NFR BLD: 96.6 % (ref 44–72)
NRBC # BLD AUTO: 0 K/UL
NRBC BLD-RTO: 0 /100 WBC (ref 0–0.2)
OVALOCYTES BLD QL SMEAR: NORMAL
PHOSPHATE SERPL-MCNC: 2.7 MG/DL (ref 2.5–4.5)
PLATELET # BLD AUTO: 105 K/UL (ref 164–446)
PLATELET BLD QL SMEAR: NORMAL
PMV BLD AUTO: 12.1 FL (ref 9–12.9)
POIKILOCYTOSIS BLD QL SMEAR: NORMAL
POTASSIUM SERPL-SCNC: 3.4 MMOL/L (ref 3.6–5.5)
PROT SERPL-MCNC: 5 G/DL (ref 6–8.2)
RBC # BLD AUTO: 3.59 M/UL (ref 4.7–6.1)
RBC BLD AUTO: PRESENT
SIGNIFICANT IND 70042: ABNORMAL
SIGNIFICANT IND 70042: ABNORMAL
SIGNIFICANT IND 70042: NORMAL
SITE SITE: ABNORMAL
SITE SITE: ABNORMAL
SITE SITE: NORMAL
SODIUM SERPL-SCNC: 139 MMOL/L (ref 135–145)
SOURCE SOURCE: ABNORMAL
SOURCE SOURCE: ABNORMAL
SOURCE SOURCE: NORMAL
TARGETS BLD QL SMEAR: NORMAL
WBC # BLD AUTO: 11.8 K/UL (ref 4.8–10.8)

## 2024-12-21 PROCEDURE — 700102 HCHG RX REV CODE 250 W/ 637 OVERRIDE(OP): Performed by: INTERNAL MEDICINE

## 2024-12-21 PROCEDURE — 85007 BL SMEAR W/DIFF WBC COUNT: CPT

## 2024-12-21 PROCEDURE — 700105 HCHG RX REV CODE 258: Performed by: INTERNAL MEDICINE

## 2024-12-21 PROCEDURE — 84100 ASSAY OF PHOSPHORUS: CPT

## 2024-12-21 PROCEDURE — 99291 CRITICAL CARE FIRST HOUR: CPT | Performed by: INTERNAL MEDICINE

## 2024-12-21 PROCEDURE — 82962 GLUCOSE BLOOD TEST: CPT

## 2024-12-21 PROCEDURE — 770022 HCHG ROOM/CARE - ICU (200)

## 2024-12-21 PROCEDURE — 83735 ASSAY OF MAGNESIUM: CPT

## 2024-12-21 PROCEDURE — 700111 HCHG RX REV CODE 636 W/ 250 OVERRIDE (IP): Mod: JZ | Performed by: INTERNAL MEDICINE

## 2024-12-21 PROCEDURE — 73701 CT LOWER EXTREMITY W/DYE: CPT | Mod: LT

## 2024-12-21 PROCEDURE — 85027 COMPLETE CBC AUTOMATED: CPT

## 2024-12-21 PROCEDURE — 99406 BEHAV CHNG SMOKING 3-10 MIN: CPT

## 2024-12-21 PROCEDURE — 71045 X-RAY EXAM CHEST 1 VIEW: CPT

## 2024-12-21 PROCEDURE — A9270 NON-COVERED ITEM OR SERVICE: HCPCS | Performed by: INTERNAL MEDICINE

## 2024-12-21 PROCEDURE — 700105 HCHG RX REV CODE 258

## 2024-12-21 PROCEDURE — 700111 HCHG RX REV CODE 636 W/ 250 OVERRIDE (IP): Performed by: INTERNAL MEDICINE

## 2024-12-21 PROCEDURE — 700117 HCHG RX CONTRAST REV CODE 255: Performed by: INTERNAL MEDICINE

## 2024-12-21 PROCEDURE — 80053 COMPREHEN METABOLIC PANEL: CPT

## 2024-12-21 PROCEDURE — 87040 BLOOD CULTURE FOR BACTERIA: CPT | Mod: 91

## 2024-12-21 RX ORDER — POTASSIUM CHLORIDE 1500 MG/1
40 TABLET, EXTENDED RELEASE ORAL ONCE
Status: COMPLETED | OUTPATIENT
Start: 2024-12-21 | End: 2024-12-21

## 2024-12-21 RX ORDER — MAGNESIUM SULFATE HEPTAHYDRATE 40 MG/ML
2 INJECTION, SOLUTION INTRAVENOUS ONCE
Status: COMPLETED | OUTPATIENT
Start: 2024-12-21 | End: 2024-12-21

## 2024-12-21 RX ADMIN — NICOTINE 14 MG: 14 PATCH TRANSDERMAL at 04:21

## 2024-12-21 RX ADMIN — OXYCODONE 5 MG: 5 TABLET ORAL at 09:49

## 2024-12-21 RX ADMIN — POTASSIUM CHLORIDE 40 MEQ: 1500 TABLET, EXTENDED RELEASE ORAL at 08:17

## 2024-12-21 RX ADMIN — DEXTROSE MONOHYDRATE: 100 INJECTION, SOLUTION INTRAVENOUS at 19:51

## 2024-12-21 RX ADMIN — THIAMINE HYDROCHLORIDE 200 MG: 100 INJECTION, SOLUTION INTRAMUSCULAR; INTRAVENOUS at 04:17

## 2024-12-21 RX ADMIN — LINEZOLID 600 MG: 600 TABLET, FILM COATED ORAL at 04:17

## 2024-12-21 RX ADMIN — APIXABAN 5 MG: 5 TABLET, FILM COATED ORAL at 04:16

## 2024-12-21 RX ADMIN — APIXABAN 5 MG: 5 TABLET, FILM COATED ORAL at 17:08

## 2024-12-21 RX ADMIN — THIAMINE HYDROCHLORIDE 200 MG: 100 INJECTION, SOLUTION INTRAMUSCULAR; INTRAVENOUS at 17:08

## 2024-12-21 RX ADMIN — AMPICILLIN AND SULBACTAM 3 G: 1; 2 INJECTION, POWDER, FOR SOLUTION INTRAMUSCULAR; INTRAVENOUS at 07:37

## 2024-12-21 RX ADMIN — OMEPRAZOLE 20 MG: 20 CAPSULE, DELAYED RELEASE ORAL at 04:17

## 2024-12-21 RX ADMIN — AMPICILLIN AND SULBACTAM 3 G: 1; 2 INJECTION, POWDER, FOR SOLUTION INTRAMUSCULAR; INTRAVENOUS at 13:14

## 2024-12-21 RX ADMIN — AMPICILLIN AND SULBACTAM 3 G: 1; 2 INJECTION, POWDER, FOR SOLUTION INTRAMUSCULAR; INTRAVENOUS at 20:34

## 2024-12-21 RX ADMIN — ACETAMINOPHEN 650 MG: 325 TABLET ORAL at 16:27

## 2024-12-21 RX ADMIN — AMPICILLIN AND SULBACTAM 3 G: 1; 2 INJECTION, POWDER, FOR SOLUTION INTRAMUSCULAR; INTRAVENOUS at 02:00

## 2024-12-21 RX ADMIN — OXYCODONE 5 MG: 5 TABLET ORAL at 21:34

## 2024-12-21 RX ADMIN — SENNOSIDES AND DOCUSATE SODIUM 2 TABLET: 50; 8.6 TABLET ORAL at 17:08

## 2024-12-21 RX ADMIN — IOHEXOL 100 ML: 350 INJECTION, SOLUTION INTRAVENOUS at 11:39

## 2024-12-21 RX ADMIN — MAGNESIUM SULFATE HEPTAHYDRATE 2 G: 2 INJECTION, SOLUTION INTRAVENOUS at 08:22

## 2024-12-21 RX ADMIN — DEXTROSE MONOHYDRATE: 100 INJECTION, SOLUTION INTRAVENOUS at 02:59

## 2024-12-21 RX ADMIN — LINEZOLID 600 MG: 600 TABLET, FILM COATED ORAL at 17:08

## 2024-12-21 ASSESSMENT — ENCOUNTER SYMPTOMS
DEPRESSION: 0
SEIZURES: 0
ABDOMINAL PAIN: 0
WEAKNESS: 0
SHORTNESS OF BREATH: 0
VOMITING: 0
FEVER: 0
NAUSEA: 0
HEADACHES: 0
SENSORY CHANGE: 0

## 2024-12-21 ASSESSMENT — PAIN DESCRIPTION - PAIN TYPE
TYPE: ACUTE PAIN;CHRONIC PAIN
TYPE: ACUTE PAIN
TYPE: ACUTE PAIN
TYPE: ACUTE PAIN;CHRONIC PAIN
TYPE: CHRONIC PAIN;ACUTE PAIN

## 2024-12-21 ASSESSMENT — LIFESTYLE VARIABLES: SUBSTANCE_ABUSE: 0

## 2024-12-21 NOTE — PROGRESS NOTES
MONITOR SUMMARY  Rate:110-130  Rhythm:A fib  Ectopy: Frequent PVCs  Measurements:      -/0.148/-

## 2024-12-21 NOTE — PROGRESS NOTES
Critical Care Progress Note    Date of admission  12/19/2024    Chief Complaint  74 y.o. male admitted 12/19/2024 with PMH homelessness, atrial fibrillation on chronic anticoagulation with apixaban, CHF, EF 30%, P.Htn, RVSP 65, remote PE, recent hospitalization 10/3 - 10/15 for perforated  (T2N0M0 invasive gastric adenocarcinoma), GDA embolization, subsequent subtotal gastrectomy, liver wedge resection, Miguel Angel-en-Y gastrojejunostomy with omental flap and cholecystectomy 10/7 with pathology showing invasive well-differentiated adenocarcinoma with associated ulceration.  There was tumor invasion into the muscularis propria and 20 lymph nodes were negative for metastatic carcinoma.  Oncology was consulted and noted no evidence of metastatic disease however MRI abdomen recommended for follow-up; no adjuvant therapy was recommended.  He presented 12/19/2024 to the ED with report of several days of increasing dyspnea, worsening bilateral lower extremity edema and mild cough.  He denies fevers or chills, abdominal pain or diarrhea.  He has had no evidence of bleeding.  He has been running out of his medication and has not taken his Lasix but has continued to take his Eliquis daily.  He denied chest pain.  He was tachycardic with irregular wide-complex rhythm, previously noted atrial fibrillation with conduction delay again noted.  Troponin 57, proBNP 21,696, lactic acid 5.6, creatinine 2.30 up from previous normal of 0.75, glucose 53.  He received D50 and was placed on BiPAP in the ED.  Taken from Dr Garcia.     Hospital Course  12/19 admitted to ICU for respiratory failure mixed shock septic and underlying cardiogenic  12/20 low dose norepi, hypoglycemia on D10 gtt, ortho consult    Interval Problem Update  Reviewed last 24 hour events:  Hypoglycemic overnight  Neuro: neuro intact, left leg pain  HR: afib 110-130's  SBP:  norepi 0.04 off levo this am  Tmax: 100.6  GI: regular diet  UOP: 4L  Lines: peripheral  IV  Resp: 1l n/c   Vte: eliquis  PPI/H2:ppi  Antibx: Unasyn and linezolid GAS in blood  -2.6L net -4.5L  Norepi goal 60  Blood cx x2  Replace k and mag  CT scan lower ext    Review of Systems  Review of Systems   Constitutional:  Negative for fever and malaise/fatigue.   Respiratory:  Negative for shortness of breath.    Cardiovascular:  Negative for chest pain.   Gastrointestinal:  Negative for abdominal pain, nausea and vomiting.   Musculoskeletal:  Positive for joint pain.   Neurological:  Negative for sensory change, seizures, weakness and headaches.   Psychiatric/Behavioral:  Negative for depression and substance abuse.         Vital Signs for last 24 hours   Temp:  [37.4 °C (99.4 °F)-38.1 °C (100.6 °F)] 37.7 °C (99.9 °F)  Pulse:  [103-126] 115  Resp:  [8-31] 23  BP: ()/(49-66) 106/64  SpO2:  [85 %-100 %] 98 %    Hemodynamic parameters for last 24 hours       Respiratory Information for the last 24 hours       Physical Exam   Physical Exam  Vitals and nursing note reviewed.   Constitutional:       Appearance: He is ill-appearing.      Comments: Chronic ill appearing male sitting up in bed on low flow oxygen   HENT:      Head: Normocephalic.      Mouth/Throat:      Mouth: Mucous membranes are moist.   Eyes:      Pupils: Pupils are equal, round, and reactive to light.   Cardiovascular:      Rate and Rhythm: Tachycardia present. Rhythm irregular.      Heart sounds: No murmur heard.  Pulmonary:      Effort: No respiratory distress.      Breath sounds: Rhonchi present. No wheezing.   Abdominal:      General: There is no distension.      Palpations: There is no mass.      Tenderness: There is no abdominal tenderness.      Hernia: No hernia is present.      Comments: Midline upper abdominal scar, no rebound or guarding   Musculoskeletal:         General: Swelling present.      Right lower leg: Edema present.      Left lower leg: Edema present.      Comments: Acute warmth and tenderness to left leg/calf with  large bulae now ruptured quite tender on exam weeping and open, improved lower ext edema with wrinkles to toes   Skin:     Coloration: Skin is not jaundiced.   Neurological:      General: No focal deficit present.      Mental Status: He is alert and oriented to person, place, and time.      Cranial Nerves: No cranial nerve deficit.      Sensory: No sensory deficit.      Motor: No weakness.      Coordination: Coordination normal.   Psychiatric:         Mood and Affect: Mood normal.         Medications  Current Facility-Administered Medications   Medication Dose Route Frequency Provider Last Rate Last Admin    dextrose 10% infusion   Intravenous Continuous Kwaku Giron D.O. 60 mL/hr at 12/21/24 0259 New Bag at 12/21/24 0259    norepinephrine (Levophed) 8 mg in 250 mL NS infusion (premix)  0-1 mcg/kg/min Intravenous Continuous Ramu Romano M.D.   Stopped at 12/21/24 0923    thiamine (B-1) injection 200 mg  200 mg Intravenous Q12HRS Ramu Romano M.D.   200 mg at 12/21/24 0417    linezolid (Zyvox) tablet 600 mg  600 mg Oral Q12HRS Ramu Romano M.D.   600 mg at 12/21/24 0417    apixaban (Eliquis) tablet 5 mg  5 mg Oral BID Hector Garcia M.D.   5 mg at 12/21/24 0416    omeprazole (PriLOSEC) capsule 20 mg  20 mg Oral DAILY Hector Garcia M.D.   20 mg at 12/21/24 0417    dextrose 50% (D50W) injection 25 g  25 g Intravenous Q15 MIN PRN Hector Garcia M.D.   25 g at 12/20/24 0439    acetaminophen (Tylenol) tablet 650 mg  650 mg Oral Q6HRS PRN Hector Garcia M.D.   650 mg at 12/20/24 1112    oxyCODONE immediate-release (Roxicodone) tablet 2.5 mg  2.5 mg Oral Q3HRS PRN Hector Garcia M.D.   2.5 mg at 12/19/24 1128    Or    oxyCODONE immediate-release (Roxicodone) tablet 5 mg  5 mg Oral Q3HRS PRN Hector Garcia M.D.   5 mg at 12/21/24 0949    Or    HYDROmorphone (Dilaudid) injection 0.25 mg  0.25 mg Intravenous Q3HRS PRN Hector Garcia M.D.        ondansetron (Zofran) syringe/vial injection 4 mg  4 mg  Intravenous Q4HRS PRN Hector Garcia M.D.        ondansetron (Zofran ODT) dispertab 4 mg  4 mg Oral Q4HRS PRN Hector Garcia M.D.        senna-docusate (Pericolace Or Senokot S) 8.6-50 MG per tablet 2 Tablet  2 Tablet Oral Q EVENING Hector Garcia M.D.   2 Tablet at 12/19/24 1715    And    polyethylene glycol/lytes (Miralax) Packet 1 Packet  1 Packet Oral QDAY PRN Hector Garcia M.D.        nicotine (Nicoderm) 14 MG/24HR 14 mg  14 mg Transdermal Daily-0600 Hector Garcia M.D.   14 mg at 12/21/24 0421    And    nicotine polacrilex (Nicorette) 2 MG piece 2 mg  2 mg Oral Q HOUR PRN Hector Garcia M.D.        ampicillin/sulbactam (Unasyn) 3 g in  mL IVPB  3 g Intravenous Q6HR Ramu Romano M.D.   Stopped at 12/21/24 0807       Fluids    Intake/Output Summary (Last 24 hours) at 12/21/2024 1139  Last data filed at 12/21/2024 0924  Gross per 24 hour   Intake 1759.17 ml   Output 3675 ml   Net -1915.83 ml       Laboratory  Recent Labs     12/19/24  0707   ISTATAPH 7.389   ISTATAPCO2 40.1   ISTATAPO2 15*   ISTATATCO2 25*   QJNWQLB3ETF 19*   ISTATARTHCO3 24.2   ISTATARTBE -1   ISTATTEMP 97.0 F   ISTATFIO2 40   ISTATSPEC Arterial   ISTATAPHTC 7.402   FPPCTUKC2BU 14*     Recent Labs     12/19/24  1100   CPKTOTAL 392*     Recent Labs     12/19/24  1100 12/19/24  1620 12/20/24  0200 12/21/24  0403   SODIUM  --  139 142 139   POTASSIUM  --  4.3 3.8 3.4*   CHLORIDE  --  100 104 102   CO2  --  23 23 25   BUN  --  40* 37* 31*   CREATININE  --  2.62* 2.10* 1.30   MAGNESIUM 1.8  --  1.9 1.9   PHOSPHORUS 4.7*  --  4.8* 2.7   CALCIUM  --  7.9* 7.4* 7.8*     Recent Labs     12/19/24  0552 12/19/24  1620 12/20/24  0200 12/21/24  0403   ALTSGPT 17  --  21 15   ASTSGOT 36  --  31 19   ALKPHOSPHAT 76  --  51 60   TBILIRUBIN 1.1  --  1.0 1.1   GLUCOSE 53* 79 78 84     Recent Labs     12/19/24  0552 12/20/24  0200 12/21/24  0403   WBC 12.8* 10.0 11.8*   NEUTSPOLYS 80.70* 88.60* 96.60*   LYMPHOCYTES 1.70* 4.10* 1.70*   MONOCYTES  1.70 2.40 1.70   EOSINOPHILS 0.00 0.00 0.00   BASOPHILS 0.00 0.00 0.00   ASTSGOT 36 31 19   ALTSGPT 17 21 15   ALKPHOSPHAT 76 51 60   TBILIRUBIN 1.1 1.0 1.1     Recent Labs     12/19/24  0552 12/19/24  1100 12/20/24  0200 12/21/24  0403   RBC 3.79*  --  3.62* 3.59*   HEMOGLOBIN 10.8*  --  10.0* 10.0*   HEMATOCRIT 34.1*  --  31.1* 30.5*   PLATELETCT 193  --  157* 105*   PROTHROMBTM  --  29.6*  --   --    INR  --  2.79*  --   --        Imaging  X-Ray:  I have personally reviewed the images and compared with prior images.  CT:    Reviewed  Echo:   Reviewed  Ultrasound:  Reviewed    Assessment/Plan  * Acute exacerbation of CHF (congestive heart failure) (HCC)- (present on admission)  Assessment & Plan  Due to afib rvr and recovering pna  Careful force diuresis with sepsis  Digoxin IV for reduced EF and BiV failure w/ afib  Now improved off Bipap    Sepsis (HCC)- (present on admission)  Assessment & Plan  This is Septic shock Present on admission  SIRS criteria identified on my evaluation include: Tachycardia, with heart rate greater than 90 BPM and Leukocytosis, with WBC greater than 12,000  Clinical indicators of end organ dysfunction include Lactic Acid greater than 2, Acute Respiratory Failure - (mechanical ventilation or BiPap or PaO2/FiO2 ratio < 300), and Acute Renal Failure, with a finding of creatinine greater than 2 (patient does not have ESRD or CKD  Indicators of septic shock include: Sepsis present and initial lactate level result is greater than or equal to 4mmol/L   Sources is: Pneumonia versus skin/soft tissue versus occult abdominal source with somewhat recent surgical intervention  Sepsis protocol initiated  Crystalloid Fluid Administration: Resuscitation volume of 0 ordered. Reason that resuscitation volume of less than 30ml/kg was ordered concern for causing harm given CHF  IV antibiotics as appropriate for source of sepsis  Reassessment: I have reassessed the patient's hemodynamic  status    Leukocytosis and SIRS/sepsis does remain in the differential  Source left leg and bacteremia continue vancomycin and unasyn follow up final sensitivities  Repeat blood cx tomorrow for clearance    Bacteremia  Assessment & Plan  Gram + cocci 2/2 ->GAS  Repeat Cx 12/21  Vancomycin dose given  Unasyn and linezolid for toxin inhibition for severe leg cellulitits        Pulmonary hypertension (HCC)  Assessment & Plan  Previous Echo RVSP 65, severe TR  Suspect mainly Group 2  RV perfusion with MAP > 65 as need norepinepinephrine  Force diuresis as able        Acute respiratory failure with hypoxia (HCC)- (present on admission)  Assessment & Plan  Improved off Bipap    Malignant neoplasm of body of stomach (HCC)- (present on admission)  Assessment & Plan  T2, N0, M0 invasive gastric adenocarcinoma presenting as perforated  10/2024  -GDA embolization  -10/7 subtotal gastrectomy, liver wedge resection, Miguel Angel-en-Y gastrojejunostomy with omental flap and cholecystectomy  -pathology invasive well-differentiated adenocarcinoma with associated ulceration  -tumor invasion into the muscularis propria and 20 lymph nodes were negative for metastatic carcinoma  -Oncology was consulted and noted no evidence of metastatic disease  -MRI abdomen recommended for follow-up; no adjuvant therapy was recommended    Homeless- (present on admission)  Assessment & Plan   consultation for ongoing access to medications and follow-up care as appropriate    Severe protein-calorie malnutrition (HCC)- (present on admission)  Assessment & Plan  Continued hypoglycemia now on D10 gtt, cortisol normal, continue good nutrition intake  Malnourished, cancer, bacteremia and poor glycogen reserves  Active EtOH use  HIV, hepatitis negative 10/24  High-dose thiamine, follow electrolytes, monitor for refeeding    COPD (chronic obstructive pulmonary disease) (HCC)- (present on admission)  Assessment & Plan  Current active smoker, no PFTs  on file  RT protocols, bronchodilators as needed    Acute kidney injury (HCC)- (present on admission)  Assessment & Plan  Resolved   Maintain euvolemia and monitor fluid responsiveness (avoid NaCL and renal congestion)  MAP > 65 uses pressors or inotropic trial  Monitor urine output and I&O's  Avoid and review nephrotoxin medication  Rule out post obstruction  Consider renal U/S if no renal images  U/a and CPK    Atrial fibrillation with RVR (HCC)- (present on admission)  Assessment & Plan  Chronic atrial fibrillation, currently with RVR  Digoxin 250 mcg IV monitoring closely with flip to prevent digoxin toxicity  Optimize volume status atrial stretch and electrolytes  Due to adrenergic worsened due to sepsis  Continue eliquis    Tobacco dependence- (present on admission)  Assessment & Plan  Nicotine placement therapy and  regarding cessation as appropriate    Hypertension- (present on admission)  Assessment & Plan  Currently mildly hypotensive with decompensated systolic heart failure, hold metoprolol    Wound of lower extremity- (present on admission)  Assessment & Plan  Likely source of infection, lower ext doppler negative  Orthopedic consultation 12/20 for possible drainage of bullae and bacteremia  Low threshold for CT left lower ext -> CT today           VTE:  NOAC  Ulcer: PPI  Lines: Paulino Catheter  Ongoing indication addressed    I have performed a physical exam and reviewed and updated ROS and Plan today (12/21/2024). In review of yesterday's note (12/20/2024), there are no changes except as documented above.     Discussed patient condition and risk of morbidity and/or mortality with RN, RT, Pharmacy, Charge nurse / hot rounds, Patient, and orthopedics    The patient remains critically ill from sepsis and bacteremia on dextrose infusion and norepinephrine gtt.  Critical care time = 54 minutes in directly providing and coordinating critical care and extensive data review.  No time overlap and excludes  procedures.

## 2024-12-21 NOTE — CARE PLAN
The patient is Stable - Low risk of patient condition declining or worsening    Shift Goals  Clinical Goals: MAP greater than 65  Patient Goals: comfort  Family Goals: MARCOS    Progress made toward(s) clinical / shift goals:    Problem: Hemodynamics  Goal: Patient's hemodynamics, fluid balance and neurologic status will be stable or improve  Description: Target End Date:  Prior to discharge or change in level of care    Document on Assessment and I/O flowsheet templates    1.  Monitor vital signs, pulse oximetry and cardiac monitor per provider order and/or policy  2.  Maintain blood pressure per provider order  3.  Hemodynamic monitoring per provider order  4.  Manage IV fluids and IV infusions  5.  Monitor intake and output  6.  Daily weights per unit policy or provider order  7.  Assess peripheral pulses and capillary refill  8.  Assess color and body temperature  9.  Position patient for maximum circulation/cardiac output  10. Monitor for signs/symptoms of excessive bleeding  11. Assess mental status, restlessness and changes in level of consciousness  12. Monitor temperature and report fever or hypothermia to provider immediately. Consideration of targeted temperature management.  Outcome: Progressing     Problem: Urinary - Renal Perfusion  Goal: Ability to achieve and maintain adequate renal perfusion and functioning will improve  Description: Target End Date:  Prior to discharge or change in level of care    Document on I/O and Assessment flowsheet    1.  Urine output will remain greater than 0.5ml/Kg/HR  2.  Monitor amount and/or characteristics of urine per order/policy. Specific gravity per order/policy  3.  Assess signs and symptoms of renal dysfunction  Outcome: Progressing     Problem: Physical Regulation  Goal: Diagnostic test results will improve  Description: Target End Date:  Prior to discharge or change in level of care    1.  Monitor lactic acid levels  2.  Monitor ABG's  3.  Monitor diagnostic test  results  Outcome: Progressing  Goal: Signs and symptoms of infection will decrease  Description: Target End Date:  Prior to discharge or change in level of care    1.  Remove potential routes of infection, such as central lines and urinary catheter  2.  Follow facility protocol for changing IV tubing and sites  3.  Collaborate with Infectious Disease  4.  Antibiotic therapy per provider order  5.  Note drug effects and monitor for antibiotic toxicity  Outcome: Progressing     Problem: Fall Risk  Goal: Patient will remain free from falls  Description: Target End Date:  Prior to discharge or change in level of care    Document interventions on the [facility] Fall Risk Assessment    1.  Assess for fall risk factors  2.  Implement fall precautions  Outcome: Progressing     Neuro intact. Pt still requiring levo for MAP >65.     Plan: Orthopedic consultation 12/20 for LLE fluid filled blister. MRI w/wo contrast to examine for decompressive fluid collection. Possible source for bacteremia? Pt continued to be hypoglycemic overnight, still requiring D10 @ 60cc/hr.

## 2024-12-21 NOTE — RESPIRATORY CARE
" SMOKING CESSATION EDUCATION by COPD CLINICAL EDUCATOR  12/21/2024 at 8:18 AM by Joslyn Loera, RRT     Smoking Cessation Intervention and education completed, 10 minutes spent on smoking cessation education with patient.  Provided smoking cessation packet with \"Tips to Quit\" and brochure for \"Free Smoking Cessation Classes\".   "

## 2024-12-21 NOTE — CONSULTS
Orthopaedic Surgery Consult    Date: 12/20/2024       History Present Illness    74 y.o. male complicated pmh including CHF EF 30% who presented 12/19/2024 to the ED with report of several days of increasing dyspnea, worsening bilateral lower extremity edema and mild cough.      Orthopedics consulted for new large fluid filled blister formation regional to baseline venous stasis skin changes to LLE. Concern that this is a source for his bacteremia.    Past Medical History:  Active Ambulatory Problems     Diagnosis Date Noted    Wound of lower extremity 08/26/2015    Hypertension 08/27/2015    Tobacco dependence 10/01/2015    Lower extremity edema 04/26/2020    Chronic systolic heart failure (HCC) 04/27/2020    Thrombocytopenia (HCC) 04/27/2020    Atrial fibrillation with RVR (HCC) 07/26/2021    Acute exacerbation of CHF (congestive heart failure) (HCC) 01/28/2023    Sepsis ([facility]) 01/29/2023    Tobacco use 01/29/2023    Acute kidney injury (HCC) 01/30/2023    COPD (chronic obstructive pulmonary disease) (HCC) 09/07/2024    ACP (advance care planning) 09/07/2024    Pneumonia 09/07/2024    Severe protein-calorie malnutrition (HCC) 09/11/2024    Perforated gastric ulcer (HCC) 10/03/2024    Cirrhosis of liver without ascites (HCC) 10/03/2024    Homeless 10/03/2024    Upper GI bleed 10/04/2024    Malignant neoplasm of body of stomach (HCC) 10/07/2024    Acute respiratory failure with hypoxia ([facility]) 10/28/2024     Resolved Ambulatory Problems     Diagnosis Date Noted    Anemia 08/27/2015    Hypokalemia 08/27/2015    Obesity (BMI 30-39.9) 10/01/2015    Elevated troponin 04/26/2020    Acute hypoxemic respiratory failure (HCC) 04/26/2020    Right upper lobe pulmonary infiltrate 04/26/2020    Atrial fibrillation with rapid ventricular response (HCC) 04/26/2020    Morbid obesity (HCC) 01/28/2023    Fluid overload 01/28/2023    Acute congestive heart failure, unspecified heart failure type (HCC) 01/29/2023    Upper GI bleed  01/30/2023    Intractable nausea and vomiting 09/07/2024    Tobacco abuse counseling 09/07/2024    Leukocytosis 09/07/2024    Lactic acidosis 09/07/2024    AP (abdominal pain) 09/07/2024    Gastrointestinal hemorrhage with melena 09/09/2024    Hypotension due to hypovolemia 09/11/2024    GI bleed 10/03/2024    Anemia due to acute blood loss 10/03/2024    Upper GI bleed 10/03/2024    Hemorrhagic shock (HCC) 10/03/2024    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    Hypophosphatemia 10/14/2024     Past Medical History:   Diagnosis Date    Arrhythmia     CHF (congestive heart failure) (HCC)     Congestive heart failure (HCC)        Past Surgical History:  Past Surgical History:   Procedure Laterality Date    GASTRECTOMY N/A 10/7/2024    Procedure: LAPAROTOMY, GASTRECTOMY-SUBTOTAL, WITH INTRAOPERATIVE ULTRASOUND GUIDANCE;  Surgeon: Mt Cabral M.D.;  Location: [facility];  Service: General    NODE DISSECTION N/A 10/7/2024    Procedure: LYMPHADENECTOMY-NODE DISSECTION;  Surgeon: Mt Cabral M.D.;  Location: SURGERY [facility];  Service: General    CREATION, FLAP, OMENTUM N/A 10/7/2024    Procedure: CREATION, FLAP, OMENTUM;  Surgeon: Mt Cabral M.D.;  Location: [facility];  Service: General    CHOLECYSTECTOMY N/A 10/7/2024    Procedure: CHOLECYSTECTOMY;  Surgeon: Mt Cabral M.D.;  Location: SURGERY [facility];  Service: General    DE UPPER GI ENDOSCOPY,DIAGNOSIS N/A 9/13/2024    Procedure: GASTROSCOPY;  Surgeon: Sarah Lozoya M.D.;  Location: SURGERY SAME DAY [facility];  Service: Gastroenterology    DE UPPER GI ENDOSCOPY,BIOPSY N/A 9/13/2024    Procedure: GASTROSCOPY, WITH BIOPSY;  Surgeon: Sarah Lozoya M.D.;  Location: SURGERY SAME DAY [facility];  Service: Gastroenterology    DE UPPER GI ENDOSCOPY,SCLER INJECT N/A 9/13/2024    Procedure: GASTROSCOPY, WITH SCLEROTHERAPY;  Surgeon: Sarah Lozoya M.D.;  Location: SURGERY SAME DAY  [facility];  Service: Gastroenterology    TX UPPER GI ENDOSCOPY,BIOPSY N/A 09/10/2024    Procedure: GASTROSCOPY, WITH BIOPSY;  Surgeon: Demond Gutierres M.D.;  Location: SURGERY SAME DAY [facility];  Service: Gastroenterology    TX UPPER GI ENDOSCOPY,SCLER INJECT N/A 09/10/2024    Procedure: GASTROSCOPY, WITH SCLEROTHERAPY;  Surgeon: Demond Gutierres M.D.;  Location: SURGERY SAME DAY [facility];  Service: Gastroenterology    GASTROSCOPY WITH ENDOSTAT N/A 09/10/2024    Procedure: EGD, WITH CAUTERIZATION;  Surgeon: Demond Gutierres M.D.;  Location: SURGERY SAME DAY [facility];  Service: Gastroenterology    TX UPPER GI ENDOSCOPY,DIAGNOSIS N/A 01/31/2023    Procedure: GASTROSCOPY;  Surgeon: Joy Mcnair M.D.;  Location: SURGERY SAME DAY [facility];  Service: Gastroenterology    CATARACT PHACO WITH IOL Left        Medications:  Medications Prior to Admission   Medication Sig Dispense Refill Last Dose/Taking    furosemide (LASIX) 20 MG Tab Take 1 Tablet by mouth 2 times a day. 60 Tablet 0 12/18/2024 Evening    omeprazole (PRILOSEC) 20 MG delayed-release capsule Take 1 Capsule by mouth every day. 30 Capsule 1 12/18/2024 Evening    metoprolol SR (TOPROL XL) 25 MG TABLET SR 24 HR Take 1 Tablet by mouth every day. 30 Tablet 1 12/18/2024 Evening    apixaban (ELIQUIS) 5mg Tab Take 1 Tablet by mouth 2 times a day. 60 Tablet 1 12/18/2024 Evening     Current Facility-Administered Medications   Medication Dose Route Frequency Provider Last Rate Last Admin    dextrose 10% infusion   Intravenous Continuous Kwaku Giron D.O. 40 mL/hr at 12/20/24 1051 Rate Verify at 12/20/24 1051    norepinephrine (Levophed) 8 mg in 250 mL NS infusion (premix)  0-1 mcg/kg/min Intravenous Continuous Ramu Romano M.D.   Dose not Required at 12/20/24 1000    thiamine (B-1) injection 200 mg  200 mg Intravenous Q12HRS Ramu Romano M.D.   200 mg at 12/20/24 1733    linezolid (Zyvox) tablet 600 mg  600 mg Oral Q12HRS Ramu Romano M.D.   600 mg at  12/20/24 1337    apixaban (Eliquis) tablet 5 mg  5 mg Oral BID Hector Gacria M.D.   5 mg at 12/20/24 1733    omeprazole (PriLOSEC) capsule 20 mg  20 mg Oral DAILY Hector Garcia M.D.   20 mg at 12/20/24 0539    dextrose 50% (D50W) injection 25 g  25 g Intravenous Q15 MIN PRN Hector Garcia M.D.   25 g at 12/20/24 0439    acetaminophen (Tylenol) tablet 650 mg  650 mg Oral Q6HRS PRN Hector Garcia M.D.   650 mg at 12/20/24 1112    oxyCODONE immediate-release (Roxicodone) tablet 2.5 mg  2.5 mg Oral Q3HRS PRN Hector Garcia M.D.   2.5 mg at 12/19/24 1128    Or    oxyCODONE immediate-release (Roxicodone) tablet 5 mg  5 mg Oral Q3HRS PRN Hector Garcia M.D.   5 mg at 12/20/24 1944    Or    HYDROmorphone (Dilaudid) injection 0.25 mg  0.25 mg Intravenous Q3HRS PRN Hector Garcia M.D.        ondansetron (Zofran) syringe/vial injection 4 mg  4 mg Intravenous Q4HRS PRN Hector Garcia M.D.        ondansetron (Zofran ODT) dispertab 4 mg  4 mg Oral Q4HRS PRN Hector Garcia M.D.        senna-docusate (Pericolace Or Senokot S) 8.6-50 MG per tablet 2 Tablet  2 Tablet Oral Q EVENING Hector Garcia M.D.   2 Tablet at 12/19/24 1715    And    polyethylene glycol/lytes (Miralax) Packet 1 Packet  1 Packet Oral QDAY PRN Hector Garcia M.D.        nicotine (Nicoderm) 14 MG/24HR 14 mg  14 mg Transdermal Daily-0600 Hector Garcia M.D.   14 mg at 12/20/24 0539    And    nicotine polacrilex (Nicorette) 2 MG piece 2 mg  2 mg Oral Q HOUR PRN Hector Garcia M.D.        ampicillin/sulbactam (Unasyn) 3 g in  mL IVPB  3 g Intravenous Q6HR Ramu Romano M.D.   Stopped at 12/20/24 1600       Allergies:  Patient has no known allergies.    Family History:  Family History   Problem Relation Age of Onset    Heart Disease Mother     Heart Disease Father        Social History:  Social History     Socioeconomic History    Marital status:    Tobacco Use    Smoking status: Some Days     Current packs/day: 1.00     Types:  Cigarettes    Smokeless tobacco: Never   Vaping Use    Vaping status: Never Used   Substance and Sexual Activity    Alcohol use: Yes     Alcohol/week: 1.2 oz     Types: 2 Cans of beer per week     Comment: occ    Drug use: Not Currently   Social History Narrative    ** Merged History Encounter **          Social Drivers of Health     Food Insecurity: No Food Insecurity (10/29/2024)    Hunger Vital Sign     Worried About Running Out of Food in the Last Year: Never true     Ran Out of Food in the Last Year: Never true   Transportation Needs: No Transportation Needs (10/29/2024)    PRAPARE - Transportation     Lack of Transportation (Medical): No     Lack of Transportation (Non-Medical): No   Recent Concern: Transportation Needs - Unmet Transportation Needs (10/5/2024)    PRAPARE - Transportation     Lack of Transportation (Medical): Yes     Lack of Transportation (Non-Medical): Yes   Intimate Partner Violence: Not At Risk (10/29/2024)    Humiliation, Afraid, Rape, and Kick questionnaire     Fear of Current or Ex-Partner: No     Emotionally Abused: No     Physically Abused: No     Sexually Abused: No   Housing Stability: High Risk (10/29/2024)    Housing Stability Vital Sign     Unable to Pay for Housing in the Last Year: Yes     Number of Times Moved in the Last Year: 3     Homeless in the Last Year: Yes       ROS:  Complete ROS performed. ROS otherwise negative except for pertinent positives, negatives noted above in HPI.    Physical Exam     Vitals:    12/20/24 1800   BP: (!) 85/53   Pulse: (!) 122   Resp: (!) 23   Temp: 37.9 °C (100.2 °F)   SpO2: (!) 87%       Physical Exam  General: NAD  Lungs: No increased work of breathing  Heart: Regular rate by palpation of peripheral pulse    LLE  Lymphedema  Chronic venous stasis skin changes  Large posterior leg fluid filled blister. No purulence noted  Regional cellulitis  NVID    Labs, Imaging   Imaging:   DX-CHEST-PORTABLE (1 VIEW)   Final Result         1.  Hazy left  pulmonary infiltrates, similar to prior study   2.  Trace left pleural effusion, stable   3.  Cardiomegaly   4.  Atherosclerosis      EC-ECHOCARDIOGRAM COMPLETE W/O CONT   Final Result      CT-CHEST,ABDOMEN,PELVIS WITH   Final Result      1. Stable spiculated opacity in the right upper lobe, extending to the pleural surface.   2. Lingular and left lower lobe parenchymal airspace disease has improved in the interval, but has not completely resolved. There is pleural reaction, suggesting some of these areas may represent parenchymal scarring.   3. Asymmetry of the lung volumes, small on the left than the right.   4. Cardiomegaly with atherosclerotic calcifications in the aorta and coronary arteries.   5. Postsurgical changes in the stomach.   6. Stable scattered multiple hypodense lesions throughout the liver.   7. Moderate ascites throughout the abdomen and pelvis.   8. Cachexia.   9. Enlarged left inguinal lymph nodes with central necrosis. There is edema involving the soft tissues of the upper left leg extending to the left pelvis.      US-EXTREMITY VENOUS LOWER BILAT   Final Result      DX-CHEST-PORTABLE (1 VIEW)   Final Result         1.  Hazy left pulmonary infiltrates.   2.  Small left pleural effusion   3.  Cardiomegaly      ZC-DYOIX-PEHMUX-WITH & W/O LEFT    (Results Pending)   DX-CHEST-PORTABLE (1 VIEW)    (Results Pending)       Laboratory Values  Recent Labs     12/19/24  0552 12/20/24  0200   WBC 12.8* 10.0   RBC 3.79* 3.62*   HEMOGLOBIN 10.8* 10.0*   HEMATOCRIT 34.1* 31.1*   MCV 90.0 85.9   MCH 28.5 27.6   MCHC 31.7* 32.2*   RDW 57.4* 52.6*   PLATELETCT 193 157*   MPV 10.0 11.8     Recent Labs     12/19/24  0552 12/19/24  1100 12/19/24  1620 12/20/24  0200   SODIUM 141  --  139 142   POTASSIUM 4.3  --  4.3 3.8   CHLORIDE 104  --  100 104   CO2 18*  --  23 23   GLUCOSE 53*  --  79 78   BUN 34*  --  40* 37*   CPKTOTAL  --  392*  --   --      Recent Labs     12/19/24  1100   INR 2.79*       Assessment    Orthopedics consulted for new large fluid filled blister formation regional to baseline venous stasis skin changes to LLE. Concern that this is a source for his bacteremia.    Plan   MRI with contrast LLE to examine for decompressible fluid collection. Current suspicion for this is low  Will consider operative intervention if fluid collection noted on advanced imaging  If no underlying fluid collection - wound care and IV antibiotics recommended  Will continue to follow    Signed:  Bryan Flores M.D., MD  12/20/2024 8:28 PM

## 2024-12-21 NOTE — CARE PLAN
The patient is Watcher - Medium risk of patient condition declining or worsening    Shift Goals  Clinical Goals: CT scan of leg, pain control, Q2 BG checks  Patient Goals: comfort  Family Goals: nilson    Progress made toward(s) clinical / shift goals:  Patient's CT scan was completed.       Problem: Hemodynamics  Goal: Patient's hemodynamics, fluid balance and neurologic status will be stable or improve  12/21/2024 1426 by David Tesfaye R.N.  Outcome: Progressing  12/21/2024 1424 by David Tesfaye R.N.  Outcome: Progressing     Problem: Respiratory  Goal: Patient will achieve/maintain optimum respiratory ventilation and gas exchange  12/21/2024 1426 by David Tesfaye R.N.  Outcome: Progressing  12/21/2024 1424 by LACY NoonanN.  Outcome: Progressing     Problem: Pain - Standard  Goal: Alleviation of pain or a reduction in pain to the patient’s comfort goal  12/21/2024 1426 by LACY NoonanN.  Outcome: Progressing  12/21/2024 1424 by LACY NoonanN.  Outcome: Progressing     Problem: Skin Integrity  Goal: Skin integrity is maintained or improved  Outcome: Progressing       Patient is not progressing towards the following goals:

## 2024-12-21 NOTE — PROGRESS NOTES
MD was made aware of blood glucose trends throughout the day and updates made per MAR to dextrose infusion

## 2024-12-22 PROBLEM — R78.81 STREPTOCOCCAL BACTEREMIA: Status: ACTIVE | Noted: 2024-12-22

## 2024-12-22 PROBLEM — E16.2 HYPOGLYCEMIA: Status: ACTIVE | Noted: 2024-12-22

## 2024-12-22 PROBLEM — B95.5 STREPTOCOCCAL BACTEREMIA: Status: ACTIVE | Noted: 2024-12-22

## 2024-12-22 LAB
ACANTHOCYTES BLD QL SMEAR: NORMAL
ALBUMIN SERPL BCP-MCNC: 2.2 G/DL (ref 3.2–4.9)
ALBUMIN/GLOB SERPL: 0.9 G/DL
ALP SERPL-CCNC: 103 U/L (ref 30–99)
ALT SERPL-CCNC: 15 U/L (ref 2–50)
ANION GAP SERPL CALC-SCNC: 9 MMOL/L (ref 7–16)
AST SERPL-CCNC: 24 U/L (ref 12–45)
BASOPHILS # BLD AUTO: 0 % (ref 0–1.8)
BASOPHILS # BLD: 0 K/UL (ref 0–0.12)
BILIRUB SERPL-MCNC: 1.4 MG/DL (ref 0.1–1.5)
BUN SERPL-MCNC: 29 MG/DL (ref 8–22)
BURR CELLS BLD QL SMEAR: NORMAL
CALCIUM ALBUM COR SERPL-MCNC: 9.6 MG/DL (ref 8.5–10.5)
CALCIUM SERPL-MCNC: 8.2 MG/DL (ref 8.5–10.5)
CHLORIDE SERPL-SCNC: 101 MMOL/L (ref 96–112)
CO2 SERPL-SCNC: 27 MMOL/L (ref 20–33)
CREAT SERPL-MCNC: 1.14 MG/DL (ref 0.5–1.4)
EOSINOPHIL # BLD AUTO: 0 K/UL (ref 0–0.51)
EOSINOPHIL NFR BLD: 0 % (ref 0–6.9)
ERYTHROCYTE [DISTWIDTH] IN BLOOD BY AUTOMATED COUNT: 53.1 FL (ref 35.9–50)
GFR SERPLBLD CREATININE-BSD FMLA CKD-EPI: 67 ML/MIN/1.73 M 2
GLOBULIN SER CALC-MCNC: 2.5 G/DL (ref 1.9–3.5)
GLUCOSE BLD STRIP.AUTO-MCNC: 107 MG/DL (ref 65–99)
GLUCOSE BLD STRIP.AUTO-MCNC: 110 MG/DL (ref 65–99)
GLUCOSE BLD STRIP.AUTO-MCNC: 134 MG/DL (ref 65–99)
GLUCOSE BLD STRIP.AUTO-MCNC: 71 MG/DL (ref 65–99)
GLUCOSE BLD STRIP.AUTO-MCNC: 75 MG/DL (ref 65–99)
GLUCOSE BLD STRIP.AUTO-MCNC: 78 MG/DL (ref 65–99)
GLUCOSE BLD STRIP.AUTO-MCNC: 83 MG/DL (ref 65–99)
GLUCOSE BLD STRIP.AUTO-MCNC: 84 MG/DL (ref 65–99)
GLUCOSE BLD STRIP.AUTO-MCNC: 89 MG/DL (ref 65–99)
GLUCOSE BLD STRIP.AUTO-MCNC: 93 MG/DL (ref 65–99)
GLUCOSE BLD STRIP.AUTO-MCNC: 99 MG/DL (ref 65–99)
GLUCOSE SERPL-MCNC: 98 MG/DL (ref 65–99)
HCT VFR BLD AUTO: 28.2 % (ref 42–52)
HGB BLD-MCNC: 9.1 G/DL (ref 14–18)
HYPOCHROMIA BLD QL SMEAR: ABNORMAL
LYMPHOCYTES # BLD AUTO: 0.38 K/UL (ref 1–4.8)
LYMPHOCYTES NFR BLD: 2.6 % (ref 22–41)
MAGNESIUM SERPL-MCNC: 1.9 MG/DL (ref 1.5–2.5)
MANUAL DIFF BLD: NORMAL
MCH RBC QN AUTO: 28 PG (ref 27–33)
MCHC RBC AUTO-ENTMCNC: 32.3 G/DL (ref 32.3–36.5)
MCV RBC AUTO: 86.8 FL (ref 81.4–97.8)
MONOCYTES # BLD AUTO: 0.87 K/UL (ref 0–0.85)
MONOCYTES NFR BLD AUTO: 6 % (ref 0–13.4)
MORPHOLOGY BLD-IMP: NORMAL
NEUTROPHILS # BLD AUTO: 13.25 K/UL (ref 1.82–7.42)
NEUTROPHILS NFR BLD: 91.4 % (ref 44–72)
NRBC # BLD AUTO: 0 K/UL
NRBC BLD-RTO: 0 /100 WBC (ref 0–0.2)
OVALOCYTES BLD QL SMEAR: NORMAL
PHOSPHATE SERPL-MCNC: 2 MG/DL (ref 2.5–4.5)
PLATELET # BLD AUTO: 60 K/UL (ref 164–446)
PLATELET BLD QL SMEAR: NORMAL
PLATELETS.RETICULATED NFR BLD AUTO: 11.6 % (ref 0.6–13.1)
POIKILOCYTOSIS BLD QL SMEAR: NORMAL
POTASSIUM SERPL-SCNC: 3.5 MMOL/L (ref 3.6–5.5)
PROT SERPL-MCNC: 4.7 G/DL (ref 6–8.2)
RBC # BLD AUTO: 3.25 M/UL (ref 4.7–6.1)
RBC BLD AUTO: PRESENT
SODIUM SERPL-SCNC: 137 MMOL/L (ref 135–145)
TARGETS BLD QL SMEAR: NORMAL
WBC # BLD AUTO: 14.5 K/UL (ref 4.8–10.8)

## 2024-12-22 PROCEDURE — 85027 COMPLETE CBC AUTOMATED: CPT

## 2024-12-22 PROCEDURE — 84100 ASSAY OF PHOSPHORUS: CPT

## 2024-12-22 PROCEDURE — A9270 NON-COVERED ITEM OR SERVICE: HCPCS | Performed by: INTERNAL MEDICINE

## 2024-12-22 PROCEDURE — 700102 HCHG RX REV CODE 250 W/ 637 OVERRIDE(OP): Performed by: INTERNAL MEDICINE

## 2024-12-22 PROCEDURE — 700111 HCHG RX REV CODE 636 W/ 250 OVERRIDE (IP): Mod: JZ | Performed by: INTERNAL MEDICINE

## 2024-12-22 PROCEDURE — 700105 HCHG RX REV CODE 258

## 2024-12-22 PROCEDURE — 80053 COMPREHEN METABOLIC PANEL: CPT

## 2024-12-22 PROCEDURE — 51798 US URINE CAPACITY MEASURE: CPT

## 2024-12-22 PROCEDURE — 85007 BL SMEAR W/DIFF WBC COUNT: CPT

## 2024-12-22 PROCEDURE — 82962 GLUCOSE BLOOD TEST: CPT | Mod: 91

## 2024-12-22 PROCEDURE — 83735 ASSAY OF MAGNESIUM: CPT

## 2024-12-22 PROCEDURE — 700105 HCHG RX REV CODE 258: Performed by: INTERNAL MEDICINE

## 2024-12-22 PROCEDURE — 85055 RETICULATED PLATELET ASSAY: CPT

## 2024-12-22 PROCEDURE — 770000 HCHG ROOM/CARE - INTERMEDIATE ICU *

## 2024-12-22 PROCEDURE — 700111 HCHG RX REV CODE 636 W/ 250 OVERRIDE (IP): Performed by: INTERNAL MEDICINE

## 2024-12-22 PROCEDURE — 700101 HCHG RX REV CODE 250

## 2024-12-22 PROCEDURE — 99291 CRITICAL CARE FIRST HOUR: CPT | Performed by: INTERNAL MEDICINE

## 2024-12-22 RX ORDER — MAGNESIUM SULFATE HEPTAHYDRATE 40 MG/ML
2 INJECTION, SOLUTION INTRAVENOUS ONCE
Status: COMPLETED | OUTPATIENT
Start: 2024-12-22 | End: 2024-12-22

## 2024-12-22 RX ORDER — DIGOXIN 0.25 MG/ML
125 INJECTION INTRAMUSCULAR; INTRAVENOUS DAILY
Status: DISCONTINUED | OUTPATIENT
Start: 2024-12-22 | End: 2024-12-26

## 2024-12-22 RX ADMIN — AMPICILLIN AND SULBACTAM 3 G: 1; 2 INJECTION, POWDER, FOR SOLUTION INTRAMUSCULAR; INTRAVENOUS at 13:52

## 2024-12-22 RX ADMIN — POTASSIUM PHOSPHATE, MONOBASIC AND POTASSIUM PHOSPHATE, DIBASIC 15 MMOL: 224; 236 INJECTION, SOLUTION, CONCENTRATE INTRAVENOUS at 05:59

## 2024-12-22 RX ADMIN — DIGOXIN 125 MCG: 250 INJECTION, SOLUTION INTRAMUSCULAR; INTRAVENOUS; PARENTERAL at 16:38

## 2024-12-22 RX ADMIN — APIXABAN 5 MG: 5 TABLET, FILM COATED ORAL at 17:31

## 2024-12-22 RX ADMIN — THIAMINE HYDROCHLORIDE 200 MG: 100 INJECTION, SOLUTION INTRAMUSCULAR; INTRAVENOUS at 17:31

## 2024-12-22 RX ADMIN — NICOTINE 14 MG: 14 PATCH TRANSDERMAL at 05:08

## 2024-12-22 RX ADMIN — POLYETHYLENE GLYCOL 3350 1 PACKET: 17 POWDER, FOR SOLUTION ORAL at 07:45

## 2024-12-22 RX ADMIN — AMPICILLIN AND SULBACTAM 3 G: 1; 2 INJECTION, POWDER, FOR SOLUTION INTRAMUSCULAR; INTRAVENOUS at 07:45

## 2024-12-22 RX ADMIN — LINEZOLID 600 MG: 600 TABLET, FILM COATED ORAL at 05:08

## 2024-12-22 RX ADMIN — OMEPRAZOLE 20 MG: 20 CAPSULE, DELAYED RELEASE ORAL at 05:09

## 2024-12-22 RX ADMIN — AMPICILLIN AND SULBACTAM 3 G: 1; 2 INJECTION, POWDER, FOR SOLUTION INTRAMUSCULAR; INTRAVENOUS at 02:18

## 2024-12-22 RX ADMIN — MAGNESIUM SULFATE HEPTAHYDRATE 2 G: 2 INJECTION, SOLUTION INTRAVENOUS at 16:37

## 2024-12-22 RX ADMIN — AMPICILLIN AND SULBACTAM 3 G: 1; 2 INJECTION, POWDER, FOR SOLUTION INTRAMUSCULAR; INTRAVENOUS at 19:40

## 2024-12-22 RX ADMIN — ONDANSETRON 4 MG: 2 INJECTION INTRAMUSCULAR; INTRAVENOUS at 13:53

## 2024-12-22 RX ADMIN — APIXABAN 5 MG: 5 TABLET, FILM COATED ORAL at 05:09

## 2024-12-22 RX ADMIN — THIAMINE HYDROCHLORIDE 200 MG: 100 INJECTION, SOLUTION INTRAMUSCULAR; INTRAVENOUS at 05:08

## 2024-12-22 RX ADMIN — SENNOSIDES AND DOCUSATE SODIUM 2 TABLET: 50; 8.6 TABLET ORAL at 17:31

## 2024-12-22 ASSESSMENT — ENCOUNTER SYMPTOMS
COUGH: 0
SHORTNESS OF BREATH: 0
PHOTOPHOBIA: 0
PALPITATIONS: 0
BACK PAIN: 0
DOUBLE VISION: 0
SPEECH CHANGE: 0
HEADACHES: 0
FEVER: 0
HALLUCINATIONS: 0
VOMITING: 0
ABDOMINAL PAIN: 0
EYE PAIN: 0
TINGLING: 0
WEIGHT LOSS: 0
MYALGIAS: 1
CHILLS: 0
FOCAL WEAKNESS: 0
TREMORS: 0
NECK PAIN: 0
CONSTIPATION: 0
DIZZINESS: 0
SENSORY CHANGE: 0
BLURRED VISION: 0
NAUSEA: 0
DIARRHEA: 0
ORTHOPNEA: 0
SPUTUM PRODUCTION: 0

## 2024-12-22 ASSESSMENT — PAIN DESCRIPTION - PAIN TYPE
TYPE: ACUTE PAIN
TYPE: ACUTE PAIN

## 2024-12-22 ASSESSMENT — LIFESTYLE VARIABLES: SUBSTANCE_ABUSE: 0

## 2024-12-22 ASSESSMENT — FIBROSIS 4 INDEX
FIB4 SCORE: 4.37
FIB4 SCORE: 7.64

## 2024-12-22 NOTE — PROGRESS NOTES
4 Eyes Skin Assessment Completed by Niya RN and      , RN.    Head WDL  Ears WDL  Nose WDL  Mouth WDL  Neck WDL  Breast/Chest WDL  Shoulder Blades WDL  Spine WDL  (R) Arm/Elbow/Hand WDL  (L) Arm/Elbow/Hand WDL  Abdomen WDL  Groin Redness and Blanching  Scrotum/Coccyx/Buttocks Redness, Blanching, and Excoriation  (R) Leg Edema  (L) Leg Weeping and Edema  (R) Heel/Foot/Toe WDL  (L) Heel/Foot/Toe WDL          Devices In Places ECG, Blood Pressure Cuff, Pulse Ox, and Nasal Cannula      Interventions In Place Gray Ear Foams, TAP System, Low Air Loss Mattress, Dri-Alan Pads, and Heels Loaded W/Pillows    Possible Skin Injury Yes    Pictures Uploaded Into Epic Yes  Wound Consult Placed Yes  RN Wound Prevention Protocol Ordered Yes

## 2024-12-22 NOTE — CONSULTS
Hospital Medicine Consultation    Date of Service  12/22/2024    Referring Physician  Ramu Romano M.D.     Consulting Physician  Suzette Camejo M.D.    Reason for Consultation  Transfer of care    History of Presenting Illness  74 y.o. male with past medical history of chronic atrial fibrillation, homeless, prior PE, congestive heart failure with biventricular dysfunction, pulmonary hypertension recently admitted for invasive gastric adenocarcinoma status post EGD embolization, cholecystectomy who presented to the hospital on 12/19/2024 with shortness of breath, left leg swelling and encephalopathy and found to hypoglycemia and he was hypoxic and was placed on BiPAP.  His symptoms of shortness of breath improved and encephalopathy also improved.    He also found to have sepsis due to GAS bacteremia most likely from left leg.    On December 22, 2024 Dr. Romano request to transfer care to hospitalist service.    I evaluated and examined him at the bedside.  He reported that he is feeling better.  He reported pain in his lower extremity.  He has a very poor oral intake and he is receiving dextrose 10% despite his blood glucose and 70s.  He transferred to [facility] and I am continuing due to our Accu-Cheks due to his ongoing hypoglycemia.    Review of Systems  Review of Systems   Constitutional:  Positive for malaise/fatigue. Negative for chills, fever and weight loss.   HENT:  Negative for hearing loss and tinnitus.    Eyes:  Negative for blurred vision, double vision, photophobia and pain.   Respiratory:  Negative for cough, sputum production and shortness of breath.    Cardiovascular:  Positive for leg swelling. Negative for chest pain, palpitations and orthopnea.   Gastrointestinal:  Negative for abdominal pain, constipation, diarrhea, nausea and vomiting.   Genitourinary:  Negative for dysuria, frequency and urgency.   Musculoskeletal:  Positive for myalgias. Negative for back pain, joint pain and neck  pain.   Skin:  Negative for rash.   Neurological:  Negative for dizziness, tingling, tremors, sensory change, speech change, focal weakness and headaches.   Psychiatric/Behavioral:  Negative for hallucinations and substance abuse.    All other systems reviewed and are negative.      Past Medical History   has a past medical history of Arrhythmia, CHF (congestive heart failure) (HCC), Congestive heart failure (HCC), and Hypertension.    Surgical History   has a past surgical history that includes pr upper gi endoscopy,diagnosis (N/A, 01/31/2023); pr upper gi endoscopy,biopsy (N/A, 09/10/2024); pr upper gi endoscopy,scler inject (N/A, 09/10/2024); gastroscopy with endostat (N/A, 09/10/2024); cataract phaco with iol (Left); pr upper gi endoscopy,diagnosis (N/A, 9/13/2024); pr upper gi endoscopy,biopsy (N/A, 9/13/2024); pr upper gi endoscopy,scler inject (N/A, 9/13/2024); gastrectomy (N/A, 10/7/2024); node dissection (N/A, 10/7/2024); creation, flap, omentum (N/A, 10/7/2024); and cholecystectomy (N/A, 10/7/2024).    Family History  family history includes Heart Disease in his father and mother.    Social History   reports that he has been smoking cigarettes. He has never used smokeless tobacco. He reports current alcohol use of about 1.2 oz of alcohol per week. He reports that he does not currently use drugs.    Medications  Prior to Admission Medications   Prescriptions Last Dose Informant Patient Reported? Taking?   apixaban (ELIQUIS) 5mg Tab 12/18/2024 Evening Patient No Yes   Sig: Take 1 Tablet by mouth 2 times a day.   furosemide (LASIX) 20 MG Tab 12/18/2024 Evening Patient No Yes   Sig: Take 1 Tablet by mouth 2 times a day.   metoprolol SR (TOPROL XL) 25 MG TABLET SR 24 HR 12/18/2024 Evening Patient No Yes   Sig: Take 1 Tablet by mouth every day.   omeprazole (PRILOSEC) 20 MG delayed-release capsule 12/18/2024 Evening Patient No Yes   Sig: Take 1 Capsule by mouth every day.      Facility-Administered Medications:  None       Allergies  No Known Allergies    Physical Exam  Temp:  [36.6 °C (97.8 °F)-38.4 °C (101.1 °F)] 36.6 °C (97.8 °F)  Pulse:  [107-129] 129  Resp:  [12-31] 31  BP: (79-98)/(49-60) 91/55  SpO2:  [88 %-99 %] 89 %    Physical Exam  Vitals reviewed.   Constitutional:       General: He is not in acute distress.     Appearance: He is ill-appearing.   HENT:      Head: Normocephalic and atraumatic. No contusion.      Right Ear: External ear normal.      Left Ear: External ear normal.      Nose: Nose normal.      Mouth/Throat:      Mouth: Mucous membranes are dry.      Pharynx: No oropharyngeal exudate.   Eyes:      General:         Right eye: No discharge.         Left eye: No discharge.      Pupils: Pupils are equal, round, and reactive to light.   Cardiovascular:      Rate and Rhythm: Normal rate. Rhythm irregular.      Heart sounds: No murmur heard.     No friction rub. No gallop.   Pulmonary:      Effort: Pulmonary effort is normal.      Breath sounds: No wheezing or rhonchi.   Abdominal:      General: Bowel sounds are normal. There is no distension.      Palpations: Abdomen is soft.      Tenderness: There is no abdominal tenderness. There is no rebound.   Musculoskeletal:         General: No swelling or tenderness. Normal range of motion.      Cervical back: No rigidity. No muscular tenderness.      Left lower leg: Edema (Dressing improved on discharge patient) present.   Skin:     General: Skin is warm and dry.      Coloration: Skin is not jaundiced.   Neurological:      General: No focal deficit present.      Mental Status: He is alert and oriented to person, place, and time.      Cranial Nerves: No cranial nerve deficit.      Sensory: No sensory deficit.      Comments: He is following commands and moving extremities decreased range of motion on left lower extremity   Psychiatric:         Mood and Affect: Mood normal.         Fluids  Date 12/22/24 0700 - 12/23/24 0659   Shift 1206-9931 9425-9557 5824-9420 24  Hour Total   INTAKE   P.O. 200   200   Shift Total 200   200   OUTPUT   Urine 210   210   Shift Total 210   210   Weight (kg) 77.8 77.8 77.8 77.8       Laboratory  Recent Labs     12/20/24  0200 12/21/24 0403 12/22/24 0318   WBC 10.0 11.8* 14.5*   RBC 3.62* 3.59* 3.25*   HEMOGLOBIN 10.0* 10.0* 9.1*   HEMATOCRIT 31.1* 30.5* 28.2*   MCV 85.9 85.0 86.8   MCH 27.6 27.9 28.0   MCHC 32.2* 32.8 32.3   RDW 52.6* 52.6* 53.1*   PLATELETCT 157* 105* 60*   MPV 11.8 12.1  --      Recent Labs     12/20/24 0200 12/21/24 0403 12/22/24 0318   SODIUM 142 139 137   POTASSIUM 3.8 3.4* 3.5*   CHLORIDE 104 102 101   CO2 23 25 27   GLUCOSE 78 84 98   BUN 37* 31* 29*   CREATININE 2.10* 1.30 1.14   CALCIUM 7.4* 7.8* 8.2*                     Imaging  CT-EXTREMITY, LOWER WITH LEFT   Final Result      No soft tissue gas to suggest necrotizing fasciitis.      No fluid collection identified.      DX-CHEST-PORTABLE (1 VIEW)   Final Result         1.  Hazy left pulmonary infiltrates, similar to prior study   2.  Trace left pleural effusion, stable   3.  Cardiomegaly   4.  Atherosclerosis      DX-CHEST-PORTABLE (1 VIEW)   Final Result         1.  Hazy left pulmonary infiltrates, similar to prior study   2.  Trace left pleural effusion, stable   3.  Cardiomegaly   4.  Atherosclerosis      EC-ECHOCARDIOGRAM COMPLETE W/O CONT   Final Result      CT-CHEST,ABDOMEN,PELVIS WITH   Final Result      1. Stable spiculated opacity in the right upper lobe, extending to the pleural surface.   2. Lingular and left lower lobe parenchymal airspace disease has improved in the interval, but has not completely resolved. There is pleural reaction, suggesting some of these areas may represent parenchymal scarring.   3. Asymmetry of the lung volumes, small on the left than the right.   4. Cardiomegaly with atherosclerotic calcifications in the aorta and coronary arteries.   5. Postsurgical changes in the stomach.   6. Stable scattered multiple hypodense lesions  throughout the liver.   7. Moderate ascites throughout the abdomen and pelvis.   8. Cachexia.   9. Enlarged left inguinal lymph nodes with central necrosis. There is edema involving the soft tissues of the upper left leg extending to the left pelvis.      US-EXTREMITY VENOUS LOWER BILAT   Final Result      DX-CHEST-PORTABLE (1 VIEW)   Final Result         1.  Hazy left pulmonary infiltrates.   2.  Small left pleural effusion   3.  Cardiomegaly      [id number] & W/O LEFT    (Results Pending)       Assessment/Plan  * Acute exacerbation of CHF (congestive heart failure) (HCC)- (present on admission)  Assessment & Plan  Due to afib rvr and recovering pna  Careful force diuresis with sepsis  Digoxin IV for reduced EF and BiV failure w/ afib  Now improved off Bipap  Maintain euvolemia.  Holding diuretic therapy due to ongoing hypotension    Hypoglycemia  Assessment & Plan  He found to hypoglycemia  He has very poor oral intake.  He has been receiving D10 infusion.  Continue D10 infusion and every 2 hours Accu-Cheks.    Streptococcal bacteremia- (present on admission)  Assessment & Plan  Found to have streptococcal bacteremia most likely secondary to lower extremity wound.  I am continuing IV Unasyn.      Bacteremia  Assessment & Plan  Gram + cocci 2/2 ->GAS  Repeat Cx 12/21  Vancomycin dose given  Continue IV Unasyn    Pulmonary hypertension (HCC)  Assessment & Plan  Previous Echo RVSP 65, severe TR  Suspect mainly Group 2  RV perfusion with MAP > 65 as need norepinepinephrine  Blood pressure is running on the lower side holding diuretic therapy at this time    Acute respiratory failure with hypoxia (HCC)- (present on admission)  Assessment & Plan  Improved off Bipap  Currently is on 0.5 L of oxygen to maintain oxygen saturation    Malignant neoplasm of body of stomach (HCC)- (present on admission)  Assessment & Plan  T2, N0, M0 invasive gastric adenocarcinoma presenting as perforated  10/2024  -GDA  embolization  -10/7 subtotal gastrectomy, liver wedge resection, Miguel Angel-en-Y gastrojejunostomy with omental flap and cholecystectomy  -pathology invasive well-differentiated adenocarcinoma with associated ulceration  -tumor invasion into the muscularis propria and 20 lymph nodes were negative for metastatic carcinoma  -Oncology was consulted and noted no evidence of metastatic disease  -MRI abdomen recommended for follow-up; no adjuvant therapy was recommended  He will need outpatient follow-up    Homeless- (present on admission)  Assessment & Plan   consultation for ongoing access to medications and follow-up care as appropriate.    Severe protein-calorie malnutrition (HCC)- (present on admission)  Assessment & Plan  Nutrition consult    COPD (chronic obstructive pulmonary disease) (HCC)- (present on admission)  Assessment & Plan  Current active smoker, no PFTs on file  RT protocols, bronchodilators as needed  No signs of exacerbation    Acute kidney injury (HCC)- (present on admission)  Assessment & Plan  Resolved   Maintain euvolemia and monitor fluid responsiveness (avoid NaCL and renal congestion)  MAP > 65 uses pressors or inotropic trial  Monitor urine output and I&O's  Avoid and review nephrotoxin medication  Rule out post obstruction  Consider renal U/S if no renal images  Continue to monitor closely    Sepsis (HCC)- (present on admission)  Assessment & Plan  This is Septic shock Present on admission  SIRS criteria identified on my evaluation include: Tachycardia, with heart rate greater than 90 BPM and Leukocytosis, with WBC greater than 12,000  Clinical indicators of end organ dysfunction include Lactic Acid greater than 2, Acute Respiratory Failure - (mechanical ventilation or BiPap or PaO2/FiO2 ratio < 300), and Acute Renal Failure, with a finding of creatinine greater than 2 (patient does not have ESRD or CKD  Indicators of septic shock include: Sepsis present and initial lactate level  result is greater than or equal to 4mmol/L   Sources is: Pneumonia versus skin/soft tissue versus occult abdominal source with somewhat recent surgical intervention  Sepsis protocol initiated  Crystalloid Fluid Administration: Resuscitation volume of 0 ordered. Reason that resuscitation volume of less than 30ml/kg was ordered concern for causing harm given CHF  IV antibiotics as appropriate for source of sepsis  Reassessment: I have reassessed the patient's hemodynamic status    Leukocytosis and SIRS/sepsis does remain in the differential  Continue IV Unasyn for bacteremia  Continue to monitor closely.    Atrial fibrillation with RVR (HCC)- (present on admission)  Assessment & Plan  Chronic atrial fibrillation, currently with RVR  Digoxin 250 mcg IV monitoring closely with flip to prevent digoxin toxicity  Optimize volume status atrial stretch and electrolytes  Due to adrenergic worsened due to sepsis  Continue eliquis  Holding metoprolol due to ongoing hypotension but  I ordered digoxin    Tobacco dependence- (present on admission)  Assessment & Plan  Counseling when appropriate    Hypertension- (present on admission)  Assessment & Plan  Currently is hypotensive holding metoprolol and goal-directed medical therapy    Wound of lower extremity- (present on admission)  Assessment & Plan  Likely source of infection, lower ext doppler negative  Orthopedic consultation 12/20 for possible drainage of bullae and bacteremia  I reviewed CT scan ocular lower extremity that did not show fluid collection or signs of necrotizing fasciitis  Continue IV antibiotics        Thank you for allow me to participate in patient care.  Hospitalist service will continue to follow this patient.    I reviewed and summarized patient hospitalization in this document.    I discussed plan of care with bedside RN in ICU.    I discussed plan of care with intensivist Dr. Romano    Patient is critically ill.   The patient continues to have: Critical  hypoglycemia  The vital organ system that is affected is the: Metabolic and central nervous system  If untreated there is a high chance of deterioration into: Seizure altered level of consciousness and cardiopulmonary arrest  And eventually death.   The critical care that I am providing today is: I am continuing dextrose 10% GGT and I am checking Accu-Chek every 2 hourly.  The critical that has been undertaken is medically complex.   There has been no overlap in critical care time.   Critical Care Time not including procedures: 37 minutes

## 2024-12-22 NOTE — PROGRESS NOTES
Transfer to [facility]:    74y M Homeless, chronic afib prior PE, CHF w/ BiV dysfunction, pulm htn, recently admitted 10/3-10/15 for invasive gastric adenocarcinoma, GDA embolization, cholecystectomy liver wedge and johan-en y gastrojejunostomy and omental flat. Presented with SOB, left leg pain swelling and encephalopathy was found hypoglycemic, hypoxic on bipap flip with volume overload.     Sepsis due to GAS bacteremia from leg source, chf improved with lasix, ongoing afib low 100's, still with ongoing IMCU needs with Q2 accucheck for hypoglycemia on dextrose gtt, no shock map goal > 60 renal function improved.     Discussed with Dr Camejo.     Ramu Romano MD  Critical Care Medicine

## 2024-12-22 NOTE — CARE PLAN
The patient is Stable - Low risk of patient condition declining or worsening    Shift Goals  Clinical Goals: wound consult, monitor blood sugar, MRI tib/fib  Patient Goals: rest  Family Goals: MARCOS    Progress made toward(s) clinical / shift goals:      Problem: Knowledge Deficit - Standard  Goal: Patient and family/care givers will demonstrate understanding of plan of care, disease process/condition, diagnostic tests and medications  Outcome: Progressing  Note: Pt aware of POC: wound consult, monitor blood sugar, MRI tib/fib. Goal is for pt to be updated in the moment.        Problem: Hemodynamics  Goal: Patient's hemodynamics, fluid balance and neurologic status will be stable or improve  Outcome: Progressing       Patient is not progressing towards the following goals:

## 2024-12-22 NOTE — CARE PLAN
"The patient is Watcher - Medium risk of patient condition declining or worsening    Shift Goals  Clinical Goals: blood glucose above 90, oxygen saturation greater than 90%  Patient Goals: comfort  Family Goals: MARCOS    Progress made toward(s) clinical / shift goals:    Problem: Hemodynamics  Goal: Patient's hemodynamics, fluid balance and neurologic status will be stable or improve  Outcome: Progressing  Note: Patient continues to be off levo. MAP greater than 60.      Problem: Pain - Standard  Goal: Alleviation of pain or a reduction in pain to the patient’s comfort goal  Outcome: Progressing  Note: Medication administered per MAR.      Problem: Skin Integrity  Goal: Skin integrity is maintained or improved  Outcome: Progressing  Note: Wound consult in place. Q2H turns, pillows for positioning, and barrier wipes/paste utilized.        Patient is not progressing towards the following goals:    Patient refused mobilization due to leg discomfort and leakage. Education provided on benefits of mobilization. Patient refused to completely be turned to the side to check for moisture fissures or changes around sacrum. Patient only allowed minimal turns with the pillow under his buttocks. Education provided on risks for pressure injuries without proper turning to off load the sacrum and charge RN notified. Patient states that \"I'm fine,\" and does not want to be moved excessively due to leg discomfort with movement.   "

## 2024-12-22 NOTE — PROGRESS NOTES
2040 notified resident, Dr. Santos, that the patient's blood glucose was 79 and received juice. Dr. Santos gave verbal orders to check the blood sugar again in an hour and if the blood glucose does not increase then start doing q1h blood glucose checks again, otherwise, continue with q2h blood glucose checks.

## 2024-12-23 LAB
ALBUMIN SERPL BCP-MCNC: 2.1 G/DL (ref 3.2–4.9)
ALBUMIN/GLOB SERPL: 0.8 G/DL
ALP SERPL-CCNC: 204 U/L (ref 30–99)
ALT SERPL-CCNC: 14 U/L (ref 2–50)
ANION GAP SERPL CALC-SCNC: 10 MMOL/L (ref 7–16)
ANISOCYTOSIS BLD QL SMEAR: ABNORMAL
AST SERPL-CCNC: 28 U/L (ref 12–45)
BASOPHILS # BLD AUTO: 0 % (ref 0–1.8)
BASOPHILS # BLD: 0 K/UL (ref 0–0.12)
BILIRUB SERPL-MCNC: 1.7 MG/DL (ref 0.1–1.5)
BUN SERPL-MCNC: 22 MG/DL (ref 8–22)
BURR CELLS BLD QL SMEAR: NORMAL
CALCIUM ALBUM COR SERPL-MCNC: 8.9 MG/DL (ref 8.5–10.5)
CALCIUM SERPL-MCNC: 7.4 MG/DL (ref 8.5–10.5)
CHLORIDE SERPL-SCNC: 95 MMOL/L (ref 96–112)
CK SERPL-CCNC: 65 U/L (ref 0–154)
CO2 SERPL-SCNC: 26 MMOL/L (ref 20–33)
CORTIS SERPL-MCNC: 14.3 UG/DL (ref 0–23)
CREAT SERPL-MCNC: 0.75 MG/DL (ref 0.5–1.4)
EOSINOPHIL # BLD AUTO: 0 K/UL (ref 0–0.51)
EOSINOPHIL NFR BLD: 0 % (ref 0–6.9)
ERYTHROCYTE [DISTWIDTH] IN BLOOD BY AUTOMATED COUNT: 52.6 FL (ref 35.9–50)
GFR SERPLBLD CREATININE-BSD FMLA CKD-EPI: 94 ML/MIN/1.73 M 2
GLOBULIN SER CALC-MCNC: 2.5 G/DL (ref 1.9–3.5)
GLUCOSE BLD STRIP.AUTO-MCNC: 100 MG/DL (ref 65–99)
GLUCOSE BLD STRIP.AUTO-MCNC: 114 MG/DL (ref 65–99)
GLUCOSE BLD STRIP.AUTO-MCNC: 115 MG/DL (ref 65–99)
GLUCOSE BLD STRIP.AUTO-MCNC: 58 MG/DL (ref 65–99)
GLUCOSE BLD STRIP.AUTO-MCNC: 74 MG/DL (ref 65–99)
GLUCOSE BLD STRIP.AUTO-MCNC: 78 MG/DL (ref 65–99)
GLUCOSE BLD STRIP.AUTO-MCNC: 84 MG/DL (ref 65–99)
GLUCOSE BLD STRIP.AUTO-MCNC: 88 MG/DL (ref 65–99)
GLUCOSE BLD STRIP.AUTO-MCNC: 91 MG/DL (ref 65–99)
GLUCOSE BLD STRIP.AUTO-MCNC: 99 MG/DL (ref 65–99)
GLUCOSE SERPL-MCNC: 202 MG/DL (ref 65–99)
HCT VFR BLD AUTO: 26.8 % (ref 42–52)
HGB BLD-MCNC: 8.8 G/DL (ref 14–18)
HYPOCHROMIA BLD QL SMEAR: ABNORMAL
LYMPHOCYTES # BLD AUTO: 0.37 K/UL (ref 1–4.8)
LYMPHOCYTES NFR BLD: 1.6 % (ref 22–41)
MACROCYTES BLD QL SMEAR: ABNORMAL
MAGNESIUM SERPL-MCNC: 2.1 MG/DL (ref 1.5–2.5)
MANUAL DIFF BLD: NORMAL
MCH RBC QN AUTO: 27.7 PG (ref 27–33)
MCHC RBC AUTO-ENTMCNC: 32.8 G/DL (ref 32.3–36.5)
MCV RBC AUTO: 84.3 FL (ref 81.4–97.8)
MONOCYTES # BLD AUTO: 0 K/UL (ref 0–0.85)
MONOCYTES NFR BLD AUTO: 0 % (ref 0–13.4)
MORPHOLOGY BLD-IMP: NORMAL
NEUTROPHILS # BLD AUTO: 22.63 K/UL (ref 1.82–7.42)
NEUTROPHILS NFR BLD: 98.4 % (ref 44–72)
NRBC # BLD AUTO: 0 K/UL
NRBC BLD-RTO: 0 /100 WBC (ref 0–0.2)
OVALOCYTES BLD QL SMEAR: NORMAL
PHOSPHATE SERPL-MCNC: 1.8 MG/DL (ref 2.5–4.5)
PLATELET # BLD AUTO: 53 K/UL (ref 164–446)
PLATELET BLD QL SMEAR: NORMAL
PLATELETS.RETICULATED NFR BLD AUTO: 17.5 % (ref 0.6–13.1)
POIKILOCYTOSIS BLD QL SMEAR: NORMAL
POTASSIUM SERPL-SCNC: 3.8 MMOL/L (ref 3.6–5.5)
PROT SERPL-MCNC: 4.6 G/DL (ref 6–8.2)
RBC # BLD AUTO: 3.18 M/UL (ref 4.7–6.1)
RBC BLD AUTO: PRESENT
SODIUM SERPL-SCNC: 131 MMOL/L (ref 135–145)
TARGETS BLD QL SMEAR: NORMAL
WBC # BLD AUTO: 23 K/UL (ref 4.8–10.8)
WBC TOXIC VACUOLES BLD QL SMEAR: NORMAL

## 2024-12-23 PROCEDURE — 700102 HCHG RX REV CODE 250 W/ 637 OVERRIDE(OP): Performed by: INTERNAL MEDICINE

## 2024-12-23 PROCEDURE — 97602 WOUND(S) CARE NON-SELECTIVE: CPT

## 2024-12-23 PROCEDURE — 99291 CRITICAL CARE FIRST HOUR: CPT | Performed by: INTERNAL MEDICINE

## 2024-12-23 PROCEDURE — 83735 ASSAY OF MAGNESIUM: CPT

## 2024-12-23 PROCEDURE — 82550 ASSAY OF CK (CPK): CPT

## 2024-12-23 PROCEDURE — 700111 HCHG RX REV CODE 636 W/ 250 OVERRIDE (IP): Mod: JZ | Performed by: INTERNAL MEDICINE

## 2024-12-23 PROCEDURE — 85027 COMPLETE CBC AUTOMATED: CPT

## 2024-12-23 PROCEDURE — 85055 RETICULATED PLATELET ASSAY: CPT

## 2024-12-23 PROCEDURE — 700105 HCHG RX REV CODE 258

## 2024-12-23 PROCEDURE — 770000 HCHG ROOM/CARE - INTERMEDIATE ICU *

## 2024-12-23 PROCEDURE — 700111 HCHG RX REV CODE 636 W/ 250 OVERRIDE (IP): Performed by: INTERNAL MEDICINE

## 2024-12-23 PROCEDURE — 84100 ASSAY OF PHOSPHORUS: CPT

## 2024-12-23 PROCEDURE — 700105 HCHG RX REV CODE 258: Performed by: INTERNAL MEDICINE

## 2024-12-23 PROCEDURE — 82533 TOTAL CORTISOL: CPT

## 2024-12-23 PROCEDURE — 700101 HCHG RX REV CODE 250: Performed by: INTERNAL MEDICINE

## 2024-12-23 PROCEDURE — 85007 BL SMEAR W/DIFF WBC COUNT: CPT

## 2024-12-23 PROCEDURE — 82962 GLUCOSE BLOOD TEST: CPT

## 2024-12-23 PROCEDURE — 80053 COMPREHEN METABOLIC PANEL: CPT

## 2024-12-23 PROCEDURE — A9270 NON-COVERED ITEM OR SERVICE: HCPCS | Performed by: INTERNAL MEDICINE

## 2024-12-23 RX ORDER — AMOXICILLIN 250 MG
2 CAPSULE ORAL EVERY EVENING
Status: DISCONTINUED | OUTPATIENT
Start: 2024-12-23 | End: 2024-12-25

## 2024-12-23 RX ORDER — POLYETHYLENE GLYCOL 3350 17 G/17G
1 POWDER, FOR SOLUTION ORAL 2 TIMES DAILY
Status: DISCONTINUED | OUTPATIENT
Start: 2024-12-23 | End: 2024-12-25

## 2024-12-23 RX ORDER — DEXTROSE MONOHYDRATE 100 MG/ML
INJECTION, SOLUTION INTRAVENOUS CONTINUOUS
Status: DISCONTINUED | OUTPATIENT
Start: 2024-12-23 | End: 2024-12-25

## 2024-12-23 RX ORDER — POLYETHYLENE GLYCOL 3350 17 G/17G
1 POWDER, FOR SOLUTION ORAL 2 TIMES DAILY
Status: DISCONTINUED | OUTPATIENT
Start: 2024-12-23 | End: 2024-12-23

## 2024-12-23 RX ORDER — THIAMINE HYDROCHLORIDE 100 MG/ML
100 INJECTION, SOLUTION INTRAMUSCULAR; INTRAVENOUS DAILY
Status: COMPLETED | OUTPATIENT
Start: 2024-12-23 | End: 2024-12-25

## 2024-12-23 RX ADMIN — OXYCODONE 5 MG: 5 TABLET ORAL at 11:42

## 2024-12-23 RX ADMIN — APIXABAN 5 MG: 5 TABLET, FILM COATED ORAL at 05:11

## 2024-12-23 RX ADMIN — DEXTROSE MONOHYDRATE: 100 INJECTION, SOLUTION INTRAVENOUS at 22:21

## 2024-12-23 RX ADMIN — AMPICILLIN AND SULBACTAM 3 G: 1; 2 INJECTION, POWDER, FOR SOLUTION INTRAMUSCULAR; INTRAVENOUS at 08:49

## 2024-12-23 RX ADMIN — APIXABAN 5 MG: 5 TABLET, FILM COATED ORAL at 17:31

## 2024-12-23 RX ADMIN — AMPICILLIN AND SULBACTAM 3 G: 1; 2 INJECTION, POWDER, FOR SOLUTION INTRAMUSCULAR; INTRAVENOUS at 01:46

## 2024-12-23 RX ADMIN — THIAMINE HYDROCHLORIDE 100 MG: 100 INJECTION, SOLUTION INTRAMUSCULAR; INTRAVENOUS at 10:53

## 2024-12-23 RX ADMIN — SENNOSIDES AND DOCUSATE SODIUM 2 TABLET: 50; 8.6 TABLET ORAL at 17:31

## 2024-12-23 RX ADMIN — AMPICILLIN AND SULBACTAM 3 G: 1; 2 INJECTION, POWDER, FOR SOLUTION INTRAMUSCULAR; INTRAVENOUS at 14:03

## 2024-12-23 RX ADMIN — POLYETHYLENE GLYCOL 3350 1 PACKET: 17 POWDER, FOR SOLUTION ORAL at 10:53

## 2024-12-23 RX ADMIN — OXYCODONE 5 MG: 5 TABLET ORAL at 22:42

## 2024-12-23 RX ADMIN — DEXTROSE MONOHYDRATE: 100 INJECTION, SOLUTION INTRAVENOUS at 04:19

## 2024-12-23 RX ADMIN — NICOTINE 14 MG: 14 PATCH TRANSDERMAL at 05:11

## 2024-12-23 RX ADMIN — DIGOXIN 125 MCG: 250 INJECTION, SOLUTION INTRAMUSCULAR; INTRAVENOUS; PARENTERAL at 17:31

## 2024-12-23 RX ADMIN — AMPICILLIN AND SULBACTAM 3 G: 1; 2 INJECTION, POWDER, FOR SOLUTION INTRAMUSCULAR; INTRAVENOUS at 20:31

## 2024-12-23 RX ADMIN — POTASSIUM PHOSPHATE, MONOBASIC AND POTASSIUM PHOSPHATE, DIBASIC 30 MMOL: 224; 236 INJECTION, SOLUTION, CONCENTRATE INTRAVENOUS at 11:46

## 2024-12-23 RX ADMIN — OMEPRAZOLE 20 MG: 20 CAPSULE, DELAYED RELEASE ORAL at 05:11

## 2024-12-23 ASSESSMENT — FIBROSIS 4 INDEX
FIB4 SCORE: 10.45
FIB4 SCORE: 7.64

## 2024-12-23 ASSESSMENT — ENCOUNTER SYMPTOMS
TINGLING: 0
NECK PAIN: 0
COUGH: 0
HEADACHES: 0
ORTHOPNEA: 0
BLURRED VISION: 0
DOUBLE VISION: 0
CHILLS: 0
BACK PAIN: 0
HALLUCINATIONS: 0
EYE PAIN: 0
SENSORY CHANGE: 0
SPEECH CHANGE: 0
TREMORS: 0
FOCAL WEAKNESS: 0
ABDOMINAL PAIN: 0
SPUTUM PRODUCTION: 0
CONSTIPATION: 0
NAUSEA: 0
FEVER: 0
PALPITATIONS: 0
WEIGHT LOSS: 0
MYALGIAS: 1
VOMITING: 0
SHORTNESS OF BREATH: 0
PHOTOPHOBIA: 0
DIARRHEA: 0
DIZZINESS: 0

## 2024-12-23 ASSESSMENT — PAIN DESCRIPTION - PAIN TYPE
TYPE: ACUTE PAIN

## 2024-12-23 ASSESSMENT — LIFESTYLE VARIABLES: SUBSTANCE_ABUSE: 0

## 2024-12-23 NOTE — ASSESSMENT & PLAN NOTE
T2, N0, M0 invasive gastric adenocarcinoma presenting as perforated  10/2024  -GDA embolization  -10/7 subtotal gastrectomy, liver wedge resection, Miguel Angel-en-Y gastrojejunostomy with omental flap and cholecystectomy  -pathology invasive well-differentiated adenocarcinoma with associated ulceration  -tumor invasion into the muscularis propria and 20 lymph nodes were negative for metastatic carcinoma  -Oncology was consulted and noted no evidence of metastatic disease  -MRI abdomen recommended for follow-up; no adjuvant therapy was recommended  He will need outpatient follow-up

## 2024-12-23 NOTE — ASSESSMENT & PLAN NOTE
Current active smoker, no PFTs on file  RT protocols, bronchodilators as needed  No signs of exacerbation

## 2024-12-23 NOTE — ASSESSMENT & PLAN NOTE
Multiple episodes of hypoglycemia requiring intervention  Poor p.o. intake  - Continue to encourage Ensure and p.o. intake  - Nutrition consulted

## 2024-12-23 NOTE — CARE PLAN
The patient is Watcher - Medium risk of patient condition declining or worsening    Shift Goals  Clinical Goals: wound consult, monitor blood sugar, MRI tib/fib  Patient Goals: rest  Family Goals: MARCOS    Progress made toward(s) clinical / shift goals:    Problem: Knowledge Deficit - Standard  Goal: Patient and family/care givers will demonstrate understanding of plan of care, disease process/condition, diagnostic tests and medications  Outcome: Progressing     Problem: Hemodynamics  Goal: Patient's hemodynamics, fluid balance and neurologic status will be stable or improve  Outcome: Progressing     Problem: Fluid Volume  Goal: Fluid volume balance will be maintained  Outcome: Progressing     Problem: Urinary - Renal Perfusion  Goal: Ability to achieve and maintain adequate renal perfusion and functioning will improve  Outcome: Progressing     Problem: Respiratory  Goal: Patient will achieve/maintain optimum respiratory ventilation and gas exchange  Outcome: Progressing     Problem: Mechanical Ventilation  Goal: Safe management of artificial airway and ventilation  Outcome: Progressing  Goal: Successful weaning off mechanical ventilator, spontaneously maintains adequate gas exchange  Outcome: Progressing  Goal: Patient will be able to express needs and understand communication  Outcome: Progressing     Problem: Physical Regulation  Goal: Diagnostic test results will improve  Outcome: Progressing  Goal: Signs and symptoms of infection will decrease  Outcome: Progressing     Problem: Pain - Standard  Goal: Alleviation of pain or a reduction in pain to the patient’s comfort goal  Outcome: Progressing     Problem: Skin Integrity  Goal: Skin integrity is maintained or improved  Outcome: Progressing     Problem: Fall Risk  Goal: Patient will remain free from falls  Outcome: Progressing     Problem: Care Map:  Admission Optimal Outcome for the Heart Failure Patient  Goal: Admission:  Optimal Care of the heart failure  patient  Outcome: Progressing     Problem: Care Map:  Day 1 Optimal Outcome for the Heart Failure Patient  Goal: Day 1:  Optimal Care of the heart failure patient  Outcome: Progressing     Problem: Care Map:  Day 2 Optimal Outcome for the Heart Failure Patient  Goal: Day 2:  Optimal Care of the heart failure patient  Outcome: Progressing     Problem: Care Map:  Day 3 Optimal Outcome for the Heart Failure Patient  Goal: Day 3:  Optimal Care of the heart failure patient  Outcome: Progressing     Problem: Care Map:  Day Before Discharge Optimal Outcome for the Heart Failure Patient  Goal: Day Before Discharge:  Optimal Care of the heart failure patient  Outcome: Progressing     Problem: Care Map:  Day of Discharge Optimal Outcome for the Heart Failure Patient  Goal: Day of Discharge:  Optimal Care of the heart failure patient  Outcome: Progressing       Patient is not progressing towards the following goals:

## 2024-12-23 NOTE — ASSESSMENT & PLAN NOTE
Likely source of infection, lower ext doppler negative  CT left leg shows small to moderate suprapatellar joint effusion.  MRI LLE showed Diffuse cellulitis and patchy myositis, negative for osteomyelitis or abscess.   Repeat MRI 12/30: Limited due to patient motion artifact; no obvious OM noted persistent cellulitis and myositis noted  - Infectious disease consulted  - Continue IV Unasyn until 1/4  - Continue clindamycin through 12/31  - Orthopedic surgery consultation 12/20 and 12/25 no surgical interventions at this time

## 2024-12-23 NOTE — ASSESSMENT & PLAN NOTE
12/19 BC: Strep pyogenes Group A  Repeat Cx 12/21 shows no growth  Continue IV Unasyn  MRSA negative    12/27 Wbc 26>23  Continue Unasyn and clindamycin  I discussed with ID

## 2024-12-23 NOTE — ASSESSMENT & PLAN NOTE
consultation for ongoing access to medications and follow-up care as appropriate.  States family in [place] but states they cannot house him nor give assistance

## 2024-12-23 NOTE — ASSESSMENT & PLAN NOTE
Echo: EF 28%; severe tricuspid and mitral regurg.  RVSP elevated at 55  - Continue GDMT with losartan, spironolactone, metoprolol, Farxiga  - Continue digoxin  - Maintain euvolemia

## 2024-12-23 NOTE — WOUND TEAM
[facility]  Inpatient Services  Initial Wound and Skin Care Evaluation    Admission Date: 12/19/2024     Last order of IP CONSULT TO WOUND CARE was found on 12/21/2024 from Hospital Encounter on 12/19/2024     HPI, PMH, SH: Reviewed    Past Surgical History:   Procedure Laterality Date    GASTRECTOMY N/A 10/7/2024    Procedure: LAPAROTOMY, GASTRECTOMY-SUBTOTAL, WITH INTRAOPERATIVE ULTRASOUND GUIDANCE;  Surgeon: Mt Cabral M.D.;  Location: SURGERY [facility];  Service: General    NODE DISSECTION N/A 10/7/2024    Procedure: LYMPHADENECTOMY-NODE DISSECTION;  Surgeon: Mt Cabral M.D.;  Location: SURGERY [facility];  Service: General    CREATION, FLAP, OMENTUM N/A 10/7/2024    Procedure: CREATION, FLAP, OMENTUM;  Surgeon: Mt Cabral M.D.;  Location: SURGERY [facility];  Service: General    CHOLECYSTECTOMY N/A 10/7/2024    Procedure: CHOLECYSTECTOMY;  Surgeon: Mt Cabral M.D.;  Location: SURGERY [facility];  Service: General    MN UPPER GI ENDOSCOPY,DIAGNOSIS N/A 9/13/2024    Procedure: GASTROSCOPY;  Surgeon: Sarah Lozoya M.D.;  Location: SURGERY SAME DAY [facility];  Service: Gastroenterology    MN UPPER GI ENDOSCOPY,BIOPSY N/A 9/13/2024    Procedure: GASTROSCOPY, WITH BIOPSY;  Surgeon: Sarah Lozoya M.D.;  Location: SURGERY SAME DAY [facility];  Service: Gastroenterology    MN UPPER GI ENDOSCOPY,SCLER INJECT N/A 9/13/2024    Procedure: GASTROSCOPY, WITH SCLEROTHERAPY;  Surgeon: Sarah Lozoya M.D.;  Location: SURGERY SAME DAY [facility];  Service: Gastroenterology    MN UPPER GI ENDOSCOPY,BIOPSY N/A 09/10/2024    Procedure: GASTROSCOPY, WITH BIOPSY;  Surgeon: Demond Gutierres M.D.;  Location: SURGERY SAME DAY [facility];  Service: Gastroenterology    MN UPPER GI ENDOSCOPY,SCLER INJECT N/A 09/10/2024    Procedure: GASTROSCOPY, WITH SCLEROTHERAPY;  Surgeon: Demond Gutierres M.D.;  Location: SURGERY SAME DAY [facility];  Service:  Gastroenterology    GASTROSCOPY WITH ENDOSTAT N/A 09/10/2024    Procedure: EGD, WITH CAUTERIZATION;  Surgeon: Demond Gutierres M.D.;  Location: SURGERY SAME DAY [facility];  Service: Gastroenterology    ND UPPER GI ENDOSCOPY,DIAGNOSIS N/A 01/31/2023    Procedure: GASTROSCOPY;  Surgeon: Joy Mcnair M.D.;  Location: SURGERY SAME DAY [facility];  Service: Gastroenterology    CATARACT PHACO WITH IOL Left      Social History     Tobacco Use    Smoking status: Some Days     Current packs/day: 1.00     Types: Cigarettes    Smokeless tobacco: Never   Substance Use Topics    Alcohol use: Yes     Alcohol/week: 1.2 oz     Types: 2 Cans of beer per week     Comment: occ     Chief Complaint   Patient presents with    Shortness of Breath           Peripheral Edema     Diagnosis: Acute left-sided CHF (congestive heart failure) (HCC) [I50.1]  Streptococcal bacteremia [R78.81, B95.5]    Unit where seen by Wound Team: [id number]     WOUND CONSULT RELATED TO:  LLE    WOUND TEAM PLAN OF CARE - Frequency of Follow-up:   Nursing to follow dressing orders written for wound care. Contact wound team if area fails to progress, deteriorates or with any questions/concerns if something comes up before next scheduled follow up (See below as to whether wound is following and frequency of wound follow up)   Weekly - LLE    WOUND HISTORY:     Per H&P: 74 y.o. male with past medical history of chronic atrial fibrillation, homeless, prior PE, congestive heart failure with biventricular dysfunction, pulmonary hypertension recently admitted for invasive gastric adenocarcinoma status post EGD embolization, cholecystectomy who presented to the hospital on 12/19/2024 with shortness of breath, left leg swelling and encephalopathy and found to hypoglycemia and he was hypoxic and was placed on BiPAP.  His symptoms of shortness of breath improved and encephalopathy also improved.          WOUND ASSESSMENT/LDA  Wound 12/20/24 Partial Thickness Wound  Pretibial Circumferential Left (Active)   Date First Assessed/Time First Assessed: 12/20/24 0800   Present on Original Admission: Yes  Primary Wound Type: Partial Thickness Wound  Location: Pretibial  Wound Orientation: Circumferential  Laterality: Left      Assessments 12/23/2024 12:00 PM   Wound Image       Site Assessment Red;Bleeding;Painful;Fragile   Periwound Assessment Red;Denuded   Margins Undefined edges   Closure Secondary intention   Drainage Amount Small   Drainage Description Sanguineous   Treatments Cleansed;Nonselective debridement;Site care   Wound Cleansing Approved Wound Cleanser   Periwound Protectant Not Applicable   Dressing Status Clean;Dry;Intact   Dressing Changed New   Dressing Cleansing/Solutions Not Applicable   Dressing Options Absorbent Abdominal Pad;Dry Roll Gauze;Petrolatum Gauze (yellow)   Dressing Change/Treatment Frequency Daily, and As Needed   NEXT Dressing Change/Treatment Date 12/24/24   NEXT Weekly Photo (Inpatient Only) 12/30/24   Wound Team Following Weekly   Non-staged Wound Description Partial thickness   Wound Length (cm) 40 cm   Wound Width (cm) 23 cm   Wound Surface Area (cm^2) 920 cm^2   Shape Irregular   Wound Odor None   Pulses 2+        Vascular:    SHANTI:   No results found.    Lab Values:    Lab Results   Component Value Date/Time    WBC 23.0 (H) 12/23/2024 04:22 AM    RBC 3.18 (L) 12/23/2024 04:22 AM    HEMOGLOBIN 8.8 (L) 12/23/2024 04:22 AM    HEMATOCRIT 26.8 (L) 12/23/2024 04:22 AM    CREACTPROT 30.02 (H) 12/20/2024 02:45 PM    SEDRATEWES 13 12/20/2024 02:45 PM    HBA1C 5.9 (H) 09/08/2024 12:24 AM         Culture Results show:  No results found for this or any previous visit (from the past 720 hours).    Pain Level/Medicated:  PO pain medications administered by bedside RN 1 hour prior       INTERVENTIONS BY WOUND TEAM:  Chart and images reviewed. Discussed with bedside RN. All areas of concern (based on picture review, LDA review and discussion with bedside RN)  have been thoroughly assessed. Documentation of areas based on significant findings. This RN in to assess patient. Performed standard wound care which includes appropriate positioning, dressing removal and non-selective debridement. Pictures and measurements obtained weekly if/when required.    Wound: LLE Circumferential Pretibial   Preparation for Dressing removal: Dressing soaked with saline  Cleansed/Non-selectively Debrided with:  Normal Saline, Wound cleanser, and Gauze  Obdulia wound: Cleansed with Normal Saline, Wound cleanser, and Gauze, Prepped with Open to Air  Primary Dressing:  Xeroform  Secondary (Outer) Dressing: Abdominal Pads, Dry roll gauze, tape    Advanced Wound Care Discharge Planning  Number of Clinicians necessary to complete wound care: 2  Is patient requiring IV pain medications for dressing changes:  No   Length of time for dressing change 20 min. (This does not include chart review, pre-medication time, set up, clean up or time spent charting.)    Interdisciplinary consultation: Patient, Bedside RNJade (Wound RN).  Pressure injury and staging reviewed with N/A.    EVALUATION / RATIONALE FOR TREATMENT:     Date:  12/23/24  Wound Status:  Initial evaluation    Patient's LLE wound initially with a blister. Wound has now opened up circumferentially. The wound bed is red, bleeding, fragile and painful. Edges are denuded with sloughing skin. Applied Xeroform (yellow) to provide an occlusive dressing that incorporates bacteriostatic protection.   Ortho following patient. Per CT Scan, negative for soft tissue gas and fluid collection. Continuing with wound care and antibiotics.   BL Heels intact. Continue with offloading measures.  Sacrum is intact. Continue with offloading measures and turns.          Goals: Steady decrease in wound area and depth weekly.    NURSING PLAN OF CARE ORDERS:  Dressing changes: See Dressing Care orders  RN Prevention Protocol    NUTRITION RECOMMENDATIONS   Wound Team  Recommendations:  N/A    DIET ORDERS (From admission to next 24h)       Start     Ordered    12/19/24 1614  Diet Order Diet: Cardiac  ALL MEALS        Question:  Diet:  Answer:  Cardiac    12/19/24 1614                    PREVENTATIVE INTERVENTIONS:    Q shift Edward - performed per nursing policy  Q shift pressure point assessments - performed per nursing policy    Surface/Positioning  Low Airloss - Currently in Place  Reposition q 2 hours - Currently in Place  TAPs Turning system - Currently in Place    Offloading/Redistribution  Sacral offloading dressing (Silicone dressing) - Ordered  Heel offloading dressing (Silicone dressing) - Ordered      Respiratory  Silicone O2 tubing - Currently in Place    Anticipated discharge plans:  TBD        Vac Discharge Needs:  Vac Discharge plan is purely a recommendation from wound team and not a requirement for discharge unless otherwise stated by physician.  Not Applicable Pt not on a wound vac

## 2024-12-23 NOTE — DISCHARGE PLANNING
Case Management Discharge Planning    Admission Date: 12/19/2024  GMLOS: 4.9  ALOS: 4    6-Clicks ADL Score: 15  6-Clicks Mobility Score: 13  PT and/or OT Eval ordered: Yes  Post-acute Referrals Ordered: No  Post-acute Choice Obtained: No  Has referral(s) been sent to post-acute provider:  No      Anticipated Discharge Dispo: Discharge Disposition: Discharged to home/self care (01)    DME Needed: No    Action(s) Taken: OTHER  Patient discussed in IDT rounds.   Per MD patient will need SNF placement.   RN CM requested PT/OT order so referrals could be sent.    Escalations Completed: None    Medically Clear: No    Next Steps: Continue to follow for CM needs.     Barriers to Discharge: Medical clearance    Is the patient up for discharge tomorrow: No

## 2024-12-23 NOTE — ASSESSMENT & PLAN NOTE
Resolved   Maintain euvolemia and monitor fluid responsiveness (avoid NaCL and renal congestion)  Monitor urine output and I&O's  Avoid and review nephrotoxin medication  Renal function improved

## 2024-12-23 NOTE — ASSESSMENT & PLAN NOTE
Discussed with nutrition, patient meets the ASPEN criteria of severe malnutrition  Continuous nutrition support

## 2024-12-23 NOTE — ASSESSMENT & PLAN NOTE
Previous Echo RVSP 65, severe TR  Suspect mainly Group 2  RV perfusion with MAP > 65 as need norepinepinephrine  Blood pressure is running on the lower side holding diuretic therapy at this time

## 2024-12-23 NOTE — ASSESSMENT & PLAN NOTE
Improved off Bipap  Currently is on 2 L of oxygen to maintain oxygen saturation  12/25 on room air.  Mobilize as able.

## 2024-12-23 NOTE — ASSESSMENT & PLAN NOTE
Chronic atrial fibrillation, currently tachycardia  Digoxin 250 mcg IV monitoring closely with flip to prevent digoxin toxicity    Continue digoxin and Eliquis  12/27 BP better, I resumed Toprol 25 mg

## 2024-12-23 NOTE — CARE PLAN
Problem: Respiratory  Goal: Patient will achieve/maintain optimum respiratory ventilation and gas exchange  Outcome: Progressing     Problem: Pain - Standard  Goal: Alleviation of pain or a reduction in pain to the patient’s comfort goal  Outcome: Progressing     Problem: Skin Integrity  Goal: Skin integrity is maintained or improved  Outcome: Progressing   The patient is Stable - Low risk of patient condition declining or worsening    Shift Goals  Clinical Goals: VSS, u4azphp, pain control  Patient Goals: Rest  Family Goals: NA    Progress made toward(s) clinical / shift goals:  Encouraged pt. To use IS 10xhr, [id number] in place. Pain medicated per MAR    Patient is not progressing towards the following goals:

## 2024-12-24 ENCOUNTER — APPOINTMENT (OUTPATIENT)
Dept: RADIOLOGY | Facility: MEDICAL CENTER | Age: 74
End: 2024-12-24
Attending: HOSPITALIST
Payer: MEDICARE

## 2024-12-24 LAB
ALBUMIN SERPL BCP-MCNC: 2.3 G/DL (ref 3.2–4.9)
ALBUMIN/GLOB SERPL: 0.8 G/DL
ALP SERPL-CCNC: 295 U/L (ref 30–99)
ALT SERPL-CCNC: 17 U/L (ref 2–50)
ANION GAP SERPL CALC-SCNC: 8 MMOL/L (ref 7–16)
ANISOCYTOSIS BLD QL SMEAR: ABNORMAL
AST SERPL-CCNC: 32 U/L (ref 12–45)
BASOPHILS # BLD AUTO: 0 % (ref 0–1.8)
BASOPHILS # BLD: 0 K/UL (ref 0–0.12)
BILIRUB SERPL-MCNC: 2.1 MG/DL (ref 0.1–1.5)
BUN SERPL-MCNC: 16 MG/DL (ref 8–22)
BURR CELLS BLD QL SMEAR: NORMAL
CALCIUM ALBUM COR SERPL-MCNC: 9.6 MG/DL (ref 8.5–10.5)
CALCIUM SERPL-MCNC: 8.2 MG/DL (ref 8.5–10.5)
CHLORIDE SERPL-SCNC: 97 MMOL/L (ref 96–112)
CK SERPL-CCNC: 42 U/L (ref 0–154)
CO2 SERPL-SCNC: 29 MMOL/L (ref 20–33)
CREAT SERPL-MCNC: 0.9 MG/DL (ref 0.5–1.4)
EOSINOPHIL # BLD AUTO: 0 K/UL (ref 0–0.51)
EOSINOPHIL NFR BLD: 0 % (ref 0–6.9)
ERYTHROCYTE [DISTWIDTH] IN BLOOD BY AUTOMATED COUNT: 51.2 FL (ref 35.9–50)
GFR SERPLBLD CREATININE-BSD FMLA CKD-EPI: 89 ML/MIN/1.73 M 2
GLOBULIN SER CALC-MCNC: 2.9 G/DL (ref 1.9–3.5)
GLUCOSE BLD STRIP.AUTO-MCNC: 100 MG/DL (ref 65–99)
GLUCOSE BLD STRIP.AUTO-MCNC: 114 MG/DL (ref 65–99)
GLUCOSE BLD STRIP.AUTO-MCNC: 58 MG/DL (ref 65–99)
GLUCOSE BLD STRIP.AUTO-MCNC: 74 MG/DL (ref 65–99)
GLUCOSE BLD STRIP.AUTO-MCNC: 78 MG/DL (ref 65–99)
GLUCOSE BLD STRIP.AUTO-MCNC: 86 MG/DL (ref 65–99)
GLUCOSE BLD STRIP.AUTO-MCNC: 88 MG/DL (ref 65–99)
GLUCOSE BLD STRIP.AUTO-MCNC: 92 MG/DL (ref 65–99)
GLUCOSE BLD STRIP.AUTO-MCNC: 93 MG/DL (ref 65–99)
GLUCOSE BLD STRIP.AUTO-MCNC: 97 MG/DL (ref 65–99)
GLUCOSE BLD STRIP.AUTO-MCNC: 98 MG/DL (ref 65–99)
GLUCOSE SERPL-MCNC: 79 MG/DL (ref 65–99)
HCT VFR BLD AUTO: 30.1 % (ref 42–52)
HGB BLD-MCNC: 9.9 G/DL (ref 14–18)
HYPOCHROMIA BLD QL SMEAR: ABNORMAL
LYMPHOCYTES # BLD AUTO: 0.21 K/UL (ref 1–4.8)
LYMPHOCYTES NFR BLD: 0.8 % (ref 22–41)
MACROCYTES BLD QL SMEAR: ABNORMAL
MAGNESIUM SERPL-MCNC: 2 MG/DL (ref 1.5–2.5)
MANUAL DIFF BLD: NORMAL
MCH RBC QN AUTO: 27.6 PG (ref 27–33)
MCHC RBC AUTO-ENTMCNC: 32.9 G/DL (ref 32.3–36.5)
MCV RBC AUTO: 83.8 FL (ref 81.4–97.8)
MONOCYTES # BLD AUTO: 0.23 K/UL (ref 0–0.85)
MONOCYTES NFR BLD AUTO: 0.9 % (ref 0–13.4)
MORPHOLOGY BLD-IMP: NORMAL
NEUTROPHILS # BLD AUTO: 25.26 K/UL (ref 1.82–7.42)
NEUTROPHILS NFR BLD: 98.3 % (ref 44–72)
NRBC # BLD AUTO: 0 K/UL
NRBC BLD-RTO: 0 /100 WBC (ref 0–0.2)
OVALOCYTES BLD QL SMEAR: NORMAL
PHOSPHATE SERPL-MCNC: 2.5 MG/DL (ref 2.5–4.5)
PLATELET # BLD AUTO: 72 K/UL (ref 164–446)
PLATELET BLD QL SMEAR: NORMAL
PLATELETS.RETICULATED NFR BLD AUTO: 16.2 % (ref 0.6–13.1)
POIKILOCYTOSIS BLD QL SMEAR: NORMAL
POTASSIUM SERPL-SCNC: 3.9 MMOL/L (ref 3.6–5.5)
PROT SERPL-MCNC: 5.2 G/DL (ref 6–8.2)
RBC # BLD AUTO: 3.59 M/UL (ref 4.7–6.1)
RBC BLD AUTO: PRESENT
SCHISTOCYTES BLD QL SMEAR: NORMAL
SODIUM SERPL-SCNC: 134 MMOL/L (ref 135–145)
TARGETS BLD QL SMEAR: NORMAL
WBC # BLD AUTO: 25.7 K/UL (ref 4.8–10.8)
WBC TOXIC VACUOLES BLD QL SMEAR: NORMAL

## 2024-12-24 PROCEDURE — 700111 HCHG RX REV CODE 636 W/ 250 OVERRIDE (IP): Mod: JG | Performed by: HOSPITALIST

## 2024-12-24 PROCEDURE — 82550 ASSAY OF CK (CPK): CPT

## 2024-12-24 PROCEDURE — 700117 HCHG RX CONTRAST REV CODE 255: Performed by: HOSPITALIST

## 2024-12-24 PROCEDURE — 84100 ASSAY OF PHOSPHORUS: CPT

## 2024-12-24 PROCEDURE — 700105 HCHG RX REV CODE 258: Performed by: INTERNAL MEDICINE

## 2024-12-24 PROCEDURE — 700102 HCHG RX REV CODE 250 W/ 637 OVERRIDE(OP): Performed by: INTERNAL MEDICINE

## 2024-12-24 PROCEDURE — 700105 HCHG RX REV CODE 258: Performed by: HOSPITALIST

## 2024-12-24 PROCEDURE — 99233 SBSQ HOSP IP/OBS HIGH 50: CPT | Performed by: HOSPITALIST

## 2024-12-24 PROCEDURE — 83735 ASSAY OF MAGNESIUM: CPT

## 2024-12-24 PROCEDURE — 85027 COMPLETE CBC AUTOMATED: CPT

## 2024-12-24 PROCEDURE — A9270 NON-COVERED ITEM OR SERVICE: HCPCS | Performed by: INTERNAL MEDICINE

## 2024-12-24 PROCEDURE — 85007 BL SMEAR W/DIFF WBC COUNT: CPT

## 2024-12-24 PROCEDURE — 73701 CT LOWER EXTREMITY W/DYE: CPT | Mod: LT

## 2024-12-24 PROCEDURE — 99223 1ST HOSP IP/OBS HIGH 75: CPT | Performed by: INTERNAL MEDICINE

## 2024-12-24 PROCEDURE — 700111 HCHG RX REV CODE 636 W/ 250 OVERRIDE (IP): Mod: JZ | Performed by: INTERNAL MEDICINE

## 2024-12-24 PROCEDURE — 97602 WOUND(S) CARE NON-SELECTIVE: CPT

## 2024-12-24 PROCEDURE — 85055 RETICULATED PLATELET ASSAY: CPT

## 2024-12-24 PROCEDURE — 82962 GLUCOSE BLOOD TEST: CPT | Mod: 91

## 2024-12-24 PROCEDURE — 770000 HCHG ROOM/CARE - INTERMEDIATE ICU *

## 2024-12-24 PROCEDURE — 80053 COMPREHEN METABOLIC PANEL: CPT

## 2024-12-24 RX ORDER — CLINDAMYCIN PHOSPHATE 600 MG/50ML
600 INJECTION, SOLUTION INTRAVENOUS EVERY 8 HOURS
Status: COMPLETED | OUTPATIENT
Start: 2024-12-24 | End: 2024-12-29

## 2024-12-24 RX ADMIN — DEXTROSE MONOHYDRATE: 100 INJECTION, SOLUTION INTRAVENOUS at 03:17

## 2024-12-24 RX ADMIN — ACETAMINOPHEN 650 MG: 325 TABLET ORAL at 23:40

## 2024-12-24 RX ADMIN — POLYETHYLENE GLYCOL 3350 1 PACKET: 17 POWDER, FOR SOLUTION ORAL at 18:20

## 2024-12-24 RX ADMIN — OXYCODONE 5 MG: 5 TABLET ORAL at 14:23

## 2024-12-24 RX ADMIN — CLINDAMYCIN IN 5 PERCENT DEXTROSE 600 MG: 12 INJECTION, SOLUTION INTRAVENOUS at 22:09

## 2024-12-24 RX ADMIN — APIXABAN 5 MG: 5 TABLET, FILM COATED ORAL at 06:20

## 2024-12-24 RX ADMIN — CLINDAMYCIN IN 5 PERCENT DEXTROSE 600 MG: 12 INJECTION, SOLUTION INTRAVENOUS at 10:32

## 2024-12-24 RX ADMIN — DIGOXIN 125 MCG: 250 INJECTION, SOLUTION INTRAMUSCULAR; INTRAVENOUS; PARENTERAL at 18:20

## 2024-12-24 RX ADMIN — IOHEXOL 100 ML: 350 INJECTION, SOLUTION INTRAVENOUS at 16:15

## 2024-12-24 RX ADMIN — OXYCODONE 5 MG: 5 TABLET ORAL at 23:40

## 2024-12-24 RX ADMIN — DEXTROSE MONOHYDRATE: 100 INJECTION, SOLUTION INTRAVENOUS at 17:03

## 2024-12-24 RX ADMIN — APIXABAN 5 MG: 5 TABLET, FILM COATED ORAL at 18:20

## 2024-12-24 RX ADMIN — AMPICILLIN AND SULBACTAM 3 G: 1; 2 INJECTION, POWDER, FOR SOLUTION INTRAMUSCULAR; INTRAVENOUS at 20:09

## 2024-12-24 RX ADMIN — AMPICILLIN AND SULBACTAM 3 G: 1; 2 INJECTION, POWDER, FOR SOLUTION INTRAMUSCULAR; INTRAVENOUS at 08:05

## 2024-12-24 RX ADMIN — HYDROMORPHONE HYDROCHLORIDE 0.25 MG: 1 INJECTION, SOLUTION INTRAMUSCULAR; INTRAVENOUS; SUBCUTANEOUS at 09:42

## 2024-12-24 RX ADMIN — NICOTINE 14 MG: 14 PATCH TRANSDERMAL at 06:20

## 2024-12-24 RX ADMIN — SENNOSIDES AND DOCUSATE SODIUM 2 TABLET: 50; 8.6 TABLET ORAL at 18:20

## 2024-12-24 RX ADMIN — AMPICILLIN AND SULBACTAM 3 G: 1; 2 INJECTION, POWDER, FOR SOLUTION INTRAMUSCULAR; INTRAVENOUS at 03:20

## 2024-12-24 RX ADMIN — AMPICILLIN AND SULBACTAM 3 G: 1; 2 INJECTION, POWDER, FOR SOLUTION INTRAMUSCULAR; INTRAVENOUS at 14:25

## 2024-12-24 RX ADMIN — OMEPRAZOLE 20 MG: 20 CAPSULE, DELAYED RELEASE ORAL at 06:20

## 2024-12-24 RX ADMIN — POLYETHYLENE GLYCOL 3350 1 PACKET: 17 POWDER, FOR SOLUTION ORAL at 06:20

## 2024-12-24 RX ADMIN — THIAMINE HYDROCHLORIDE 100 MG: 100 INJECTION, SOLUTION INTRAMUSCULAR; INTRAVENOUS at 06:19

## 2024-12-24 ASSESSMENT — ENCOUNTER SYMPTOMS
WEAKNESS: 1
NERVOUS/ANXIOUS: 0
MYALGIAS: 1
SENSORY CHANGE: 0
FEVER: 0
SHORTNESS OF BREATH: 0
HEADACHES: 0

## 2024-12-24 ASSESSMENT — PAIN DESCRIPTION - PAIN TYPE
TYPE: ACUTE PAIN

## 2024-12-24 NOTE — PROGRESS NOTES
Hospital Medicine Daily Progress Note    Date of Service  12/24/2024    Chief Complaint  Short of breath    Hospital Course  Robert Mcdonnell is a 75yo male with a PMH homelessness, atrial fibrillation on chronic anticoagulation with apixaban, CHF, EF 30%, P.Htn, RVSP 65, remote PE, recent hospitalization 10/3 - 10/15 for perforated  (T2N0M0 invasive gastric adenocarcinoma), GDA embolization, subsequent subtotal gastrectomy, liver wedge resection, Miguel Angel-en-Y gastrojejunostomy with omental flap and cholecystectomy 10/7 with pathology showing invasive well-differentiated adenocarcinoma with associated ulceration.  There was tumor invasion into the muscularis propria and 20 lymph nodes were negative for metastatic carcinoma.  Oncology was consulted and noted no evidence of metastatic disease however MRI abdomen recommended for follow-up; no adjuvant therapy was recommended.  He presented 12/19/2024 to the ED with report of several days of increasing dyspnea, worsening bilateral lower extremity edema and mild cough.  Patient was found to be hypoglycemic on admission with a glucose of 53 for which she was placed on D50.  He had increasing shortness of breath and required BiPAP in the emergency room.  Initial proBNP 21,696 with an elevated lactic acid of 5.6 and a creatinine of 2.3.  He had not been taking his Lasix as he had run out of it but had been on his Eliquis.  EKG showed irregular wide-complex rhythm.    Interval Problem Update  12/24: WBC: 25.7, Hgb: 9.9, plt: 72. Remains on D10 overnight.  Pain controlled on oxycodone 10mg.  Awake and warm.  Left leg swelling and erythema in medial groin.  Speech clear.    I have discussed this patient's plan of care and discharge plan at IDT rounds today with Case Management, Nursing, Nursing leadership, and other members of the IDT team.    Code Status  Full Code    Disposition  The patient is not medically cleared for discharge to home or a post-acute facility.  Anticipate  discharge to: skilled nursing facility    I have placed the appropriate orders for post-discharge needs.    Review of Systems  Review of Systems   Constitutional:  Positive for malaise/fatigue. Negative for fever.   Respiratory:  Negative for shortness of breath.    Cardiovascular:  Positive for leg swelling.   Musculoskeletal:  Positive for myalgias.   Neurological:  Positive for weakness. Negative for sensory change and headaches.   Psychiatric/Behavioral:  The patient is not nervous/anxious.         Physical Exam  Temp:  [36.2 °C (97.2 °F)-37.2 °C (98.9 °F)] 36.7 °C (98 °F)  Pulse:  [] 102  Resp:  [15-25] 22  BP: ()/(53-69) 113/58  SpO2:  [93 %-100 %] 94 %    Physical Exam  Constitutional:       Appearance: He is ill-appearing.   HENT:      Head: Normocephalic.      Mouth/Throat:      Mouth: Mucous membranes are moist.   Eyes:      Conjunctiva/sclera: Conjunctivae normal.   Cardiovascular:      Rate and Rhythm: Regular rhythm. Tachycardia present.      Heart sounds: No murmur heard.  Pulmonary:      Effort: No respiratory distress.      Breath sounds: No wheezing.   Abdominal:      General: Bowel sounds are normal. There is no distension.      Palpations: Abdomen is soft.      Tenderness: There is no abdominal tenderness.   Musculoskeletal:         General: Tenderness present.      Cervical back: Neck supple.      Right lower leg: Edema present.      Left lower leg: Edema present.      Comments: Left leg > right leg swelling   Lymphadenopathy:      Cervical: No cervical adenopathy.   Skin:     Findings: Erythema (right medial thigh and lower leg) present.      Comments: Skin denuding on lower leg. Wounds on scrotum and medial thigh.  Reviewed on patient and in pictures with wound care RN present   Neurological:      General: No focal deficit present.      Mental Status: He is oriented to person, place, and time.      Cranial Nerves: No cranial nerve deficit.   Psychiatric:         Mood and Affect:  Mood normal.         Thought Content: Thought content normal.         Fluids    Intake/Output Summary (Last 24 hours) at 12/24/2024 0747  Last data filed at 12/24/2024 0630  Gross per 24 hour   Intake 450 ml   Output 1350 ml   Net -900 ml        Laboratory  Recent Labs     12/22/24 0318 12/23/24 0422 12/24/24  0439   WBC 14.5* 23.0* 25.7*   RBC 3.25* 3.18* 3.59*   HEMOGLOBIN 9.1* 8.8* 9.9*   HEMATOCRIT 28.2* 26.8* 30.1*   MCV 86.8 84.3 83.8   MCH 28.0 27.7 27.6   MCHC 32.3 32.8 32.9   RDW 53.1* 52.6* 51.2*   PLATELETCT 60* 53* 72*     Recent Labs     12/22/24 0318 12/23/24 0422 12/24/24  0439   SODIUM 137 131* 134*   POTASSIUM 3.5* 3.8 3.9   CHLORIDE 101 95* 97   CO2 27 26 29   GLUCOSE 98 202* 79   BUN 29* 22 16   CREATININE 1.14 0.75 0.90   CALCIUM 8.2* 7.4* 8.2*                   Imaging  CT-EXTREMITY, LOWER WITH LEFT   Final Result      No soft tissue gas to suggest necrotizing fasciitis.      No fluid collection identified.      DX-CHEST-PORTABLE (1 VIEW)   Final Result         1.  Hazy left pulmonary infiltrates, similar to prior study   2.  Trace left pleural effusion, stable   3.  Cardiomegaly   4.  Atherosclerosis      DX-CHEST-PORTABLE (1 VIEW)   Final Result         1.  Hazy left pulmonary infiltrates, similar to prior study   2.  Trace left pleural effusion, stable   3.  Cardiomegaly   4.  Atherosclerosis      EC-ECHOCARDIOGRAM COMPLETE W/O CONT   Final Result      CT-CHEST,ABDOMEN,PELVIS WITH   Final Result      1. Stable spiculated opacity in the right upper lobe, extending to the pleural surface.   2. Lingular and left lower lobe parenchymal airspace disease has improved in the interval, but has not completely resolved. There is pleural reaction, suggesting some of these areas may represent parenchymal scarring.   3. Asymmetry of the lung volumes, small on the left than the right.   4. Cardiomegaly with atherosclerotic calcifications in the aorta and coronary arteries.   5. Postsurgical changes in  the stomach.   6. Stable scattered multiple hypodense lesions throughout the liver.   7. Moderate ascites throughout the abdomen and pelvis.   8. Cachexia.   9. Enlarged left inguinal lymph nodes with central necrosis. There is edema involving the soft tissues of the upper left leg extending to the left pelvis.      US-EXTREMITY VENOUS LOWER BILAT   Final Result      DX-CHEST-PORTABLE (1 VIEW)   Final Result         1.  Hazy left pulmonary infiltrates.   2.  Small left pleural effusion   3.  Cardiomegaly      [id number] & W/O LEFT    (Results Pending)        Assessment/Plan  * Acute exacerbation of CHF (congestive heart failure) (HCC)- (present on admission)  Assessment & Plan  Due to afib rvr and recovering pna  Careful force diuresis with sepsis  Digoxin IV for reduced EF and BiV failure w/ afib  Now improved off Bipap  Maintain euvolemia.  Holding diuretic therapy due to ongoing hypotension  12/24 Net output since admit: -2431 ml    Hypoglycemia  Assessment & Plan  He found to hypoglycemia  Hypoglycemic protocol  He has very poor oral intake.  Supplements as tolerates (ordered 12/24)  Remains on D10.    Streptococcal bacteremia- (present on admission)  Assessment & Plan  Likely skin wound source.  Discussed with wound care  continuing IV Unasyn.  Worsening leukocytosis  Monitor cbc and vitals  Add clindamycin  Consult ID.      Bacteremia  Assessment & Plan  12/19 BC: Strep pyogenes Group A  Repeat Cx 12/21 shows no growth  Continue IV Unasyn  MRSA negative    Pulmonary hypertension (HCC)  Assessment & Plan  Previous Echo RVSP 65, severe TR  Suspect mainly Group 2  RV perfusion with MAP > 65 as need norepinepinephrine  Blood pressure is running on the lower side holding diuretic therapy at this time    Acute respiratory failure with hypoxia (HCC)- (present on admission)  Assessment & Plan  Improved off Bipap  Currently is on 2 L of oxygen to maintain oxygen saturation  12/24 titrating to room air as  tolerates    Malignant neoplasm of body of stomach (HCC)- (present on admission)  Assessment & Plan  T2, N0, M0 invasive gastric adenocarcinoma presenting as perforated  10/2024  -GDA embolization  -10/7 subtotal gastrectomy, liver wedge resection, Miguel Angel-en-Y gastrojejunostomy with omental flap and cholecystectomy  -pathology invasive well-differentiated adenocarcinoma with associated ulceration  -tumor invasion into the muscularis propria and 20 lymph nodes were negative for metastatic carcinoma  -Oncology was consulted and noted no evidence of metastatic disease  -MRI abdomen recommended for follow-up; no adjuvant therapy was recommended  He will need outpatient follow-up    Homeless- (present on admission)  Assessment & Plan   consultation for ongoing access to medications and follow-up care as appropriate.  States family in [place] but states they cannot house him nor give assistance     Severe protein-calorie malnutrition (HCC)- (present on admission)  Assessment & Plan  Nutrition consult    COPD (chronic obstructive pulmonary disease) (HCC)- (present on admission)  Assessment & Plan  Current active smoker, no PFTs on file  RT protocols, bronchodilators as needed  No signs of exacerbation    Acute kidney injury (HCC)- (present on admission)  Assessment & Plan  Resolved   Maintain euvolemia and monitor fluid responsiveness (avoid NaCL and renal congestion)  Monitor urine output and I&O's  Avoid and review nephrotoxin medication  Renal function improved    Sepsis (HCC)- (present on admission)  Assessment & Plan  Skin infection/ Pneumonia  Antibiotics  Strep pyogenes Group A   Unasyn  Positive 12/19 blood cultures  Repeat blood cultures negative    Atrial fibrillation with RVR (HCC)- (present on admission)  Assessment & Plan  Chronic atrial fibrillation, currently with RVR  Digoxin 250 mcg IV monitoring closely with flip to prevent digoxin toxicity  Continue eliquis.    Metoprolol on hold due to  ongoing hypotension    Thrombocytopenia (HCC)- (present on admission)  Assessment & Plan  He found to have thrombocytopenia and platelet count is trending down.  Platelet count remained above 50,000 and current platelet count is 53,000.  I am continue Eliquis for now for stroke prevention  Ordered CBC for tomorrow.      Tobacco dependence- (present on admission)  Assessment & Plan  Counseling when appropriate    Hypertension- (present on admission)  Assessment & Plan  Currently is hypotensive holding metoprolol and goal-directed medical therapy    Wound of lower extremity- (present on admission)  Assessment & Plan  Likely source of infection, lower ext doppler negative  Orthopedic consultation 12/20 for possible drainage of bullae and bacteremia  I reviewed CT scan ocular lower extremity that did not show fluid collection or signs of necrotizing fasciitis  Continue IV antibiotics.  Wound care         VTE prophylaxis:    therapeutic anticoagulation with eliquis 5 mg BID      I have performed a physical exam and reviewed and updated ROS and Plan today (12/24/2024). In review of yesterday's note (12/23/2024), there are no changes except as documented above.

## 2024-12-24 NOTE — WOUND TEAM
[facility]  Inpatient Services  Wound and Skin Care Follow-up    Admission Date: 12/19/2024     Last order of IP CONSULT TO WOUND CARE was found on 12/23/2024 from Hospital Encounter on 12/19/2024     HPI, PMH, SH: Reviewed    Past Surgical History:   Procedure Laterality Date    GASTRECTOMY N/A 10/7/2024    Procedure: LAPAROTOMY, GASTRECTOMY-SUBTOTAL, WITH INTRAOPERATIVE ULTRASOUND GUIDANCE;  Surgeon: Mt Cabral M.D.;  Location: SURGERY [facility];  Service: General    NODE DISSECTION N/A 10/7/2024    Procedure: LYMPHADENECTOMY-NODE DISSECTION;  Surgeon: Mt Cabral M.D.;  Location: SURGERY [facility];  Service: General    CREATION, FLAP, OMENTUM N/A 10/7/2024    Procedure: CREATION, FLAP, OMENTUM;  Surgeon: Mt Cabral M.D.;  Location: SURGERY [facility];  Service: General    CHOLECYSTECTOMY N/A 10/7/2024    Procedure: CHOLECYSTECTOMY;  Surgeon: Mt Cabral M.D.;  Location: SURGERY [facility];  Service: General    FL UPPER GI ENDOSCOPY,DIAGNOSIS N/A 9/13/2024    Procedure: GASTROSCOPY;  Surgeon: Sarah Lozoya M.D.;  Location: SURGERY SAME DAY [facility];  Service: Gastroenterology    FL UPPER GI ENDOSCOPY,BIOPSY N/A 9/13/2024    Procedure: GASTROSCOPY, WITH BIOPSY;  Surgeon: Sarah Lozoya M.D.;  Location: SURGERY SAME DAY [facility];  Service: Gastroenterology    FL UPPER GI ENDOSCOPY,SCLER INJECT N/A 9/13/2024    Procedure: GASTROSCOPY, WITH SCLEROTHERAPY;  Surgeon: Sarah Lozoya M.D.;  Location: SURGERY SAME DAY [facility];  Service: Gastroenterology    FL UPPER GI ENDOSCOPY,BIOPSY N/A 09/10/2024    Procedure: GASTROSCOPY, WITH BIOPSY;  Surgeon: Demond Gutierres M.D.;  Location: SURGERY SAME DAY [facility];  Service: Gastroenterology    FL UPPER GI ENDOSCOPY,SCLER INJECT N/A 09/10/2024    Procedure: GASTROSCOPY, WITH SCLEROTHERAPY;  Surgeon: Demond Gutierres M.D.;  Location: SURGERY SAME DAY [facility];  Service: Gastroenterology     GASTROSCOPY WITH ENDOSTAT N/A 09/10/2024    Procedure: EGD, WITH CAUTERIZATION;  Surgeon: Demond Gutierres M.D.;  Location: SURGERY SAME DAY [facility];  Service: Gastroenterology    [place] UPPER GI ENDOSCOPY,DIAGNOSIS N/A 01/31/2023    Procedure: GASTROSCOPY;  Surgeon: Joy Mcnair M.D.;  Location: SURGERY SAME DAY [facility];  Service: Gastroenterology    CATARACT PHACO WITH IOL Left      Social History     Tobacco Use    Smoking status: Some Days     Current packs/day: 1.00     Types: Cigarettes    Smokeless tobacco: Never   Substance Use Topics    Alcohol use: Yes     Alcohol/week: 1.2 oz     Types: 2 Cans of beer per week     Comment: occ     Chief Complaint   Patient presents with    Shortness of Breath           Peripheral Edema     Diagnosis: Acute left-sided CHF (congestive heart failure) (HCC) [I50.1]  Streptococcal bacteremia [R78.81, B95.5]    Unit where seen by Wound Team: [id number]     WOUND FOLLOW UP RELATED TO:  Left Upper Thigh, Scrotum       WOUND TEAM PLAN OF CARE - Frequency of Follow-up:   Nursing to follow dressing orders written for wound care. Contact wound team if area fails to progress, deteriorates or with any questions/concerns if something comes up before next scheduled follow up (See below as to whether wound is following and frequency of wound follow up)  Dressing changes by wound team:                   Weekly - LLE, L upper thigh, Scrotum    WOUND HISTORY:       Per H&P: 74 y.o. male with past medical history of chronic atrial fibrillation, homeless, prior PE, congestive heart failure with biventricular dysfunction, pulmonary hypertension recently admitted for invasive gastric adenocarcinoma status post EGD embolization, cholecystectomy who presented to the hospital on 12/19/2024 with shortness of breath, left leg swelling and encephalopathy and found to hypoglycemia and he was hypoxic and was placed on BiPAP.  His symptoms of shortness of breath improved and encephalopathy also  improved.          WOUND ASSESSMENT/LDA  Wound 12/20/24 Partial Thickness Wound Pretibial;Thigh;Scrotum Circumferential Left (Active)   Date First Assessed/Time First Assessed: 12/20/24 0800   Present on Original Admission: Yes  Primary Wound Type: Partial Thickness Wound  Location: Pretibial;Thigh;Scrotum  Wound Orientation: Circumferential  Laterality: Left      Assessments 12/24/2024 10:00 AM   Wound Image      Site Assessment Red;Bleeding;Fragile;Drainage   Periwound Assessment Red;Denuded   Margins Undefined edges   Closure Secondary intention   Drainage Amount None   Drainage Description Sanguineous   Treatments Cleansed;Nonselective debridement;Site care   Wound Cleansing Normal Saline Irrigation   Periwound Protectant Not Applicable   Dressing Status Clean;Dry;Intact   Dressing Changed Changed   Dressing Cleansing/Solutions Not Applicable   Dressing Options Absorbent Abdominal Pad;Dry Roll Gauze;Petrolatum Gauze (yellow)   Dressing Change/Treatment Frequency Daily, and As Needed   NEXT Dressing Change/Treatment Date 12/25/24   NEXT Weekly Photo (Inpatient Only) 12/30/24   Wound Team Following Weekly   Non-staged Wound Description Partial thickness   Shape Irregular   Wound Odor None       Moisture Associated Skin Damage 12/24/24 Buttock (Active)   First Observed Date/First Observed Time: 12/24/24 1102   Laterality: Left  Wound Location : Buttock      Assessments 12/24/2024 10:10 AM   Wound Image     NEXT Weekly Photo (Inpatient Only) 12/31/24   Drainage Amount Scant   Drainage Description Serosanguineous   Periwound Assessment Red;Bleeding;Denuded   IAD Cleansing Dimethecone Wipes   Periwound Protectant Barrier Paste   IAD Containment Device Interdry Cloth        Vascular:    SHANTI:   No results found.    Lab Values:    Lab Results   Component Value Date/Time    WBC 25.7 (H) 12/24/2024 04:39 AM    RBC 3.59 (L) 12/24/2024 04:39 AM    HEMOGLOBIN 9.9 (L) 12/24/2024 04:39 AM    HEMATOCRIT 30.1 (L) 12/24/2024 04:39  AM    CREACTPROT 30.02 (H) 12/20/2024 02:45 PM    SEDRATEWES 13 12/20/2024 02:45 PM    HBA1C 5.9 (H) 09/08/2024 12:24 AM         Culture Results show:  No results found for this or any previous visit (from the past 720 hours).    Pain Level/Medicated:  IV pain medications administered by bedside RN immediately prior (Pt educated that IV medication will not be available on outpatient basis)       INTERVENTIONS BY WOUND TEAM:  Chart and images reviewed. Discussed with bedside RN. All areas of concern (based on picture review, LDA review and discussion with bedside RN) have been thoroughly assessed. Documentation of areas based on significant findings. This RN in to assess patient. Performed standard wound care which includes appropriate positioning, dressing removal and non-selective debridement. Pictures and measurements obtained weekly if/when required.    Wound:  Left Upper Thigh, LLE, Scrotum/Perineum  Preparation for Dressing removal: Removed without difficulty  Cleansed/Non-selectively Debrided with:  Normal Saline  Obdulia wound: Cleansed with Normal Saline and Gauze, Prepped with Open to Air  Primary Dressing:  Xeroform  Secondary (Outer) Dressing: Abdominal pads, dry roll gauze, tape     Wound:  Sacrum  Preparation for Dressing removal: Open to air  Cleansed/Non-selectively Debrided with:  Perineal Wipes (Barrier wipes)  Obdulia wound: Cleansed with Perineal Wipes (Barrier wipes), Prepped with Barrier paste  Primary Dressing:  Open to air, barrier paste, sacral offloading dressing to upper bony prominence area.    Advanced Wound Care Discharge Planning  Number of Clinicians necessary to complete wound care: 1  Is patient requiring IV pain medications for dressing changes:  Yes  Length of time for dressing change 30 min. (This does not include chart review, pre-medication time, set up, clean up or time spent charting.)    Interdisciplinary consultation: Patient, Bedside RN, N/A.  Pressure injury and staging reviewed  with N/A.    EVALUATION / RATIONALE FOR TREATMENT:     Date:  12/24/24  Wound Status:  No change in wound     Patient seen on 12/23/24 for the left lower extremity blister that has opened and is denuded. New consult placed on 12/24/24 for worsening and more areas on the upper thigh and scrotum that are becoming denuded. Modified dressing orders to include left upper thigh and scrotum with daily dressing changes. Recommend condom cath if patient can tolerate. Spoke with Dr. Zamora who also came bedside to assess and consult ID for further plan of care.   Sacrum and left buttock denuded as well. Unclear if etiology is friction/shearing/MASD vs the rest of the LLE wound. Applied barrier paste. Continue with offloading measures.      Date:  12/23/24  Wound Status:  Initial evaluation    Patient's LLE wound initially with a blister. Wound has now opened up circumferentially. The wound bed is red, bleeding, fragile and painful. Edges are denuded with sloughing skin. Applied Xeroform (yellow) to provide an occlusive dressing that incorporates bacteriostatic protection.   Ortho following patient. Per CT Scan, negative for soft tissue gas and fluid collection. Continuing with wound care and antibiotics.   BL Heels intact. Continue with offloading measures.  Sacrum is intact. Continue with offloading measures and turns.          Goals: Steady decrease in wound area and depth weekly.    NURSING PLAN OF CARE ORDERS:  Dressing changes: See Dressing Care orders    NUTRITION RECOMMENDATIONS   Wound Team Recommendations:  N/A     DIET ORDERS (From admission to next 24h)       Start     Ordered    12/24/24 0941  Supplements  ALL MEALS        Question Answer Comment   Which Supplement Ensure    Ensure: Ensure Max (Non Caffeinated) Carton        12/24/24 0940    12/24/24 0851  Diet Order Diet: Regular  ALL MEALS        Question:  Diet:  Answer:  Regular    12/24/24 0850                    PREVENTATIVE INTERVENTIONS:   Q shift Edward -  performed per nursing policy  Q shift pressure point assessments - performed per nursing policy    Surface/Positioning  ICU Low Airloss - Currently in Place  Reposition q 2 hours - Currently in Place  TAPs Turning system - Currently in Place    Offloading/Redistribution  Heel float boots (Prevalon boot) - Ordered      Respiratory  Silicone O2 tubing - Currently in Place    Containment/Moisture Prevention    Dri-nava pad - Currently in Place  Barrier wipes - Currently in Place  Barrier paste - Currently in Place  Interdry - Currently in Place    Anticipated discharge plans:  TBD        Vac Discharge Needs:  Vac Discharge plan is purely a recommendation from wound team and not a requirement for discharge unless otherwise stated by physician.  Not Applicable Pt not on a wound vac

## 2024-12-24 NOTE — PROGRESS NOTES
Dr. Zamora and wound RN at bedside assessing Pt's left leg. Noted to be worsening redness/discoloration from previous assessments. Pt's temp 99.1 this AM. Pain present but not out of proportion to exam. Dr. Zamora states he will consult ID and change abx regimen, see MAR.

## 2024-12-24 NOTE — THERAPY
Physical Therapy Contact Note    Patient Name: Robert Mcdonnell  Age:  74 y.o., Sex:  male  Medical Record #: [id number]  Today's Date: 12/24/2024    Discussed missed therapy with RN       12/24/24 3449   Interdisciplinary Plan of Care Collaboration   Collaboration Comments attempted, but pt refusing OOB per nsg due to pain and pt going for procedure, will attempt again as able.

## 2024-12-24 NOTE — CONSULTS
INFECTIOUS DISEASES INPATIENT CONSULT NOTE     Date of Service: 12/24/2024    Consult Requested By: Pako Zamora D.O.    Reason for Consultation: Group A streptococcal bacteremia, left lower extremity cellulitis    History of Present Illness:   Robert Mcdonnell is a 74 y.o. homeless man with a history of chronic atrial fibrillation, prior pulmonary emboli, congestive heart failure, pulmonary hypertension, newly diagnosed invasive gastric adenocarcinoma status post EGD embolization and cholecystectomy admitted 12/19/2024 secondary to shortness of breath, left leg swelling and encephalopathy.  Extensive review of emergency physician notes, hospital medicine notes and consultant notes performed.  Patient was hospitalized from 10/3 - 10/15/2024 secondary to chest pain.  CT of the abdomen pelvis at that time showed a perforated gastric ulcer.  He underwent embolization with IR on 10/3.  Biopsy showed high-grade dysplasia.  On 10/7, he underwent subtotal gastrectomy, liver wedge resection, Miguel Angel-en-Y gastrojejunostomy, omental flap, cholecystectomy, celiac node and hepatic artery node dissection.  He was then again readmitted from 10/28 - 10/29 secondary to shortness of breath and was diagnosed with pneumonia.  Patient completed a course of doxycycline and Augmentin.  On presentation, he was noted to be hypoglycemic and in acute hypoxic respiratory failure and was placed on BiPAP.  He was also noted to have significant soft tissue infection of the left lower extremity with group A streptococcus bacteremia.  Repeat blood cultures on 12/21 are negative to date.  The patient is currently on IV Unasyn.  Due to concerns for worsening soft tissue infection of the left upper thigh and increasing leukocytosis, patient was started on IV clindamycin.  He is complaining of pain on the posterior side of his left lower extremity.  Infectious disease service consulted for antibiotic recommendations.      All other review of systems  reviewed and negative except those documented above in the HPI.     PMH:   Past Medical History:   Diagnosis Date    Arrhythmia     CHF (congestive heart failure) (HCC)     Congestive heart failure (HCC)     Hypertension        PSH:  Past Surgical History:   Procedure Laterality Date    GASTRECTOMY N/A 10/7/2024    Procedure: LAPAROTOMY, GASTRECTOMY-SUBTOTAL, WITH INTRAOPERATIVE ULTRASOUND GUIDANCE;  Surgeon: Mt Cabral M.D.;  Location: SURGERY [facility];  Service: General    NODE DISSECTION N/A 10/7/2024    Procedure: LYMPHADENECTOMY-NODE DISSECTION;  Surgeon: Mt Cabral M.D.;  Location: SURGERY [facility];  Service: General    CREATION, FLAP, OMENTUM N/A 10/7/2024    Procedure: CREATION, FLAP, OMENTUM;  Surgeon: Mt Cabral M.D.;  Location: SURGERY [facility];  Service: General    CHOLECYSTECTOMY N/A 10/7/2024    Procedure: CHOLECYSTECTOMY;  Surgeon: Mt Cabral M.D.;  Location: SURGERY [facility];  Service: General    TN UPPER GI ENDOSCOPY,DIAGNOSIS N/A 9/13/2024    Procedure: GASTROSCOPY;  Surgeon: Sarah Lozoya M.D.;  Location: SURGERY SAME DAY [facility];  Service: Gastroenterology    TN UPPER GI ENDOSCOPY,BIOPSY N/A 9/13/2024    Procedure: GASTROSCOPY, WITH BIOPSY;  Surgeon: Sarah Lozoya M.D.;  Location: SURGERY SAME DAY [facility];  Service: Gastroenterology    TN UPPER GI ENDOSCOPY,SCLER INJECT N/A 9/13/2024    Procedure: GASTROSCOPY, WITH SCLEROTHERAPY;  Surgeon: Sarah Lozoya M.D.;  Location: SURGERY SAME DAY [facility];  Service: Gastroenterology    TN UPPER GI ENDOSCOPY,BIOPSY N/A 09/10/2024    Procedure: GASTROSCOPY, WITH BIOPSY;  Surgeon: Demond Gutierres M.D.;  Location: SURGERY SAME DAY [facility];  Service: Gastroenterology    TN UPPER GI ENDOSCOPY,SCLER INJECT N/A 09/10/2024    Procedure: GASTROSCOPY, WITH SCLEROTHERAPY;  Surgeon: Demond Gutierres M.D.;  Location: SURGERY SAME DAY [facility];  Service: Gastroenterology    GASTROSCOPY  WITH ENDOSTAT N/A 09/10/2024    Procedure: EGD, WITH CAUTERIZATION;  Surgeon: Demond Gutierres M.D.;  Location: SURGERY SAME DAY [facility];  Service: Gastroenterology    RI UPPER GI ENDOSCOPY,DIAGNOSIS N/A 01/31/2023    Procedure: GASTROSCOPY;  Surgeon: Joy Mcnair M.D.;  Location: SURGERY SAME DAY [facility];  Service: Gastroenterology    CATARACT PHACO WITH IOL Left        FAMILY HX:  Family History   Problem Relation Age of Onset    Heart Disease Mother     Heart Disease Father        SOCIAL HX:  Social History     Socioeconomic History    Marital status:      Spouse name: Not on file    Number of children: Not on file    Years of education: Not on file    Highest education level: Not on file   Occupational History    Not on file   Tobacco Use    Smoking status: Some Days     Current packs/day: 1.00     Types: Cigarettes    Smokeless tobacco: Never   Vaping Use    Vaping status: Never Used   Substance and Sexual Activity    Alcohol use: Yes     Alcohol/week: 1.2 oz     Types: 2 Cans of beer per week     Comment: occ    Drug use: Not Currently    Sexual activity: Not on file   Other Topics Concern    Not on file   Social History Narrative    ** Merged History Encounter **          Social Drivers of Health     Financial Resource Strain: Not on file   Food Insecurity: No Food Insecurity (10/29/2024)    Hunger Vital Sign     Worried About Running Out of Food in the Last Year: Never true     Ran Out of Food in the Last Year: Never true   Transportation Needs: No Transportation Needs (10/29/2024)    PRAPARE - Transportation     Lack of Transportation (Medical): No     Lack of Transportation (Non-Medical): No   Recent Concern: Transportation Needs - Unmet Transportation Needs (10/5/2024)    PRAPARE - Transportation     Lack of Transportation (Medical): Yes     Lack of Transportation (Non-Medical): Yes   Physical Activity: Not on file   Stress: Not on file   Social Connections: Not on file   Intimate Partner  Violence: Not At Risk (10/29/2024)    Humiliation, Afraid, Rape, and Kick questionnaire     Fear of Current or Ex-Partner: No     Emotionally Abused: No     Physically Abused: No     Sexually Abused: No   Housing Stability: High Risk (10/29/2024)    Housing Stability Vital Sign     Unable to Pay for Housing in the Last Year: Yes     Number of Times Moved in the Last Year: 3     Homeless in the Last Year: Yes     Social History     Tobacco Use   Smoking Status Some Days    Current packs/day: 1.00    Types: Cigarettes   Smokeless Tobacco Never     Social History     Substance and Sexual Activity   Alcohol Use Yes    Alcohol/week: 1.2 oz    Types: 2 Cans of beer per week    Comment: occ       Allergies/Intolerances:  No Known Allergies        Other Current Medications:    Current Facility-Administered Medications:     clindamycin (Cleocin) IVPB premix 600 mg, 600 mg, Intravenous, Q8HRS, Pako Zamora D.O., Last Rate: 100 mL/hr at 12/24/24 1032, 600 mg at 12/24/24 1032    thiamine (B-1) injection 100 mg, 100 mg, Intravenous, DAILY, Suzette Camejo M.D., 100 mg at 12/24/24 0619    senna-docusate (Pericolace Or Senokot S) 8.6-50 MG per tablet 2 Tablet, 2 Tablet, Oral, Q EVENING, 2 Tablet at 12/23/24 1731 **AND** polyethylene glycol/lytes (Miralax) Packet 1 Packet, 1 Packet, Oral, BID, Suzette Camejo M.D., 1 Packet at 12/24/24 0620    dextrose 10% infusion, , Intravenous, Continuous, Suzette Camejo M.D., Stopped at 12/24/24 0807    digoxin (Lanoxin) injection 125 mcg, 125 mcg, Intravenous, DAILY AT 1800, Suzette Camejo M.D., 125 mcg at 12/23/24 1731    apixaban (Eliquis) tablet 5 mg, 5 mg, Oral, BID, Hector Garcia M.D., 5 mg at 12/24/24 0620    omeprazole (PriLOSEC) capsule 20 mg, 20 mg, Oral, DAILY, Hector Garcia M.D., 20 mg at 12/24/24 0620    Notify MD and PharmD if FSBG level is less than or equal to 70 mg/dL or patient is showing signs/symptoms of hypoglycemia (tachycardia,  palpitations, diaphoresis, clammy, tremulousness, nausea, confused), , , Once **AND** Administer 20 grams of glucose (approximately 8 ounces of fruit juice) every 15 minutes PRN FSBS less than 70 mg/dL, , , PRN **AND** dextrose 50% (D50W) injection 25 g, 25 g, Intravenous, Q15 MIN PRN, Hector Garcia M.D., 25 g at 24 0439    acetaminophen (Tylenol) tablet 650 mg, 650 mg, Oral, Q6HRS PRN, Hector Garcia M.D., 650 mg at 24 1627    oxyCODONE immediate-release (Roxicodone) tablet 2.5 mg, 2.5 mg, Oral, Q3HRS PRN, 2.5 mg at 24 1128 **OR** oxyCODONE immediate-release (Roxicodone) tablet 5 mg, 5 mg, Oral, Q3HRS PRN, 5 mg at 24 2242 **OR** HYDROmorphone (Dilaudid) injection 0.25 mg, 0.25 mg, Intravenous, Q3HRS PRN, Hector Garcia M.D., 0.25 mg at 24 0942    ondansetron (Zofran) syringe/vial injection 4 mg, 4 mg, Intravenous, Q4HRS PRN, Hector Garcia M.D., 4 mg at 24 1353    ondansetron (Zofran ODT) dispertab 4 mg, 4 mg, Oral, Q4HRS PRN, Hector Garcia M.D.    nicotine (Nicoderm) 14 MG/24HR 14 mg, 14 mg, Transdermal, Daily-0600, 14 mg at 24 0620 **AND** Nicotine Replacement Patient Education Materials, , , Once **AND** nicotine polacrilex (Nicorette) 2 MG piece 2 mg, 2 mg, Oral, Q HOUR PRN, Hector Garcia M.D.    ampicillin/sulbactam (Unasyn) 3 g in  mL IVPB, 3 g, Intravenous, Q6HR, Ramu Romano M.D., Stopped at 24 0835  [unfilled]    Most Recent Vital Signs:  /54   Pulse (!) 109   Temp 37.3 °C (99.1 °F) (Temporal)   Resp (!) 24   Ht 1.829 m (6')   Wt 80.9 kg (178 lb 5.6 oz)   SpO2 96%   BMI 24.19 kg/m²   Temp  Av.3 °C (99.1 °F)  Min: 36.2 °C (97.2 °F)  Max: 38.4 °C (101.1 °F)    Physical Exam:  General: well nourished, no diaphoresis, well-appearing, no acute distress  HEENT: sclera anicteric, PERRL, extraocular muscles intact, mucous membranes moist, oropharynx clear and moist, no oral lesions or exudate  Neck: supple, no  lymphadenopathy  Chest: CTAB, no rales, rhonchi or wheezes, normal work of breathing.  Cardiac: Tachycardic, irregularly irregular rhythm, normal S1 S2, no murmurs, rubs or gallops  Abdomen: + bowel sounds, soft, non-tender, non-distended, no hepatosplenomegaly  Extremities: Left lower extremity dressed.  Tenderness to palpation on calf.  Erythema and tenderness to palpation along inner left thigh  Skin: warm and dry, no rashes or worrisome lesions  Neuro: Alert and oriented times 3, non-focal exam, speech fluent, full range of motion to bilateral upper and lower extremities  Psych: normal mood and behavior, pleasant; memory intact, normal judgement    Pertinent Lab Results:  Recent Labs     12/22/24 0318 12/23/24 0422 12/24/24 0439   WBC 14.5* 23.0* 25.7*      Recent Labs     12/22/24 0318 12/23/24 0422 12/24/24 0439   HEMOGLOBIN 9.1* 8.8* 9.9*   HEMATOCRIT 28.2* 26.8* 30.1*   MCV 86.8 84.3 83.8   MCH 28.0 27.7 27.6   MACROCYTOSIS  --  1+ 2+*   ANISOCYTOSIS  --  1+ 1+   PLATELETCT 60* 53* 72*         Recent Labs     12/22/24 0318 12/23/24 0422 12/24/24 0439   SODIUM 137 131* 134*   POTASSIUM 3.5* 3.8 3.9   CHLORIDE 101 95* 97   CO2 27 26 29   CREATININE 1.14 0.75 0.90        Recent Labs     12/22/24 0318 12/23/24 0422 12/24/24  0439   ALBUMIN 2.2* 2.1* 2.3*        Pertinent Micro:  Results       Procedure Component Value Units Date/Time    Blood Culture - Draw one from central line and one from peripheral site [[id number]]  (Abnormal) Collected: 12/19/24 0747    Order Status: Completed Specimen: Blood from Peripheral Updated: 12/21/24 1506     Significant Indicator POS     Source BLD     Site PERIPHERAL     Culture Result Growth detected by automated blood culture system. 12/19/2024  19:16        Streptococcus pyogenes (Group A)  See previous culture for sensitivity report.      Blood Culture - Draw one from central line and one from peripheral site [[id number]]  (Abnormal)  (Susceptibility) Collected:  12/19/24 0731    Order Status: Completed Specimen: Blood from Line Updated: 12/21/24 1506     Significant Indicator POS     Source BLD     Site Peripheral     Culture Result Growth detected by automated blood culture system.  12/19/2024  18:20        Streptococcus pyogenes (Group A)    Susceptibility       Streptococcus pyogenes (group a) (1)       Antibiotic Interpretation Microscan   Method Status    Penicillin Sensitive 0.016 mcg/mL E-TEST Final    Cefotaxime Sensitive 0.008 mcg/mL E-TEST Final    Clindamycin Sensitive - mcg/mL E-TEST Final                       E-Test [[id number]] Collected: 12/19/24 0731    Order Status: Completed Specimen: Other Updated: 12/21/24 1506     ETEST Sensitivity FINAL    Narrative:      161 tel. [phone] 12/20/2024, 12:39, RB PERF. RESULTS CALLED TO: [id number] RN  161 tel. [phone] 12/19/2024, 18:20, RB PERF. RESULTS CALLED TO: Meghann BHATIA  [id number]    BLOOD CULTURE [[id number]] Collected: 12/21/24 0816    Order Status: Completed Specimen: Blood from Peripheral Updated: 12/21/24 1208     Significant Indicator NEG     Source BLD     Site PERIPHERAL     Culture Result No Growth  Note: Blood cultures are incubated for 5 days and  are monitored continuously.Positive blood cultures  are called to the RN and reported as soon as  they are identified.      BLOOD CULTURE [[id number]] Collected: 12/21/24 0820    Order Status: Completed Specimen: Blood from Peripheral Updated: 12/21/24 1208     Significant Indicator NEG     Source BLD     Site PERIPHERAL     Culture Result No Growth  Note: Blood cultures are incubated for 5 days and  are monitored continuously.Positive blood cultures  are called to the RN and reported as soon as  they are identified.      Urine Culture (New) [[id number]] Collected: 12/19/24 1100    Order Status: Completed Specimen: Urine Updated: 12/21/24 0713     Significant Indicator NEG     Source UR     Site -     Culture Result No growth at 48 hours.    MRSA By PCR (Amp)  [[id number]]     Order Status: Canceled Specimen: Respirate from Nares     MRSA By PCR (Amp) [[id number]] Collected: 12/19/24 1100    Order Status: Completed Specimen: Respirate from Nares Updated: 12/19/24 1258     MRSA by PCR Negative    Urinalysis [[id number]]  (Abnormal) Collected: 12/19/24 1100    Order Status: Completed Specimen: Urine Updated: 12/19/24 1155     Color Dark Yellow     Character Cloudy     Specific Gravity 1.021     Ph 5.0     Glucose Negative mg/dL      Ketones Trace mg/dL      Protein 100 mg/dL      Bilirubin Small     Urobilinogen, Urine 1.0 EU/dL      Nitrite Negative     Leukocyte Esterase Trace     Occult Blood Negative     Micro Urine Req Microscopic          Blood Culture   Date Value Ref Range Status   08/26/2015 No growth after 5 days of incubation.  Final        Studies:  CT-EXTREMITY, LOWER WITH LEFT    Result Date: 12/21/2024 12/21/2024 11:19 AM HISTORY/REASON FOR EXAM:  concerns for necrotizing fasciitis. Concerns for necrotizing fasciitis TECHNIQUE/EXAM DESCRIPTION AND NUMBER OF VIEWS:  CT scan of the LEFT lower extremity with contrast, with reconstructions. Thin helical 3 mm sections were obtained from the distal femur through the proximal tibia/fibula. Sagittal and coronal multiplanar reconstructions were generated from the axial images. A total of 100 mL of Omnipaque 350 nonionic contrast was administered  IV without complication. Up to date radiation dose reduction adjustments have been utilized to meet ALARA standards for radiation dose reduction. COMPARISON: None. FINDINGS: Diffuse subcutaneous edema. No discrete fluid collection identified. No soft tissue gas identified. No acute fracture or dislocation.     No soft tissue gas to suggest necrotizing fasciitis. No fluid collection identified.    DX-CHEST-PORTABLE (1 VIEW)    Result Date: 12/21/2024 12/21/2024 12:22 AM HISTORY/REASON FOR EXAM: Shortness of Breath TECHNIQUE/EXAM DESCRIPTION:  Single AP view of the chest.  COMPARISON: Yesterday FINDINGS: Overlying cardiac leads are present. Cardiomegaly is observed. Atherosclerotic calcification of the aorta is noted.  The central pulmonary vasculature appears normal. Bilateral lung volumes are diminished.  Hazy left pulmonary opacity is seen. Blunting of the left costophrenic angle is seen suggesting trace left effusion. The bony structures appear age-appropriate.     1.  Hazy left pulmonary infiltrates, similar to prior study 2.  Trace left pleural effusion, stable 3.  Cardiomegaly 4.  Atherosclerosis    DX-CHEST-PORTABLE (1 VIEW)    Result Date: 2024 12:32 AM HISTORY/REASON FOR EXAM: Shortness of Breath TECHNIQUE/EXAM DESCRIPTION:  Single AP view of the chest. COMPARISON: Yesterday FINDINGS: Overlying cardiac leads are present. Cardiomegaly is observed. Atherosclerotic calcification of the aorta is noted.  The central pulmonary vasculature appears normal. Bilateral lung volumes are diminished.  Hazy left pulmonary opacity is seen. Blunting of the left costophrenic angle is seen suggesting trace left effusion. The bony structures appear age-appropriate.     1.  Hazy left pulmonary infiltrates, similar to prior study 2.  Trace left pleural effusion, stable 3.  Cardiomegaly 4.  Atherosclerosis    EC-ECHOCARDIOGRAM COMPLETE W/O CONT    Result Date: 2024  Transthoracic Echo Report Echocardiography Laboratory CONCLUSIONS Severely reduced left ventricular systolic function. Severe mitral regurgitation. Severe tricuspid regurgitation due to dilated annulus. Estimated right ventricular systolic pressure is 55 mmHg. HUNTER STEELE Exam Date:         2024                    15:40 Exam Location:     Inpatient Priority:          Routine Ordering Physician:        PAULINA GABRIEL Referring Physician:       PAULINA GABRIEL Sonographer:               Shawn CAMPBELL Age:    74     Gender:    M MRN:    [id number] :    1950 BSA:    2.02   Ht (in):    72      Wt (lb):    176 Exam Type:     Complete Indications:     Unspecified systolic (congestive) heart failure ICD Codes:       I50.20 CPT Codes:       02278 BP:   94     /   62     HR:   120 Technical Quality:       Good MEASUREMENTS  (Male / Female) Normal Values 2D ECHO LV Diastolic Diameter PLAX        5.5 cm                4.2 - 5.9 / 3.9 - 5.3 cm LV Systolic Diameter PLAX         4.3 cm                2.1 - 4.0 cm IVS Diastolic Thickness           1 cm                  LVPW Diastolic Thickness          1 cm                  LVOT Diameter                     2.1 cm                Estimated LV Ejection Fraction    28 %                  LV Ejection Fraction MOD BP       40.1 %                >= 55  % LV Ejection Fraction MOD 4C       27.1 %                LV Ejection Fraction MOD 2C       44.5 %                LV Ejection Fraction 4C AL        28.8 %                LV Ejection Fraction 2C AL        45.4 %                LA Volume Index                   61.1 cm3/m2           16 - 28 cm3/m2 DOPPLER AV Peak Velocity                  1.1 m/s               AV Peak Gradient                  5.2 mmHg              AV Mean Gradient                  3 mmHg                LVOT Peak Velocity                0.93 m/s              AV Area Cont Eq vti               2.3 cm2               MV Velocity Time Integral         17.8 cm               MR Flow Convergence Radius        1.1 cm                MR ERO PISA                       0.46 cm2              MR Regurgitant Volume PISA        51.1 cm3              MR Flow Area                      7.6 cm2               TR Peak Velocity                  328 cm/s              PV Peak Velocity                  0.67 m/s              PV Peak Gradient                  1.8 mmHg              RVOT Peak Velocity                0.53 m/s              * Indicates values subject to auto-interpretation LV EF:  28    % FINDINGS Left Ventricle Normal left ventricular chamber is dilated. Normal left  ventricular wall thickness. Severely reduced left ventricular systolic function. The left ventricular ejection fraction is visually estimated to be 25- 30%. Global hypokinesis with regional variation. A reliable estimation of diastolic function cannot be made due to mitral valve disease. Right Ventricle The right ventricle is dilated. Normal right ventricular systolic function. Right Atrium Enlarged right atrium. Dilated inferior vena cava with inspiratory collapse. Left Atrium Severely dilated left atrium. Left atrial volume index is 58 mL/sq m. Intact atrial septum without Doppler evidence of interatrial shunt (e.g. PFO or ASD). Mitral Valve Structurally normal mitral valve without significant stenosis. Severe mitral regurgitation. ERO by PISA method is 0.54 sq cm. Vena contracta is 0.5 cm. Aortic Valve Tricuspid aortic valve. Structurally normal aortic valve without significant stenosis or regurgitation. Tricuspid Valve Structurally normal tricuspid valve without significant stenosis. Severe tricuspid regurgitation due to dilated annulus. Right atrial pressure is estimated to be 8 mmHg. Estimated right ventricular systolic pressure is 55 mmHg. Pulmonic Valve Structurally normal pulmonic valve without significant stenosis. Mild pulmonic insufficiency. Pericardium Trivial pericardial effusion. Aorta Normal aortic root for body surface area. The ascending aorta diameter is 3.5 cm. Arie Jones MD (Electronically Signed) Final Date:     19 December 2024                 17:17    CT-CHEST,ABDOMEN,PELVIS WITH    Result Date: 12/19/2024 12/19/2024 2:02 PM HISTORY/REASON FOR EXAM:  gastric cancer, recent partial gastrectomy. TECHNIQUE/EXAM DESCRIPTION: CT scan of the chest, abdomen and pelvis with contrast. Thin-section helical scanning was obtained with intravenous contrast from the lung apices through the pubic symphysis to include the chest, abdomen and pelvis. 90 mL of Omnipaque 350 nonionic contrast was  administered intravenously without complication. Low dose optimization technique was utilized for this CT exam including automated exposure control and adjustment of the mA and/or kV according to patient size. COMPARISON: CT of the abdomen and pelvis, 10/28/2024. CTA of the pulmonary arteries, 10/28/2024. FINDINGS: CT Chest: Lungs: Stable spiculated opacity in the right upper lobe measuring 2.1 cm in diameter. Asymmetric volumes of the lungs, small on the left and the right. Areas of scarring and/or atelectasis in the lingula and left lower lobe, with a small left pleural effusion. No right pleural effusion. No pneumothorax bilaterally. Cardiac: The heart size is enlarged. No pericardial effusion. Atherosclerotic calcifications in the coronary arteries. Mediastinum/alice: No mediastinal or hilar adenopathy. No obstructing endobronchial lesion. Vascular: Atherosclerotic calcifications in the aorta, without aneurysmal dilation. Soft tissues: Cachexia. Bones: No acute or destructive process. Decreased bone mineralization. CT Abdomen and Pelvis: Liver: Stable multiple small subcentimeter lucencies throughout the liver parenchyma. Perihepatic ascites. Spleen: Unremarkable. Pancreas: Unremarkable. Gallbladder: The gallbladder has been resected. Biliary: Nondilated. Adrenal glands: Normal. Kidneys: Unremarkable without hydronephrosis. Bowel: No obstruction or acute inflammation. Postsurgical changes of gastric bypass surgery. Lymph nodes: There are enlarged left inguinal lymph nodes with central necrosis. The largest lymph node measures 1.7 cm in diameter. There is edema involving the soft tissues of the proximal left leg. Vasculature: Atherosclerotic calcifications in the aorta and iliac arteries, without aneurysmal dilation. Ascites: Moderate amount of ascites throughout the abdomen and pelvis. Pelvis: There is pelvic ascites. There is a Paulino catheter in the bladder lumen. There is circumferential bladder wall  thickening. Prostate gland and seminal vesicles are unremarkable. Musculoskeletal: No acute or destructive process. Decreased bone mineralization. Degenerative disc disease at L5-S1.     1. Stable spiculated opacity in the right upper lobe, extending to the pleural surface. 2. Lingular and left lower lobe parenchymal airspace disease has improved in the interval, but has not completely resolved. There is pleural reaction, suggesting some of these areas may represent parenchymal scarring. 3. Asymmetry of the lung volumes, small on the left than the right. 4. Cardiomegaly with atherosclerotic calcifications in the aorta and coronary arteries. 5. Postsurgical changes in the stomach. 6. Stable scattered multiple hypodense lesions throughout the liver. 7. Moderate ascites throughout the abdomen and pelvis. 8. Cachexia. 9. Enlarged left inguinal lymph nodes with central necrosis. There is edema involving the soft tissues of the upper left leg extending to the left pelvis.    US-EXTREMITY VENOUS LOWER BILAT    Result Date: 2024   Vascular Laboratory  CONCLUSIONS  Compared to the prior study on 20.  Normal bilateral lower extremity venous duplex exam.  HUNTER STEELE  Exam Date:     2024 07:54  Room #:     Inpatient  Priority:     Stat  Ht (in):             Wt (lb):  Ordering Physician:        LAVELLE GANDARA  Referring Physician:       [id number] Ennis  Sonographer:               Malik Clifton RVT  Study Type:                Complete Bilateral  Technical Quality:         Adequate  Age:    74    Gender:     M  MRN:    [id number]  :    1950      BSA:  Indications:     Edema  CPT Codes:       73363  ICD Codes:       782.3  History:         Edema  Limitations:  PROCEDURES:  Bilateral lower extremity venous duplex imaging.  The following venous structures were evaluated: common femoral, deep  femoral, proximal great saphenous, femoral, popliteal, peroneal, and  posterior tibial  veins.  Serial compression, color, and spectral Doppler flow evaluations were  performed.  FINDINGS:  Bilateral lower extremities.  The peroneal and posterior tibial veins are difficult to assess for  compressibility, but flow response to augmentation is demonstrated.  All other veins demonstrate complete color filling and compressibility with  normal venous flow dynamics including spontaneous flow and respiratory  phasicity.  No deep venous thrombosis.  Rustam Nazario MD  (Electronically Signed)  Final Date:      19 December 2024                   09:00    DX-CHEST-PORTABLE (1 VIEW)    Result Date: 12/19/2024 12/19/2024 6:26 AM HISTORY/REASON FOR EXAM: Shortness of Breath TECHNIQUE/EXAM DESCRIPTION:  Single AP view of the chest. COMPARISON: October 28, 2024 FINDINGS: The cardiac silhouette appears within normal limits. The mediastinal contour appears within normal limits.  The central pulmonary vasculature appears normal. Bilateral lung volumes are diminished.  Hazy left midlung pulmonary opacity is seen Blunting of the left costophrenic angle is seen suggesting trace left effusion. The bony structures appear age-appropriate.     1.  Hazy left pulmonary infiltrates. 2.  Small left pleural effusion 3.  Cardiomegaly    CT-CSPINE WITHOUT PLUS RECONS    Result Date: 11/29/2024 11/29/2024 10:01 AM HISTORY/REASON FOR EXAM: Polytrauma, blunt; s/p fall - patient on anticoagulation; Neck pain from ground level fall COMPARISON: CT chest 10/28/2024 and 4/26/2020. TECHNIQUE/EXAM DESCRIPTION: CT cervical spine without contrast, with reconstructions. The study was performed on a helical multidetector CT scanner. Thin-section helical scanning was performed from the skull base through T1. Sagittal and coronal multiplanar reconstructions were generated from the axial images. Low dose optimization technique was utilized for this CT exam including automated exposure control and adjustment of the mA and/or kV according to patient  size. FINDINGS: Carotid vascular calcifications. 2.5 x 1.5 cm right lung base spiculated density posteriorly is not significantly changed from 4/26/2020. No paraspinal mass. Severe degenerative changes. Straightening of the normal cervical lordosis may be related to muscle spasm or positioning. Multilevel mild congenital spinal stenosis. Soft tissue detail adjacent to bony structures is limited on CT images. No fractures visible. Incomplete bony union C1 posterior arch is congenital and incidental. The odontoid appears normal on coronal reformatted images.     Impression: 1. No cervical spine fracture or subluxation to T1 evident. 2.. Straightening of the normal cervical lordosis may be related to muscle spasm or positioning. 3. Prominent degenerative changes.     CT-HEAD W/O    Result Date: 11/29/2024  Examination: 11/29/2024 10:00 AM HISTORY/REASON FOR EXAM:  Trauma with loss of consciousness. COMPARISON: None available. TECHNIQUE/EXAM DESCRIPTION: CT of the head without contrast. CT head protocol utilized without contrast. CT performed using ALARA principles (as low as reasonably achievable). Sagittal and coronal reformatted images included. FINDINGS: Forehead scalp soft tissue swelling. No fracture is visible. Age expected atrophy and chronic white matter microvascular ischemic change type lucency noted. No mass, mass effect, hydrocephalus or hemorrhage evident.  No low density to indicate acute stroke.  No convexity fluid collection. The visible skull base sinuses do not show any fluid. Mastoids in the field of view are unremarkable.     Impression: 1. No acute process in the brain evident. 2. Forehead scalp soft tissue swelling. No fracture is visible.       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 Reddy admitted on 12/19/2024 secondary to encephalopathy, shortness of breath and left leg swelling.  Patient found to have group A streptococcal bacteremia secondary to left lower extremity cellulitis.  Repeat blood cultures on 12/21 are negative to date.  Hospital course complicated by worsening leukocytosis.    -Recommend CT of the left lower extremity with contrast to rule out any deep abscess  -Agree with IV clindamycin added today  -Continue IV Unasyn  -Blood cultures on 12/19+ group A streptococcus  -Repeat blood cultures on 12/21-negative to date  -Continue wound care    This infection poses a threat to life    Disposition: TBD patient is homeless    Plan of care discussed with JUDI Zamora D.O.. Will continue to follow    Brenda High M.D.      Please note that this dictation was created using voice recognition software. I have worked with technical experts from [facility] to optimize the interface.  I have made every reasonable attempt to correct obvious errors, but there may be errors of grammar and possibly content that I did not discover before finalizing the note.

## 2024-12-24 NOTE — CARE PLAN
The patient is Watcher - Medium risk of patient condition declining or worsening    Shift Goals  Clinical Goals: hemodynamic stability, t0qxons, pain control  Patient Goals: rest, pain management  Family Goals: MARCOS    Progress made toward(s) clinical / shift goals:       Problem: Knowledge Deficit - Standard  Goal: Patient and family/care givers will demonstrate understanding of plan of care, disease process/condition, diagnostic tests and medications  Outcome: Progressing     Problem: Hemodynamics  Goal: Patient's hemodynamics, fluid balance and neurologic status will be stable or improve  Outcome: Progressing     Problem: Pain - Standard  Goal: Alleviation of pain or a reduction in pain to the patient’s comfort goal  Outcome: Progressing     Problem: Skin Integrity  Goal: Skin integrity is maintained or improved  Outcome: Progressing     Problem: Fall Risk  Goal: Patient will remain free from falls  Outcome: Progressing

## 2024-12-25 ENCOUNTER — APPOINTMENT (OUTPATIENT)
Dept: RADIOLOGY | Facility: MEDICAL CENTER | Age: 74
End: 2024-12-25
Attending: HOSPITALIST
Payer: MEDICARE

## 2024-12-25 ENCOUNTER — APPOINTMENT (OUTPATIENT)
Dept: RADIOLOGY | Facility: MEDICAL CENTER | Age: 74
DRG: 871 | End: 2024-12-25
Attending: HOSPITALIST
Payer: MEDICARE

## 2024-12-25 LAB
ALBUMIN SERPL BCP-MCNC: 2.2 G/DL (ref 3.2–4.9)
ALBUMIN/GLOB SERPL: 0.8 G/DL
ALP SERPL-CCNC: 310 U/L (ref 30–99)
ALT SERPL-CCNC: 16 U/L (ref 2–50)
ANION GAP SERPL CALC-SCNC: 6 MMOL/L (ref 7–16)
AST SERPL-CCNC: 19 U/L (ref 12–45)
BASOPHILS # BLD AUTO: 0.3 % (ref 0–1.8)
BASOPHILS # BLD: 0.07 K/UL (ref 0–0.12)
BILIRUB SERPL-MCNC: 1.9 MG/DL (ref 0.1–1.5)
BUN SERPL-MCNC: 12 MG/DL (ref 8–22)
CALCIUM ALBUM COR SERPL-MCNC: 9 MG/DL (ref 8.5–10.5)
CALCIUM SERPL-MCNC: 7.6 MG/DL (ref 8.5–10.5)
CHLORIDE SERPL-SCNC: 94 MMOL/L (ref 96–112)
CK SERPL-CCNC: 31 U/L (ref 0–154)
CO2 SERPL-SCNC: 29 MMOL/L (ref 20–33)
CREAT SERPL-MCNC: 0.89 MG/DL (ref 0.5–1.4)
EOSINOPHIL # BLD AUTO: 0.06 K/UL (ref 0–0.51)
EOSINOPHIL NFR BLD: 0.2 % (ref 0–6.9)
ERYTHROCYTE [DISTWIDTH] IN BLOOD BY AUTOMATED COUNT: 52.2 FL (ref 35.9–50)
GFR SERPLBLD CREATININE-BSD FMLA CKD-EPI: 90 ML/MIN/1.73 M 2
GLOBULIN SER CALC-MCNC: 2.9 G/DL (ref 1.9–3.5)
GLUCOSE BLD STRIP.AUTO-MCNC: 102 MG/DL (ref 65–99)
GLUCOSE BLD STRIP.AUTO-MCNC: 103 MG/DL (ref 65–99)
GLUCOSE BLD STRIP.AUTO-MCNC: 103 MG/DL (ref 65–99)
GLUCOSE BLD STRIP.AUTO-MCNC: 110 MG/DL (ref 65–99)
GLUCOSE BLD STRIP.AUTO-MCNC: 152 MG/DL (ref 65–99)
GLUCOSE BLD STRIP.AUTO-MCNC: 210 MG/DL (ref 65–99)
GLUCOSE BLD STRIP.AUTO-MCNC: 58 MG/DL (ref 65–99)
GLUCOSE BLD STRIP.AUTO-MCNC: 62 MG/DL (ref 65–99)
GLUCOSE BLD STRIP.AUTO-MCNC: 64 MG/DL (ref 65–99)
GLUCOSE BLD STRIP.AUTO-MCNC: 65 MG/DL (ref 65–99)
GLUCOSE BLD STRIP.AUTO-MCNC: 72 MG/DL (ref 65–99)
GLUCOSE BLD STRIP.AUTO-MCNC: 78 MG/DL (ref 65–99)
GLUCOSE BLD STRIP.AUTO-MCNC: 98 MG/DL (ref 65–99)
GLUCOSE SERPL-MCNC: 108 MG/DL (ref 65–99)
HCT VFR BLD AUTO: 30.7 % (ref 42–52)
HGB BLD-MCNC: 10 G/DL (ref 14–18)
IMM GRANULOCYTES # BLD AUTO: 0.47 K/UL (ref 0–0.11)
IMM GRANULOCYTES NFR BLD AUTO: 1.8 % (ref 0–0.9)
LYMPHOCYTES # BLD AUTO: 0.78 K/UL (ref 1–4.8)
LYMPHOCYTES NFR BLD: 2.9 % (ref 22–41)
MAGNESIUM SERPL-MCNC: 1.9 MG/DL (ref 1.5–2.5)
MCH RBC QN AUTO: 27.5 PG (ref 27–33)
MCHC RBC AUTO-ENTMCNC: 32.6 G/DL (ref 32.3–36.5)
MCV RBC AUTO: 84.3 FL (ref 81.4–97.8)
MONOCYTES # BLD AUTO: 0.93 K/UL (ref 0–0.85)
MONOCYTES NFR BLD AUTO: 3.5 % (ref 0–13.4)
NEUTROPHILS # BLD AUTO: 24.31 K/UL (ref 1.82–7.42)
NEUTROPHILS NFR BLD: 91.3 % (ref 44–72)
NRBC # BLD AUTO: 0 K/UL
NRBC BLD-RTO: 0 /100 WBC (ref 0–0.2)
PHOSPHATE SERPL-MCNC: 2.3 MG/DL (ref 2.5–4.5)
PLATELET # BLD AUTO: 87 K/UL (ref 164–446)
PLATELETS.RETICULATED NFR BLD AUTO: 14.6 % (ref 0.6–13.1)
POTASSIUM SERPL-SCNC: 3.9 MMOL/L (ref 3.6–5.5)
PROT SERPL-MCNC: 5.1 G/DL (ref 6–8.2)
RBC # BLD AUTO: 3.64 M/UL (ref 4.7–6.1)
SODIUM SERPL-SCNC: 129 MMOL/L (ref 135–145)
WBC # BLD AUTO: 26.6 K/UL (ref 4.8–10.8)

## 2024-12-25 PROCEDURE — 700101 HCHG RX REV CODE 250: Performed by: INTERNAL MEDICINE

## 2024-12-25 PROCEDURE — 700117 HCHG RX CONTRAST REV CODE 255: Mod: JZ | Performed by: HOSPITALIST

## 2024-12-25 PROCEDURE — 82962 GLUCOSE BLOOD TEST: CPT | Mod: 91

## 2024-12-25 PROCEDURE — 99233 SBSQ HOSP IP/OBS HIGH 50: CPT | Performed by: HOSPITALIST

## 2024-12-25 PROCEDURE — 770000 HCHG ROOM/CARE - INTERMEDIATE ICU *

## 2024-12-25 PROCEDURE — A9579 GAD-BASE MR CONTRAST NOS,1ML: HCPCS | Mod: JZ | Performed by: HOSPITALIST

## 2024-12-25 PROCEDURE — A9270 NON-COVERED ITEM OR SERVICE: HCPCS | Performed by: HOSPITALIST

## 2024-12-25 PROCEDURE — 85025 COMPLETE CBC W/AUTO DIFF WBC: CPT

## 2024-12-25 PROCEDURE — A9270 NON-COVERED ITEM OR SERVICE: HCPCS | Performed by: INTERNAL MEDICINE

## 2024-12-25 PROCEDURE — 700102 HCHG RX REV CODE 250 W/ 637 OVERRIDE(OP): Performed by: INTERNAL MEDICINE

## 2024-12-25 PROCEDURE — 80053 COMPREHEN METABOLIC PANEL: CPT

## 2024-12-25 PROCEDURE — 700111 HCHG RX REV CODE 636 W/ 250 OVERRIDE (IP): Mod: JZ | Performed by: INTERNAL MEDICINE

## 2024-12-25 PROCEDURE — 83735 ASSAY OF MAGNESIUM: CPT

## 2024-12-25 PROCEDURE — 700101 HCHG RX REV CODE 250: Performed by: HOSPITALIST

## 2024-12-25 PROCEDURE — 700105 HCHG RX REV CODE 258: Performed by: INTERNAL MEDICINE

## 2024-12-25 PROCEDURE — 700111 HCHG RX REV CODE 636 W/ 250 OVERRIDE (IP): Mod: JG | Performed by: HOSPITALIST

## 2024-12-25 PROCEDURE — 700102 HCHG RX REV CODE 250 W/ 637 OVERRIDE(OP): Performed by: HOSPITALIST

## 2024-12-25 PROCEDURE — 84100 ASSAY OF PHOSPHORUS: CPT

## 2024-12-25 PROCEDURE — 99233 SBSQ HOSP IP/OBS HIGH 50: CPT | Performed by: INTERNAL MEDICINE

## 2024-12-25 PROCEDURE — 700105 HCHG RX REV CODE 258: Performed by: HOSPITALIST

## 2024-12-25 PROCEDURE — 73718 MRI LOWER EXTREMITY W/O DYE: CPT | Mod: LT

## 2024-12-25 PROCEDURE — 92610 EVALUATE SWALLOWING FUNCTION: CPT

## 2024-12-25 PROCEDURE — 82550 ASSAY OF CK (CPK): CPT

## 2024-12-25 PROCEDURE — 73723 MRI JOINT LWR EXTR W/O&W/DYE: CPT | Mod: LT

## 2024-12-25 PROCEDURE — 85055 RETICULATED PLATELET ASSAY: CPT

## 2024-12-25 RX ORDER — AMOXICILLIN 250 MG
2 CAPSULE ORAL EVERY EVENING
Status: DISCONTINUED | OUTPATIENT
Start: 2024-12-25 | End: 2025-01-21 | Stop reason: HOSPADM

## 2024-12-25 RX ORDER — DEXTROSE, SODIUM CHLORIDE, SODIUM LACTATE, POTASSIUM CHLORIDE, AND CALCIUM CHLORIDE 5; .6; .31; .03; .02 G/100ML; G/100ML; G/100ML; G/100ML; G/100ML
INJECTION, SOLUTION INTRAVENOUS CONTINUOUS
Status: DISCONTINUED | OUTPATIENT
Start: 2024-12-25 | End: 2024-12-29

## 2024-12-25 RX ORDER — POLYETHYLENE GLYCOL 3350 17 G/17G
1 POWDER, FOR SOLUTION ORAL
Status: DISCONTINUED | OUTPATIENT
Start: 2024-12-25 | End: 2025-01-21 | Stop reason: HOSPADM

## 2024-12-25 RX ORDER — THIAMINE HYDROCHLORIDE 100 MG/ML
100 INJECTION, SOLUTION INTRAMUSCULAR; INTRAVENOUS DAILY
Status: COMPLETED | OUTPATIENT
Start: 2024-12-25 | End: 2024-12-27

## 2024-12-25 RX ORDER — MAGNESIUM SULFATE HEPTAHYDRATE 40 MG/ML
2 INJECTION, SOLUTION INTRAVENOUS ONCE
Status: COMPLETED | OUTPATIENT
Start: 2024-12-25 | End: 2024-12-25

## 2024-12-25 RX ADMIN — OMEPRAZOLE 20 MG: 20 CAPSULE, DELAYED RELEASE ORAL at 05:24

## 2024-12-25 RX ADMIN — APIXABAN 5 MG: 5 TABLET, FILM COATED ORAL at 05:24

## 2024-12-25 RX ADMIN — SENNOSIDES AND DOCUSATE SODIUM 2 TABLET: 50; 8.6 TABLET ORAL at 20:05

## 2024-12-25 RX ADMIN — CLINDAMYCIN IN 5 PERCENT DEXTROSE 600 MG: 12 INJECTION, SOLUTION INTRAVENOUS at 05:32

## 2024-12-25 RX ADMIN — AMPICILLIN AND SULBACTAM 3 G: 1; 2 INJECTION, POWDER, FOR SOLUTION INTRAMUSCULAR; INTRAVENOUS at 02:07

## 2024-12-25 RX ADMIN — POTASSIUM PHOSPHATE, MONOBASIC AND POTASSIUM PHOSPHATE, DIBASIC 15 MMOL: 224; 236 INJECTION, SOLUTION, CONCENTRATE INTRAVENOUS at 19:58

## 2024-12-25 RX ADMIN — OXYCODONE 5 MG: 5 TABLET ORAL at 14:43

## 2024-12-25 RX ADMIN — OXYCODONE 5 MG: 5 TABLET ORAL at 20:06

## 2024-12-25 RX ADMIN — THIAMINE HYDROCHLORIDE 100 MG: 100 INJECTION, SOLUTION INTRAMUSCULAR; INTRAVENOUS at 05:25

## 2024-12-25 RX ADMIN — MAGNESIUM SULFATE HEPTAHYDRATE 2 G: 2 INJECTION, SOLUTION INTRAVENOUS at 11:48

## 2024-12-25 RX ADMIN — MAGNESIUM HYDROXIDE 30 ML: 1200 LIQUID ORAL at 08:24

## 2024-12-25 RX ADMIN — DIGOXIN 125 MCG: 250 INJECTION, SOLUTION INTRAMUSCULAR; INTRAVENOUS; PARENTERAL at 20:09

## 2024-12-25 RX ADMIN — DEXTROSE MONOHYDRATE 25 G: 25 INJECTION, SOLUTION INTRAVENOUS at 20:45

## 2024-12-25 RX ADMIN — APIXABAN 5 MG: 5 TABLET, FILM COATED ORAL at 20:05

## 2024-12-25 RX ADMIN — GADOTERIDOL 15 ML: 279.3 INJECTION, SOLUTION INTRAVENOUS at 10:45

## 2024-12-25 RX ADMIN — AMPICILLIN AND SULBACTAM 3 G: 1; 2 INJECTION, POWDER, FOR SOLUTION INTRAMUSCULAR; INTRAVENOUS at 20:02

## 2024-12-25 RX ADMIN — CLINDAMYCIN IN 5 PERCENT DEXTROSE 600 MG: 12 INJECTION, SOLUTION INTRAVENOUS at 15:10

## 2024-12-25 RX ADMIN — AMPICILLIN AND SULBACTAM 3 G: 1; 2 INJECTION, POWDER, FOR SOLUTION INTRAMUSCULAR; INTRAVENOUS at 14:19

## 2024-12-25 RX ADMIN — THIAMINE HYDROCHLORIDE 100 MG: 100 INJECTION, SOLUTION INTRAMUSCULAR; INTRAVENOUS at 11:46

## 2024-12-25 RX ADMIN — NICOTINE 14 MG: 14 PATCH TRANSDERMAL at 05:24

## 2024-12-25 RX ADMIN — POLYETHYLENE GLYCOL 3350 1 PACKET: 17 POWDER, FOR SOLUTION ORAL at 05:25

## 2024-12-25 RX ADMIN — CLINDAMYCIN IN 5 PERCENT DEXTROSE 600 MG: 12 INJECTION, SOLUTION INTRAVENOUS at 22:23

## 2024-12-25 RX ADMIN — OXYCODONE 5 MG: 5 TABLET ORAL at 11:38

## 2024-12-25 RX ADMIN — AMPICILLIN AND SULBACTAM 3 G: 1; 2 INJECTION, POWDER, FOR SOLUTION INTRAMUSCULAR; INTRAVENOUS at 08:16

## 2024-12-25 RX ADMIN — SODIUM CHLORIDE, SODIUM LACTATE, POTASSIUM CHLORIDE, CALCIUM CHLORIDE AND DEXTROSE MONOHYDRATE: 5; 600; 310; 30; 20 INJECTION, SOLUTION INTRAVENOUS at 14:48

## 2024-12-25 ASSESSMENT — PAIN DESCRIPTION - PAIN TYPE
TYPE: ACUTE PAIN

## 2024-12-25 ASSESSMENT — SOCIAL DETERMINANTS OF HEALTH (SDOH)
WITHIN THE LAST YEAR, HAVE TO BEEN RAPED OR FORCED TO HAVE ANY KIND OF SEXUAL ACTIVITY BY YOUR PARTNER OR EX-PARTNER?: NO
WITHIN THE PAST 12 MONTHS, YOU WORRIED THAT YOUR FOOD WOULD RUN OUT BEFORE YOU GOT THE MONEY TO BUY MORE: NEVER TRUE
WITHIN THE PAST 12 MONTHS, THE FOOD YOU BOUGHT JUST DIDN'T LAST AND YOU DIDN'T HAVE MONEY TO GET MORE: NEVER TRUE
WITHIN THE LAST YEAR, HAVE YOU BEEN AFRAID OF YOUR PARTNER OR EX-PARTNER?: NO
WITHIN THE LAST YEAR, HAVE YOU BEEN KICKED, HIT, SLAPPED, OR OTHERWISE PHYSICALLY HURT BY YOUR PARTNER OR EX-PARTNER?: NO
WITHIN THE LAST YEAR, HAVE YOU BEEN HUMILIATED OR EMOTIONALLY ABUSED IN OTHER WAYS BY YOUR PARTNER OR EX-PARTNER?: NO
IN THE PAST 12 MONTHS, HAS THE ELECTRIC, GAS, OIL, OR WATER COMPANY THREATENED TO SHUT OFF SERVICE IN YOUR HOME?: NO

## 2024-12-25 ASSESSMENT — ENCOUNTER SYMPTOMS
ABDOMINAL PAIN: 0
HEADACHES: 0
CHILLS: 0
NERVOUS/ANXIOUS: 0
MYALGIAS: 1
NAUSEA: 0
DIZZINESS: 0
SENSORY CHANGE: 0
FEVER: 0
CHILLS: 1
DIARRHEA: 0
SHORTNESS OF BREATH: 0

## 2024-12-25 ASSESSMENT — LIFESTYLE VARIABLES
TOTAL SCORE: 0
HOW MANY TIMES IN THE PAST YEAR HAVE YOU HAD 5 OR MORE DRINKS IN A DAY: 0
EVER FELT BAD OR GUILTY ABOUT YOUR DRINKING: NO
EVER HAD A DRINK FIRST THING IN THE MORNING TO STEADY YOUR NERVES TO GET RID OF A HANGOVER: NO
DOES PATIENT WANT TO STOP DRINKING: YES
HAVE YOU EVER FELT YOU SHOULD CUT DOWN ON YOUR DRINKING: NO
CONSUMPTION TOTAL: NEGATIVE
TOTAL SCORE: 0
AVERAGE NUMBER OF DAYS PER WEEK YOU HAVE A DRINK CONTAINING ALCOHOL: 1
ALCOHOL_USE: YES
ON A TYPICAL DAY WHEN YOU DRINK ALCOHOL HOW MANY DRINKS DO YOU HAVE: 4
HAVE PEOPLE ANNOYED YOU BY CRITICIZING YOUR DRINKING: NO
TOTAL SCORE: 0
DOES PATIENT WANT TO TALK TO SOMEONE ABOUT QUITTING: NO

## 2024-12-25 ASSESSMENT — PATIENT HEALTH QUESTIONNAIRE - PHQ9
4. FEELING TIRED OR HAVING LITTLE ENERGY: NEARLY EVERY DAY
7. TROUBLE CONCENTRATING ON THINGS, SUCH AS READING THE NEWSPAPER OR WATCHING TELEVISION: NEARLY EVERY DAY
1. LITTLE INTEREST OR PLEASURE IN DOING THINGS: MORE THAN HALF THE DAYS
SUM OF ALL RESPONSES TO PHQ9 QUESTIONS 1 AND 2: 5
5. POOR APPETITE OR OVEREATING: NEARLY EVERY DAY
6. FEELING BAD ABOUT YOURSELF - OR THAT YOU ARE A FAILURE OR HAVE LET YOURSELF OR YOUR FAMILY DOWN: NEARLY EVERY DAY
3. TROUBLE FALLING OR STAYING ASLEEP OR SLEEPING TOO MUCH: NEARLY EVERY DAY
8. MOVING OR SPEAKING SO SLOWLY THAT OTHER PEOPLE COULD HAVE NOTICED. OR THE OPPOSITE, BEING SO FIGETY OR RESTLESS THAT YOU HAVE BEEN MOVING AROUND A LOT MORE THAN USUAL: NEARLY EVERY DAY
2. FEELING DOWN, DEPRESSED, IRRITABLE, OR HOPELESS: NEARLY EVERY DAY
9. THOUGHTS THAT YOU WOULD BE BETTER OFF DEAD, OR OF HURTING YOURSELF: NEARLY EVERY DAY
SUM OF ALL RESPONSES TO PHQ QUESTIONS 1-9: 26

## 2024-12-25 ASSESSMENT — FIBROSIS 4 INDEX: FIB4 SCORE: 7.98

## 2024-12-25 NOTE — CARE PLAN
Problem: Knowledge Deficit - Standard  Goal: Patient and family/care givers will demonstrate understanding of plan of care, disease process/condition, diagnostic tests and medications  Outcome: Progressing  Note: Pt able to states basic understanding of disease process.      Problem: Hemodynamics  Goal: Patient's hemodynamics, fluid balance and neurologic status will be stable or improve  Outcome: Progressing  Note: Remain within defined limits by provider.      Problem: Pain - Standard  Goal: Alleviation of pain or a reduction in pain to the patient’s comfort goal  Outcome: Progressing  Note: Patient states alleviation of pain with use of non-pharmacologic and PRN medications, see MAR.    The patient is Watcher - Medium risk of patient condition declining or worsening    Shift Goals  Clinical Goals: Abx as ordered, BG q 2 hours, wound care per order  Patient Goals: Rest  Family Goals: MARCOS    Progress made toward(s) clinical / shift goals:  met

## 2024-12-25 NOTE — THERAPY
"Speech Language Pathology   Clinical Swallow Evaluation     Patient Name: Robert Mcdonnell  AGE:  74 y.o., SEX:  male  Medical Record #: [id number]  Date of Service: 12/25/2024      History of Present Illness  75 y/o male admitted 12/19 with dyspnea, bilateral LE edema, cough. Found to have hypoglycemic and in acute exacerbation of CHF.     CMHx: CHF in exacerbation, LE wound, sepsis, NIRMAL, COPD, malnutrition, acute respiratory failure, GAS bacteremia  PMHx: Afib, CHF (EF 30%), pulmonary HTN, PA,  perforation (10/2024), subtotal gastrectomy, Miguel Angel-en-Y gastrojejunostomy, malignant neoplasm of stromach, PNA    No hx SLP in Epic. Made NPO following RN concern; from note 12/24 at 1440 \"Dr. Zamora notified Pt continues to cough when consuming foods and liquids. Dr. Zamora gives orders to make Pt NPO, sips with meds, SLP eval, and dextrose continuous infusion, see MAR.\"    CXR 12/21:    \"1.  Hazy left pulmonary infiltrates, similar to prior study  2.  Trace left pleural effusion, stable  3.  Cardiomegaly  4.  Atherosclerosis\"      General Information:  Vitals  Respiration: 20  Pulse Oximetry: 94 %  O2 (LPM): 0.5  O2 Delivery Device: Silicone Nasal Cannula  Level of Consciousness: Awake  Patient Behaviors: Fatigue, Flat Affect  Orientation: Oriented x 4  Follows Directives: Yes      Prior Living Situation & Level of Function:  Comments: per EMR - patient is unhoused  Communication: Reports WFL  Swallowing: Reports diet likely most consistent with SB6 or EC7 and thin liquids. Does not report chronic dysphagis symptoms       Oral Mechanism Evaluation:  Dentition: Scattered dentition (Near edentulous)   Facial Symmetry: Equal  Facial Sensation: Equal     Labial Observations: Bilateral weakness, Greater weakness on the R  Lingual Observations: Midline, Xerostomia  Motor Speech: WFL         Laryngeal Function:  Secretion Management: Adequate  Cough: Perceptually WNL  Comments: No pain reported with cough, speech, or swallow. " "Reports feeling that he has the feeling of \"junk\" in his throat that he cannot clear      Subjective  Pt agreeable and cooperative with SLP evaluation tasks. Reports that coughing with PO is not consistent with his normal, although he attributes it to the \"junk\" in his throat. \"I'll have another bite.\"      Assessment  Current Method of Nutrition: NPO until cleared by speech pathology  Positioning: Semi-Thompson's (30-45 degrees)  Bolus Administration: SLP  O2 (LPM): 0.5 O2 Delivery Device: Silicone Nasal Cannula  Factor(s) Affecting Performance: None  Tracheostomy : No      Swallowing Trials:  Ice: WFL  Thin Liquid (TN0): Impaired  Liquidised (LQ3): WFL  Easy to Chew (EC7): WFL      Comments:   Appropriate oral bolus stripping and containment. Presumed complete AP transit with effective bolus formation evidenced by complete clearance of bolus from oral cavity. Functional and timely mastication of solid consistencies. Cough response following ~50% of single and sequential sips of TN0, concerning for airway invasion. One to two swallows appreciated per bolus.       Clinical Impressions  Pt presents with clinical indicators of and is at elevated risk for oropharyngeal dysphagia given CHF exacerbation and acute respiratory failure. Pt would benefit from further evaluation of swallow using FEES . However, recommend re-initiation of PO until FEES can be completed given that patient's oxygenation and respiratory rate remained within typical parameters during session on 0.5L oxygen. Should patient's respiratory status decline, recommend making patient NPO until FEES can be completed.       Recommendations  Easy to chew solids / thin liquids  Instrumentation: FEES  Medication: As tolerated  Supervision: Close supervision - patient may be left alone for less than 5 minutes at a time, Assist with meal tray set up  Positioning: Fully upright and midline during oral intake  Risk Management : Small bites/sips, Slow rate of intake, " "Physical mobility, as tolerated  Oral Care: Q4h         SLP Treatment Plan  Treatment Plan: Dysphagia Treatment, Patient/Family/Caregiver Training  SLP Frequency: 4x Per Week  Estimated Duration: Until Therapy Goals Met      Anticipated Discharge Needs  Discharge Recommendations: Recommend post-acute placement for additional speech therapy services prior to discharge home (changes pending FEES)   Therapy Recommendations Upon DC: Dysphagia Training, Community Re-Integration, Patient / Family / Caregiver Education        Patient / Family Goals  Patient / Family Goal #1: \"If I could just get rid of this junk I would be fine\"  Short Term Goals  Short Term Goal # 1: Pt will complete FEES w SLP to further evaluate swallow function and inform POC.      Adela Perez, SLP   "

## 2024-12-25 NOTE — PROGRESS NOTES
Hospital Medicine Daily Progress Note    Date of Service  12/25/2024    Chief Complaint  Short of breath    Hospital Course  Robert Mcdonnell is a 75yo male with a PMH homelessness, atrial fibrillation on chronic anticoagulation with apixaban, CHF, EF 30%, P.Htn, RVSP 65, remote PE, recent hospitalization 10/3 - 10/15 for perforated  (T2N0M0 invasive gastric adenocarcinoma), GDA embolization, subsequent subtotal gastrectomy, liver wedge resection, Miguel Angel-en-Y gastrojejunostomy with omental flap and cholecystectomy 10/7 with pathology showing invasive well-differentiated adenocarcinoma with associated ulceration.  There was tumor invasion into the muscularis propria and 20 lymph nodes were negative for metastatic carcinoma.  Oncology was consulted and noted no evidence of metastatic disease however MRI abdomen recommended for follow-up; no adjuvant therapy was recommended.  He presented 12/19/2024 to the ED with report of several days of increasing dyspnea, worsening bilateral lower extremity edema and mild cough.  Patient was found to be hypoglycemic on admission with a glucose of 53 for which she was placed on D50.  He had increasing shortness of breath and required BiPAP in the emergency room.  Initial proBNP 21,696 with an elevated lactic acid of 5.6 and a creatinine of 2.3.  He had not been taking his Lasix as he had run out of it but had been on his Eliquis.  EKG showed irregular wide-complex rhythm.    Interval Problem Update  12/25: WBC:26.6, Alk phos:310, Tbilib 1.9. CT left leg shows small to moderate suprapatellar joint effusion. I have consulted orthopedics. Pain in left thigh and left knee.  Cold this am but cold at 67 degrees in his room. I discussed with Dr High (ID) and Dr Flores (ortho) and I have ordered stat MRI left leg to rule out infection.    12/24: WBC: 25.7, Hgb: 9.9, plt: 72. Remains on D10 overnight.  Pain controlled on oxycodone 10mg.  Awake and warm.  Left leg swelling and erythema in  medial groin.  Speech clear.    I have discussed this patient's plan of care and discharge plan at IDT rounds today with Case Management, Nursing, Nursing leadership, and other members of the IDT team.    Code Status  Full Code    Disposition  Medically Cleared  I have placed the appropriate orders for post-discharge needs.    Review of Systems  Review of Systems   Constitutional:  Positive for chills and malaise/fatigue. Negative for fever.   Respiratory:  Negative for shortness of breath.    Cardiovascular:  Positive for leg swelling.   Gastrointestinal:  Negative for abdominal pain and nausea.   Musculoskeletal:  Positive for myalgias (left median thigh).   Neurological:  Negative for dizziness, sensory change and headaches.   Psychiatric/Behavioral:  The patient is not nervous/anxious.         Physical Exam  Temp:  [36.3 °C (97.3 °F)-38.1 °C (100.6 °F)] 36.5 °C (97.7 °F)  Pulse:  [] 87  Resp:  [14-26] 16  BP: ()/(52-95) 96/61  SpO2:  [89 %-100 %] 97 %    Physical Exam  Constitutional:       Appearance: He is ill-appearing.   HENT:      Head: Normocephalic.      Mouth/Throat:      Mouth: Mucous membranes are moist.   Eyes:      Conjunctiva/sclera: Conjunctivae normal.   Cardiovascular:      Rate and Rhythm: Regular rhythm. Tachycardia present.      Heart sounds: No murmur heard.  Pulmonary:      Effort: No respiratory distress.      Breath sounds: No wheezing.   Abdominal:      General: Bowel sounds are normal. There is no distension.      Palpations: Abdomen is soft.      Tenderness: There is no abdominal tenderness.   Musculoskeletal:         General: Tenderness present.      Cervical back: Neck supple.      Left lower leg: Edema present.   Lymphadenopathy:      Cervical: No cervical adenopathy.   Skin:     Findings: Erythema (decrease in erythema) present.      Comments: Skin denuding on lower leg. Wounds on scrotum and medial thigh.  Reviewed on patient and in pictures with wound care RN present    Neurological:      General: No focal deficit present.      Mental Status: He is alert and oriented to person, place, and time.      Cranial Nerves: No cranial nerve deficit.   Psychiatric:         Mood and Affect: Mood normal.         Behavior: Behavior normal.         Thought Content: Thought content normal.         Fluids    Intake/Output Summary (Last 24 hours) at 12/25/2024 0802  Last data filed at 12/25/2024 0615  Gross per 24 hour   Intake 1896.04 ml   Output 1050 ml   Net 846.04 ml        Laboratory  Recent Labs     12/23/24 0422 12/24/24 0439 12/25/24  0410   WBC 23.0* 25.7* 26.6*   RBC 3.18* 3.59* 3.64*   HEMOGLOBIN 8.8* 9.9* 10.0*   HEMATOCRIT 26.8* 30.1* 30.7*   MCV 84.3 83.8 84.3   MCH 27.7 27.6 27.5   MCHC 32.8 32.9 32.6   RDW 52.6* 51.2* 52.2*   PLATELETCT 53* 72* 87*     Recent Labs     12/23/24 0422 12/24/24 0439 12/25/24 0410   SODIUM 131* 134* 129*   POTASSIUM 3.8 3.9 3.9   CHLORIDE 95* 97 94*   CO2 26 29 29   GLUCOSE 202* 79 108*   BUN 22 16 12   CREATININE 0.75 0.90 0.89   CALCIUM 7.4* 8.2* 7.6*                   Imaging  CT-EXTREMITY, LOWER WITH LEFT   Final Result      1.  Extensive subcutaneous inflammatory changes about the left lower extremity consistent with deep and superficial cellulitis.      2.  No definite CT evidence of necrotizing fasciitis.      3.  No CT evidence of deep abscess formation or acute or chronic osteomyelitis.      4.  Small to moderate suprapatellar joint effusion.      CT-EXTREMITY, LOWER WITH LEFT   Final Result      No soft tissue gas to suggest necrotizing fasciitis.      No fluid collection identified.      DX-CHEST-PORTABLE (1 VIEW)   Final Result         1.  Hazy left pulmonary infiltrates, similar to prior study   2.  Trace left pleural effusion, stable   3.  Cardiomegaly   4.  Atherosclerosis      DX-CHEST-PORTABLE (1 VIEW)   Final Result         1.  Hazy left pulmonary infiltrates, similar to prior study   2.  Trace left pleural effusion, stable   3.   Cardiomegaly   4.  Atherosclerosis      EC-ECHOCARDIOGRAM COMPLETE W/O CONT   Final Result      CT-CHEST,ABDOMEN,PELVIS WITH   Final Result      1. Stable spiculated opacity in the right upper lobe, extending to the pleural surface.   2. Lingular and left lower lobe parenchymal airspace disease has improved in the interval, but has not completely resolved. There is pleural reaction, suggesting some of these areas may represent parenchymal scarring.   3. Asymmetry of the lung volumes, small on the left than the right.   4. Cardiomegaly with atherosclerotic calcifications in the aorta and coronary arteries.   5. Postsurgical changes in the stomach.   6. Stable scattered multiple hypodense lesions throughout the liver.   7. Moderate ascites throughout the abdomen and pelvis.   8. Cachexia.   9. Enlarged left inguinal lymph nodes with central necrosis. There is edema involving the soft tissues of the upper left leg extending to the left pelvis.      US-EXTREMITY VENOUS LOWER BILAT   Final Result      DX-CHEST-PORTABLE (1 VIEW)   Final Result         1.  Hazy left pulmonary infiltrates.   2.  Small left pleural effusion   3.  Cardiomegaly      OY-ENYMG-DSASUV-WITH & W/O LEFT    (Results Pending)        Assessment/Plan  * Acute exacerbation of CHF (congestive heart failure) (HCC)- (present on admission)  Assessment & Plan  Due to afib rvr and recovering pna  Careful force diuresis with sepsis  Digoxin IV for reduced EF and BiV failure w/ afib  Now improved off Bipap  Maintain euvolemia.  Holding diuretic therapy due to ongoing hypotension  12/25 Net output since admit: -2024 ml    Hypertension- (present on admission)  Assessment & Plan  Currently is hypotensive holding metoprolol and goal-directed medical therapy    Hypoglycemia  Assessment & Plan  He found to hypoglycemia  Hypoglycemic protocol  He has very poor oral intake.  Supplements as tolerates (ordered 12/24)  Remains on D10.    Streptococcal bacteremia- (present  on admission)  Assessment & Plan  Likely skin wound source.  Discussed with wound care  continuing IV Unasyn.  Worsening leukocytosis  Monitor cbc and vitals  Added clindamycin 12/24  Consulted ID, 12/24      Bacteremia  Assessment & Plan  12/19 BC: Strep pyogenes Group A  Repeat Cx 12/21 shows no growth  Continue IV Unasyn  MRSA negative  Worsening wbc on daily cbc  ID consulted 12/24 and discussed 12/25 with Dr High    Pulmonary hypertension (HCC)  Assessment & Plan  Previous Echo RVSP 65, severe TR  Suspect mainly Group 2  RV perfusion with MAP > 65 as need norepinepinephrine  Blood pressure is running on the lower side holding diuretic therapy at this time    Acute respiratory failure with hypoxia (HCC)- (present on admission)  Assessment & Plan  Improved off Bipap  Currently is on 2 L of oxygen to maintain oxygen saturation  12/25 on room air.  Mobilize as able.    Malignant neoplasm of body of stomach (HCC)- (present on admission)  Assessment & Plan  T2, N0, M0 invasive gastric adenocarcinoma presenting as perforated  10/2024  -GDA embolization  -10/7 subtotal gastrectomy, liver wedge resection, Miguel Angel-en-Y gastrojejunostomy with omental flap and cholecystectomy  -pathology invasive well-differentiated adenocarcinoma with associated ulceration  -tumor invasion into the muscularis propria and 20 lymph nodes were negative for metastatic carcinoma  -Oncology was consulted and noted no evidence of metastatic disease  -MRI abdomen recommended for follow-up; no adjuvant therapy was recommended  He will need outpatient follow-up    Homeless- (present on admission)  Assessment & Plan   consultation for ongoing access to medications and follow-up care as appropriate.  States family in [place] but states they cannot house him nor give assistance     Severe protein-calorie malnutrition (HCC)- (present on admission)  Assessment & Plan  Nutrition consult    COPD (chronic obstructive pulmonary disease)  ([facility])- (present on admission)  Assessment & Plan  Current active smoker, no PFTs on file  RT protocols, bronchodilators as needed  No signs of exacerbation    Acute kidney injury ([facility])- (present on admission)  Assessment & Plan  Resolved   Maintain euvolemia and monitor fluid responsiveness (avoid NaCL and renal congestion)  Monitor urine output and I&O's  Avoid and review nephrotoxin medication  Renal function improved    Sepsis ([facility])- (present on admission)  Assessment & Plan  Skin infection/ Pneumonia  Antibiotics  Strep pyogenes Group A   Unasyn  Positive 12/19 blood cultures  Repeat blood cultures negative  12/25 discussed with Dr High and reconsulted Dr Flores (ortho)  12/25 Stat MRI given finding of suprapatellar fluid collection and knee pain.  Ortho did not want to tap knee with possible cellulitis in area as of yet.    Atrial fibrillation with RVR ([facility])- (present on admission)  Assessment & Plan  Chronic atrial fibrillation, currently with RVR  Digoxin 250 mcg IV monitoring closely with flip to prevent digoxin toxicity  Continue eliquis.    Metoprolol on hold due to ongoing hypotension    Thrombocytopenia ([facility])- (present on admission)  Assessment & Plan  He found to have thrombocytopenia and platelet count is trending down.  12/25 Plt: 87  I am continue Eliquis for now for stroke prevention  Ordered CBC for tomorrow.      Tobacco dependence- (present on admission)  Assessment & Plan  Counseling when appropriate    Wound of lower extremity- (present on admission)  Assessment & Plan  Likely source of infection, lower ext doppler negative  Orthopedic consultation 12/20 for possible drainage of bullae and bacteremia  Reconsult to ortho 12/25 for possible Knee infection and stat MRI ordered  Pain control  Continue IV antibiotics.  Wound care         VTE prophylaxis: VTE Selection    I have performed a physical exam and reviewed and updated ROS and Plan today (12/25/2024). In review of yesterday's note  (12/24/2024), there are no changes except as documented above.

## 2024-12-25 NOTE — PROGRESS NOTES
"74 y.o. male complicated pmh including CHF EF 30% who presented 12/19/2024 to the ED with report of several days of increasing dyspnea, worsening bilateral lower extremity edema and mild cough.       Orthopedics consulted for new large fluid filled blister formation regional to baseline venous stasis skin changes to LLE.    I was asked to reevaluate the patient's wounds and condition this morning. I last saw him 5 days ago.  The patient's wounds have continued to mature and there has been some progression proximally along the medial thigh.  Of note and concern, is his rising white blood cell count and developing hyponatremia.  Considering these laboratory findings as well as the clinical appearance of his wound, there is some concern for underlying necrotizing fasciitis especially in light of his prior strep a bacteremia.  As such I recommended an MRI with contrast for the entire left lower extremity to aid us diagnostically, as his clinical picture remains unclear - whether this is cellulitis in context of lymphedema associated with his heart failure or a more aggressive soft tissue infection.      I spoke to the patient at length regarding treatment options and the scenario that his condition worsens or diagnostics return concerning for necrotizing fasciitis.  In these circumstances surgical intervention would be recommended, and likely consist of amputation at some level to the left lower extremity.  Irrigation and debridement alone would be insufficient considering the extent of his existing wound and limited potential to heal his soft tissues following that procedure.  Patient was adamant that he \"would rather die\" then undergo an amputation to the extremity.  We discussed this at length and what this means.  He was salient throughout the conversation and understood the risks, in the future event that surgical intervention was recommended (which is currently not the case).  As such we will continue diagnostic " measures and observation of his wounds.  He knows he may change his mind regarding surgical intervention(amputation) in the future if it ever is deemed necessary.

## 2024-12-25 NOTE — CARE PLAN
The patient is Watcher - Medium risk of patient condition declining or worsening    Shift Goals  Clinical Goals: hemodynamic stability, wound care, pain management  Patient Goals: rest  Family Goals: MARCOS    Progress made toward(s) clinical / shift goals:       Problem: Knowledge Deficit - Standard  Goal: Patient and family/care givers will demonstrate understanding of plan of care, disease process/condition, diagnostic tests and medications  Outcome: Progressing     Problem: Hemodynamics  Goal: Patient's hemodynamics, fluid balance and neurologic status will be stable or improve  Outcome: Progressing     Problem: Respiratory  Goal: Patient will achieve/maintain optimum respiratory ventilation and gas exchange  Outcome: Progressing     Problem: Pain - Standard  Goal: Alleviation of pain or a reduction in pain to the patient’s comfort goal  Outcome: Progressing     Problem: Skin Integrity  Goal: Skin integrity is maintained or improved  Outcome: Progressing     Problem: Fall Risk  Goal: Patient will remain free from falls  Outcome: Progressing

## 2024-12-25 NOTE — CARE PLAN
Problem: Hemodynamics  Goal: Patient's hemodynamics, fluid balance and neurologic status will be stable or improve  Description: Target End Date:  Prior to discharge or change in level of care    Document on Assessment and I/O flowsheet templates    1.  Monitor vital signs, pulse oximetry and cardiac monitor per provider order and/or policy  2.  Maintain blood pressure per provider order  3.  Hemodynamic monitoring per provider order  4.  Manage IV fluids and IV infusions  5.  Monitor intake and output  6.  Daily weights per unit policy or provider order  7.  Assess peripheral pulses and capillary refill  8.  Assess color and body temperature  9.  Position patient for maximum circulation/cardiac output  10. Monitor for signs/symptoms of excessive bleeding  11. Assess mental status, restlessness and changes in level of consciousness  12. Monitor temperature and report fever or hypothermia to provider immediately. Consideration of targeted temperature management.  Outcome: Progressing     Problem: Pain - Standard  Goal: Alleviation of pain or a reduction in pain to the patient’s comfort goal  Description: Target End Date:  Prior to discharge or change in level of care    Document on Vitals flowsheet    1.  Document pain using the appropriate pain scale per order or unit policy  2.  Educate and implement non-pharmacologic comfort measures (i.e. relaxation, distraction, massage, cold/heat therapy, etc.)  3.  Pain management medications as ordered  4.  Reassess pain after pain med administration per policy  5.  If opiods administered assess patient's response to pain medication is appropriate per POSS sedation scale  6.  Follow pain management plan developed in collaboration with patient and interdisciplinary team (including palliative care or pain specialists if applicable)  Outcome: Progressing   The patient is Stable - Low risk of patient condition declining or worsening    Shift Goals  Clinical Goals: MRI,  hemodynamic stability  Patient Goals: be well  Family Goals: MARCOS    Progress made toward(s) clinical / shift goals:  pt reports good pain management

## 2024-12-25 NOTE — PROGRESS NOTES
Late entry:     1440:    Hypoglycemia Intervention    Hypoglycemia protocol intervention:  Blood glucose 58 at 1418.  Intervention: 8 oz of fruit juice   Repeat blood glucose 86 at 1437.  Intervention: see below    Additional interventions needed: IV fluids, see BENJAMIN Zamora notified of the above at 1440. Dr. Zamora notified Pt continues to cough when consuming foods and liquids. Dr. Zamora gives orders to make Pt NPO, sips with meds, SLP eval, and dextrose continuous infusion, see MAR.

## 2024-12-25 NOTE — PROGRESS NOTES
1030 Informed Dr. Zamora that patient has no BM since PTA. New orders placed.    1213 Informed Dr. Zamora that pt's BS: 78 from 102 with poor oral intake. Told said MD that I am ordering patient food that he likes and ensure protein drink.Per said MD, keep an eye on it.    1419 BS:65. Updated Dr. Zamora. New orders placed.    1657 Okay to pause Dextrose IV for MRI per Dr. Zamora.    1700 Pt to MRI.

## 2024-12-25 NOTE — PROGRESS NOTES
Infectious Disease Progress Note    Author: Brenda High M.D. Date & Time of service: 2024  7:43 AM    Chief Complaint:  Follow-up for group A streptococcus bacteremia, left lower extremity cellulitis    Interval History:   Tmax 100.4, WBC 26.6.  Leukocytosis increasing.  Patient had fever overnight.  CT of the left leg did not reveal any deep abscess however there is a moderate suprapatellar joint effusion.  Patient has some tenderness and swelling over the right knee.      Labs Reviewed, Medications Reviewed, and Wound Reviewed.    Review of Systems:  Review of Systems   Constitutional:  Negative for chills and fever.   Cardiovascular:  Positive for leg swelling.   Gastrointestinal:  Negative for diarrhea.       Hemodynamics:  Temp (24hrs), Av.2 °C (99 °F), Min:36.3 °C (97.3 °F), Max:38.1 °C (100.6 °F)  Temperature: 36.5 °C (97.7 °F)  Pulse  Av  Min: 87  Max: 162   Blood Pressure : 96/61       Physical Exam:  Physical Exam  Vitals and nursing note reviewed.   HENT:      Mouth/Throat:      Comments: Poor dentition  Eyes:      Pupils: Pupils are equal, round, and reactive to light.   Cardiovascular:      Rate and Rhythm: Normal rate. Rhythm irregular.   Pulmonary:      Effort: Pulmonary effort is normal.   Musculoskeletal:      Comments: Left inner thigh edema and erythema    Left knee swelling and tenderness to palpation    Left lower extremity tenderness to palpation   Skin:     General: Skin is warm and dry.   Neurological:      General: No focal deficit present.      Mental Status: He is alert and oriented to person, place, and time.         Meds:    Current Facility-Administered Medications:     clindamycin (CLEOCIN) IV    senna-docusate **AND** polyethylene glycol/lytes    dextrose 10%    digoxin    apixaban    omeprazole    Notify MD and PharmD if FSBG level is less than or equal to 70 mg/dL or patient is showing signs/symptoms of hypoglycemia (tachycardia, palpitations,  diaphoresis, clammy, tremulousness, nausea, confused) **AND** Administer 20 grams of glucose (approximately 8 ounces of fruit juice) every 15 minutes PRN FSBS less than 70 mg/dL **AND** dextrose bolus    acetaminophen    oxyCODONE immediate-release **OR** oxyCODONE immediate-release **OR** HYDROmorphone    ondansetron    ondansetron    nicotine **AND** Nicotine Replacement Patient Education Materials **AND** nicotine polacrilex    ampicillin-sulbactam (UNASYN) IV    Labs:  Recent Labs     12/23/24 0422 12/24/24 0439 12/25/24  0410   WBC 23.0* 25.7* 26.6*   RBC 3.18* 3.59* 3.64*   HEMOGLOBIN 8.8* 9.9* 10.0*   HEMATOCRIT 26.8* 30.1* 30.7*   MCV 84.3 83.8 84.3   MCH 27.7 27.6 27.5   RDW 52.6* 51.2* 52.2*   PLATELETCT 53* 72* 87*   NEUTSPOLYS 98.40* 98.30* 91.30*   LYMPHOCYTES 1.60* 0.80* 2.90*   MONOCYTES 0.00 0.90 3.50   EOSINOPHILS 0.00 0.00 0.20   BASOPHILS 0.00 0.00 0.30   RBCMORPHOLO Present Present  --      Recent Labs     12/23/24 0422 12/24/24  0439 12/25/24  0410   SODIUM 131* 134* 129*   POTASSIUM 3.8 3.9 3.9   CHLORIDE 95* 97 94*   CO2 26 29 29   GLUCOSE 202* 79 108*   BUN 22 16 12   CPKTOTAL 65 42 31     Recent Labs     12/23/24 0422 12/24/24  0439 12/25/24  0410   ALBUMIN 2.1* 2.3* 2.2*   TBILIRUBIN 1.7* 2.1* 1.9*   ALKPHOSPHAT 204* 295* 310*   TOTPROTEIN 4.6* 5.2* 5.1*   ALTSGPT 14 17 16   ASTSGOT 28 32 19   CREATININE 0.75 0.90 0.89       Imaging:  CT-EXTREMITY, LOWER WITH LEFT    Result Date: 12/24/2024 12/24/2024 3:20 PM HISTORY/REASON FOR EXAM:  concern of possible abscess or nec fasc. TECHNIQUE/EXAM DESCRIPTION AND NUMBER OF VIEWS:  CT scan of the LEFT lower extremity with contrast, with reconstructions. Thin helical 3 mm sections were obtained from the distal femur through the proximal tibia/fibula. Sagittal and coronal multiplanar reconstructions were generated from the axial images. A total of 100 mL of Omnipaque 350 nonionic contrast was administered  IV without complication. Up to date  radiation dose reduction adjustments have been utilized to meet ALARA standards for radiation dose reduction. COMPARISON: CT scan of left lower extremity dated 12/21/2024 FINDINGS: There is soft tissue attenuation in the subcutaneous tissues of the left lower leg. There is a small to moderate suprapatellar joint effusion. There is decreased bony mineralization of the left lower extremity. There is no evidence of fracture, dislocation, or other osseous lesion. There is no evidence of acute or chronic osteomyelitis. The musculature of the left lower extremity has a normal appearance and enhancement pattern. There is no evidence of deep abscess formation.     1.  Extensive subcutaneous inflammatory changes about the left lower extremity consistent with deep and superficial cellulitis. 2.  No definite CT evidence of necrotizing fasciitis. 3.  No CT evidence of deep abscess formation or acute or chronic osteomyelitis. 4.  Small to moderate suprapatellar joint effusion.    CT-EXTREMITY, LOWER WITH LEFT    Result Date: 12/21/2024 12/21/2024 11:19 AM HISTORY/REASON FOR EXAM:  concerns for necrotizing fasciitis. Concerns for necrotizing fasciitis TECHNIQUE/EXAM DESCRIPTION AND NUMBER OF VIEWS:  CT scan of the LEFT lower extremity with contrast, with reconstructions. Thin helical 3 mm sections were obtained from the distal femur through the proximal tibia/fibula. Sagittal and coronal multiplanar reconstructions were generated from the axial images. A total of 100 mL of Omnipaque 350 nonionic contrast was administered  IV without complication. Up to date radiation dose reduction adjustments have been utilized to meet ALARA standards for radiation dose reduction. COMPARISON: None. FINDINGS: Diffuse subcutaneous edema. No discrete fluid collection identified. No soft tissue gas identified. No acute fracture or dislocation.     No soft tissue gas to suggest necrotizing fasciitis. No fluid collection  identified.    DX-CHEST-PORTABLE (1 VIEW)    Result Date: 12/21/2024 12/21/2024 12:22 AM HISTORY/REASON FOR EXAM: Shortness of Breath TECHNIQUE/EXAM DESCRIPTION:  Single AP view of the chest. COMPARISON: Yesterday FINDINGS: Overlying cardiac leads are present. Cardiomegaly is observed. Atherosclerotic calcification of the aorta is noted.  The central pulmonary vasculature appears normal. Bilateral lung volumes are diminished.  Hazy left pulmonary opacity is seen. Blunting of the left costophrenic angle is seen suggesting trace left effusion. The bony structures appear age-appropriate.     1.  Hazy left pulmonary infiltrates, similar to prior study 2.  Trace left pleural effusion, stable 3.  Cardiomegaly 4.  Atherosclerosis    DX-CHEST-PORTABLE (1 VIEW)    Result Date: 12/20/2024 12/20/2024 12:32 AM HISTORY/REASON FOR EXAM: Shortness of Breath TECHNIQUE/EXAM DESCRIPTION:  Single AP view of the chest. COMPARISON: Yesterday FINDINGS: Overlying cardiac leads are present. Cardiomegaly is observed. Atherosclerotic calcification of the aorta is noted.  The central pulmonary vasculature appears normal. Bilateral lung volumes are diminished.  Hazy left pulmonary opacity is seen. Blunting of the left costophrenic angle is seen suggesting trace left effusion. The bony structures appear age-appropriate.     1.  Hazy left pulmonary infiltrates, similar to prior study 2.  Trace left pleural effusion, stable 3.  Cardiomegaly 4.  Atherosclerosis    EC-ECHOCARDIOGRAM COMPLETE W/O CONT    Result Date: 12/19/2024  Transthoracic Echo Report Echocardiography Laboratory CONCLUSIONS Severely reduced left ventricular systolic function. Severe mitral regurgitation. Severe tricuspid regurgitation due to dilated annulus. Estimated right ventricular systolic pressure is 55 mmHg. HUNTER STEELE Exam Date:         12/19/2024                    15:40 Exam Location:     Inpatient Priority:          Routine Ordering Physician:        NERY  PAULINA PARK Referring Physician:       PAULINA GABRIEL Sonographer:               Shawn CAMPBELL Age:    74     Gender:    M MRN:    [id number] :    1950 BSA:    2.02   Ht (in):    72     Wt (lb):    176 Exam Type:     Complete Indications:     Unspecified systolic (congestive) heart failure ICD Codes:       I50.20 CPT Codes:       11768 BP:   94     /   62     HR:   120 Technical Quality:       Good MEASUREMENTS  (Male / Female) Normal Values 2D ECHO LV Diastolic Diameter PLAX        5.5 cm                4.2 - 5.9 / 3.9 - 5.3 cm LV Systolic Diameter PLAX         4.3 cm                2.1 - 4.0 cm IVS Diastolic Thickness           1 cm                  LVPW Diastolic Thickness          1 cm                  LVOT Diameter                     2.1 cm                Estimated LV Ejection Fraction    28 %                  LV Ejection Fraction MOD BP       40.1 %                >= 55  % LV Ejection Fraction MOD 4C       27.1 %                LV Ejection Fraction MOD 2C       44.5 %                LV Ejection Fraction 4C AL        28.8 %                LV Ejection Fraction 2C AL        45.4 %                LA Volume Index                   61.1 cm3/m2           16 - 28 cm3/m2 DOPPLER AV Peak Velocity                  1.1 m/s               AV Peak Gradient                  5.2 mmHg              AV Mean Gradient                  3 mmHg                LVOT Peak Velocity                0.93 m/s              AV Area Cont Eq vti               2.3 cm2               MV Velocity Time Integral         17.8 cm               MR Flow Convergence Radius        1.1 cm                MR ERO PISA                       0.46 cm2              MR Regurgitant Volume PISA        51.1 cm3              MR Flow Area                      7.6 cm2               TR Peak Velocity                  328 cm/s              PV Peak Velocity                  0.67 m/s              PV Peak Gradient                  1.8 mmHg              RVOT Peak  Velocity                0.53 m/s              * Indicates values subject to auto-interpretation LV EF:  28    % FINDINGS Left Ventricle Normal left ventricular chamber is dilated. Normal left ventricular wall thickness. Severely reduced left ventricular systolic function. The left ventricular ejection fraction is visually estimated to be 25- 30%. Global hypokinesis with regional variation. A reliable estimation of diastolic function cannot be made due to mitral valve disease. Right Ventricle The right ventricle is dilated. Normal right ventricular systolic function. Right Atrium Enlarged right atrium. Dilated inferior vena cava with inspiratory collapse. Left Atrium Severely dilated left atrium. Left atrial volume index is 58 mL/sq m. Intact atrial septum without Doppler evidence of interatrial shunt (e.g. PFO or ASD). Mitral Valve Structurally normal mitral valve without significant stenosis. Severe mitral regurgitation. ERO by PISA method is 0.54 sq cm. Vena contracta is 0.5 cm. Aortic Valve Tricuspid aortic valve. Structurally normal aortic valve without significant stenosis or regurgitation. Tricuspid Valve Structurally normal tricuspid valve without significant stenosis. Severe tricuspid regurgitation due to dilated annulus. Right atrial pressure is estimated to be 8 mmHg. Estimated right ventricular systolic pressure is 55 mmHg. Pulmonic Valve Structurally normal pulmonic valve without significant stenosis. Mild pulmonic insufficiency. Pericardium Trivial pericardial effusion. Aorta Normal aortic root for body surface area. The ascending aorta diameter is 3.5 cm. Arie Jones MD (Electronically Signed) Final Date:     19 December 2024                 17:17    CT-CHEST,ABDOMEN,PELVIS WITH    Result Date: 12/19/2024 12/19/2024 2:02 PM HISTORY/REASON FOR EXAM:  gastric cancer, recent partial gastrectomy. TECHNIQUE/EXAM DESCRIPTION: CT scan of the chest, abdomen and pelvis with contrast. Thin-section helical  scanning was obtained with intravenous contrast from the lung apices through the pubic symphysis to include the chest, abdomen and pelvis. 90 mL of Omnipaque 350 nonionic contrast was administered intravenously without complication. Low dose optimization technique was utilized for this CT exam including automated exposure control and adjustment of the mA and/or kV according to patient size. COMPARISON: CT of the abdomen and pelvis, 10/28/2024. CTA of the pulmonary arteries, 10/28/2024. FINDINGS: CT Chest: Lungs: Stable spiculated opacity in the right upper lobe measuring 2.1 cm in diameter. Asymmetric volumes of the lungs, small on the left and the right. Areas of scarring and/or atelectasis in the lingula and left lower lobe, with a small left pleural effusion. No right pleural effusion. No pneumothorax bilaterally. Cardiac: The heart size is enlarged. No pericardial effusion. Atherosclerotic calcifications in the coronary arteries. Mediastinum/alice: No mediastinal or hilar adenopathy. No obstructing endobronchial lesion. Vascular: Atherosclerotic calcifications in the aorta, without aneurysmal dilation. Soft tissues: Cachexia. Bones: No acute or destructive process. Decreased bone mineralization. CT Abdomen and Pelvis: Liver: Stable multiple small subcentimeter lucencies throughout the liver parenchyma. Perihepatic ascites. Spleen: Unremarkable. Pancreas: Unremarkable. Gallbladder: The gallbladder has been resected. Biliary: Nondilated. Adrenal glands: Normal. Kidneys: Unremarkable without hydronephrosis. Bowel: No obstruction or acute inflammation. Postsurgical changes of gastric bypass surgery. Lymph nodes: There are enlarged left inguinal lymph nodes with central necrosis. The largest lymph node measures 1.7 cm in diameter. There is edema involving the soft tissues of the proximal left leg. Vasculature: Atherosclerotic calcifications in the aorta and iliac arteries, without aneurysmal dilation. Ascites:  Moderate amount of ascites throughout the abdomen and pelvis. Pelvis: There is pelvic ascites. There is a Paulino catheter in the bladder lumen. There is circumferential bladder wall thickening. Prostate gland and seminal vesicles are unremarkable. Musculoskeletal: No acute or destructive process. Decreased bone mineralization. Degenerative disc disease at L5-S1.     1. Stable spiculated opacity in the right upper lobe, extending to the pleural surface. 2. Lingular and left lower lobe parenchymal airspace disease has improved in the interval, but has not completely resolved. There is pleural reaction, suggesting some of these areas may represent parenchymal scarring. 3. Asymmetry of the lung volumes, small on the left than the right. 4. Cardiomegaly with atherosclerotic calcifications in the aorta and coronary arteries. 5. Postsurgical changes in the stomach. 6. Stable scattered multiple hypodense lesions throughout the liver. 7. Moderate ascites throughout the abdomen and pelvis. 8. Cachexia. 9. Enlarged left inguinal lymph nodes with central necrosis. There is edema involving the soft tissues of the upper left leg extending to the left pelvis.    US-EXTREMITY VENOUS LOWER BILAT    Result Date: 2024   Vascular Laboratory  CONCLUSIONS  Compared to the prior study on 20.  Normal bilateral lower extremity venous duplex exam.  HUNTER STEELE  Exam Date:     2024 07:54  Room #:     Inpatient  Priority:     Stat  Ht (in):             Wt (lb):  Ordering Physician:        LAVELLE GANDARA  Referring Physician:       [id number]  Sonographer:               Malik Clifton                             HAILEY  Study Type:                Complete Bilateral  Technical Quality:         Adequate  Age:    74    Gender:     M  MRN:    [id number]  :    1950      BSA:  Indications:     Edema  CPT Codes:       62048  ICD Codes:       782.3  History:         Edema  Limitations:  PROCEDURES:  Bilateral lower extremity  venous duplex imaging.  The following venous structures were evaluated: common femoral, deep  femoral, proximal great saphenous, femoral, popliteal, peroneal, and  posterior tibial veins.  Serial compression, color, and spectral Doppler flow evaluations were  performed.  FINDINGS:  Bilateral lower extremities.  The peroneal and posterior tibial veins are difficult to assess for  compressibility, but flow response to augmentation is demonstrated.  All other veins demonstrate complete color filling and compressibility with  normal venous flow dynamics including spontaneous flow and respiratory  phasicity.  No deep venous thrombosis.  Rustam Nazario MD  (Electronically Signed)  Final Date:      19 December 2024                   09:00    DX-CHEST-PORTABLE (1 VIEW)    Result Date: 12/19/2024 12/19/2024 6:26 AM HISTORY/REASON FOR EXAM: Shortness of Breath TECHNIQUE/EXAM DESCRIPTION:  Single AP view of the chest. COMPARISON: October 28, 2024 FINDINGS: The cardiac silhouette appears within normal limits. The mediastinal contour appears within normal limits.  The central pulmonary vasculature appears normal. Bilateral lung volumes are diminished.  Hazy left midlung pulmonary opacity is seen Blunting of the left costophrenic angle is seen suggesting trace left effusion. The bony structures appear age-appropriate.     1.  Hazy left pulmonary infiltrates. 2.  Small left pleural effusion 3.  Cardiomegaly    CT-CSPINE WITHOUT PLUS RECONS    Result Date: 11/29/2024 11/29/2024 10:01 AM HISTORY/REASON FOR EXAM: Polytrauma, blunt; s/p fall - patient on anticoagulation; Neck pain from ground level fall COMPARISON: CT chest 10/28/2024 and 4/26/2020. TECHNIQUE/EXAM DESCRIPTION: CT cervical spine without contrast, with reconstructions. The study was performed on a helical multidetector CT scanner. Thin-section helical scanning was performed from the skull base through T1. Sagittal and coronal multiplanar reconstructions were  generated from the axial images. Low dose optimization technique was utilized for this CT exam including automated exposure control and adjustment of the mA and/or kV according to patient size. FINDINGS: Carotid vascular calcifications. 2.5 x 1.5 cm right lung base spiculated density posteriorly is not significantly changed from 4/26/2020. No paraspinal mass. Severe degenerative changes. Straightening of the normal cervical lordosis may be related to muscle spasm or positioning. Multilevel mild congenital spinal stenosis. Soft tissue detail adjacent to bony structures is limited on CT images. No fractures visible. Incomplete bony union C1 posterior arch is congenital and incidental. The odontoid appears normal on coronal reformatted images.     Impression: 1. No cervical spine fracture or subluxation to T1 evident. 2.. Straightening of the normal cervical lordosis may be related to muscle spasm or positioning. 3. Prominent degenerative changes.     CT-HEAD W/O    Result Date: 11/29/2024  Examination: 11/29/2024 10:00 AM HISTORY/REASON FOR EXAM:  Trauma with loss of consciousness. COMPARISON: None available. TECHNIQUE/EXAM DESCRIPTION: CT of the head without contrast. CT head protocol utilized without contrast. CT performed using ALARA principles (as low as reasonably achievable). Sagittal and coronal reformatted images included. FINDINGS: Forehead scalp soft tissue swelling. No fracture is visible. Age expected atrophy and chronic white matter microvascular ischemic change type lucency noted. No mass, mass effect, hydrocephalus or hemorrhage evident.  No low density to indicate acute stroke.  No convexity fluid collection. The visible skull base sinuses do not show any fluid. Mastoids in the field of view are unremarkable.     Impression: 1. No acute process in the brain evident. 2. Forehead scalp soft tissue swelling. No fracture is visible.       Micro:  Results       Procedure Component Value Units Date/Time     Blood Culture - Draw one from central line and one from peripheral site [[id number]]  (Abnormal) Collected: 12/19/24 0747    Order Status: Completed Specimen: Blood from Peripheral Updated: 12/21/24 1506     Significant Indicator POS     Source BLD     Site PERIPHERAL     Culture Result Growth detected by automated blood culture system. 12/19/2024  19:16        Streptococcus pyogenes (Group A)  See previous culture for sensitivity report.      Blood Culture - Draw one from central line and one from peripheral site [[id number]]  (Abnormal)  (Susceptibility) Collected: 12/19/24 0731    Order Status: Completed Specimen: Blood from Line Updated: 12/21/24 1506     Significant Indicator POS     Source BLD     Site Peripheral     Culture Result Growth detected by automated blood culture system.  12/19/2024  18:20        Streptococcus pyogenes (Group A)    Susceptibility       Streptococcus pyogenes (group a) (1)       Antibiotic Interpretation Microscan   Method Status    Penicillin Sensitive 0.016 mcg/mL E-TEST Final    Cefotaxime Sensitive 0.008 mcg/mL E-TEST Final    Clindamycin Sensitive - mcg/mL E-TEST Final                       E-Test [[id number]] Collected: 12/19/24 0731    Order Status: Completed Specimen: Other Updated: 12/21/24 1506     ETEST Sensitivity FINAL    Narrative:      161 tel. [phone] 12/20/2024, 12:39, RB PERF. RESULTS CALLED TO: Maggie RN  161 tel. [phone] 12/19/2024, 18:20, RB PERF. RESULTS CALLED TO: Meghann BHATIA  [id number]    BLOOD CULTURE [[id number]] Collected: 12/21/24 0816    Order Status: Completed Specimen: Blood from Peripheral Updated: 12/21/24 1208     Significant Indicator NEG     Source BLD     Site PERIPHERAL     Culture Result No Growth  Note: Blood cultures are incubated for 5 days and  are monitored continuously.Positive blood cultures  are called to the RN and reported as soon as  they are identified.      BLOOD CULTURE [[id number]] Collected: 12/21/24 0820    Order Status: Completed  Specimen: Blood from Peripheral Updated: 12/21/24 1208     Significant Indicator NEG     Source BLD     Site PERIPHERAL     Culture Result No Growth  Note: Blood cultures are incubated for 5 days and  are monitored continuously.Positive blood cultures  are called to the RN and reported as soon as  they are identified.      Urine Culture (New) [[id number]] Collected: 12/19/24 1100    Order Status: Completed Specimen: Urine Updated: 12/21/24 0713     Significant Indicator NEG     Source UR     Site -     Culture Result No growth at 48 hours.    MRSA By PCR (Amp) [[id number]]     Order Status: Canceled Specimen: Respirate from Nares     MRSA By PCR (Amp) [[id number]] Collected: 12/19/24 1100    Order Status: Completed Specimen: Respirate from Nares Updated: 12/19/24 1258     MRSA by PCR Negative    Urinalysis [[id number]]  (Abnormal) Collected: 12/19/24 1100    Order Status: Completed Specimen: Urine Updated: 12/19/24 1155     Color Dark Yellow     Character Cloudy     Specific Gravity 1.021     Ph 5.0     Glucose Negative mg/dL      Ketones Trace mg/dL      Protein 100 mg/dL      Bilirubin Small     Urobilinogen, Urine 1.0 EU/dL      Nitrite Negative     Leukocyte Esterase Trace     Occult Blood Negative     Micro Urine Req Microscopic            Assessment:  Active Hospital Problems    Diagnosis     *Acute exacerbation of CHF (congestive heart failure) ([facility]) [I50.9]     Streptococcal bacteremia [R78.81, B95.5]     Hypoglycemia [E16.2]     Bacteremia [R78.81]     Pulmonary hypertension ([facility]) [I27.20]     Acute respiratory failure with hypoxia ([facility]) [J96.01]     Malignant neoplasm of body of stomach ([facility]) [C16.2]     Homeless [Z59.00]     Severe protein-calorie malnutrition ([facility]) [E43]     COPD (chronic obstructive pulmonary disease) ([facility]) [J44.9]     Acute kidney injury ([facility]) [N17.9]     Sepsis ([facility]) [A41.9]     Atrial fibrillation with RVR ([facility]) [I48.91]     Thrombocytopenia ([facility]) [D69.6]     Tobacco dependence [F17.200]      Hypertension [I10]     Wound of lower extremity [Y91.916Z]       74 y.o. 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 Reddy admitted on 12/19/2024 secondary to encephalopathy, shortness of breath and left leg swelling.  Patient found to have group A streptococcal bacteremia secondary to left lower extremity cellulitis.  Repeat blood cultures on 12/21 are negative to date.  Hospital course complicated by worsening leukocytosis.     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 Will continue to follow

## 2024-12-26 PROBLEM — L03.116 LEFT LEG CELLULITIS: Status: ACTIVE | Noted: 2024-12-26

## 2024-12-26 LAB
ALBUMIN SERPL BCP-MCNC: 2.2 G/DL (ref 3.2–4.9)
ALBUMIN/GLOB SERPL: 0.8 G/DL
ALP SERPL-CCNC: 276 U/L (ref 30–99)
ALT SERPL-CCNC: 14 U/L (ref 2–50)
ANION GAP SERPL CALC-SCNC: 8 MMOL/L (ref 7–16)
AST SERPL-CCNC: 19 U/L (ref 12–45)
BACTERIA BLD CULT: NORMAL
BACTERIA BLD CULT: NORMAL
BASOPHILS # BLD AUTO: 0.2 % (ref 0–1.8)
BASOPHILS # BLD: 0.05 K/UL (ref 0–0.12)
BILIRUB SERPL-MCNC: 1.3 MG/DL (ref 0.1–1.5)
BUN SERPL-MCNC: 11 MG/DL (ref 8–22)
CALCIUM ALBUM COR SERPL-MCNC: 8.7 MG/DL (ref 8.5–10.5)
CALCIUM SERPL-MCNC: 7.3 MG/DL (ref 8.5–10.5)
CHLORIDE SERPL-SCNC: 97 MMOL/L (ref 96–112)
CK SERPL-CCNC: 42 U/L (ref 0–154)
CO2 SERPL-SCNC: 29 MMOL/L (ref 20–33)
CREAT SERPL-MCNC: 0.82 MG/DL (ref 0.5–1.4)
EOSINOPHIL # BLD AUTO: 0.1 K/UL (ref 0–0.51)
EOSINOPHIL NFR BLD: 0.4 % (ref 0–6.9)
ERYTHROCYTE [DISTWIDTH] IN BLOOD BY AUTOMATED COUNT: 50.3 FL (ref 35.9–50)
GFR SERPLBLD CREATININE-BSD FMLA CKD-EPI: 92 ML/MIN/1.73 M 2
GLOBULIN SER CALC-MCNC: 2.8 G/DL (ref 1.9–3.5)
GLUCOSE BLD STRIP.AUTO-MCNC: 118 MG/DL (ref 65–99)
GLUCOSE BLD STRIP.AUTO-MCNC: 132 MG/DL (ref 65–99)
GLUCOSE BLD STRIP.AUTO-MCNC: 36 MG/DL (ref 65–99)
GLUCOSE BLD STRIP.AUTO-MCNC: 63 MG/DL (ref 65–99)
GLUCOSE BLD STRIP.AUTO-MCNC: 67 MG/DL (ref 65–99)
GLUCOSE BLD STRIP.AUTO-MCNC: 73 MG/DL (ref 65–99)
GLUCOSE BLD STRIP.AUTO-MCNC: 73 MG/DL (ref 65–99)
GLUCOSE BLD STRIP.AUTO-MCNC: 77 MG/DL (ref 65–99)
GLUCOSE BLD STRIP.AUTO-MCNC: 86 MG/DL (ref 65–99)
GLUCOSE BLD STRIP.AUTO-MCNC: 87 MG/DL (ref 65–99)
GLUCOSE BLD STRIP.AUTO-MCNC: 88 MG/DL (ref 65–99)
GLUCOSE BLD STRIP.AUTO-MCNC: 92 MG/DL (ref 65–99)
GLUCOSE SERPL-MCNC: 81 MG/DL (ref 65–99)
HCT VFR BLD AUTO: 28.7 % (ref 42–52)
HGB BLD-MCNC: 9.7 G/DL (ref 14–18)
IMM GRANULOCYTES # BLD AUTO: 0.51 K/UL (ref 0–0.11)
IMM GRANULOCYTES NFR BLD AUTO: 2 % (ref 0–0.9)
LYMPHOCYTES # BLD AUTO: 0.95 K/UL (ref 1–4.8)
LYMPHOCYTES NFR BLD: 3.7 % (ref 22–41)
MAGNESIUM SERPL-MCNC: 2.1 MG/DL (ref 1.5–2.5)
MCH RBC QN AUTO: 28 PG (ref 27–33)
MCHC RBC AUTO-ENTMCNC: 33.8 G/DL (ref 32.3–36.5)
MCV RBC AUTO: 82.7 FL (ref 81.4–97.8)
MONOCYTES # BLD AUTO: 1.05 K/UL (ref 0–0.85)
MONOCYTES NFR BLD AUTO: 4.1 % (ref 0–13.4)
NEUTROPHILS # BLD AUTO: 23.19 K/UL (ref 1.82–7.42)
NEUTROPHILS NFR BLD: 89.6 % (ref 44–72)
NRBC # BLD AUTO: 0 K/UL
NRBC BLD-RTO: 0 /100 WBC (ref 0–0.2)
PHOSPHATE SERPL-MCNC: 2.5 MG/DL (ref 2.5–4.5)
PLATELET # BLD AUTO: 142 K/UL (ref 164–446)
PMV BLD AUTO: 11.8 FL (ref 9–12.9)
POTASSIUM SERPL-SCNC: 4.4 MMOL/L (ref 3.6–5.5)
PROT SERPL-MCNC: 5 G/DL (ref 6–8.2)
RBC # BLD AUTO: 3.47 M/UL (ref 4.7–6.1)
SIGNIFICANT IND 70042: NORMAL
SIGNIFICANT IND 70042: NORMAL
SITE SITE: NORMAL
SITE SITE: NORMAL
SODIUM SERPL-SCNC: 134 MMOL/L (ref 135–145)
SOURCE SOURCE: NORMAL
SOURCE SOURCE: NORMAL
WBC # BLD AUTO: 25.9 K/UL (ref 4.8–10.8)

## 2024-12-26 PROCEDURE — 700102 HCHG RX REV CODE 250 W/ 637 OVERRIDE(OP): Performed by: INTERNAL MEDICINE

## 2024-12-26 PROCEDURE — 99233 SBSQ HOSP IP/OBS HIGH 50: CPT | Performed by: INTERNAL MEDICINE

## 2024-12-26 PROCEDURE — 700105 HCHG RX REV CODE 258: Performed by: HOSPITALIST

## 2024-12-26 PROCEDURE — 700101 HCHG RX REV CODE 250: Performed by: INTERNAL MEDICINE

## 2024-12-26 PROCEDURE — A9270 NON-COVERED ITEM OR SERVICE: HCPCS | Performed by: INTERNAL MEDICINE

## 2024-12-26 PROCEDURE — 80053 COMPREHEN METABOLIC PANEL: CPT

## 2024-12-26 PROCEDURE — 84100 ASSAY OF PHOSPHORUS: CPT

## 2024-12-26 PROCEDURE — 700102 HCHG RX REV CODE 250 W/ 637 OVERRIDE(OP): Performed by: HOSPITALIST

## 2024-12-26 PROCEDURE — 85025 COMPLETE CBC W/AUTO DIFF WBC: CPT

## 2024-12-26 PROCEDURE — 82962 GLUCOSE BLOOD TEST: CPT | Mod: 91

## 2024-12-26 PROCEDURE — A9270 NON-COVERED ITEM OR SERVICE: HCPCS | Performed by: HOSPITALIST

## 2024-12-26 PROCEDURE — 700111 HCHG RX REV CODE 636 W/ 250 OVERRIDE (IP): Performed by: HOSPITALIST

## 2024-12-26 PROCEDURE — 770006 HCHG ROOM/CARE - MED/SURG/GYN SEMI*

## 2024-12-26 PROCEDURE — 700105 HCHG RX REV CODE 258: Performed by: INTERNAL MEDICINE

## 2024-12-26 PROCEDURE — 99233 SBSQ HOSP IP/OBS HIGH 50: CPT | Performed by: HOSPITALIST

## 2024-12-26 PROCEDURE — 700111 HCHG RX REV CODE 636 W/ 250 OVERRIDE (IP): Mod: JZ | Performed by: INTERNAL MEDICINE

## 2024-12-26 PROCEDURE — 92612 ENDOSCOPY SWALLOW (FEES) VID: CPT

## 2024-12-26 PROCEDURE — 83735 ASSAY OF MAGNESIUM: CPT

## 2024-12-26 PROCEDURE — 82550 ASSAY OF CK (CPK): CPT

## 2024-12-26 RX ORDER — DIGOXIN 125 MCG
125 TABLET ORAL DAILY
Status: DISCONTINUED | OUTPATIENT
Start: 2024-12-26 | End: 2025-01-21 | Stop reason: HOSPADM

## 2024-12-26 RX ADMIN — OXYCODONE 5 MG: 5 TABLET ORAL at 16:27

## 2024-12-26 RX ADMIN — SENNOSIDES AND DOCUSATE SODIUM 2 TABLET: 50; 8.6 TABLET ORAL at 16:27

## 2024-12-26 RX ADMIN — POLYETHYLENE GLYCOL 3350 1 PACKET: 17 POWDER, FOR SOLUTION ORAL at 16:27

## 2024-12-26 RX ADMIN — CLINDAMYCIN IN 5 PERCENT DEXTROSE 600 MG: 12 INJECTION, SOLUTION INTRAVENOUS at 06:16

## 2024-12-26 RX ADMIN — DEXTROSE MONOHYDRATE 25 G: 25 INJECTION, SOLUTION INTRAVENOUS at 16:27

## 2024-12-26 RX ADMIN — AMPICILLIN AND SULBACTAM 3 G: 1; 2 INJECTION, POWDER, FOR SOLUTION INTRAMUSCULAR; INTRAVENOUS at 14:16

## 2024-12-26 RX ADMIN — NICOTINE 14 MG: 14 PATCH TRANSDERMAL at 05:29

## 2024-12-26 RX ADMIN — OXYCODONE 5 MG: 5 TABLET ORAL at 00:30

## 2024-12-26 RX ADMIN — APIXABAN 5 MG: 5 TABLET, FILM COATED ORAL at 16:27

## 2024-12-26 RX ADMIN — OXYCODONE 5 MG: 5 TABLET ORAL at 21:18

## 2024-12-26 RX ADMIN — OMEPRAZOLE 20 MG: 20 CAPSULE, DELAYED RELEASE ORAL at 05:29

## 2024-12-26 RX ADMIN — CLINDAMYCIN IN 5 PERCENT DEXTROSE 600 MG: 12 INJECTION, SOLUTION INTRAVENOUS at 14:38

## 2024-12-26 RX ADMIN — SODIUM CHLORIDE, SODIUM LACTATE, POTASSIUM CHLORIDE, CALCIUM CHLORIDE AND DEXTROSE MONOHYDRATE 1000 ML: 5; 600; 310; 30; 20 INJECTION, SOLUTION INTRAVENOUS at 21:49

## 2024-12-26 RX ADMIN — THIAMINE HYDROCHLORIDE 100 MG: 100 INJECTION, SOLUTION INTRAMUSCULAR; INTRAVENOUS at 05:29

## 2024-12-26 RX ADMIN — AMPICILLIN AND SULBACTAM 3 G: 1; 2 INJECTION, POWDER, FOR SOLUTION INTRAMUSCULAR; INTRAVENOUS at 02:18

## 2024-12-26 RX ADMIN — OXYCODONE 5 MG: 5 TABLET ORAL at 11:52

## 2024-12-26 RX ADMIN — APIXABAN 5 MG: 5 TABLET, FILM COATED ORAL at 05:29

## 2024-12-26 RX ADMIN — AMPICILLIN AND SULBACTAM 3 G: 1; 2 INJECTION, POWDER, FOR SOLUTION INTRAMUSCULAR; INTRAVENOUS at 08:50

## 2024-12-26 RX ADMIN — DIGOXIN 125 MCG: 0.12 TABLET ORAL at 17:47

## 2024-12-26 RX ADMIN — CLINDAMYCIN IN 5 PERCENT DEXTROSE 600 MG: 12 INJECTION, SOLUTION INTRAVENOUS at 23:14

## 2024-12-26 RX ADMIN — AMPICILLIN AND SULBACTAM 3 G: 1; 2 INJECTION, POWDER, FOR SOLUTION INTRAMUSCULAR; INTRAVENOUS at 21:52

## 2024-12-26 ASSESSMENT — PAIN DESCRIPTION - PAIN TYPE
TYPE: ACUTE PAIN

## 2024-12-26 ASSESSMENT — ENCOUNTER SYMPTOMS
ABDOMINAL PAIN: 0
CHILLS: 0
SENSORY CHANGE: 0
FEVER: 0
SHORTNESS OF BREATH: 0
DIZZINESS: 0
NAUSEA: 0
HEADACHES: 0
MYALGIAS: 1
NERVOUS/ANXIOUS: 0
DIARRHEA: 0

## 2024-12-26 NOTE — PROGRESS NOTES
BP 99/55   Pulse 97   Temp 37.1 °C (98.8 °F) (Temporal)   Resp (!) 23   Ht 1.829 m (6')   Wt 81.2 kg (179 lb 0.2 oz)   SpO2 95%         @ 1125, patient was transferred  off the unit to [place] [id number].       Patient AAO*4, per patient, his pain is 2/10, declines pain meds.     All patient's belongings packed and with patient.     Report was given to Jarred Dhillon RN.

## 2024-12-26 NOTE — PROGRESS NOTES
Pt back from MRI with sakshi RN and transport. Pt connected to monitors. Drips verified. Pt is A&OX4. Fall precautions in place. Call light within reach.

## 2024-12-26 NOTE — CARE PLAN
The patient is Watcher - Medium risk of patient condition declining or worsening    Shift Goals  Clinical Goals: hemodynamic stability, wound care, pain management  Patient Goals: rest, pain control, sleep  Family Goals: MARCOS    Progress made toward(s) clinical / shift goals:       Problem: Knowledge Deficit - Standard  Goal: Patient and family/care givers will demonstrate understanding of plan of care, disease process/condition, diagnostic tests and medications  Outcome: Progressing     Problem: Hemodynamics  Goal: Patient's hemodynamics, fluid balance and neurologic status will be stable or improve  Outcome: Progressing     Problem: Pain - Standard  Goal: Alleviation of pain or a reduction in pain to the patient’s comfort goal  Outcome: Progressing     Problem: Skin Integrity  Goal: Skin integrity is maintained or improved  Outcome: Progressing     Problem: Fall Risk  Goal: Patient will remain free from falls  Outcome: Progressing

## 2024-12-26 NOTE — THERAPY
"Speech Language Pathology   Flexible Endoscopic Evaluation of Swallowing (FEES)        Patient Name: Robert Mcdonnell  AGE:  74 y.o., SEX:  male  Medical Record #: [id number]  Date of Service: 12/26/2024    History of Present Illness  75 y/o male admitted 12/19 with dyspnea, bilateral LE edema, cough. Found to have hypoglycemic and in acute exacerbation of CHF.      CMHx: CHF in exacerbation, LE wound, sepsis, NIRMAL, COPD, malnutrition, acute respiratory failure, GAS bacteremia  PMHx: Afib, CHF (EF 30%), pulmonary HTN, PA,  perforation (10/2024), subtotal gastrectomy, Miguel Angel-en-Y gastrojejunostomy, malignant neoplasm of stromach, PNA     No hx SLP in Epic. Made NPO following RN concern; from note 12/24 at 1440 \"Dr. Zamora notified Pt continues to cough when consuming foods and liquids. Dr. Zamora gives orders to make Pt NPO, sips with meds, SLP eval, and dextrose continuous infusion, see MAR.\"     CXR 12/21:    \"1.  Hazy left pulmonary infiltrates, similar to prior study  2.  Trace left pleural effusion, stable  3.  Cardiomegaly  4.  Atherosclerosis\"    Pertinent Information  Current Method of Nutrition: Oral diet (Thin/all Liquids, Easy to Chew)      Dentition: Scattered dentition (Near edentulous)   Feeding Tube: None   Tracheostomy: No       Discussed the risks, benefits, and alternatives of the FEES procedure. Patient/family acknowledged and agreed to proceed.    Assessment  Flexible Endoscopic Evaluation of Swallowing (FEES) completed at bedside today. The endoscope was passed transnasally via Right nare to evaluate the anatomy and physiology of swallowing. Pt tolerated the procedure with no apparent distress.    Anatomic Findings: Unremarkable    Vocal Fold Motion: Bilateral movement  Secretion Management: Adequate     Consistency PAS Score Timing Residue Comments   Thin Liquid 2 Pre Swallow Vallecular Residue: None (0%)  Pyriform Sinus Residue: None (0%)    Liquidised 1 N/A Vallecular Residue: Mild " (5%-25%)  Pyriform Sinus Residue: Mild (5%-25%)    Pudding 1 N/A Vallecular Residue: Mild (5%-25%)  Pyriform Sinus Residue: Mild (5%-25%)    Soft & Bite Sized 1 N/A Vallecular Residue: Mild (5%-25%)  Pyriform Sinus Residue: Mild (5%-25%)      Penetration-Aspiration Scale (PAS)  1     No contrast enters airway  2     Contrast enters the airway, remains above the vocal folds, and is ejected from the airway (not seen in the airway at the end of the swallow).  3     Contrast enters the airway, remains above the vocal folds, and is not ejected from the airway (is seen in the airway after the swallow).  4     Contrast enters the airway, contacts the vocal folds, and is ejected from the airway.  5     Contrast enters the airway, contacts the vocal folds, and is not ejected from the airway  6     Contrast enters the airway, crosses the plane of the vocal folds, and is ejected from the airway.  7     Contrast enters the airway, crosses the plane of the vocal folds, and is not ejected from the airway despite effort.  8     Contrast enters the airway, crosses the plane of the vocal folds, is not ejected from the airway and there is no response to aspiration.    Oral phase: Bolus stripping and containment was WNL. Oral clearance was complete w/ no gross oral residue post swallow. Mastication was functional for soft solids.     Pharyngeal phase: Swallow initiation was timely and complete laryngeal vestibule closure was achieved. Base of tongue retraction and pharyngeal constriction was WNL. Passage through the UES was WNL. There was no gross pharyngeal residue or airway invasion.     Compensatory Strategies: N/A    Severity Rating:  Severity Rating: SARY     SARY: Functional    Clinical Impressions  The pt presents with oropharyngeal swallowing within functional limits and functional to continue a Easy to Chew solids and thin/all liquids diet. No gross aspiration or other abnormalities were visualized during today's exam. He  "appears to be at low risk for aspiration PNA or malnutrition/dehydration. No further ST services appear indicated at this time.     Recommendations  Diet Consistency: Easy to chew solids / thin liquids  Medication: As tolerated  Supervision: Assist with meal tray set up, Distant supervision - check on patient 2-3 times per meal  Positioning: Fully upright and midline during oral intake  Strategies: Small bites/sips, Slow rate of intake  Oral Care: BID  Additional Instrumentation: None    SLP Treatment Plan  Treatment Plan: None Indicated  SLP Frequency: N/A - Evaluation Only  Estimated Duration: N/A - Evaluation Only    Anticipated Discharge Needs  Discharge Recommendations: Anticipate that the patient will have no further speech therapy needs after discharge from the hospital   Therapy Recommendations Upon DC: Not Indicated     Patient / Family Goals  Patient / Family Goal #1: \"If I could just get rid of this junk I would be fine\"  Goal #1 Outcome: Goal met  Short Term Goal # 1: Pt will complete FEES w SLP to further evaluate swallow function and inform POC.  Goal Outcome # 1: Goal met    ANTHONY Lassiter  "

## 2024-12-26 NOTE — PROGRESS NOTES
Hypoglycemia Intervention    Hypoglycemia protocol intervention:  Blood glucose 62 at 2022.  Intervention: 8 oz of fruit juice   Repeat blood glucose 64 at 2038.  Intervention: 25 g IV dextrose per MAR   Additional interventions needed: pt already has D5LR running at 50 mL/hr  Dr. Christianson notified of the above at 2045.

## 2024-12-26 NOTE — PROGRESS NOTES
Infectious Disease Progress Note    Author: Brenda High M.D. Date & Time of service: 2024  7:39 AM    Chief Complaint:  Follow-up for group A streptococcus bacteremia, left lower extremity cellulitis    Interval History:   Tmax 100.4, WBC 26.6.  Leukocytosis increasing.  Patient had fever overnight.  CT of the left leg did not reveal any deep abscess however there is a moderate suprapatellar joint effusion.  Patient has some tenderness and swelling over the right knee.   patient afebrile, WBC 25.9.  MRI of the left lower extremity negative for any abscess nor osteomyelitis, MRI of the knee with small to moderate joint effusion with mild synovitis but no evidence of soft tissue abscess.  Feels okay today    Labs Reviewed, Medications Reviewed, and Wound Reviewed.    Review of Systems:  Review of Systems   Constitutional:  Negative for chills and fever.   Cardiovascular:  Positive for leg swelling.   Gastrointestinal:  Negative for diarrhea.       Hemodynamics:  Temp (24hrs), Av °C (98.6 °F), Min:36.5 °C (97.7 °F), Max:37.3 °C (99.2 °F)  Temperature: 37.1 °C (98.8 °F)  Pulse  Av.1  Min: 80  Max: 162   Blood Pressure : 94/57       Physical Exam:  Physical Exam  Vitals and nursing note reviewed.   HENT:      Mouth/Throat:      Comments: Poor dentition  Eyes:      Pupils: Pupils are equal, round, and reactive to light.   Cardiovascular:      Rate and Rhythm: Normal rate. Rhythm irregular.   Pulmonary:      Effort: Pulmonary effort is normal.   Musculoskeletal:      Comments: Left inner thigh edema and erythema    Left knee swelling and tenderness to palpation.  Warm to palpation but no surrounding erythema    Left lower extremity tenderness to palpation   Skin:     General: Skin is warm and dry.   Neurological:      General: No focal deficit present.      Mental Status: He is alert and oriented to person, place, and time.         Meds:    Current Facility-Administered Medications:      senna-docusate **AND** polyethylene glycol/lytes    magnesium hydroxide    thiamine    D5LR    clindamycin (CLEOCIN) IV    digoxin    apixaban    omeprazole    Notify MD and PharmD if FSBG level is less than or equal to 70 mg/dL or patient is showing signs/symptoms of hypoglycemia (tachycardia, palpitations, diaphoresis, clammy, tremulousness, nausea, confused) **AND** Administer 20 grams of glucose (approximately 8 ounces of fruit juice) every 15 minutes PRN FSBS less than 70 mg/dL **AND** dextrose bolus    acetaminophen    oxyCODONE immediate-release **OR** oxyCODONE immediate-release **OR** HYDROmorphone    ondansetron    ondansetron    nicotine **AND** Nicotine Replacement Patient Education Materials **AND** nicotine polacrilex    ampicillin-sulbactam (UNASYN) IV    Labs:  Recent Labs     12/24/24 0439 12/25/24 0410 12/26/24  0445   WBC 25.7* 26.6* 25.9*   RBC 3.59* 3.64* 3.47*   HEMOGLOBIN 9.9* 10.0* 9.7*   HEMATOCRIT 30.1* 30.7* 28.7*   MCV 83.8 84.3 82.7   MCH 27.6 27.5 28.0   RDW 51.2* 52.2* 50.3*   PLATELETCT 72* 87* 142*   MPV  --   --  11.8   NEUTSPOLYS 98.30* 91.30* 89.60*   LYMPHOCYTES 0.80* 2.90* 3.70*   MONOCYTES 0.90 3.50 4.10   EOSINOPHILS 0.00 0.20 0.40   BASOPHILS 0.00 0.30 0.20   RBCMORPHOLO Present  --   --      Recent Labs     12/24/24 0439 12/25/24  0410 12/26/24  0445   SODIUM 134* 129* 134*   POTASSIUM 3.9 3.9 4.4   CHLORIDE 97 94* 97   CO2 29 29 29   GLUCOSE 79 108* 81   BUN 16 12 11   CPKTOTAL 42 31 42     Recent Labs     12/24/24 0439 12/25/24  0410 12/26/24  0445   ALBUMIN 2.3* 2.2* 2.2*   TBILIRUBIN 2.1* 1.9* 1.3   ALKPHOSPHAT 295* 310* 276*   TOTPROTEIN 5.2* 5.1* 5.0*   ALTSGPT 17 16 14   ASTSGOT 32 19 19   CREATININE 0.90 0.89 0.82       Imaging:  CT-EXTREMITY, LOWER WITH LEFT    Result Date: 12/24/2024 12/24/2024 3:20 PM HISTORY/REASON FOR EXAM:  concern of possible abscess or nec fasc. TECHNIQUE/EXAM DESCRIPTION AND NUMBER OF VIEWS:  CT scan of the LEFT lower extremity with  contrast, with reconstructions. Thin helical 3 mm sections were obtained from the distal femur through the proximal tibia/fibula. Sagittal and coronal multiplanar reconstructions were generated from the axial images. A total of 100 mL of Omnipaque 350 nonionic contrast was administered  IV without complication. Up to date radiation dose reduction adjustments have been utilized to meet ALARA standards for radiation dose reduction. COMPARISON: CT scan of left lower extremity dated 12/21/2024 FINDINGS: There is soft tissue attenuation in the subcutaneous tissues of the left lower leg. There is a small to moderate suprapatellar joint effusion. There is decreased bony mineralization of the left lower extremity. There is no evidence of fracture, dislocation, or other osseous lesion. There is no evidence of acute or chronic osteomyelitis. The musculature of the left lower extremity has a normal appearance and enhancement pattern. There is no evidence of deep abscess formation.     1.  Extensive subcutaneous inflammatory changes about the left lower extremity consistent with deep and superficial cellulitis. 2.  No definite CT evidence of necrotizing fasciitis. 3.  No CT evidence of deep abscess formation or acute or chronic osteomyelitis. 4.  Small to moderate suprapatellar joint effusion.    CT-EXTREMITY, LOWER WITH LEFT    Result Date: 12/21/2024 12/21/2024 11:19 AM HISTORY/REASON FOR EXAM:  concerns for necrotizing fasciitis. Concerns for necrotizing fasciitis TECHNIQUE/EXAM DESCRIPTION AND NUMBER OF VIEWS:  CT scan of the LEFT lower extremity with contrast, with reconstructions. Thin helical 3 mm sections were obtained from the distal femur through the proximal tibia/fibula. Sagittal and coronal multiplanar reconstructions were generated from the axial images. A total of 100 mL of Omnipaque 350 nonionic contrast was administered  IV without complication. Up to date radiation dose reduction adjustments have been  utilized to meet ALARA standards for radiation dose reduction. COMPARISON: None. FINDINGS: Diffuse subcutaneous edema. No discrete fluid collection identified. No soft tissue gas identified. No acute fracture or dislocation.     No soft tissue gas to suggest necrotizing fasciitis. No fluid collection identified.    DX-CHEST-PORTABLE (1 VIEW)    Result Date: 12/21/2024 12/21/2024 12:22 AM HISTORY/REASON FOR EXAM: Shortness of Breath TECHNIQUE/EXAM DESCRIPTION:  Single AP view of the chest. COMPARISON: Yesterday FINDINGS: Overlying cardiac leads are present. Cardiomegaly is observed. Atherosclerotic calcification of the aorta is noted.  The central pulmonary vasculature appears normal. Bilateral lung volumes are diminished.  Hazy left pulmonary opacity is seen. Blunting of the left costophrenic angle is seen suggesting trace left effusion. The bony structures appear age-appropriate.     1.  Hazy left pulmonary infiltrates, similar to prior study 2.  Trace left pleural effusion, stable 3.  Cardiomegaly 4.  Atherosclerosis    DX-CHEST-PORTABLE (1 VIEW)    Result Date: 12/20/2024 12/20/2024 12:32 AM HISTORY/REASON FOR EXAM: Shortness of Breath TECHNIQUE/EXAM DESCRIPTION:  Single AP view of the chest. COMPARISON: Yesterday FINDINGS: Overlying cardiac leads are present. Cardiomegaly is observed. Atherosclerotic calcification of the aorta is noted.  The central pulmonary vasculature appears normal. Bilateral lung volumes are diminished.  Hazy left pulmonary opacity is seen. Blunting of the left costophrenic angle is seen suggesting trace left effusion. The bony structures appear age-appropriate.     1.  Hazy left pulmonary infiltrates, similar to prior study 2.  Trace left pleural effusion, stable 3.  Cardiomegaly 4.  Atherosclerosis    EC-ECHOCARDIOGRAM COMPLETE W/O CONT    Result Date: 12/19/2024  Transthoracic Echo Report Echocardiography Laboratory CONCLUSIONS Severely reduced left ventricular systolic function.  Severe mitral regurgitation. Severe tricuspid regurgitation due to dilated annulus. Estimated right ventricular systolic pressure is 55 mmHg. HUNTER STEELE Exam Date:         2024                    15:40 Exam Location:     Inpatient Priority:          Routine Ordering Physician:        PAULINA GABRIEL Referring Physician:       PAULINA GABRIEL Sonographer:               Shawn CAPMBELL Age:    74     Gender:    M MRN:    [id number] :    1950 BSA:    2.02   Ht (in):    72     Wt (lb):    176 Exam Type:     Complete Indications:     Unspecified systolic (congestive) heart failure ICD Codes:       I50.20 CPT Codes:       05905 BP:   94     /   62     HR:   120 Technical Quality:       Good MEASUREMENTS  (Male / Female) Normal Values 2D ECHO LV Diastolic Diameter PLAX        5.5 cm                4.2 - 5.9 / 3.9 - 5.3 cm LV Systolic Diameter PLAX         4.3 cm                2.1 - 4.0 cm IVS Diastolic Thickness           1 cm                  LVPW Diastolic Thickness          1 cm                  LVOT Diameter                     2.1 cm                Estimated LV Ejection Fraction    28 %                  LV Ejection Fraction MOD BP       40.1 %                >= 55  % LV Ejection Fraction MOD 4C       27.1 %                LV Ejection Fraction MOD 2C       44.5 %                LV Ejection Fraction 4C AL        28.8 %                LV Ejection Fraction 2C AL        45.4 %                LA Volume Index                   61.1 cm3/m2           16 - 28 cm3/m2 DOPPLER AV Peak Velocity                  1.1 m/s               AV Peak Gradient                  5.2 mmHg              AV Mean Gradient                  3 mmHg                LVOT Peak Velocity                0.93 m/s              AV Area Cont Eq vti               2.3 cm2               MV Velocity Time Integral         17.8 cm               MR Flow Convergence Radius        1.1 cm                MR ERO PISA                       0.46 cm2               MR Regurgitant Volume PISA        51.1 cm3              MR Flow Area                      7.6 cm2               TR Peak Velocity                  328 cm/s              PV Peak Velocity                  0.67 m/s              PV Peak Gradient                  1.8 mmHg              RVOT Peak Velocity                0.53 m/s              * Indicates values subject to auto-interpretation LV EF:  28    % FINDINGS Left Ventricle Normal left ventricular chamber is dilated. Normal left ventricular wall thickness. Severely reduced left ventricular systolic function. The left ventricular ejection fraction is visually estimated to be 25- 30%. Global hypokinesis with regional variation. A reliable estimation of diastolic function cannot be made due to mitral valve disease. Right Ventricle The right ventricle is dilated. Normal right ventricular systolic function. Right Atrium Enlarged right atrium. Dilated inferior vena cava with inspiratory collapse. Left Atrium Severely dilated left atrium. Left atrial volume index is 58 mL/sq m. Intact atrial septum without Doppler evidence of interatrial shunt (e.g. PFO or ASD). Mitral Valve Structurally normal mitral valve without significant stenosis. Severe mitral regurgitation. ERO by PISA method is 0.54 sq cm. Vena contracta is 0.5 cm. Aortic Valve Tricuspid aortic valve. Structurally normal aortic valve without significant stenosis or regurgitation. Tricuspid Valve Structurally normal tricuspid valve without significant stenosis. Severe tricuspid regurgitation due to dilated annulus. Right atrial pressure is estimated to be 8 mmHg. Estimated right ventricular systolic pressure is 55 mmHg. Pulmonic Valve Structurally normal pulmonic valve without significant stenosis. Mild pulmonic insufficiency. Pericardium Trivial pericardial effusion. Aorta Normal aortic root for body surface area. The ascending aorta diameter is 3.5 cm. Arie Jones MD (Electronically Signed) Final Date:      19 December 2024                 17:17    CT-CHEST,ABDOMEN,PELVIS WITH    Result Date: 12/19/2024 12/19/2024 2:02 PM HISTORY/REASON FOR EXAM:  gastric cancer, recent partial gastrectomy. TECHNIQUE/EXAM DESCRIPTION: CT scan of the chest, abdomen and pelvis with contrast. Thin-section helical scanning was obtained with intravenous contrast from the lung apices through the pubic symphysis to include the chest, abdomen and pelvis. 90 mL of Omnipaque 350 nonionic contrast was administered intravenously without complication. Low dose optimization technique was utilized for this CT exam including automated exposure control and adjustment of the mA and/or kV according to patient size. COMPARISON: CT of the abdomen and pelvis, 10/28/2024. CTA of the pulmonary arteries, 10/28/2024. FINDINGS: CT Chest: Lungs: Stable spiculated opacity in the right upper lobe measuring 2.1 cm in diameter. Asymmetric volumes of the lungs, small on the left and the right. Areas of scarring and/or atelectasis in the lingula and left lower lobe, with a small left pleural effusion. No right pleural effusion. No pneumothorax bilaterally. Cardiac: The heart size is enlarged. No pericardial effusion. Atherosclerotic calcifications in the coronary arteries. Mediastinum/alice: No mediastinal or hilar adenopathy. No obstructing endobronchial lesion. Vascular: Atherosclerotic calcifications in the aorta, without aneurysmal dilation. Soft tissues: Cachexia. Bones: No acute or destructive process. Decreased bone mineralization. CT Abdomen and Pelvis: Liver: Stable multiple small subcentimeter lucencies throughout the liver parenchyma. Perihepatic ascites. Spleen: Unremarkable. Pancreas: Unremarkable. Gallbladder: The gallbladder has been resected. Biliary: Nondilated. Adrenal glands: Normal. Kidneys: Unremarkable without hydronephrosis. Bowel: No obstruction or acute inflammation. Postsurgical changes of gastric bypass surgery. Lymph nodes: There are  enlarged left inguinal lymph nodes with central necrosis. The largest lymph node measures 1.7 cm in diameter. There is edema involving the soft tissues of the proximal left leg. Vasculature: Atherosclerotic calcifications in the aorta and iliac arteries, without aneurysmal dilation. Ascites: Moderate amount of ascites throughout the abdomen and pelvis. Pelvis: There is pelvic ascites. There is a Paulino catheter in the bladder lumen. There is circumferential bladder wall thickening. Prostate gland and seminal vesicles are unremarkable. Musculoskeletal: No acute or destructive process. Decreased bone mineralization. Degenerative disc disease at L5-S1.     1. Stable spiculated opacity in the right upper lobe, extending to the pleural surface. 2. Lingular and left lower lobe parenchymal airspace disease has improved in the interval, but has not completely resolved. There is pleural reaction, suggesting some of these areas may represent parenchymal scarring. 3. Asymmetry of the lung volumes, small on the left than the right. 4. Cardiomegaly with atherosclerotic calcifications in the aorta and coronary arteries. 5. Postsurgical changes in the stomach. 6. Stable scattered multiple hypodense lesions throughout the liver. 7. Moderate ascites throughout the abdomen and pelvis. 8. Cachexia. 9. Enlarged left inguinal lymph nodes with central necrosis. There is edema involving the soft tissues of the upper left leg extending to the left pelvis.    US-EXTREMITY VENOUS LOWER BILAT    Result Date: 12/19/2024   Vascular Laboratory  CONCLUSIONS  Compared to the prior study on 4/6/20.  Normal bilateral lower extremity venous duplex exam.  STEELEHUNTER  Exam Date:     12/19/2024 07:54  Room #:     Inpatient  Priority:     Stat  Ht (in):             Wt (lb):  Ordering Physician:        LAVELLE GANDARA  Referring Physician:       [id number] Ennis  Sonographer:               Malik Clifton                             HAILEY  Study Type:                 Complete Bilateral  Technical Quality:         Adequate  Age:    74    Gender:     M  MRN:    [id number]  :    1950      BSA:  Indications:     Edema  CPT Codes:       69595  ICD Codes:       782.3  History:         Edema  Limitations:  PROCEDURES:  Bilateral lower extremity venous duplex imaging.  The following venous structures were evaluated: common femoral, deep  femoral, proximal great saphenous, femoral, popliteal, peroneal, and  posterior tibial veins.  Serial compression, color, and spectral Doppler flow evaluations were  performed.  FINDINGS:  Bilateral lower extremities.  The peroneal and posterior tibial veins are difficult to assess for  compressibility, but flow response to augmentation is demonstrated.  All other veins demonstrate complete color filling and compressibility with  normal venous flow dynamics including spontaneous flow and respiratory  phasicity.  No deep venous thrombosis.  Rustam Nazario MD  (Electronically Signed)  Final Date:      2024                   09:00    DX-CHEST-PORTABLE (1 VIEW)    Result Date: 2024 6:26 AM HISTORY/REASON FOR EXAM: Shortness of Breath TECHNIQUE/EXAM DESCRIPTION:  Single AP view of the chest. COMPARISON: 2024 FINDINGS: The cardiac silhouette appears within normal limits. The mediastinal contour appears within normal limits.  The central pulmonary vasculature appears normal. Bilateral lung volumes are diminished.  Hazy left midlung pulmonary opacity is seen Blunting of the left costophrenic angle is seen suggesting trace left effusion. The bony structures appear age-appropriate.     1.  Hazy left pulmonary infiltrates. 2.  Small left pleural effusion 3.  Cardiomegaly    CT-CSPINE WITHOUT PLUS RECONS    Result Date: 2024 10:01 AM HISTORY/REASON FOR EXAM: Polytrauma, blunt; s/p fall - patient on anticoagulation; Neck pain from ground level fall COMPARISON: CT chest 10/28/2024 and  4/26/2020. TECHNIQUE/EXAM DESCRIPTION: CT cervical spine without contrast, with reconstructions. The study was performed on a helical multidetector CT scanner. Thin-section helical scanning was performed from the skull base through T1. Sagittal and coronal multiplanar reconstructions were generated from the axial images. Low dose optimization technique was utilized for this CT exam including automated exposure control and adjustment of the mA and/or kV according to patient size. FINDINGS: Carotid vascular calcifications. 2.5 x 1.5 cm right lung base spiculated density posteriorly is not significantly changed from 4/26/2020. No paraspinal mass. Severe degenerative changes. Straightening of the normal cervical lordosis may be related to muscle spasm or positioning. Multilevel mild congenital spinal stenosis. Soft tissue detail adjacent to bony structures is limited on CT images. No fractures visible. Incomplete bony union C1 posterior arch is congenital and incidental. The odontoid appears normal on coronal reformatted images.     Impression: 1. No cervical spine fracture or subluxation to T1 evident. 2.. Straightening of the normal cervical lordosis may be related to muscle spasm or positioning. 3. Prominent degenerative changes.     CT-HEAD W/O    Result Date: 11/29/2024  Examination: 11/29/2024 10:00 AM HISTORY/REASON FOR EXAM:  Trauma with loss of consciousness. COMPARISON: None available. TECHNIQUE/EXAM DESCRIPTION: CT of the head without contrast. CT head protocol utilized without contrast. CT performed using ALARA principles (as low as reasonably achievable). Sagittal and coronal reformatted images included. FINDINGS: Forehead scalp soft tissue swelling. No fracture is visible. Age expected atrophy and chronic white matter microvascular ischemic change type lucency noted. No mass, mass effect, hydrocephalus or hemorrhage evident.  No low density to indicate acute stroke.  No convexity fluid collection. The  visible skull base sinuses do not show any fluid. Mastoids in the field of view are unremarkable.     Impression: 1. No acute process in the brain evident. 2. Forehead scalp soft tissue swelling. No fracture is visible.       Micro:  Results       Procedure Component Value Units Date/Time    Blood Culture - Draw one from central line and one from peripheral site [[id number]]  (Abnormal) Collected: 12/19/24 0747    Order Status: Completed Specimen: Blood from Peripheral Updated: 12/21/24 1506     Significant Indicator POS     Source BLD     Site PERIPHERAL     Culture Result Growth detected by automated blood culture system. 12/19/2024  19:16        Streptococcus pyogenes (Group A)  See previous culture for sensitivity report.      Blood Culture - Draw one from central line and one from peripheral site [[id number]]  (Abnormal)  (Susceptibility) Collected: 12/19/24 0731    Order Status: Completed Specimen: Blood from Line Updated: 12/21/24 1506     Significant Indicator POS     Source BLD     Site Peripheral     Culture Result Growth detected by automated blood culture system.  12/19/2024  18:20        Streptococcus pyogenes (Group A)    Susceptibility       Streptococcus pyogenes (group a) (1)       Antibiotic Interpretation Microscan   Method Status    Penicillin Sensitive 0.016 mcg/mL E-TEST Final    Cefotaxime Sensitive 0.008 mcg/mL E-TEST Final    Clindamycin Sensitive - mcg/mL E-TEST Final                       E-Test [[id number]] Collected: 12/19/24 0731    Order Status: Completed Specimen: Other Updated: 12/21/24 1506     ETEST Sensitivity FINAL    Narrative:      161 tel. [phone] 12/20/2024, 12:39, RB PERF. RESULTS CALLED TO: Maggie BHATIA  161 tel. [phone] 12/19/2024, 18:20, RB PERF. RESULTS CALLED TO: Meghann BHATIA  [id number]    BLOOD CULTURE [[id number]] Collected: 12/21/24 0816    Order Status: Completed Specimen: Blood from Peripheral Updated: 12/21/24 1208     Significant Indicator NEG     Source BLD     Site  PERIPHERAL     Culture Result No Growth  Note: Blood cultures are incubated for 5 days and  are monitored continuously.Positive blood cultures  are called to the RN and reported as soon as  they are identified.      BLOOD CULTURE [[id number]] Collected: 12/21/24 0820    Order Status: Completed Specimen: Blood from Peripheral Updated: 12/21/24 1208     Significant Indicator NEG     Source BLD     Site PERIPHERAL     Culture Result No Growth  Note: Blood cultures are incubated for 5 days and  are monitored continuously.Positive blood cultures  are called to the RN and reported as soon as  they are identified.      Urine Culture (New) [[id number]] Collected: 12/19/24 1100    Order Status: Completed Specimen: Urine Updated: 12/21/24 0713     Significant Indicator NEG     Source UR     Site -     Culture Result No growth at 48 hours.    MRSA By PCR (Amp) [[id number]]     Order Status: Canceled Specimen: Respirate from Nares     MRSA By PCR (Amp) [[id number]] Collected: 12/19/24 1100    Order Status: Completed Specimen: Respirate from Nares Updated: 12/19/24 1258     MRSA by PCR Negative    Urinalysis [[id number]]  (Abnormal) Collected: 12/19/24 1100    Order Status: Completed Specimen: Urine Updated: 12/19/24 1155     Color Dark Yellow     Character Cloudy     Specific Gravity 1.021     Ph 5.0     Glucose Negative mg/dL      Ketones Trace mg/dL      Protein 100 mg/dL      Bilirubin Small     Urobilinogen, Urine 1.0 EU/dL      Nitrite Negative     Leukocyte Esterase Trace     Occult Blood Negative     Micro Urine Req Microscopic            Assessment:  Active Hospital Problems    Diagnosis     *Acute exacerbation of CHF (congestive heart failure) ([facility]) [I50.9]     Streptococcal bacteremia [R78.81, B95.5]     Hypoglycemia [E16.2]     Bacteremia [R78.81]     Pulmonary hypertension ([facility]) [I27.20]     Acute respiratory failure with hypoxia ([facility]) [J96.01]     Malignant neoplasm of body of stomach ([facility]) [C16.2]     Homeless [Z59.00]      Severe protein-calorie malnutrition ([facility]) [E43]     COPD (chronic obstructive pulmonary disease) ([facility]) [J44.9]     Acute kidney injury ([facility]) [N17.9]     Sepsis (HCC) [A41.9]     Atrial fibrillation with RVR ([facility]) [I48.91]     Thrombocytopenia ([facility]) [D69.6]     Tobacco dependence [F17.200]     Hypertension [I10]     Wound of lower extremity [S81.809A]       74 y.o. 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 Reddy admitted on 12/19/2024 secondary to encephalopathy, shortness of breath and left leg swelling.  Patient found to have group A streptococcal bacteremia secondary to left lower extremity cellulitis.  Repeat blood cultures on 12/21 are negative to date.  Hospital course complicated by worsening leukocytosis.     Pertinent diagnoses:  Group A streptococcus bacteremia, secondary to below  Left lower extremity cellulitis  Left patellar joint effusion, noted on MRI  Leukocytosis, persistent and worse  Thrombocytopenia, improved.  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   Homelessness    Plan:  -Continue IV Unasyn and IV clindamycin  -Blood cultures on 12/19+ group A streptococcus  -Repeat blood cultures on 12/21-negative to date  -Continue wound care  -CT of the left lower extremity -significant cellulitis, and moderate suprapatellar joint effusion but no no evidence of abscess  - MRI of the left leg-cellulitis, no abscess nor osteomyelitis  -MRI of the knee-moderate joint effusion with synovitis  -Ortho not recommending aspiration of suprapatellar effusion due to risk of seeding infection  -Monitor labs  -If patient has any worsening left knee pain, swelling and worsening leukocytosis, recommend reevaluation by orthopedic surgery for aspiration versus surgical washout    This infection poses a threat to life and  further limb function     Disposition: TBD patient is homeless     Plan of care discussed with JUDI Zamora D.O.. Will continue to follow

## 2024-12-26 NOTE — CARE PLAN
The patient is Stable - Low risk of patient condition declining or worsening    Shift Goals  Clinical Goals: Wean off oxygen, BS checks  Patient Goals: Comfort  Family Goals: N/A    Progress made toward(s) clinical / shift goals:      Problem: Knowledge Deficit - Standard  Goal: Patient and family/care givers will demonstrate understanding of plan of care, disease process/condition, diagnostic tests and medications  Description: Target End Date:  1-3 days or as soon as patient condition allows    Document in Patient Education    1.  Patient and family/caregiver oriented to unit, equipment, visitation policy and means for communicating concern  2.  Complete/review Learning Assessment  3.  Assess knowledge level of disease process/condition, treatment plan, diagnostic tests and medications  4.  Explain disease process/condition, treatment plan, diagnostic tests and medications  Outcome: Progressing     Problem: Skin Integrity  Goal: Skin integrity is maintained or improved  Description: Target End Date:  Prior to discharge or change in level of care    Document interventions on Skin Risk/Edward flowsheet groups and corresponding LDA    1.  Assess and monitor skin integrity, appearance and/or temperature  2.  Assess risk factors for impaired skin integrity and/or pressures ulcers  3.  Implement precautions to protect skin integrity in collaboration with interdisciplinary team  4.  Implement pressure ulcer prevention protocol if at risk for skin breakdown  5.  Confirm wound care consult if at risk for skin breakdown  6.  Ensure patient use of pressure relieving devices  (Low air loss bed, waffle overlay, heel protectors, ROHO cushion, etc)  Outcome: Progressing     Problem: Fall Risk  Goal: Patient will remain free from falls  Description: Target End Date:  Prior to discharge or change in level of care    Document interventions on the Celsa Lewis Fall Risk Assessment    1.  Assess for fall risk factors  2.  Implement  fall precautions  Outcome: Progressing

## 2024-12-26 NOTE — PROGRESS NOTES
Hospital Medicine Daily Progress Note    Date of Service  12/26/2024    Chief Complaint  Short of breath    Hospital Course  Robert Mcdonnell is a 73yo male with a PMH homelessness, atrial fibrillation on chronic anticoagulation with apixaban, CHF, EF 30%, P.Htn, RVSP 65, remote PE, recent hospitalization 10/3 - 10/15 for perforated  (T2N0M0 invasive gastric adenocarcinoma), GDA embolization, subsequent subtotal gastrectomy, liver wedge resection, Miguel Angel-en-Y gastrojejunostomy with omental flap and cholecystectomy 10/7 with pathology showing invasive well-differentiated adenocarcinoma with associated ulceration.  There was tumor invasion into the muscularis propria and 20 lymph nodes were negative for metastatic carcinoma.  Oncology was consulted and noted no evidence of metastatic disease however MRI abdomen recommended for follow-up; no adjuvant therapy was recommended.  He presented 12/19/2024 to the ED with report of several days of increasing dyspnea, worsening bilateral lower extremity edema and mild cough.  Patient was found to be hypoglycemic on admission with a glucose of 53 for which she was placed on D50.  He had increasing shortness of breath and required BiPAP in the emergency room.  Initial proBNP 21,696 with an elevated lactic acid of 5.6 and a creatinine of 2.3.  He had not been taking his Lasix as he had run out of it but had been on his Eliquis.  EKG showed irregular wide-complex rhythm.    Interval Problem Update  12/26: wbc:25.9.  I discussed with orthopedist yesterday.  The orthopedist did talk to the patient and the patient was adamant about no further surgery to his leg.  The patient continues to have hypoglycemic episodes and I have encouraged him to eat and drink his supplement shakes.    12/25: WBC:26.6, Alk phos:310, Tbilib 1.9. CT left leg shows small to moderate suprapatellar joint effusion. I have consulted orthopedics. Pain in left thigh and left knee.  Cold this am but cold at 67 degrees  in his room. I discussed with Dr High (ID) and Dr Flores (ortho) and I have ordered stat MRI left leg to rule out infection.    12/24: WBC: 25.7, Hgb: 9.9, plt: 72. Remains on D10 overnight.  Pain controlled on oxycodone 10mg.  Awake and warm.  Left leg swelling and erythema in medial groin.  Speech clear.    I have discussed this patient's plan of care and discharge plan at IDT rounds today with Case Management, Nursing, Nursing leadership, and other members of the IDT team.    Code Status  Full Code    Disposition  The patient is not medically cleared for discharge to home or a post-acute facility.  Anticipate discharge to: skilled nursing facility    I have placed the appropriate orders for post-discharge needs.    Review of Systems  Review of Systems   Constitutional:  Positive for malaise/fatigue. Negative for chills and fever.   Respiratory:  Negative for shortness of breath.    Cardiovascular:  Positive for leg swelling.   Gastrointestinal:  Negative for abdominal pain and nausea.   Musculoskeletal:  Positive for myalgias (left median thigh).   Neurological:  Negative for dizziness, sensory change and headaches.   Psychiatric/Behavioral:  The patient is not nervous/anxious.         Physical Exam  Temp:  [36.8 °C (98.2 °F)-37.3 °C (99.2 °F)] 37.1 °C (98.8 °F)  Pulse:  [] 97  Resp:  [15-23] 23  BP: ()/(51-74) 94/57  SpO2:  [90 %-100 %] 95 %    Physical Exam  Constitutional:       Appearance: Normal appearance.   HENT:      Head: Normocephalic and atraumatic.   Eyes:      Extraocular Movements: Extraocular movements intact.      Conjunctiva/sclera: Conjunctivae normal.   Cardiovascular:      Rate and Rhythm: Regular rhythm. Tachycardia present.      Heart sounds: No murmur heard.  Pulmonary:      Effort: No respiratory distress.      Breath sounds: No wheezing.   Abdominal:      General: Bowel sounds are normal. There is no distension.      Palpations: Abdomen is soft.      Tenderness: There  is no abdominal tenderness.   Musculoskeletal:         General: Tenderness (less compared to 12/25 exam.) present.      Cervical back: Neck supple.      Right lower leg: No edema.      Left lower leg: Edema present.   Skin:     Findings: Erythema (decrease in erythema compared to prior exams) present.      Comments: Skin denuding on lower leg. Wounds on scrotum and medial thigh.  Reviewed on patient and in pictures with wound care RN present   Neurological:      General: No focal deficit present.      Mental Status: He is alert and oriented to person, place, and time.      Cranial Nerves: No cranial nerve deficit.   Psychiatric:         Mood and Affect: Mood normal.         Behavior: Behavior normal.         Thought Content: Thought content normal.         Fluids    Intake/Output Summary (Last 24 hours) at 12/26/2024 0801  Last data filed at 12/26/2024 0600  Gross per 24 hour   Intake 1156.54 ml   Output 1150 ml   Net 6.54 ml        Laboratory  Recent Labs     12/24/24  0439 12/25/24  0410 12/26/24  0445   WBC 25.7* 26.6* 25.9*   RBC 3.59* 3.64* 3.47*   HEMOGLOBIN 9.9* 10.0* 9.7*   HEMATOCRIT 30.1* 30.7* 28.7*   MCV 83.8 84.3 82.7   MCH 27.6 27.5 28.0   MCHC 32.9 32.6 33.8   RDW 51.2* 52.2* 50.3*   PLATELETCT 72* 87* 142*   MPV  --   --  11.8     Recent Labs     12/24/24  0439 12/25/24  0410 12/26/24  0445   SODIUM 134* 129* 134*   POTASSIUM 3.9 3.9 4.4   CHLORIDE 97 94* 97   CO2 29 29 29   GLUCOSE 79 108* 81   BUN 16 12 11   CREATININE 0.90 0.89 0.82   CALCIUM 8.2* 7.6* 7.3*                   Imaging  MR-LOWER EXTREMITY-NO JOINT-W/O LEFT   Final Result      1.  Patient motion degraded exam.      2.  No evidence of marrow edema or bone lesion to suggest presence of osteomyelitis.      3.  Diffuse cellulitis and patchy myositis.      4.  No evidence of focal fluid collection to suggest presence of abscess.      CT-EXTREMITY, LOWER WITH LEFT   Final Result      1.  Extensive subcutaneous inflammatory changes about the  left lower extremity consistent with deep and superficial cellulitis.      2.  No definite CT evidence of necrotizing fasciitis.      3.  No CT evidence of deep abscess formation or acute or chronic osteomyelitis.      4.  Small to moderate suprapatellar joint effusion.      CT-EXTREMITY, LOWER WITH LEFT   Final Result      No soft tissue gas to suggest necrotizing fasciitis.      No fluid collection identified.      DX-CHEST-PORTABLE (1 VIEW)   Final Result         1.  Hazy left pulmonary infiltrates, similar to prior study   2.  Trace left pleural effusion, stable   3.  Cardiomegaly   4.  Atherosclerosis      DX-CHEST-PORTABLE (1 VIEW)   Final Result         1.  Hazy left pulmonary infiltrates, similar to prior study   2.  Trace left pleural effusion, stable   3.  Cardiomegaly   4.  Atherosclerosis      EC-ECHOCARDIOGRAM COMPLETE W/O CONT   Final Result      CT-CHEST,ABDOMEN,PELVIS WITH   Final Result      1. Stable spiculated opacity in the right upper lobe, extending to the pleural surface.   2. Lingular and left lower lobe parenchymal airspace disease has improved in the interval, but has not completely resolved. There is pleural reaction, suggesting some of these areas may represent parenchymal scarring.   3. Asymmetry of the lung volumes, small on the left than the right.   4. Cardiomegaly with atherosclerotic calcifications in the aorta and coronary arteries.   5. Postsurgical changes in the stomach.   6. Stable scattered multiple hypodense lesions throughout the liver.   7. Moderate ascites throughout the abdomen and pelvis.   8. Cachexia.   9. Enlarged left inguinal lymph nodes with central necrosis. There is edema involving the soft tissues of the upper left leg extending to the left pelvis.      US-EXTREMITY VENOUS LOWER BILAT   Final Result      DX-CHEST-PORTABLE (1 VIEW)   Final Result         1.  Hazy left pulmonary infiltrates.   2.  Small left pleural effusion   3.  Cardiomegaly       KY-ANQED-YEQIRK-WITH & W/O LEFT    (Results Pending)   MR-KNEE-WITH & W/O LEFT    (Results Pending)        Assessment/Plan  * Acute exacerbation of CHF (congestive heart failure) (HCC)- (present on admission)  Assessment & Plan  Due to afib rvr and recovering pna  Careful force diuresis with sepsis  Digoxin IV for reduced EF and BiV failure w/ afib  Now improved off Bipap  Maintain euvolemia.  Holding diuretic therapy due to ongoing hypotension  12/26 Net output since admit: -2017 ml    Hypoglycemia  Assessment & Plan  He found to hypoglycemia  Hypoglycemic protocol  He has very poor oral intake.  Supplements as tolerates (ordered 12/24)  Remains on D10.    Streptococcal bacteremia- (present on admission)  Assessment & Plan  Likely skin wound source.  Discussed with wound care  continuing IV Unasyn.  Worsening leukocytosis  Monitor cbc and vitals  Added clindamycin 12/24  Consulted ID, 12/24 12/26 wbc:25.9 <26.6 <25.7    Bacteremia  Assessment & Plan  12/19 BC: Strep pyogenes Group A  Repeat Cx 12/21 shows no growth  Continue IV Unasyn  MRSA negative  Worsening wbc on daily cbc  ID consulted 12/24 and discussed 12/25 with Dr High    Pulmonary hypertension (HCC)  Assessment & Plan  Previous Echo RVSP 65, severe TR  Suspect mainly Group 2  RV perfusion with MAP > 65 as need norepinepinephrine  Blood pressure is running on the lower side holding diuretic therapy at this time    Acute respiratory failure with hypoxia (HCC)- (present on admission)  Assessment & Plan  Improved off Bipap  Currently is on 2 L of oxygen to maintain oxygen saturation  12/25 on room air.  Mobilize as able.    Malignant neoplasm of body of stomach (HCC)- (present on admission)  Assessment & Plan  T2, N0, M0 invasive gastric adenocarcinoma presenting as perforated  10/2024  -GDA embolization  -10/7 subtotal gastrectomy, liver wedge resection, Miguel Angel-en-Y gastrojejunostomy with omental flap and cholecystectomy  -pathology invasive  well-differentiated adenocarcinoma with associated ulceration  -tumor invasion into the muscularis propria and 20 lymph nodes were negative for metastatic carcinoma  -Oncology was consulted and noted no evidence of metastatic disease  -MRI abdomen recommended for follow-up; no adjuvant therapy was recommended  He will need outpatient follow-up    Homeless- (present on admission)  Assessment & Plan   consultation for ongoing access to medications and follow-up care as appropriate.  States family in [place] but states they cannot house him nor give assistance     Severe protein-calorie malnutrition (HCC)- (present on admission)  Assessment & Plan  Nutrition consult    COPD (chronic obstructive pulmonary disease) (HCC)- (present on admission)  Assessment & Plan  Current active smoker, no PFTs on file  RT protocols, bronchodilators as needed  No signs of exacerbation    Acute kidney injury (HCC)- (present on admission)  Assessment & Plan  Resolved   Maintain euvolemia and monitor fluid responsiveness (avoid NaCL and renal congestion)  Monitor urine output and I&O's  Avoid and review nephrotoxin medication  Renal function improved    Sepsis ([facility])- (present on admission)  Assessment & Plan  Skin infection/ Pneumonia  Antibiotics  Strep pyogenes Group A   Unasyn  Positive 12/19 blood cultures  Repeat blood cultures negative  12/25 discussed with Dr High and reconsulted Dr Flores (ortho)  12/25 Stat MRI given finding of suprapatellar fluid collection and knee pain.  Ortho did not want to tap knee with possible cellulitis in area as of yet.    Atrial fibrillation with RVR ([facility])- (present on admission)  Assessment & Plan  Chronic atrial fibrillation, currently tachycardia  Digoxin 250 mcg IV monitoring closely with flip to prevent digoxin toxicity  Continue eliquis.    Metoprolol on hold due to ongoing hypotension    Thrombocytopenia ([facility])- (present on admission)  Assessment & Plan  He found to have  thrombocytopenia and platelet count is trending down.  Resolving, suspect acute inflammatory process  12/26 Plt: 142 <87 <72  I am continue Eliquis for now for stroke prevention  Ordered CBC for tomorrow.      Tobacco dependence- (present on admission)  Assessment & Plan  Counseling when appropriate    Hypertension- (present on admission)  Assessment & Plan  Currently is hypotensive holding metoprolol and goal-directed medical therapy    Wound of lower extremity- (present on admission)  Assessment & Plan  Likely source of infection, lower ext doppler negative  Orthopedic consultation 12/20 for possible drainage of bullae and bacteremia  Ortho reconsulted 12/25.  MRI leg/knee 12/6: Small to moderate joint effusion with mild synovitis. Cellulitis  ID request tap of knee however ortho does not wish to seed joint upon tap if cellulitis is a possibility.  Pain control  Continue IV antibiotics.  Wound care         VTE prophylaxis: VTE Selection    I have performed a physical exam and reviewed and updated ROS and Plan today (12/26/2024). In review of yesterday's note (12/25/2024), there are no changes except as documented above.

## 2024-12-27 ENCOUNTER — APPOINTMENT (OUTPATIENT)
Dept: RADIOLOGY | Facility: MEDICAL CENTER | Age: 74
DRG: 871 | End: 2024-12-27
Attending: STUDENT IN AN ORGANIZED HEALTH CARE EDUCATION/TRAINING PROGRAM
Payer: MEDICARE

## 2024-12-27 PROBLEM — K59.00 CONSTIPATION: Status: ACTIVE | Noted: 2024-12-27

## 2024-12-27 PROBLEM — R78.81 BACTEREMIA: Status: RESOLVED | Noted: 2024-12-20 | Resolved: 2024-12-27

## 2024-12-27 LAB
ALBUMIN SERPL BCP-MCNC: 2.1 G/DL (ref 3.2–4.9)
ALBUMIN/GLOB SERPL: 0.6 G/DL
ALP SERPL-CCNC: 294 U/L (ref 30–99)
ALT SERPL-CCNC: 13 U/L (ref 2–50)
ANION GAP SERPL CALC-SCNC: 7 MMOL/L (ref 7–16)
AST SERPL-CCNC: 16 U/L (ref 12–45)
BASOPHILS # BLD AUTO: 0.2 % (ref 0–1.8)
BASOPHILS # BLD: 0.05 K/UL (ref 0–0.12)
BILIRUB SERPL-MCNC: 1.4 MG/DL (ref 0.1–1.5)
BUN SERPL-MCNC: 11 MG/DL (ref 8–22)
CALCIUM ALBUM COR SERPL-MCNC: 9 MG/DL (ref 8.5–10.5)
CALCIUM SERPL-MCNC: 7.5 MG/DL (ref 8.5–10.5)
CHLORIDE SERPL-SCNC: 98 MMOL/L (ref 96–112)
CO2 SERPL-SCNC: 27 MMOL/L (ref 20–33)
CREAT SERPL-MCNC: 0.56 MG/DL (ref 0.5–1.4)
EOSINOPHIL # BLD AUTO: 0.05 K/UL (ref 0–0.51)
EOSINOPHIL NFR BLD: 0.2 % (ref 0–6.9)
ERYTHROCYTE [DISTWIDTH] IN BLOOD BY AUTOMATED COUNT: 50.2 FL (ref 35.9–50)
GFR SERPLBLD CREATININE-BSD FMLA CKD-EPI: 103 ML/MIN/1.73 M 2
GLOBULIN SER CALC-MCNC: 3.5 G/DL (ref 1.9–3.5)
GLUCOSE BLD STRIP.AUTO-MCNC: 100 MG/DL (ref 65–99)
GLUCOSE BLD STRIP.AUTO-MCNC: 102 MG/DL (ref 65–99)
GLUCOSE BLD STRIP.AUTO-MCNC: 60 MG/DL (ref 65–99)
GLUCOSE BLD STRIP.AUTO-MCNC: 75 MG/DL (ref 65–99)
GLUCOSE BLD STRIP.AUTO-MCNC: 84 MG/DL (ref 65–99)
GLUCOSE SERPL-MCNC: 88 MG/DL (ref 65–99)
HCT VFR BLD AUTO: 27.9 % (ref 42–52)
HGB BLD-MCNC: 9.5 G/DL (ref 14–18)
IMM GRANULOCYTES # BLD AUTO: 0.57 K/UL (ref 0–0.11)
IMM GRANULOCYTES NFR BLD AUTO: 2.4 % (ref 0–0.9)
LYMPHOCYTES # BLD AUTO: 1.04 K/UL (ref 1–4.8)
LYMPHOCYTES NFR BLD: 4.4 % (ref 22–41)
MAGNESIUM SERPL-MCNC: 2 MG/DL (ref 1.5–2.5)
MCH RBC QN AUTO: 28.1 PG (ref 27–33)
MCHC RBC AUTO-ENTMCNC: 34.1 G/DL (ref 32.3–36.5)
MCV RBC AUTO: 82.5 FL (ref 81.4–97.8)
MONOCYTES # BLD AUTO: 1.11 K/UL (ref 0–0.85)
MONOCYTES NFR BLD AUTO: 4.7 % (ref 0–13.4)
NEUTROPHILS # BLD AUTO: 20.94 K/UL (ref 1.82–7.42)
NEUTROPHILS NFR BLD: 88.1 % (ref 44–72)
NRBC # BLD AUTO: 0 K/UL
NRBC BLD-RTO: 0 /100 WBC (ref 0–0.2)
PHOSPHATE SERPL-MCNC: 2.4 MG/DL (ref 2.5–4.5)
PLATELET # BLD AUTO: 189 K/UL (ref 164–446)
PMV BLD AUTO: 11.9 FL (ref 9–12.9)
POTASSIUM SERPL-SCNC: 4.5 MMOL/L (ref 3.6–5.5)
PROT SERPL-MCNC: 5.6 G/DL (ref 6–8.2)
RBC # BLD AUTO: 3.38 M/UL (ref 4.7–6.1)
SODIUM SERPL-SCNC: 132 MMOL/L (ref 135–145)
WBC # BLD AUTO: 23.8 K/UL (ref 4.8–10.8)

## 2024-12-27 PROCEDURE — 700102 HCHG RX REV CODE 250 W/ 637 OVERRIDE(OP): Performed by: HOSPITALIST

## 2024-12-27 PROCEDURE — 700105 HCHG RX REV CODE 258: Performed by: INTERNAL MEDICINE

## 2024-12-27 PROCEDURE — A9270 NON-COVERED ITEM OR SERVICE: HCPCS | Performed by: INTERNAL MEDICINE

## 2024-12-27 PROCEDURE — 97163 PT EVAL HIGH COMPLEX 45 MIN: CPT

## 2024-12-27 PROCEDURE — 84100 ASSAY OF PHOSPHORUS: CPT

## 2024-12-27 PROCEDURE — 74018 RADEX ABDOMEN 1 VIEW: CPT

## 2024-12-27 PROCEDURE — 700102 HCHG RX REV CODE 250 W/ 637 OVERRIDE(OP): Performed by: STUDENT IN AN ORGANIZED HEALTH CARE EDUCATION/TRAINING PROGRAM

## 2024-12-27 PROCEDURE — 80053 COMPREHEN METABOLIC PANEL: CPT

## 2024-12-27 PROCEDURE — 99233 SBSQ HOSP IP/OBS HIGH 50: CPT | Performed by: STUDENT IN AN ORGANIZED HEALTH CARE EDUCATION/TRAINING PROGRAM

## 2024-12-27 PROCEDURE — 36415 COLL VENOUS BLD VENIPUNCTURE: CPT

## 2024-12-27 PROCEDURE — 700111 HCHG RX REV CODE 636 W/ 250 OVERRIDE (IP): Mod: JG | Performed by: HOSPITALIST

## 2024-12-27 PROCEDURE — 85025 COMPLETE CBC W/AUTO DIFF WBC: CPT

## 2024-12-27 PROCEDURE — 97535 SELF CARE MNGMENT TRAINING: CPT

## 2024-12-27 PROCEDURE — A9270 NON-COVERED ITEM OR SERVICE: HCPCS | Performed by: STUDENT IN AN ORGANIZED HEALTH CARE EDUCATION/TRAINING PROGRAM

## 2024-12-27 PROCEDURE — 99233 SBSQ HOSP IP/OBS HIGH 50: CPT | Performed by: INTERNAL MEDICINE

## 2024-12-27 PROCEDURE — 700101 HCHG RX REV CODE 250: Performed by: INTERNAL MEDICINE

## 2024-12-27 PROCEDURE — 82962 GLUCOSE BLOOD TEST: CPT | Mod: 91

## 2024-12-27 PROCEDURE — 700111 HCHG RX REV CODE 636 W/ 250 OVERRIDE (IP): Mod: JZ | Performed by: INTERNAL MEDICINE

## 2024-12-27 PROCEDURE — 99497 ADVNCD CARE PLAN 30 MIN: CPT | Performed by: STUDENT IN AN ORGANIZED HEALTH CARE EDUCATION/TRAINING PROGRAM

## 2024-12-27 PROCEDURE — A9270 NON-COVERED ITEM OR SERVICE: HCPCS | Performed by: HOSPITALIST

## 2024-12-27 PROCEDURE — 770006 HCHG ROOM/CARE - MED/SURG/GYN SEMI*

## 2024-12-27 PROCEDURE — 83735 ASSAY OF MAGNESIUM: CPT

## 2024-12-27 PROCEDURE — 700102 HCHG RX REV CODE 250 W/ 637 OVERRIDE(OP): Performed by: INTERNAL MEDICINE

## 2024-12-27 RX ORDER — BISACODYL 10 MG
10 SUPPOSITORY, RECTAL RECTAL
Status: DISCONTINUED | OUTPATIENT
Start: 2024-12-27 | End: 2025-01-21 | Stop reason: HOSPADM

## 2024-12-27 RX ORDER — POLYETHYLENE GLYCOL 3350 17 G/17G
1 POWDER, FOR SOLUTION ORAL DAILY
Status: DISCONTINUED | OUTPATIENT
Start: 2024-12-27 | End: 2025-01-21 | Stop reason: HOSPADM

## 2024-12-27 RX ORDER — BISACODYL 10 MG
10 SUPPOSITORY, RECTAL RECTAL ONCE
Status: COMPLETED | OUTPATIENT
Start: 2024-12-27 | End: 2024-12-27

## 2024-12-27 RX ORDER — METOPROLOL SUCCINATE 25 MG/1
25 TABLET, EXTENDED RELEASE ORAL
Status: DISCONTINUED | OUTPATIENT
Start: 2024-12-27 | End: 2025-01-21 | Stop reason: HOSPADM

## 2024-12-27 RX ADMIN — CLINDAMYCIN IN 5 PERCENT DEXTROSE 600 MG: 12 INJECTION, SOLUTION INTRAVENOUS at 22:14

## 2024-12-27 RX ADMIN — OXYCODONE 5 MG: 5 TABLET ORAL at 16:23

## 2024-12-27 RX ADMIN — OMEPRAZOLE 20 MG: 20 CAPSULE, DELAYED RELEASE ORAL at 04:00

## 2024-12-27 RX ADMIN — METOPROLOL SUCCINATE 25 MG: 25 TABLET, EXTENDED RELEASE ORAL at 15:31

## 2024-12-27 RX ADMIN — AMPICILLIN AND SULBACTAM 3 G: 1; 2 INJECTION, POWDER, FOR SOLUTION INTRAMUSCULAR; INTRAVENOUS at 13:26

## 2024-12-27 RX ADMIN — OXYCODONE 5 MG: 5 TABLET ORAL at 20:05

## 2024-12-27 RX ADMIN — DIBASIC SODIUM PHOSPHATE, MONOBASIC POTASSIUM PHOSPHATE AND MONOBASIC SODIUM PHOSPHATE 500 MG: 852; 155; 130 TABLET ORAL at 08:24

## 2024-12-27 RX ADMIN — AMPICILLIN AND SULBACTAM 3 G: 1; 2 INJECTION, POWDER, FOR SOLUTION INTRAMUSCULAR; INTRAVENOUS at 03:53

## 2024-12-27 RX ADMIN — SENNOSIDES AND DOCUSATE SODIUM 2 TABLET: 50; 8.6 TABLET ORAL at 16:23

## 2024-12-27 RX ADMIN — APIXABAN 5 MG: 5 TABLET, FILM COATED ORAL at 16:23

## 2024-12-27 RX ADMIN — THIAMINE HYDROCHLORIDE 100 MG: 100 INJECTION, SOLUTION INTRAMUSCULAR; INTRAVENOUS at 04:02

## 2024-12-27 RX ADMIN — AMPICILLIN AND SULBACTAM 3 G: 1; 2 INJECTION, POWDER, FOR SOLUTION INTRAMUSCULAR; INTRAVENOUS at 08:12

## 2024-12-27 RX ADMIN — DIGOXIN 125 MCG: 0.12 TABLET ORAL at 16:26

## 2024-12-27 RX ADMIN — NICOTINE 14 MG: 14 PATCH TRANSDERMAL at 04:00

## 2024-12-27 RX ADMIN — DIBASIC SODIUM PHOSPHATE, MONOBASIC POTASSIUM PHOSPHATE AND MONOBASIC SODIUM PHOSPHATE 500 MG: 852; 155; 130 TABLET ORAL at 16:23

## 2024-12-27 RX ADMIN — OXYCODONE 5 MG: 5 TABLET ORAL at 03:49

## 2024-12-27 RX ADMIN — CLINDAMYCIN IN 5 PERCENT DEXTROSE 600 MG: 12 INJECTION, SOLUTION INTRAVENOUS at 14:05

## 2024-12-27 RX ADMIN — BISACODYL 10 MG: 10 SUPPOSITORY RECTAL at 13:38

## 2024-12-27 RX ADMIN — CLINDAMYCIN IN 5 PERCENT DEXTROSE 600 MG: 12 INJECTION, SOLUTION INTRAVENOUS at 04:39

## 2024-12-27 RX ADMIN — POLYETHYLENE GLYCOL 3350 1 PACKET: 17 POWDER, FOR SOLUTION ORAL at 13:38

## 2024-12-27 RX ADMIN — APIXABAN 5 MG: 5 TABLET, FILM COATED ORAL at 04:00

## 2024-12-27 RX ADMIN — OXYCODONE 5 MG: 5 TABLET ORAL at 11:31

## 2024-12-27 RX ADMIN — DIBASIC SODIUM PHOSPHATE, MONOBASIC POTASSIUM PHOSPHATE AND MONOBASIC SODIUM PHOSPHATE 500 MG: 852; 155; 130 TABLET ORAL at 11:53

## 2024-12-27 RX ADMIN — DEXTROSE MONOHYDRATE 25 G: 25 INJECTION, SOLUTION INTRAVENOUS at 13:38

## 2024-12-27 RX ADMIN — AMPICILLIN AND SULBACTAM 3 G: 1; 2 INJECTION, POWDER, FOR SOLUTION INTRAMUSCULAR; INTRAVENOUS at 21:27

## 2024-12-27 ASSESSMENT — PAIN DESCRIPTION - PAIN TYPE
TYPE: ACUTE PAIN

## 2024-12-27 ASSESSMENT — COGNITIVE AND FUNCTIONAL STATUS - GENERAL
STANDING UP FROM CHAIR USING ARMS: TOTAL
MOVING TO AND FROM BED TO CHAIR: TOTAL
MOVING FROM LYING ON BACK TO SITTING ON SIDE OF FLAT BED: A LOT
WALKING IN HOSPITAL ROOM: TOTAL
MOBILITY SCORE: 8
TURNING FROM BACK TO SIDE WHILE IN FLAT BAD: A LOT
SUGGESTED CMS G CODE MODIFIER MOBILITY: CM
CLIMB 3 TO 5 STEPS WITH RAILING: TOTAL

## 2024-12-27 ASSESSMENT — ENCOUNTER SYMPTOMS
SHORTNESS OF BREATH: 0
DIZZINESS: 0
CHILLS: 0
MYALGIAS: 1
DIARRHEA: 0
HEADACHES: 0
NAUSEA: 0
FEVER: 0
NERVOUS/ANXIOUS: 0
ABDOMINAL PAIN: 0
SENSORY CHANGE: 0

## 2024-12-27 ASSESSMENT — GAIT ASSESSMENTS: GAIT LEVEL OF ASSIST: UNABLE TO PARTICIPATE

## 2024-12-27 NOTE — CARE PLAN
The patient is Stable - Low risk of patient condition declining or worsening    Shift Goals  Clinical Goals: q4 glucose monitoring, maintain glucose >90, maintain pain <5 by end of shift, q2 turns, wounds CDI, wean o2, maintain o2 >90%, increase PO intake  Patient Goals: pain control, comfort  Family Goals: nilson    Progress made toward(s) clinical / shift goals:      Problem: Knowledge Deficit - Standard  Goal: Patient and family/care givers will demonstrate understanding of plan of care, disease process/condition, diagnostic tests and medications  Description: Target End Date:  1-3 days or as soon as patient condition allows    Document in Patient Education    1.  Patient and family/caregiver oriented to unit, equipment, visitation policy and means for communicating concern  2.  Complete/review Learning Assessment  3.  Assess knowledge level of disease process/condition, treatment plan, diagnostic tests and medications  4.  Explain disease process/condition, treatment plan, diagnostic tests and medications  Outcome: Progressing     Problem: Skin Integrity  Goal: Skin integrity is maintained or improved  Description: Target End Date:  Prior to discharge or change in level of care    Document interventions on Skin Risk/Edward flowsheet groups and corresponding LDA    1.  Assess and monitor skin integrity, appearance and/or temperature  2.  Assess risk factors for impaired skin integrity and/or pressures ulcers  3.  Implement precautions to protect skin integrity in collaboration with interdisciplinary team  4.  Implement pressure ulcer prevention protocol if at risk for skin breakdown  5.  Confirm wound care consult if at risk for skin breakdown  6.  Ensure patient use of pressure relieving devices  (Low air loss bed, waffle overlay, heel protectors, ROHO cushion, etc)  Outcome: Progressing  Note: Q2 turns with pillows and wedges, AREN matress. Mepilex in place       Patient is not progressing towards the following  goals:

## 2024-12-27 NOTE — PROGRESS NOTES
Hospital Medicine Daily Progress Note    Date of Service  12/27/2024    Chief Complaint  Short of breath    Hospital Course  Robert Mcdonnell is a 73yo male with a PMH homelessness, atrial fibrillation on chronic anticoagulation with apixaban, CHF, EF 30%, P.Htn, RVSP 65, remote PE, recent hospitalization 10/3 - 10/15 for perforated  (T2N0M0 invasive gastric adenocarcinoma), GDA embolization, subsequent subtotal gastrectomy, liver wedge resection, Miguel Angel-en-Y gastrojejunostomy with omental flap and cholecystectomy 10/7 with pathology showing invasive well-differentiated adenocarcinoma with associated ulceration.  There was tumor invasion into the muscularis propria and 20 lymph nodes were negative for metastatic carcinoma.  Oncology was consulted and noted no evidence of metastatic disease however MRI abdomen recommended for follow-up; no adjuvant therapy was recommended.  He presented 12/19/2024 to the ED with report of several days of increasing dyspnea, worsening bilateral lower extremity edema and mild cough.  Patient was found to be hypoglycemic on admission with a glucose of 53 for which she was placed on D50.  He had increasing shortness of breath and required BiPAP in the emergency room.  Initial proBNP 21,696 with an elevated lactic acid of 5.6 and a creatinine of 2.3.  He had not been taking his Lasix as he had run out of it but had been on his Eliquis.  EKG showed irregular wide-complex rhythm.    Blood culture positive for group A streptococcus bacteremia, likely due to LLE infection.  Also found Left knee-moderate joint effusion.  ID consulted, continue Unasyn and clindamycin.    CT left leg shows small to moderate suprapatellar joint effusion.  MRI LLE showed Diffuse cellulitis and patchy myositis, negative for osteomyelitis or abscess.   Ortho consulted, no surgery at this point given bacteremia.     The patient continues to have hypoglycemic episodes and was encouraged him to eat and drink his supplement  christelle.    Interval Problem Update  I have seen and examined the patient at bedside    Frequent hypoglycemia, continue D5 LR , encouraging oral intake.   Constipation -I ordered a bowel management    Bacteremia -continue Unasyn and clindamycin    CHF with EF of 30% - I started Toprol      I have discussed this patient's plan of care and discharge plan at IDT rounds today with Case Management, Nursing, Nursing leadership, and other members of the IDT team.    Code Status  Full Code    Disposition  The patient is not medically cleared for discharge to home or a post-acute facility.      I have placed the appropriate orders for post-discharge needs.    Review of Systems  Review of Systems   Constitutional:  Positive for malaise/fatigue. Negative for chills and fever.   Respiratory:  Negative for shortness of breath.    Cardiovascular:  Positive for leg swelling.   Gastrointestinal:  Negative for abdominal pain and nausea.   Musculoskeletal:  Positive for myalgias (left median thigh).   Neurological:  Negative for dizziness, sensory change and headaches.   Psychiatric/Behavioral:  The patient is not nervous/anxious.         Physical Exam  Temp:  [36.3 °C (97.4 °F)-37.3 °C (99.2 °F)] 37 °C (98.6 °F)  Pulse:  [] 90  Resp:  [16-18] 17  BP: (103-111)/(57-68) 104/57  SpO2:  [95 %-99 %] 95 %    Physical Exam  Constitutional:       Appearance: Normal appearance.   HENT:      Head: Normocephalic and atraumatic.   Eyes:      Extraocular Movements: Extraocular movements intact.      Conjunctiva/sclera: Conjunctivae normal.   Cardiovascular:      Rate and Rhythm: Regular rhythm. Tachycardia present.      Heart sounds: No murmur heard.  Pulmonary:      Effort: No respiratory distress.      Breath sounds: No wheezing.   Abdominal:      General: Bowel sounds are normal. There is no distension.      Palpations: Abdomen is soft.      Tenderness: There is no abdominal tenderness.   Musculoskeletal:         General: Tenderness  (less compared to 12/25 exam.) present.      Cervical back: Neck supple.      Right lower leg: No edema.      Left lower leg: Edema present.   Skin:     Findings: Erythema (decrease in erythema compared to prior exams) present.      Comments: Skin denuding on lower leg. Wounds on scrotum and medial thigh.  Reviewed on patient and in pictures with wound care RN present   Neurological:      General: No focal deficit present.      Mental Status: He is alert and oriented to person, place, and time.      Cranial Nerves: No cranial nerve deficit.   Psychiatric:         Mood and Affect: Mood normal.         Behavior: Behavior normal.         Thought Content: Thought content normal.         Fluids    Intake/Output Summary (Last 24 hours) at 12/27/2024 1420  Last data filed at 12/27/2024 0930  Gross per 24 hour   Intake 500 ml   Output 1300 ml   Net -800 ml        Laboratory  Recent Labs     12/25/24  0410 12/26/24  0445 12/27/24  0112   WBC 26.6* 25.9* 23.8*   RBC 3.64* 3.47* 3.38*   HEMOGLOBIN 10.0* 9.7* 9.5*   HEMATOCRIT 30.7* 28.7* 27.9*   MCV 84.3 82.7 82.5   MCH 27.5 28.0 28.1   MCHC 32.6 33.8 34.1   RDW 52.2* 50.3* 50.2*   PLATELETCT 87* 142* 189   MPV  --  11.8 11.9     Recent Labs     12/25/24  0410 12/26/24  0445 12/27/24  0112   SODIUM 129* 134* 132*   POTASSIUM 3.9 4.4 4.5   CHLORIDE 94* 97 98   CO2 29 29 27   GLUCOSE 108* 81 88   BUN 12 11 11   CREATININE 0.89 0.82 0.56   CALCIUM 7.6* 7.3* 7.5*                   Imaging  [id number] VIEW   Final Result      No evidence of bowel obstruction. Mild constipation.      MR-KNEE-WITH & W/O LEFT   Final Result      1.  No evidence of marrow edema or abnormal enhancement to suggest presence of osteomyelitis.      2.  Small to moderate joint effusion with mild synovitis.      3.  Diffuse soft tissue edema/induration consistent with cellulitis.      4.  Small popliteal cyst.      5.  No evidence of soft tissue abscess.      6.  Markedly motion degraded examination.  Ligaments and menisci are inadequately evaluated due to patient motion.      MR-LOWER EXTREMITY-NO JOINT-W/O LEFT   Final Result      1.  Patient motion degraded exam.      2.  No evidence of marrow edema or bone lesion to suggest presence of osteomyelitis.      3.  Diffuse cellulitis and patchy myositis.      4.  No evidence of focal fluid collection to suggest presence of abscess.      CT-EXTREMITY, LOWER WITH LEFT   Final Result      1.  Extensive subcutaneous inflammatory changes about the left lower extremity consistent with deep and superficial cellulitis.      2.  No definite CT evidence of necrotizing fasciitis.      3.  No CT evidence of deep abscess formation or acute or chronic osteomyelitis.      4.  Small to moderate suprapatellar joint effusion.      CT-EXTREMITY, LOWER WITH LEFT   Final Result      No soft tissue gas to suggest necrotizing fasciitis.      No fluid collection identified.      DX-CHEST-PORTABLE (1 VIEW)   Final Result         1.  Hazy left pulmonary infiltrates, similar to prior study   2.  Trace left pleural effusion, stable   3.  Cardiomegaly   4.  Atherosclerosis      DX-CHEST-PORTABLE (1 VIEW)   Final Result         1.  Hazy left pulmonary infiltrates, similar to prior study   2.  Trace left pleural effusion, stable   3.  Cardiomegaly   4.  Atherosclerosis      EC-ECHOCARDIOGRAM COMPLETE W/O CONT   Final Result      CT-CHEST,ABDOMEN,PELVIS WITH   Final Result      1. Stable spiculated opacity in the right upper lobe, extending to the pleural surface.   2. Lingular and left lower lobe parenchymal airspace disease has improved in the interval, but has not completely resolved. There is pleural reaction, suggesting some of these areas may represent parenchymal scarring.   3. Asymmetry of the lung volumes, small on the left than the right.   4. Cardiomegaly with atherosclerotic calcifications in the aorta and coronary arteries.   5. Postsurgical changes in the stomach.   6. Stable  scattered multiple hypodense lesions throughout the liver.   7. Moderate ascites throughout the abdomen and pelvis.   8. Cachexia.   9. Enlarged left inguinal lymph nodes with central necrosis. There is edema involving the soft tissues of the upper left leg extending to the left pelvis.      US-EXTREMITY VENOUS LOWER BILAT   Final Result      DX-CHEST-PORTABLE (1 VIEW)   Final Result         1.  Hazy left pulmonary infiltrates.   2.  Small left pleural effusion   3.  Cardiomegaly      LY-[id number]-WITH & W/O LEFT    (Results Pending)        Assessment/Plan  * Acute exacerbation of CHF (congestive heart failure) (HCC)- (present on admission)  Assessment & Plan  EF of 30%   due to afib rvr and recovering pna  Careful force diuresis with sepsis  Digoxin IV for reduced EF and BiV failure w/ afib  Now improved off Bipap  Maintain euvolemia.  Holding diuretic therapy due to ongoing hypotension    12/27 BP better, I restarted metoprolol 25 mg  May add other GDMT if tolerates    Constipation  Assessment & Plan  I ordered a bowel management  Suppository today    Hypoglycemia  Assessment & Plan  He found to hypoglycemia  Hypoglycemic protocol  He has very poor oral intake.  Supplements as tolerates (ordered 12/24)  Remains on D10.    12/27 frequent episodes of hypoglycemia, BG down to 40s  Encourage oral intake  I ordered Ensure  Continue D5 LR  Nutrition consulted      Streptococcal bacteremia- (present on admission)  Assessment & Plan  Likely skin wound source.  Discussed with wound care    12/27 Wbc 26>23  Continue Unasyn and clindamycin  I discussed with ID    Pulmonary hypertension (HCC)  Assessment & Plan  Previous Echo RVSP 65, severe TR  Suspect mainly Group 2  RV perfusion with MAP > 65 as need norepinepinephrine  Blood pressure is running on the lower side holding diuretic therapy at this time    Acute respiratory failure with hypoxia (HCC)- (present on admission)  Assessment & Plan  Improved off Bipap  Currently  is on 2 L of oxygen to maintain oxygen saturation  12/25 on room air.  Mobilize as able.    Hypophosphatemia- (present on admission)  Assessment & Plan  Replaced    Malignant neoplasm of body of stomach (HCC)- (present on admission)  Assessment & Plan  T2, N0, M0 invasive gastric adenocarcinoma presenting as perforated  10/2024  -GDA embolization  -10/7 subtotal gastrectomy, liver wedge resection, Miguel Angel-en-Y gastrojejunostomy with omental flap and cholecystectomy  -pathology invasive well-differentiated adenocarcinoma with associated ulceration  -tumor invasion into the muscularis propria and 20 lymph nodes were negative for metastatic carcinoma  -Oncology was consulted and noted no evidence of metastatic disease  -MRI abdomen recommended for follow-up; no adjuvant therapy was recommended  He will need outpatient follow-up    Homeless- (present on admission)  Assessment & Plan   consultation for ongoing access to medications and follow-up care as appropriate.  States family in [place] but states they cannot house him nor give assistance     Severe protein-calorie malnutrition (HCC)- (present on admission)  Assessment & Plan  Discussed with nutrition, patient meets the ASPEN criteria of severe malnutrition  Continuous nutrition support    COPD (chronic obstructive pulmonary disease) (HCC)- (present on admission)  Assessment & Plan  Current active smoker, no PFTs on file  RT protocols, bronchodilators as needed  No signs of exacerbation    Acute kidney injury (HCC)- (present on admission)  Assessment & Plan  Resolved   Maintain euvolemia and monitor fluid responsiveness (avoid NaCL and renal congestion)  Monitor urine output and I&O's  Avoid and review nephrotoxin medication  Renal function improved    Sepsis (HCC)- (present on admission)  Assessment & Plan  Skin infection/ Pneumonia  Antibiotics  Strep pyogenes Group A   Unasyn  Positive 12/19 blood cultures  Repeat blood cultures negative  12/25  discussed with Dr High and reconsulted Dr Flores (ortho)  12/25 Stat MRI given finding of suprapatellar fluid collection and knee pain.  Ortho did not want to tap knee with possible cellulitis in area as of yet.    Atrial fibrillation with RVR (HCC)- (present on admission)  Assessment & Plan  Chronic atrial fibrillation, currently tachycardia  Digoxin 250 mcg IV monitoring closely with flip to prevent digoxin toxicity    Continue digoxin and Eliquis  12/27 BP better, I resumed Toprol 25 mg    Thrombocytopenia (HCC)- (present on admission)  Assessment & Plan  He found to have thrombocytopenia and platelet count is trending down.  Resolving, suspect acute inflammatory process  12/26 Plt: 142 <87 <72  I am continue Eliquis for now for stroke prevention  Ordered CBC for tomorrow.      Tobacco dependence- (present on admission)  Assessment & Plan  Counseling when appropriate    Hypertension- (present on admission)  Assessment & Plan  Currently is hypotensive holding metoprolol and goal-directed medical therapy    Wound of lower extremity- (present on admission)  Assessment & Plan  Likely source of infection, lower ext doppler negative  CT left leg shows small to moderate suprapatellar joint effusion.  MRI LLE showed Diffuse cellulitis and patchy myositis, negative for osteomyelitis or abscess.     Orthopedic consultation 12/20 for possible drainage of bullae and bacteremia  Ortho reconsulted 12/25.  MRI leg/knee 12/6: Small to moderate joint effusion with mild synovitis. Cellulitis  ID request tap of knee however ortho does not wish to seed joint upon tap if cellulitis is a possibility.    Continue Unasyn and clindamycin  I discussed with ID.  May reconsult Ortho if no improvement    Ortho consulted, no surgery at this point given bacteremia.     ACP (advance care planning)- (present on admission)  Assessment & Plan  Patient admitted with CHF exacerbation, significantly reduced the EF of 30%.  Also found  bacteremia, LLE infection, severe malnutrition.  History of invasive gastric malignancy, A-fib RVR, PE, gastric perforation post extensive surgery including subtotal gastrectomy, liver resection  Miguel Angel-en-Y gastrojejunostomy with omental flap and cholecystectomy.  I discussed the CODE STATUS with him, including CPR, intubation/mechanical ventilation.  He wants to stay full code.  Patient has high complexity and guarded prognosis.  I consulted palliative care.  ACP 18 minutes.          VTE prophylaxis: Eliquis    I have performed a physical exam and reviewed and updated ROS and Plan today (12/27/2024). In review of yesterday's note (12/26/2024), there are no changes except as documented above.    I spent greater than 55 minutes for chart review, obtaining history independently, performing medically appropriate examination,  documenting , ordering medications, tests, or procedures, referring and communicating with other health care professionals, Independently interpreting results and communicating results with patient/family/caregiver. More than 50% of time was spent in face-to-face clinical encounter.

## 2024-12-27 NOTE — PROGRESS NOTES
Received pt from night RN  Patient laying in bed asleep easy to wake and responds to voice  On o2 support 1lpm  Iv fluids running  On q4 glucose monitoring  Bed low and locked  Q2 turns  Left call light within reach.

## 2024-12-27 NOTE — WOUND TEAM
Received wound consult for LLE.  Wound team already following area on a weekly basis, seen on 12/24 with dressing orders in place.    Consult completed at this time.

## 2024-12-27 NOTE — PROGRESS NOTES
4 Eyes Skin Assessment Completed by JANNETTE Stern and JANNETTE Mike.    Head Scab and Scar  Ears WDL  Nose WDL  Mouth WDL  Neck WDL  Breast/Chest WDL  Shoulder Blades WDL  Spine WDL skin tags, discoloration  (R) Arm/Elbow/Hand Abrasion  (L) Arm/Elbow/Hand Abrasion  Abdomen Scar  Groin Abrasion skin tear  Scrotum/Coccyx/Buttocks Redness, Blanching, and Excoriation  (R) Leg Redness, Blanching, Scab, and Edema discoloration  (L) Leg Redness, Swelling, Abrasion, and Edema wound  (R) Heel/Foot/Toe Redness, Blanching, and Boggy  (L) Heel/Foot/Toe Redness, Blanching, and Boggy          Devices In Places Condom Cath and Nasal Cannula      Interventions In Place NC W/Ear Foams, InterDry, Heel Mepilex, Sacral Mepilex, TAP System, Pillows, Q2 Turns, and Low Air Loss Mattress    Possible Skin Injury Yes    Pictures Uploaded Into Epic Yes  Wound Consult Placed Yes  RN Wound Prevention Protocol Ordered Yes

## 2024-12-27 NOTE — PROGRESS NOTES
Hypoglycemia Intervention    Hypoglycemia protocol intervention:  Blood glucose 67 at 1602.  Intervention: 8 oz of fruit juice   Repeat blood glucose 63 at 1621.  Intervention: 25 g IV dextrose per MAR   Blood glucose 132 at 1650.  Dr. Zamora notified of the above at 1653.

## 2024-12-27 NOTE — PROGRESS NOTES
4 Eyes Skin Assessment Completed by JANNETTE Du and JANNETTE Stoddard.    Head - forehead scab/scar, right cheek scab  Ears WDL  Nose WDL  Mouth WDL  Neck WDL  Breast/Chest WDL  Shoulder Blades WDL  Spine - didscoloration, skin tags  (R) Arm/Elbow/Hand Abrasion  (L) Arm/Elbow/Hand Abrasion  Abdomen Scar  Groin Redness, skin tear  Scrotum/Coccyx/Buttocks Redness, Blanching, and Excoriation  (R) Leg Redness, Blanching, Scab, and Edema, discolored  (L) Leg Redness, Swelling, Abrasion, and Edema, wound  (R) Heel/Foot/Toe Redness, Blanching, and Boggy  (L) Heel/Foot/Toe Redness, Blanching, and Boggy      Devices In Places Condom Cath and Nasal Cannula, PIV x2      Interventions In Place NC W/Ear Foams, InterDry, Heel Mepilex, Sacral Mepilex, TAP System, Pillows, Q2 Turns, Low Air Loss Mattress, Barrier Cream, and Dri-Alan Pads    Possible Skin Injury Yes    Pictures Uploaded Into Epic - already uploaded  Wound Consult Placed Yes already placed  RN Wound Prevention Protocol Ordered - already ordered

## 2024-12-27 NOTE — DIETARY
Nutrition Services: Initial Assessment     Day 8 of admit. Robert Mcdonnell is 74 y.o., male with admitting DX of Acute left-sided CHF (congestive heart failure) ([facility]) [I50.1]  Streptococcal bacteremia [R78.81, B95.5]  Left leg cellulitis [L03.116].    Consult Received for: MST - Malnutrition Screening Tool and Intake    Current Hospital Problems List: Cellulites, Hypoglycemia,Streptococcal bacteremia,Pulmonary hypertension, Acute respiratory failure , severe protein malnutrition, COPD, NIRMAL, Hypertension, afib, lower extremities  wound        Nutrition Assessment:      Height: 182.9 cm (6')  Weight: 81.2 kg (179 lb 0.2 oz)  Weight taken via: Bed Scale  BMI Calculated: 21.74  BMI Classification: WNL       Weight Readings from Last 5 encounters:   Wt Readings from Last 5 Encounters:   12/25/24 81.2 kg (179 lb 0.2 oz)   11/29/24 80 kg (176 lb 5.9 oz)   10/29/24 80 kg (176 lb 5.9 oz)       Non- noted weight loss x 2 months. Patient reported his UBW is at 185 lbs > 3 months ago.  6lbs (3.2%) weight loss x 3 months is consider non-significant weight loss.       Objective:   Pertinent Medical Hx: Cellulites, Hypoglycemia,Streptococcal bacteremia,Pulmonary hypertension, Acute respiratory failure , severe protein malnutrition, COPD, NIRMAL, Hypertension, afib, lower extremities  wound   Pertinent Labs:  Sodium 132, Alkaline phosphate 294, phosphors 2.4    Pertinent Meds:  PPI, phosphorus , Miralax, senna   Skin/Wounds: Moisture skin damage, Partial thickness wound   Food Allergies: NKFA   Last BM:  Type 6: Fluffy pieces with ragged edges, a mushy stool   (PTA, bowel protocol in place)     Current Diet Order/Intake:   IDDSI Level 7 - Easy-to-chew    Subjective:     See patient at bedside for consult regarding MST, diagnosed with SPM and poor oral intake. The patient denied a loss of appetite prior to admission. Per flowsheets, oral intake at meals was documented as poor, < 25%. The patient reported this was due to food  preferences and also mentioned difficulty swallowing but stated he is tolerating the current diet well. NFPE performed at bedside noted severe muscle and fat loss. Ensure HP TID was added, along with a customized snack plan, to increase overall oral intake.    Patient reported UBW:  185 lbs x 3 months ago   Dietary Recall/Energy Intake: Patient denied decreased appetite prior admission.Stating eating 3 meals a day and able to finished > 75% of meals . Per flowsheets, patient PO is inadequate. Most meals documented < 25%     This indicates Insufficient energy intake.       Nutrition Focused Physical Exam (NFPE)  Weight Loss: Weight loss is non- significant per patient reported UBW   Muscle Mass: Severe Wasting at [place], shoulder, clavicle   Subcutaneous Fat: Severe Loss at orbital,buccal, triceps   Fluid Accumulation: n/a   Reduced  Strength: N/A in acute care setting.    Nutrition Diagnosis:      Severe Malnutrition in context of Chronic Illness related to muscle and fat wasting as evidenced by Severe muscle loss at (temple, shoulder, clavicle) Severe muscle loss at (orbital, buccal, triceps)     RD notified provider  Janeen Campos  .     Nutrition Interventions:        Recommend Ensure Plus High Protein (provides 350 calories) 20 g protein per 8 fl oz) TID.  Patient aware of active plan of care as appropriate.       Nutrition Monitoring and Evaluation:      Monitor nutrition POC, goal for > 50% intake from meals and supplements.  Additional fluids per MD/DO  Monitor vital signs pertinent to nutrition.    RD following and will provide updated recommendations as indicated.      Tabitha ROBERTS/AND CRITERIA FOR MALNUTRITION

## 2024-12-27 NOTE — CARE PLAN
The patient is Stable - Low risk of patient condition declining or worsening    Shift Goals  Clinical Goals: Monitor glucose  Patient Goals: Comfort  Family Goals: MARCOS    Progress made toward(s) clinical / shift goals:      Problem: Acute Care of the Diabetic Patient  Goal: Optimal Outcome for the Diabetic Patient  Outcome: Progressing     Problem: Pain - Standard  Goal: Alleviation of pain or a reduction in pain to the patient’s comfort goal  12/27/2024 0442 by Chadwick Mcfarland R.N.  Outcome: Progressing     Problem: Skin Integrity  Goal: Skin integrity is maintained or improved  12/27/2024 0442 by Chadwick Mcfarland R.N.  Outcome: Progressing

## 2024-12-27 NOTE — THERAPY
"Physical Therapy   Initial Evaluation     Patient Name: Robert Mcdonnell  Age:  74 y.o., Sex:  male  Medical Record #: [id number]  Today's Date: 12/27/2024     Precautions  Precautions: Fall Risk;Swallow Precautions  Comments: LE wounds    Assessment  Patient is 74 y.o. male admitted with SOB, BLE edema, mild cough, hypoglycemic. Found pt in acute heart failure exacerbation, sepsis, PNA, extensive LE wounds with recommendations for amputation which pt refuses. PMHx of CHF, HTN, recent abdominal procedures after perforated  in October.     Pt with significant L calf pain with mobility, requiring ModA for bed mobility and seated scooting , unable to attempt stand. Further details below. Recommend placement at this time. Pt would benefit from continued acute IP PT services to address said deficits.     Plan    Physical Therapy Initial Treatment Plan   Treatment Plan : Bed Mobility, Equipment, Family / Caregiver Training, Gait Training, Manual Therapy, Neuro Re-Education / Balance, Self Care / Home Evaluation, Stair Training, Therapeutic Activities, Therapeutic Exercise  Treatment Frequency: 4 Times per Week  Duration: Until Therapy Goals Met    DC Equipment Recommendations: Unable to determine at this time  Discharge Recommendations: Recommend post-acute placement for additional physical therapy services prior to discharge home       Subjective    \"My calf hurts so bad.\" Regarding LLE     Objective     12/27/24 1123   Vitals   O2 (LPM) 1   O2 Delivery Device Silicone Nasal Cannula   Vitals Comments new O2 needs, on  RA at baseline   Pain 0 - 10 Group   Therapist Pain Assessment Nurse Notified;During Activity;Post Activity  (reporting no pain at rest. c/o significant pain with mobility, unpredictable, L calf worst. RN made aware, pt requesting pain meds)   Prior Living Situation   Housing / Facility Homeless   Bathroom Set up Walk In Shower;Grab Bars;Shower Chair   Equipment Owned None   Comments Pt living at cares " [place]   Prior Level of Functional Mobility   Bed Mobility Independent   Transfer Status Independent   Ambulation Independent   Ambulation Distance   (1 mile)   Assistive Devices Used None   Stairs Independent   Cognition    Cognition / Consciousness WDL   Level of Consciousness Alert   Comments pleasant and cooperative   Passive ROM Lower Body   Comments B knee/hips tolerate 90/90 at EOB, pain with L ankle DF at calf unable to achieve neutral   Active ROM Lower Body    Comments required assist to bring legs on/off bed, unable to perform heel slide or SLR or LAQ   Strength Lower Body   Comments LLE grossly 2-/5 2/2 pain, RLE 3-/5   Balance Assessment   Sitting Balance (Static) Fair   Sitting Balance (Dynamic) Fair -   Weight Shift Sitting Poor   Comments sitting only 2/2 pain   Bed Mobility    Supine to Sit Moderate Assist   Sit to Supine Moderate Assist   Scooting Moderate Assist   Rolling Moderate Assist to Lt.;Moderate Assist to Rt.   Comments HOB raised, use of rails, increased time and effort   Gait Analysis   Gait Level Of Assist Unable to Participate   Functional Mobility   Sit to Stand Unable to Participate   Bed, Chair, Wheelchair Transfer Unable to Participate   Mobility EOB only 2/2 significant pain   6 Clicks Assessment - How much HELP from from another person do you currently need... (If the patient hasn't done an activity recently, how much help from another person do you think he/she would need if he/she tried?)   Turning from your back to your side while in a flat bed without using bedrails? 2   Moving from lying on your back to sitting on the side of a flat bed without using bedrails? 2   Moving to and from a bed to a chair (including a wheelchair)? 1   Standing up from a chair using your arms (e.g., wheelchair, or bedside chair)? 1   Walking in hospital room? 1   Climbing 3-5 steps with a railing? 1   6 clicks Mobility Score 8   Activity Tolerance   Comments limited 2/2 pain   Patient / Family  Goals    Patient / Family Goal #1 to have less pain, walk   Short Term Goals    Short Term Goal # 1 Pt will perform supine <> sit with SPV in 6 visits to get in/out of bed   Short Term Goal # 2 Pt will perform stand step transfers with FWW and SPV in 6 visits to get in/out of chair   Short Term Goal # 3 Pt will ambulate 150ft with FWW and SPV in 6 visits to access home environment   Education Group   Education Provided Role of Physical Therapist   Role of Physical Therapist Patient Response Patient;Acceptance;Explanation;Verbal Demonstration   Additional Comments Pt receptive to self management and compensatory strategies with mobility   Physical Therapy Initial Treatment Plan    Treatment Plan  Bed Mobility;Equipment;Family / Caregiver Training;Gait Training;Manual Therapy;Neuro Re-Education / Balance;Self Care / Home Evaluation;Stair Training;Therapeutic Activities;Therapeutic Exercise   Treatment Frequency 4 Times per Week   Duration Until Therapy Goals Met   Problem List    Problems Pain;Impaired Bed Mobility;Impaired Transfers;Impaired Ambulation;Functional Strength Deficit;Impaired Balance;Decreased Activity Tolerance;Functional ROM Deficit   Anticipated Discharge Equipment and Recommendations   DC Equipment Recommendations Unable to determine at this time   Discharge Recommendations Recommend post-acute placement for additional physical therapy services prior to discharge home   Interdisciplinary Plan of Care Collaboration   IDT Collaboration with  Nursing;Occupational Therapist   Patient Position at End of Therapy In Bed;Bed Alarm On;Call Light within Reach;Tray Table within Reach;Phone within Reach   Collaboration Comments RN and OT updated   Session Information   Date / Session Number  12/27- 1 (1/4, 1/2)

## 2024-12-27 NOTE — ASSESSMENT & PLAN NOTE
Patient admitted with CHF exacerbation, significantly reduced the EF of 30%.  Also found bacteremia, LLE infection, severe malnutrition.  History of invasive gastric malignancy, A-fib RVR, PE, gastric perforation post extensive surgery including subtotal gastrectomy, liver resection  Miguel Angel-en-Y gastrojejunostomy with omental flap and cholecystectomy.  I discussed the CODE STATUS with him, including CPR, intubation/mechanical ventilation.  He wants to stay full code.  Patient has high complexity and guarded prognosis.  I consulted palliative care.  ACP 18 minutes.

## 2024-12-27 NOTE — PROGRESS NOTES
Infectious Disease Progress Note    Author: Brenda High M.D. Date & Time of service: 2024  9:04 AM    Chief Complaint:  Follow-up for group A streptococcus bacteremia, left lower extremity cellulitis    Interval History:   Tmax 100.4, WBC 26.6.  Leukocytosis increasing.  Patient had fever overnight.  CT of the left leg did not reveal any deep abscess however there is a moderate suprapatellar joint effusion.  Patient has some tenderness and swelling over the right knee.   patient afebrile, WBC 25.9.  MRI of the left lower extremity negative for any abscess nor osteomyelitis, MRI of the knee with small to moderate joint effusion with mild synovitis but no evidence of soft tissue abscess.  Feels okay today   patient afebrile, WBC 23.8.  Patient transferred out of the [facility].  Tolerating antibiotics.  No new symptoms to report.    Labs Reviewed, Medications Reviewed, and Wound Reviewed.    Review of Systems:  Review of Systems   Constitutional:  Negative for chills and fever.   Cardiovascular:  Positive for leg swelling.   Gastrointestinal:  Negative for diarrhea.       Hemodynamics:  Temp (24hrs), Av.8 °C (98.3 °F), Min:36.3 °C (97.4 °F), Max:37.3 °C (99.2 °F)  Temperature: 37 °C (98.6 °F)  Pulse  Av.9  Min: 69  Max: 162   Blood Pressure : 104/57       Physical Exam:  Physical Exam  Vitals and nursing note reviewed.   HENT:      Mouth/Throat:      Comments: Poor dentition  Eyes:      Pupils: Pupils are equal, round, and reactive to light.   Cardiovascular:      Rate and Rhythm: Normal rate. Rhythm irregular.   Pulmonary:      Effort: Pulmonary effort is normal.   Musculoskeletal:      Comments: Left inner thigh edema and erythema improving overall    Left knee swelling and tenderness to palpation.  Warm to palpation but no surrounding erythema    Left calf and Kerlix.  Posterior wound with serosanguineous drainage on dressing   Skin:     General: Skin is warm and dry.   Neurological:       General: No focal deficit present.      Mental Status: He is alert and oriented to person, place, and time.         Meds:    Current Facility-Administered Medications:     phosphorus    digoxin    senna-docusate **AND** polyethylene glycol/lytes    magnesium hydroxide    D5LR    clindamycin (CLEOCIN) IV    apixaban    omeprazole    Notify MD and PharmD if FSBG level is less than or equal to 70 mg/dL or patient is showing signs/symptoms of hypoglycemia (tachycardia, palpitations, diaphoresis, clammy, tremulousness, nausea, confused) **AND** Administer 20 grams of glucose (approximately 8 ounces of fruit juice) every 15 minutes PRN FSBS less than 70 mg/dL **AND** dextrose bolus    acetaminophen    oxyCODONE immediate-release **OR** oxyCODONE immediate-release **OR** HYDROmorphone    ondansetron    ondansetron    nicotine **AND** Nicotine Replacement Patient Education Materials **AND** nicotine polacrilex    ampicillin-sulbactam (UNASYN) IV    Labs:  Recent Labs     12/25/24 0410 12/26/24 0445 12/27/24  0112   WBC 26.6* 25.9* 23.8*   RBC 3.64* 3.47* 3.38*   HEMOGLOBIN 10.0* 9.7* 9.5*   HEMATOCRIT 30.7* 28.7* 27.9*   MCV 84.3 82.7 82.5   MCH 27.5 28.0 28.1   RDW 52.2* 50.3* 50.2*   PLATELETCT 87* 142* 189   MPV  --  11.8 11.9   NEUTSPOLYS 91.30* 89.60* 88.10*   LYMPHOCYTES 2.90* 3.70* 4.40*   MONOCYTES 3.50 4.10 4.70   EOSINOPHILS 0.20 0.40 0.20   BASOPHILS 0.30 0.20 0.20     Recent Labs     12/25/24 0410 12/26/24 0445 12/27/24  0112   SODIUM 129* 134* 132*   POTASSIUM 3.9 4.4 4.5   CHLORIDE 94* 97 98   CO2 29 29 27   GLUCOSE 108* 81 88   BUN 12 11 11   CPKTOTAL 31 42  --      Recent Labs     12/25/24 0410 12/26/24 0445 12/27/24  0112   ALBUMIN 2.2* 2.2* 2.1*   TBILIRUBIN 1.9* 1.3 1.4   ALKPHOSPHAT 310* 276* 294*   TOTPROTEIN 5.1* 5.0* 5.6*   ALTSGPT 16 14 13   ASTSGOT 19 19 16   CREATININE 0.89 0.82 0.56       Imaging:  CT-EXTREMITY, LOWER WITH LEFT    Result Date: 12/24/2024 12/24/2024 3:20 PM  HISTORY/REASON FOR EXAM:  concern of possible abscess or nec fasc. TECHNIQUE/EXAM DESCRIPTION AND NUMBER OF VIEWS:  CT scan of the LEFT lower extremity with contrast, with reconstructions. Thin helical 3 mm sections were obtained from the distal femur through the proximal tibia/fibula. Sagittal and coronal multiplanar reconstructions were generated from the axial images. A total of 100 mL of Omnipaque 350 nonionic contrast was administered  IV without complication. Up to date radiation dose reduction adjustments have been utilized to meet ALARA standards for radiation dose reduction. COMPARISON: CT scan of left lower extremity dated 12/21/2024 FINDINGS: There is soft tissue attenuation in the subcutaneous tissues of the left lower leg. There is a small to moderate suprapatellar joint effusion. There is decreased bony mineralization of the left lower extremity. There is no evidence of fracture, dislocation, or other osseous lesion. There is no evidence of acute or chronic osteomyelitis. The musculature of the left lower extremity has a normal appearance and enhancement pattern. There is no evidence of deep abscess formation.     1.  Extensive subcutaneous inflammatory changes about the left lower extremity consistent with deep and superficial cellulitis. 2.  No definite CT evidence of necrotizing fasciitis. 3.  No CT evidence of deep abscess formation or acute or chronic osteomyelitis. 4.  Small to moderate suprapatellar joint effusion.    CT-EXTREMITY, LOWER WITH LEFT    Result Date: 12/21/2024 12/21/2024 11:19 AM HISTORY/REASON FOR EXAM:  concerns for necrotizing fasciitis. Concerns for necrotizing fasciitis TECHNIQUE/EXAM DESCRIPTION AND NUMBER OF VIEWS:  CT scan of the LEFT lower extremity with contrast, with reconstructions. Thin helical 3 mm sections were obtained from the distal femur through the proximal tibia/fibula. Sagittal and coronal multiplanar reconstructions were generated from the axial images. A  total of 100 mL of Omnipaque 350 nonionic contrast was administered  IV without complication. Up to date radiation dose reduction adjustments have been utilized to meet ALARA standards for radiation dose reduction. COMPARISON: None. FINDINGS: Diffuse subcutaneous edema. No discrete fluid collection identified. No soft tissue gas identified. No acute fracture or dislocation.     No soft tissue gas to suggest necrotizing fasciitis. No fluid collection identified.    DX-CHEST-PORTABLE (1 VIEW)    Result Date: 12/21/2024 12/21/2024 12:22 AM HISTORY/REASON FOR EXAM: Shortness of Breath TECHNIQUE/EXAM DESCRIPTION:  Single AP view of the chest. COMPARISON: Yesterday FINDINGS: Overlying cardiac leads are present. Cardiomegaly is observed. Atherosclerotic calcification of the aorta is noted.  The central pulmonary vasculature appears normal. Bilateral lung volumes are diminished.  Hazy left pulmonary opacity is seen. Blunting of the left costophrenic angle is seen suggesting trace left effusion. The bony structures appear age-appropriate.     1.  Hazy left pulmonary infiltrates, similar to prior study 2.  Trace left pleural effusion, stable 3.  Cardiomegaly 4.  Atherosclerosis    DX-CHEST-PORTABLE (1 VIEW)    Result Date: 12/20/2024 12/20/2024 12:32 AM HISTORY/REASON FOR EXAM: Shortness of Breath TECHNIQUE/EXAM DESCRIPTION:  Single AP view of the chest. COMPARISON: Yesterday FINDINGS: Overlying cardiac leads are present. Cardiomegaly is observed. Atherosclerotic calcification of the aorta is noted.  The central pulmonary vasculature appears normal. Bilateral lung volumes are diminished.  Hazy left pulmonary opacity is seen. Blunting of the left costophrenic angle is seen suggesting trace left effusion. The bony structures appear age-appropriate.     1.  Hazy left pulmonary infiltrates, similar to prior study 2.  Trace left pleural effusion, stable 3.  Cardiomegaly 4.  Atherosclerosis    EC-ECHOCARDIOGRAM COMPLETE W/O  CONT    Result Date: 2024  Transthoracic Echo Report Echocardiography Laboratory CONCLUSIONS Severely reduced left ventricular systolic function. Severe mitral regurgitation. Severe tricuspid regurgitation due to dilated annulus. Estimated right ventricular systolic pressure is 55 mmHg. HUNTER STEELE Exam Date:         2024                    15:40 Exam Location:     Inpatient Priority:          Routine Ordering Physician:        PAULINA GABRIEL Referring Physician:       PAULINA GABRIEL Sonographer:               Shawn CAMPBELL Age:    74     Gender:    M MRN:    [id number] :    1950 BSA:    2.02   Ht (in):    72     Wt (lb):    176 Exam Type:     Complete Indications:     Unspecified systolic (congestive) heart failure ICD Codes:       I50.20 CPT Codes:       15177 BP:   94     /   62     HR:   120 Technical Quality:       Good MEASUREMENTS  (Male / Female) Normal Values 2D ECHO LV Diastolic Diameter PLAX        5.5 cm                4.2 - 5.9 / 3.9 - 5.3 cm LV Systolic Diameter PLAX         4.3 cm                2.1 - 4.0 cm IVS Diastolic Thickness           1 cm                  LVPW Diastolic Thickness          1 cm                  LVOT Diameter                     2.1 cm                Estimated LV Ejection Fraction    28 %                  LV Ejection Fraction MOD BP       40.1 %                >= 55  % LV Ejection Fraction MOD 4C       27.1 %                LV Ejection Fraction MOD 2C       44.5 %                LV Ejection Fraction 4C AL        28.8 %                LV Ejection Fraction 2C AL        45.4 %                LA Volume Index                   61.1 cm3/m2           16 - 28 cm3/m2 DOPPLER AV Peak Velocity                  1.1 m/s               AV Peak Gradient                  5.2 mmHg              AV Mean Gradient                  3 mmHg                LVOT Peak Velocity                0.93 m/s              AV Area Cont Eq vti               2.3 cm2               MV  Velocity Time Integral         17.8 cm               MR Flow Convergence Radius        1.1 cm                MR ERO PISA                       0.46 cm2              MR Regurgitant Volume PISA        51.1 cm3              MR Flow Area                      7.6 cm2               TR Peak Velocity                  328 cm/s              PV Peak Velocity                  0.67 m/s              PV Peak Gradient                  1.8 mmHg              RVOT Peak Velocity                0.53 m/s              * Indicates values subject to auto-interpretation LV EF:  28    % FINDINGS Left Ventricle Normal left ventricular chamber is dilated. Normal left ventricular wall thickness. Severely reduced left ventricular systolic function. The left ventricular ejection fraction is visually estimated to be 25- 30%. Global hypokinesis with regional variation. A reliable estimation of diastolic function cannot be made due to mitral valve disease. Right Ventricle The right ventricle is dilated. Normal right ventricular systolic function. Right Atrium Enlarged right atrium. Dilated inferior vena cava with inspiratory collapse. Left Atrium Severely dilated left atrium. Left atrial volume index is 58 mL/sq m. Intact atrial septum without Doppler evidence of interatrial shunt (e.g. PFO or ASD). Mitral Valve Structurally normal mitral valve without significant stenosis. Severe mitral regurgitation. ERO by PISA method is 0.54 sq cm. Vena contracta is 0.5 cm. Aortic Valve Tricuspid aortic valve. Structurally normal aortic valve without significant stenosis or regurgitation. Tricuspid Valve Structurally normal tricuspid valve without significant stenosis. Severe tricuspid regurgitation due to dilated annulus. Right atrial pressure is estimated to be 8 mmHg. Estimated right ventricular systolic pressure is 55 mmHg. Pulmonic Valve Structurally normal pulmonic valve without significant stenosis. Mild pulmonic insufficiency. Pericardium Trivial  pericardial effusion. Aorta Normal aortic root for body surface area. The ascending aorta diameter is 3.5 cm. Arie Jones MD (Electronically Signed) Final Date:     19 December 2024                 17:17    CT-CHEST,ABDOMEN,PELVIS WITH    Result Date: 12/19/2024 12/19/2024 2:02 PM HISTORY/REASON FOR EXAM:  gastric cancer, recent partial gastrectomy. TECHNIQUE/EXAM DESCRIPTION: CT scan of the chest, abdomen and pelvis with contrast. Thin-section helical scanning was obtained with intravenous contrast from the lung apices through the pubic symphysis to include the chest, abdomen and pelvis. 90 mL of Omnipaque 350 nonionic contrast was administered intravenously without complication. Low dose optimization technique was utilized for this CT exam including automated exposure control and adjustment of the mA and/or kV according to patient size. COMPARISON: CT of the abdomen and pelvis, 10/28/2024. CTA of the pulmonary arteries, 10/28/2024. FINDINGS: CT Chest: Lungs: Stable spiculated opacity in the right upper lobe measuring 2.1 cm in diameter. Asymmetric volumes of the lungs, small on the left and the right. Areas of scarring and/or atelectasis in the lingula and left lower lobe, with a small left pleural effusion. No right pleural effusion. No pneumothorax bilaterally. Cardiac: The heart size is enlarged. No pericardial effusion. Atherosclerotic calcifications in the coronary arteries. Mediastinum/alice: No mediastinal or hilar adenopathy. No obstructing endobronchial lesion. Vascular: Atherosclerotic calcifications in the aorta, without aneurysmal dilation. Soft tissues: Cachexia. Bones: No acute or destructive process. Decreased bone mineralization. CT Abdomen and Pelvis: Liver: Stable multiple small subcentimeter lucencies throughout the liver parenchyma. Perihepatic ascites. Spleen: Unremarkable. Pancreas: Unremarkable. Gallbladder: The gallbladder has been resected. Biliary: Nondilated. Adrenal glands: Normal.  Kidneys: Unremarkable without hydronephrosis. Bowel: No obstruction or acute inflammation. Postsurgical changes of gastric bypass surgery. Lymph nodes: There are enlarged left inguinal lymph nodes with central necrosis. The largest lymph node measures 1.7 cm in diameter. There is edema involving the soft tissues of the proximal left leg. Vasculature: Atherosclerotic calcifications in the aorta and iliac arteries, without aneurysmal dilation. Ascites: Moderate amount of ascites throughout the abdomen and pelvis. Pelvis: There is pelvic ascites. There is a Paulino catheter in the bladder lumen. There is circumferential bladder wall thickening. Prostate gland and seminal vesicles are unremarkable. Musculoskeletal: No acute or destructive process. Decreased bone mineralization. Degenerative disc disease at L5-S1.     1. Stable spiculated opacity in the right upper lobe, extending to the pleural surface. 2. Lingular and left lower lobe parenchymal airspace disease has improved in the interval, but has not completely resolved. There is pleural reaction, suggesting some of these areas may represent parenchymal scarring. 3. Asymmetry of the lung volumes, small on the left than the right. 4. Cardiomegaly with atherosclerotic calcifications in the aorta and coronary arteries. 5. Postsurgical changes in the stomach. 6. Stable scattered multiple hypodense lesions throughout the liver. 7. Moderate ascites throughout the abdomen and pelvis. 8. Cachexia. 9. Enlarged left inguinal lymph nodes with central necrosis. There is edema involving the soft tissues of the upper left leg extending to the left pelvis.    US-EXTREMITY VENOUS LOWER BILAT    Result Date: 12/19/2024   Vascular Laboratory  CONCLUSIONS  Compared to the prior study on 4/6/20.  Normal bilateral lower extremity venous duplex exam.  HUNTER STEELE  Exam Date:     12/19/2024 07:54  Room #:     Inpatient  Priority:     Stat  Ht (in):             Wt (lb):  Ordering  Physician:        LAVELLE GANDARA  Referring Physician:       [id number]  Sonographer:               Malik Clifton                             RVT  Study Type:                Complete Bilateral  Technical Quality:         Adequate  Age:    74    Gender:     M  MRN:    [id number]  :    1950      BSA:  Indications:     Edema  CPT Codes:       09270  ICD Codes:       782.3  History:         Edema  Limitations:  PROCEDURES:  Bilateral lower extremity venous duplex imaging.  The following venous structures were evaluated: common femoral, deep  femoral, proximal great saphenous, femoral, popliteal, peroneal, and  posterior tibial veins.  Serial compression, color, and spectral Doppler flow evaluations were  performed.  FINDINGS:  Bilateral lower extremities.  The peroneal and posterior tibial veins are difficult to assess for  compressibility, but flow response to augmentation is demonstrated.  All other veins demonstrate complete color filling and compressibility with  normal venous flow dynamics including spontaneous flow and respiratory  phasicity.  No deep venous thrombosis.  Rustam Nazario MD  (Electronically Signed)  Final Date:      2024                   09:00    DX-CHEST-PORTABLE (1 VIEW)    Result Date: 2024 6:26 AM HISTORY/REASON FOR EXAM: Shortness of Breath TECHNIQUE/EXAM DESCRIPTION:  Single AP view of the chest. COMPARISON: 2024 FINDINGS: The cardiac silhouette appears within normal limits. The mediastinal contour appears within normal limits.  The central pulmonary vasculature appears normal. Bilateral lung volumes are diminished.  Hazy left midlung pulmonary opacity is seen Blunting of the left costophrenic angle is seen suggesting trace left effusion. The bony structures appear age-appropriate.     1.  Hazy left pulmonary infiltrates. 2.  Small left pleural effusion 3.  Cardiomegaly    CT-CSPINE WITHOUT PLUS RECONS    Result Date:  11/29/2024 11/29/2024 10:01 AM HISTORY/REASON FOR EXAM: Polytrauma, blunt; s/p fall - patient on anticoagulation; Neck pain from ground level fall COMPARISON: CT chest 10/28/2024 and 4/26/2020. TECHNIQUE/EXAM DESCRIPTION: CT cervical spine without contrast, with reconstructions. The study was performed on a helical multidetector CT scanner. Thin-section helical scanning was performed from the skull base through T1. Sagittal and coronal multiplanar reconstructions were generated from the axial images. Low dose optimization technique was utilized for this CT exam including automated exposure control and adjustment of the mA and/or kV according to patient size. FINDINGS: Carotid vascular calcifications. 2.5 x 1.5 cm right lung base spiculated density posteriorly is not significantly changed from 4/26/2020. No paraspinal mass. Severe degenerative changes. Straightening of the normal cervical lordosis may be related to muscle spasm or positioning. Multilevel mild congenital spinal stenosis. Soft tissue detail adjacent to bony structures is limited on CT images. No fractures visible. Incomplete bony union C1 posterior arch is congenital and incidental. The odontoid appears normal on coronal reformatted images.     Impression: 1. No cervical spine fracture or subluxation to T1 evident. 2.. Straightening of the normal cervical lordosis may be related to muscle spasm or positioning. 3. Prominent degenerative changes.     CT-HEAD W/O    Result Date: 11/29/2024  Examination: 11/29/2024 10:00 AM HISTORY/REASON FOR EXAM:  Trauma with loss of consciousness. COMPARISON: None available. TECHNIQUE/EXAM DESCRIPTION: CT of the head without contrast. CT head protocol utilized without contrast. CT performed using ALARA principles (as low as reasonably achievable). Sagittal and coronal reformatted images included. FINDINGS: Forehead scalp soft tissue swelling. No fracture is visible. Age expected atrophy and chronic white matter  microvascular ischemic change type lucency noted. No mass, mass effect, hydrocephalus or hemorrhage evident.  No low density to indicate acute stroke.  No convexity fluid collection. The visible skull base sinuses do not show any fluid. Mastoids in the field of view are unremarkable.     Impression: 1. No acute process in the brain evident. 2. Forehead scalp soft tissue swelling. No fracture is visible.       Micro:  Results       Procedure Component Value Units Date/Time    BLOOD CULTURE [[id number]] Collected: 12/21/24 0816    Order Status: Completed Specimen: Blood from Peripheral Updated: 12/26/24 1100     Significant Indicator NEG     Source BLD     Site PERIPHERAL     Culture Result No growth after 5 days of incubation.    BLOOD CULTURE [[id number]] Collected: 12/21/24 0820    Order Status: Completed Specimen: Blood from Peripheral Updated: 12/26/24 1100     Significant Indicator NEG     Source BLD     Site PERIPHERAL     Culture Result No growth after 5 days of incubation.    Blood Culture - Draw one from central line and one from peripheral site [[id number]]  (Abnormal) Collected: 12/19/24 0747    Order Status: Completed Specimen: Blood from Peripheral Updated: 12/21/24 1506     Significant Indicator POS     Source BLD     Site PERIPHERAL     Culture Result Growth detected by automated blood culture system. 12/19/2024  19:16        Streptococcus pyogenes (Group A)  See previous culture for sensitivity report.      Blood Culture - Draw one from central line and one from peripheral site [[id number]]  (Abnormal)  (Susceptibility) Collected: 12/19/24 0731    Order Status: Completed Specimen: Blood from Line Updated: 12/21/24 1506     Significant Indicator POS     Source BLD     Site Peripheral     Culture Result Growth detected by automated blood culture system.  12/19/2024  18:20        Streptococcus pyogenes (Group A)    Susceptibility       Streptococcus pyogenes (group a) (1)       Antibiotic Interpretation  Microscan   Method Status    Penicillin Sensitive 0.016 mcg/mL E-TEST Final    Cefotaxime Sensitive 0.008 mcg/mL E-TEST Final    Clindamycin Sensitive - mcg/mL E-TEST Final                       E-Test [[id number]] Collected: 12/19/24 0731    Order Status: Completed Specimen: Other Updated: 12/21/24 1506     ETEST Sensitivity FINAL    Narrative:      161 tel. [phone] 12/20/2024, 12:39, RB PERF. RESULTS CALLED TO: Maggie RN  161 tel. [phone] 12/19/2024, 18:20, RB PERF. RESULTS CALLED TO: Meghann  RN  [id number]    Urine Culture (New) [[id number]] Collected: 12/19/24 1100    Order Status: Completed Specimen: Urine Updated: 12/21/24 0713     Significant Indicator NEG     Source UR     Site -     Culture Result No growth at 48 hours.            Assessment:  Active Hospital Problems    Diagnosis     *Acute exacerbation of CHF (congestive heart failure) ([facility]) [I50.9]     Streptococcal bacteremia [R78.81, B95.5]     Hypoglycemia [E16.2]     Bacteremia [R78.81]     Pulmonary hypertension ([facility]) [I27.20]     Acute respiratory failure with hypoxia ([facility]) [J96.01]     Malignant neoplasm of body of stomach ([facility]) [C16.2]     Homeless [Z59.00]     Severe protein-calorie malnutrition ([facility]) [E43]     COPD (chronic obstructive pulmonary disease) ([facility]) [J44.9]     Acute kidney injury ([facility]) [N17.9]     Sepsis ([facility]) [A41.9]     Atrial fibrillation with RVR ([facility]) [I48.91]     Thrombocytopenia ([facility]) [D69.6]     Tobacco dependence [F17.200]     Hypertension [I10]     Wound of lower extremity [S81.809A]       74 y.o. 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 Reddy admitted on 12/19/2024 secondary to encephalopathy, shortness of breath and left leg swelling.  Patient found to have group A streptococcal bacteremia secondary to left lower extremity cellulitis.  Repeat blood cultures on 12/21 are negative to  date.  Hospital course complicated by worsening leukocytosis.     Pertinent diagnoses:  Group A streptococcus bacteremia, secondary to below  Left lower extremity cellulitis, slowly improving  Left patellar joint effusion, noted on MRI  Persistent leukocytosis, slight improvement today  Thrombocytopenia, resolved.  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   Homelessness    Plan:  -Continue IV Unasyn and IV clindamycin  -Blood cultures on 12/19+ group A streptococcus  -Repeat blood cultures on 12/21-negative to date  -Continue wound care  -CT of the left lower extremity -significant cellulitis, and moderate suprapatellar joint effusion but no no evidence of abscess  - MRI of the left leg-cellulitis, no abscess nor osteomyelitis  -MRI of the knee-moderate joint effusion with synovitis  -Ortho not recommending aspiration of suprapatellar effusion due to risk of seeding infection  -Monitor labs  -If patient has any worsening left knee pain, swelling and worsening leukocytosis, recommend reevaluation by orthopedic surgery for aspiration versus surgical washout  -Anticipate a 14-day course of antibiotics for bacteremia from 12/21 with stop date of 01/04/2025  This infection poses a threat to life and further limb function     Disposition: TBD patient is homeless     Plan of care discussed with hospitalist, Dr. Campos.  Will continue to follow

## 2024-12-28 LAB
ALBUMIN SERPL BCP-MCNC: 2.5 G/DL (ref 3.2–4.9)
ALBUMIN/GLOB SERPL: 0.6 G/DL
ALP SERPL-CCNC: 274 U/L (ref 30–99)
ALT SERPL-CCNC: 13 U/L (ref 2–50)
ANION GAP SERPL CALC-SCNC: 14 MMOL/L (ref 7–16)
AST SERPL-CCNC: 18 U/L (ref 12–45)
BASOPHILS # BLD AUTO: 0.4 % (ref 0–1.8)
BASOPHILS # BLD: 0.1 K/UL (ref 0–0.12)
BILIRUB SERPL-MCNC: 1.5 MG/DL (ref 0.1–1.5)
BUN SERPL-MCNC: 10 MG/DL (ref 8–22)
CALCIUM ALBUM COR SERPL-MCNC: 9.2 MG/DL (ref 8.5–10.5)
CALCIUM SERPL-MCNC: 8 MG/DL (ref 8.5–10.5)
CHLORIDE SERPL-SCNC: 98 MMOL/L (ref 96–112)
CO2 SERPL-SCNC: 23 MMOL/L (ref 20–33)
CREAT SERPL-MCNC: 0.71 MG/DL (ref 0.5–1.4)
EOSINOPHIL # BLD AUTO: 0.01 K/UL (ref 0–0.51)
EOSINOPHIL NFR BLD: 0 % (ref 0–6.9)
ERYTHROCYTE [DISTWIDTH] IN BLOOD BY AUTOMATED COUNT: 52.1 FL (ref 35.9–50)
GFR SERPLBLD CREATININE-BSD FMLA CKD-EPI: 96 ML/MIN/1.73 M 2
GLOBULIN SER CALC-MCNC: 4.2 G/DL (ref 1.9–3.5)
GLUCOSE BLD STRIP.AUTO-MCNC: 105 MG/DL (ref 65–99)
GLUCOSE BLD STRIP.AUTO-MCNC: 110 MG/DL (ref 65–99)
GLUCOSE BLD STRIP.AUTO-MCNC: 114 MG/DL (ref 65–99)
GLUCOSE BLD STRIP.AUTO-MCNC: 91 MG/DL (ref 65–99)
GLUCOSE SERPL-MCNC: 114 MG/DL (ref 65–99)
HCT VFR BLD AUTO: 34 % (ref 42–52)
HGB BLD-MCNC: 11.4 G/DL (ref 14–18)
IMM GRANULOCYTES # BLD AUTO: 0.52 K/UL (ref 0–0.11)
IMM GRANULOCYTES NFR BLD AUTO: 2 % (ref 0–0.9)
LYMPHOCYTES # BLD AUTO: 0.76 K/UL (ref 1–4.8)
LYMPHOCYTES NFR BLD: 2.9 % (ref 22–41)
MAGNESIUM SERPL-MCNC: 2 MG/DL (ref 1.5–2.5)
MCH RBC QN AUTO: 28.1 PG (ref 27–33)
MCHC RBC AUTO-ENTMCNC: 33.5 G/DL (ref 32.3–36.5)
MCV RBC AUTO: 83.7 FL (ref 81.4–97.8)
MONOCYTES # BLD AUTO: 0.79 K/UL (ref 0–0.85)
MONOCYTES NFR BLD AUTO: 3 % (ref 0–13.4)
NEUTROPHILS # BLD AUTO: 24.48 K/UL (ref 1.82–7.42)
NEUTROPHILS NFR BLD: 91.7 % (ref 44–72)
NRBC # BLD AUTO: 0 K/UL
NRBC BLD-RTO: 0 /100 WBC (ref 0–0.2)
PHOSPHATE SERPL-MCNC: 3.2 MG/DL (ref 2.5–4.5)
PLATELET # BLD AUTO: 281 K/UL (ref 164–446)
PMV BLD AUTO: 11.4 FL (ref 9–12.9)
POTASSIUM SERPL-SCNC: 4.4 MMOL/L (ref 3.6–5.5)
PROT SERPL-MCNC: 6.7 G/DL (ref 6–8.2)
RBC # BLD AUTO: 4.06 M/UL (ref 4.7–6.1)
SODIUM SERPL-SCNC: 135 MMOL/L (ref 135–145)
WBC # BLD AUTO: 26.7 K/UL (ref 4.8–10.8)

## 2024-12-28 PROCEDURE — 36415 COLL VENOUS BLD VENIPUNCTURE: CPT

## 2024-12-28 PROCEDURE — 84100 ASSAY OF PHOSPHORUS: CPT

## 2024-12-28 PROCEDURE — 83735 ASSAY OF MAGNESIUM: CPT

## 2024-12-28 PROCEDURE — 82962 GLUCOSE BLOOD TEST: CPT | Mod: 91

## 2024-12-28 PROCEDURE — A9270 NON-COVERED ITEM OR SERVICE: HCPCS | Performed by: INTERNAL MEDICINE

## 2024-12-28 PROCEDURE — 80053 COMPREHEN METABOLIC PANEL: CPT

## 2024-12-28 PROCEDURE — 700111 HCHG RX REV CODE 636 W/ 250 OVERRIDE (IP): Mod: JG | Performed by: HOSPITALIST

## 2024-12-28 PROCEDURE — 700111 HCHG RX REV CODE 636 W/ 250 OVERRIDE (IP): Mod: JZ | Performed by: INTERNAL MEDICINE

## 2024-12-28 PROCEDURE — 700102 HCHG RX REV CODE 250 W/ 637 OVERRIDE(OP): Performed by: INTERNAL MEDICINE

## 2024-12-28 PROCEDURE — 700102 HCHG RX REV CODE 250 W/ 637 OVERRIDE(OP): Performed by: STUDENT IN AN ORGANIZED HEALTH CARE EDUCATION/TRAINING PROGRAM

## 2024-12-28 PROCEDURE — 700105 HCHG RX REV CODE 258: Performed by: INTERNAL MEDICINE

## 2024-12-28 PROCEDURE — 99233 SBSQ HOSP IP/OBS HIGH 50: CPT | Performed by: STUDENT IN AN ORGANIZED HEALTH CARE EDUCATION/TRAINING PROGRAM

## 2024-12-28 PROCEDURE — 700102 HCHG RX REV CODE 250 W/ 637 OVERRIDE(OP): Performed by: HOSPITALIST

## 2024-12-28 PROCEDURE — 770006 HCHG ROOM/CARE - MED/SURG/GYN SEMI*

## 2024-12-28 PROCEDURE — A9270 NON-COVERED ITEM OR SERVICE: HCPCS | Performed by: STUDENT IN AN ORGANIZED HEALTH CARE EDUCATION/TRAINING PROGRAM

## 2024-12-28 PROCEDURE — 700105 HCHG RX REV CODE 258: Performed by: HOSPITALIST

## 2024-12-28 PROCEDURE — 85025 COMPLETE CBC W/AUTO DIFF WBC: CPT

## 2024-12-28 PROCEDURE — A9270 NON-COVERED ITEM OR SERVICE: HCPCS | Performed by: HOSPITALIST

## 2024-12-28 RX ORDER — LOSARTAN POTASSIUM 25 MG/1
25 TABLET ORAL
Status: DISCONTINUED | OUTPATIENT
Start: 2024-12-28 | End: 2025-01-21 | Stop reason: HOSPADM

## 2024-12-28 RX ORDER — DAPAGLIFLOZIN 10 MG/1
10 TABLET, FILM COATED ORAL DAILY
Status: DISCONTINUED | OUTPATIENT
Start: 2024-12-28 | End: 2025-01-02

## 2024-12-28 RX ADMIN — CLINDAMYCIN IN 5 PERCENT DEXTROSE 600 MG: 12 INJECTION, SOLUTION INTRAVENOUS at 05:59

## 2024-12-28 RX ADMIN — CLINDAMYCIN IN 5 PERCENT DEXTROSE 600 MG: 12 INJECTION, SOLUTION INTRAVENOUS at 23:05

## 2024-12-28 RX ADMIN — AMPICILLIN AND SULBACTAM 3 G: 1; 2 INJECTION, POWDER, FOR SOLUTION INTRAMUSCULAR; INTRAVENOUS at 02:55

## 2024-12-28 RX ADMIN — APIXABAN 5 MG: 5 TABLET, FILM COATED ORAL at 16:25

## 2024-12-28 RX ADMIN — MAGNESIUM HYDROXIDE 30 ML: 1200 LIQUID ORAL at 10:17

## 2024-12-28 RX ADMIN — LOSARTAN POTASSIUM 25 MG: 25 TABLET, FILM COATED ORAL at 16:25

## 2024-12-28 RX ADMIN — OXYCODONE 5 MG: 5 TABLET ORAL at 02:49

## 2024-12-28 RX ADMIN — SENNOSIDES AND DOCUSATE SODIUM 2 TABLET: 50; 8.6 TABLET ORAL at 16:25

## 2024-12-28 RX ADMIN — AMPICILLIN AND SULBACTAM 3 G: 1; 2 INJECTION, POWDER, FOR SOLUTION INTRAMUSCULAR; INTRAVENOUS at 13:56

## 2024-12-28 RX ADMIN — CLINDAMYCIN IN 5 PERCENT DEXTROSE 600 MG: 12 INJECTION, SOLUTION INTRAVENOUS at 14:49

## 2024-12-28 RX ADMIN — OMEPRAZOLE 20 MG: 20 CAPSULE, DELAYED RELEASE ORAL at 05:57

## 2024-12-28 RX ADMIN — POLYETHYLENE GLYCOL 3350 1 PACKET: 17 POWDER, FOR SOLUTION ORAL at 05:56

## 2024-12-28 RX ADMIN — ACETAMINOPHEN 650 MG: 325 TABLET ORAL at 05:57

## 2024-12-28 RX ADMIN — DIGOXIN 125 MCG: 0.12 TABLET ORAL at 16:25

## 2024-12-28 RX ADMIN — APIXABAN 5 MG: 5 TABLET, FILM COATED ORAL at 05:57

## 2024-12-28 RX ADMIN — SODIUM CHLORIDE, SODIUM LACTATE, POTASSIUM CHLORIDE, CALCIUM CHLORIDE AND DEXTROSE MONOHYDRATE: 5; 600; 310; 30; 20 INJECTION, SOLUTION INTRAVENOUS at 10:28

## 2024-12-28 RX ADMIN — AMPICILLIN AND SULBACTAM 3 G: 1; 2 INJECTION, POWDER, FOR SOLUTION INTRAMUSCULAR; INTRAVENOUS at 21:17

## 2024-12-28 RX ADMIN — DAPAGLIFLOZIN 10 MG: 10 TABLET, FILM COATED ORAL at 16:25

## 2024-12-28 RX ADMIN — ONDANSETRON 4 MG: 2 INJECTION INTRAMUSCULAR; INTRAVENOUS at 02:50

## 2024-12-28 RX ADMIN — OXYCODONE 5 MG: 5 TABLET ORAL at 22:58

## 2024-12-28 RX ADMIN — AMPICILLIN AND SULBACTAM 3 G: 1; 2 INJECTION, POWDER, FOR SOLUTION INTRAMUSCULAR; INTRAVENOUS at 10:23

## 2024-12-28 RX ADMIN — METOPROLOL SUCCINATE 25 MG: 25 TABLET, EXTENDED RELEASE ORAL at 05:57

## 2024-12-28 ASSESSMENT — ENCOUNTER SYMPTOMS
NAUSEA: 0
HEADACHES: 0
MYALGIAS: 1
CHILLS: 0
ABDOMINAL PAIN: 0
NERVOUS/ANXIOUS: 0
FEVER: 0
DIZZINESS: 0
SENSORY CHANGE: 0
SHORTNESS OF BREATH: 0

## 2024-12-28 ASSESSMENT — PAIN DESCRIPTION - PAIN TYPE
TYPE: ACUTE PAIN

## 2024-12-28 NOTE — PROGRESS NOTES
Patient alert and oriented x 4. Noted with Emesis, medium amount, tan colored, with undigested food. Remeron administered.

## 2024-12-28 NOTE — PROGRESS NOTES
4 Eyes Skin Assessment Completed by JANNETTE Du and JANNETTE Salcido.     Head - forehead scab/scar, right cheek scab  Ears WDL  Nose WDL  Mouth WDL  Neck WDL  Breast/Chest WDL  Shoulder Blades WDL  Spine - didscoloration, skin tags  (R) Arm/Elbow/Hand Abrasion  (L) Arm/Elbow/Hand Abrasion  Abdomen Scar  Groin Redness, skin tear  Scrotum/Coccyx/Buttocks Redness, Blanching, and Excoriation  (R) Leg Redness, Blanching, Scab, and Edema, discolored  (L) Leg Redness, Swelling, Abrasion, and Edema, wound, inner thigh peeling skin  (R) Heel/Foot/Toe Redness, Blanching, and Boggy  (L) Heel/Foot/Toe Redness, Blanching, and Boggy        Devices In Places Condom Cath and Nasal Cannula, PIV x2        Interventions In Place NC W/Ear Foams, InterDry, Heel Mepilex, Sacral Mepilex, TAP System, Pillows, Q2 Turns, Low Air Loss Mattress, Barrier Cream, and Dri-Alan Pads     Possible Skin Injury Yes     Pictures Uploaded Into Epic - already uploaded  Wound Consult Placed Yes already placed  RN Wound Prevention Protocol Ordered - already ordered

## 2024-12-28 NOTE — PROGRESS NOTES
4 Eyes Skin Assessment Completed by JANNETTE Stern and JANNETTE Blandon.    Head forehead sacb/scar right cheek scab  Ears WDL  Nose WDL  Mouth WDL  Neck WDL  Breast/Chest WDL  Shoulder Blades WDL  Spine discoloration, skin tags  (R) Arm/Elbow/Hand Abrasion fragile  (L) Arm/Elbow/Hand Abrasion fragile  Abdomen Scar  Groin Redness left inner thigh skin tear  Scrotum/Coccyx/Buttocks Redness, Blanching, and Excoriation  (R) Leg Redness, Blanching, and Scab discoloration, edema  (L) Leg Redness, Swelling, and Abrasion, edema wound  (R) Heel/Foot/Toe Redness, Blanching, and Boggy  (L) Heel/Foot/Toe Redness, Blanching, and Boggy          Devices In Places Condom Cath and Nasal Cannula      Interventions In Place NC W/Ear Foams, InterDry, Heel Mepilex, Sacral Mepilex, Pillows, Q2 Turns, Barrier Cream, and Heels Loaded W/Pillows    Possible Skin Injury Yes    Pictures Uploaded Into Epic Yes  Wound Consult Placed following  RN Wound Prevention Protocol Ordered Yes

## 2024-12-28 NOTE — DISCHARGE PLANNING
Case Management Discharge Planning    Admission Date: 12/19/2024  GMLOS: 4.9  ALOS: 9    6-Clicks ADL Score: 15  6-Clicks Mobility Score: 8  PT and/or OT Eval ordered: Yes  Post-acute Referrals Ordered: Yes  Post-acute Choice Obtained: No  Has referral(s) been sent to post-acute provider:  Yes      Anticipated Discharge Dispo: Discharge Disposition: D/T to SNF with Medicare cert in anticipation of skilled care (03)    DME Needed: No    Action(s) Taken: LMSW sent a blanket SNF referral per the PT/Ot recs.     Escalations Completed: None    Medically Clear: No    Next Steps: LMSW to follow until DC.     Barriers to Discharge: Medical clearance    Is the patient up for discharge tomorrow: No

## 2024-12-28 NOTE — CARE PLAN
The patient is Watcher - Medium risk of patient condition declining or worsening    Shift Goals  Clinical Goals: pt will maintain free from falls and injuries throughout shift  Patient Goals: rest  Family Goals: nilson    Progress made toward(s) clinical / shift goals:  Pt alert and able to make needs known. Call light and personal belongings in reach. Bed locked in lowest position with bed alarm on. Pt still has poor PO intake, encouraged to eat more. Pt sugars monitored as ordered. All needs met at this time.     Problem: Knowledge Deficit - Standard  Goal: Patient and family/care givers will demonstrate understanding of plan of care, disease process/condition, diagnostic tests and medications  Outcome: Progressing     Problem: Hemodynamics  Goal: Patient's hemodynamics, fluid balance and neurologic status will be stable or improve  Outcome: Progressing     Problem: Fluid Volume  Goal: Fluid volume balance will be maintained  Outcome: Progressing     Problem: Urinary - Renal Perfusion  Goal: Ability to achieve and maintain adequate renal perfusion and functioning will improve  Outcome: Progressing       Patient is not progressing towards the following goals:

## 2024-12-28 NOTE — PROGRESS NOTES
Hospital Medicine Daily Progress Note    Date of Service  12/28/2024    Chief Complaint  Short of breath    Hospital Course  Robert Mcdonnell is a 75yo male with a PMH homelessness, atrial fibrillation on chronic anticoagulation with apixaban, CHF, EF 30%, P.Htn, RVSP 65, remote PE, recent hospitalization 10/3 - 10/15 for perforated  (T2N0M0 invasive gastric adenocarcinoma), GDA embolization, subsequent subtotal gastrectomy, liver wedge resection, Miguel Angel-en-Y gastrojejunostomy with omental flap and cholecystectomy 10/7 with pathology showing invasive well-differentiated adenocarcinoma with associated ulceration.  There was tumor invasion into the muscularis propria and 20 lymph nodes were negative for metastatic carcinoma.  Oncology was consulted and noted no evidence of metastatic disease however MRI abdomen recommended for follow-up; no adjuvant therapy was recommended.  He presented 12/19/2024 to the ED with report of several days of increasing dyspnea, worsening bilateral lower extremity edema and mild cough.  Patient was found to be hypoglycemic on admission with a glucose of 53 for which she was placed on D50.  He had increasing shortness of breath and required BiPAP in the emergency room.  Initial proBNP 21,696 with an elevated lactic acid of 5.6 and a creatinine of 2.3.  He had not been taking his Lasix as he had run out of it but had been on his Eliquis.  EKG showed irregular wide-complex rhythm.    Blood culture positive for group A streptococcus bacteremia, likely due to LLE infection.  Also found Left knee-moderate joint effusion.  ID consulted, continue Unasyn and clindamycin.    CT left leg shows small to moderate suprapatellar joint effusion.  MRI LLE showed Diffuse cellulitis and patchy myositis, negative for osteomyelitis or abscess.   Ortho consulted, no surgery at this point given bacteremia.     The patient continues to have hypoglycemic episodes and was encouraged him to eat and drink his supplement  christelle.    Interval Problem Update  I have seen and examined the patient at bedside    Hypoglycemia improving, continue D5 LR, encourage oral intake    Constipation -had a bowel movement to 12/27.  Continue bowel management    Bacteremia -continue IV Unasyn and IV clindamycin for 14 days course until 1/4    Still significant leukocytosis, wbc 23>26  I discussed with ID, needs the white count trends down before discharge    CHF with EF of 30% -I started losartan and Farxiga, continue Toprol    I have discussed this patient's plan of care and discharge plan at IDT rounds today with Case Management, Nursing, Nursing leadership, and other members of the IDT team.    Code Status  Full Code    Disposition  The patient is not medically cleared for discharge to home or a post-acute facility.      I have placed the appropriate orders for post-discharge needs.    Review of Systems  Review of Systems   Constitutional:  Positive for malaise/fatigue. Negative for chills and fever.   Respiratory:  Negative for shortness of breath.    Cardiovascular:  Positive for leg swelling.   Gastrointestinal:  Negative for abdominal pain and nausea.   Musculoskeletal:  Positive for myalgias (left median thigh).   Neurological:  Negative for dizziness, sensory change and headaches.   Psychiatric/Behavioral:  The patient is not nervous/anxious.         Physical Exam  Temp:  [37.1 °C (98.7 °F)-37.6 °C (99.6 °F)] 37.3 °C (99.1 °F)  Pulse:  [] 104  Resp:  [16-20] 19  BP: (101-143)/(65-88) 125/81  SpO2:  [91 %-98 %] 91 %    Physical Exam  Constitutional:       Appearance: Normal appearance.   HENT:      Head: Normocephalic and atraumatic.   Eyes:      Extraocular Movements: Extraocular movements intact.      Conjunctiva/sclera: Conjunctivae normal.   Cardiovascular:      Rate and Rhythm: Regular rhythm. Tachycardia present.      Heart sounds: No murmur heard.  Pulmonary:      Effort: No respiratory distress.      Breath sounds: No wheezing.    Abdominal:      General: Bowel sounds are normal. There is no distension.      Palpations: Abdomen is soft.      Tenderness: There is no abdominal tenderness.   Musculoskeletal:         General: Tenderness (less compared to 12/25 exam.) present.      Cervical back: Neck supple.      Right lower leg: No edema.      Left lower leg: Edema present.   Skin:     Findings: Erythema (decrease in erythema compared to prior exams) present.      Comments: Skin denuding on lower leg. Wounds on scrotum and medial thigh.  Reviewed on patient and in pictures with wound care RN present   Neurological:      General: No focal deficit present.      Mental Status: He is alert and oriented to person, place, and time.      Cranial Nerves: No cranial nerve deficit.   Psychiatric:         Mood and Affect: Mood normal.         Behavior: Behavior normal.         Thought Content: Thought content normal.         Fluids    Intake/Output Summary (Last 24 hours) at 12/28/2024 1435  Last data filed at 12/28/2024 0436  Gross per 24 hour   Intake 120 ml   Output 600 ml   Net -480 ml        Laboratory  Recent Labs     12/26/24 0445 12/27/24  0112 12/28/24  0550   WBC 25.9* 23.8* 26.7*   RBC 3.47* 3.38* 4.06*   HEMOGLOBIN 9.7* 9.5* 11.4*   HEMATOCRIT 28.7* 27.9* 34.0*   MCV 82.7 82.5 83.7   MCH 28.0 28.1 28.1   MCHC 33.8 34.1 33.5   RDW 50.3* 50.2* 52.1*   PLATELETCT 142* 189 281   MPV 11.8 11.9 11.4     Recent Labs     12/26/24  0445 12/27/24  0112 12/28/24  0550   SODIUM 134* 132* 135   POTASSIUM 4.4 4.5 4.4   CHLORIDE 97 98 98   CO2 29 27 23   GLUCOSE 81 88 114*   BUN 11 11 10   CREATININE 0.82 0.56 0.71   CALCIUM 7.3* 7.5* 8.0*                   Imaging  [id number] VIEW   Final Result      No evidence of bowel obstruction. Mild constipation.      MR-KNEE-WITH & W/O LEFT   Final Result      1.  No evidence of marrow edema or abnormal enhancement to suggest presence of osteomyelitis.      2.  Small to moderate joint effusion with mild  synovitis.      3.  Diffuse soft tissue edema/induration consistent with cellulitis.      4.  Small popliteal cyst.      5.  No evidence of soft tissue abscess.      6.  Markedly motion degraded examination. Ligaments and menisci are inadequately evaluated due to patient motion.      MR-LOWER EXTREMITY-NO JOINT-W/O LEFT   Final Result      1.  Patient motion degraded exam.      2.  No evidence of marrow edema or bone lesion to suggest presence of osteomyelitis.      3.  Diffuse cellulitis and patchy myositis.      4.  No evidence of focal fluid collection to suggest presence of abscess.      CT-EXTREMITY, LOWER WITH LEFT   Final Result      1.  Extensive subcutaneous inflammatory changes about the left lower extremity consistent with deep and superficial cellulitis.      2.  No definite CT evidence of necrotizing fasciitis.      3.  No CT evidence of deep abscess formation or acute or chronic osteomyelitis.      4.  Small to moderate suprapatellar joint effusion.      CT-EXTREMITY, LOWER WITH LEFT   Final Result      No soft tissue gas to suggest necrotizing fasciitis.      No fluid collection identified.      DX-CHEST-PORTABLE (1 VIEW)   Final Result         1.  Hazy left pulmonary infiltrates, similar to prior study   2.  Trace left pleural effusion, stable   3.  Cardiomegaly   4.  Atherosclerosis      DX-CHEST-PORTABLE (1 VIEW)   Final Result         1.  Hazy left pulmonary infiltrates, similar to prior study   2.  Trace left pleural effusion, stable   3.  Cardiomegaly   4.  Atherosclerosis      EC-ECHOCARDIOGRAM COMPLETE W/O CONT   Final Result      CT-CHEST,ABDOMEN,PELVIS WITH   Final Result      1. Stable spiculated opacity in the right upper lobe, extending to the pleural surface.   2. Lingular and left lower lobe parenchymal airspace disease has improved in the interval, but has not completely resolved. There is pleural reaction, suggesting some of these areas may represent parenchymal scarring.   3.  Asymmetry of the lung volumes, small on the left than the right.   4. Cardiomegaly with atherosclerotic calcifications in the aorta and coronary arteries.   5. Postsurgical changes in the stomach.   6. Stable scattered multiple hypodense lesions throughout the liver.   7. Moderate ascites throughout the abdomen and pelvis.   8. Cachexia.   9. Enlarged left inguinal lymph nodes with central necrosis. There is edema involving the soft tissues of the upper left leg extending to the left pelvis.      US-EXTREMITY VENOUS LOWER BILAT   Final Result      DX-CHEST-PORTABLE (1 VIEW)   Final Result         1.  Hazy left pulmonary infiltrates.   2.  Small left pleural effusion   3.  Cardiomegaly      [id number]-WITH & W/O LEFT    (Results Pending)        Assessment/Plan  * Acute exacerbation of CHF (congestive heart failure) (HCC)- (present on admission)  Assessment & Plan  EF of 30%   due to afib rvr and recovering pna  Careful force diuresis with sepsis  Digoxin IV for reduced EF and BiV failure w/ afib  Now improved off Bipap  Maintain euvolemia.  Holding diuretic therapy due to ongoing hypotension    12/27 BP better, I restarted metoprolol 25 mg  12/28 BP good, I started losartan and Farxiga, continue Toprol  Monitor vital signs closely  May add other GDMT if tolerates    Constipation  Assessment & Plan  I ordered a bowel management  Suppository today    Hypoglycemia  Assessment & Plan  He found to hypoglycemia  Hypoglycemic protocol  He has very poor oral intake.  Supplements as tolerates (ordered 12/24)  Remains on D10.    12/27 frequent episodes of hypoglycemia, BG down to 40s  Encourage oral intake  I ordered Ensure  Continue D5 LR  Nutrition consulted      Streptococcal bacteremia- (present on admission)  Assessment & Plan  Likely skin wound source.  Discussed with wound care    12/28 continue IV Unasyn and IV clindamycin for 14 days course until 1/4  Still significant leukocytosis, wbc 23>26  I discussed with  ID, needs the white count trends down before discharge    Pulmonary hypertension (HCC)  Assessment & Plan  Previous Echo RVSP 65, severe TR  Suspect mainly Group 2  RV perfusion with MAP > 65 as need norepinepinephrine  Blood pressure is running on the lower side holding diuretic therapy at this time    Acute respiratory failure with hypoxia (HCC)- (present on admission)  Assessment & Plan  Improved off Bipap  Currently is on 2 L of oxygen to maintain oxygen saturation  12/25 on room air.  Mobilize as able.    Hypophosphatemia- (present on admission)  Assessment & Plan  Replaced    Malignant neoplasm of body of stomach (HCC)- (present on admission)  Assessment & Plan  T2, N0, M0 invasive gastric adenocarcinoma presenting as perforated  10/2024  -GDA embolization  -10/7 subtotal gastrectomy, liver wedge resection, Miguel Angel-en-Y gastrojejunostomy with omental flap and cholecystectomy  -pathology invasive well-differentiated adenocarcinoma with associated ulceration  -tumor invasion into the muscularis propria and 20 lymph nodes were negative for metastatic carcinoma  -Oncology was consulted and noted no evidence of metastatic disease  -MRI abdomen recommended for follow-up; no adjuvant therapy was recommended  He will need outpatient follow-up    Homeless- (present on admission)  Assessment & Plan   consultation for ongoing access to medications and follow-up care as appropriate.  States family in [place] but states they cannot house him nor give assistance     Severe protein-calorie malnutrition (HCC)- (present on admission)  Assessment & Plan  Discussed with nutrition, patient meets the ASPEN criteria of severe malnutrition  Continuous nutrition support    COPD (chronic obstructive pulmonary disease) (HCC)- (present on admission)  Assessment & Plan  Current active smoker, no PFTs on file  RT protocols, bronchodilators as needed  No signs of exacerbation    Acute kidney injury (HCC)- (present on  admission)  Assessment & Plan  Resolved   Maintain euvolemia and monitor fluid responsiveness (avoid NaCL and renal congestion)  Monitor urine output and I&O's  Avoid and review nephrotoxin medication  Renal function improved    Sepsis (HCC)- (present on admission)  Assessment & Plan  Skin infection/ Pneumonia  Antibiotics  Strep pyogenes Group A   Unasyn  Positive 12/19 blood cultures  Repeat blood cultures negative  12/25 discussed with Dr High and reconsulted Dr Flores (ortho)  12/25 Stat MRI given finding of suprapatellar fluid collection and knee pain.  Ortho did not want to tap knee with possible cellulitis in area as of yet.    Atrial fibrillation with RVR ([facility])- (present on admission)  Assessment & Plan  Chronic atrial fibrillation, currently tachycardia  Digoxin 250 mcg IV monitoring closely with flip to prevent digoxin toxicity    Continue digoxin and Eliquis  12/27 BP better, I resumed Toprol 25 mg    Thrombocytopenia ([facility])- (present on admission)  Assessment & Plan  He found to have thrombocytopenia and platelet count is trending down.  Resolving, suspect acute inflammatory process  12/26 Plt: 142 <87 <72  I am continue Eliquis for now for stroke prevention  Ordered CBC for tomorrow.      Tobacco dependence- (present on admission)  Assessment & Plan  Counseling when appropriate    Hypertension- (present on admission)  Assessment & Plan  Currently is hypotensive holding metoprolol and goal-directed medical therapy    Wound of lower extremity- (present on admission)  Assessment & Plan  Likely source of infection, lower ext doppler negative  CT left leg shows small to moderate suprapatellar joint effusion.  MRI LLE showed Diffuse cellulitis and patchy myositis, negative for osteomyelitis or abscess.     Orthopedic consultation 12/20 for possible drainage of bullae and bacteremia  Ortho reconsulted 12/25.  MRI leg/knee 12/6: Small to moderate joint effusion with mild synovitis. Cellulitis  ID request  tap of knee however ortho does not wish to seed joint upon tap if cellulitis is a possibility.    Continue Unasyn and clindamycin  I discussed with ID.  May reconsult Ortho if no improvement    Ortho consulted, no surgery at this point given bacteremia.     ACP (advance care planning)- (present on admission)  Assessment & Plan  Patient admitted with CHF exacerbation, significantly reduced the EF of 30%.  Also found bacteremia, LLE infection, severe malnutrition.  History of invasive gastric malignancy, A-fib RVR, PE, gastric perforation post extensive surgery including subtotal gastrectomy, liver resection  Miguel Angel-en-Y gastrojejunostomy with omental flap and cholecystectomy.  I discussed the CODE STATUS with him, including CPR, intubation/mechanical ventilation.  He wants to stay full code.  Patient has high complexity and guarded prognosis.  I consulted palliative care.  ACP 18 minutes.          VTE prophylaxis: Eliquis    I have performed a physical exam and reviewed and updated ROS and Plan today (12/28/2024). In review of yesterday's note (12/27/2024), there are no changes except as documented above.    I spent greater than 53 minutes for chart review, obtaining history independently, performing medically appropriate examination,  documenting , ordering medications, tests, or procedures, referring and communicating with other health care professionals, Independently interpreting results and communicating results with patient/family/caregiver. More than 50% of time was spent in face-to-face clinical encounter.     VTE Selection

## 2024-12-28 NOTE — CARE PLAN
The patient is Watcher - Medium risk of patient condition declining or worsening    Shift Goals  Clinical Goals: bowel movement  Patient Goals: pain relief  Family Goals: nilson    Progress made toward(s) clinical / shift goals:      Problem: Knowledge Deficit - Standard  Goal: Patient and family/care givers will demonstrate understanding of plan of care, disease process/condition, diagnostic tests and medications  Outcome: Progressing     Problem: Respiratory  Goal: Patient will achieve/maintain optimum respiratory ventilation and gas exchange  Outcome: Progressing     Problem: Care Map:  Admission Optimal Outcome for the Heart Failure Patient  Goal: Admission:  Optimal Care of the heart failure patient  Outcome: Progressing     Problem: Acute Care of the Diabetic Patient  Goal: Optimal Outcome for the Diabetic Patient  Outcome: Progressing     Patient is not progressing towards the following goals:      Problem: Hemodynamics  Goal: Patient's hemodynamics, fluid balance and neurologic status will be stable or improve  Outcome: Not Progressing     Problem: Pain - Standard  Goal: Alleviation of pain or a reduction in pain to the patient’s comfort goal  Outcome: Not Progressing

## 2024-12-29 LAB
ALBUMIN SERPL BCP-MCNC: 2.1 G/DL (ref 3.2–4.9)
ALBUMIN/GLOB SERPL: 0.7 G/DL
ALP SERPL-CCNC: 209 U/L (ref 30–99)
ALT SERPL-CCNC: 9 U/L (ref 2–50)
ANION GAP SERPL CALC-SCNC: 8 MMOL/L (ref 7–16)
AST SERPL-CCNC: 17 U/L (ref 12–45)
BASOPHILS # BLD AUTO: 0.2 % (ref 0–1.8)
BASOPHILS # BLD: 0.03 K/UL (ref 0–0.12)
BILIRUB SERPL-MCNC: 0.9 MG/DL (ref 0.1–1.5)
BUN SERPL-MCNC: 12 MG/DL (ref 8–22)
CALCIUM ALBUM COR SERPL-MCNC: 8.9 MG/DL (ref 8.5–10.5)
CALCIUM SERPL-MCNC: 7.4 MG/DL (ref 8.5–10.5)
CHLORIDE SERPL-SCNC: 105 MMOL/L (ref 96–112)
CO2 SERPL-SCNC: 24 MMOL/L (ref 20–33)
CREAT SERPL-MCNC: 0.72 MG/DL (ref 0.5–1.4)
EOSINOPHIL # BLD AUTO: 0.03 K/UL (ref 0–0.51)
EOSINOPHIL NFR BLD: 0.2 % (ref 0–6.9)
ERYTHROCYTE [DISTWIDTH] IN BLOOD BY AUTOMATED COUNT: 50.8 FL (ref 35.9–50)
GFR SERPLBLD CREATININE-BSD FMLA CKD-EPI: 96 ML/MIN/1.73 M 2
GLOBULIN SER CALC-MCNC: 3.2 G/DL (ref 1.9–3.5)
GLUCOSE BLD STRIP.AUTO-MCNC: 120 MG/DL (ref 65–99)
GLUCOSE BLD STRIP.AUTO-MCNC: 91 MG/DL (ref 65–99)
GLUCOSE BLD STRIP.AUTO-MCNC: 98 MG/DL (ref 65–99)
GLUCOSE SERPL-MCNC: 87 MG/DL (ref 65–99)
HCT VFR BLD AUTO: 27.3 % (ref 42–52)
HGB BLD-MCNC: 9.2 G/DL (ref 14–18)
IMM GRANULOCYTES # BLD AUTO: 0.16 K/UL (ref 0–0.11)
IMM GRANULOCYTES NFR BLD AUTO: 1.1 % (ref 0–0.9)
LYMPHOCYTES # BLD AUTO: 0.81 K/UL (ref 1–4.8)
LYMPHOCYTES NFR BLD: 5.4 % (ref 22–41)
MAGNESIUM SERPL-MCNC: 2 MG/DL (ref 1.5–2.5)
MCH RBC QN AUTO: 27.5 PG (ref 27–33)
MCHC RBC AUTO-ENTMCNC: 33.7 G/DL (ref 32.3–36.5)
MCV RBC AUTO: 81.7 FL (ref 81.4–97.8)
MONOCYTES # BLD AUTO: 0.68 K/UL (ref 0–0.85)
MONOCYTES NFR BLD AUTO: 4.6 % (ref 0–13.4)
NEUTROPHILS # BLD AUTO: 13.23 K/UL (ref 1.82–7.42)
NEUTROPHILS NFR BLD: 88.5 % (ref 44–72)
NRBC # BLD AUTO: 0 K/UL
NRBC BLD-RTO: 0 /100 WBC (ref 0–0.2)
PHOSPHATE SERPL-MCNC: 2.2 MG/DL (ref 2.5–4.5)
PLATELET # BLD AUTO: 276 K/UL (ref 164–446)
PMV BLD AUTO: 10.9 FL (ref 9–12.9)
POTASSIUM SERPL-SCNC: 4 MMOL/L (ref 3.6–5.5)
PROCALCITONIN SERPL-MCNC: 0.49 NG/ML
PROT SERPL-MCNC: 5.3 G/DL (ref 6–8.2)
RBC # BLD AUTO: 3.34 M/UL (ref 4.7–6.1)
SODIUM SERPL-SCNC: 137 MMOL/L (ref 135–145)
WBC # BLD AUTO: 14.9 K/UL (ref 4.8–10.8)

## 2024-12-29 PROCEDURE — 84145 PROCALCITONIN (PCT): CPT

## 2024-12-29 PROCEDURE — 700111 HCHG RX REV CODE 636 W/ 250 OVERRIDE (IP): Mod: JG | Performed by: HOSPITALIST

## 2024-12-29 PROCEDURE — 99233 SBSQ HOSP IP/OBS HIGH 50: CPT | Performed by: INTERNAL MEDICINE

## 2024-12-29 PROCEDURE — 770006 HCHG ROOM/CARE - MED/SURG/GYN SEMI*

## 2024-12-29 PROCEDURE — A9270 NON-COVERED ITEM OR SERVICE: HCPCS | Performed by: HOSPITALIST

## 2024-12-29 PROCEDURE — 700102 HCHG RX REV CODE 250 W/ 637 OVERRIDE(OP): Performed by: STUDENT IN AN ORGANIZED HEALTH CARE EDUCATION/TRAINING PROGRAM

## 2024-12-29 PROCEDURE — 700102 HCHG RX REV CODE 250 W/ 637 OVERRIDE(OP): Performed by: HOSPITALIST

## 2024-12-29 PROCEDURE — A9270 NON-COVERED ITEM OR SERVICE: HCPCS | Performed by: INTERNAL MEDICINE

## 2024-12-29 PROCEDURE — 36415 COLL VENOUS BLD VENIPUNCTURE: CPT

## 2024-12-29 PROCEDURE — 85025 COMPLETE CBC W/AUTO DIFF WBC: CPT

## 2024-12-29 PROCEDURE — 83735 ASSAY OF MAGNESIUM: CPT

## 2024-12-29 PROCEDURE — A9270 NON-COVERED ITEM OR SERVICE: HCPCS | Performed by: STUDENT IN AN ORGANIZED HEALTH CARE EDUCATION/TRAINING PROGRAM

## 2024-12-29 PROCEDURE — 80053 COMPREHEN METABOLIC PANEL: CPT

## 2024-12-29 PROCEDURE — 99233 SBSQ HOSP IP/OBS HIGH 50: CPT | Performed by: STUDENT IN AN ORGANIZED HEALTH CARE EDUCATION/TRAINING PROGRAM

## 2024-12-29 PROCEDURE — 82962 GLUCOSE BLOOD TEST: CPT

## 2024-12-29 PROCEDURE — 700105 HCHG RX REV CODE 258: Performed by: INTERNAL MEDICINE

## 2024-12-29 PROCEDURE — 700111 HCHG RX REV CODE 636 W/ 250 OVERRIDE (IP): Mod: JZ,JG | Performed by: INTERNAL MEDICINE

## 2024-12-29 PROCEDURE — 700111 HCHG RX REV CODE 636 W/ 250 OVERRIDE (IP): Mod: JZ | Performed by: INTERNAL MEDICINE

## 2024-12-29 PROCEDURE — 84100 ASSAY OF PHOSPHORUS: CPT

## 2024-12-29 PROCEDURE — 700102 HCHG RX REV CODE 250 W/ 637 OVERRIDE(OP): Performed by: INTERNAL MEDICINE

## 2024-12-29 PROCEDURE — 700105 HCHG RX REV CODE 258: Performed by: STUDENT IN AN ORGANIZED HEALTH CARE EDUCATION/TRAINING PROGRAM

## 2024-12-29 RX ORDER — SPIRONOLACTONE 25 MG/1
25 TABLET ORAL
Status: DISCONTINUED | OUTPATIENT
Start: 2024-12-29 | End: 2025-01-21 | Stop reason: HOSPADM

## 2024-12-29 RX ORDER — CLINDAMYCIN PHOSPHATE 900 MG/50ML
900 INJECTION, SOLUTION INTRAVENOUS EVERY 8 HOURS
Status: COMPLETED | OUTPATIENT
Start: 2024-12-29 | End: 2024-12-31

## 2024-12-29 RX ORDER — DEXTROSE MONOHYDRATE AND SODIUM CHLORIDE 5; .9 G/100ML; G/100ML
INJECTION, SOLUTION INTRAVENOUS CONTINUOUS
Status: DISCONTINUED | OUTPATIENT
Start: 2024-12-29 | End: 2024-12-29

## 2024-12-29 RX ADMIN — SPIRONOLACTONE 25 MG: 25 TABLET ORAL at 16:29

## 2024-12-29 RX ADMIN — AMPICILLIN AND SULBACTAM 3 G: 1; 2 INJECTION, POWDER, FOR SOLUTION INTRAMUSCULAR; INTRAVENOUS at 03:25

## 2024-12-29 RX ADMIN — DIBASIC SODIUM PHOSPHATE, MONOBASIC POTASSIUM PHOSPHATE AND MONOBASIC SODIUM PHOSPHATE 500 MG: 852; 155; 130 TABLET ORAL at 11:48

## 2024-12-29 RX ADMIN — CLINDAMYCIN IN 5 PERCENT DEXTROSE 600 MG: 12 INJECTION, SOLUTION INTRAVENOUS at 05:38

## 2024-12-29 RX ADMIN — CLINDAMYCIN IN 5 PERCENT DEXTROSE 900 MG: 18 INJECTION, SOLUTION INTRAVENOUS at 21:24

## 2024-12-29 RX ADMIN — SENNOSIDES AND DOCUSATE SODIUM 2 TABLET: 50; 8.6 TABLET ORAL at 16:29

## 2024-12-29 RX ADMIN — APIXABAN 5 MG: 5 TABLET, FILM COATED ORAL at 05:33

## 2024-12-29 RX ADMIN — CLINDAMYCIN IN 5 PERCENT DEXTROSE 900 MG: 18 INJECTION, SOLUTION INTRAVENOUS at 13:49

## 2024-12-29 RX ADMIN — DAPAGLIFLOZIN 10 MG: 10 TABLET, FILM COATED ORAL at 05:33

## 2024-12-29 RX ADMIN — AMPICILLIN AND SULBACTAM 3 G: 1; 2 INJECTION, POWDER, FOR SOLUTION INTRAMUSCULAR; INTRAVENOUS at 08:29

## 2024-12-29 RX ADMIN — METOPROLOL SUCCINATE 25 MG: 25 TABLET, EXTENDED RELEASE ORAL at 05:33

## 2024-12-29 RX ADMIN — APIXABAN 5 MG: 5 TABLET, FILM COATED ORAL at 16:29

## 2024-12-29 RX ADMIN — DIGOXIN 125 MCG: 0.12 TABLET ORAL at 16:29

## 2024-12-29 RX ADMIN — DIBASIC SODIUM PHOSPHATE, MONOBASIC POTASSIUM PHOSPHATE AND MONOBASIC SODIUM PHOSPHATE 500 MG: 852; 155; 130 TABLET ORAL at 16:29

## 2024-12-29 RX ADMIN — AMPICILLIN AND SULBACTAM 3 G: 1; 2 INJECTION, POWDER, FOR SOLUTION INTRAMUSCULAR; INTRAVENOUS at 13:44

## 2024-12-29 RX ADMIN — CLINDAMYCIN IN 5 PERCENT DEXTROSE 900 MG: 18 INJECTION, SOLUTION INTRAVENOUS at 10:32

## 2024-12-29 RX ADMIN — LOSARTAN POTASSIUM 25 MG: 25 TABLET, FILM COATED ORAL at 05:33

## 2024-12-29 RX ADMIN — OMEPRAZOLE 20 MG: 20 CAPSULE, DELAYED RELEASE ORAL at 05:33

## 2024-12-29 RX ADMIN — DEXTROSE AND SODIUM CHLORIDE: 5; 900 INJECTION, SOLUTION INTRAVENOUS at 08:25

## 2024-12-29 RX ADMIN — AMPICILLIN AND SULBACTAM 3 G: 1; 2 INJECTION, POWDER, FOR SOLUTION INTRAMUSCULAR; INTRAVENOUS at 20:25

## 2024-12-29 RX ADMIN — POLYETHYLENE GLYCOL 3350 1 PACKET: 17 POWDER, FOR SOLUTION ORAL at 05:33

## 2024-12-29 RX ADMIN — DIBASIC SODIUM PHOSPHATE, MONOBASIC POTASSIUM PHOSPHATE AND MONOBASIC SODIUM PHOSPHATE 500 MG: 852; 155; 130 TABLET ORAL at 09:02

## 2024-12-29 ASSESSMENT — ENCOUNTER SYMPTOMS
ABDOMINAL PAIN: 0
DIZZINESS: 0
NAUSEA: 0
CHILLS: 0
MYALGIAS: 1
NERVOUS/ANXIOUS: 0
SHORTNESS OF BREATH: 0
SENSORY CHANGE: 0
HEADACHES: 0
FEVER: 0
DIARRHEA: 0

## 2024-12-29 ASSESSMENT — PAIN DESCRIPTION - PAIN TYPE
TYPE: ACUTE PAIN
TYPE: ACUTE PAIN

## 2024-12-29 NOTE — CARE PLAN
The patient is Stable - Low risk of patient condition declining or worsening    Shift Goals  Clinical Goals: pt will maintain normal glucose levels.  Patient Goals: rest and comfort  Family Goals: nilson    Progress made toward(s) clinical / shift goals:  Patient A&Ox4. No s/s of hyper/hypoglycemia noted. Pain management effective. No SOB noted. Turned and repositioned q2 hours. Fluids encouraged.Fall precautions in place.    Problem: Knowledge Deficit - Standard  Goal: Patient and family/care givers will demonstrate understanding of plan of care, disease process/condition, diagnostic tests and medications  Outcome: Progressing     Problem: Fluid Volume  Goal: Fluid volume balance will be maintained  Outcome: Progressing     Problem: Urinary - Renal Perfusion  Goal: Ability to achieve and maintain adequate renal perfusion and functioning will improve  Outcome: Progressing     Problem: Respiratory  Goal: Patient will achieve/maintain optimum respiratory ventilation and gas exchange  Outcome: Progressing     Problem: Pain - Standard  Goal: Alleviation of pain or a reduction in pain to the patient’s comfort goal  Outcome: Progressing     Problem: Fall Risk  Goal: Patient will remain free from falls  Outcome: Progressing     Problem: Acute Care of the Diabetic Patient  Goal: Optimal Outcome for the Diabetic Patient  Outcome: Progressing     Patient is not progressing towards the following goals:      Problem: Skin Integrity  Goal: Skin integrity is maintained or improved  Outcome: Not Progressing

## 2024-12-29 NOTE — PROGRESS NOTES
4 Eyes Skin Assessment Completed by Yajaira SNELL RN and Ana Laura FLORES RN.    Head Scab, forehead and cheek  Ears WDL  Nose WDL  Mouth WDL  Neck WDL  Breast/Chest WDL  Shoulder Blades WDL  Spine skin tags  (R) Arm/Elbow/Hand Abrasion  (L) Arm/Elbow/Hand Abrasion  Abdomen Scar  Groin Redness, inner thiegh excoriation  Scrotum/Coccyx/Buttocks Redness, blanching  (R) Leg Blanching and Scab, discoloration, edema  (L) Leg Redness, Swelling, Abrasion, and Edema, wound to lower leg  (R) Heel/Foot/Toe Redness, Blanching, and Boggy  (L) Heel/Foot/Toe Redness, Blanching, and Boggy          Devices In Places Condom Cath and Nasal Cannula      Interventions In Place NC W/Ear Foams, InterDry, Heel Mepilex, Sacral Mepilex, TAP System, Pillows, Elbow Mepilex, Q2 Turns, Low Air Loss Mattress, Barrier Cream, and Heels Loaded W/Pillows    Possible Skin Injury Yes    Pictures Uploaded Into Epic Yes  Wound Consult Placed Yes  RN Wound Prevention Protocol Ordered Yes

## 2024-12-29 NOTE — PROGRESS NOTES
Infectious Disease Progress Note    Author: Brenda High M.D. Date & Time of service: 2024  7:56 AM    Chief Complaint:  Follow-up for group A streptococcus bacteremia, left lower extremity cellulitis    Interval History:   Tmax 100.4, WBC 26.6.  Leukocytosis increasing.  Patient had fever overnight.  CT of the left leg did not reveal any deep abscess however there is a moderate suprapatellar joint effusion.  Patient has some tenderness and swelling over the right knee.   patient afebrile, WBC 25.9.  MRI of the left lower extremity negative for any abscess nor osteomyelitis, MRI of the knee with small to moderate joint effusion with mild synovitis but no evidence of soft tissue abscess.  Feels okay today   patient afebrile, WBC 23.8.  Patient transferred out of the [facility].  Tolerating antibiotics.  No new symptoms to report.   patient afebrile, WBC 14.9.  Leukocytosis downtrending patient sleeping but arousable.  No new issues    Labs Reviewed, Medications Reviewed, and Wound Reviewed.    Review of Systems:  Review of Systems   Constitutional:  Negative for chills and fever.   Cardiovascular:  Positive for leg swelling.   Gastrointestinal:  Negative for diarrhea.       Hemodynamics:  Temp (24hrs), Av.7 °C (98.1 °F), Min:36.1 °C (97 °F), Max:37.3 °C (99.1 °F)  Temperature: 36.1 °C (97 °F)  Pulse  Av.5  Min: 69  Max: 162   Blood Pressure : 113/67       Physical Exam:  Physical Exam  Vitals and nursing note reviewed.   HENT:      Mouth/Throat:      Comments: Poor dentition  Eyes:      Pupils: Pupils are equal, round, and reactive to light.   Cardiovascular:      Rate and Rhythm: Normal rate. Rhythm irregular.   Pulmonary:      Effort: Pulmonary effort is normal.   Musculoskeletal:      Comments: Left inner thigh edema and erythema improving overall    Left knee swelling and tenderness to palpation.  Warm to palpation but no surrounding erythema    Left calf in Kerlix   Skin:      General: Skin is warm and dry.   Neurological:      General: No focal deficit present.      Mental Status: He is alert and oriented to person, place, and time.         Meds:    Current Facility-Administered Medications:     D5 NS    losartan    dapagliflozin propanediol    polyethylene glycol/lytes    bisacodyl    metoprolol SR    digoxin    senna-docusate **AND** polyethylene glycol/lytes    magnesium hydroxide    D5LR    apixaban    omeprazole    Notify MD and PharmD if FSBG level is less than or equal to 70 mg/dL or patient is showing signs/symptoms of hypoglycemia (tachycardia, palpitations, diaphoresis, clammy, tremulousness, nausea, confused) **AND** Administer 20 grams of glucose (approximately 8 ounces of fruit juice) every 15 minutes PRN FSBS less than 70 mg/dL **AND** dextrose bolus    acetaminophen    oxyCODONE immediate-release **OR** oxyCODONE immediate-release **OR** HYDROmorphone    ondansetron    ondansetron    nicotine **AND** Nicotine Replacement Patient Education Materials **AND** nicotine polacrilex    ampicillin-sulbactam (UNASYN) IV    Labs:  Recent Labs     12/27/24  0112 12/28/24  0550 12/29/24  0414   WBC 23.8* 26.7* 14.9*   RBC 3.38* 4.06* 3.34*   HEMOGLOBIN 9.5* 11.4* 9.2*   HEMATOCRIT 27.9* 34.0* 27.3*   MCV 82.5 83.7 81.7   MCH 28.1 28.1 27.5   RDW 50.2* 52.1* 50.8*   PLATELETCT 189 281 276   MPV 11.9 11.4 10.9   NEUTSPOLYS 88.10* 91.70* 88.50*   LYMPHOCYTES 4.40* 2.90* 5.40*   MONOCYTES 4.70 3.00 4.60   EOSINOPHILS 0.20 0.00 0.20   BASOPHILS 0.20 0.40 0.20     Recent Labs     12/27/24  0112 12/28/24  0550 12/29/24  0333   SODIUM 132* 135 137   POTASSIUM 4.5 4.4 4.0   CHLORIDE 98 98 105   CO2 27 23 24   GLUCOSE 88 114* 87   BUN 11 10 12     Recent Labs     12/27/24  0112 12/28/24  0550 12/29/24  0333   ALBUMIN 2.1* 2.5* 2.1*   TBILIRUBIN 1.4 1.5 0.9   ALKPHOSPHAT 294* 274* 209*   TOTPROTEIN 5.6* 6.7 5.3*   ALTSGPT 13 13 9   ASTSGOT 16 18 17   CREATININE 0.56 0.71 0.72        Imaging:  CT-EXTREMITY, LOWER WITH LEFT    Result Date: 12/24/2024 12/24/2024 3:20 PM HISTORY/REASON FOR EXAM:  concern of possible abscess or nec fasc. TECHNIQUE/EXAM DESCRIPTION AND NUMBER OF VIEWS:  CT scan of the LEFT lower extremity with contrast, with reconstructions. Thin helical 3 mm sections were obtained from the distal femur through the proximal tibia/fibula. Sagittal and coronal multiplanar reconstructions were generated from the axial images. A total of 100 mL of Omnipaque 350 nonionic contrast was administered  IV without complication. Up to date radiation dose reduction adjustments have been utilized to meet ALARA standards for radiation dose reduction. COMPARISON: CT scan of left lower extremity dated 12/21/2024 FINDINGS: There is soft tissue attenuation in the subcutaneous tissues of the left lower leg. There is a small to moderate suprapatellar joint effusion. There is decreased bony mineralization of the left lower extremity. There is no evidence of fracture, dislocation, or other osseous lesion. There is no evidence of acute or chronic osteomyelitis. The musculature of the left lower extremity has a normal appearance and enhancement pattern. There is no evidence of deep abscess formation.     1.  Extensive subcutaneous inflammatory changes about the left lower extremity consistent with deep and superficial cellulitis. 2.  No definite CT evidence of necrotizing fasciitis. 3.  No CT evidence of deep abscess formation or acute or chronic osteomyelitis. 4.  Small to moderate suprapatellar joint effusion.    CT-EXTREMITY, LOWER WITH LEFT    Result Date: 12/21/2024 12/21/2024 11:19 AM HISTORY/REASON FOR EXAM:  concerns for necrotizing fasciitis. Concerns for necrotizing fasciitis TECHNIQUE/EXAM DESCRIPTION AND NUMBER OF VIEWS:  CT scan of the LEFT lower extremity with contrast, with reconstructions. Thin helical 3 mm sections were obtained from the distal femur through the proximal  tibia/fibula. Sagittal and coronal multiplanar reconstructions were generated from the axial images. A total of 100 mL of Omnipaque 350 nonionic contrast was administered  IV without complication. Up to date radiation dose reduction adjustments have been utilized to meet ALARA standards for radiation dose reduction. COMPARISON: None. FINDINGS: Diffuse subcutaneous edema. No discrete fluid collection identified. No soft tissue gas identified. No acute fracture or dislocation.     No soft tissue gas to suggest necrotizing fasciitis. No fluid collection identified.    DX-CHEST-PORTABLE (1 VIEW)    Result Date: 12/21/2024 12/21/2024 12:22 AM HISTORY/REASON FOR EXAM: Shortness of Breath TECHNIQUE/EXAM DESCRIPTION:  Single AP view of the chest. COMPARISON: Yesterday FINDINGS: Overlying cardiac leads are present. Cardiomegaly is observed. Atherosclerotic calcification of the aorta is noted.  The central pulmonary vasculature appears normal. Bilateral lung volumes are diminished.  Hazy left pulmonary opacity is seen. Blunting of the left costophrenic angle is seen suggesting trace left effusion. The bony structures appear age-appropriate.     1.  Hazy left pulmonary infiltrates, similar to prior study 2.  Trace left pleural effusion, stable 3.  Cardiomegaly 4.  Atherosclerosis    DX-CHEST-PORTABLE (1 VIEW)    Result Date: 12/20/2024 12/20/2024 12:32 AM HISTORY/REASON FOR EXAM: Shortness of Breath TECHNIQUE/EXAM DESCRIPTION:  Single AP view of the chest. COMPARISON: Yesterday FINDINGS: Overlying cardiac leads are present. Cardiomegaly is observed. Atherosclerotic calcification of the aorta is noted.  The central pulmonary vasculature appears normal. Bilateral lung volumes are diminished.  Hazy left pulmonary opacity is seen. Blunting of the left costophrenic angle is seen suggesting trace left effusion. The bony structures appear age-appropriate.     1.  Hazy left pulmonary infiltrates, similar to prior study 2.  Trace  left pleural effusion, stable 3.  Cardiomegaly 4.  Atherosclerosis    EC-ECHOCARDIOGRAM COMPLETE W/O CONT    Result Date: 2024  Transthoracic Echo Report Echocardiography Laboratory CONCLUSIONS Severely reduced left ventricular systolic function. Severe mitral regurgitation. Severe tricuspid regurgitation due to dilated annulus. Estimated right ventricular systolic pressure is 55 mmHg. HUNTER STEELE Exam Date:         2024                    15:40 Exam Location:     Inpatient Priority:          Routine Ordering Physician:        PAULINA GABRIEL Referring Physician:       PAULINA GABRIEL Sonographer:               Shawn CAMPBELL Age:    74     Gender:    M MRN:    [id number] :    1950 BSA:    2.02   Ht (in):    72     Wt (lb):    176 Exam Type:     Complete Indications:     Unspecified systolic (congestive) heart failure ICD Codes:       I50.20 CPT Codes:       12541 BP:   94     /   62     HR:   120 Technical Quality:       Good MEASUREMENTS  (Male / Female) Normal Values 2D ECHO LV Diastolic Diameter PLAX        5.5 cm                4.2 - 5.9 / 3.9 - 5.3 cm LV Systolic Diameter PLAX         4.3 cm                2.1 - 4.0 cm IVS Diastolic Thickness           1 cm                  LVPW Diastolic Thickness          1 cm                  LVOT Diameter                     2.1 cm                Estimated LV Ejection Fraction    28 %                  LV Ejection Fraction MOD BP       40.1 %                >= 55  % LV Ejection Fraction MOD 4C       27.1 %                LV Ejection Fraction MOD 2C       44.5 %                LV Ejection Fraction 4C AL        28.8 %                LV Ejection Fraction 2C AL        45.4 %                LA Volume Index                   61.1 cm3/m2           16 - 28 cm3/m2 DOPPLER AV Peak Velocity                  1.1 m/s               AV Peak Gradient                  5.2 mmHg              AV Mean Gradient                  3 mmHg                LVOT Peak Velocity                 0.93 m/s              AV Area Cont Eq vti               2.3 cm2               MV Velocity Time Integral         17.8 cm               MR Flow Convergence Radius        1.1 cm                MR ERO PISA                       0.46 cm2              MR Regurgitant Volume PISA        51.1 cm3              MR Flow Area                      7.6 cm2               TR Peak Velocity                  328 cm/s              PV Peak Velocity                  0.67 m/s              PV Peak Gradient                  1.8 mmHg              RVOT Peak Velocity                0.53 m/s              * Indicates values subject to auto-interpretation LV EF:  28    % FINDINGS Left Ventricle Normal left ventricular chamber is dilated. Normal left ventricular wall thickness. Severely reduced left ventricular systolic function. The left ventricular ejection fraction is visually estimated to be 25- 30%. Global hypokinesis with regional variation. A reliable estimation of diastolic function cannot be made due to mitral valve disease. Right Ventricle The right ventricle is dilated. Normal right ventricular systolic function. Right Atrium Enlarged right atrium. Dilated inferior vena cava with inspiratory collapse. Left Atrium Severely dilated left atrium. Left atrial volume index is 58 mL/sq m. Intact atrial septum without Doppler evidence of interatrial shunt (e.g. PFO or ASD). Mitral Valve Structurally normal mitral valve without significant stenosis. Severe mitral regurgitation. ERO by PISA method is 0.54 sq cm. Vena contracta is 0.5 cm. Aortic Valve Tricuspid aortic valve. Structurally normal aortic valve without significant stenosis or regurgitation. Tricuspid Valve Structurally normal tricuspid valve without significant stenosis. Severe tricuspid regurgitation due to dilated annulus. Right atrial pressure is estimated to be 8 mmHg. Estimated right ventricular systolic pressure is 55 mmHg. Pulmonic Valve Structurally normal pulmonic  valve without significant stenosis. Mild pulmonic insufficiency. Pericardium Trivial pericardial effusion. Aorta Normal aortic root for body surface area. The ascending aorta diameter is 3.5 cm. Arie Jones MD (Electronically Signed) Final Date:     19 December 2024                 17:17    CT-CHEST,ABDOMEN,PELVIS WITH    Result Date: 12/19/2024 12/19/2024 2:02 PM HISTORY/REASON FOR EXAM:  gastric cancer, recent partial gastrectomy. TECHNIQUE/EXAM DESCRIPTION: CT scan of the chest, abdomen and pelvis with contrast. Thin-section helical scanning was obtained with intravenous contrast from the lung apices through the pubic symphysis to include the chest, abdomen and pelvis. 90 mL of Omnipaque 350 nonionic contrast was administered intravenously without complication. Low dose optimization technique was utilized for this CT exam including automated exposure control and adjustment of the mA and/or kV according to patient size. COMPARISON: CT of the abdomen and pelvis, 10/28/2024. CTA of the pulmonary arteries, 10/28/2024. FINDINGS: CT Chest: Lungs: Stable spiculated opacity in the right upper lobe measuring 2.1 cm in diameter. Asymmetric volumes of the lungs, small on the left and the right. Areas of scarring and/or atelectasis in the lingula and left lower lobe, with a small left pleural effusion. No right pleural effusion. No pneumothorax bilaterally. Cardiac: The heart size is enlarged. No pericardial effusion. Atherosclerotic calcifications in the coronary arteries. Mediastinum/alice: No mediastinal or hilar adenopathy. No obstructing endobronchial lesion. Vascular: Atherosclerotic calcifications in the aorta, without aneurysmal dilation. Soft tissues: Cachexia. Bones: No acute or destructive process. Decreased bone mineralization. CT Abdomen and Pelvis: Liver: Stable multiple small subcentimeter lucencies throughout the liver parenchyma. Perihepatic ascites. Spleen: Unremarkable. Pancreas: Unremarkable.  Gallbladder: The gallbladder has been resected. Biliary: Nondilated. Adrenal glands: Normal. Kidneys: Unremarkable without hydronephrosis. Bowel: No obstruction or acute inflammation. Postsurgical changes of gastric bypass surgery. Lymph nodes: There are enlarged left inguinal lymph nodes with central necrosis. The largest lymph node measures 1.7 cm in diameter. There is edema involving the soft tissues of the proximal left leg. Vasculature: Atherosclerotic calcifications in the aorta and iliac arteries, without aneurysmal dilation. Ascites: Moderate amount of ascites throughout the abdomen and pelvis. Pelvis: There is pelvic ascites. There is a Paulino catheter in the bladder lumen. There is circumferential bladder wall thickening. Prostate gland and seminal vesicles are unremarkable. Musculoskeletal: No acute or destructive process. Decreased bone mineralization. Degenerative disc disease at L5-S1.     1. Stable spiculated opacity in the right upper lobe, extending to the pleural surface. 2. Lingular and left lower lobe parenchymal airspace disease has improved in the interval, but has not completely resolved. There is pleural reaction, suggesting some of these areas may represent parenchymal scarring. 3. Asymmetry of the lung volumes, small on the left than the right. 4. Cardiomegaly with atherosclerotic calcifications in the aorta and coronary arteries. 5. Postsurgical changes in the stomach. 6. Stable scattered multiple hypodense lesions throughout the liver. 7. Moderate ascites throughout the abdomen and pelvis. 8. Cachexia. 9. Enlarged left inguinal lymph nodes with central necrosis. There is edema involving the soft tissues of the upper left leg extending to the left pelvis.    US-EXTREMITY VENOUS LOWER BILAT    Result Date: 12/19/2024   Vascular Laboratory  CONCLUSIONS  Compared to the prior study on 4/6/20.  Normal bilateral lower extremity venous duplex exam.  HUNTER STEELE  Exam Date:     12/19/2024 07:54   Room #:     Inpatient  Priority:     Stat  Ht (in):             Wt (lb):  Ordering Physician:        LAVELLE GANDARA  Referring Physician:       [id number] Ennis  Sonographer:               Malik Clifton RVT  Study Type:                Complete Bilateral  Technical Quality:         Adequate  Age:    74    Gender:     M  MRN:    [id number]  :    1950      BSA:  Indications:     Edema  CPT Codes:       52130  ICD Codes:       782.3  History:         Edema  Limitations:  PROCEDURES:  Bilateral lower extremity venous duplex imaging.  The following venous structures were evaluated: common femoral, deep  femoral, proximal great saphenous, femoral, popliteal, peroneal, and  posterior tibial veins.  Serial compression, color, and spectral Doppler flow evaluations were  performed.  FINDINGS:  Bilateral lower extremities.  The peroneal and posterior tibial veins are difficult to assess for  compressibility, but flow response to augmentation is demonstrated.  All other veins demonstrate complete color filling and compressibility with  normal venous flow dynamics including spontaneous flow and respiratory  phasicity.  No deep venous thrombosis.  Rustam Nazario MD  (Electronically Signed)  Final Date:      2024                   09:00    DX-CHEST-PORTABLE (1 VIEW)    Result Date: 2024 6:26 AM HISTORY/REASON FOR EXAM: Shortness of Breath TECHNIQUE/EXAM DESCRIPTION:  Single AP view of the chest. COMPARISON: 2024 FINDINGS: The cardiac silhouette appears within normal limits. The mediastinal contour appears within normal limits.  The central pulmonary vasculature appears normal. Bilateral lung volumes are diminished.  Hazy left midlung pulmonary opacity is seen Blunting of the left costophrenic angle is seen suggesting trace left effusion. The bony structures appear age-appropriate.     1.  Hazy left pulmonary infiltrates. 2.  Small left pleural  effusion 3.  Cardiomegaly    CT-CSPINE WITHOUT PLUS RECONS    Result Date: 11/29/2024 11/29/2024 10:01 AM HISTORY/REASON FOR EXAM: Polytrauma, blunt; s/p fall - patient on anticoagulation; Neck pain from ground level fall COMPARISON: CT chest 10/28/2024 and 4/26/2020. TECHNIQUE/EXAM DESCRIPTION: CT cervical spine without contrast, with reconstructions. The study was performed on a helical multidetector CT scanner. Thin-section helical scanning was performed from the skull base through T1. Sagittal and coronal multiplanar reconstructions were generated from the axial images. Low dose optimization technique was utilized for this CT exam including automated exposure control and adjustment of the mA and/or kV according to patient size. FINDINGS: Carotid vascular calcifications. 2.5 x 1.5 cm right lung base spiculated density posteriorly is not significantly changed from 4/26/2020. No paraspinal mass. Severe degenerative changes. Straightening of the normal cervical lordosis may be related to muscle spasm or positioning. Multilevel mild congenital spinal stenosis. Soft tissue detail adjacent to bony structures is limited on CT images. No fractures visible. Incomplete bony union C1 posterior arch is congenital and incidental. The odontoid appears normal on coronal reformatted images.     Impression: 1. No cervical spine fracture or subluxation to T1 evident. 2.. Straightening of the normal cervical lordosis may be related to muscle spasm or positioning. 3. Prominent degenerative changes.     CT-HEAD W/O    Result Date: 11/29/2024  Examination: 11/29/2024 10:00 AM HISTORY/REASON FOR EXAM:  Trauma with loss of consciousness. COMPARISON: None available. TECHNIQUE/EXAM DESCRIPTION: CT of the head without contrast. CT head protocol utilized without contrast. CT performed using ALARA principles (as low as reasonably achievable). Sagittal and coronal reformatted images included. FINDINGS: Forehead scalp soft tissue swelling. No  fracture is visible. Age expected atrophy and chronic white matter microvascular ischemic change type lucency noted. No mass, mass effect, hydrocephalus or hemorrhage evident.  No low density to indicate acute stroke.  No convexity fluid collection. The visible skull base sinuses do not show any fluid. Mastoids in the field of view are unremarkable.     Impression: 1. No acute process in the brain evident. 2. Forehead scalp soft tissue swelling. No fracture is visible.       Micro:  Results       Procedure Component Value Units Date/Time    BLOOD CULTURE [[id number]] Collected: 12/21/24 0816    Order Status: Completed Specimen: Blood from Peripheral Updated: 12/26/24 1100     Significant Indicator NEG     Source BLD     Site PERIPHERAL     Culture Result No growth after 5 days of incubation.    BLOOD CULTURE [[id number]] Collected: 12/21/24 0820    Order Status: Completed Specimen: Blood from Peripheral Updated: 12/26/24 1100     Significant Indicator NEG     Source BLD     Site PERIPHERAL     Culture Result No growth after 5 days of incubation.            Assessment:  Active Hospital Problems    Diagnosis     *Acute exacerbation of CHF (congestive heart failure) ([facility]) [I50.9]     Streptococcal bacteremia [R78.81, B95.5]     Hypoglycemia [E16.2]     Bacteremia [R78.81]     Pulmonary hypertension ([facility]) [I27.20]     Acute respiratory failure with hypoxia ([facility]) [J96.01]     Malignant neoplasm of body of stomach ([facility]) [C16.2]     Homeless [Z59.00]     Severe protein-calorie malnutrition ([facility]) [E43]     COPD (chronic obstructive pulmonary disease) ([facility]) [J44.9]     Acute kidney injury ([facility]) [N17.9]     Sepsis ([facility]) [A41.9]     Atrial fibrillation with RVR ([facility]) [I48.91]     Thrombocytopenia ([facility]) [D69.6]     Tobacco dependence [F17.200]     Hypertension [I10]     Wound of lower extremity [S81.809A]       74 y.o. 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 Reddy admitted on 12/19/2024 secondary to encephalopathy, shortness of breath and left leg swelling.  Patient found to have group A streptococcal bacteremia secondary to left lower extremity cellulitis.  Repeat blood cultures on 12/21 are negative to date.  Hospital course complicated by worsening leukocytosis.     Pertinent diagnoses:  Group A streptococcus bacteremia, secondary to below  Left lower extremity cellulitis, slowly improving  Left patellar joint effusion, noted on MRI  Persistent leukocytosis, improved today  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   Homelessness    Plan:  -Continue IV Unasyn through 1/4/2025  -Will plan a 7-day course of IV clindamycin from 12/24 with stop date of 12/31/2024  -Blood cultures on 12/19+ group A streptococcus  -Repeat blood cultures on 12/21-negative to date  -Continue wound care  -CT of the left lower extremity -significant cellulitis, and moderate suprapatellar joint effusion but no no evidence of abscess  - MRI of the left leg-cellulitis, no abscess nor osteomyelitis  -MRI of the knee-moderate joint effusion with synovitis  -Ortho not recommending aspiration of suprapatellar effusion due to risk of seeding infection  -Monitor labs  -If patient has any worsening left knee pain, swelling and worsening leukocytosis, recommend reevaluation by orthopedic surgery for aspiration versus surgical washout  -Anticipate a 14-day course of antibiotics for bacteremia from 12/21 with stop date of 01/04/2025    This infection poses a threat to life and further limb function     Disposition: SNF placement.  Patient is homeless     Plan of care discussed with hospitalist, Dr. Campos.  ID signing off.  Please reconsult if needed

## 2024-12-29 NOTE — PROGRESS NOTES
Hospital Medicine Daily Progress Note    Date of Service  12/29/2024    Chief Complaint  Short of breath    Hospital Course  Robert Mcdonnell is a 73yo male with a PMH homelessness, atrial fibrillation on chronic anticoagulation with apixaban, CHF, EF 30%, P.Htn, RVSP 65, remote PE, recent hospitalization 10/3 - 10/15 for perforated  (T2N0M0 invasive gastric adenocarcinoma), GDA embolization, subsequent subtotal gastrectomy, liver wedge resection, Miguel Angel-en-Y gastrojejunostomy with omental flap and cholecystectomy 10/7 with pathology showing invasive well-differentiated adenocarcinoma with associated ulceration.  There was tumor invasion into the muscularis propria and 20 lymph nodes were negative for metastatic carcinoma.  Oncology was consulted and noted no evidence of metastatic disease however MRI abdomen recommended for follow-up; no adjuvant therapy was recommended.  He presented 12/19/2024 to the ED with report of several days of increasing dyspnea, worsening bilateral lower extremity edema and mild cough.  Patient was found to be hypoglycemic on admission with a glucose of 53 for which she was placed on D50.  He had increasing shortness of breath and required BiPAP in the emergency room.  Initial proBNP 21,696 with an elevated lactic acid of 5.6 and a creatinine of 2.3.  He had not been taking his Lasix as he had run out of it but had been on his Eliquis.  EKG showed irregular wide-complex rhythm.    Blood culture positive for group A streptococcus bacteremia, likely due to LLE infection.  Also found Left knee-moderate joint effusion.  ID consulted, continue Unasyn and clindamycin.    CT left leg shows small to moderate suprapatellar joint effusion.  MRI LLE showed Diffuse cellulitis and patchy myositis, negative for osteomyelitis or abscess.   Ortho consulted, no surgery at this point given bacteremia.     The patient continues to have hypoglycemic episodes and was encouraged him to eat and drink his supplement  christelle.    Interval Problem Update  I have seen and examined the patient at bedside    Hypoglycemia improving, I discontinued the D5 LR.  Encourage oral intake    Constipation -had a bowel movement to 12/27.  Continue bowel management    Bacteremia -I discussed with ID, plan for 7-day course of IV clindamycin with stop date 12/31/2024; continue IV Unasyn for 14 days course until 1/4/2025    Leukocytosis is significantly improved -WBC 26>14,    CHF with EF of 30% -BP tolerates, I started spironolactone.  Continue losartan and Farxiga, continue Toprol    I have discussed this patient's plan of care and discharge plan at IDT rounds today with Case Management, Nursing, Nursing leadership, and other members of the IDT team.    Code Status  Full Code    Disposition  The patient is not medically cleared for discharge to home or a post-acute facility.      I have placed the appropriate orders for post-discharge needs.    Review of Systems  Review of Systems   Constitutional:  Positive for malaise/fatigue. Negative for chills and fever.   Respiratory:  Negative for shortness of breath.    Cardiovascular:  Positive for leg swelling.   Gastrointestinal:  Negative for abdominal pain and nausea.   Musculoskeletal:  Positive for myalgias (left median thigh).   Neurological:  Negative for dizziness, sensory change and headaches.   Psychiatric/Behavioral:  The patient is not nervous/anxious.         Physical Exam  Temp:  [36.1 °C (97 °F)-36.9 °C (98.5 °F)] 36.8 °C (98.2 °F)  Pulse:  [81-87] 87  Resp:  [17-18] 18  BP: (102-114)/(58-68) 114/68  SpO2:  [93 %-94 %] 93 %    Physical Exam  Constitutional:       Appearance: Normal appearance.   HENT:      Head: Normocephalic and atraumatic.   Eyes:      Extraocular Movements: Extraocular movements intact.      Conjunctiva/sclera: Conjunctivae normal.   Cardiovascular:      Rate and Rhythm: Regular rhythm. Tachycardia present.      Heart sounds: No murmur heard.  Pulmonary:      Effort: No  respiratory distress.      Breath sounds: No wheezing.   Abdominal:      General: Bowel sounds are normal. There is no distension.      Palpations: Abdomen is soft.      Tenderness: There is no abdominal tenderness.   Musculoskeletal:         General: Tenderness (less compared to 12/25 exam.) present.      Cervical back: Neck supple.      Right lower leg: No edema.      Left lower leg: Edema present.   Skin:     Findings: Erythema (decrease in erythema compared to prior exams) present.      Comments: Skin denuding on lower leg. Wounds on scrotum and medial thigh.  Reviewed on patient and in pictures with wound care RN present   Neurological:      General: No focal deficit present.      Mental Status: He is alert and oriented to person, place, and time.      Cranial Nerves: No cranial nerve deficit.   Psychiatric:         Mood and Affect: Mood normal.         Behavior: Behavior normal.         Thought Content: Thought content normal.         Fluids    Intake/Output Summary (Last 24 hours) at 12/29/2024 1532  Last data filed at 12/29/2024 0500  Gross per 24 hour   Intake 240 ml   Output 1150 ml   Net -910 ml        Laboratory  Recent Labs     12/27/24  0112 12/28/24  0550 12/29/24  0414   WBC 23.8* 26.7* 14.9*   RBC 3.38* 4.06* 3.34*   HEMOGLOBIN 9.5* 11.4* 9.2*   HEMATOCRIT 27.9* 34.0* 27.3*   MCV 82.5 83.7 81.7   MCH 28.1 28.1 27.5   MCHC 34.1 33.5 33.7   RDW 50.2* 52.1* 50.8*   PLATELETCT 189 281 276   MPV 11.9 11.4 10.9     Recent Labs     12/27/24  0112 12/28/24  0550 12/29/24  0333   SODIUM 132* 135 137   POTASSIUM 4.5 4.4 4.0   CHLORIDE 98 98 105   CO2 27 23 24   GLUCOSE 88 114* 87   BUN 11 10 12   CREATININE 0.56 0.71 0.72   CALCIUM 7.5* 8.0* 7.4*                   Imaging  [id number] VIEW   Final Result      No evidence of bowel obstruction. Mild constipation.      MR-KNEE-WITH & W/O LEFT   Final Result      1.  No evidence of marrow edema or abnormal enhancement to suggest presence of osteomyelitis.       2.  Small to moderate joint effusion with mild synovitis.      3.  Diffuse soft tissue edema/induration consistent with cellulitis.      4.  Small popliteal cyst.      5.  No evidence of soft tissue abscess.      6.  Markedly motion degraded examination. Ligaments and menisci are inadequately evaluated due to patient motion.      MR-LOWER EXTREMITY-NO JOINT-W/O LEFT   Final Result      1.  Patient motion degraded exam.      2.  No evidence of marrow edema or bone lesion to suggest presence of osteomyelitis.      3.  Diffuse cellulitis and patchy myositis.      4.  No evidence of focal fluid collection to suggest presence of abscess.      CT-EXTREMITY, LOWER WITH LEFT   Final Result      1.  Extensive subcutaneous inflammatory changes about the left lower extremity consistent with deep and superficial cellulitis.      2.  No definite CT evidence of necrotizing fasciitis.      3.  No CT evidence of deep abscess formation or acute or chronic osteomyelitis.      4.  Small to moderate suprapatellar joint effusion.      CT-EXTREMITY, LOWER WITH LEFT   Final Result      No soft tissue gas to suggest necrotizing fasciitis.      No fluid collection identified.      DX-CHEST-PORTABLE (1 VIEW)   Final Result         1.  Hazy left pulmonary infiltrates, similar to prior study   2.  Trace left pleural effusion, stable   3.  Cardiomegaly   4.  Atherosclerosis      DX-CHEST-PORTABLE (1 VIEW)   Final Result         1.  Hazy left pulmonary infiltrates, similar to prior study   2.  Trace left pleural effusion, stable   3.  Cardiomegaly   4.  Atherosclerosis      EC-ECHOCARDIOGRAM COMPLETE W/O CONT   Final Result      CT-CHEST,ABDOMEN,PELVIS WITH   Final Result      1. Stable spiculated opacity in the right upper lobe, extending to the pleural surface.   2. Lingular and left lower lobe parenchymal airspace disease has improved in the interval, but has not completely resolved. There is pleural reaction, suggesting some of these areas  may represent parenchymal scarring.   3. Asymmetry of the lung volumes, small on the left than the right.   4. Cardiomegaly with atherosclerotic calcifications in the aorta and coronary arteries.   5. Postsurgical changes in the stomach.   6. Stable scattered multiple hypodense lesions throughout the liver.   7. Moderate ascites throughout the abdomen and pelvis.   8. Cachexia.   9. Enlarged left inguinal lymph nodes with central necrosis. There is edema involving the soft tissues of the upper left leg extending to the left pelvis.      US-EXTREMITY VENOUS LOWER BILAT   Final Result      DX-CHEST-PORTABLE (1 VIEW)   Final Result         1.  Hazy left pulmonary infiltrates.   2.  Small left pleural effusion   3.  Cardiomegaly      OE-[id number]-WITH & W/O LEFT    (Results Pending)        Assessment/Plan  * Acute exacerbation of CHF (congestive heart failure) (HCC)- (present on admission)  Assessment & Plan  EF of 30%   due to afib rvr and recovering pna  Careful force diuresis with sepsis  Digoxin IV for reduced EF and BiV failure w/ afib  Now improved off Bipap  Maintain euvolemia.  Holding diuretic therapy due to ongoing hypotension    12/27 BP better, I restarted metoprolol 25 mg  12/28 BP good, I started losartan and Farxiga, continue Toprol  12/29 -BP tolerates, I started spironolactone.  Continue losartan and Farxiga, continue Toprol  Monitor vital signs closely  May add other GDMT if tolerates    Constipation  Assessment & Plan  I ordered a bowel management  Suppository today    Hypoglycemia  Assessment & Plan  He found to hypoglycemia  Hypoglycemic protocol  He has very poor oral intake.  Supplements as tolerates (ordered 12/24)  Remains on D10.    12/27 frequent episodes of hypoglycemia, BG down to 40s  Encourage oral intake  I ordered Ensure  Continue D5 LR  Nutrition consulted      Streptococcal bacteremia- (present on admission)  Assessment & Plan  Likely skin wound source.  Discussed with wound  care    12/29 plan for 7-day course of IV clindamycin with stop date 12/31/2024; continue IV Unasyn for 14 days course until 1/4/2025  I discussed with ID  WBC 26>14,  I ordered a.m. CBC to monitor      Pulmonary hypertension (HCC)  Assessment & Plan  Previous Echo RVSP 65, severe TR  Suspect mainly Group 2  RV perfusion with MAP > 65 as need norepinepinephrine  Blood pressure is running on the lower side holding diuretic therapy at this time    Acute respiratory failure with hypoxia (HCC)- (present on admission)  Assessment & Plan  Improved off Bipap  Currently is on 2 L of oxygen to maintain oxygen saturation  12/25 on room air.  Mobilize as able.    Hypophosphatemia- (present on admission)  Assessment & Plan  Replaced    Malignant neoplasm of body of stomach (HCC)- (present on admission)  Assessment & Plan  T2, N0, M0 invasive gastric adenocarcinoma presenting as perforated  10/2024  -GDA embolization  -10/7 subtotal gastrectomy, liver wedge resection, Miguel Angel-en-Y gastrojejunostomy with omental flap and cholecystectomy  -pathology invasive well-differentiated adenocarcinoma with associated ulceration  -tumor invasion into the muscularis propria and 20 lymph nodes were negative for metastatic carcinoma  -Oncology was consulted and noted no evidence of metastatic disease  -MRI abdomen recommended for follow-up; no adjuvant therapy was recommended  He will need outpatient follow-up    Homeless- (present on admission)  Assessment & Plan   consultation for ongoing access to medications and follow-up care as appropriate.  States family in [place] but states they cannot house him nor give assistance     Severe protein-calorie malnutrition (HCC)- (present on admission)  Assessment & Plan  Discussed with nutrition, patient meets the ASPEN criteria of severe malnutrition  Continuous nutrition support    COPD (chronic obstructive pulmonary disease) (HCC)- (present on admission)  Assessment & Plan  Current  active smoker, no PFTs on file  RT protocols, bronchodilators as needed  No signs of exacerbation    Acute kidney injury (HCC)- (present on admission)  Assessment & Plan  Resolved   Maintain euvolemia and monitor fluid responsiveness (avoid NaCL and renal congestion)  Monitor urine output and I&O's  Avoid and review nephrotoxin medication  Renal function improved    Sepsis (HCC)- (present on admission)  Assessment & Plan  Skin infection/ Pneumonia  Antibiotics  Strep pyogenes Group A   Unasyn  Positive 12/19 blood cultures  Repeat blood cultures negative  12/25 discussed with Dr High and reconsulted Dr Flores (ortho)  12/25 Stat MRI given finding of suprapatellar fluid collection and knee pain.  Ortho did not want to tap knee with possible cellulitis in area as of yet.    Atrial fibrillation with RVR ([facility])- (present on admission)  Assessment & Plan  Chronic atrial fibrillation, currently tachycardia  Digoxin 250 mcg IV monitoring closely with flip to prevent digoxin toxicity    Continue digoxin and Eliquis  12/27 BP better, I resumed Toprol 25 mg    Thrombocytopenia ([facility])- (present on admission)  Assessment & Plan  He found to have thrombocytopenia and platelet count is trending down.  Resolving, suspect acute inflammatory process  12/26 Plt: 142 <87 <72  I am continue Eliquis for now for stroke prevention  Ordered CBC for tomorrow.      Tobacco dependence- (present on admission)  Assessment & Plan  Counseling when appropriate    Hypertension- (present on admission)  Assessment & Plan  Currently is hypotensive holding metoprolol and goal-directed medical therapy    Wound of lower extremity- (present on admission)  Assessment & Plan  Likely source of infection, lower ext doppler negative  CT left leg shows small to moderate suprapatellar joint effusion.  MRI LLE showed Diffuse cellulitis and patchy myositis, negative for osteomyelitis or abscess.     Orthopedic consultation 12/20 for possible drainage of  bullae and bacteremia  Ortho reconsulted 12/25.  MRI leg/knee 12/6: Small to moderate joint effusion with mild synovitis. Cellulitis  ID request tap of knee however ortho does not wish to seed joint upon tap if cellulitis is a possibility.    Continue Unasyn and clindamycin  I discussed with ID.  May reconsult Ortho if no improvement    Ortho consulted, no surgery at this point given bacteremia.     ACP (advance care planning)- (present on admission)  Assessment & Plan  Patient admitted with CHF exacerbation, significantly reduced the EF of 30%.  Also found bacteremia, LLE infection, severe malnutrition.  History of invasive gastric malignancy, A-fib RVR, PE, gastric perforation post extensive surgery including subtotal gastrectomy, liver resection  Miguel Angel-en-Y gastrojejunostomy with omental flap and cholecystectomy.  I discussed the CODE STATUS with him, including CPR, intubation/mechanical ventilation.  He wants to stay full code.  Patient has high complexity and guarded prognosis.  I consulted palliative care.  ACP 18 minutes.          VTE prophylaxis: Eliquis    I have performed a physical exam and reviewed and updated ROS and Plan today (12/29/2024). In review of yesterday's note (12/28/2024), there are no changes except as documented above.    I spent greater than 52 minutes for chart review, obtaining history independently, performing medically appropriate examination,  documenting , ordering medications, tests, or procedures, referring and communicating with other health care professionals, Independently interpreting results and communicating results with patient/family/caregiver. More than 50% of time was spent in face-to-face clinical encounter.     VTE Selection

## 2024-12-29 NOTE — PROGRESS NOTES
4 Eyes Skin Assessment Completed by JANNETTE Jean and JANNETTE Clark.    Head Scab to forehead and cheek  Ears WDL  Nose WDL  Mouth WDL  Neck WDL  Breast/Chest WDL  Shoulder Blades WDL  Spine Redness and Blanching  (R) Arm/Elbow/Hand Abrasion  (L) Arm/Elbow/Hand Abrasion  Abdomen Scar  Groin skin tear  Scrotum/Coccyx/Buttocks Redness, Blanching, and Excoriation  (R) Leg Redness, Blanching, Scab, and Edema  (L) Leg Redness, Blanching, and Edema, inner thigh wound  (R) Heel/Foot/Toe Redness, Blanching, and Boggy  (L) Heel/Foot/Toe Redness, Blanching, and Boggy          Devices In Places Condom Cath      Interventions In Place InterDry, Heel Mepilex, Sacral Mepilex, TAP System, Pillows, Elbow Mepilex, Q2 Turns, Low Air Loss Mattress, and Heels Loaded W/Pillows    Possible Skin Injury Yes    Pictures Uploaded Into Epic Yes  Wound Consult Placed Yes  RN Wound Prevention Protocol Ordered Yes

## 2024-12-30 ENCOUNTER — APPOINTMENT (OUTPATIENT)
Dept: RADIOLOGY | Facility: MEDICAL CENTER | Age: 74
DRG: 871 | End: 2024-12-30
Payer: MEDICARE

## 2024-12-30 LAB
ALBUMIN SERPL BCP-MCNC: 2.3 G/DL (ref 3.2–4.9)
ALBUMIN/GLOB SERPL: 0.7 G/DL
ALP SERPL-CCNC: 247 U/L (ref 30–99)
ALT SERPL-CCNC: 11 U/L (ref 2–50)
ANION GAP SERPL CALC-SCNC: 8 MMOL/L (ref 7–16)
AST SERPL-CCNC: 23 U/L (ref 12–45)
BASOPHILS # BLD AUTO: 0.2 % (ref 0–1.8)
BASOPHILS # BLD: 0.03 K/UL (ref 0–0.12)
BILIRUB SERPL-MCNC: 0.9 MG/DL (ref 0.1–1.5)
BUN SERPL-MCNC: 11 MG/DL (ref 8–22)
CALCIUM ALBUM COR SERPL-MCNC: 8.6 MG/DL (ref 8.5–10.5)
CALCIUM SERPL-MCNC: 7.2 MG/DL (ref 8.5–10.5)
CHLORIDE SERPL-SCNC: 103 MMOL/L (ref 96–112)
CO2 SERPL-SCNC: 25 MMOL/L (ref 20–33)
CREAT SERPL-MCNC: 0.78 MG/DL (ref 0.5–1.4)
EOSINOPHIL # BLD AUTO: 0.03 K/UL (ref 0–0.51)
EOSINOPHIL NFR BLD: 0.2 % (ref 0–6.9)
ERYTHROCYTE [DISTWIDTH] IN BLOOD BY AUTOMATED COUNT: 51.5 FL (ref 35.9–50)
GFR SERPLBLD CREATININE-BSD FMLA CKD-EPI: 93 ML/MIN/1.73 M 2
GLOBULIN SER CALC-MCNC: 3.1 G/DL (ref 1.9–3.5)
GLUCOSE BLD STRIP.AUTO-MCNC: 92 MG/DL (ref 65–99)
GLUCOSE SERPL-MCNC: 67 MG/DL (ref 65–99)
HCT VFR BLD AUTO: 26.4 % (ref 42–52)
HGB BLD-MCNC: 8.9 G/DL (ref 14–18)
IMM GRANULOCYTES # BLD AUTO: 0.11 K/UL (ref 0–0.11)
IMM GRANULOCYTES NFR BLD AUTO: 0.8 % (ref 0–0.9)
LYMPHOCYTES # BLD AUTO: 0.73 K/UL (ref 1–4.8)
LYMPHOCYTES NFR BLD: 5.1 % (ref 22–41)
MAGNESIUM SERPL-MCNC: 2 MG/DL (ref 1.5–2.5)
MCH RBC QN AUTO: 27.9 PG (ref 27–33)
MCHC RBC AUTO-ENTMCNC: 33.7 G/DL (ref 32.3–36.5)
MCV RBC AUTO: 82.8 FL (ref 81.4–97.8)
MONOCYTES # BLD AUTO: 0.74 K/UL (ref 0–0.85)
MONOCYTES NFR BLD AUTO: 5.2 % (ref 0–13.4)
NEUTROPHILS # BLD AUTO: 12.56 K/UL (ref 1.82–7.42)
NEUTROPHILS NFR BLD: 88.5 % (ref 44–72)
NRBC # BLD AUTO: 0 K/UL
NRBC BLD-RTO: 0 /100 WBC (ref 0–0.2)
PHOSPHATE SERPL-MCNC: 2.8 MG/DL (ref 2.5–4.5)
PLATELET # BLD AUTO: 288 K/UL (ref 164–446)
PMV BLD AUTO: 10.3 FL (ref 9–12.9)
POTASSIUM SERPL-SCNC: 3.8 MMOL/L (ref 3.6–5.5)
PROT SERPL-MCNC: 5.4 G/DL (ref 6–8.2)
RBC # BLD AUTO: 3.19 M/UL (ref 4.7–6.1)
SODIUM SERPL-SCNC: 136 MMOL/L (ref 135–145)
WBC # BLD AUTO: 14.2 K/UL (ref 4.8–10.8)

## 2024-12-30 PROCEDURE — 700102 HCHG RX REV CODE 250 W/ 637 OVERRIDE(OP): Performed by: STUDENT IN AN ORGANIZED HEALTH CARE EDUCATION/TRAINING PROGRAM

## 2024-12-30 PROCEDURE — 99497 ADVNCD CARE PLAN 30 MIN: CPT | Performed by: NAPRAPATH

## 2024-12-30 PROCEDURE — 700111 HCHG RX REV CODE 636 W/ 250 OVERRIDE (IP): Mod: JZ,JG | Performed by: INTERNAL MEDICINE

## 2024-12-30 PROCEDURE — 80053 COMPREHEN METABOLIC PANEL: CPT

## 2024-12-30 PROCEDURE — 99223 1ST HOSP IP/OBS HIGH 75: CPT | Mod: 25 | Performed by: NAPRAPATH

## 2024-12-30 PROCEDURE — 83735 ASSAY OF MAGNESIUM: CPT

## 2024-12-30 PROCEDURE — A9270 NON-COVERED ITEM OR SERVICE: HCPCS | Performed by: INTERNAL MEDICINE

## 2024-12-30 PROCEDURE — 700102 HCHG RX REV CODE 250 W/ 637 OVERRIDE(OP): Performed by: INTERNAL MEDICINE

## 2024-12-30 PROCEDURE — 73718 MRI LOWER EXTREMITY W/O DYE: CPT | Mod: LT

## 2024-12-30 PROCEDURE — 700102 HCHG RX REV CODE 250 W/ 637 OVERRIDE(OP): Performed by: HOSPITALIST

## 2024-12-30 PROCEDURE — 99232 SBSQ HOSP IP/OBS MODERATE 35: CPT | Performed by: STUDENT IN AN ORGANIZED HEALTH CARE EDUCATION/TRAINING PROGRAM

## 2024-12-30 PROCEDURE — 82962 GLUCOSE BLOOD TEST: CPT

## 2024-12-30 PROCEDURE — 84100 ASSAY OF PHOSPHORUS: CPT

## 2024-12-30 PROCEDURE — 770006 HCHG ROOM/CARE - MED/SURG/GYN SEMI*

## 2024-12-30 PROCEDURE — 97166 OT EVAL MOD COMPLEX 45 MIN: CPT

## 2024-12-30 PROCEDURE — 36415 COLL VENOUS BLD VENIPUNCTURE: CPT

## 2024-12-30 PROCEDURE — A9270 NON-COVERED ITEM OR SERVICE: HCPCS | Performed by: HOSPITALIST

## 2024-12-30 PROCEDURE — A9270 NON-COVERED ITEM OR SERVICE: HCPCS | Performed by: STUDENT IN AN ORGANIZED HEALTH CARE EDUCATION/TRAINING PROGRAM

## 2024-12-30 PROCEDURE — 700111 HCHG RX REV CODE 636 W/ 250 OVERRIDE (IP): Mod: JZ | Performed by: INTERNAL MEDICINE

## 2024-12-30 PROCEDURE — 700105 HCHG RX REV CODE 258: Performed by: INTERNAL MEDICINE

## 2024-12-30 PROCEDURE — 85025 COMPLETE CBC W/AUTO DIFF WBC: CPT

## 2024-12-30 RX ADMIN — AMPICILLIN AND SULBACTAM 3 G: 1; 2 INJECTION, POWDER, FOR SOLUTION INTRAMUSCULAR; INTRAVENOUS at 14:28

## 2024-12-30 RX ADMIN — AMPICILLIN AND SULBACTAM 3 G: 1; 2 INJECTION, POWDER, FOR SOLUTION INTRAMUSCULAR; INTRAVENOUS at 02:18

## 2024-12-30 RX ADMIN — SENNOSIDES AND DOCUSATE SODIUM 2 TABLET: 50; 8.6 TABLET ORAL at 18:01

## 2024-12-30 RX ADMIN — DAPAGLIFLOZIN 10 MG: 10 TABLET, FILM COATED ORAL at 09:33

## 2024-12-30 RX ADMIN — ACETAMINOPHEN 650 MG: 325 TABLET ORAL at 07:38

## 2024-12-30 RX ADMIN — APIXABAN 5 MG: 5 TABLET, FILM COATED ORAL at 18:01

## 2024-12-30 RX ADMIN — CLINDAMYCIN IN 5 PERCENT DEXTROSE 900 MG: 18 INJECTION, SOLUTION INTRAVENOUS at 21:48

## 2024-12-30 RX ADMIN — POLYETHYLENE GLYCOL 3350 1 PACKET: 17 POWDER, FOR SOLUTION ORAL at 04:58

## 2024-12-30 RX ADMIN — CLINDAMYCIN IN 5 PERCENT DEXTROSE 900 MG: 18 INJECTION, SOLUTION INTRAVENOUS at 05:22

## 2024-12-30 RX ADMIN — OMEPRAZOLE 20 MG: 20 CAPSULE, DELAYED RELEASE ORAL at 04:59

## 2024-12-30 RX ADMIN — OXYCODONE 5 MG: 5 TABLET ORAL at 10:13

## 2024-12-30 RX ADMIN — APIXABAN 5 MG: 5 TABLET, FILM COATED ORAL at 04:58

## 2024-12-30 RX ADMIN — AMPICILLIN AND SULBACTAM 3 G: 1; 2 INJECTION, POWDER, FOR SOLUTION INTRAMUSCULAR; INTRAVENOUS at 09:36

## 2024-12-30 RX ADMIN — CLINDAMYCIN IN 5 PERCENT DEXTROSE 900 MG: 18 INJECTION, SOLUTION INTRAVENOUS at 15:24

## 2024-12-30 RX ADMIN — OXYCODONE 5 MG: 5 TABLET ORAL at 14:22

## 2024-12-30 RX ADMIN — AMPICILLIN AND SULBACTAM 3 G: 1; 2 INJECTION, POWDER, FOR SOLUTION INTRAMUSCULAR; INTRAVENOUS at 20:22

## 2024-12-30 RX ADMIN — LOSARTAN POTASSIUM 25 MG: 25 TABLET, FILM COATED ORAL at 04:59

## 2024-12-30 RX ADMIN — DIGOXIN 125 MCG: 0.12 TABLET ORAL at 18:01

## 2024-12-30 RX ADMIN — SPIRONOLACTONE 25 MG: 25 TABLET ORAL at 04:58

## 2024-12-30 RX ADMIN — METOPROLOL SUCCINATE 25 MG: 25 TABLET, EXTENDED RELEASE ORAL at 04:59

## 2024-12-30 ASSESSMENT — COGNITIVE AND FUNCTIONAL STATUS - GENERAL
DAILY ACTIVITIY SCORE: 15
SUGGESTED CMS G CODE MODIFIER DAILY ACTIVITY: CK
TOILETING: A LOT
DRESSING REGULAR UPPER BODY CLOTHING: A LOT
DRESSING REGULAR LOWER BODY CLOTHING: A LOT
HELP NEEDED FOR BATHING: A LOT
PERSONAL GROOMING: A LITTLE

## 2024-12-30 ASSESSMENT — PAIN DESCRIPTION - PAIN TYPE
TYPE: ACUTE PAIN

## 2024-12-30 ASSESSMENT — ENCOUNTER SYMPTOMS
WEIGHT LOSS: 1
NAUSEA: 0
ABDOMINAL PAIN: 0
FEVER: 0
VOMITING: 0
BACK PAIN: 1
SHORTNESS OF BREATH: 0

## 2024-12-30 ASSESSMENT — ACTIVITIES OF DAILY LIVING (ADL): TOILETING: INDEPENDENT

## 2024-12-30 NOTE — DISCHARGE PLANNING
Case Management Discharge Planning    Admission Date: 12/19/2024  GMLOS: 4.9  ALOS: 11    6-Clicks ADL Score: 15  6-Clicks Mobility Score: 8  PT and/or OT Eval ordered: Yes  Post-acute Referrals Ordered: Yes  Post-acute Choice Obtained: Yes  Has referral(s) been sent to post-acute provider:  Yes      Anticipated Discharge Dispo: Discharge Disposition: D/T to SNF with Medicare cert in anticipation of skilled care (03)    DME Needed: No    Action(s) Taken: Updated Provider/Nurse on Discharge Plan and Referral(s) sent  RNCM discussed pt during IDT rounds. CM discussed discharge plan with Dr. Chris. Pt is not medically cleared. Discharge plan is pending MRI and results. Pt will remain on IV antibiotic regimen as ordered until 1/4/25.  Met with pt at bedside to discuss discharge goals.   @[id number] RNCM spoke with [facility] liaison, Yajaira to update her that wound note were requested by facility as a condition of acceptance. Wound notes are completed and ID notes and recommendations are updated. Liaison states she will visit patient and will get back to CM regarding decision.   @1414 RNCM requested DPA expand search [place] communities for placement.     Escalations Completed: None    Medically Clear: No    Next Steps: Follow-up with medical team to discuss DC needs and barriers.    Barriers to Discharge: Medical clearance, Pending Placement, Homelessness, and Pending Procedures    I

## 2024-12-30 NOTE — CARE PLAN
The patient is Stable - Low risk of patient condition declining or worsening    Shift Goals  Clinical Goals: Patient will remain free from any falls or injury within the shift  Patient Goals: Comfort, Rest  Family Goals: MARCOS    Progress made toward(s) clinical / shift goals: Turning and repositioning done, call light placed within easy reach. Incontinence care provided. Wound care and dressing done. Bed alarms on. Remained free form any falls or injury within the shift.    Patient is not progressing towards the following goals:      Problem: Skin Integrity  Goal: Skin integrity is maintained or improved  Description: Target End Date:  Prior to discharge or change in level of care    Document interventions on Skin Risk/Edward flowsheet groups and corresponding LDA    1.  Assess and monitor skin integrity, appearance and/or temperature  2.  Assess risk factors for impaired skin integrity and/or pressures ulcers  3.  Implement precautions to protect skin integrity in collaboration with interdisciplinary team  4.  Implement pressure ulcer prevention protocol if at risk for skin breakdown  5.  Confirm wound care consult if at risk for skin breakdown  6.  Ensure patient use of pressure relieving devices  (Low air loss bed, waffle overlay, heel protectors, ROHO cushion, etc)  12/30/2024 0341 by Alexandra Mirza, R.N.  Outcome: Not Progressing     Problem: Care Map:  Day Before Discharge Optimal Outcome for the Heart Failure Patient  Goal: Day Before Discharge:  Optimal Care of the heart failure patient  Description: Target End Date:  Prior to discharge or change in level of care  12/30/2024 0341 by Alexandra Mirza, R.N.  Outcome: Not Progressing

## 2024-12-30 NOTE — PROGRESS NOTES
Hypoglycemia Intervention    Hypoglycemia protocol intervention:  Blood glucose (lab draw) of 67 mg/dL  Intervention: 8 oz of fruit juice   Repeat blood glucose 92 mg/dL at 0506H.  Intervention: Offered snacks/protein shake - minimal intake.

## 2024-12-30 NOTE — PROGRESS NOTES
Hospital Medicine Daily Progress Note    Date of Service  12/30/2024    Chief Complaint  Robert Mcdonnell is a 74 y.o. male admitted 12/19/2024 with lower extremity edema and shortness of breath.    Hospital Course  Robert Mcdonnell is a 74-year-old male with PMHx homelessness, chronic atrial fibrillation on apixaban, HFrEF, pulmonary hypertension, perforated gastric ulcer s/p gastrectomy.    Per history: recent hospitalization 10/3 - 10/15 for perforated  secondary to invasive gastric adenocarcinoma, GDA embolization, subsequent subtotal gastrectomy, liver wedge resection, Miguel Angel-en-Y gastrojejunostomy with omental flap and cholecystectomy 10/7 with pathology showing invasive well-differentiated adenocarcinoma with associated ulceration. There was tumor invasion into the muscularis propria and 20 lymph nodes were negative for metastatic carcinoma. Oncology was consulted and noted no evidence of metastatic disease however MRI abdomen recommended for follow-up; no adjuvant therapy was recommended.     Patient was readmitted 12/19 for worsening lower extremity edema and shortness of breath.  He initially required BiPAP and IMCU admission.  Per history-patient had run out of his Lasix at home.  EKG in the emergency room showing irregular wide-complex arrhythmia.      Additionally, blood cultures were positive for group A strep bacteremia secondary to left lower extremity infection.  CT LLE: Small to moderate suprapatellar joint effusion.  MRI LLE: Diffuse cellulitis, patchy myositis, negative for OM.  Orthopedic surgery was consulted and recommended medical management. Infectious disease was consulted and recommended IV Unasyn with an end date of 1/4 and clindamycin with an end date of 12/31.    For patient's HFrEF-he was started on GDMT with effective diuresis.    Patient continued to have hypoglycemic episodes due to poor p.o. intake.    Interval Problem Update  12/30: Vital stable overnight.  SBP ranging 125 through  139.  Patient resting comfortably on room air.  WBC 14.2 from 14.9.  Hb stable at 8.9.  Chemistry panel stable.  MRI left lower extremity limited due to motion but no obvious osteomyelitis or bone lesions identified.  Continue IV Unasyn with an end date of 1/4.  Placement is pending.    I have discussed this patient's plan of care and discharge plan at IDT rounds today with Case Management, Nursing, Nursing leadership, and other members of the IDT team.    Consultants/Specialty  critical care, infectious disease, and orthopedics    Code Status  Full Code    Disposition  The patient is medically cleared for discharge to home or a post-acute facility.  Anticipate discharge to: skilled nursing facility    I have placed the appropriate orders for post-discharge needs.    Review of Systems  Review of Systems   Constitutional:  Negative for fever and malaise/fatigue.   Respiratory:  Negative for shortness of breath.    Cardiovascular:  Negative for chest pain and leg swelling.   Gastrointestinal:  Negative for abdominal pain, nausea and vomiting.        Physical Exam  Temp:  [36.2 °C (97.2 °F)-36.6 °C (97.9 °F)] 36.3 °C (97.3 °F)  Pulse:  [77-88] 77  Resp:  [17-19] 17  BP: (110-139)/(62-71) 129/71  SpO2:  [92 %-96 %] 92 %    Physical Exam  Vitals and nursing note reviewed.   Constitutional:       General: He is not in acute distress.     Appearance: Normal appearance. He is ill-appearing.      Comments: Frail appearing    Cardiovascular:      Rate and Rhythm: Normal rate and regular rhythm.   Pulmonary:      Effort: Pulmonary effort is normal.      Breath sounds: Normal breath sounds.   Skin:     General: Skin is warm and dry.   Neurological:      Mental Status: He is alert and oriented to person, place, and time. Mental status is at baseline.   Psychiatric:         Mood and Affect: Mood normal.         Behavior: Behavior normal.         Fluids    Intake/Output Summary (Last 24 hours) at 12/30/2024 3084  Last data filed  at 12/30/2024 0622  Gross per 24 hour   Intake 750 ml   Output 1350 ml   Net -600 ml        Laboratory  Recent Labs     12/28/24  0550 12/29/24  0414 12/30/24 0228   WBC 26.7* 14.9* 14.2*   RBC 4.06* 3.34* 3.19*   HEMOGLOBIN 11.4* 9.2* 8.9*   HEMATOCRIT 34.0* 27.3* 26.4*   MCV 83.7 81.7 82.8   MCH 28.1 27.5 27.9   MCHC 33.5 33.7 33.7   RDW 52.1* 50.8* 51.5*   PLATELETCT 281 276 288   MPV 11.4 10.9 10.3     Recent Labs     12/28/24  0550 12/29/24  0333 12/30/24 0228   SODIUM 135 137 136   POTASSIUM 4.4 4.0 3.8   CHLORIDE 98 105 103   CO2 23 24 25   GLUCOSE 114* 87 67   BUN 10 12 11   CREATININE 0.71 0.72 0.78   CALCIUM 8.0* 7.4* 7.2*                   Imaging  [id number]-W/O LEFT   Final Result      1.  Very limited examination due to patient motion artifact. Patient was also unable to complete the examination and therefore contrast-enhanced sequences were not obtained.      2.  No evidence of marrow edema or bone lesion.      3.  Again seen diffuse soft tissue edema/induration likely representing cellulitis. This also patchy muscle edema suggesting myositis.      4.  No focal fluid collection to suggest presence of abscess.      [id number] VIEW   Final Result      No evidence of bowel obstruction. Mild constipation.      MR-KNEE-WITH & W/O LEFT   Final Result      1.  No evidence of marrow edema or abnormal enhancement to suggest presence of osteomyelitis.      2.  Small to moderate joint effusion with mild synovitis.      3.  Diffuse soft tissue edema/induration consistent with cellulitis.      4.  Small popliteal cyst.      5.  No evidence of soft tissue abscess.      6.  Markedly motion degraded examination. Ligaments and menisci are inadequately evaluated due to patient motion.      MR-LOWER EXTREMITY-NO JOINT-W/O LEFT   Final Result      1.  Patient motion degraded exam.      2.  No evidence of marrow edema or bone lesion to suggest presence of osteomyelitis.      3.  Diffuse cellulitis and patchy  myositis.      4.  No evidence of focal fluid collection to suggest presence of abscess.      CT-EXTREMITY, LOWER WITH LEFT   Final Result      1.  Extensive subcutaneous inflammatory changes about the left lower extremity consistent with deep and superficial cellulitis.      2.  No definite CT evidence of necrotizing fasciitis.      3.  No CT evidence of deep abscess formation or acute or chronic osteomyelitis.      4.  Small to moderate suprapatellar joint effusion.      CT-EXTREMITY, LOWER WITH LEFT   Final Result      No soft tissue gas to suggest necrotizing fasciitis.      No fluid collection identified.      DX-CHEST-PORTABLE (1 VIEW)   Final Result         1.  Hazy left pulmonary infiltrates, similar to prior study   2.  Trace left pleural effusion, stable   3.  Cardiomegaly   4.  Atherosclerosis      DX-CHEST-PORTABLE (1 VIEW)   Final Result         1.  Hazy left pulmonary infiltrates, similar to prior study   2.  Trace left pleural effusion, stable   3.  Cardiomegaly   4.  Atherosclerosis      EC-ECHOCARDIOGRAM COMPLETE W/O CONT   Final Result      CT-CHEST,ABDOMEN,PELVIS WITH   Final Result      1. Stable spiculated opacity in the right upper lobe, extending to the pleural surface.   2. Lingular and left lower lobe parenchymal airspace disease has improved in the interval, but has not completely resolved. There is pleural reaction, suggesting some of these areas may represent parenchymal scarring.   3. Asymmetry of the lung volumes, small on the left than the right.   4. Cardiomegaly with atherosclerotic calcifications in the aorta and coronary arteries.   5. Postsurgical changes in the stomach.   6. Stable scattered multiple hypodense lesions throughout the liver.   7. Moderate ascites throughout the abdomen and pelvis.   8. Cachexia.   9. Enlarged left inguinal lymph nodes with central necrosis. There is edema involving the soft tissues of the upper left leg extending to the left pelvis.       US-EXTREMITY VENOUS LOWER BILAT   Final Result      DX-CHEST-PORTABLE (1 VIEW)   Final Result         1.  Hazy left pulmonary infiltrates.   2.  Small left pleural effusion   3.  Cardiomegaly           Assessment/Plan  * Acute exacerbation of CHF (congestive heart failure) (HCC)- (present on admission)  Assessment & Plan  Echo: EF 28%; severe tricuspid and mitral regurg.  RVSP elevated at 55  - Continue GDMT with losartan, spironolactone, metoprolol, Farxiga  - Continue digoxin  - Maintain euvolemia    Constipation  Assessment & Plan  I ordered a bowel management  Suppository today    Hypoglycemia  Assessment & Plan  Multiple episodes of hypoglycemia requiring intervention  Poor p.o. intake  - Continue to encourage Ensure and p.o. intake  - Nutrition consulted    Streptococcal bacteremia- (present on admission)  Assessment & Plan  Blood cultures 12/19 positive for group A strep  Repeat blood cultures 12/21 NGTD    Pulmonary hypertension (HCC)  Assessment & Plan  Previous Echo RVSP 65, severe TR  Suspect mainly Group 2  RV perfusion with MAP > 65 as need norepinepinephrine  Blood pressure is running on the lower side holding diuretic therapy at this time    Acute respiratory failure with hypoxia (HCC)- (present on admission)  Assessment & Plan  Improved off Bipap  Currently is on 2 L of oxygen to maintain oxygen saturation  12/25 on room air.  Mobilize as able.    Hypophosphatemia- (present on admission)  Assessment & Plan  Replaced    Malignant neoplasm of body of stomach (HCC)- (present on admission)  Assessment & Plan  T2, N0, M0 invasive gastric adenocarcinoma presenting as perforated  10/2024  -GDA embolization  -10/7 subtotal gastrectomy, liver wedge resection, Miguel Angel-en-Y gastrojejunostomy with omental flap and cholecystectomy  -pathology invasive well-differentiated adenocarcinoma with associated ulceration  -tumor invasion into the muscularis propria and 20 lymph nodes were negative for metastatic  carcinoma  -Oncology was consulted and noted no evidence of metastatic disease  -MRI abdomen recommended for follow-up; no adjuvant therapy was recommended  He will need outpatient follow-up    Homeless- (present on admission)  Assessment & Plan   consultation for ongoing access to medications and follow-up care as appropriate.  States family in [place] but states they cannot house him nor give assistance     Severe protein-calorie malnutrition (HCC)- (present on admission)  Assessment & Plan  Discussed with nutrition, patient meets the ASPEN criteria of severe malnutrition  Continuous nutrition support    ACP (advance care planning)- (present on admission)  Assessment & Plan  Patient admitted with CHF exacerbation, significantly reduced the EF of 30%.  Also found bacteremia, LLE infection, severe malnutrition.  History of invasive gastric malignancy, A-fib RVR, PE, gastric perforation post extensive surgery including subtotal gastrectomy, liver resection  Miguel Angel-en-Y gastrojejunostomy with omental flap and cholecystectomy.  I discussed the CODE STATUS with him, including CPR, intubation/mechanical ventilation.  He wants to stay full code.  Patient has high complexity and guarded prognosis.  I consulted palliative care.  ACP 18 minutes.     COPD (chronic obstructive pulmonary disease) (HCC)- (present on admission)  Assessment & Plan  Current active smoker, no PFTs on file  RT protocols, bronchodilators as needed  No signs of exacerbation    Acute kidney injury (HCC)- (present on admission)  Assessment & Plan  Resolved   Maintain euvolemia and monitor fluid responsiveness (avoid NaCL and renal congestion)  Monitor urine output and I&O's  Avoid and review nephrotoxin medication  Renal function improved    Sepsis (HCC)- (present on admission)  Assessment & Plan  See above    Atrial fibrillation with RVR (HCC)- (present on admission)  Assessment & Plan  Chronic atrial fibrillation, currently  tachycardia  Digoxin 250 mcg IV monitoring closely with flip to prevent digoxin toxicity    Continue digoxin and Eliquis  12/27 BP better, I resumed Toprol 25 mg    Thrombocytopenia (HCC)- (present on admission)  Assessment & Plan  Platelets 288  - Repeat CBC in a.m.  - Stable to continue Eliquis at this time    Tobacco dependence- (present on admission)  Assessment & Plan  Counseling when appropriate    Hypertension- (present on admission)  Assessment & Plan  SBP well-controlled  - Continue losartan, metoprolol, spironolactone    Wound of lower extremity- (present on admission)  Assessment & Plan  Likely source of infection, lower ext doppler negative  CT left leg shows small to moderate suprapatellar joint effusion.  MRI LLE showed Diffuse cellulitis and patchy myositis, negative for osteomyelitis or abscess.   Repeat MRI 12/30: Limited due to patient motion artifact; no obvious OM noted persistent cellulitis and myositis noted  - Infectious disease consulted  - Continue IV Unasyn until 1/4  - Continue clindamycin through 12/31  - Orthopedic surgery consultation 12/20 and 12/25 no surgical interventions at this time       My total time spent caring for the patient on the day of the encounter was 59 minutes.   This does not include time spent on separately billable procedures/tests.      VTE prophylaxis: VTE Selection    I have performed a physical exam and reviewed and updated ROS and Plan today (12/30/2024). In review of yesterday's note (12/29/2024), there are no changes except as documented above.

## 2024-12-30 NOTE — CARE PLAN
The patient is Stable - Low risk of patient condition declining or worsening    Shift Goals  Clinical Goals: Patient to maintain glucose levels throught the shift  Patient Goals: comfort  Family Goals: nilson    Progress made toward(s) clinical / shift goals:  Patient glucose levels maintained with appropriate fluids. Patient has no complaints of pain or discomfort, able to make need known. Skin maintained with offloading, mepilex, q2 turns with pillows. Bed in low and locked position, bed alarm on, personal items within reach. Call light within reach.    Patient is not progressing towards the following goals:

## 2024-12-30 NOTE — HOSPITAL COURSE
Robert Mcdonnell is a 74-year-old male with PMHx homelessness, chronic atrial fibrillation on apixaban, HFrEF, pulmonary hypertension, perforated gastric ulcer s/p gastrectomy.    Per history: recent hospitalization 10/3 - 10/15 for perforated  secondary to invasive gastric adenocarcinoma, GDA embolization, subsequent subtotal gastrectomy, liver wedge resection, Miguel Angel-en-Y gastrojejunostomy with omental flap and cholecystectomy 10/7 with pathology showing invasive well-differentiated adenocarcinoma with associated ulceration. There was tumor invasion into the muscularis propria and 20 lymph nodes were negative for metastatic carcinoma. Oncology was consulted and noted no evidence of metastatic disease however MRI abdomen recommended for follow-up; no adjuvant therapy was recommended.     Patient was readmitted 12/19 for worsening lower extremity edema and shortness of breath.  He initially required BiPAP and IMCU admission.  Per history-patient had run out of his Lasix at home.  EKG in the emergency room showing irregular wide-complex arrhythmia.      Additionally, blood cultures were positive for group A strep bacteremia secondary to left lower extremity infection.  CT LLE: Small to moderate suprapatellar joint effusion.  MRI LLE: Diffuse cellulitis, patchy myositis, negative for OM.  Orthopedic surgery was consulted and recommended medical management. Infectious disease was consulted and recommended IV Unasyn with an end date of 1/4 and clindamycin with an end date of 12/31.    For patient's HFrEF-he was started on GDMT with effective diuresis.    Patient continued to have hypoglycemic episodes due to poor p.o. intake.   care.  He has completed antibiotics.  Most recently seen by wound care on 1/16/2025.  Gentle slough and soft eschar debridement with Dakin's overlying.  Continue to follow with wound care until wound has healed.    Patient will discharge to [facility] for ongoing care.

## 2024-12-30 NOTE — THERAPY
"Occupational Therapy   Initial Evaluation     Patient Name: Robert Mcdonnell  Age:  74 y.o., Sex:  male  Medical Record #: [id number]  Today's Date: 12/30/2024       Precautions:  Fall Risk, Swallow Precautions  Comments:  LE wounds    Assessment  Patient is 74 y.o. male admitted with SOB, BLE edema, mild cough, hypoglycemic. Found pt in acute heart failure exacerbation, sepsis, PNA, extensive LE wounds with recommendations for amputation which pt refuses. PMHx of CHF, HTN, recent abdominal procedures after perforated  in October.     Pt seen for OT eval. Pt received in chair, most limited d/t pain in LLE. Attempted sit>stand requiring maxA and unable to come to full upright position. Pt required maxA for LB dressing additionally. Limited d/t pain, activity tolerance, strength, balance. Pt will continue to benefit from inpt OT. Recommend post acute placement.     Plan    Occupational Therapy Initial Treatment Plan   Treatment Interventions: (P) Self Care / Activities of Daily Living, Neuro Re-Education / Balance, Therapeutic Exercises, Therapeutic Activity, Adaptive Equipment  Treatment Frequency: (P) 3 Times per Week  Duration: (P) Until Therapy Goals Met    DC Equipment Recommendations: (P) Unable to determine at this time  Discharge Recommendations: (P) Recommend post-acute placement for additional occupational therapy services prior to discharge home     Subjective    \"Oh Im sorry to disappoint you\"      Objective       12/30/24 1357   Prior Living Situation   Prior Services None   Housing / Facility Homeless   Bathroom Set up Walk In Shower;Grab Bars;Shower Chair   Equipment Owned None   Lives with - Patient's Self Care Capacity Other (Comments);Alone and Able to Care For Self   Comments Pt was living at [facility]   Prior Level of ADL Function   Self Feeding Independent   Grooming / Hygiene Independent   Bathing Independent   Dressing Independent   Toileting Independent   Prior Level of IADL Function "   Comments pt reports last admission he left the hospital and was unable to get into [facility] therefore was on the street for 1 month   History of Falls   Date of Last Fall   (denies recent falls)   Precautions   Precautions Fall Risk;Swallow Precautions   Comments LE wounds   Vitals   O2 Delivery Device None - Room Air   Pain   Intervention Medication (see MAR)   Pain 0 - 10 Group   Location Leg   Location Orientation Left   Pain Rating Scale (NPRS) 7   Therapist Pain Assessment During Activity;Post Activity Pain Same as Prior to Activity;Nurse Notified;7   Non Verbal Descriptors   Non Verbal Scale  Calm   Cognition    Cognition / Consciousness WDL   Level of Consciousness Alert   Comments very pleasant and cooperative throughout   Passive ROM Upper Body   Passive ROM Upper Body WDL   Active ROM Upper Body   Active ROM Upper Body  WDL   Dominant Hand Right   Strength Upper Body   Upper Body Strength  X   Gross Strength Generalized Weakness, Equal Bilaterally.    Sensation Upper Body   Upper Extremity Sensation  WDL   Upper Body Muscle Tone   Upper Body Muscle Tone  WDL   Neurological Concerns   Neurological Concerns No   Coordination Upper Body   Coordination WDL   Balance Assessment   Sitting Balance (Static) Fair   Sitting Balance (Dynamic) Fair -   Standing Balance (Static) Poor -   Weight Shift Sitting Poor   Weight Shift Standing Poor   Comments pain in LLE, unable to perform full stand   Bed Mobility    Comments was in chair   ADL Assessment   Grooming Supervision;Seated  (facial hygiene)   Upper Body Dressing Minimal Assist   Lower Body Dressing Maximal Assist   Toileting Maximal Assist  (carter)   Functional Mobility   Sit to Stand Maximal Assist   Bed, Chair, Wheelchair Transfer Maximal Assist   Transfer Method Stand Pivot   Mobility chair> STS   Comments w/FWW   Visual Perception   Visual Perception  WDL   Edema / Skin Assessment   Edema / Skin  X   Comments dressing on LLE   Activity Tolerance    Sitting in Chair pre and post session   Standing < 1 min   Comments limited 2/2 pain   Patient / Family Goals   Patient / Family Goal #1 to get stronger   Short Term Goals   Short Term Goal # 1 Pt will complete toilet transfers Radha   Short Term Goal # 2 Pt will complete LB dressing with A/E Radha   Short Term Goal # 3 Pt will complete standing grooming tasks Radha   Education Group   Education Provided Role of Occupational Therapist;Activities of Daily Living   Role of Occupational Therapist Patient Response Patient;Acceptance;Explanation;Demonstration;Verbal Demonstration;Action Demonstration   ADL Patient Response Patient;Acceptance;Explanation;Demonstration;Verbal Demonstration;Action Demonstration   Occupational Therapy Initial Treatment Plan    Treatment Interventions Self Care / Activities of Daily Living;Neuro Re-Education / Balance;Therapeutic Exercises;Therapeutic Activity;Adaptive Equipment   Treatment Frequency 3 Times per Week   Duration Until Therapy Goals Met   Problem List   Problem List Decreased Active Daily Living Skills;Decreased Upper Extremity Strength Right;Decreased Upper Extremity Strength Left;Decreased Functional Mobility;Decreased Activity Tolerance;Safety Awareness Deficits / Cognition   Anticipated Discharge Equipment and Recommendations   DC Equipment Recommendations Unable to determine at this time   Discharge Recommendations Recommend post-acute placement for additional occupational therapy services prior to discharge home   Interdisciplinary Plan of Care Collaboration   IDT Collaboration with  Nursing;Physical Therapist   Patient Position at End of Therapy Seated;Chair Alarm On;Call Light within Reach;Tray Table within Reach   Collaboration Comments RN upated   Session Information   Date / Session Number  12/30, #1 (1/3, 1/5)

## 2024-12-30 NOTE — CONSULTS
MRN: [id number]  Date of palliative consult: 2024  Reason for consult: Goals of care  Referring provider: Dr. Campos  Location of consult: [facility]  Additional consulting services: Infectious disease    HPI:   Robert Mcdonnell is a 74 y.o. male with medical history significant for recent diagnosis of gastric cancer, status post subtotal gastrectomy, liver wedge resection, Miguel Angel-en-Y gastro jejunostomy, omental flap, cholecystectomy, lymph node hepatic artery node dissection during hospitalization 10/3 to 10/15/2024, was again hospitalized 10/28-10/29/24 with pneumonia was admitted on 2024 with shortness of breath, flulike symptoms and bilateral leg swelling.  Patient initially required BiPAP for acute respiratory failure.  Blood culture positive for group A strep bacteremia.  MRI left lower extremity with diffuse cellulitis, negative for osteomyelitis or abscess.  Orthopedic surgery consulted, no surgery at this time due to bacteremia.  Infectious disease consulted, patient started on 7-day course of IV clindamycin stop date 2024.  IV Unasyn x 14 days until 2025.    Additional Pertinent Medical History: CHF, EF 30%, pulmonary hypertension, hypertension, tobacco use, alcohol use, atrial fibrillation on apixaban.  Patient with T2 N0 M0 invasive gastric adenocarcinoma status post surgical resection on 10/7/2024 as per above.  Patient seen by oncology, MRI recommended for follow-up with no adjuvant therapy recommended at this time.    ROS:    Review of Systems   Constitutional:  Positive for malaise/fatigue and weight loss.   Musculoskeletal:  Positive for back pain.       PE:   Recent vital signs  BMI: Body mass index is 24.28 kg/m².    Temp (24hrs), Av.4 °C (97.6 °F), Min:36.2 °C (97.2 °F), Max:36.8 °C (98.2 °F)  Temperature: 36.3 °C (97.3 °F)  Pulse  Av  Min: 69  Max: 162   Blood Pressure : 129/71       Physical Exam  Constitutional:       Comments: Bitemporal muscle wasting.   HENT:       Mouth/Throat:      Mouth: Mucous membranes are dry.   Pulmonary:      Effort: Pulmonary effort is normal.   Musculoskeletal:         General: Swelling and tenderness present.      Right lower leg: Edema present.      Left lower leg: Edema present.   Skin:     General: Skin is warm and dry.   Neurological:      Mental Status: He is alert and oriented to person, place, and time.   Psychiatric:         Mood and Affect: Mood normal.         Behavior: Behavior normal.         Thought Content: Thought content normal.         Judgment: Judgment normal.         ASSESSMENT/PLAN WITH SHARED DECISION MAKING:   PHYSICAL ASPECTS OF CARE  Palliative Performance Scale: 30%    # CHF exacerbation  -EF 30% with severe mitral and tricuspid regurgitation  # Streptococcal bacteremia  -Initial blood cultures positive for group A strep, repeat blood cultures negative on 12/21/2024  # Sepsis  -Resolving  # Pulmonary hypertension  # Left lower extremity cellulitis  -MRI showed diffuse cellulitis, patchy myositis, negative for abscess or osteomyelitis  - infectious disease following, orthopedic surgery following  -IV clindamycin through 12/31/2024, IV Unasyn through 1/4/2025  # Atrial fibrillation with RVR  -Patient managed with digoxin and Eliquis  # Gastric adenocarcinoma  -S/p Miguel Angel-en-Y jejunostomy with lymph node dissection October 2024  -Oncology following outpatient, no adjunct therapy recommended.  # Severe protein calorie malnutrition  -Patient states he lost 40 to 50 pounds over the course of gastric cancer diagnosis and surgery.  -Patient says he has a good appetite now and is trying to eat to gain weight.  -Encourage patient to drink protein shakes in between meals to increase calorie intake.  # Homeless  -Patient lives at [facility] for the past year  # COPD  -Discussed importance of quitting smoking, patient states he has not smoked in the past 2 months and plans to remain tobacco free.  # Advance care planning    SOCIAL  ASPECTS OF CARE  Patient is unmarried, his only sibling is his brother who lives in [facility].  He has no children.  Patient enjoys going to the [employer] with a few of his friends to play bingo or cards and eat lunch.  Patient previously has worked for the [employer] in various positions, but he stopped working in 2021 due to decline in his health.  Patient wants to find a part-time job once he is able to get better and stronger.    SPIRITUAL ASPECTS OF CARE   Not discussed at this visit    GOALS OF CARE/SERIOUS ILLNESS CONVERSATION  Introduced myself to Robert. Discussed role of palliative care and reason for consult. Robert agreeable to discuss goals of care. Robert expressed understanding about his leg infection and that he is weak and has suffered significant weight loss.  He feels that his infection has significantly improved given his overall improvement in symptoms, and had a difficult time understanding that there was still risk for infection to have spread to the bone, although there is no definitive evidence of this at this time.  Patient has hope for a full recovery to be able to get back to living at the [facility] and going to the [employer] with his friends.  Patient was walking unassisted prior to this hospitalization and is hopeful for recovery back to the state.     Code status discussed in detail with patient. Discussed concern that burden of resuscitation attempts would outweigh benefits given underlying comborbidities and overall deconditioning.  Patient understands the implications of CODE STATUS and would like to remain full code at this time.    Provided palliative care contact information and encouraged Robert to reach out with any questions/needs.     Code Status: Full code    ACP Documents: No documents.  Palliative care will follow up with the patient tomorrow (12/31/2024) to complete advance directive with patient.    18 minutes spent discussing advance care planning, this  time excludes any other billed services.    Interval diagnostic studies and medical documentation entries pertinent to this case were reviewed independently by me. This patient has at least one acute or chronic illness or injury that poses a threat to life or bodily function. This patient suffers from a high risk of morbidity from additional invasive diagnostic testing or intensive treatment. Discussion of recommendations and coordination of care undertaken with primary provider/treatment team.      Vanessa Blanco, APRN  Palliative Care  [phone]

## 2024-12-30 NOTE — PROGRESS NOTES
4 Eyes Skin Assessment Completed by JANNETTE Morris and JANNETTE Martinez.    Head Scars/Scabs to forehead and cheek  Ears WDL  Nose WDL  Mouth WDL  Neck WDL  Breast/Chest WDL  Shoulder Blades WDL  Spine Skin tags  (R) Arm/Elbow/Hand Scab -previous abrasions, Flaky, Fragile  (L) Arm/Elbow/Hand Scab -previous abrasions, Flaky, Fragile  Abdomen Scar  Groin Redness and Excoriation to inner thigh/groin, Fragile, Flaky  Scrotum/Coccyx/Buttocks Redness, Blanching, and Discoloration, Fragile, Flaky  (R) Leg Scab, Discoloration, Edema, Flaky, Fragile  (L) Leg Swelling and Edema, Discoloration, Wound to L leg, Flaky, Fragile  (R) Heel/Foot/Toe Redness, Blanching, and Boggy, Flaky  (L) Heel/Foot/Toe Redness, Blanching, and Boggy, Flaky          Devices In Places Condom Cath      Interventions In Place Heel Mepilex, Sacral Mepilex, TAP System, Pillows, Elbow Mepilex, Q2 Turns, Low Air Loss Mattress, Barrier Cream, and Heels Loaded W/Pillows    Possible Skin Injury Yes    Pictures Uploaded Into Epic N/A  Wound Consult Placed N/A- already consulted  RN Wound Prevention Protocol Ordered Yes

## 2024-12-31 VITALS
BODY MASS INDEX: 24.25 KG/M2 | DIASTOLIC BLOOD PRESSURE: 57 MMHG | HEART RATE: 67 BPM | SYSTOLIC BLOOD PRESSURE: 121 MMHG | HEIGHT: 72 IN | WEIGHT: 179.01 LBS | TEMPERATURE: 98.8 F | OXYGEN SATURATION: 97 % | RESPIRATION RATE: 18 BRPM

## 2024-12-31 LAB
ALBUMIN SERPL BCP-MCNC: 2.3 G/DL (ref 3.2–4.9)
ALBUMIN/GLOB SERPL: 0.7 G/DL
ALP SERPL-CCNC: 196 U/L (ref 30–99)
ALT SERPL-CCNC: 11 U/L (ref 2–50)
ANION GAP SERPL CALC-SCNC: 8 MMOL/L (ref 7–16)
AST SERPL-CCNC: 26 U/L (ref 12–45)
BASOPHILS # BLD AUTO: 0.2 % (ref 0–1.8)
BASOPHILS # BLD: 0.03 K/UL (ref 0–0.12)
BILIRUB SERPL-MCNC: 0.9 MG/DL (ref 0.1–1.5)
BUN SERPL-MCNC: 11 MG/DL (ref 8–22)
CALCIUM ALBUM COR SERPL-MCNC: 9.2 MG/DL (ref 8.5–10.5)
CALCIUM SERPL-MCNC: 7.8 MG/DL (ref 8.5–10.5)
CHLORIDE SERPL-SCNC: 105 MMOL/L (ref 96–112)
CO2 SERPL-SCNC: 24 MMOL/L (ref 20–33)
CREAT SERPL-MCNC: 0.84 MG/DL (ref 0.5–1.4)
EOSINOPHIL # BLD AUTO: 0.05 K/UL (ref 0–0.51)
EOSINOPHIL NFR BLD: 0.3 % (ref 0–6.9)
ERYTHROCYTE [DISTWIDTH] IN BLOOD BY AUTOMATED COUNT: 52.9 FL (ref 35.9–50)
GFR SERPLBLD CREATININE-BSD FMLA CKD-EPI: 91 ML/MIN/1.73 M 2
GLOBULIN SER CALC-MCNC: 3.3 G/DL (ref 1.9–3.5)
GLUCOSE BLD STRIP.AUTO-MCNC: 102 MG/DL (ref 65–99)
GLUCOSE BLD STRIP.AUTO-MCNC: 109 MG/DL (ref 65–99)
GLUCOSE BLD STRIP.AUTO-MCNC: 115 MG/DL (ref 65–99)
GLUCOSE BLD STRIP.AUTO-MCNC: 71 MG/DL (ref 65–99)
GLUCOSE BLD STRIP.AUTO-MCNC: 81 MG/DL (ref 65–99)
GLUCOSE BLD STRIP.AUTO-MCNC: 83 MG/DL (ref 65–99)
GLUCOSE SERPL-MCNC: 73 MG/DL (ref 65–99)
HCT VFR BLD AUTO: 28.4 % (ref 42–52)
HGB BLD-MCNC: 9.2 G/DL (ref 14–18)
IMM GRANULOCYTES # BLD AUTO: 0.1 K/UL (ref 0–0.11)
IMM GRANULOCYTES NFR BLD AUTO: 0.7 % (ref 0–0.9)
LYMPHOCYTES # BLD AUTO: 0.8 K/UL (ref 1–4.8)
LYMPHOCYTES NFR BLD: 5.4 % (ref 22–41)
MAGNESIUM SERPL-MCNC: 2.1 MG/DL (ref 1.5–2.5)
MCH RBC QN AUTO: 27.2 PG (ref 27–33)
MCHC RBC AUTO-ENTMCNC: 32.4 G/DL (ref 32.3–36.5)
MCV RBC AUTO: 84 FL (ref 81.4–97.8)
MONOCYTES # BLD AUTO: 0.85 K/UL (ref 0–0.85)
MONOCYTES NFR BLD AUTO: 5.7 % (ref 0–13.4)
NEUTROPHILS # BLD AUTO: 13 K/UL (ref 1.82–7.42)
NEUTROPHILS NFR BLD: 87.7 % (ref 44–72)
NRBC # BLD AUTO: 0 K/UL
NRBC BLD-RTO: 0 /100 WBC (ref 0–0.2)
PHOSPHATE SERPL-MCNC: 2.9 MG/DL (ref 2.5–4.5)
PLATELET # BLD AUTO: 350 K/UL (ref 164–446)
PMV BLD AUTO: 10.3 FL (ref 9–12.9)
POTASSIUM SERPL-SCNC: 4.2 MMOL/L (ref 3.6–5.5)
PROT SERPL-MCNC: 5.6 G/DL (ref 6–8.2)
RBC # BLD AUTO: 3.38 M/UL (ref 4.7–6.1)
SODIUM SERPL-SCNC: 137 MMOL/L (ref 135–145)
WBC # BLD AUTO: 14.8 K/UL (ref 4.8–10.8)

## 2024-12-31 PROCEDURE — 85025 COMPLETE CBC W/AUTO DIFF WBC: CPT

## 2024-12-31 PROCEDURE — 83735 ASSAY OF MAGNESIUM: CPT

## 2024-12-31 PROCEDURE — 700101 HCHG RX REV CODE 250: Performed by: STUDENT IN AN ORGANIZED HEALTH CARE EDUCATION/TRAINING PROGRAM

## 2024-12-31 PROCEDURE — 700111 HCHG RX REV CODE 636 W/ 250 OVERRIDE (IP): Mod: JZ | Performed by: INTERNAL MEDICINE

## 2024-12-31 PROCEDURE — A9270 NON-COVERED ITEM OR SERVICE: HCPCS | Performed by: STUDENT IN AN ORGANIZED HEALTH CARE EDUCATION/TRAINING PROGRAM

## 2024-12-31 PROCEDURE — 700105 HCHG RX REV CODE 258: Performed by: INTERNAL MEDICINE

## 2024-12-31 PROCEDURE — 80053 COMPREHEN METABOLIC PANEL: CPT

## 2024-12-31 PROCEDURE — 36415 COLL VENOUS BLD VENIPUNCTURE: CPT

## 2024-12-31 PROCEDURE — 302843 HCHG HB CODING REVIEW REQUIRED

## 2024-12-31 PROCEDURE — 700102 HCHG RX REV CODE 250 W/ 637 OVERRIDE(OP): Performed by: INTERNAL MEDICINE

## 2024-12-31 PROCEDURE — 82962 GLUCOSE BLOOD TEST: CPT

## 2024-12-31 PROCEDURE — 700111 HCHG RX REV CODE 636 W/ 250 OVERRIDE (IP): Performed by: INTERNAL MEDICINE

## 2024-12-31 PROCEDURE — 97602 WOUND(S) CARE NON-SELECTIVE: CPT

## 2024-12-31 PROCEDURE — 84100 ASSAY OF PHOSPHORUS: CPT

## 2024-12-31 PROCEDURE — 700111 HCHG RX REV CODE 636 W/ 250 OVERRIDE (IP): Mod: JZ,JG | Performed by: INTERNAL MEDICINE

## 2024-12-31 PROCEDURE — A9270 NON-COVERED ITEM OR SERVICE: HCPCS | Performed by: INTERNAL MEDICINE

## 2024-12-31 PROCEDURE — 99232 SBSQ HOSP IP/OBS MODERATE 35: CPT | Performed by: STUDENT IN AN ORGANIZED HEALTH CARE EDUCATION/TRAINING PROGRAM

## 2024-12-31 PROCEDURE — 770006 HCHG ROOM/CARE - MED/SURG/GYN SEMI*

## 2024-12-31 PROCEDURE — A9270 NON-COVERED ITEM OR SERVICE: HCPCS | Performed by: HOSPITALIST

## 2024-12-31 PROCEDURE — 700102 HCHG RX REV CODE 250 W/ 637 OVERRIDE(OP): Performed by: STUDENT IN AN ORGANIZED HEALTH CARE EDUCATION/TRAINING PROGRAM

## 2024-12-31 PROCEDURE — 700102 HCHG RX REV CODE 250 W/ 637 OVERRIDE(OP): Performed by: HOSPITALIST

## 2024-12-31 RX ORDER — SODIUM HYPOCHLORITE 1.25 MG/ML
SOLUTION TOPICAL 2 TIMES DAILY
Status: DISCONTINUED | OUTPATIENT
Start: 2024-12-31 | End: 2024-12-31

## 2024-12-31 RX ORDER — SODIUM HYPOCHLORITE 1.25 MG/ML
SOLUTION TOPICAL 2 TIMES DAILY
Status: DISCONTINUED | OUTPATIENT
Start: 2024-12-31 | End: 2025-01-21 | Stop reason: HOSPADM

## 2024-12-31 RX ADMIN — APIXABAN 5 MG: 5 TABLET, FILM COATED ORAL at 17:09

## 2024-12-31 RX ADMIN — SPIRONOLACTONE 25 MG: 25 TABLET ORAL at 05:12

## 2024-12-31 RX ADMIN — CLINDAMYCIN IN 5 PERCENT DEXTROSE 900 MG: 18 INJECTION, SOLUTION INTRAVENOUS at 05:11

## 2024-12-31 RX ADMIN — APIXABAN 5 MG: 5 TABLET, FILM COATED ORAL at 05:12

## 2024-12-31 RX ADMIN — AMPICILLIN AND SULBACTAM 3 G: 1; 2 INJECTION, POWDER, FOR SOLUTION INTRAMUSCULAR; INTRAVENOUS at 13:38

## 2024-12-31 RX ADMIN — OXYCODONE 5 MG: 5 TABLET ORAL at 17:17

## 2024-12-31 RX ADMIN — HYDROMORPHONE HYDROCHLORIDE 0.25 MG: 1 INJECTION, SOLUTION INTRAMUSCULAR; INTRAVENOUS; SUBCUTANEOUS at 22:10

## 2024-12-31 RX ADMIN — CLINDAMYCIN IN 5 PERCENT DEXTROSE 900 MG: 18 INJECTION, SOLUTION INTRAVENOUS at 14:28

## 2024-12-31 RX ADMIN — MAGNESIUM HYDROXIDE 30 ML: 1200 LIQUID ORAL at 06:18

## 2024-12-31 RX ADMIN — METOPROLOL SUCCINATE 25 MG: 25 TABLET, EXTENDED RELEASE ORAL at 05:12

## 2024-12-31 RX ADMIN — OMEPRAZOLE 20 MG: 20 CAPSULE, DELAYED RELEASE ORAL at 05:12

## 2024-12-31 RX ADMIN — CLINDAMYCIN IN 5 PERCENT DEXTROSE 900 MG: 18 INJECTION, SOLUTION INTRAVENOUS at 22:01

## 2024-12-31 RX ADMIN — OXYCODONE 5 MG: 5 TABLET ORAL at 20:52

## 2024-12-31 RX ADMIN — OXYCODONE 5 MG: 5 TABLET ORAL at 10:40

## 2024-12-31 RX ADMIN — DAPAGLIFLOZIN 10 MG: 10 TABLET, FILM COATED ORAL at 08:45

## 2024-12-31 RX ADMIN — AMPICILLIN AND SULBACTAM 3 G: 1; 2 INJECTION, POWDER, FOR SOLUTION INTRAMUSCULAR; INTRAVENOUS at 08:38

## 2024-12-31 RX ADMIN — LOSARTAN POTASSIUM 25 MG: 25 TABLET, FILM COATED ORAL at 05:12

## 2024-12-31 RX ADMIN — DIGOXIN 125 MCG: 0.12 TABLET ORAL at 17:09

## 2024-12-31 RX ADMIN — AMPICILLIN AND SULBACTAM 3 G: 1; 2 INJECTION, POWDER, FOR SOLUTION INTRAMUSCULAR; INTRAVENOUS at 02:34

## 2024-12-31 RX ADMIN — AMPICILLIN AND SULBACTAM 3 G: 1; 2 INJECTION, POWDER, FOR SOLUTION INTRAMUSCULAR; INTRAVENOUS at 20:51

## 2024-12-31 RX ADMIN — POLYETHYLENE GLYCOL 3350 1 PACKET: 17 POWDER, FOR SOLUTION ORAL at 05:12

## 2024-12-31 RX ADMIN — DAKIN'S SOLUTION 0.125% (QUARTER STRENGTH) 10 ML: 0.12 SOLUTION at 20:52

## 2024-12-31 RX ADMIN — ACETAMINOPHEN 650 MG: 325 TABLET ORAL at 10:38

## 2024-12-31 RX ADMIN — SENNOSIDES AND DOCUSATE SODIUM 2 TABLET: 50; 8.6 TABLET ORAL at 17:09

## 2024-12-31 ASSESSMENT — ENCOUNTER SYMPTOMS
ABDOMINAL PAIN: 0
VOMITING: 0
FEVER: 0
NAUSEA: 0
SHORTNESS OF BREATH: 0

## 2024-12-31 ASSESSMENT — PAIN DESCRIPTION - PAIN TYPE
TYPE: ACUTE PAIN

## 2024-12-31 NOTE — CARE PLAN
The patient is Watcher - Medium risk of patient condition declining or worsening    Shift Goals  Clinical Goals: Patient will report a pain level less than or equal to 5 by the end of shift  Patient Goals: pain control  Family Goals: MARCOS    Progress made toward(s) clinical / shift goals:  Patients pain controlled with medication as per MAR, rest, distraction, and repositioning. Patient did report a pain level less than or equal to 5.    Patient is not progressing towards the following goals: progressing

## 2024-12-31 NOTE — PROGRESS NOTES
Hospital Medicine Daily Progress Note    Date of Service  12/31/2024    Chief Complaint  Robert Mcdonnell is a 74 y.o. male admitted 12/19/2024 with lower extremity edema and shortness of breath.    Hospital Course  Robert Mcdonnell is a 74-year-old male with PMHx homelessness, chronic atrial fibrillation on apixaban, HFrEF, pulmonary hypertension, perforated gastric ulcer s/p gastrectomy.    Per history: recent hospitalization 10/3 - 10/15 for perforated  secondary to invasive gastric adenocarcinoma, GDA embolization, subsequent subtotal gastrectomy, liver wedge resection, Miguel Angel-en-Y gastrojejunostomy with omental flap and cholecystectomy 10/7 with pathology showing invasive well-differentiated adenocarcinoma with associated ulceration. There was tumor invasion into the muscularis propria and 20 lymph nodes were negative for metastatic carcinoma. Oncology was consulted and noted no evidence of metastatic disease however MRI abdomen recommended for follow-up; no adjuvant therapy was recommended.     Patient was readmitted 12/19 for worsening lower extremity edema and shortness of breath.  He initially required BiPAP and IMCU admission.  Per history-patient had run out of his Lasix at home.  EKG in the emergency room showing irregular wide-complex arrhythmia.      Additionally, blood cultures were positive for group A strep bacteremia secondary to left lower extremity infection.  CT LLE: Small to moderate suprapatellar joint effusion.  MRI LLE: Diffuse cellulitis, patchy myositis, negative for OM.  Orthopedic surgery was consulted and recommended medical management. Infectious disease was consulted and recommended IV Unasyn with an end date of 1/4 and clindamycin with an end date of 12/31.    For patient's HFrEF-he was started on GDMT with effective diuresis.    Patient continued to have hypoglycemic episodes due to poor p.o. intake.    Interval Problem Update  12/30: Vital stable overnight.  SBP ranging 125 through  139.  Patient resting comfortably on room air.  WBC 14.2 from 14.9.  Hb stable at 8.9.  Chemistry panel stable.  MRI left lower extremity limited due to motion but no obvious osteomyelitis or bone lesions identified.  Continue IV Unasyn with an end date of 1/4.  Placement is pending.    12/31: Vitals stable overnight.  WBC 14.8 from 14.2.  Hb stable at 9.2.  Continue IV Unasyn until 1/4.  Borderline hypoglycemic with glucose ranging 60s to 90s.  Continue to encourage p.o. intake.     I have discussed this patient's plan of care and discharge plan at IDT rounds today with Case Management, Nursing, Nursing leadership, and other members of the IDT team.    Consultants/Specialty  critical care, infectious disease, and orthopedics    Code Status  Full Code    Disposition  The patient is medically cleared for discharge to home or a post-acute facility.  Anticipate discharge to: skilled nursing facility    I have placed the appropriate orders for post-discharge needs.    Review of Systems  Review of Systems   Constitutional:  Negative for fever and malaise/fatigue.   Respiratory:  Negative for shortness of breath.    Cardiovascular:  Negative for chest pain and leg swelling.   Gastrointestinal:  Negative for abdominal pain, nausea and vomiting.        Physical Exam  Temp:  [36.3 °C (97.3 °F)-36.7 °C (98 °F)] 36.6 °C (97.9 °F)  Pulse:  [71-77] 71  Resp:  [17-18] 18  BP: (121-129)/(62-75) 121/62  SpO2:  [92 %-96 %] 95 %    Physical Exam  Vitals and nursing note reviewed.   Constitutional:       General: He is not in acute distress.     Appearance: Normal appearance. He is ill-appearing.      Comments: Frail appearing    Cardiovascular:      Rate and Rhythm: Normal rate and regular rhythm.   Pulmonary:      Effort: Pulmonary effort is normal.      Breath sounds: Normal breath sounds.   Musculoskeletal:      Comments: Right lower extremity with dressings clean dry intact   Skin:     General: Skin is warm and dry.    Neurological:      Mental Status: He is alert and oriented to person, place, and time. Mental status is at baseline.   Psychiatric:         Mood and Affect: Mood normal.         Behavior: Behavior normal.         Fluids    Intake/Output Summary (Last 24 hours) at 12/31/2024 1120  Last data filed at 12/31/2024 0930  Gross per 24 hour   Intake 800 ml   Output 500 ml   Net 300 ml        Laboratory  Recent Labs     12/29/24  0414 12/30/24  0228 12/31/24  0142   WBC 14.9* 14.2* 14.8*   RBC 3.34* 3.19* 3.38*   HEMOGLOBIN 9.2* 8.9* 9.2*   HEMATOCRIT 27.3* 26.4* 28.4*   MCV 81.7 82.8 84.0   MCH 27.5 27.9 27.2   MCHC 33.7 33.7 32.4   RDW 50.8* 51.5* 52.9*   PLATELETCT 276 288 350   MPV 10.9 10.3 10.3     Recent Labs     12/29/24  0333 12/30/24  0228 12/31/24  0142   SODIUM 137 136 137   POTASSIUM 4.0 3.8 4.2   CHLORIDE 105 103 105   CO2 24 25 24   GLUCOSE 87 67 73   BUN 12 11 11   CREATININE 0.72 0.78 0.84   CALCIUM 7.4* 7.2* 7.8*                   Imaging  [id number]-W/O LEFT   Final Result      1.  Very limited examination due to patient motion artifact. Patient was also unable to complete the examination and therefore contrast-enhanced sequences were not obtained.      2.  No evidence of marrow edema or bone lesion.      3.  Again seen diffuse soft tissue edema/induration likely representing cellulitis. This also patchy muscle edema suggesting myositis.      4.  No focal fluid collection to suggest presence of abscess.      BV-OUNBENA-3 VIEW   Final Result      No evidence of bowel obstruction. Mild constipation.      MR-KNEE-WITH & W/O LEFT   Final Result      1.  No evidence of marrow edema or abnormal enhancement to suggest presence of osteomyelitis.      2.  Small to moderate joint effusion with mild synovitis.      3.  Diffuse soft tissue edema/induration consistent with cellulitis.      4.  Small popliteal cyst.      5.  No evidence of soft tissue abscess.      6.  Markedly motion degraded examination.  Ligaments and menisci are inadequately evaluated due to patient motion.      MR-LOWER EXTREMITY-NO JOINT-W/O LEFT   Final Result      1.  Patient motion degraded exam.      2.  No evidence of marrow edema or bone lesion to suggest presence of osteomyelitis.      3.  Diffuse cellulitis and patchy myositis.      4.  No evidence of focal fluid collection to suggest presence of abscess.      CT-EXTREMITY, LOWER WITH LEFT   Final Result      1.  Extensive subcutaneous inflammatory changes about the left lower extremity consistent with deep and superficial cellulitis.      2.  No definite CT evidence of necrotizing fasciitis.      3.  No CT evidence of deep abscess formation or acute or chronic osteomyelitis.      4.  Small to moderate suprapatellar joint effusion.      CT-EXTREMITY, LOWER WITH LEFT   Final Result      No soft tissue gas to suggest necrotizing fasciitis.      No fluid collection identified.      DX-CHEST-PORTABLE (1 VIEW)   Final Result         1.  Hazy left pulmonary infiltrates, similar to prior study   2.  Trace left pleural effusion, stable   3.  Cardiomegaly   4.  Atherosclerosis      DX-CHEST-PORTABLE (1 VIEW)   Final Result         1.  Hazy left pulmonary infiltrates, similar to prior study   2.  Trace left pleural effusion, stable   3.  Cardiomegaly   4.  Atherosclerosis      EC-ECHOCARDIOGRAM COMPLETE W/O CONT   Final Result      CT-CHEST,ABDOMEN,PELVIS WITH   Final Result      1. Stable spiculated opacity in the right upper lobe, extending to the pleural surface.   2. Lingular and left lower lobe parenchymal airspace disease has improved in the interval, but has not completely resolved. There is pleural reaction, suggesting some of these areas may represent parenchymal scarring.   3. Asymmetry of the lung volumes, small on the left than the right.   4. Cardiomegaly with atherosclerotic calcifications in the aorta and coronary arteries.   5. Postsurgical changes in the stomach.   6. Stable  scattered multiple hypodense lesions throughout the liver.   7. Moderate ascites throughout the abdomen and pelvis.   8. Cachexia.   9. Enlarged left inguinal lymph nodes with central necrosis. There is edema involving the soft tissues of the upper left leg extending to the left pelvis.      US-EXTREMITY VENOUS LOWER BILAT   Final Result      DX-CHEST-PORTABLE (1 VIEW)   Final Result         1.  Hazy left pulmonary infiltrates.   2.  Small left pleural effusion   3.  Cardiomegaly           Assessment/Plan  * Acute exacerbation of CHF (congestive heart failure) (HCC)- (present on admission)  Assessment & Plan  Echo: EF 28%; severe tricuspid and mitral regurg.  RVSP elevated at 55  - Continue GDMT with losartan, spironolactone, metoprolol, Farxiga  - Continue digoxin  - Maintain euvolemia    Constipation  Assessment & Plan  I ordered a bowel management  Suppository today    Hypoglycemia  Assessment & Plan  Multiple episodes of hypoglycemia requiring intervention  Poor p.o. intake  - Continue to encourage Ensure and p.o. intake  - Nutrition consulted    Streptococcal bacteremia- (present on admission)  Assessment & Plan  Blood cultures 12/19 positive for group A strep  Repeat blood cultures 12/21 NGTD    Pulmonary hypertension (HCC)  Assessment & Plan  Previous Echo RVSP 65, severe TR  Suspect mainly Group 2  RV perfusion with MAP > 65 as need norepinepinephrine  Blood pressure is running on the lower side holding diuretic therapy at this time    Acute respiratory failure with hypoxia (HCC)- (present on admission)  Assessment & Plan  Improved off Bipap  Currently is on 2 L of oxygen to maintain oxygen saturation  12/25 on room air.  Mobilize as able.    Hypophosphatemia- (present on admission)  Assessment & Plan  Replaced    Malignant neoplasm of body of stomach (HCC)- (present on admission)  Assessment & Plan  T2, N0, M0 invasive gastric adenocarcinoma presenting as perforated  10/2024  -GDA embolization  -10/7  subtotal gastrectomy, liver wedge resection, Miguel Angel-en-Y gastrojejunostomy with omental flap and cholecystectomy  -pathology invasive well-differentiated adenocarcinoma with associated ulceration  -tumor invasion into the muscularis propria and 20 lymph nodes were negative for metastatic carcinoma  -Oncology was consulted and noted no evidence of metastatic disease  -MRI abdomen recommended for follow-up; no adjuvant therapy was recommended  He will need outpatient follow-up    Homeless- (present on admission)  Assessment & Plan   consultation for ongoing access to medications and follow-up care as appropriate.  States family in [place] but states they cannot house him nor give assistance     Severe protein-calorie malnutrition (HCC)- (present on admission)  Assessment & Plan  Discussed with nutrition, patient meets the ASPEN criteria of severe malnutrition  Continuous nutrition support    ACP (advance care planning)- (present on admission)  Assessment & Plan  Patient admitted with CHF exacerbation, significantly reduced the EF of 30%.  Also found bacteremia, LLE infection, severe malnutrition.  History of invasive gastric malignancy, A-fib RVR, PE, gastric perforation post extensive surgery including subtotal gastrectomy, liver resection  Miguel Angel-en-Y gastrojejunostomy with omental flap and cholecystectomy.  I discussed the CODE STATUS with him, including CPR, intubation/mechanical ventilation.  He wants to stay full code.  Patient has high complexity and guarded prognosis.  I consulted palliative care.  ACP 18 minutes.     COPD (chronic obstructive pulmonary disease) (HCC)- (present on admission)  Assessment & Plan  Current active smoker, no PFTs on file  RT protocols, bronchodilators as needed  No signs of exacerbation    Acute kidney injury (HCC)- (present on admission)  Assessment & Plan  Resolved   Maintain euvolemia and monitor fluid responsiveness (avoid NaCL and renal congestion)  Monitor urine  output and I&O's  Avoid and review nephrotoxin medication  Renal function improved    Sepsis (HCC)- (present on admission)  Assessment & Plan  See above    Atrial fibrillation with RVR (HCC)- (present on admission)  Assessment & Plan  Chronic atrial fibrillation, currently tachycardia  Digoxin 250 mcg IV monitoring closely with flip to prevent digoxin toxicity    Continue digoxin and Eliquis  12/27 BP better, I resumed Toprol 25 mg    Thrombocytopenia (HCC)- (present on admission)  Assessment & Plan  Platelets 288  - Repeat CBC in a.m.  - Stable to continue Eliquis at this time    Tobacco dependence- (present on admission)  Assessment & Plan  Counseling when appropriate    Hypertension- (present on admission)  Assessment & Plan  SBP well-controlled  - Continue losartan, metoprolol, spironolactone    Wound of lower extremity- (present on admission)  Assessment & Plan  Likely source of infection, lower ext doppler negative  CT left leg shows small to moderate suprapatellar joint effusion.  MRI LLE showed Diffuse cellulitis and patchy myositis, negative for osteomyelitis or abscess.   Repeat MRI 12/30: Limited due to patient motion artifact; no obvious OM noted persistent cellulitis and myositis noted  - Infectious disease consulted  - Continue IV Unasyn until 1/4  - Continue clindamycin through 12/31  - Orthopedic surgery consultation 12/20 and 12/25 no surgical interventions at this time         VTE prophylaxis:    therapeutic anticoagulation with eliquis 5 mg BID      I have performed a physical exam and reviewed and updated ROS and Plan today (12/31/2024). In review of yesterday's note (12/30/2024), there are no changes except as documented above.

## 2024-12-31 NOTE — FACE TO FACE
Face to Face Note  -  Durable Medical Equipment    Yuko Chris M.D. - NPI: [id number]  I certify that this patient is under my care and that they had a durable medical equipment(DME)face to face encounter by myself that meets the physician DME face-to-face encounter requirements with this patient on:    Date of encounter:   Patient:                    MRN:                       YOB: 2024  Robert Mcdonnell  [id number]  1950     The encounter with the patient was in whole, or in part, for the following medical condition, which is the primary reason for durable medical equipment:  Other - right lower ext cellulitis, weakness, hfref    I certify that, based on my findings, the following durable medical equipment is medically necessary:    Wheelchair   Patient needs manual wheelchair for use inside the home based on the above diagnosis. Per guidelines patient meets criteria in the following ways:   A.  Patient has significant impairment in the following Toileting, Grooming, and Bathing and is is unable to complete these tasks in a reasonable timeframe and places the patient at a heightened risk of morbidity.   B.  The patient's mobility limitations cannot be sufficiently resolved by use of  fitted cane or walker.   C.  The patient reports his home provides adequate access between rooms,  maneuvering space, and surfaces for use of the manual wheelchair that is  provided.   D.  The use of the manual wheelchair will significantly improve the patient's  ability to participate in MRADLs and the patient will use it on a regular basis in  the home.   E.  The patient has not expressed an unwillingness to use the manual  wheelchair.   F. The patient has limitations of strength, endurance, range of motion, or coordination per OT notes:.    My Clinical findings support the need for the above equipment due to:  Abnormal Gait, Frequent Falls, Wound/Incision

## 2024-12-31 NOTE — WOUND TEAM
Assisted  Therese (Wound RN) , with wound care, non-selective debridement performed using wound cleanser/NS and gauze. Please see  Therese's  wound note for further wound care details.

## 2024-12-31 NOTE — WOUND TEAM
[facility]  Inpatient Services  Wound and Skin Care Follow-up    Admission Date: 12/19/2024     Last order of IP CONSULT TO WOUND CARE was found on 12/26/2024 from Hospital Encounter on 12/19/2024     HPI, PMH, SH: Reviewed    Past Surgical History:   Procedure Laterality Date    GASTRECTOMY N/A 10/7/2024    Procedure: LAPAROTOMY, GASTRECTOMY-SUBTOTAL, WITH INTRAOPERATIVE ULTRASOUND GUIDANCE;  Surgeon: Mt Cabral M.D.;  Location: SURGERY [facility];  Service: General    NODE DISSECTION N/A 10/7/2024    Procedure: LYMPHADENECTOMY-NODE DISSECTION;  Surgeon: Mt Cabral M.D.;  Location: SURGERY [facility];  Service: General    CREATION, FLAP, OMENTUM N/A 10/7/2024    Procedure: CREATION, FLAP, OMENTUM;  Surgeon: Mt Cabral M.D.;  Location: SURGERY [facility];  Service: General    CHOLECYSTECTOMY N/A 10/7/2024    Procedure: CHOLECYSTECTOMY;  Surgeon: Mt Cabral M.D.;  Location: SURGERY [facility];  Service: General    [place] UPPER GI ENDOSCOPY,DIAGNOSIS N/A 9/13/2024    Procedure: GASTROSCOPY;  Surgeon: Sarah Lozoya M.D.;  Location: SURGERY SAME DAY [facility];  Service: Gastroenterology    [place] UPPER GI ENDOSCOPY,BIOPSY N/A 9/13/2024    Procedure: GASTROSCOPY, WITH BIOPSY;  Surgeon: Sarah Lozoya M.D.;  Location: SURGERY SAME DAY [facility];  Service: Gastroenterology    [place] UPPER GI ENDOSCOPY,SCLER INJECT N/A 9/13/2024    Procedure: GASTROSCOPY, WITH SCLEROTHERAPY;  Surgeon: Sarah Lozoya M.D.;  Location: SURGERY SAME DAY [facility];  Service: Gastroenterology    [place] UPPER GI ENDOSCOPY,BIOPSY N/A 09/10/2024    Procedure: GASTROSCOPY, WITH BIOPSY;  Surgeon: Demond Gutierres M.D.;  Location: SURGERY SAME DAY [facility];  Service: Gastroenterology    [place] UPPER GI ENDOSCOPY,SCLER INJECT N/A 09/10/2024    Procedure: GASTROSCOPY, WITH SCLEROTHERAPY;  Surgeon: Demond Gutierres M.D.;  Location: SURGERY SAME DAY [facility];  Service: Gastroenterology     GASTROSCOPY WITH ENDOSTAT N/A 09/10/2024    Procedure: EGD, WITH CAUTERIZATION;  Surgeon: Demond Gutierres M.D.;  Location: SURGERY SAME DAY [facility];  Service: Gastroenterology    [place] UPPER GI ENDOSCOPY,DIAGNOSIS N/A 01/31/2023    Procedure: GASTROSCOPY;  Surgeon: Joy Mcnair M.D.;  Location: SURGERY SAME DAY [facility];  Service: Gastroenterology    CATARACT PHACO WITH IOL Left      Social History     Tobacco Use    Smoking status: Some Days     Current packs/day: 1.00     Types: Cigarettes    Smokeless tobacco: Never   Substance Use Topics    Alcohol use: Yes     Alcohol/week: 1.2 oz     Types: 2 Cans of beer per week     Comment: occ     Chief Complaint   Patient presents with    Shortness of Breath           Peripheral Edema     Diagnosis: Acute left-sided CHF (congestive heart failure) (HCC) [I50.1]  Streptococcal bacteremia [R78.81, B95.5]  Left leg cellulitis [L03.116]    Unit where seen by Wound Team: S614/02     WOUND FOLLOW UP RELATED TO:  LLE       WOUND TEAM PLAN OF CARE - Frequency of Follow-up:   Nursing to follow dressing orders written for wound care. Contact wound team if area fails to progress, deteriorates or with any questions/concerns if something comes up before next scheduled follow up (See below as to whether wound is following and frequency of wound follow up)  Dressing changes by wound team:                   Weekly - LLE    WOUND HISTORY:     Per H&P: 74 y.o. male with past medical history of chronic atrial fibrillation, homeless, prior PE, congestive heart failure with biventricular dysfunction, pulmonary hypertension recently admitted for invasive gastric adenocarcinoma status post EGD embolization, cholecystectomy who presented to the hospital on 12/19/2024 with shortness of breath, left leg swelling and encephalopathy and found to hypoglycemia and he was hypoxic and was placed on BiPAP. His symptoms of shortness of breath improved and encephalopathy also improved.         WOUND  ASSESSMENT/LDA  Wound 12/20/24 Full Thickness Wound Pretibial Circumferential Left (Active)   Date First Assessed/Time First Assessed: 12/20/24 0800   Present on Original Admission: Yes  Primary Wound Type: Full Thickness Wound  Location: Pretibial  Wound Orientation: Circumferential  Laterality: Left      Assessments 12/31/2024 10:00 AM   Wound Image      Site Assessment Pink;Red;Yellow;Brown;Slough   Periwound Assessment Scar tissue;Flaky;Fragile;Pink   Margins Attached edges;Defined edges   Closure Secondary intention   Drainage Amount Small   Drainage Description Serosanguineous   Treatments Cleansed;Nonselective debridement;Site care   Wound Cleansing Approved Wound Cleanser   Periwound Protectant Barrier Paste   Dressing Status Clean;Dry;Intact   Dressing Changed Changed   Dressing Cleansing/Solutions 1/4 Strength Dakin's Solution   Dressing Options Absorbent Abdominal Pad;Moist Roll Gauze;Dry Roll Gauze;Hypafix Tape   Dressing Change/Treatment Frequency Every Shift, and As Needed   NEXT Dressing Change/Treatment Date 01/01/25   NEXT Weekly Photo (Inpatient Only) 01/07/25   Wound Team Following Weekly   Non-staged Wound Description Full thickness   Wound Length (cm) 19 cm   Wound Width (cm) 25 cm   Wound Surface Area (cm^2) 475 cm^2   Shape irregular   Wound Odor None   Pulses DP;2+   WOUND NURSE ONLY - Time Spent with Patient (mins) 60        Vascular:    SHANTI:   No results found.    Lab Values:    Lab Results   Component Value Date/Time    WBC 14.8 (H) 12/31/2024 01:42 AM    RBC 3.38 (L) 12/31/2024 01:42 AM    HEMOGLOBIN 9.2 (L) 12/31/2024 01:42 AM    HEMATOCRIT 28.4 (L) 12/31/2024 01:42 AM    CREACTPROT 30.02 (H) 12/20/2024 02:45 PM    SEDRATEWES 13 12/20/2024 02:45 PM    HBA1C 5.9 (H) 09/08/2024 12:24 AM         Culture Results show:  No results found for this or any previous visit (from the past 720 hours).    Pain Level/Medicated:  None, Tolerated without pain medication       INTERVENTIONS BY WOUND TEAM:   Chart and images reviewed. Discussed with bedside RN. All areas of concern (based on picture review, LDA review and discussion with bedside RN) have been thoroughly assessed. Documentation of areas based on significant findings. This RN in to assess patient. Performed standard wound care which includes appropriate positioning, dressing removal and non-selective debridement. Pictures and measurements obtained weekly if/when required.    Wound:  LLE  Preparation for Dressing removal: Removed without difficulty  Cleansed/Non-selectively Debrided with:  Wound cleanser and Gauze  Obdulia wound: Cleansed with Wound cleanser and Gauze, Prepped with Barrier paste  Primary Dressing:  Cleanser soaked gauze (dakins ordered)  Secondary (Outer) Dressing: ABD pads, dry roll gauze, hypafix tape    Advanced Wound Care Discharge Planning  Number of Clinicians necessary to complete wound care: 1  Is patient requiring IV pain medications for dressing changes:  No   Length of time for dressing change 45 min. (This does not include chart review, pre-medication time, set up, clean up or time spent charting.)    Interdisciplinary consultation: Patient, Bedside RN (Sarah), Dale RAGLAND (Wound RN).     EVALUATION / RATIONALE FOR TREATMENT:     Date:  12/31/24  Wound Status:  Wound progressing as expected    Wound to LLE continues to evolve. Previously blistered skin was easily debrided with cleansing.  Wound bed now noted with areas of adhered slough. A dakins wet to dry was applied to chemically debride nonviable tissue, decrease bioburden and odor.   Upper thigh/groin assessed which appears improved, and with areas of scar tissue. Ok to continue with moisture prevention and offloading.     Date:  12/24/24  Wound Status:  No change in wound      Patient seen on 12/23/24 for the left lower extremity blister that has opened and is denuded. New consult placed on 12/24/24 for worsening and more areas on the upper thigh and scrotum that are  becoming denuded. Modified dressing orders to include left upper thigh and scrotum with daily dressing changes. Recommend condom cath if patient can tolerate. Spoke with Dr. Zamora who also came bedside to assess and consult ID for further plan of care.   Sacrum and left buttock denuded as well. Unclear if etiology is friction/shearing/MASD vs the rest of the LLE wound. Applied barrier paste. Continue with offloading measures.        Date:  12/23/24  Wound Status:  Initial evaluation     Patient's LLE wound initially with a blister. Wound has now opened up circumferentially. The wound bed is red, bleeding, fragile and painful. Edges are denuded with sloughing skin. Applied Xeroform (yellow) to provide an occlusive dressing that incorporates bacteriostatic protection.   Ortho following patient. Per CT Scan, negative for soft tissue gas and fluid collection. Continuing with wound care and antibiotics.   BL Heels intact. Continue with offloading measures.  Sacrum is intact. Continue with offloading measures and turns.            Goals: Steady decrease in wound area and depth weekly.            Goals: Steady decrease in wound area and depth weekly.    NURSING PLAN OF CARE ORDERS:  Dressing changes: See Dressing Care orders    NUTRITION RECOMMENDATIONS   Wound Team Recommendations:  N/A     DIET ORDERS (From admission to next 24h)       Start     Ordered    12/31/24 0406  Diet Order Diet: Level 7 - Easy to Chew; Liquid level: Level 0 - Thin; Second Modifier: (optional): Cardiac  ALL MEALS        Question Answer Comment   Diet: Level 7 - Easy to Chew    Liquid level Level 0 - Thin    Second Modifier: (optional) Cardiac        12/31/24 0406    12/27/24 1352  Supplements  ALL MEALS        Question Answer Comment   Which Supplement Ensure    Ensure: Ensure High Protein Pudding        12/27/24 1351    12/24/24 0941  Supplements  ALL MEALS        Question Answer Comment   Which Supplement Ensure    Ensure: Ensure Max (Non  Caffeinated) Dank        12/24/24 0978                    PREVENTATIVE INTERVENTIONS:   Q shift Edward - performed per nursing policy  Q shift pressure point assessments - performed per nursing policy    Surface/Positioning  Low Airloss - Currently in Place  Reposition q 2 hours - Currently in Place  TAPs Turning system - Currently in Place    Offloading/Redistribution  Heel offloading dressing (Silicone dressing) - Currently in Place    Anticipated discharge plans:  TBD        Vac Discharge Needs:  Vac Discharge plan is purely a recommendation from wound team and not a requirement for discharge unless otherwise stated by physician.  Not Applicable Pt not on a wound vac

## 2024-12-31 NOTE — CARE PLAN
The patient is Stable - Low risk of patient condition declining or worsening    Shift Goals  Clinical Goals: Patient will remain safe and free from any falls during shift  Patient Goals: Rest  Family Goals: MARCOS    Patient a/o x4. Patient stated pain was 8/10 after wound team dressed his wound on groin and legs. Gave pain medication. Most recent blood glucose was 109 at 1339. Call light and belongings are within reach. Bed is in low locked position.     Progress made toward(s) clinical / shift goals:    Problem: Knowledge Deficit - Standard  Goal: Patient and family/care givers will demonstrate understanding of plan of care, disease process/condition, diagnostic tests and medications  Outcome: Progressing     Problem: Hemodynamics  Goal: Patient's hemodynamics, fluid balance and neurologic status will be stable or improve  Outcome: Progressing     Problem: Fluid Volume  Goal: Fluid volume balance will be maintained  Outcome: Progressing     Problem: Urinary - Renal Perfusion  Goal: Ability to achieve and maintain adequate renal perfusion and functioning will improve  Outcome: Progressing     Problem: Respiratory  Goal: Patient will achieve/maintain optimum respiratory ventilation and gas exchange  Outcome: Progressing     Problem: Mechanical Ventilation  Goal: Safe management of artificial airway and ventilation  Outcome: Progressing  Goal: Successful weaning off mechanical ventilator, spontaneously maintains adequate gas exchange  Outcome: Progressing  Goal: Patient will be able to express needs and understand communication  Outcome: Progressing     Problem: Physical Regulation  Goal: Diagnostic test results will improve  Outcome: Progressing  Goal: Signs and symptoms of infection will decrease  Outcome: Progressing     Problem: Pain - Standard  Goal: Alleviation of pain or a reduction in pain to the patient’s comfort goal  Outcome: Progressing     Problem: Skin Integrity  Goal: Skin integrity is maintained or  improved  Outcome: Progressing     Problem: Fall Risk  Goal: Patient will remain free from falls  Outcome: Progressing     Problem: Care Map:  Admission Optimal Outcome for the Heart Failure Patient  Goal: Admission:  Optimal Care of the heart failure patient  Outcome: Progressing     Problem: Care Map:  Day 1 Optimal Outcome for the Heart Failure Patient  Goal: Day 1:  Optimal Care of the heart failure patient  Outcome: Progressing     Problem: Depression  Goal: Patient and family/caregiver will verbalize accurate information about at least two of the possible causes of depression, three-four of the signs and symptoms of depression  Outcome: Progressing     Problem: Nutrition  Goal: Patient's nutritional and fluid intake will be adequate or improve  Outcome: Progressing       Patient is not progressing towards the following goals:

## 2024-12-31 NOTE — CARE PLAN
The patient is Stable - Low risk of patient condition declining or worsening    Shift Goals  Clinical Goals: Patient will remain free from any falls or injury within the shift  Patient Goals: Rest, Comfort  Family Goals: MARCOS    Progress made toward(s) clinical / shift goals: Turning and repositioning done, call light placed within easy reach. Incontinence care provided. Wound care and dressing done to LLE. Bed alarms on. Remained free form any falls or injury within the shift.     Patient is not progressing towards the following goals:      Problem: Knowledge Deficit - Standard  Goal: Patient and family/care givers will demonstrate understanding of plan of care, disease process/condition, diagnostic tests and medications  Description: Target End Date:  1-3 days or as soon as patient condition allows    Document in Patient Education    1.  Patient and family/caregiver oriented to unit, equipment, visitation policy and means for communicating concern  2.  Complete/review Learning Assessment  3.  Assess knowledge level of disease process/condition, treatment plan, diagnostic tests and medications  4.  Explain disease process/condition, treatment plan, diagnostic tests and medications  Outcome: Not Progressing     Problem: Physical Regulation  Goal: Diagnostic test results will improve  Description: Target End Date:  Prior to discharge or change in level of care  1.  Monitor diagnostic test results  Outcome: Not Progressing  Goal: Signs and symptoms of infection will decrease  Description: Target End Date:  Prior to discharge or change in level of care    1.  Remove potential routes of infection, such as central lines and urinary catheter  2.  Follow facility protocol for changing IV tubing and sites  3.  Collaborate with Infectious Disease  4.  Antibiotic therapy per provider order  5.  Note drug effects and monitor for antibiotic toxicity  Outcome: Not Progressing     Problem: Skin Integrity  Goal: Skin integrity is  maintained or improved  Description: Target End Date:  Prior to discharge or change in level of care    Document interventions on Skin Risk/Edward flowsheet groups and corresponding LDA    1.  Assess and monitor skin integrity, appearance and/or temperature  2.  Assess risk factors for impaired skin integrity and/or pressures ulcers  3.  Implement precautions to protect skin integrity in collaboration with interdisciplinary team  4.  Implement pressure ulcer prevention protocol if at risk for skin breakdown  5.  Confirm wound care consult if at risk for skin breakdown  6.  Ensure patient use of pressure relieving devices  (Low air loss bed, waffle overlay, heel protectors, ROHO cushion, etc)  Outcome: Not Progressing     Problem: Nutrition  Goal: Patient's nutritional and fluid intake will be adequate or improve  Description: Target End Date:  Prior to discharge or change in level of care    Document on I/O flowsheet    1.  Monitor nutritional intake  2.  Monitor weight per provider order  3.  Assess patient's ability to take oral nutrition  4.  Collaborate with Speech Therapy, Dietitian and interdisciplinary team for appropriate feeding and fluid intake  5.  Assist with feeding  Outcome: Not Progressing

## 2024-12-31 NOTE — PROGRESS NOTES
4 Eyes Skin Assessment Completed by JANNETTE Morris and JANNETTE Martinez.    Head Scars/Scabs to forehead and cheek  Ears WDL  Nose WDL  Mouth WDL  Neck WDL  Breast/Chest WDL  Shoulder Blades WDL  Spine Skin tags  (R) Arm/Elbow/Hand Scab, Flaky, Fragile  (L) Arm/Elbow/Hand Scab, Flaky, Fragile  Abdomen Scar  Groin Redness, Skin peeling to inner thigh/groin, Fragile, Flaky  Scrotum/Coccyx/Buttocks Redness, Blanching, and Discoloration, Fragile, Flaky  (R) Leg Scab, Discoloration, Edema, Flaky, Fragile  (L) Leg Discoloration, Swelling, and Edema, Wound to L Leg, Flaky, Fragile  (R) Heel/Foot/Toe Redness, Blanching, and Boggy, Flaky  (L) Heel/Foot/Toe Redness, Blanching, and Boggy, Flaky          Devices In Places Condom Cath      Interventions In Place Heel Mepilex, Sacral Mepilex, Heel Float Boots, TAP System, Pillows, Elbow Mepilex, Q2 Turns, Low Air Loss Mattress, Barrier Cream, and Heels Loaded W/Pillows    Possible Skin Injury Yes    Pictures Uploaded Into Epic N/A  Wound Consult Placed N/A-already consulted  RN Wound Prevention Protocol Ordered Yes

## 2024-12-31 NOTE — DIETARY
Nutrition Update: Follow-up for PO intake  Day 12 of admit.  Robert Mcdonnell is a 74 y.o. male with admitting DX of Acute left-sided CHF (congestive heart failure) ([facility]) [I50.1]  Streptococcal bacteremia [R78.81, B95.5]  Left leg cellulitis [L03.116].    Current Diet: Level 7 - easy to chew, thin liquid, Cardiac with Ensure supplements. Recorded PO intake of meals often <50%, but pt did eat 50-75% breakfast today and does drink some Ensure per flow sheets.     Severe Malnutrition in context of Chronic Illness related to muscle and fat wasting as evidenced by Severe muscle loss at (temple, shoulder, clavicle) Severe muscle loss at (orbital, buccal, triceps)     Nutrition Dx Status: Ongoing    Problem: Nutritional:  Goal: Achieve adequate nutritional intake  Description: Patient will consume >50% of meals and supplements.  Outcome: Progressing    RD following

## 2024-12-31 NOTE — DISCHARGE PLANNING
Case Management Discharge Planning    Admission Date: 12/19/2024  GMLOS: 4.9  ALOS: 12    6-Clicks ADL Score: 15  6-Clicks Mobility Score: 8  PT and/or OT Eval ordered: Yes  Post-acute Referrals Ordered: Yes  Post-acute Choice Obtained: No  Has referral(s) been sent to post-acute provider:  Yes      Anticipated Discharge Dispo: Discharge Disposition: D/T to SNF with Medicare cert in anticipation of skilled care (03)    DME Needed: No    Action(s) Taken: Updated Provider/Nurse on Discharge Plan, Referral(s) sent, DME Face to Face , and Completed PASSR/LOC  PASRR [id number]  RNCM discussed pt during IDT rounds. CM discussed discharge plan with Dr. Chris. Pt is medically cleared pending placement., pending MRI. RNCM contacted [facility] to inquire further regarding placement at facility. Wound notes are available according to medical team. Admissions coordinator, Erasmo will look further at current wound care and will contact RNCM today with decision.  Met with pt at bedside to discuss discharge goals. Pt is pleasant and very agreeable to discharge plan. Pt lives alone at [facility]. Prior to hospitalization pt states he was independent with ADLs and does not use DME. No Oxygen set up. Denies mental health and denies substance abuse. Pt states that he needs another PCP as he has not seen Torri Razo PA-C for about a year. Pt states that he plans to return to [facility], once IV antibiotics are complete, as he feels comfortable there. Pt does have a brother, Clyde, and a close friend, Tabitha, available to him, however, he does not seek to live with them at this time. Preferred pharmacy with [facility] on [address] [place] [address].     Escalations Completed: None    Medically Clear: No    Next Steps: Follow-up with medical team to discuss DC needs and barriers.    Barriers to Discharge: Medical clearance, Homelessness, and Pending Procedures    Care Transition Team Assessment    Information Source  Orientation Level:  Oriented X4  Information Given By: Patient  Who is responsible for making decisions for patient? : Patient    Readmission Evaluation  Is this a readmission?: No    Elopement Risk  Legal Hold: No  Ambulatory or Self Mobile in Wheelchair: No-Not an Elopement Risk  Disoriented: No  Psychiatric Symptoms: None  History of Wandering: No  Elopement this Admit: No  Vocalizing Wanting to Leave: No  Displays Behaviors, Body Language Wanting to Leave: No-Not at Risk for Elopement  Elopement Risk: Not at Risk for Elopement    Interdisciplinary Discharge Planning  Lives with - Patient's Self Care Capacity: Other (Comments), Alone and Able to Care For Self  Patient or legal guardian wants to designate a caregiver: No  Support Systems: None  Housing / Facility: Homeless  Name of Care Facility: [facility]  Prior Services: None    Discharge Preparedness  What is your plan after discharge?: Skilled nursing facility  What are your discharge supports?: Sibling, Other (comment) (brother Clyde and friend Tabitha)  Prior Functional Level: Independent with Activities of Daily Living, Independent with Medication Management  Difficulity with ADLs: Walking, Toileting, Bathing    Functional Assesment  Prior Functional Level: Independent with Activities of Daily Living, Independent with Medication Management    Finances  Financial Barriers to Discharge: Yes  Prescription Coverage: Yes    Vision / Hearing Impairment  Right Eye Vision: Impaired  Left Eye Vision: Impaired    Advance Directive  Advance Directive?: None  Advance Directive offered?: AD Booklet refused    Domestic Abuse  Have you ever been the victim of abuse or violence?: No  Possible Abuse/Neglect Reported to:: Not Applicable    Psychological Assessment  History of Substance Abuse: None  History of Psychiatric Problems: No  Non-compliant with Treatment: Yes  Newly Diagnosed Illness: No    Discharge Risks or Barriers  Discharge risks or barriers?: Homeless / couch surfing,  Post-acute placement / services, Complex medical needs  Patient risk factors: Homeless, Complex medical needs    Anticipated Discharge Information  Discharge Disposition: D/T to SNF with Medicare cert in anticipation of skilled care (03)

## 2024-12-31 NOTE — PROGRESS NOTES
Messaged Dr. Yuko Chris asking how often blood sugar needs to be monitored as there are a few orders. Just checked blood sugar and it was 83.

## 2025-01-01 LAB
ALBUMIN SERPL BCP-MCNC: 2.2 G/DL (ref 3.2–4.9)
ALBUMIN/GLOB SERPL: 0.7 G/DL
ALP SERPL-CCNC: 171 U/L (ref 30–99)
ALT SERPL-CCNC: 9 U/L (ref 2–50)
ANION GAP SERPL CALC-SCNC: 7 MMOL/L (ref 7–16)
AST SERPL-CCNC: 25 U/L (ref 12–45)
BASOPHILS # BLD AUTO: 0.7 % (ref 0–1.8)
BASOPHILS # BLD: 0.08 K/UL (ref 0–0.12)
BILIRUB SERPL-MCNC: 0.8 MG/DL (ref 0.1–1.5)
BUN SERPL-MCNC: 9 MG/DL (ref 8–22)
CALCIUM ALBUM COR SERPL-MCNC: 8.9 MG/DL (ref 8.5–10.5)
CALCIUM SERPL-MCNC: 7.5 MG/DL (ref 8.5–10.5)
CHLORIDE SERPL-SCNC: 107 MMOL/L (ref 96–112)
CO2 SERPL-SCNC: 23 MMOL/L (ref 20–33)
CREAT SERPL-MCNC: 0.77 MG/DL (ref 0.5–1.4)
DIGOXIN SERPL-MCNC: 0.9 NG/ML (ref 0.8–2)
EOSINOPHIL # BLD AUTO: 0.08 K/UL (ref 0–0.51)
EOSINOPHIL NFR BLD: 0.7 % (ref 0–6.9)
ERYTHROCYTE [DISTWIDTH] IN BLOOD BY AUTOMATED COUNT: 54.6 FL (ref 35.9–50)
GFR SERPLBLD CREATININE-BSD FMLA CKD-EPI: 94 ML/MIN/1.73 M 2
GLOBULIN SER CALC-MCNC: 3.1 G/DL (ref 1.9–3.5)
GLUCOSE BLD STRIP.AUTO-MCNC: 56 MG/DL (ref 65–99)
GLUCOSE BLD STRIP.AUTO-MCNC: 68 MG/DL (ref 65–99)
GLUCOSE BLD STRIP.AUTO-MCNC: 74 MG/DL (ref 65–99)
GLUCOSE BLD STRIP.AUTO-MCNC: 83 MG/DL (ref 65–99)
GLUCOSE BLD STRIP.AUTO-MCNC: 86 MG/DL (ref 65–99)
GLUCOSE SERPL-MCNC: 89 MG/DL (ref 65–99)
HCT VFR BLD AUTO: 26.7 % (ref 42–52)
HGB BLD-MCNC: 8.8 G/DL (ref 14–18)
IMM GRANULOCYTES # BLD AUTO: 0.08 K/UL (ref 0–0.11)
IMM GRANULOCYTES NFR BLD AUTO: 0.7 % (ref 0–0.9)
LYMPHOCYTES # BLD AUTO: 1.13 K/UL (ref 1–4.8)
LYMPHOCYTES NFR BLD: 9.3 % (ref 22–41)
MAGNESIUM SERPL-MCNC: 2.5 MG/DL (ref 1.5–2.5)
MCH RBC QN AUTO: 28.4 PG (ref 27–33)
MCHC RBC AUTO-ENTMCNC: 33 G/DL (ref 32.3–36.5)
MCV RBC AUTO: 86.1 FL (ref 81.4–97.8)
MONOCYTES # BLD AUTO: 0.93 K/UL (ref 0–0.85)
MONOCYTES NFR BLD AUTO: 7.7 % (ref 0–13.4)
NEUTROPHILS # BLD AUTO: 9.81 K/UL (ref 1.82–7.42)
NEUTROPHILS NFR BLD: 80.9 % (ref 44–72)
NRBC # BLD AUTO: 0 K/UL
NRBC BLD-RTO: 0 /100 WBC (ref 0–0.2)
PHOSPHATE SERPL-MCNC: 2.5 MG/DL (ref 2.5–4.5)
PLATELET # BLD AUTO: 374 K/UL (ref 164–446)
PMV BLD AUTO: 10.8 FL (ref 9–12.9)
POTASSIUM SERPL-SCNC: 4.3 MMOL/L (ref 3.6–5.5)
PROT SERPL-MCNC: 5.3 G/DL (ref 6–8.2)
RBC # BLD AUTO: 3.1 M/UL (ref 4.7–6.1)
SODIUM SERPL-SCNC: 137 MMOL/L (ref 135–145)
WBC # BLD AUTO: 12.1 K/UL (ref 4.8–10.8)

## 2025-01-01 PROCEDURE — 97530 THERAPEUTIC ACTIVITIES: CPT

## 2025-01-01 PROCEDURE — 700102 HCHG RX REV CODE 250 W/ 637 OVERRIDE(OP): Performed by: HOSPITALIST

## 2025-01-01 PROCEDURE — 85025 COMPLETE CBC W/AUTO DIFF WBC: CPT

## 2025-01-01 PROCEDURE — 700102 HCHG RX REV CODE 250 W/ 637 OVERRIDE(OP): Performed by: STUDENT IN AN ORGANIZED HEALTH CARE EDUCATION/TRAINING PROGRAM

## 2025-01-01 PROCEDURE — A9270 NON-COVERED ITEM OR SERVICE: HCPCS | Performed by: STUDENT IN AN ORGANIZED HEALTH CARE EDUCATION/TRAINING PROGRAM

## 2025-01-01 PROCEDURE — A9270 NON-COVERED ITEM OR SERVICE: HCPCS | Performed by: HOSPITALIST

## 2025-01-01 PROCEDURE — 80162 ASSAY OF DIGOXIN TOTAL: CPT

## 2025-01-01 PROCEDURE — 80053 COMPREHEN METABOLIC PANEL: CPT

## 2025-01-01 PROCEDURE — 83735 ASSAY OF MAGNESIUM: CPT

## 2025-01-01 PROCEDURE — 36415 COLL VENOUS BLD VENIPUNCTURE: CPT

## 2025-01-01 PROCEDURE — 700102 HCHG RX REV CODE 250 W/ 637 OVERRIDE(OP): Performed by: INTERNAL MEDICINE

## 2025-01-01 PROCEDURE — 700105 HCHG RX REV CODE 258: Performed by: INTERNAL MEDICINE

## 2025-01-01 PROCEDURE — 700111 HCHG RX REV CODE 636 W/ 250 OVERRIDE (IP): Mod: TB | Performed by: INTERNAL MEDICINE

## 2025-01-01 PROCEDURE — 700111 HCHG RX REV CODE 636 W/ 250 OVERRIDE (IP): Mod: JZ | Performed by: INTERNAL MEDICINE

## 2025-01-01 PROCEDURE — A9270 NON-COVERED ITEM OR SERVICE: HCPCS | Performed by: INTERNAL MEDICINE

## 2025-01-01 PROCEDURE — 99232 SBSQ HOSP IP/OBS MODERATE 35: CPT | Performed by: STUDENT IN AN ORGANIZED HEALTH CARE EDUCATION/TRAINING PROGRAM

## 2025-01-01 PROCEDURE — 82962 GLUCOSE BLOOD TEST: CPT | Mod: 91

## 2025-01-01 PROCEDURE — 770006 HCHG ROOM/CARE - MED/SURG/GYN SEMI*

## 2025-01-01 PROCEDURE — 84100 ASSAY OF PHOSPHORUS: CPT

## 2025-01-01 RX ADMIN — AMPICILLIN AND SULBACTAM 3 G: 1; 2 INJECTION, POWDER, FOR SOLUTION INTRAMUSCULAR; INTRAVENOUS at 02:13

## 2025-01-01 RX ADMIN — OXYCODONE 5 MG: 5 TABLET ORAL at 21:31

## 2025-01-01 RX ADMIN — AMPICILLIN AND SULBACTAM 3 G: 1; 2 INJECTION, POWDER, FOR SOLUTION INTRAMUSCULAR; INTRAVENOUS at 14:45

## 2025-01-01 RX ADMIN — DIGOXIN 125 MCG: 0.12 TABLET ORAL at 17:50

## 2025-01-01 RX ADMIN — SENNOSIDES AND DOCUSATE SODIUM 2 TABLET: 50; 8.6 TABLET ORAL at 17:50

## 2025-01-01 RX ADMIN — OMEPRAZOLE 20 MG: 20 CAPSULE, DELAYED RELEASE ORAL at 06:04

## 2025-01-01 RX ADMIN — HYDROMORPHONE HYDROCHLORIDE 0.25 MG: 1 INJECTION, SOLUTION INTRAMUSCULAR; INTRAVENOUS; SUBCUTANEOUS at 10:08

## 2025-01-01 RX ADMIN — OXYCODONE 5 MG: 5 TABLET ORAL at 08:34

## 2025-01-01 RX ADMIN — APIXABAN 5 MG: 5 TABLET, FILM COATED ORAL at 17:50

## 2025-01-01 RX ADMIN — DAKIN'S SOLUTION 0.125% (QUARTER STRENGTH) 1 ML: 0.12 SOLUTION at 06:06

## 2025-01-01 RX ADMIN — DAPAGLIFLOZIN 10 MG: 10 TABLET, FILM COATED ORAL at 06:04

## 2025-01-01 RX ADMIN — OXYCODONE 2.5 MG: 5 TABLET ORAL at 12:18

## 2025-01-01 RX ADMIN — POLYETHYLENE GLYCOL 3350 1 PACKET: 17 POWDER, FOR SOLUTION ORAL at 06:04

## 2025-01-01 RX ADMIN — OXYCODONE 5 MG: 5 TABLET ORAL at 14:43

## 2025-01-01 RX ADMIN — HYDROMORPHONE HYDROCHLORIDE 0.25 MG: 1 INJECTION, SOLUTION INTRAMUSCULAR; INTRAVENOUS; SUBCUTANEOUS at 22:32

## 2025-01-01 RX ADMIN — AMPICILLIN AND SULBACTAM 3 G: 1; 2 INJECTION, POWDER, FOR SOLUTION INTRAMUSCULAR; INTRAVENOUS at 20:13

## 2025-01-01 RX ADMIN — APIXABAN 5 MG: 5 TABLET, FILM COATED ORAL at 06:04

## 2025-01-01 RX ADMIN — AMPICILLIN AND SULBACTAM 3 G: 1; 2 INJECTION, POWDER, FOR SOLUTION INTRAMUSCULAR; INTRAVENOUS at 08:34

## 2025-01-01 RX ADMIN — NICOTINE 14 MG: 14 PATCH TRANSDERMAL at 06:04

## 2025-01-01 ASSESSMENT — COGNITIVE AND FUNCTIONAL STATUS - GENERAL
STANDING UP FROM CHAIR USING ARMS: A LOT
CLIMB 3 TO 5 STEPS WITH RAILING: TOTAL
MOBILITY SCORE: 13
MOVING TO AND FROM BED TO CHAIR: TOTAL
WALKING IN HOSPITAL ROOM: TOTAL
SUGGESTED CMS G CODE MODIFIER MOBILITY: CL

## 2025-01-01 ASSESSMENT — PAIN DESCRIPTION - PAIN TYPE
TYPE: ACUTE PAIN

## 2025-01-01 ASSESSMENT — ENCOUNTER SYMPTOMS
ABDOMINAL PAIN: 0
FEVER: 0
NAUSEA: 0
SHORTNESS OF BREATH: 0
VOMITING: 0

## 2025-01-01 ASSESSMENT — GAIT ASSESSMENTS: GAIT LEVEL OF ASSIST: UNABLE TO PARTICIPATE

## 2025-01-01 NOTE — THERAPY
Physical Therapy   Daily Treatment     Patient Name: Robert Mcdonnell  Age:  74 y.o., Sex:  male  Medical Record #: [id number]  Today's Date: 1/1/2025          Assessment    Rec;d pt alert, pleasant and agreeable to work w/ PT.  He is able to mobilize as noted below.  STanding and ambulation, unable despite max assist to attempt standing.  Limited by pain.    Plan    Treatment Plan Status: Continue Current Treatment Plan  Type of Treatment: Bed Mobility, Equipment, Family / Caregiver Training, Gait Training, Manual Therapy, Neuro Re-Education / Balance, Self Care / Home Evaluation, Stair Training, Therapeutic Activities, Therapeutic Exercise  Treatment Frequency: 4 Times per Week  Treatment Duration: Until Therapy Goals Met    DC Equipment Recommendations: Unable to determine at this time  Discharge Recommendations: Recommend post-acute placement for additional physical therapy services prior to discharge home         Objective       01/01/25 0840   Balance   Sitting Balance (Static) Fair   Sitting Balance (Dynamic) Fair   Standing Balance (Static)   (MARCOS)   Weight Shift Sitting Fair   Weight Shift Standing Absent   Skilled Intervention Verbal Cuing;Tactile Cuing;Sequencing;Facilitation   Bed Mobility    Supine to Sit Supervised   Sit to Supine Supervised   Gait Analysis   Gait Level Of Assist Unable to Participate   Functional Mobility   Sit to Stand Maximal Assist   Comments Unable to come to full standing   Short Term Goals    Short Term Goal # 1 Pt will perform supine <> sit with SPV in 6 visits to get in/out of bed   Goal Outcome # 1 Goal met   Short Term Goal # 2 Pt will perform stand step transfers with FWW and SPV in 6 visits to get in/out of chair   Goal Outcome # 2 Goal not met   Short Term Goal # 3 Pt will ambulate 150ft with FWW and SPV in 6 visits to access home environment   Goal Outcome # 3 Goal not met   Physical Therapy Treatment Plan   Physical Therapy Treatment Plan Continue Current Treatment Plan    Anticipated Discharge Equipment and Recommendations   DC Equipment Recommendations Unable to determine at this time   Discharge Recommendations Recommend post-acute placement for additional physical therapy services prior to discharge home

## 2025-01-01 NOTE — CARE PLAN
The patient is Stable - Low risk of patient condition declining or worsening    Shift Goals  Clinical Goals: Patient skin integrity jamie be maintained or improved by end of shift  Patient Goals: rest, increase PO intake  Family Goals: nilson    Patient A & O x 4, utilizes call light unreliably. Skin integrity maintained with offloading dressings, Q2 turns with wedges, barrier paste/creams, inter dry, offloading boots. Reported pain managed with available PRNs, pt tolerated dressing change well. Initial POC blood glucose in the mid 70s, raised with PO juice intake.  Bed in low and locked position, call light in reach, rounded on hourly.         Progress made toward(s) clinical / shift goals:    Problem: Pain - Standard  Goal: Alleviation of pain or a reduction in pain to the patient’s comfort goal  Outcome: Progressing     Problem: Skin Integrity  Goal: Skin integrity is maintained or improved  Outcome: Progressing       Patient is not progressing towards the following goals:

## 2025-01-01 NOTE — DISCHARGE PLANNING
@1034  Agency/Facility Name: [facility]  Outcome: DPA received a message from Ilda mason referral.  Non-contracted insurance provider.

## 2025-01-01 NOTE — PROGRESS NOTES
[facility] Medicine Daily Progress Note    Date of Service  1/1/2025    Chief Complaint  Robert Mcdonnell is a 74 y.o. male admitted 12/19/2024 with lower extremity edema and shortness of breath.    Hospital Course  Robert Mcdonnell is a 74-year-old male with PMHx homelessness, chronic atrial fibrillation on apixaban, HFrEF, pulmonary hypertension, perforated gastric ulcer s/p gastrectomy.    Per history: recent hospitalization 10/3 - 10/15 for perforated  secondary to invasive gastric adenocarcinoma, GDA embolization, subsequent subtotal gastrectomy, liver wedge resection, Miguel Angel-en-Y gastrojejunostomy with omental flap and cholecystectomy 10/7 with pathology showing invasive well-differentiated adenocarcinoma with associated ulceration. There was tumor invasion into the muscularis propria and 20 lymph nodes were negative for metastatic carcinoma. Oncology was consulted and noted no evidence of metastatic disease however MRI abdomen recommended for follow-up; no adjuvant therapy was recommended.     Patient was readmitted 12/19 for worsening lower extremity edema and shortness of breath.  He initially required BiPAP and IMCU admission.  Per history-patient had run out of his Lasix at home.  EKG in the emergency room showing irregular wide-complex arrhythmia.      Additionally, blood cultures were positive for group A strep bacteremia secondary to left lower extremity infection.  CT LLE: Small to moderate suprapatellar joint effusion.  MRI LLE: Diffuse cellulitis, patchy myositis, negative for OM.  Orthopedic surgery was consulted and recommended medical management. Infectious disease was consulted and recommended IV Unasyn with an end date of 1/4 and clindamycin with an end date of 12/31.    For patient's HFrEF-he was started on GDMT with effective diuresis.    Patient continued to have hypoglycemic episodes due to poor p.o. intake.    Interval Problem Update  12/30: Vital stable overnight.  SBP ranging 125 through  139.  Patient resting comfortably on room air.  WBC 14.2 from 14.9.  Hb stable at 8.9.  Chemistry panel stable.  MRI left lower extremity limited due to motion but no obvious osteomyelitis or bone lesions identified.  Continue IV Unasyn with an end date of 1/4.  Placement is pending.    12/31: Vitals stable overnight.  WBC 14.8 from 14.2.  Hb stable at 9.2.  Continue IV Unasyn until 1/4.  Borderline hypoglycemic with glucose ranging 60s to 90s.  Continue to encourage p.o. intake.     1/1: Vitals stable overnight.  SBP ranging .  WBC 12.1 from 14.8.  Hb stable at 8.8.  Glucose .  Hold Farxiga due to ongoing hypoglycemia.  Continue IV Unasyn today.    I have discussed this patient's plan of care and discharge plan at IDT rounds today with Case Management, Nursing, Nursing leadership, and other members of the IDT team.    Consultants/Specialty  critical care, infectious disease, and orthopedics    Code Status  Full Code    Disposition  The patient is medically cleared for discharge to home or a post-acute facility.  Anticipate discharge to: skilled nursing facility    I have placed the appropriate orders for post-discharge needs.    Review of Systems  Review of Systems   Constitutional:  Negative for fever and malaise/fatigue.   Respiratory:  Negative for shortness of breath.    Cardiovascular:  Negative for chest pain and leg swelling.   Gastrointestinal:  Negative for abdominal pain, nausea and vomiting.        Physical Exam  Temp:  [36.7 °C (98 °F)-37.1 °C (98.8 °F)] 36.7 °C (98 °F)  Pulse:  [62-70] 70  Resp:  [16-18] 17  BP: ()/(56-68) 96/56  SpO2:  [92 %-98 %] 92 %    Physical Exam  Vitals and nursing note reviewed.   Constitutional:       General: He is not in acute distress.     Appearance: Normal appearance. He is ill-appearing.      Comments: Frail appearing    Cardiovascular:      Rate and Rhythm: Normal rate and regular rhythm.   Pulmonary:      Effort: Pulmonary effort is normal.       Breath sounds: Normal breath sounds.   Musculoskeletal:      Comments: Right lower extremity with dressings clean dry intact   Skin:     General: Skin is warm and dry.   Neurological:      Mental Status: He is alert and oriented to person, place, and time. Mental status is at baseline.   Psychiatric:         Mood and Affect: Mood normal.         Behavior: Behavior normal.         Fluids    Intake/Output Summary (Last 24 hours) at 1/1/2025 1444  Last data filed at 1/1/2025 1213  Gross per 24 hour   Intake --   Output 1500 ml   Net -1500 ml        Laboratory  Recent Labs     12/30/24 0228 12/31/24 0142 01/01/25  0325   WBC 14.2* 14.8* 12.1*   RBC 3.19* 3.38* 3.10*   HEMOGLOBIN 8.9* 9.2* 8.8*   HEMATOCRIT 26.4* 28.4* 26.7*   MCV 82.8 84.0 86.1   MCH 27.9 27.2 28.4   MCHC 33.7 32.4 33.0   RDW 51.5* 52.9* 54.6*   PLATELETCT 288 350 374   MPV 10.3 10.3 10.8     Recent Labs     12/30/24 0228 12/31/24 0142 01/01/25  0325   SODIUM 136 137 137   POTASSIUM 3.8 4.2 4.3   CHLORIDE 103 105 107   CO2 25 24 23   GLUCOSE 67 73 89   BUN 11 11 9   CREATININE 0.78 0.84 0.77   CALCIUM 7.2* 7.8* 7.5*                   Imaging  [id number]-W/O LEFT   Final Result      1.  Very limited examination due to patient motion artifact. Patient was also unable to complete the examination and therefore contrast-enhanced sequences were not obtained.      2.  No evidence of marrow edema or bone lesion.      3.  Again seen diffuse soft tissue edema/induration likely representing cellulitis. This also patchy muscle edema suggesting myositis.      4.  No focal fluid collection to suggest presence of abscess.      [id number] VIEW   Final Result      No evidence of bowel obstruction. Mild constipation.      MR-KNEE-WITH & W/O LEFT   Final Result      1.  No evidence of marrow edema or abnormal enhancement to suggest presence of osteomyelitis.      2.  Small to moderate joint effusion with mild synovitis.      3.  Diffuse soft tissue  edema/induration consistent with cellulitis.      4.  Small popliteal cyst.      5.  No evidence of soft tissue abscess.      6.  Markedly motion degraded examination. Ligaments and menisci are inadequately evaluated due to patient motion.      MR-LOWER EXTREMITY-NO JOINT-W/O LEFT   Final Result      1.  Patient motion degraded exam.      2.  No evidence of marrow edema or bone lesion to suggest presence of osteomyelitis.      3.  Diffuse cellulitis and patchy myositis.      4.  No evidence of focal fluid collection to suggest presence of abscess.      CT-EXTREMITY, LOWER WITH LEFT   Final Result      1.  Extensive subcutaneous inflammatory changes about the left lower extremity consistent with deep and superficial cellulitis.      2.  No definite CT evidence of necrotizing fasciitis.      3.  No CT evidence of deep abscess formation or acute or chronic osteomyelitis.      4.  Small to moderate suprapatellar joint effusion.      CT-EXTREMITY, LOWER WITH LEFT   Final Result      No soft tissue gas to suggest necrotizing fasciitis.      No fluid collection identified.      DX-CHEST-PORTABLE (1 VIEW)   Final Result         1.  Hazy left pulmonary infiltrates, similar to prior study   2.  Trace left pleural effusion, stable   3.  Cardiomegaly   4.  Atherosclerosis      DX-CHEST-PORTABLE (1 VIEW)   Final Result         1.  Hazy left pulmonary infiltrates, similar to prior study   2.  Trace left pleural effusion, stable   3.  Cardiomegaly   4.  Atherosclerosis      EC-ECHOCARDIOGRAM COMPLETE W/O CONT   Final Result      CT-CHEST,ABDOMEN,PELVIS WITH   Final Result      1. Stable spiculated opacity in the right upper lobe, extending to the pleural surface.   2. Lingular and left lower lobe parenchymal airspace disease has improved in the interval, but has not completely resolved. There is pleural reaction, suggesting some of these areas may represent parenchymal scarring.   3. Asymmetry of the lung volumes, small on the  left than the right.   4. Cardiomegaly with atherosclerotic calcifications in the aorta and coronary arteries.   5. Postsurgical changes in the stomach.   6. Stable scattered multiple hypodense lesions throughout the liver.   7. Moderate ascites throughout the abdomen and pelvis.   8. Cachexia.   9. Enlarged left inguinal lymph nodes with central necrosis. There is edema involving the soft tissues of the upper left leg extending to the left pelvis.      US-EXTREMITY VENOUS LOWER BILAT   Final Result      DX-CHEST-PORTABLE (1 VIEW)   Final Result         1.  Hazy left pulmonary infiltrates.   2.  Small left pleural effusion   3.  Cardiomegaly           Assessment/Plan  * Acute exacerbation of CHF (congestive heart failure) (HCC)- (present on admission)  Assessment & Plan  Echo: EF 28%; severe tricuspid and mitral regurg.  RVSP elevated at 55  - Continue GDMT with losartan, spironolactone, metoprolol, Farxiga  - Continue digoxin  - Maintain euvolemia    Constipation  Assessment & Plan  I ordered a bowel management  Suppository today    Hypoglycemia  Assessment & Plan  Multiple episodes of hypoglycemia requiring intervention  Poor p.o. intake  - Continue to encourage Ensure and p.o. intake  - Nutrition consulted  - Hold Farxiga    Streptococcal bacteremia- (present on admission)  Assessment & Plan  Blood cultures 12/19 positive for group A strep  Repeat blood cultures 12/21 NGTD    Pulmonary hypertension (HCC)  Assessment & Plan  Previous Echo RVSP 65, severe TR  Suspect mainly Group 2  RV perfusion with MAP > 65 as need norepinepinephrine  Blood pressure is running on the lower side holding diuretic therapy at this time    Acute respiratory failure with hypoxia (HCC)- (present on admission)  Assessment & Plan  Improved off Bipap  Currently is on 2 L of oxygen to maintain oxygen saturation  12/25 on room air.  Mobilize as able.    Hypophosphatemia- (present on admission)  Assessment & Plan  Replaced    Malignant  neoplasm of body of stomach (HCC)- (present on admission)  Assessment & Plan  T2, N0, M0 invasive gastric adenocarcinoma presenting as perforated  10/2024  -GDA embolization  -10/7 subtotal gastrectomy, liver wedge resection, Miguel Angel-en-Y gastrojejunostomy with omental flap and cholecystectomy  -pathology invasive well-differentiated adenocarcinoma with associated ulceration  -tumor invasion into the muscularis propria and 20 lymph nodes were negative for metastatic carcinoma  -Oncology was consulted and noted no evidence of metastatic disease  -MRI abdomen recommended for follow-up; no adjuvant therapy was recommended  He will need outpatient follow-up    Homeless- (present on admission)  Assessment & Plan   consultation for ongoing access to medications and follow-up care as appropriate.  States family in [place] but states they cannot house him nor give assistance     Severe protein-calorie malnutrition (HCC)- (present on admission)  Assessment & Plan  Discussed with nutrition, patient meets the ASPEN criteria of severe malnutrition  Continuous nutrition support    ACP (advance care planning)- (present on admission)  Assessment & Plan  Patient admitted with CHF exacerbation, significantly reduced the EF of 30%.  Also found bacteremia, LLE infection, severe malnutrition.  History of invasive gastric malignancy, A-fib RVR, PE, gastric perforation post extensive surgery including subtotal gastrectomy, liver resection  Miguel Angel-en-Y gastrojejunostomy with omental flap and cholecystectomy.  I discussed the CODE STATUS with him, including CPR, intubation/mechanical ventilation.  He wants to stay full code.  Patient has high complexity and guarded prognosis.  I consulted palliative care.  ACP 18 minutes.     COPD (chronic obstructive pulmonary disease) (HCC)- (present on admission)  Assessment & Plan  Current active smoker, no PFTs on file  RT protocols, bronchodilators as needed  No signs of  exacerbation    Acute kidney injury (HCC)- (present on admission)  Assessment & Plan  Resolved   Maintain euvolemia and monitor fluid responsiveness (avoid NaCL and renal congestion)  Monitor urine output and I&O's  Avoid and review nephrotoxin medication  Renal function improved    Sepsis (HCC)- (present on admission)  Assessment & Plan  See above    Atrial fibrillation with RVR (HCC)- (present on admission)  Assessment & Plan  Chronic atrial fibrillation, currently tachycardia  Digoxin 250 mcg IV monitoring closely with flip to prevent digoxin toxicity    Continue digoxin and Eliquis  12/27 BP better, I resumed Toprol 25 mg    Thrombocytopenia (HCC)- (present on admission)  Assessment & Plan  Platelets 288  - Repeat CBC in a.m.  - Stable to continue Eliquis at this time    Tobacco dependence- (present on admission)  Assessment & Plan  Counseling when appropriate    Hypertension- (present on admission)  Assessment & Plan  SBP well-controlled  - Continue losartan, metoprolol, spironolactone    Wound of lower extremity- (present on admission)  Assessment & Plan  Likely source of infection, lower ext doppler negative  CT left leg shows small to moderate suprapatellar joint effusion.  MRI LLE showed Diffuse cellulitis and patchy myositis, negative for osteomyelitis or abscess.   Repeat MRI 12/30: Limited due to patient motion artifact; no obvious OM noted persistent cellulitis and myositis noted  - Infectious disease consulted  - Continue IV Unasyn until 1/4  - Continue clindamycin through 12/31  - Orthopedic surgery consultation 12/20 and 12/25 no surgical interventions at this time         VTE prophylaxis:   SCDs/TEDs   therapeutic anticoagulation with eliquis 5 mg BID      I have performed a physical exam and reviewed and updated ROS and Plan today (1/1/2025). In review of yesterday's note (12/31/2024), there are no changes except as documented above.

## 2025-01-01 NOTE — PROGRESS NOTES
4 Eyes Skin Assessment Completed by JANNETTE Vázquez and JANNETTE Pederson.    Head Scab and Scar left forehead  Ears WDL  Nose WDL  Mouth WDL  Neck WDL  Breast/Chest WDL  Shoulder Blades WDL  Spine skin tags  (R) Arm/Elbow/Hand Bruising, Scab, and Discoloration fragile skin  (L) Arm/Elbow/Hand Bruising and Scab fragile skin  Abdomen Scar  Groin Redness and Excoriation peeling skin on inner thigh.  Scrotum/Coccyx/Buttocks Redness and Blanching, discoloration, hyperpigmentation  (R) Leg Redness, Scar, Scab, and Edema scars on knee, discoloration  (L) Leg Redness, Scab, Swelling, Weeping, and Edema wound to lower leg, fragile flaky skin surrounding.   (R) Heel/Foot/Toe Redness, Blanching, and Boggy flaky fragile skin on feet  (L) Heel/Foot/Toe Redness, Blanching, and Boggy flaky fragile skin on feet.          Devices In Places Blood Pressure Cuff and Condom Cath      Interventions In Place Heel Mepilex, Sacral Mepilex, Heel Float Boots, TAP System, Elbow Mepilex, Q2 Turns, Low Air Loss Mattress, Barrier Cream, and Heels Loaded W/Pillows    Possible Skin Injury Yes    Pictures Uploaded Into Epic N/A  Wound Consult Placed N/A previously consulted, no new wounds found  RN Wound Prevention Protocol Ordered Yes

## 2025-01-02 LAB
GLUCOSE BLD STRIP.AUTO-MCNC: 129 MG/DL (ref 65–99)
GLUCOSE BLD STRIP.AUTO-MCNC: 71 MG/DL (ref 65–99)
GLUCOSE BLD STRIP.AUTO-MCNC: 74 MG/DL (ref 65–99)
GLUCOSE BLD STRIP.AUTO-MCNC: 81 MG/DL (ref 65–99)
GLUCOSE BLD STRIP.AUTO-MCNC: 91 MG/DL (ref 65–99)
GLUCOSE BLD STRIP.AUTO-MCNC: 91 MG/DL (ref 65–99)

## 2025-01-02 PROCEDURE — 700102 HCHG RX REV CODE 250 W/ 637 OVERRIDE(OP): Performed by: HOSPITALIST

## 2025-01-02 PROCEDURE — 700105 HCHG RX REV CODE 258: Performed by: INTERNAL MEDICINE

## 2025-01-02 PROCEDURE — 700111 HCHG RX REV CODE 636 W/ 250 OVERRIDE (IP): Mod: TB | Performed by: INTERNAL MEDICINE

## 2025-01-02 PROCEDURE — A9270 NON-COVERED ITEM OR SERVICE: HCPCS | Performed by: HOSPITALIST

## 2025-01-02 PROCEDURE — 770006 HCHG ROOM/CARE - MED/SURG/GYN SEMI*

## 2025-01-02 PROCEDURE — A9270 NON-COVERED ITEM OR SERVICE: HCPCS | Performed by: INTERNAL MEDICINE

## 2025-01-02 PROCEDURE — A9270 NON-COVERED ITEM OR SERVICE: HCPCS | Performed by: STUDENT IN AN ORGANIZED HEALTH CARE EDUCATION/TRAINING PROGRAM

## 2025-01-02 PROCEDURE — 82962 GLUCOSE BLOOD TEST: CPT | Mod: 91

## 2025-01-02 PROCEDURE — 700102 HCHG RX REV CODE 250 W/ 637 OVERRIDE(OP): Performed by: INTERNAL MEDICINE

## 2025-01-02 PROCEDURE — 700111 HCHG RX REV CODE 636 W/ 250 OVERRIDE (IP): Mod: JZ | Performed by: INTERNAL MEDICINE

## 2025-01-02 PROCEDURE — 700102 HCHG RX REV CODE 250 W/ 637 OVERRIDE(OP): Performed by: STUDENT IN AN ORGANIZED HEALTH CARE EDUCATION/TRAINING PROGRAM

## 2025-01-02 PROCEDURE — 99232 SBSQ HOSP IP/OBS MODERATE 35: CPT | Performed by: STUDENT IN AN ORGANIZED HEALTH CARE EDUCATION/TRAINING PROGRAM

## 2025-01-02 RX ADMIN — POLYETHYLENE GLYCOL 3350 1 PACKET: 17 POWDER, FOR SOLUTION ORAL at 06:31

## 2025-01-02 RX ADMIN — AMPICILLIN AND SULBACTAM 3 G: 1; 2 INJECTION, POWDER, FOR SOLUTION INTRAMUSCULAR; INTRAVENOUS at 02:21

## 2025-01-02 RX ADMIN — SPIRONOLACTONE 25 MG: 25 TABLET ORAL at 06:31

## 2025-01-02 RX ADMIN — DAKIN'S SOLUTION 0.125% (QUARTER STRENGTH) 473 ML: 0.12 SOLUTION at 06:32

## 2025-01-02 RX ADMIN — OMEPRAZOLE 20 MG: 20 CAPSULE, DELAYED RELEASE ORAL at 06:31

## 2025-01-02 RX ADMIN — LOSARTAN POTASSIUM 25 MG: 25 TABLET, FILM COATED ORAL at 06:32

## 2025-01-02 RX ADMIN — APIXABAN 5 MG: 5 TABLET, FILM COATED ORAL at 16:39

## 2025-01-02 RX ADMIN — DIGOXIN 125 MCG: 0.12 TABLET ORAL at 16:39

## 2025-01-02 RX ADMIN — HYDROMORPHONE HYDROCHLORIDE 0.25 MG: 1 INJECTION, SOLUTION INTRAMUSCULAR; INTRAVENOUS; SUBCUTANEOUS at 03:09

## 2025-01-02 RX ADMIN — OXYCODONE 5 MG: 5 TABLET ORAL at 17:25

## 2025-01-02 RX ADMIN — AMPICILLIN AND SULBACTAM 3 G: 1; 2 INJECTION, POWDER, FOR SOLUTION INTRAMUSCULAR; INTRAVENOUS at 08:17

## 2025-01-02 RX ADMIN — OXYCODONE 5 MG: 5 TABLET ORAL at 02:13

## 2025-01-02 RX ADMIN — METOPROLOL SUCCINATE 25 MG: 25 TABLET, EXTENDED RELEASE ORAL at 06:31

## 2025-01-02 RX ADMIN — AMPICILLIN AND SULBACTAM 3 G: 1; 2 INJECTION, POWDER, FOR SOLUTION INTRAMUSCULAR; INTRAVENOUS at 14:45

## 2025-01-02 RX ADMIN — DAKIN'S SOLUTION 0.125% (QUARTER STRENGTH) 473 ML: 0.12 SOLUTION at 17:26

## 2025-01-02 RX ADMIN — APIXABAN 5 MG: 5 TABLET, FILM COATED ORAL at 06:31

## 2025-01-02 RX ADMIN — AMPICILLIN AND SULBACTAM 3 G: 1; 2 INJECTION, POWDER, FOR SOLUTION INTRAMUSCULAR; INTRAVENOUS at 20:34

## 2025-01-02 RX ADMIN — OXYCODONE 5 MG: 5 TABLET ORAL at 08:22

## 2025-01-02 ASSESSMENT — PAIN DESCRIPTION - PAIN TYPE
TYPE: ACUTE PAIN

## 2025-01-02 ASSESSMENT — PATIENT HEALTH QUESTIONNAIRE - PHQ9
3. TROUBLE FALLING OR STAYING ASLEEP OR SLEEPING TOO MUCH: MORE THAN HALF THE DAYS
2. FEELING DOWN, DEPRESSED, IRRITABLE, OR HOPELESS: MORE THAN HALF THE DAYS
7. TROUBLE CONCENTRATING ON THINGS, SUCH AS READING THE NEWSPAPER OR WATCHING TELEVISION: NEARLY EVERY DAY
4. FEELING TIRED OR HAVING LITTLE ENERGY: MORE THAN HALF THE DAYS
8. MOVING OR SPEAKING SO SLOWLY THAT OTHER PEOPLE COULD HAVE NOTICED. OR THE OPPOSITE, BEING SO FIGETY OR RESTLESS THAT YOU HAVE BEEN MOVING AROUND A LOT MORE THAN USUAL: NEARLY EVERY DAY
6. FEELING BAD ABOUT YOURSELF - OR THAT YOU ARE A FAILURE OR HAVE LET YOURSELF OR YOUR FAMILY DOWN: NEARLY EVERY DAY
5. POOR APPETITE OR OVEREATING: NEARLY EVERY DAY
SUM OF ALL RESPONSES TO PHQ9 QUESTIONS 1 AND 2: 4
9. THOUGHTS THAT YOU WOULD BE BETTER OFF DEAD, OR OF HURTING YOURSELF: NEARLY EVERY DAY
SUM OF ALL RESPONSES TO PHQ QUESTIONS 1-9: 23
1. LITTLE INTEREST OR PLEASURE IN DOING THINGS: MORE THAN HALF THE DAYS

## 2025-01-02 ASSESSMENT — ENCOUNTER SYMPTOMS
FEVER: 0
VOMITING: 0
SHORTNESS OF BREATH: 0
NAUSEA: 0
ABDOMINAL PAIN: 0

## 2025-01-02 NOTE — PROGRESS NOTES
[facility] Medicine Daily Progress Note    Date of Service  1/2/2025    Chief Complaint  Robert Mcdonnell is a 74 y.o. male admitted 12/19/2024 with lower extremity edema and shortness of breath.    Hospital Course  Robert Mcdonnell is a 74-year-old male with PMHx homelessness, chronic atrial fibrillation on apixaban, HFrEF, pulmonary hypertension, perforated gastric ulcer s/p gastrectomy.    Per history: recent hospitalization 10/3 - 10/15 for perforated  secondary to invasive gastric adenocarcinoma, GDA embolization, subsequent subtotal gastrectomy, liver wedge resection, Miguel Angel-en-Y gastrojejunostomy with omental flap and cholecystectomy 10/7 with pathology showing invasive well-differentiated adenocarcinoma with associated ulceration. There was tumor invasion into the muscularis propria and 20 lymph nodes were negative for metastatic carcinoma. Oncology was consulted and noted no evidence of metastatic disease however MRI abdomen recommended for follow-up; no adjuvant therapy was recommended.     Patient was readmitted 12/19 for worsening lower extremity edema and shortness of breath.  He initially required BiPAP and IMCU admission.  Per history-patient had run out of his Lasix at home.  EKG in the emergency room showing irregular wide-complex arrhythmia.      Additionally, blood cultures were positive for group A strep bacteremia secondary to left lower extremity infection.  CT LLE: Small to moderate suprapatellar joint effusion.  MRI LLE: Diffuse cellulitis, patchy myositis, negative for OM.  Orthopedic surgery was consulted and recommended medical management. Infectious disease was consulted and recommended IV Unasyn with an end date of 1/4 and clindamycin with an end date of 12/31.    For patient's HFrEF-he was started on GDMT with effective diuresis.    Patient continued to have hypoglycemic episodes due to poor p.o. intake.    Interval Problem Update  12/30: Vital stable overnight.  SBP ranging 125 through  139.  Patient resting comfortably on room air.  WBC 14.2 from 14.9.  Hb stable at 8.9.  Chemistry panel stable.  MRI left lower extremity limited due to motion but no obvious osteomyelitis or bone lesions identified.  Continue IV Unasyn with an end date of 1/4.  Placement is pending.    12/31: Vitals stable overnight.  WBC 14.8 from 14.2.  Hb stable at 9.2.  Continue IV Unasyn until 1/4.  Borderline hypoglycemic with glucose ranging 60s to 90s.  Continue to encourage p.o. intake.     1/1: Vitals stable overnight.  SBP ranging .  WBC 12.1 from 14.8.  Hb stable at 8.8.  Glucose .  Hold Farxiga due to ongoing hypoglycemia.  Continue IV Unasyn today.    1/2: Vital stable overnight.  Patient resting comfortably on room air.  Glucose 70-80 overnight.  Plan to discontinue Farxiga due to hypoglycemia.  Continue IV Unasyn through 1/4.     I have discussed this patient's plan of care and discharge plan at IDT rounds today with Case Management, Nursing, Nursing leadership, and other members of the IDT team.    Consultants/Specialty  critical care, infectious disease, and orthopedics    Code Status  Full Code    Disposition  The patient is medically cleared for discharge to home or a post-acute facility.      I have placed the appropriate orders for post-discharge needs.    Review of Systems  Review of Systems   Constitutional:  Negative for fever and malaise/fatigue.   Respiratory:  Negative for shortness of breath.    Cardiovascular:  Negative for chest pain and leg swelling.   Gastrointestinal:  Negative for abdominal pain, nausea and vomiting.        Physical Exam  Temp:  [36.7 °C (98 °F)-37.4 °C (99.3 °F)] 36.7 °C (98 °F)  Pulse:  [67-90] 70  Resp:  [15-20] 17  BP: ()/(45-72) 102/45  SpO2:  [92 %-95 %] 95 %    Physical Exam  Vitals and nursing note reviewed.   Constitutional:       General: He is not in acute distress.     Appearance: Normal appearance. He is ill-appearing.      Comments: Frail appearing     Cardiovascular:      Rate and Rhythm: Normal rate and regular rhythm.   Pulmonary:      Effort: Pulmonary effort is normal.      Breath sounds: Normal breath sounds.   Musculoskeletal:      Comments: Right lower extremity with dressings clean dry intact   Skin:     General: Skin is warm and dry.   Neurological:      Mental Status: He is alert and oriented to person, place, and time. Mental status is at baseline.   Psychiatric:         Mood and Affect: Mood normal.         Behavior: Behavior normal.         Fluids    Intake/Output Summary (Last 24 hours) at 1/2/2025 1341  Last data filed at 1/2/2025 0600  Gross per 24 hour   Intake 480 ml   Output 1550 ml   Net -1070 ml        Laboratory  Recent Labs     12/31/24  0142 01/01/25  0325   WBC 14.8* 12.1*   RBC 3.38* 3.10*   HEMOGLOBIN 9.2* 8.8*   HEMATOCRIT 28.4* 26.7*   MCV 84.0 86.1   MCH 27.2 28.4   MCHC 32.4 33.0   RDW 52.9* 54.6*   PLATELETCT 350 374   MPV 10.3 10.8     Recent Labs     12/31/24  0142 01/01/25  0325   SODIUM 137 137   POTASSIUM 4.2 4.3   CHLORIDE 105 107   CO2 24 23   GLUCOSE 73 89   BUN 11 9   CREATININE 0.84 0.77   CALCIUM 7.8* 7.5*                   Imaging  [id number]-W/O LEFT   Final Result      1.  Very limited examination due to patient motion artifact. Patient was also unable to complete the examination and therefore contrast-enhanced sequences were not obtained.      2.  No evidence of marrow edema or bone lesion.      3.  Again seen diffuse soft tissue edema/induration likely representing cellulitis. This also patchy muscle edema suggesting myositis.      4.  No focal fluid collection to suggest presence of abscess.      [id number] VIEW   Final Result      No evidence of bowel obstruction. Mild constipation.      MR-KNEE-WITH & W/O LEFT   Final Result      1.  No evidence of marrow edema or abnormal enhancement to suggest presence of osteomyelitis.      2.  Small to moderate joint effusion with mild synovitis.      3.  Diffuse  soft tissue edema/induration consistent with cellulitis.      4.  Small popliteal cyst.      5.  No evidence of soft tissue abscess.      6.  Markedly motion degraded examination. Ligaments and menisci are inadequately evaluated due to patient motion.      MR-LOWER EXTREMITY-NO JOINT-W/O LEFT   Final Result      1.  Patient motion degraded exam.      2.  No evidence of marrow edema or bone lesion to suggest presence of osteomyelitis.      3.  Diffuse cellulitis and patchy myositis.      4.  No evidence of focal fluid collection to suggest presence of abscess.      CT-EXTREMITY, LOWER WITH LEFT   Final Result      1.  Extensive subcutaneous inflammatory changes about the left lower extremity consistent with deep and superficial cellulitis.      2.  No definite CT evidence of necrotizing fasciitis.      3.  No CT evidence of deep abscess formation or acute or chronic osteomyelitis.      4.  Small to moderate suprapatellar joint effusion.      CT-EXTREMITY, LOWER WITH LEFT   Final Result      No soft tissue gas to suggest necrotizing fasciitis.      No fluid collection identified.      DX-CHEST-PORTABLE (1 VIEW)   Final Result         1.  Hazy left pulmonary infiltrates, similar to prior study   2.  Trace left pleural effusion, stable   3.  Cardiomegaly   4.  Atherosclerosis      DX-CHEST-PORTABLE (1 VIEW)   Final Result         1.  Hazy left pulmonary infiltrates, similar to prior study   2.  Trace left pleural effusion, stable   3.  Cardiomegaly   4.  Atherosclerosis      EC-ECHOCARDIOGRAM COMPLETE W/O CONT   Final Result      CT-CHEST,ABDOMEN,PELVIS WITH   Final Result      1. Stable spiculated opacity in the right upper lobe, extending to the pleural surface.   2. Lingular and left lower lobe parenchymal airspace disease has improved in the interval, but has not completely resolved. There is pleural reaction, suggesting some of these areas may represent parenchymal scarring.   3. Asymmetry of the lung volumes,  small on the left than the right.   4. Cardiomegaly with atherosclerotic calcifications in the aorta and coronary arteries.   5. Postsurgical changes in the stomach.   6. Stable scattered multiple hypodense lesions throughout the liver.   7. Moderate ascites throughout the abdomen and pelvis.   8. Cachexia.   9. Enlarged left inguinal lymph nodes with central necrosis. There is edema involving the soft tissues of the upper left leg extending to the left pelvis.      US-EXTREMITY VENOUS LOWER BILAT   Final Result      DX-CHEST-PORTABLE (1 VIEW)   Final Result         1.  Hazy left pulmonary infiltrates.   2.  Small left pleural effusion   3.  Cardiomegaly           Assessment/Plan  * Acute exacerbation of CHF (congestive heart failure) (HCC)- (present on admission)  Assessment & Plan  Echo: EF 28%; severe tricuspid and mitral regurg.  RVSP elevated at 55  - Continue GDMT with losartan, spironolactone, metoprolol, Farxiga  - Continue digoxin  - Maintain euvolemia    Constipation  Assessment & Plan  I ordered a bowel management  Suppository today    Hypoglycemia  Assessment & Plan  Multiple episodes of hypoglycemia requiring intervention  Poor p.o. intake  - Continue to encourage Ensure and p.o. intake  - Nutrition consulted  - Discontinue Farxiga due to hypoglycemia    Streptococcal bacteremia- (present on admission)  Assessment & Plan  Blood cultures 12/19 positive for group A strep  Repeat blood cultures 12/21 NGTD    Pulmonary hypertension (HCC)  Assessment & Plan  Previous Echo RVSP 65, severe TR  Suspect mainly Group 2  RV perfusion with MAP > 65 as need norepinepinephrine  Blood pressure is running on the lower side holding diuretic therapy at this time    Acute respiratory failure with hypoxia (HCC)- (present on admission)  Assessment & Plan  Improved off Bipap  Weaned to room air    Hypophosphatemia- (present on admission)  Assessment & Plan  Replaced    Malignant neoplasm of body of stomach (HCC)- (present on  admission)  Assessment & Plan  T2, N0, M0 invasive gastric adenocarcinoma presenting as perforated  10/2024  -GDA embolization  -10/7 subtotal gastrectomy, liver wedge resection, Miguel Angel-en-Y gastrojejunostomy with omental flap and cholecystectomy  -pathology invasive well-differentiated adenocarcinoma with associated ulceration  -tumor invasion into the muscularis propria and 20 lymph nodes were negative for metastatic carcinoma  -Oncology was consulted and noted no evidence of metastatic disease  -MRI abdomen recommended for follow-up; no adjuvant therapy was recommended  He will need outpatient follow-up    Homeless- (present on admission)  Assessment & Plan  Patient is currently homeless.  He is unsure if he is able to return to [facility].  Wheelchair has been ordered.  Discussed with case management today.    Severe protein-calorie malnutrition (HCC)- (present on admission)  Assessment & Plan  Discussed with nutrition, patient meets the ASPEN criteria of severe malnutrition  Continuous nutrition support    ACP (advance care planning)- (present on admission)  Assessment & Plan  Patient admitted with CHF exacerbation, significantly reduced the EF of 30%.  Also found bacteremia, LLE infection, severe malnutrition.  History of invasive gastric malignancy, A-fib RVR, PE, gastric perforation post extensive surgery including subtotal gastrectomy, liver resection  Miguel Angel-en-Y gastrojejunostomy with omental flap and cholecystectomy.  I discussed the CODE STATUS with him, including CPR, intubation/mechanical ventilation.  He wants to stay full code.  Patient has high complexity and guarded prognosis.  I consulted palliative care.  ACP 18 minutes.     COPD (chronic obstructive pulmonary disease) (HCC)- (present on admission)  Assessment & Plan  Current active smoker, no PFTs on file  RT protocols, bronchodilators as needed  No signs of exacerbation    Acute kidney injury (HCC)- (present on admission)  Assessment &  Plan  Resolved   Maintain euvolemia and monitor fluid responsiveness (avoid NaCL and renal congestion)  Monitor urine output and I&O's  Avoid and review nephrotoxin medication  Renal function improved    Sepsis (HCC)- (present on admission)  Assessment & Plan  See above    Atrial fibrillation with RVR (HCC)- (present on admission)  Assessment & Plan  Chronic atrial fibrillation, currently tachycardia  Digoxin 250 mcg IV monitoring closely with flip to prevent digoxin toxicity    Continue digoxin and Eliquis  12/27 BP better, I resumed Toprol 25 mg    Thrombocytopenia (HCC)- (present on admission)  Assessment & Plan  Platelets 288  - Repeat CBC in a.m.  - Stable to continue Eliquis at this time    Tobacco dependence- (present on admission)  Assessment & Plan  Counseling when appropriate    Hypertension- (present on admission)  Assessment & Plan  SBP well-controlled  - Continue losartan, metoprolol, spironolactone    Wound of lower extremity- (present on admission)  Assessment & Plan  Likely source of infection, lower ext doppler negative  CT left leg shows small to moderate suprapatellar joint effusion.  MRI LLE showed Diffuse cellulitis and patchy myositis, negative for osteomyelitis or abscess.   Repeat MRI 12/30: Limited due to patient motion artifact; no obvious OM noted persistent cellulitis and myositis noted  - Infectious disease consulted  - Continue IV Unasyn until 1/4  - Continue clindamycin through 12/31  - Orthopedic surgery consultation 12/20 and 12/25 no surgical interventions at this time         VTE prophylaxis:   SCDs/TEDs   therapeutic anticoagulation with eliquis 5 mg BID      I have performed a physical exam and reviewed and updated ROS and Plan today (1/2/2025). In review of yesterday's note (1/1/2025), there are no changes except as documented above.

## 2025-01-02 NOTE — PROGRESS NOTES
4 Eyes Skin Assessment Completed by JANNETTE Vázquez and JANNETTE Yousif.    Head Scab and Scar left forehead scab, no drainage  Ears WDL  Nose WDL  Mouth WDL  Neck WDL  Breast/Chest WDL  Shoulder Blades WDL  Spine - skin tags, blanching redness  (R) Arm/Elbow/Hand Bruising, Scab, and Discoloration diffuse small scabs and scars, bruising  (L) Arm/Elbow/Hand Bruising and Scab  Abdomen Scar midline abdominal scar  Groin Redness and Blanching peeled skin on inner thigh  Scrotum/Coccyx/Buttocks Redness, Blanching, and Excoriation fragile skin  (R) Leg Redness, Scar, Scab, and Edema small scabs and scars on knee, lower leg  (L) Leg Redness, Blanching, Non-Blanching, Scar, Scab, Swelling, Weeping, and Edema large wound lower leg, small diffuse scabbing/bruising  (R) Heel/Foot/Toe Redness, Blanching, and Boggy dry fragile skin at heels  (L) Heel/Foot/Toe Redness, Blanching, and Boggy dry fragile skin at heels          Devices In Places Blood Pressure Cuff      Interventions In Place Heel Mepilex, Sacral Mepilex, Heel Float Boots, TAP System, Pillows, Low Air Loss Mattress, Barrier Cream, and Heels Loaded W/Pillows    Possible Skin Injury No    Pictures Uploaded Into Epic Yes  Wound Consult Placed Yes  RN Wound Prevention Protocol Ordered Yes

## 2025-01-02 NOTE — DISCHARGE PLANNING
@[id number]  Agency/Facility Name: [facility]  Outcome: DPA received a voice message from [employer] declining the referral.  Non-contracted insurance provider.    @0656  Agency/Facility Name: [facility]  Spoke To: Ilda  Outcome: Referral accepted.  Wheelchair will be delivered bedside tomorrow, 1/03/25.

## 2025-01-02 NOTE — CARE PLAN
The patient is Stable - Low risk of patient condition declining or worsening    Shift Goals  Clinical Goals: Patient skin integrity will be maintained or improved by end of shift  Patient Goals: rest, increase PO intake  Family Goals: nilson    Patient A & O x 4, does not reliably utilize call light.  Patient was able to ambulate to bathroom with two person assist and FWW, exhibited general weakness.  Patient tolerated dressing changes well and was able to increase PO intake when prompted. Bed in low and locked position, call light in reach, rounded on hourly.     Progress made toward(s) clinical / shift goals:    Problem: Skin Integrity  Goal: Skin integrity is maintained or improved  Outcome: Progressing     Problem: Pain - Standard  Goal: Alleviation of pain or a reduction in pain to the patient’s comfort goal  Outcome: Progressing     Problem: Fall Risk  Goal: Patient will remain free from falls  Outcome: Progressing       Patient is not progressing towards the following goals:

## 2025-01-02 NOTE — CARE PLAN
The patient is Stable - Low risk of patient condition declining or worsening    Shift Goals  Clinical Goals: pt skin integrity will be maintained or improved by end of shift  Patient Goals: rest  Family Goals: nilson    Progress made toward(s) clinical / shift goals:  alert and oriented, pain managed w/ PRN pain medications per MAR. Wound cares completed per orders   Problem: Knowledge Deficit - Standard  Goal: Patient and family/care givers will demonstrate understanding of plan of care, disease process/condition, diagnostic tests and medications  Description: Target End Date:  1-3 days or as soon as patient condition allows    Document in Patient Education    1.  Patient and family/caregiver oriented to unit, equipment, visitation policy and means for communicating concern  2.  Complete/review Learning Assessment  3.  Assess knowledge level of disease process/condition, treatment plan, diagnostic tests and medications  4.  Explain disease process/condition, treatment plan, diagnostic tests and medications  Outcome: Progressing     Problem: Pain - Standard  Goal: Alleviation of pain or a reduction in pain to the patient’s comfort goal  Description: Target End Date:  Prior to discharge or change in level of care    Document on Vitals flowsheet    1.  Document pain using the appropriate pain scale per order or unit policy  2.  Educate and implement non-pharmacologic comfort measures (i.e. relaxation, distraction, massage, cold/heat therapy, etc.)  3.  Pain management medications as ordered  4.  Reassess pain after pain med administration per policy  5.  If opiods administered assess patient's response to pain medication is appropriate per POSS sedation scale  6.  Follow pain management plan developed in collaboration with patient and interdisciplinary team (including palliative care or pain specialists if applicable)  Outcome: Progressing       Patient is not progressing towards the following goals:

## 2025-01-02 NOTE — PROGRESS NOTES
PALLIATIVE CARE SOCIAL WORK NOTE    Patient: Robert Mcdonnell  Age: 74  Gender: Male  MRN: [id number]  Insurance: [employer]  Date Admitted: 12/19/24  Date of Service: 1/2/25    LMSW met with patient to discuss/complete advance directives. He remembers discussion earlier with palliative care APRN. He has no family besides his brother, Clyde Mcdonnell([phone]). He is agreeable to have his brother make medical decisions if he is not able and he plans to discuss his wishes with brother when he visits. LMSW provided patient with advance directive packet and he wants to look over. LMSW provided contact information if patient needs assistance with completion and/or has questions.     Sailaja Leija LMSW  Palliative Care

## 2025-01-02 NOTE — DISCHARGE PLANNING
RAFAEL Ayala received a referral request from Lela BHATIA and states the pt is under the impression that pt cannot return to the [facility].  Called [facility] and spoke with Doreen [phone] who states pt can return. Pt is working with [facility].   Per Doreen pt will need to return to the [facility] at the [facility] and wait for a overflow bed , once in an overflow bed pt then will be eligible for a permanent bed on campus.

## 2025-01-02 NOTE — PROGRESS NOTES
4 Eyes Skin Assessment Completed by JANNETTE Mike and JANNETTE Gates.    Head scab on forehead  Ears WDL  Nose WDL  Mouth WDL  Neck WDL  Breast/Chest WDL  Shoulder Blades WDL  Spine WDL  (R) Arm/Elbow/Hand Redness and Blanching  (L) Arm/Elbow/Hand Redness and Blanching  Abdomen WDL  Groin abrasion pink area L inner upper thigh   Scrotum/Coccyx/Buttocks Redness and Blanching  (R) Leg dry flaky skin, scab on knee  (L) Leg wound on L calf covered w/ dressing, scab on knee   (R) Heel/Foot/Toe Redness and Blanching  (L) Heel/Foot/Toe Redness and Blanching          Devices In Places Pulse Ox      Interventions In Place Heel Mepilex, Sacral Mepilex, Pillows, Q2 Turns, Low Air Loss Mattress, and Barrier Cream    Possible Skin Injury Yes    Pictures Uploaded Into Epic Yes  Wound Consult Placed Yes  RN Wound Prevention Protocol Ordered Yes

## 2025-01-02 NOTE — CARE PLAN
The patient is Watcher - Medium risk of patient condition declining or worsening    Shift Goals  Clinical Goals: Patient skin integrity jamie be maintained or improved by end of shift  Patient Goals: rest, increase PO intake  Family Goals: nilson    Patient a/o x4. Patient on stating pain 7/10 earlier. Pain medication was given. Patient has more pain today. PT had patient sitting on the side of the bed. Patient wanted to stay there as long as he could which was about 20-25. Patient's blood glucose was 56 at 1210. Patient was asymptomatic. Notified Dr. Yuko Chris and SEEMA Mai. Gave patient orange juice and sugar. Checked blood glucose again at 1224 and went up to 74. Patient resting comfortably. Call light and belongings are within reach. Bed is in low locked position.     Progress made toward(s) clinical / shift goals:    Problem: Pain - Standard  Goal: Alleviation of pain or a reduction in pain to the patient’s comfort goal  Outcome: Not Progressing     Problem: Knowledge Deficit - Standard  Goal: Patient and family/care givers will demonstrate understanding of plan of care, disease process/condition, diagnostic tests and medications  Outcome: Progressing     Problem: Hemodynamics  Goal: Patient's hemodynamics, fluid balance and neurologic status will be stable or improve  Outcome: Progressing     Problem: Fluid Volume  Goal: Fluid volume balance will be maintained  Outcome: Progressing     Problem: Urinary - Renal Perfusion  Goal: Ability to achieve and maintain adequate renal perfusion and functioning will improve  Outcome: Progressing     Problem: Respiratory  Goal: Patient will achieve/maintain optimum respiratory ventilation and gas exchange  Outcome: Progressing     Problem: Physical Regulation  Goal: Diagnostic test results will improve  Outcome: Progressing     Problem: Skin Integrity  Goal: Skin integrity is maintained or improved  Outcome: Progressing     Problem: Fall Risk  Goal: Patient will  remain free from falls  Outcome: Progressing     Problem: Depression  Goal: Patient and family/caregiver will verbalize accurate information about at least two of the possible causes of depression, three-four of the signs and symptoms of depression  Outcome: Progressing     Problem: Acute Care of the Diabetic Patient  Goal: Optimal Outcome for the Diabetic Patient  Outcome: Progressing     Problem: Nutrition  Goal: Patient's nutritional and fluid intake will be adequate or improve  Outcome: Progressing       Patient is not progressing towards the following goals:      Problem: Pain - Standard  Goal: Alleviation of pain or a reduction in pain to the patient’s comfort goal  Outcome: Not Progressing

## 2025-01-02 NOTE — DISCHARGE PLANNING
"Case Management Discharge Planning    Admission Date: 12/19/2024  GMLOS: 4.9  ALOS: 14    6-Clicks ADL Score: 15  6-Clicks Mobility Score: 13  PT and/or OT Eval ordered: Yes  Post-acute Referrals Ordered: Yes  Post-acute Choice Obtained: No  Has referral(s) been sent to post-acute provider:  Yes      Anticipated Discharge Dispo: Discharge Disposition: D/T to SNF with Medicare cert in anticipation of skilled care (03)    DME Needed: Yes    DME Ordered: Yes wheelchair will be delivered by [facility] 1/3/25 to bedside.     Action(s) Taken: Updated Provider/Nurse on Discharge Plan  RNCM discussed pt during IDT rounds. CM discussed discharge plan with Dr. Chris. Discharge plan is pending placement in SNF due to IV infusion end date 1/4/25. Expanded search is in process. Referral was resent for DME wheelchair. DPA sent referral to [facility]. Awaiting confirmation. Met with pt at bedside to discuss discharge planning.    Expected arrival of DME tomorrow. Pt plans to return to [facility] on 1/4/25 if placement is not secured. Pt verbalized concerns that he was told that he cannot return to [facility] once his abx are finished on 1/4. He says he is evidently \"out of days there\". Pt has calm demeanor and has no adverse behaviors as inpatient.   RNCM messaged Lucy HALL, to inquire further with [facility] personnel, possible resources, and support.     Escalations Completed: None    Medically Clear: No    Next Steps: Follow-up with medical team to discuss DC needs and barriers.    Barriers to Discharge: Medical clearance, DME, Transportation, No Social Support, and Homelessness            "

## 2025-01-03 LAB
GLUCOSE BLD STRIP.AUTO-MCNC: 69 MG/DL (ref 65–99)
GLUCOSE BLD STRIP.AUTO-MCNC: 74 MG/DL (ref 65–99)
GLUCOSE BLD STRIP.AUTO-MCNC: 74 MG/DL (ref 65–99)
GLUCOSE BLD STRIP.AUTO-MCNC: 76 MG/DL (ref 65–99)
GLUCOSE BLD STRIP.AUTO-MCNC: 77 MG/DL (ref 65–99)

## 2025-01-03 PROCEDURE — 99232 SBSQ HOSP IP/OBS MODERATE 35: CPT | Performed by: STUDENT IN AN ORGANIZED HEALTH CARE EDUCATION/TRAINING PROGRAM

## 2025-01-03 PROCEDURE — 770006 HCHG ROOM/CARE - MED/SURG/GYN SEMI*

## 2025-01-03 PROCEDURE — 700105 HCHG RX REV CODE 258: Performed by: INTERNAL MEDICINE

## 2025-01-03 PROCEDURE — A9270 NON-COVERED ITEM OR SERVICE: HCPCS | Performed by: HOSPITALIST

## 2025-01-03 PROCEDURE — A9270 NON-COVERED ITEM OR SERVICE: HCPCS | Performed by: INTERNAL MEDICINE

## 2025-01-03 PROCEDURE — 700111 HCHG RX REV CODE 636 W/ 250 OVERRIDE (IP): Mod: JZ | Performed by: INTERNAL MEDICINE

## 2025-01-03 PROCEDURE — 700102 HCHG RX REV CODE 250 W/ 637 OVERRIDE(OP): Performed by: STUDENT IN AN ORGANIZED HEALTH CARE EDUCATION/TRAINING PROGRAM

## 2025-01-03 PROCEDURE — 700102 HCHG RX REV CODE 250 W/ 637 OVERRIDE(OP): Performed by: HOSPITALIST

## 2025-01-03 PROCEDURE — A9270 NON-COVERED ITEM OR SERVICE: HCPCS | Performed by: STUDENT IN AN ORGANIZED HEALTH CARE EDUCATION/TRAINING PROGRAM

## 2025-01-03 PROCEDURE — 82962 GLUCOSE BLOOD TEST: CPT | Mod: 91

## 2025-01-03 PROCEDURE — 700102 HCHG RX REV CODE 250 W/ 637 OVERRIDE(OP): Performed by: INTERNAL MEDICINE

## 2025-01-03 RX ADMIN — APIXABAN 5 MG: 5 TABLET, FILM COATED ORAL at 16:34

## 2025-01-03 RX ADMIN — DIGOXIN 125 MCG: 0.12 TABLET ORAL at 16:34

## 2025-01-03 RX ADMIN — SPIRONOLACTONE 25 MG: 25 TABLET ORAL at 05:24

## 2025-01-03 RX ADMIN — LOSARTAN POTASSIUM 25 MG: 25 TABLET, FILM COATED ORAL at 05:23

## 2025-01-03 RX ADMIN — AMPICILLIN AND SULBACTAM 3 G: 1; 2 INJECTION, POWDER, FOR SOLUTION INTRAMUSCULAR; INTRAVENOUS at 14:35

## 2025-01-03 RX ADMIN — NICOTINE 14 MG: 14 PATCH TRANSDERMAL at 05:21

## 2025-01-03 RX ADMIN — AMPICILLIN AND SULBACTAM 3 G: 1; 2 INJECTION, POWDER, FOR SOLUTION INTRAMUSCULAR; INTRAVENOUS at 20:06

## 2025-01-03 RX ADMIN — AMPICILLIN AND SULBACTAM 3 G: 1; 2 INJECTION, POWDER, FOR SOLUTION INTRAMUSCULAR; INTRAVENOUS at 02:57

## 2025-01-03 RX ADMIN — AMPICILLIN AND SULBACTAM 3 G: 1; 2 INJECTION, POWDER, FOR SOLUTION INTRAMUSCULAR; INTRAVENOUS at 07:48

## 2025-01-03 RX ADMIN — OXYCODONE 5 MG: 5 TABLET ORAL at 15:29

## 2025-01-03 RX ADMIN — METOPROLOL SUCCINATE 25 MG: 25 TABLET, EXTENDED RELEASE ORAL at 05:23

## 2025-01-03 RX ADMIN — OXYCODONE 5 MG: 5 TABLET ORAL at 05:22

## 2025-01-03 RX ADMIN — OMEPRAZOLE 20 MG: 20 CAPSULE, DELAYED RELEASE ORAL at 05:23

## 2025-01-03 RX ADMIN — DAKIN'S SOLUTION 0.125% (QUARTER STRENGTH) 50 ML: 0.12 SOLUTION at 16:35

## 2025-01-03 RX ADMIN — APIXABAN 5 MG: 5 TABLET, FILM COATED ORAL at 05:22

## 2025-01-03 RX ADMIN — POLYETHYLENE GLYCOL 3350 1 PACKET: 17 POWDER, FOR SOLUTION ORAL at 05:21

## 2025-01-03 ASSESSMENT — PAIN DESCRIPTION - PAIN TYPE
TYPE: ACUTE PAIN

## 2025-01-03 ASSESSMENT — ENCOUNTER SYMPTOMS
NAUSEA: 0
VOMITING: 0
ABDOMINAL PAIN: 0
FEVER: 0
SHORTNESS OF BREATH: 0

## 2025-01-03 NOTE — CARE PLAN
The patient is Stable - Low risk of patient condition declining or worsening    Shift Goals  Clinical Goals: pt skin integrity will be maintained or improved by end of shift  Patient Goals: rest and comfort  Family Goals: nilson    Progress made toward(s) clinical / shift goals:  on going    Patient is  progressing towards the following goals:  Problem: Hemodynamics  Goal: Patient's hemodynamics, fluid balance and neurologic status will be stable or improve  Description: Target End Date:  Prior to discharge or change in level of care    Document on Assessment and I/O flowsheet templates    1.  Monitor vital signs, pulse oximetry and cardiac monitor per provider order and/or policy  2.  Maintain blood pressure per provider order  3.  Hemodynamic monitoring per provider order  4.  Manage IV fluids and IV infusions  5.  Monitor intake and output  6.  Daily weights per unit policy or provider order  7.  Assess peripheral pulses and capillary refill  8.  Assess color and body temperature  9.  Position patient for maximum circulation/cardiac output  10. Monitor for signs/symptoms of excessive bleeding  11. Assess mental status, restlessness and changes in level of consciousness  12. Monitor temperature and report fever or hypothermia to provider immediately. Consideration of targeted temperature management.  Outcome: Progressing     Problem: Mechanical Ventilation  Goal: Patient will be able to express needs and understand communication  Description: Target End Date:  when vent discontinued    1.  Assess ability to communicate and understand  2.  Provide communication tools  3.  Collaborate with Speech Therapy for [facility]  Outcome: Progressing

## 2025-01-03 NOTE — DISCHARGE PLANNING
Case Management Discharge Planning    Admission Date: 12/19/2024  GMLOS: 4.9  ALOS: 15    6-Clicks ADL Score: 15  6-Clicks Mobility Score: 13  PT and/or OT Eval ordered: Yes  Post-acute Referrals Ordered: Yes  Post-acute Choice Obtained: No  Has referral(s) been sent to post-acute provider:  Yes      Anticipated Discharge Dispo: Discharge Disposition: Discharged to home/self care (01)  RNCM discussed pt during IDT rounds. CM discussed discharge plan with Dr. Chris. Pt is medically cleared, pending placement or pending completion of infusions. IV antibiotics end tomorrow afternoon. Met with pt at bedside to discuss discharge plan. Anticipate discharge tomorrow, however, pt verbalized concerns and strongly desires to discharge in early morning @0730 on 1/5/25 and states that he may choose to appeal his discharge tomorrow, if he must leave too late in afternoon or evening to have a bed at [facility]. Pt will need transport/ cab voucher to [facility].   @1400 RNCM updated DME w/c delivered to bedside by [facility].   @1455 RNCM spoke to Dr Chris regarding a discussion with family/friend at bedside whom pt asked to advocate for him. Pt states that he is very weak and cannot care for himself. He feels that he needs strength and rehabilitation in a SNF. Dr Chris would like PT/OT to reevaluate him. RNCM messaged DPA to resend referrals to all [facility], [facility], [place], and [facility] SNFs for placement.     DME Needed: No    Action(s) Taken: Updated Provider/Nurse on Discharge Plan    Escalations Completed: None    Medically Clear: No    Next Steps: Follow-up with medical team to discuss DC needs and barriers.    Barriers to Discharge: Medical clearance and Pending Placement

## 2025-01-03 NOTE — PROGRESS NOTES
Hospital Medicine Daily Progress Note    Date of Service  1/3/2025    Chief Complaint  Robert Mcdonnell is a 74 y.o. male admitted 12/19/2024 with lower extremity edema and shortness of breath.    Hospital Course  Robert Mcdonnell is a 74-year-old male with PMHx homelessness, chronic atrial fibrillation on apixaban, HFrEF, pulmonary hypertension, perforated gastric ulcer s/p gastrectomy.    Per history: recent hospitalization 10/3 - 10/15 for perforated  secondary to invasive gastric adenocarcinoma, GDA embolization, subsequent subtotal gastrectomy, liver wedge resection, Miguel Angel-en-Y gastrojejunostomy with omental flap and cholecystectomy 10/7 with pathology showing invasive well-differentiated adenocarcinoma with associated ulceration. There was tumor invasion into the muscularis propria and 20 lymph nodes were negative for metastatic carcinoma. Oncology was consulted and noted no evidence of metastatic disease however MRI abdomen recommended for follow-up; no adjuvant therapy was recommended.     Patient was readmitted 12/19 for worsening lower extremity edema and shortness of breath.  He initially required BiPAP and IMCU admission.  Per history-patient had run out of his Lasix at home.  EKG in the emergency room showing irregular wide-complex arrhythmia.      Additionally, blood cultures were positive for group A strep bacteremia secondary to left lower extremity infection.  CT LLE: Small to moderate suprapatellar joint effusion.  MRI LLE: Diffuse cellulitis, patchy myositis, negative for OM.  Orthopedic surgery was consulted and recommended medical management. Infectious disease was consulted and recommended IV Unasyn with an end date of 1/4 and clindamycin with an end date of 12/31.    For patient's HFrEF-he was started on GDMT with effective diuresis.    Patient continued to have hypoglycemic episodes due to poor p.o. intake.    Interval Problem Update  12/30: Vital stable overnight.  SBP ranging 125 through  139.  Patient resting comfortably on room air.  WBC 14.2 from 14.9.  Hb stable at 8.9.  Chemistry panel stable.  MRI left lower extremity limited due to motion but no obvious osteomyelitis or bone lesions identified.  Continue IV Unasyn with an end date of 1/4.  Placement is pending.    12/31: Vitals stable overnight.  WBC 14.8 from 14.2.  Hb stable at 9.2.  Continue IV Unasyn until 1/4.  Borderline hypoglycemic with glucose ranging 60s to 90s.  Continue to encourage p.o. intake.     1/1: Vitals stable overnight.  SBP ranging .  WBC 12.1 from 14.8.  Hb stable at 8.8.  Glucose .  Hold Farxiga due to ongoing hypoglycemia.  Continue IV Unasyn today.    1/2: Vital stable overnight.  Patient resting comfortably on room air.  Glucose 70-80 overnight.  Plan to discontinue Farxiga due to hypoglycemia.  Continue IV Unasyn through 1/4.     1/3: Vitals stable overnight.  SBP 96 through 120.  No additional episodes of hypoglycemia.  Anticipate discharge tomorrow to [facility] after antibiotics have completed.  Wheelchair to be delivered today. Continue IV unasyn today. Final dose scheduled for tomorrow.     I have discussed this patient's plan of care and discharge plan at IDT rounds today with Case Management, Nursing, Nursing leadership, and other members of the IDT team.    Consultants/Specialty  critical care, infectious disease, and orthopedics    Code Status  Full Code    Disposition  The patient is not medically cleared for discharge to home or a post-acute facility.  Anticipate discharge to: home with close outpatient follow-up    I have placed the appropriate orders for post-discharge needs.    Review of Systems  Review of Systems   Constitutional:  Negative for fever and malaise/fatigue.   Respiratory:  Negative for shortness of breath.    Cardiovascular:  Negative for chest pain and leg swelling.   Gastrointestinal:  Negative for abdominal pain, nausea and vomiting.        Physical Exam  Temp:  [36.3 °C  (97.4 °F)-36.8 °C (98.2 °F)] 36.3 °C (97.4 °F)  Pulse:  [60-69] 60  Resp:  [16] 16  BP: (107-130)/(58-69) 130/60  SpO2:  [94 %-96 %] 96 %    Physical Exam  Vitals and nursing note reviewed.   Constitutional:       General: He is not in acute distress.     Appearance: Normal appearance. He is ill-appearing.      Comments: Frail appearing    Cardiovascular:      Rate and Rhythm: Normal rate and regular rhythm.   Pulmonary:      Effort: Pulmonary effort is normal.      Breath sounds: Normal breath sounds.   Musculoskeletal:      Comments: Right lower extremity with dressings clean dry intact   Skin:     General: Skin is warm and dry.   Neurological:      Mental Status: He is alert and oriented to person, place, and time. Mental status is at baseline.   Psychiatric:         Mood and Affect: Mood normal.         Behavior: Behavior normal.         Fluids    Intake/Output Summary (Last 24 hours) at 1/3/2025 1415  Last data filed at 1/3/2025 0725  Gross per 24 hour   Intake 480 ml   Output 1500 ml   Net -1020 ml        Laboratory  Recent Labs     01/01/25  0325   WBC 12.1*   RBC 3.10*   HEMOGLOBIN 8.8*   HEMATOCRIT 26.7*   MCV 86.1   MCH 28.4   MCHC 33.0   RDW 54.6*   PLATELETCT 374   MPV 10.8     Recent Labs     01/01/25  0325   SODIUM 137   POTASSIUM 4.3   CHLORIDE 107   CO2 23   GLUCOSE 89   BUN 9   CREATININE 0.77   CALCIUM 7.5*                   Imaging  [id number]-W/O LEFT   Final Result      1.  Very limited examination due to patient motion artifact. Patient was also unable to complete the examination and therefore contrast-enhanced sequences were not obtained.      2.  No evidence of marrow edema or bone lesion.      3.  Again seen diffuse soft tissue edema/induration likely representing cellulitis. This also patchy muscle edema suggesting myositis.      4.  No focal fluid collection to suggest presence of abscess.      HF-MASCTFF-3 VIEW   Final Result      No evidence of bowel obstruction. Mild constipation.       MR-KNEE-WITH & W/O LEFT   Final Result      1.  No evidence of marrow edema or abnormal enhancement to suggest presence of osteomyelitis.      2.  Small to moderate joint effusion with mild synovitis.      3.  Diffuse soft tissue edema/induration consistent with cellulitis.      4.  Small popliteal cyst.      5.  No evidence of soft tissue abscess.      6.  Markedly motion degraded examination. Ligaments and menisci are inadequately evaluated due to patient motion.      MR-LOWER EXTREMITY-NO JOINT-W/O LEFT   Final Result      1.  Patient motion degraded exam.      2.  No evidence of marrow edema or bone lesion to suggest presence of osteomyelitis.      3.  Diffuse cellulitis and patchy myositis.      4.  No evidence of focal fluid collection to suggest presence of abscess.      CT-EXTREMITY, LOWER WITH LEFT   Final Result      1.  Extensive subcutaneous inflammatory changes about the left lower extremity consistent with deep and superficial cellulitis.      2.  No definite CT evidence of necrotizing fasciitis.      3.  No CT evidence of deep abscess formation or acute or chronic osteomyelitis.      4.  Small to moderate suprapatellar joint effusion.      CT-EXTREMITY, LOWER WITH LEFT   Final Result      No soft tissue gas to suggest necrotizing fasciitis.      No fluid collection identified.      DX-CHEST-PORTABLE (1 VIEW)   Final Result         1.  Hazy left pulmonary infiltrates, similar to prior study   2.  Trace left pleural effusion, stable   3.  Cardiomegaly   4.  Atherosclerosis      DX-CHEST-PORTABLE (1 VIEW)   Final Result         1.  Hazy left pulmonary infiltrates, similar to prior study   2.  Trace left pleural effusion, stable   3.  Cardiomegaly   4.  Atherosclerosis      EC-ECHOCARDIOGRAM COMPLETE W/O CONT   Final Result      CT-CHEST,ABDOMEN,PELVIS WITH   Final Result      1. Stable spiculated opacity in the right upper lobe, extending to the pleural surface.   2. Lingular and left lower lobe  parenchymal airspace disease has improved in the interval, but has not completely resolved. There is pleural reaction, suggesting some of these areas may represent parenchymal scarring.   3. Asymmetry of the lung volumes, small on the left than the right.   4. Cardiomegaly with atherosclerotic calcifications in the aorta and coronary arteries.   5. Postsurgical changes in the stomach.   6. Stable scattered multiple hypodense lesions throughout the liver.   7. Moderate ascites throughout the abdomen and pelvis.   8. Cachexia.   9. Enlarged left inguinal lymph nodes with central necrosis. There is edema involving the soft tissues of the upper left leg extending to the left pelvis.      US-EXTREMITY VENOUS LOWER BILAT   Final Result      DX-CHEST-PORTABLE (1 VIEW)   Final Result         1.  Hazy left pulmonary infiltrates.   2.  Small left pleural effusion   3.  Cardiomegaly           Assessment/Plan  * Acute exacerbation of CHF (congestive heart failure) (HCC)- (present on admission)  Assessment & Plan  Echo: EF 28%; severe tricuspid and mitral regurg.  RVSP elevated at 55  - Continue GDMT with losartan, spironolactone, metoprolol, Farxiga  - Continue digoxin  - Maintain euvolemia    Constipation  Assessment & Plan  I ordered a bowel management  Suppository today    Hypoglycemia  Assessment & Plan  Multiple episodes of hypoglycemia requiring intervention  Poor p.o. intake  - Continue to encourage Ensure and p.o. intake  - Nutrition consulted  - Discontinue Farxiga due to hypoglycemia    Streptococcal bacteremia- (present on admission)  Assessment & Plan  Blood cultures 12/19 positive for group A strep  Repeat blood cultures 12/21 NGTD    Pulmonary hypertension (HCC)  Assessment & Plan  Previous Echo RVSP 65, severe TR  Suspect mainly Group 2  RV perfusion with MAP > 65 as need norepinepinephrine  Blood pressure is running on the lower side holding diuretic therapy at this time    Acute respiratory failure with hypoxia  (HCC)- (present on admission)  Assessment & Plan  Improved off Bipap  Weaned to room air    Hypophosphatemia- (present on admission)  Assessment & Plan  Replaced    Malignant neoplasm of body of stomach (HCC)- (present on admission)  Assessment & Plan  T2, N0, M0 invasive gastric adenocarcinoma presenting as perforated  10/2024  -GDA embolization  -10/7 subtotal gastrectomy, liver wedge resection, Miguel Angel-en-Y gastrojejunostomy with omental flap and cholecystectomy  -pathology invasive well-differentiated adenocarcinoma with associated ulceration  -tumor invasion into the muscularis propria and 20 lymph nodes were negative for metastatic carcinoma  -Oncology was consulted and noted no evidence of metastatic disease  -MRI abdomen recommended for follow-up; no adjuvant therapy was recommended  He will need outpatient follow-up    Homeless- (present on admission)  Assessment & Plan  Patient is currently homeless.  He is unsure if he is able to return to [facility].  Wheelchair has been ordered.  Discussed with case management today.    Severe protein-calorie malnutrition (HCC)- (present on admission)  Assessment & Plan  Discussed with nutrition, patient meets the ASPEN criteria of severe malnutrition  Continuous nutrition support    ACP (advance care planning)- (present on admission)  Assessment & Plan  Patient admitted with CHF exacerbation, significantly reduced the EF of 30%.  Also found bacteremia, LLE infection, severe malnutrition.  History of invasive gastric malignancy, A-fib RVR, PE, gastric perforation post extensive surgery including subtotal gastrectomy, liver resection  Miguel Angel-en-Y gastrojejunostomy with omental flap and cholecystectomy.  I discussed the CODE STATUS with him, including CPR, intubation/mechanical ventilation.  He wants to stay full code.  Patient has high complexity and guarded prognosis.  I consulted palliative care.  ACP 18 minutes.     COPD (chronic obstructive pulmonary disease) (HCC)-  (present on admission)  Assessment & Plan  Current active smoker, no PFTs on file  RT protocols, bronchodilators as needed  No signs of exacerbation    Acute kidney injury (HCC)- (present on admission)  Assessment & Plan  Resolved   Maintain euvolemia and monitor fluid responsiveness (avoid NaCL and renal congestion)  Monitor urine output and I&O's  Avoid and review nephrotoxin medication  Renal function improved    Sepsis (HCC)- (present on admission)  Assessment & Plan  See above    Atrial fibrillation with RVR (HCC)- (present on admission)  Assessment & Plan  Chronic atrial fibrillation, currently tachycardia  Digoxin 250 mcg IV monitoring closely with flip to prevent digoxin toxicity    Continue digoxin and Eliquis  12/27 BP better, I resumed Toprol 25 mg    Thrombocytopenia (HCC)- (present on admission)  Assessment & Plan  Platelets 288  - Repeat CBC in a.m.  - Stable to continue Eliquis at this time    Tobacco dependence- (present on admission)  Assessment & Plan  Counseling when appropriate    Hypertension- (present on admission)  Assessment & Plan  SBP well-controlled  - Continue losartan, metoprolol, spironolactone    Wound of lower extremity- (present on admission)  Assessment & Plan  Likely source of infection, lower ext doppler negative  CT left leg shows small to moderate suprapatellar joint effusion.  MRI LLE showed Diffuse cellulitis and patchy myositis, negative for osteomyelitis or abscess.   Repeat MRI 12/30: Limited due to patient motion artifact; no obvious OM noted persistent cellulitis and myositis noted  - Infectious disease consulted  - Continue IV Unasyn until 1/4  - Continue clindamycin through 12/31  - Orthopedic surgery consultation 12/20 and 12/25 no surgical interventions at this time         VTE prophylaxis:   SCDs/TEDs      I have performed a physical exam and reviewed and updated ROS and Plan today (1/3/2025). In review of yesterday's note (1/2/2025), there are no changes except as  documented above.

## 2025-01-03 NOTE — DIETARY
Nutrition Update: Follow-up for PO Intake   Day 15 of admit.  Robert Mcdonnell is a 74 y.o. male with admitting DX of Acute left-sided CHF (congestive heart failure) (HCC) [I50.1]  Streptococcal bacteremia [R78.81, B95.5]  Left leg cellulitis [L03.116].    Current Diet: Cardiac- Easy to chew, thin liquids + Ensure Max TID   - Ensure HP Pudding also listed, no longer exists     Malnutrition risk: met criteria on 12/27     PES   Severe Malnutrition in context of Chronic Illness related to muscle and fat wasting as evidenced by Severe muscle loss at (temple, shoulder, clavicle) Severe muscle loss at (orbital, buccal, triceps)   Nutrition Dx Status: Ongoing    Problem: Nutritional:  Goal: Achieve adequate nutritional intake  Description: Patient will consume >=50% of meals  Outcome: Progressing     - PO intake inconsistent since 12/28  Refused x 1  0% x 3 meals   <25% x 3 meals   25-50% x 3 meals   50-75% x 2 meals   % x 1 meal (most recent meal)     Recommendations   Continue current diet   Continue ONS   Add Bruce BID for wounds   Weekly weights   Continue to chart all meal/supplement ADL %'s to help facilitate cross communication among the interdisciplinary team.     RD following

## 2025-01-03 NOTE — PROGRESS NOTES
4 Eyes Skin Assessment Completed by JANNETTE Milton and JANNETTE Estrada.    Head scab and scar  no drainage  Ears Redness and Discoloration  Nose WDL  Mouth WDL  Neck WDL  Breast/Chest WDL  Shoulder Blades WDL  Spine Blanching skin tags  (R) Arm/Elbow/Hand Bruising, Scab, Discoloration, and Scar  (L) Arm/Elbow/Hand Bruising and Scab  Abdomen Scar midline abdominal scar  Groin Redness inner thigh  Scrotum/Coccyx/Buttocks blanching excoriation frail fragile skin Redness, Blanching, and Excoriation  (R) Leg Scar, Scab, and Edema  (L) Leg Redness, Blanching, Non-Blanching, Scar, Scab, Swelling, Weeping, and Edema large wound lower leg  (R) Heel/Foot/Toe Redness, Blanching, and Boggy dry fragile skin on the heels  (L) Heel/Foot/Toe Redness and Boggy frail fragile skin          Devices In Places Blood Pressure Cuff      Interventions In Place Heel Mepilex, Sacral Mepilex, TAP System, Q2 Turns, Low Air Loss Mattress, Barrier Cream, and Heels Loaded W/Pillows    Possible Skin Injury Yes    Pictures Uploaded Into Epic Yes  Wound Consult Placed Yes  RN Wound Prevention Protocol Ordered Yes . Patient refused wound dressing change

## 2025-01-03 NOTE — PROGRESS NOTES
4 Eyes Skin Assessment Completed by JANNETTE Mike and JANNETTE Gates.    Head scab on forehead  Ears WDL  Nose WDL  Mouth WDL  Neck WDL  Breast/Chest WDL  Shoulder Blades WDL  Spine WDL  (R) Arm/Elbow/Hand Redness and Blanching  (L) Arm/Elbow/Hand Redness and Blanching  Abdomen WDL  Groin pinkened area L inner thigh  Scrotum/Coccyx/Buttocks Redness and Blanching  (R) Leg dry flaky skin, scab on knee  (L) Leg L calf wound covered w/ dressing, scab on knee   (R) Heel/Foot/Toe Redness and Blanching  (L) Heel/Foot/Toe Redness and Blanching          Devices In Places Pulse Ox      Interventions In Place Heel Mepilex, Sacral Mepilex, TAP System, Pillows, Elbow Mepilex, Q2 Turns, Low Air Loss Mattress, and Barrier Cream    Possible Skin Injury Yes    Pictures Uploaded Into Epic Yes  Wound Consult Placed Yes  RN Wound Prevention Protocol Ordered Yes

## 2025-01-04 LAB
GLUCOSE BLD STRIP.AUTO-MCNC: 106 MG/DL (ref 65–99)
GLUCOSE BLD STRIP.AUTO-MCNC: 63 MG/DL (ref 65–99)
GLUCOSE BLD STRIP.AUTO-MCNC: 66 MG/DL (ref 65–99)
GLUCOSE BLD STRIP.AUTO-MCNC: 95 MG/DL (ref 65–99)

## 2025-01-04 PROCEDURE — 700102 HCHG RX REV CODE 250 W/ 637 OVERRIDE(OP): Performed by: STUDENT IN AN ORGANIZED HEALTH CARE EDUCATION/TRAINING PROGRAM

## 2025-01-04 PROCEDURE — 82962 GLUCOSE BLOOD TEST: CPT

## 2025-01-04 PROCEDURE — 770006 HCHG ROOM/CARE - MED/SURG/GYN SEMI*

## 2025-01-04 PROCEDURE — 700102 HCHG RX REV CODE 250 W/ 637 OVERRIDE(OP): Performed by: HOSPITALIST

## 2025-01-04 PROCEDURE — A9270 NON-COVERED ITEM OR SERVICE: HCPCS | Performed by: STUDENT IN AN ORGANIZED HEALTH CARE EDUCATION/TRAINING PROGRAM

## 2025-01-04 PROCEDURE — 700111 HCHG RX REV CODE 636 W/ 250 OVERRIDE (IP): Mod: JZ | Performed by: INTERNAL MEDICINE

## 2025-01-04 PROCEDURE — 700105 HCHG RX REV CODE 258: Performed by: INTERNAL MEDICINE

## 2025-01-04 PROCEDURE — A9270 NON-COVERED ITEM OR SERVICE: HCPCS | Performed by: HOSPITALIST

## 2025-01-04 PROCEDURE — A9270 NON-COVERED ITEM OR SERVICE: HCPCS | Performed by: INTERNAL MEDICINE

## 2025-01-04 PROCEDURE — 99232 SBSQ HOSP IP/OBS MODERATE 35: CPT | Performed by: STUDENT IN AN ORGANIZED HEALTH CARE EDUCATION/TRAINING PROGRAM

## 2025-01-04 PROCEDURE — 700102 HCHG RX REV CODE 250 W/ 637 OVERRIDE(OP): Performed by: INTERNAL MEDICINE

## 2025-01-04 RX ADMIN — SENNOSIDES AND DOCUSATE SODIUM 2 TABLET: 50; 8.6 TABLET ORAL at 17:05

## 2025-01-04 RX ADMIN — LOSARTAN POTASSIUM 25 MG: 25 TABLET, FILM COATED ORAL at 04:30

## 2025-01-04 RX ADMIN — DAKIN'S SOLUTION 0.125% (QUARTER STRENGTH) 10 ML: 0.12 SOLUTION at 17:05

## 2025-01-04 RX ADMIN — OMEPRAZOLE 20 MG: 20 CAPSULE, DELAYED RELEASE ORAL at 04:30

## 2025-01-04 RX ADMIN — OXYCODONE 5 MG: 5 TABLET ORAL at 08:23

## 2025-01-04 RX ADMIN — AMPICILLIN AND SULBACTAM 3 G: 1; 2 INJECTION, POWDER, FOR SOLUTION INTRAMUSCULAR; INTRAVENOUS at 01:55

## 2025-01-04 RX ADMIN — OXYCODONE 5 MG: 5 TABLET ORAL at 04:30

## 2025-01-04 RX ADMIN — OXYCODONE 5 MG: 5 TABLET ORAL at 20:08

## 2025-01-04 RX ADMIN — AMPICILLIN AND SULBACTAM 3 G: 1; 2 INJECTION, POWDER, FOR SOLUTION INTRAMUSCULAR; INTRAVENOUS at 20:11

## 2025-01-04 RX ADMIN — DAKIN'S SOLUTION 0.125% (QUARTER STRENGTH) 100 ML: 0.12 SOLUTION at 06:23

## 2025-01-04 RX ADMIN — DIGOXIN 125 MCG: 0.12 TABLET ORAL at 17:05

## 2025-01-04 RX ADMIN — SPIRONOLACTONE 25 MG: 25 TABLET ORAL at 04:30

## 2025-01-04 RX ADMIN — AMPICILLIN AND SULBACTAM 3 G: 1; 2 INJECTION, POWDER, FOR SOLUTION INTRAMUSCULAR; INTRAVENOUS at 08:34

## 2025-01-04 RX ADMIN — APIXABAN 5 MG: 5 TABLET, FILM COATED ORAL at 04:30

## 2025-01-04 RX ADMIN — METOPROLOL SUCCINATE 25 MG: 25 TABLET, EXTENDED RELEASE ORAL at 04:30

## 2025-01-04 RX ADMIN — AMPICILLIN AND SULBACTAM 3 G: 1; 2 INJECTION, POWDER, FOR SOLUTION INTRAMUSCULAR; INTRAVENOUS at 14:03

## 2025-01-04 RX ADMIN — APIXABAN 5 MG: 5 TABLET, FILM COATED ORAL at 17:05

## 2025-01-04 ASSESSMENT — ENCOUNTER SYMPTOMS
SHORTNESS OF BREATH: 0
ABDOMINAL PAIN: 0
FEVER: 0
VOMITING: 0
NAUSEA: 0

## 2025-01-04 ASSESSMENT — FIBROSIS 4 INDEX: FIB4 SCORE: 1.65

## 2025-01-04 ASSESSMENT — PAIN DESCRIPTION - PAIN TYPE
TYPE: ACUTE PAIN

## 2025-01-04 NOTE — PROGRESS NOTES
4 Eyes Skin Assessment Completed by JANNETTE Morris and JANNETTE Martinez.    Head Scab and Scar to forehead and cheek  Ears WDL  Nose WDL  Mouth WDL  Neck WDL  Breast/Chest WDL  Shoulder Blades WDL  Spine Skin tags  (R) Arm/Elbow/Hand Redness and Blanching, Flaky, Fragile  (L) Arm/Elbow/Hand Redness and Blanching, Flaky, Fragile  Abdomen Scar  Groin Discoloration, Epithelization, Flaky, Fragile  Scrotum/Coccyx/Buttocks Redness, Blanching, and Discoloration, Flaky, Fragile  (R) Leg Dry, Flaky, Scab  (L) Leg Scab, LLE wound with dressing in placed, Swelling, Flaky  (R) Heel/Foot/Toe Redness, Blanching, and Boggy, Flaky, Fragile  (L) Heel/Foot/Toe Redness, Blanching, and Boggy, Flaky, Fragile          Devices In Places PIV Line      Interventions In Place Heel Mepilex, Sacral Mepilex, TAP System, Pillows, Elbow Mepilex, Low Air Loss Mattress, and Barrier Cream    Possible Skin Injury Yes    Pictures Uploaded Into Epic N/A  Wound Consult Placed N/A - already consulted  RN Wound Prevention Protocol Ordered Yes

## 2025-01-04 NOTE — PROGRESS NOTES
Hypoglycemia Intervention    Hypoglycemia protocol intervention:  Blood glucose 66 at 1406.  Intervention: 8 oz of fruit juice   Repeat blood glucose 95 at 1438.  Intervention: Carb/Protein snack given, recheck blood glucose in 1 hour    Additional interventions needed: none  Dr. Chris notified of the above at 1449.

## 2025-01-04 NOTE — PROGRESS NOTES
Hospital Medicine Daily Progress Note    Date of Service  1/4/2025    Chief Complaint  Robert Mcdonnell is a 74 y.o. male admitted 12/19/2024 with lower extremity edema and shortness of breath.    Hospital Course  Robert Mcdonnell is a 74-year-old male with PMHx homelessness, chronic atrial fibrillation on apixaban, HFrEF, pulmonary hypertension, perforated gastric ulcer s/p gastrectomy.    Per history: recent hospitalization 10/3 - 10/15 for perforated  secondary to invasive gastric adenocarcinoma, GDA embolization, subsequent subtotal gastrectomy, liver wedge resection, Miguel Angel-en-Y gastrojejunostomy with omental flap and cholecystectomy 10/7 with pathology showing invasive well-differentiated adenocarcinoma with associated ulceration. There was tumor invasion into the muscularis propria and 20 lymph nodes were negative for metastatic carcinoma. Oncology was consulted and noted no evidence of metastatic disease however MRI abdomen recommended for follow-up; no adjuvant therapy was recommended.     Patient was readmitted 12/19 for worsening lower extremity edema and shortness of breath.  He initially required BiPAP and IMCU admission.  Per history-patient had run out of his Lasix at home.  EKG in the emergency room showing irregular wide-complex arrhythmia.      Additionally, blood cultures were positive for group A strep bacteremia secondary to left lower extremity infection.  CT LLE: Small to moderate suprapatellar joint effusion.  MRI LLE: Diffuse cellulitis, patchy myositis, negative for OM.  Orthopedic surgery was consulted and recommended medical management. Infectious disease was consulted and recommended IV Unasyn with an end date of 1/4 and clindamycin with an end date of 12/31.    For patient's HFrEF-he was started on GDMT with effective diuresis.    Patient continued to have hypoglycemic episodes due to poor p.o. intake.    Interval Problem Update  12/30: Vital stable overnight.  SBP ranging 125 through  139.  Patient resting comfortably on room air.  WBC 14.2 from 14.9.  Hb stable at 8.9.  Chemistry panel stable.  MRI left lower extremity limited due to motion but no obvious osteomyelitis or bone lesions identified.  Continue IV Unasyn with an end date of 1/4.  Placement is pending.    12/31: Vitals stable overnight.  WBC 14.8 from 14.2.  Hb stable at 9.2.  Continue IV Unasyn until 1/4.  Borderline hypoglycemic with glucose ranging 60s to 90s.  Continue to encourage p.o. intake.     1/1: Vitals stable overnight.  SBP ranging .  WBC 12.1 from 14.8.  Hb stable at 8.8.  Glucose .  Hold Farxiga due to ongoing hypoglycemia.  Continue IV Unasyn today.    1/2: Vital stable overnight.  Patient resting comfortably on room air.  Glucose 70-80 overnight.  Plan to discontinue Farxiga due to hypoglycemia.  Continue IV Unasyn through 1/4.     1/3: Vitals stable overnight.  SBP 96 through 120.  No additional episodes of hypoglycemia.  Anticipate discharge tomorrow to [facility] after antibiotics have completed.  Wheelchair to be delivered today. Continue IV unasyn today. Final dose scheduled for tomorrow.     1/4: Vitals stable overnight.  Patient remains on room air.  Patient will complete IV Unasyn today.  However, he continues to have profound weakness.  Discharge to [facility] would be unsafe.  PT OT continue to recommend placement.  Patient is agreeable, compliant with care, pleasant on every exam.  Placement is pending. 2 episodes of hypoglycemia today. Treated with juice. Continue to encourage PO intake.     I have discussed this patient's plan of care and discharge plan at IDT rounds today with Case Management, Nursing, Nursing leadership, and other members of the IDT team.    Consultants/Specialty  critical care, infectious disease, and orthopedics    Code Status  Full Code    Disposition  The patient is medically cleared for discharge to home or a post-acute facility.  Anticipate discharge to: skilled  nursing facility    I have placed the appropriate orders for post-discharge needs.    Review of Systems  Review of Systems   Constitutional:  Negative for fever and malaise/fatigue.   Respiratory:  Negative for shortness of breath.    Cardiovascular:  Negative for chest pain and leg swelling.   Gastrointestinal:  Negative for abdominal pain, nausea and vomiting.        Physical Exam  Temp:  [36.1 °C (97 °F)-36.9 °C (98.4 °F)] 36.3 °C (97.4 °F)  Pulse:  [55-72] 65  Resp:  [16-17] 16  BP: (105-139)/(59-72) 116/72  SpO2:  [95 %-99 %] 96 %    Physical Exam  Vitals and nursing note reviewed.   Constitutional:       General: He is not in acute distress.     Appearance: Normal appearance. He is ill-appearing.      Comments: Frail appearing    Cardiovascular:      Rate and Rhythm: Normal rate and regular rhythm.   Pulmonary:      Effort: Pulmonary effort is normal.      Breath sounds: Normal breath sounds.   Musculoskeletal:      Comments: Right lower extremity with dressings clean dry intact   Skin:     General: Skin is warm and dry.   Neurological:      Mental Status: He is alert and oriented to person, place, and time. Mental status is at baseline.   Psychiatric:         Mood and Affect: Mood normal.         Behavior: Behavior normal.         Fluids    Intake/Output Summary (Last 24 hours) at 1/4/2025 1523  Last data filed at 1/4/2025 0430  Gross per 24 hour   Intake 600 ml   Output 1100 ml   Net -500 ml        Laboratory                            Imaging  [id number]-W/O LEFT   Final Result      1.  Very limited examination due to patient motion artifact. Patient was also unable to complete the examination and therefore contrast-enhanced sequences were not obtained.      2.  No evidence of marrow edema or bone lesion.      3.  Again seen diffuse soft tissue edema/induration likely representing cellulitis. This also patchy muscle edema suggesting myositis.      4.  No focal fluid collection to suggest presence of  abscess.      [id number] VIEW   Final Result      No evidence of bowel obstruction. Mild constipation.      MR-KNEE-WITH & W/O LEFT   Final Result      1.  No evidence of marrow edema or abnormal enhancement to suggest presence of osteomyelitis.      2.  Small to moderate joint effusion with mild synovitis.      3.  Diffuse soft tissue edema/induration consistent with cellulitis.      4.  Small popliteal cyst.      5.  No evidence of soft tissue abscess.      6.  Markedly motion degraded examination. Ligaments and menisci are inadequately evaluated due to patient motion.      MR-LOWER EXTREMITY-NO JOINT-W/O LEFT   Final Result      1.  Patient motion degraded exam.      2.  No evidence of marrow edema or bone lesion to suggest presence of osteomyelitis.      3.  Diffuse cellulitis and patchy myositis.      4.  No evidence of focal fluid collection to suggest presence of abscess.      CT-EXTREMITY, LOWER WITH LEFT   Final Result      1.  Extensive subcutaneous inflammatory changes about the left lower extremity consistent with deep and superficial cellulitis.      2.  No definite CT evidence of necrotizing fasciitis.      3.  No CT evidence of deep abscess formation or acute or chronic osteomyelitis.      4.  Small to moderate suprapatellar joint effusion.      CT-EXTREMITY, LOWER WITH LEFT   Final Result      No soft tissue gas to suggest necrotizing fasciitis.      No fluid collection identified.      DX-CHEST-PORTABLE (1 VIEW)   Final Result         1.  Hazy left pulmonary infiltrates, similar to prior study   2.  Trace left pleural effusion, stable   3.  Cardiomegaly   4.  Atherosclerosis      DX-CHEST-PORTABLE (1 VIEW)   Final Result         1.  Hazy left pulmonary infiltrates, similar to prior study   2.  Trace left pleural effusion, stable   3.  Cardiomegaly   4.  Atherosclerosis      EC-ECHOCARDIOGRAM COMPLETE W/O CONT   Final Result      CT-CHEST,ABDOMEN,PELVIS WITH   Final Result      1. Stable spiculated  opacity in the right upper lobe, extending to the pleural surface.   2. Lingular and left lower lobe parenchymal airspace disease has improved in the interval, but has not completely resolved. There is pleural reaction, suggesting some of these areas may represent parenchymal scarring.   3. Asymmetry of the lung volumes, small on the left than the right.   4. Cardiomegaly with atherosclerotic calcifications in the aorta and coronary arteries.   5. Postsurgical changes in the stomach.   6. Stable scattered multiple hypodense lesions throughout the liver.   7. Moderate ascites throughout the abdomen and pelvis.   8. Cachexia.   9. Enlarged left inguinal lymph nodes with central necrosis. There is edema involving the soft tissues of the upper left leg extending to the left pelvis.      US-EXTREMITY VENOUS LOWER BILAT   Final Result      DX-CHEST-PORTABLE (1 VIEW)   Final Result         1.  Hazy left pulmonary infiltrates.   2.  Small left pleural effusion   3.  Cardiomegaly           Assessment/Plan  * Acute exacerbation of CHF (congestive heart failure) (HCC)- (present on admission)  Assessment & Plan  Echo: EF 28%; severe tricuspid and mitral regurg.  RVSP elevated at 55  - Continue GDMT with losartan, spironolactone, metoprolol, Farxiga  - Continue digoxin  - Maintain euvolemia    Constipation  Assessment & Plan  I ordered a bowel management  Suppository today    Hypoglycemia  Assessment & Plan  Multiple episodes of hypoglycemia requiring intervention  Additional episodes of hypoglycemia today  Poor p.o. intake  - Continue to encourage PO intake   - Nutrition consulted  - Discontinue Farxiga due to hypoglycemia    Streptococcal bacteremia- (present on admission)  Assessment & Plan  Blood cultures 12/19 positive for group A strep  Repeat blood cultures 12/21 NGTD    Pulmonary hypertension (HCC)  Assessment & Plan  Previous Echo RVSP 65, severe TR  Suspect mainly Group 2  RV perfusion with MAP > 65 as need  norepinepinephrine  Blood pressure is running on the lower side holding diuretic therapy at this time    Acute respiratory failure with hypoxia (HCC)- (present on admission)  Assessment & Plan  Improved off Bipap  Weaned to room air    Hypophosphatemia- (present on admission)  Assessment & Plan  Replaced    Malignant neoplasm of body of stomach (HCC)- (present on admission)  Assessment & Plan  T2, N0, M0 invasive gastric adenocarcinoma presenting as perforated  10/2024  -GDA embolization  -10/7 subtotal gastrectomy, liver wedge resection, Miguel Angel-en-Y gastrojejunostomy with omental flap and cholecystectomy  -pathology invasive well-differentiated adenocarcinoma with associated ulceration  -tumor invasion into the muscularis propria and 20 lymph nodes were negative for metastatic carcinoma  -Oncology was consulted and noted no evidence of metastatic disease  -MRI abdomen recommended for follow-up; no adjuvant therapy was recommended  He will need outpatient follow-up    Homeless- (present on admission)  Assessment & Plan  Patient is currently homeless.  He is unsure if he is able to return to [facility].  Wheelchair has been ordered.  Discussed with case management today.    Severe protein-calorie malnutrition (HCC)- (present on admission)  Assessment & Plan  Discussed with nutrition, patient meets the ASPEN criteria of severe malnutrition  Continuous nutrition support    ACP (advance care planning)- (present on admission)  Assessment & Plan  Patient admitted with CHF exacerbation, significantly reduced the EF of 30%.  Also found bacteremia, LLE infection, severe malnutrition.  History of invasive gastric malignancy, A-fib RVR, PE, gastric perforation post extensive surgery including subtotal gastrectomy, liver resection  Miguel Angel-en-Y gastrojejunostomy with omental flap and cholecystectomy.  I discussed the CODE STATUS with him, including CPR, intubation/mechanical ventilation.  He wants to stay full code.  Patient has  high complexity and guarded prognosis.  I consulted palliative care.  ACP 18 minutes.     COPD (chronic obstructive pulmonary disease) ([facility])- (present on admission)  Assessment & Plan  Current active smoker, no PFTs on file  RT protocols, bronchodilators as needed  No signs of exacerbation    Acute kidney injury (HCC)- (present on admission)  Assessment & Plan  Resolved   Maintain euvolemia and monitor fluid responsiveness (avoid NaCL and renal congestion)  Monitor urine output and I&O's  Avoid and review nephrotoxin medication  Renal function improved    Sepsis ([facility])- (present on admission)  Assessment & Plan  See above    Atrial fibrillation with RVR ([facility])- (present on admission)  Assessment & Plan  Chronic atrial fibrillation, currently tachycardia  Digoxin 250 mcg IV monitoring closely with flip to prevent digoxin toxicity    Continue digoxin and Eliquis  12/27 BP better, I resumed Toprol 25 mg    Thrombocytopenia ([facility])- (present on admission)  Assessment & Plan  Platelets 288  - Repeat CBC in a.m.  - Stable to continue Eliquis at this time    Tobacco dependence- (present on admission)  Assessment & Plan  Counseling when appropriate    Hypertension- (present on admission)  Assessment & Plan  SBP well-controlled  - Continue losartan, metoprolol, spironolactone    Wound of lower extremity- (present on admission)  Assessment & Plan  Likely source of infection, lower ext doppler negative  CT left leg shows small to moderate suprapatellar joint effusion.  MRI LLE showed Diffuse cellulitis and patchy myositis, negative for osteomyelitis or abscess.   Repeat MRI 12/30: Limited due to patient motion artifact; no obvious OM noted persistent cellulitis and myositis noted  - Infectious disease consulted  - Complete IV Unasyn today  - Continue clindamycin through 12/31  - Orthopedic surgery consultation 12/20 and 12/25 no surgical interventions at this time         VTE prophylaxis:   SCDs/TEDs   therapeutic anticoagulation  with eliquis 5 mg BID      I have performed a physical exam and reviewed and updated ROS and Plan today (1/4/2025). In review of yesterday's note (1/3/2025), there are no changes except as documented above.

## 2025-01-04 NOTE — CARE PLAN
The patient is Stable - Low risk of patient condition declining or worsening    Shift Goals  Clinical Goals: pt skin integrity will be maintained or improved by end of shift  Patient Goals: rest  Family Goals: nilson    Progress made toward(s) clinical / shift goals:  alert and oriented, bed in low position, call light within reach. Compliant w/ cares this shift. Pain managed w/ PRN pain medications per MAR.  Problem: Knowledge Deficit - Standard  Goal: Patient and family/care givers will demonstrate understanding of plan of care, disease process/condition, diagnostic tests and medications  Description: Target End Date:  1-3 days or as soon as patient condition allows    Document in Patient Education    1.  Patient and family/caregiver oriented to unit, equipment, visitation policy and means for communicating concern  2.  Complete/review Learning Assessment  3.  Assess knowledge level of disease process/condition, treatment plan, diagnostic tests and medications  4.  Explain disease process/condition, treatment plan, diagnostic tests and medications  Outcome: Progressing     Problem: Pain - Standard  Goal: Alleviation of pain or a reduction in pain to the patient’s comfort goal  Description: Target End Date:  Prior to discharge or change in level of care    Document on Vitals flowsheet    1.  Document pain using the appropriate pain scale per order or unit policy  2.  Educate and implement non-pharmacologic comfort measures (i.e. relaxation, distraction, massage, cold/heat therapy, etc.)  3.  Pain management medications as ordered  4.  Reassess pain after pain med administration per policy  5.  If opiods administered assess patient's response to pain medication is appropriate per POSS sedation scale  6.  Follow pain management plan developed in collaboration with patient and interdisciplinary team (including palliative care or pain specialists if applicable)  Outcome: Progressing       Patient is not progressing  towards the following goals:

## 2025-01-04 NOTE — PROGRESS NOTES
Hypoglycemia Intervention    Hypoglycemia protocol intervention:  Blood glucose 63 at 0835.  Intervention: 8 oz of fruit juice   Repeat blood glucose 106 at 0915.  Intervention: 4 oz of fruit juice  Additional interventions needed: none  Dr. Chris notified of the above at 0849.

## 2025-01-04 NOTE — CARE PLAN
The patient is Stable - Low risk of patient condition declining or worsening    Shift Goals  Clinical Goals: Improved skin integrity within the shift  Patient Goals: Rest, Comfort  Family Goals: MARCOS    Progress made toward(s) clinical / shift goals:  Wound care and dressing to LLE done, tolerated. Medicated for pain prior dressing change.  Reinforced turning and repositioning. Able to increase PO intake when prompted. Bed in low and locked position, call light in reach, rounded on hourly.         Patient is not progressing towards the following goals:      Problem: Skin Integrity  Goal: Skin integrity is maintained or improved  Description: Target End Date:  Prior to discharge or change in level of care    Document interventions on Skin Risk/Edward flowsheet groups and corresponding LDA    1.  Assess and monitor skin integrity, appearance and/or temperature  2.  Assess risk factors for impaired skin integrity and/or pressures ulcers  3.  Implement precautions to protect skin integrity in collaboration with interdisciplinary team  4.  Implement pressure ulcer prevention protocol if at risk for skin breakdown  5.  Confirm wound care consult if at risk for skin breakdown  6.  Ensure patient use of pressure relieving devices  (Low air loss bed, waffle overlay, heel protectors, ROHO cushion, etc)  Outcome: Not Progressing     Problem: Care Map:  Day Before Discharge Optimal Outcome for the Heart Failure Patient  Goal: Day Before Discharge:  Optimal Care of the heart failure patient  Description: Target End Date:  Prior to discharge or change in level of care  Outcome: Not Progressing

## 2025-01-05 LAB
GLUCOSE BLD STRIP.AUTO-MCNC: 76 MG/DL (ref 65–99)
GLUCOSE BLD STRIP.AUTO-MCNC: 98 MG/DL (ref 65–99)

## 2025-01-05 PROCEDURE — 82962 GLUCOSE BLOOD TEST: CPT

## 2025-01-05 PROCEDURE — 700102 HCHG RX REV CODE 250 W/ 637 OVERRIDE(OP): Performed by: INTERNAL MEDICINE

## 2025-01-05 PROCEDURE — 770006 HCHG ROOM/CARE - MED/SURG/GYN SEMI*

## 2025-01-05 PROCEDURE — 700101 HCHG RX REV CODE 250: Performed by: STUDENT IN AN ORGANIZED HEALTH CARE EDUCATION/TRAINING PROGRAM

## 2025-01-05 PROCEDURE — 700102 HCHG RX REV CODE 250 W/ 637 OVERRIDE(OP): Performed by: HOSPITALIST

## 2025-01-05 PROCEDURE — 700102 HCHG RX REV CODE 250 W/ 637 OVERRIDE(OP): Performed by: STUDENT IN AN ORGANIZED HEALTH CARE EDUCATION/TRAINING PROGRAM

## 2025-01-05 PROCEDURE — A9270 NON-COVERED ITEM OR SERVICE: HCPCS | Performed by: HOSPITALIST

## 2025-01-05 PROCEDURE — 99232 SBSQ HOSP IP/OBS MODERATE 35: CPT | Performed by: STUDENT IN AN ORGANIZED HEALTH CARE EDUCATION/TRAINING PROGRAM

## 2025-01-05 PROCEDURE — A9270 NON-COVERED ITEM OR SERVICE: HCPCS | Performed by: STUDENT IN AN ORGANIZED HEALTH CARE EDUCATION/TRAINING PROGRAM

## 2025-01-05 PROCEDURE — A9270 NON-COVERED ITEM OR SERVICE: HCPCS | Performed by: INTERNAL MEDICINE

## 2025-01-05 RX ADMIN — LOSARTAN POTASSIUM 25 MG: 25 TABLET, FILM COATED ORAL at 04:58

## 2025-01-05 RX ADMIN — DAKIN'S SOLUTION 0.125% (QUARTER STRENGTH) 473 ML: 0.12 SOLUTION at 06:01

## 2025-01-05 RX ADMIN — APIXABAN 5 MG: 5 TABLET, FILM COATED ORAL at 18:15

## 2025-01-05 RX ADMIN — OXYCODONE 2.5 MG: 5 TABLET ORAL at 08:13

## 2025-01-05 RX ADMIN — OMEPRAZOLE 20 MG: 20 CAPSULE, DELAYED RELEASE ORAL at 04:58

## 2025-01-05 RX ADMIN — DAKIN'S SOLUTION 0.125% (QUARTER STRENGTH) 10 ML: 0.12 SOLUTION at 18:15

## 2025-01-05 RX ADMIN — SENNOSIDES AND DOCUSATE SODIUM 2 TABLET: 50; 8.6 TABLET ORAL at 18:15

## 2025-01-05 RX ADMIN — DIGOXIN 125 MCG: 0.12 TABLET ORAL at 18:14

## 2025-01-05 RX ADMIN — OXYCODONE 5 MG: 5 TABLET ORAL at 18:15

## 2025-01-05 RX ADMIN — OXYCODONE 5 MG: 5 TABLET ORAL at 03:43

## 2025-01-05 RX ADMIN — SPIRONOLACTONE 25 MG: 25 TABLET ORAL at 04:57

## 2025-01-05 RX ADMIN — APIXABAN 5 MG: 5 TABLET, FILM COATED ORAL at 06:00

## 2025-01-05 RX ADMIN — METOPROLOL SUCCINATE 25 MG: 25 TABLET, EXTENDED RELEASE ORAL at 04:57

## 2025-01-05 ASSESSMENT — PAIN DESCRIPTION - PAIN TYPE
TYPE: ACUTE PAIN

## 2025-01-05 ASSESSMENT — ENCOUNTER SYMPTOMS
VOMITING: 0
ABDOMINAL PAIN: 0
NAUSEA: 0
SHORTNESS OF BREATH: 0
FEVER: 0

## 2025-01-05 NOTE — CARE PLAN
The patient is Stable - Low risk of patient condition declining or worsening    Shift Goals  Clinical Goals: Pt will be free from falls or injury this shift, wound care  Patient Goals: Rest, pain control  Family Goals: MARCOS    Progress made toward(s) clinical / shift goals:    Pt alert and able to make needs known this shift. Pt c/o moderate pain, PRNs given per MAR. Dressing to L leg wound changed. Bed locked and in lowest position with bed alarm on, pt free from falls or injury. All needs met at this time.     Problem: Knowledge Deficit - Standard  Goal: Patient and family/care givers will demonstrate understanding of plan of care, disease process/condition, diagnostic tests and medications  Outcome: Progressing     Problem: Pain - Standard  Goal: Alleviation of pain or a reduction in pain to the patient’s comfort goal  Outcome: Progressing     Problem: Skin Integrity  Goal: Skin integrity is maintained or improved  Outcome: Progressing     Problem: Fall Risk  Goal: Patient will remain free from falls  Outcome: Progressing     Problem: Nutrition  Goal: Patient's nutritional and fluid intake will be adequate or improve  Outcome: Progressing       Patient is not progressing towards the following goals:

## 2025-01-05 NOTE — PROGRESS NOTES
[facility] Medicine Daily Progress Note    Date of Service  1/5/2025    Chief Complaint  Robert Mcdonnell is a 74 y.o. male admitted 12/19/2024 with lower extremity edema and shortness of breath.    Hospital Course  Robert Mcdonnell is a 74-year-old male with PMHx homelessness, chronic atrial fibrillation on apixaban, HFrEF, pulmonary hypertension, perforated gastric ulcer s/p gastrectomy.    Per history: recent hospitalization 10/3 - 10/15 for perforated  secondary to invasive gastric adenocarcinoma, GDA embolization, subsequent subtotal gastrectomy, liver wedge resection, Miguel Angel-en-Y gastrojejunostomy with omental flap and cholecystectomy 10/7 with pathology showing invasive well-differentiated adenocarcinoma with associated ulceration. There was tumor invasion into the muscularis propria and 20 lymph nodes were negative for metastatic carcinoma. Oncology was consulted and noted no evidence of metastatic disease however MRI abdomen recommended for follow-up; no adjuvant therapy was recommended.     Patient was readmitted 12/19 for worsening lower extremity edema and shortness of breath.  He initially required BiPAP and IMCU admission.  Per history-patient had run out of his Lasix at home.  EKG in the emergency room showing irregular wide-complex arrhythmia.      Additionally, blood cultures were positive for group A strep bacteremia secondary to left lower extremity infection.  CT LLE: Small to moderate suprapatellar joint effusion.  MRI LLE: Diffuse cellulitis, patchy myositis, negative for OM.  Orthopedic surgery was consulted and recommended medical management. Infectious disease was consulted and recommended IV Unasyn with an end date of 1/4 and clindamycin with an end date of 12/31.    For patient's HFrEF-he was started on GDMT with effective diuresis.    Patient continued to have hypoglycemic episodes due to poor p.o. intake.    Interval Problem Update  12/30: Vital stable overnight.  SBP ranging 125 through  139.  Patient resting comfortably on room air.  WBC 14.2 from 14.9.  Hb stable at 8.9.  Chemistry panel stable.  MRI left lower extremity limited due to motion but no obvious osteomyelitis or bone lesions identified.  Continue IV Unasyn with an end date of 1/4.  Placement is pending.    12/31: Vitals stable overnight.  WBC 14.8 from 14.2.  Hb stable at 9.2.  Continue IV Unasyn until 1/4.  Borderline hypoglycemic with glucose ranging 60s to 90s.  Continue to encourage p.o. intake.     1/1: Vitals stable overnight.  SBP ranging .  WBC 12.1 from 14.8.  Hb stable at 8.8.  Glucose .  Hold Farxiga due to ongoing hypoglycemia.  Continue IV Unasyn today.    1/2: Vital stable overnight.  Patient resting comfortably on room air.  Glucose 70-80 overnight.  Plan to discontinue Farxiga due to hypoglycemia.  Continue IV Unasyn through 1/4.     1/3: Vitals stable overnight.  SBP 96 through 120.  No additional episodes of hypoglycemia.  Anticipate discharge tomorrow to [facility] after antibiotics have completed.  Wheelchair to be delivered today. Continue IV unasyn today. Final dose scheduled for tomorrow.     1/4: Vitals stable overnight.  Patient remains on room air.  Patient will complete IV Unasyn today.  However, he continues to have profound weakness.  Discharge to [facility] would be unsafe.  PT OT continue to recommend placement.  Patient is agreeable, compliant with care, pleasant on every exam.  Placement is pending. 2 episodes of hypoglycemia today. Treated with juice. Continue to encourage PO intake.     1/5: Vital stable overnight.   through 126.  Glucose ranging 66 through 106 overnight. Placement is pending for ongoing rehabilitation.  Antibiotic dosing completed yesterday.    I have discussed this patient's plan of care and discharge plan at IDT rounds today with Case Management, Nursing, Nursing leadership, and other members of the IDT team.    Consultants/Specialty  critical care,  infectious disease, and orthopedics    Code Status  Full Code    Disposition  The patient is medically cleared for discharge to home or a post-acute facility.  Anticipate discharge to: skilled nursing facility    I have placed the appropriate orders for post-discharge needs.    Review of Systems  Review of Systems   Constitutional:  Negative for fever and malaise/fatigue.   Respiratory:  Negative for shortness of breath.    Cardiovascular:  Negative for chest pain and leg swelling.   Gastrointestinal:  Negative for abdominal pain, nausea and vomiting.        Physical Exam  Temp:  [36.3 °C (97.4 °F)-36.7 °C (98.1 °F)] 36.7 °C (98 °F)  Pulse:  [65-93] 66  Resp:  [16-17] 17  BP: (116-126)/(64-72) 125/68  SpO2:  [96 %-100 %] 98 %    Physical Exam  Vitals and nursing note reviewed.   Constitutional:       General: He is not in acute distress.     Appearance: Normal appearance. He is ill-appearing.      Comments: Frail appearing    Cardiovascular:      Rate and Rhythm: Normal rate and regular rhythm.   Pulmonary:      Effort: Pulmonary effort is normal.      Breath sounds: Normal breath sounds.   Musculoskeletal:      Comments: Right lower extremity with dressings clean dry intact   Skin:     General: Skin is warm and dry.   Neurological:      Mental Status: He is alert and oriented to person, place, and time. Mental status is at baseline.   Psychiatric:         Mood and Affect: Mood normal.         Behavior: Behavior normal.         Fluids    Intake/Output Summary (Last 24 hours) at 1/5/2025 1132  Last data filed at 1/5/2025 0900  Gross per 24 hour   Intake 360 ml   Output 1900 ml   Net -1540 ml        Laboratory                            Imaging  [id number]/O LEFT   Final Result      1.  Very limited examination due to patient motion artifact. Patient was also unable to complete the examination and therefore contrast-enhanced sequences were not obtained.      2.  No evidence of marrow edema or bone lesion.       3.  Again seen diffuse soft tissue edema/induration likely representing cellulitis. This also patchy muscle edema suggesting myositis.      4.  No focal fluid collection to suggest presence of abscess.      [id number] VIEW   Final Result      No evidence of bowel obstruction. Mild constipation.      MR-KNEE-WITH & W/O LEFT   Final Result      1.  No evidence of marrow edema or abnormal enhancement to suggest presence of osteomyelitis.      2.  Small to moderate joint effusion with mild synovitis.      3.  Diffuse soft tissue edema/induration consistent with cellulitis.      4.  Small popliteal cyst.      5.  No evidence of soft tissue abscess.      6.  Markedly motion degraded examination. Ligaments and menisci are inadequately evaluated due to patient motion.      MR-LOWER EXTREMITY-NO JOINT-W/O LEFT   Final Result      1.  Patient motion degraded exam.      2.  No evidence of marrow edema or bone lesion to suggest presence of osteomyelitis.      3.  Diffuse cellulitis and patchy myositis.      4.  No evidence of focal fluid collection to suggest presence of abscess.      CT-EXTREMITY, LOWER WITH LEFT   Final Result      1.  Extensive subcutaneous inflammatory changes about the left lower extremity consistent with deep and superficial cellulitis.      2.  No definite CT evidence of necrotizing fasciitis.      3.  No CT evidence of deep abscess formation or acute or chronic osteomyelitis.      4.  Small to moderate suprapatellar joint effusion.      CT-EXTREMITY, LOWER WITH LEFT   Final Result      No soft tissue gas to suggest necrotizing fasciitis.      No fluid collection identified.      DX-CHEST-PORTABLE (1 VIEW)   Final Result         1.  Hazy left pulmonary infiltrates, similar to prior study   2.  Trace left pleural effusion, stable   3.  Cardiomegaly   4.  Atherosclerosis      DX-CHEST-PORTABLE (1 VIEW)   Final Result         1.  Hazy left pulmonary infiltrates, similar to prior study   2.  Trace left  pleural effusion, stable   3.  Cardiomegaly   4.  Atherosclerosis      EC-ECHOCARDIOGRAM COMPLETE W/O CONT   Final Result      CT-CHEST,ABDOMEN,PELVIS WITH   Final Result      1. Stable spiculated opacity in the right upper lobe, extending to the pleural surface.   2. Lingular and left lower lobe parenchymal airspace disease has improved in the interval, but has not completely resolved. There is pleural reaction, suggesting some of these areas may represent parenchymal scarring.   3. Asymmetry of the lung volumes, small on the left than the right.   4. Cardiomegaly with atherosclerotic calcifications in the aorta and coronary arteries.   5. Postsurgical changes in the stomach.   6. Stable scattered multiple hypodense lesions throughout the liver.   7. Moderate ascites throughout the abdomen and pelvis.   8. Cachexia.   9. Enlarged left inguinal lymph nodes with central necrosis. There is edema involving the soft tissues of the upper left leg extending to the left pelvis.      US-EXTREMITY VENOUS LOWER BILAT   Final Result      DX-CHEST-PORTABLE (1 VIEW)   Final Result         1.  Hazy left pulmonary infiltrates.   2.  Small left pleural effusion   3.  Cardiomegaly           Assessment/Plan  * Acute exacerbation of CHF (congestive heart failure) (HCC)- (present on admission)  Assessment & Plan  Echo: EF 28%; severe tricuspid and mitral regurg.  RVSP elevated at 55  - Continue GDMT with losartan, spironolactone, metoprolol, Farxiga  - Continue digoxin  - Maintain euvolemia    Constipation  Assessment & Plan  I ordered a bowel management  Suppository today    Hypoglycemia  Assessment & Plan  Multiple episodes of hypoglycemia requiring intervention  Additional episodes of hypoglycemia today  Poor p.o. intake  - Continue to encourage PO intake   - Nutrition consulted  - Discontinue Farxiga due to hypoglycemia    Streptococcal bacteremia- (present on admission)  Assessment & Plan  Blood cultures 12/19 positive for group A  strep  Repeat blood cultures 12/21 NGTD    Pulmonary hypertension (HCC)  Assessment & Plan  Previous Echo RVSP 65, severe TR  Suspect mainly Group 2  RV perfusion with MAP > 65 as need norepinepinephrine  Blood pressure is running on the lower side holding diuretic therapy at this time    Acute respiratory failure with hypoxia (HCC)- (present on admission)  Assessment & Plan  Improved off Bipap  Weaned to room air    Hypophosphatemia- (present on admission)  Assessment & Plan  Replaced    Malignant neoplasm of body of stomach (HCC)- (present on admission)  Assessment & Plan  T2, N0, M0 invasive gastric adenocarcinoma presenting as perforated  10/2024  -GDA embolization  -10/7 subtotal gastrectomy, liver wedge resection, Miguel Angel-en-Y gastrojejunostomy with omental flap and cholecystectomy  -pathology invasive well-differentiated adenocarcinoma with associated ulceration  -tumor invasion into the muscularis propria and 20 lymph nodes were negative for metastatic carcinoma  -Oncology was consulted and noted no evidence of metastatic disease  -MRI abdomen recommended for follow-up; no adjuvant therapy was recommended  He will need outpatient follow-up    Homeless- (present on admission)  Assessment & Plan  Patient is currently homeless.  He is unsure if he is able to return to [facility].  Wheelchair has been ordered.  Discussed with case management today.    Severe protein-calorie malnutrition (HCC)- (present on admission)  Assessment & Plan  Discussed with nutrition, patient meets the ASPEN criteria of severe malnutrition  Continuous nutrition support    ACP (advance care planning)- (present on admission)  Assessment & Plan  Patient admitted with CHF exacerbation, significantly reduced the EF of 30%.  Also found bacteremia, LLE infection, severe malnutrition.  History of invasive gastric malignancy, A-fib RVR, PE, gastric perforation post extensive surgery including subtotal gastrectomy, liver resection  Miguel Angel-en-Y  gastrojejunostomy with omental flap and cholecystectomy.  I discussed the CODE STATUS with him, including CPR, intubation/mechanical ventilation.  He wants to stay full code.  Patient has high complexity and guarded prognosis.  I consulted palliative care.  ACP 18 minutes.     COPD (chronic obstructive pulmonary disease) ([facility])- (present on admission)  Assessment & Plan  Current active smoker, no PFTs on file  RT protocols, bronchodilators as needed  No signs of exacerbation    Acute kidney injury (HCC)- (present on admission)  Assessment & Plan  Resolved   Maintain euvolemia and monitor fluid responsiveness (avoid NaCL and renal congestion)  Monitor urine output and I&O's  Avoid and review nephrotoxin medication  Renal function improved    Sepsis ([facility])- (present on admission)  Assessment & Plan  See above    Atrial fibrillation with RVR ([facility])- (present on admission)  Assessment & Plan  Chronic atrial fibrillation, currently tachycardia  Digoxin 250 mcg IV monitoring closely with flip to prevent digoxin toxicity    Continue digoxin and Eliquis  12/27 BP better, I resumed Toprol 25 mg    Thrombocytopenia ([facility])- (present on admission)  Assessment & Plan  Platelets 288  - Repeat CBC in a.m.  - Stable to continue Eliquis at this time    Tobacco dependence- (present on admission)  Assessment & Plan  Counseling when appropriate    Hypertension- (present on admission)  Assessment & Plan  SBP well-controlled  - Continue losartan, metoprolol, spironolactone    Wound of lower extremity- (present on admission)  Assessment & Plan  Likely source of infection, lower ext doppler negative  CT left leg shows small to moderate suprapatellar joint effusion.  MRI LLE showed Diffuse cellulitis and patchy myositis, negative for osteomyelitis or abscess.   Repeat MRI 12/30: Limited due to patient motion artifact; no obvious OM noted persistent cellulitis and myositis noted  - Infectious disease consulted  - IV Unasyn regimen completed  1/4  - P.o. clindamycin completed 12/31  - Orthopedic surgery consultation 12/20 and 12/25 no surgical interventions at this time         VTE prophylaxis:   SCDs/TEDs   therapeutic anticoagulation with eliquis 5 mg BID      I have performed a physical exam and reviewed and updated ROS and Plan today (1/5/2025). In review of yesterday's note (1/4/2025), there are no changes except as documented above.

## 2025-01-05 NOTE — CARE PLAN
The patient is Stable - Low risk of patient condition declining or worsening    Shift Goals  Clinical Goals: Patient will have improve skin integrity within the shift  Patient Goals: Rest, Comfort, Pain Control  Family Goals: MARCOS    Progress made toward(s) clinical / shift goals: Wound care and dressing to LLE done, tolerated. Improved skin integrity noted. Pre-medicated for pain. Reinforced turning and repositioning. Able to increase PO intake when prompted. Bed in low and locked position, call light in reach, rounded on hourly. Assisted with toileting and ambulation with FWW.    Patient is not progressing towards the following goals:      Problem: Care Map:  Day Before Discharge Optimal Outcome for the Heart Failure Patient  Goal: Day Before Discharge:  Optimal Care of the heart failure patient  Description: Target End Date:  Prior to discharge or change in level of care  Outcome: Not Progressing    Problem: Skin Integrity  Goal: Skin integrity is maintained or improved  Description: Target End Date:  Prior to discharge or change in level of care    Document interventions on Skin Risk/Edward flowsheet groups and corresponding LDA    1.  Assess and monitor skin integrity, appearance and/or temperature  2.  Assess risk factors for impaired skin integrity and/or pressures ulcers  3.  Implement precautions to protect skin integrity in collaboration with interdisciplinary team  4.  Implement pressure ulcer prevention protocol if at risk for skin breakdown  5.  Confirm wound care consult if at risk for skin breakdown  6.  Ensure patient use of pressure relieving devices  (Low air loss bed, waffle overlay, heel protectors, ROHO cushion, etc)  1/5/2025 0333 by Alexandra Mirza RJANETT  Outcome: Not Progressing     Problem: Pain - Standard  Goal: Alleviation of pain or a reduction in pain to the patient’s comfort goal  Description: Target End Date:  Prior to discharge or change in level of care    Document on Vitals  flowsheet    1.  Document pain using the appropriate pain scale per order or unit policy  2.  Educate and implement non-pharmacologic comfort measures (i.e. relaxation, distraction, massage, cold/heat therapy, etc.)  3.  Pain management medications as ordered  4.  Reassess pain after pain med administration per policy  5.  If opiods administered assess patient's response to pain medication is appropriate per POSS sedation scale  6.  Follow pain management plan developed in collaboration with patient and interdisciplinary team (including palliative care or pain specialists if applicable)  1/5/2025 0333 by Alexandra Mirza, R.N.  Outcome: Not Progressing     Problem: Nutrition  Goal: Patient's nutritional and fluid intake will be adequate or improve  Description: Target End Date:  Prior to discharge or change in level of care    Document on I/O flowsheet    1.  Monitor nutritional intake  2.  Monitor weight per provider order  3.  Assess patient's ability to take oral nutrition  4.  Collaborate with Speech Therapy, Dietitian and interdisciplinary team for appropriate feeding and fluid intake  5.  Assist with feeding  1/5/2025 0333 by Alexandra Mirza, R.N.  Outcome: Not Progressing

## 2025-01-05 NOTE — CARE PLAN
The patient is Stable - Low risk of patient condition declining or worsening    Shift Goals  Clinical Goals: maintain skin integrity, monitor blood sugars throughout shift  Patient Goals: rest, comfort  Family Goals: MARCOS    Progress made toward(s) clinical / shift goals:  Pt alert and oriented, makes needs known. Medications administered per MAR including PRN medication for pain. Hypoglycemia protocol initiated twice this shift, blood sugar increased after giving 8oz fruit juice. Free of falls and injury this shift.    Patient is not progressing towards the following goals:

## 2025-01-05 NOTE — PROGRESS NOTES
4 Eyes Skin Assessment Completed by JANNETTE Morris and JANNETTE Martinez.    Head Scab and Scar  Ears WDL  Nose WDL  Mouth WDL  Neck WDL  Breast/Chest WDL  Shoulder Blades WDL  Spine Skin tags  (R) Arm/Elbow/Hand Redness and Blanching, Flaky, Fragile  (L) Arm/Elbow/Hand Redness and Blanching, Flaky, Fargile  Abdomen Scar  Groin Discoloration, Scar, Flaky, Fragile  Scrotum/Coccyx/Buttocks Redness, Blanching, and Discoloration, Flaky, Fragile  (R) Leg Scab, Flaky  (L) Leg Scab, Flaky, LLE wound with dressing in placed, Swelling  (R) Heel/Foot/Toe Redness, Blanching, Discoloration, and Boggy, Flaky, Fragile  (L) Heel/Foot/Toe Redness, Blanching, and Boggy, Discoloration, Flaky, Fragile        Devices In Places PIV line      Interventions In Place Heel Mepilex, Sacral Mepilex, TAP System, Pillows, Low Air Loss Mattress, Barrier Cream, and Heels Loaded W/Pillows    Possible Skin Injury Yes    Pictures Uploaded Into Epic N/A  Wound Consult Placed N/A- Wound team following  RN Wound Prevention Protocol Ordered Yes

## 2025-01-05 NOTE — PROGRESS NOTES
4 Eyes Skin Assessment Completed by JANNETTE Valdez and JANNETTE Mike.    Head Scab and Scar  Ears WDL  Nose WDL  Mouth WDL  Neck WDL  Breast/Chest WDL  Shoulder Blades WDL  Spine Skin tag right lower back  (R) Arm/Elbow/Hand Redness and Blanching, flaky, fragile  (L) Arm/Elbow/Hand Redness and Blanching, flaky, fragile  Abdomen [place] scar  Groin Flaky, fragile, redness, blanching  Scrotum/Coccyx/Buttocks Redness, Blanching and Discoloration, flaky, fragile  (R) Leg Scab and Swelling, flaky  (L) Leg Scab and Swelling, white, yellow, pink, open wound  (R) Heel/Foot/Toe Redness and Blanching, flaky  (L) Heel/Foot/Toe Redness and Blanching, flaky          Devices In Places PIV      Interventions In Place Heel Mepilex, TAP System, Pillows, Low Air Loss Mattress, Barrier Cream, and Heels Loaded W/Pillows    Possible Skin Injury No    Pictures Uploaded Into Epic N/A  Wound Consult Placed Yes wound following weekly for leg wound  RN Wound Prevention Protocol Ordered Yes already ordered

## 2025-01-06 LAB
ANION GAP SERPL CALC-SCNC: 6 MMOL/L (ref 7–16)
BUN SERPL-MCNC: 6 MG/DL (ref 8–22)
CALCIUM SERPL-MCNC: 7.7 MG/DL (ref 8.5–10.5)
CHLORIDE SERPL-SCNC: 104 MMOL/L (ref 96–112)
CO2 SERPL-SCNC: 24 MMOL/L (ref 20–33)
CREAT SERPL-MCNC: 0.74 MG/DL (ref 0.5–1.4)
GFR SERPLBLD CREATININE-BSD FMLA CKD-EPI: 95 ML/MIN/1.73 M 2
GLUCOSE BLD STRIP.AUTO-MCNC: 100 MG/DL (ref 65–99)
GLUCOSE BLD STRIP.AUTO-MCNC: 71 MG/DL (ref 65–99)
GLUCOSE BLD STRIP.AUTO-MCNC: 73 MG/DL (ref 65–99)
GLUCOSE BLD STRIP.AUTO-MCNC: 81 MG/DL (ref 65–99)
GLUCOSE BLD STRIP.AUTO-MCNC: 92 MG/DL (ref 65–99)
GLUCOSE SERPL-MCNC: 66 MG/DL (ref 65–99)
POTASSIUM SERPL-SCNC: 4.3 MMOL/L (ref 3.6–5.5)
SODIUM SERPL-SCNC: 134 MMOL/L (ref 135–145)

## 2025-01-06 PROCEDURE — A9270 NON-COVERED ITEM OR SERVICE: HCPCS | Performed by: INTERNAL MEDICINE

## 2025-01-06 PROCEDURE — 36415 COLL VENOUS BLD VENIPUNCTURE: CPT

## 2025-01-06 PROCEDURE — 99232 SBSQ HOSP IP/OBS MODERATE 35: CPT | Performed by: STUDENT IN AN ORGANIZED HEALTH CARE EDUCATION/TRAINING PROGRAM

## 2025-01-06 PROCEDURE — 82962 GLUCOSE BLOOD TEST: CPT | Mod: 91

## 2025-01-06 PROCEDURE — 700102 HCHG RX REV CODE 250 W/ 637 OVERRIDE(OP): Performed by: HOSPITALIST

## 2025-01-06 PROCEDURE — 80048 BASIC METABOLIC PNL TOTAL CA: CPT

## 2025-01-06 PROCEDURE — 700102 HCHG RX REV CODE 250 W/ 637 OVERRIDE(OP): Performed by: INTERNAL MEDICINE

## 2025-01-06 PROCEDURE — 770006 HCHG ROOM/CARE - MED/SURG/GYN SEMI*

## 2025-01-06 PROCEDURE — A9270 NON-COVERED ITEM OR SERVICE: HCPCS | Performed by: HOSPITALIST

## 2025-01-06 PROCEDURE — 97530 THERAPEUTIC ACTIVITIES: CPT

## 2025-01-06 PROCEDURE — 700101 HCHG RX REV CODE 250: Performed by: INTERNAL MEDICINE

## 2025-01-06 RX ORDER — DIGOXIN 125 MCG
125 TABLET ORAL DAILY
Qty: 30 TABLET | Refills: 1 | Status: SHIPPED | OUTPATIENT
Start: 2025-01-06

## 2025-01-06 RX ORDER — LOSARTAN POTASSIUM 25 MG/1
25 TABLET ORAL DAILY
Qty: 30 TABLET | Refills: 3 | Status: SHIPPED | OUTPATIENT
Start: 2025-01-07

## 2025-01-06 RX ORDER — METOPROLOL SUCCINATE 25 MG/1
12.5 TABLET, EXTENDED RELEASE ORAL DAILY
Qty: 30 TABLET | Refills: 3 | Status: SHIPPED | OUTPATIENT
Start: 2025-01-06

## 2025-01-06 RX ORDER — SPIRONOLACTONE 25 MG/1
25 TABLET ORAL DAILY
Qty: 30 TABLET | Refills: 3 | Status: SHIPPED | OUTPATIENT
Start: 2025-01-07

## 2025-01-06 RX ADMIN — OMEPRAZOLE 20 MG: 20 CAPSULE, DELAYED RELEASE ORAL at 05:14

## 2025-01-06 RX ADMIN — APIXABAN 5 MG: 5 TABLET, FILM COATED ORAL at 16:10

## 2025-01-06 RX ADMIN — OXYCODONE 5 MG: 5 TABLET ORAL at 03:08

## 2025-01-06 RX ADMIN — DEXTROSE MONOHYDRATE 25 G: 25 INJECTION, SOLUTION INTRAVENOUS at 06:09

## 2025-01-06 RX ADMIN — DAKIN'S SOLUTION 0.125% (QUARTER STRENGTH) 473 ML: 0.12 SOLUTION at 05:14

## 2025-01-06 RX ADMIN — DAKIN'S SOLUTION 0.125% (QUARTER STRENGTH) 5 ML: 0.12 SOLUTION at 16:10

## 2025-01-06 RX ADMIN — DIGOXIN 125 MCG: 0.12 TABLET ORAL at 16:10

## 2025-01-06 RX ADMIN — APIXABAN 5 MG: 5 TABLET, FILM COATED ORAL at 05:14

## 2025-01-06 RX ADMIN — SENNOSIDES AND DOCUSATE SODIUM 2 TABLET: 50; 8.6 TABLET ORAL at 16:10

## 2025-01-06 RX ADMIN — OXYCODONE 5 MG: 5 TABLET ORAL at 16:10

## 2025-01-06 ASSESSMENT — PAIN DESCRIPTION - PAIN TYPE
TYPE: ACUTE PAIN

## 2025-01-06 ASSESSMENT — ENCOUNTER SYMPTOMS
WEAKNESS: 1
VOMITING: 0
ABDOMINAL PAIN: 0
SHORTNESS OF BREATH: 0
FEVER: 0
NAUSEA: 0

## 2025-01-06 ASSESSMENT — GAIT ASSESSMENTS
GAIT LEVEL OF ASSIST: MINIMAL ASSIST
ASSISTIVE DEVICE: FRONT WHEEL WALKER
DISTANCE (FEET): 4
DEVIATION: BRADYKINETIC;SHUFFLED GAIT

## 2025-01-06 ASSESSMENT — COGNITIVE AND FUNCTIONAL STATUS - GENERAL
SUGGESTED CMS G CODE MODIFIER MOBILITY: CK
MOBILITY SCORE: 19
WALKING IN HOSPITAL ROOM: A LITTLE
MOVING TO AND FROM BED TO CHAIR: A LITTLE
STANDING UP FROM CHAIR USING ARMS: A LITTLE
CLIMB 3 TO 5 STEPS WITH RAILING: A LOT

## 2025-01-06 NOTE — DISCHARGE INSTRUCTIONS
HF Patient Discharge Instructions  Monitor your weight daily, and maintain a weight chart, to track your weight changes.   Activity as tolerated, unless your Doctor has ordered otherwise. Other activity order: n/a.  Follow a low fat, low cholesterol, low salt diet unless instructed otherwise by your Doctor. Read the labels on the back of food products and track your intake of fat, cholesterol and salt.   Fluid Restriction No. If a Fluid Restriction has been ordered by your Doctor, measure fluids with a measuring cup to ensure that you are not exceeding the restriction.   No smoking.  Oxygen No. If your Doctor has ordered that you wear Oxygen at home, it is important to wear it as ordered.  Did you receive an explanation from staff on the importance of taking each of your medications and why it is necessary to keep taking them unless your doctor says to stop? Yes  Were all of your questions answered about how to manage your heart failure and what to do if you have increased signs and symptoms after you go home? Yes  Do you feel like your heart failure care team involved you in the care treatment plan and allowed you to make decisions regarding your care while in the hospital and addressed any discharge needs you might have? Yes    See the educational handout provided at discharge for more information on monitoring your daily weight, activity and diet. This also explains more about Heart Failure, symptoms of a flare-up and some of the tests that you have undergone.     Warning Signs of a Flare-Up include:  Swelling in the ankles or lower legs.  Shortness of breath, while at rest, or while doing normal activities.   Shortness of breath at night when in bed, or coughing in bed.   Requiring more pillows to sleep at night, or needing to sit up at night to sleep.  Feeling weak, dizzy or fatigued.     When to call your Doctor:  Call your Primary Care Physician or Cardiologist if:   You experience any pain radiating to  your jaw or neck.  You have any difficulty breathing.  You experience weight gain of 3 lbs in a day or 5 lbs in a week.   You feel any palpitations or irregular heartbeats.  You become dizzy or lose consciousness.   If you have had an angiogram or had a pacemaker or AICD placed, and experience:  Bleeding, drainage or swelling at the surgical / puncture site.  Fever greater than 100.0 F  Shock from internal defibrillator.  Cool and / or numb extremities.     Please access the [facility] My HF Guide/Heart Failure Interactive Workbook:   [email or web address]

## 2025-01-06 NOTE — DISCHARGE PLANNING
Complex Case Management    Order received for Complex Case Management. Patient's chart has been reviewed.     Complex case management following? Yes     Yes: Case assigned to Francis Worrell    NOTE: There may be cases that are NOT assigned to a CCM but will be followed for progression of care by leaders of the Complex Discharge Committee.

## 2025-01-06 NOTE — PROGRESS NOTES
Hypoglycemia Intervention    Hypoglycemia protocol intervention:  Blood glucose 66 per lab results at 0500H.  Intervention: 8 oz of fruit juice   Repeat blood glucose 81 at 0546H.  Intervention: 25 g IV dextrose per MAR   Additional interventions: Given snacks, FS recheck in an hour  On Call Hospitalist Sergei notified of the above at 0603H

## 2025-01-06 NOTE — PROGRESS NOTES
4 Eyes Skin Assessment Completed by Bita RN and JANNETTE Tucker.    Head WDL  Ears WDL  Nose WDL  Mouth WDL  Neck WDL  Breast/Chest WDL  Shoulder Blades WDL  Spine WDL  (R) Arm/Elbow/Hand WDL flaky  (L) Arm/Elbow/Hand WDL flaky  Abdomen Scar  Groin WDL  Scrotum/Coccyx/Buttocks Redness, Blanching, Discoloration, and Scar  (R) Leg Edema flaky   (L) Leg Redness and Edema large calf wound with dressing in place  (R) Heel/Foot/Toe Redness, Blanching, and Boggy flaky/peeling  (L) Heel/Foot/Toe Redness, Blanching, and Boggy flaky/peeling          Devices In Places PIV      Interventions In Place Heel Mepilex, Sacral Mepilex, Pillows, Low Air Loss Mattress, Barrier Cream, Dri-Alan Pads, and Heels Loaded W/Pillows    Possible Skin Injury Yes    Pictures Uploaded Into Epic Yes  Wound Consult Placed N/A - wound team following patient  RN Wound Prevention Protocol Ordered No  interventions already in place

## 2025-01-06 NOTE — PROGRESS NOTES
4 Eyes Skin Assessment Completed by JANNETTE Morris and JANNETTE Smith.    Head Scab and Scar  Ears WDL  Nose WDL  Mouth WDL  Neck WDL  Breast/Chest WDL  Shoulder Blades WDL  Spine Skin tags  (R) Arm/Elbow/Hand Discoloration, Flaky  (L) Arm/Elbow/Hand Discoloration, Flaky  Abdomen Scar  Groin Discoloration, Scar, Flaky  Scrotum/Coccyx/Buttocks Discoloration, Flaky, Scar  (R) Leg Scab, Flaky  (L) Leg Scab, Flaky, LLE Wound with dressing in placed, Edema  (R) Heel/Foot/Toe Redness, Blanching, Discoloration, and Boggy, Flaky  (L) Heel/Foot/Toe Redness, Blanching, Discoloration, and Boggy, Flaky          Devices In Places PIV line      Interventions In Place Heel Mepilex, Sacral Mepilex, TAP System, Pillows, Low Air Loss Mattress, and Barrier Cream    Possible Skin Injury Yes    Pictures Uploaded Into Epic N/A  Wound Consult Placed N/A- already consulted.  RN Wound Prevention Protocol Ordered Yes

## 2025-01-06 NOTE — CARE PLAN
The patient is Stable - Low risk of patient condition declining or worsening      Problem: Knowledge Deficit - Standard  Goal: Patient and family/care givers will demonstrate understanding of plan of care, disease process/condition, diagnostic tests and medications  Outcome: Progressing     Problem: Skin Integrity  Goal: Skin integrity is maintained or improved  Outcome: Progressing     Problem: Fall Risk  Goal: Patient will remain free from falls  Outcome: Progressing     Problem: Nutrition  Goal: Patient's nutritional and fluid intake will be adequate or improve  Outcome: Progressing     Shift Goals  Clinical Goals: Blood sugar monitoring, Wound care, Pain control <5 throughout shift  Patient Goals: Rest and comfort  Family Goals: MARCOS    Progress made toward(s) clinical / shift goals:      Pt alert and oriented. VSS. On room air. Blood sugar was checked this am, ate breakfast and orange juice given,  this afternoon. Pain med given. Wound care done on L leg. Pt ambulates to bathroom and sat to chair, uses FWW, reinforced education on fall precaution, call light within reach, bed alarm on.

## 2025-01-06 NOTE — DISCHARGE PLANNING
RAFAEL Ayala called Jose Miguel [facility] [phone] and left a voicemail requesting a call back to discuss pt. Jose Miguel works Wednesday thru Saturday.

## 2025-01-06 NOTE — DISCHARGE PLANNING
"Case Management Discharge Planning    Admission Date: 12/19/2024  GMLOS: 4.9  ALOS: 18    6-Clicks ADL Score: 15  6-Clicks Mobility Score: 13  PT and/or OT Eval ordered: Yes  Post-acute Referrals Ordered: Yes  Post-acute Choice Obtained: No  Has referral(s) been sent to post-acute provider:  Yes      Anticipated Discharge Dispo: Discharge Disposition: Discharged to home/self care (01)    DME Needed: Yes    DME Ordered: Yes    Action(s) Taken: Updated Provider/Nurse on Discharge Plan  RNCM discussed pt during IDT rounds. CM discussed discharge plan with Dr. Chris. Pt is medically cleared to discharge. Pending PT/OT evaluation. Recommendation remains placement at a post acute care facility. RNCM called all \"pending\" SNF facilities to inquire regarding current status of consideration for placement. Messages were left for CM/admissions. Awaiting responses.   @0900 RNCM messaged DPA to resend referral to [facility]. and [facility], as they stated they did not receive the referral to the correct fax number [phone].   RNCM spoke directly with admissions coordinator Erasmo Ashford. Pt has been declined due to perceived difficult discharge plan back to [facility] from [facility]. Facility will decline placement due to needs exceeding their capacity for wound care. Admissions director was informed that per MD wounds appear to be healing well as expected. Pt remains weak and needs strength and mobility to increase with rehab.   @0945  RNCM messaged DPA to resend referral to [place]. CM spoke with Brittanie in admissions who will review case with her team and reconsider placement.      Escalations Completed: None    Medically Clear: Yes    Next Steps: Follow-up with medical team to discuss DC needs and barriers.    Barriers to Discharge: Pending Placement and Pending PT Evaluation    I    "

## 2025-01-06 NOTE — DISCHARGE PLANNING
ARNIE hard faxed SNF referral to [facility] -- attnancy Hernandez.    F: [phone]    DPA also resent SNF referral to [facility] per RN CM request.

## 2025-01-06 NOTE — CARE PLAN
The patient is Stable - Low risk of patient condition declining or worsening    Shift Goals  Clinical Goals: Patient will remain free from any falls or injury within the shift  Patient Goals: Rest, Comfort  Family Goals: MARCOS    Progress made toward(s) clinical / shift goals: Wound care and dressing to LLE done, tolerated. Improved skin integrity noted. Pre-medicated for pain. Reinforced turning and repositioning. Able to increase PO intake when prompted. Bed in low and locked position, call light in reach, rounded on hourly. Assisted with toileting and ambulation with FWW.       Patient is not progressing towards the following goals:      Problem: Pain - Standard  Goal: Alleviation of pain or a reduction in pain to the patient’s comfort goal  Description: Target End Date:  Prior to discharge or change in level of care    Document on Vitals flowsheet    1.  Document pain using the appropriate pain scale per order or unit policy  2.  Educate and implement non-pharmacologic comfort measures (i.e. relaxation, distraction, massage, cold/heat therapy, etc.)  3.  Pain management medications as ordered  4.  Reassess pain after pain med administration per policy  5.  If opiods administered assess patient's response to pain medication is appropriate per POSS sedation scale  6.  Follow pain management plan developed in collaboration with patient and interdisciplinary team (including palliative care or pain specialists if applicable)  Outcome: Not Progressing     Problem: Skin Integrity  Goal: Skin integrity is maintained or improved  Description: Target End Date:  Prior to discharge or change in level of care    Document interventions on Skin Risk/Edward flowsheet groups and corresponding LDA    1.  Assess and monitor skin integrity, appearance and/or temperature  2.  Assess risk factors for impaired skin integrity and/or pressures ulcers  3.  Implement precautions to protect skin integrity in collaboration with  interdisciplinary team  4.  Implement pressure ulcer prevention protocol if at risk for skin breakdown  5.  Confirm wound care consult if at risk for skin breakdown  6.  Ensure patient use of pressure relieving devices  (Low air loss bed, waffle overlay, heel protectors, ROHO cushion, etc)  Outcome: Not Progressing     Problem: Care Map:  Day Before Discharge Optimal Outcome for the Heart Failure Patient  Goal: Day Before Discharge:  Optimal Care of the heart failure patient  Description: Target End Date:  Prior to discharge or change in level of care  Outcome: Not Progressing     Problem: Nutrition  Goal: Patient's nutritional and fluid intake will be adequate or improve  Description: Target End Date:  Prior to discharge or change in level of care    Document on I/O flowsheet    1.  Monitor nutritional intake  2.  Monitor weight per provider order  3.  Assess patient's ability to take oral nutrition  4.  Collaborate with Speech Therapy, Dietitian and interdisciplinary team for appropriate feeding and fluid intake  5.  Assist with feeding  Outcome: Not Progressing

## 2025-01-06 NOTE — PROGRESS NOTES
[facility] Medicine Daily Progress Note    Date of Service  1/6/2025    Chief Complaint  Robert Mcdonnell is a 74 y.o. male admitted 12/19/2024 with lower extremity edema and shortness of breath.    Hospital Course  Robert Mcdonnell is a 74-year-old male with PMHx homelessness, chronic atrial fibrillation on apixaban, HFrEF, pulmonary hypertension, perforated gastric ulcer s/p gastrectomy.    Per history: recent hospitalization 10/3 - 10/15 for perforated  secondary to invasive gastric adenocarcinoma, GDA embolization, subsequent subtotal gastrectomy, liver wedge resection, Miguel Angel-en-Y gastrojejunostomy with omental flap and cholecystectomy 10/7 with pathology showing invasive well-differentiated adenocarcinoma with associated ulceration. There was tumor invasion into the muscularis propria and 20 lymph nodes were negative for metastatic carcinoma. Oncology was consulted and noted no evidence of metastatic disease however MRI abdomen recommended for follow-up; no adjuvant therapy was recommended.     Patient was readmitted 12/19 for worsening lower extremity edema and shortness of breath.  He initially required BiPAP and IMCU admission.  Per history-patient had run out of his Lasix at home.  EKG in the emergency room showing irregular wide-complex arrhythmia.      Additionally, blood cultures were positive for group A strep bacteremia secondary to left lower extremity infection.  CT LLE: Small to moderate suprapatellar joint effusion.  MRI LLE: Diffuse cellulitis, patchy myositis, negative for OM.  Orthopedic surgery was consulted and recommended medical management. Infectious disease was consulted and recommended IV Unasyn with an end date of 1/4 and clindamycin with an end date of 12/31.    For patient's HFrEF-he was started on GDMT with effective diuresis.    Patient continued to have hypoglycemic episodes due to poor p.o. intake.    Interval Problem Update  12/30: Vital stable overnight.  SBP ranging 125 through  139.  Patient resting comfortably on room air.  WBC 14.2 from 14.9.  Hb stable at 8.9.  Chemistry panel stable.  MRI left lower extremity limited due to motion but no obvious osteomyelitis or bone lesions identified.  Continue IV Unasyn with an end date of 1/4.  Placement is pending.    12/31: Vitals stable overnight.  WBC 14.8 from 14.2.  Hb stable at 9.2.  Continue IV Unasyn until 1/4.  Borderline hypoglycemic with glucose ranging 60s to 90s.  Continue to encourage p.o. intake.     1/1: Vitals stable overnight.  SBP ranging .  WBC 12.1 from 14.8.  Hb stable at 8.8.  Glucose .  Hold Farxiga due to ongoing hypoglycemia.  Continue IV Unasyn today.    1/2: Vital stable overnight.  Patient resting comfortably on room air.  Glucose 70-80 overnight.  Plan to discontinue Farxiga due to hypoglycemia.  Continue IV Unasyn through 1/4.     1/3: Vitals stable overnight.  SBP 96 through 120.  No additional episodes of hypoglycemia.  Anticipate discharge tomorrow to [facility] after antibiotics have completed.  Wheelchair to be delivered today. Continue IV unasyn today. Final dose scheduled for tomorrow.     1/4: Vitals stable overnight.  Patient remains on room air.  Patient will complete IV Unasyn today.  However, he continues to have profound weakness.  Discharge to [facility] would be unsafe.  PT OT continue to recommend placement.  Patient is agreeable, compliant with care, pleasant on every exam.  Placement is pending. 2 episodes of hypoglycemia today. Treated with juice. Continue to encourage PO intake.     1/5: Vital stable overnight.   through 126.  Glucose ranging 66 through 106 overnight. Placement is pending for ongoing rehabilitation.  Antibiotic dosing completed yesterday.    1/6: Vitals stable overnight.  SBP 99 through 113.  Medically clear for discharge though will need wound care, PT OT.  SNF placement is pending.  Patient is agreeable and compliant with medical care.  Continue to  encourage p.o. intake.  Every 12 hour glucose monitoring.  Has completed IV antibiotics.    I have discussed this patient's plan of care and discharge plan at IDT rounds today with Case Management, Nursing, Nursing leadership, and other members of the IDT team.    Consultants/Specialty  critical care, infectious disease, and orthopedics    Code Status  Full Code    Disposition  The patient is medically cleared for discharge to home or a post-acute facility.  Anticipate discharge to: skilled nursing facility    I have placed the appropriate orders for post-discharge needs.    Review of Systems  Review of Systems   Constitutional:  Positive for malaise/fatigue. Negative for fever.   Respiratory:  Negative for shortness of breath.    Cardiovascular:  Negative for chest pain and leg swelling.   Gastrointestinal:  Negative for abdominal pain, nausea and vomiting.   Neurological:  Positive for weakness.        Physical Exam  Temp:  [36.3 °C (97.3 °F)-37.1 °C (98.7 °F)] 36.3 °C (97.3 °F)  Pulse:  [60-70] 60  Resp:  [16-18] 18  BP: ()/(55-69) 107/55  SpO2:  [96 %-99 %] 99 %    Physical Exam  Vitals and nursing note reviewed.   Constitutional:       General: He is not in acute distress.     Appearance: Normal appearance. He is ill-appearing.      Comments: Frail appearing    Cardiovascular:      Rate and Rhythm: Normal rate and regular rhythm.   Pulmonary:      Effort: Pulmonary effort is normal.      Breath sounds: Normal breath sounds.   Musculoskeletal:      Comments: Healing right lower extremity wound with granulation tissue present based on wound care note from 1/5   Skin:     General: Skin is warm and dry.   Neurological:      Mental Status: He is alert and oriented to person, place, and time. Mental status is at baseline.   Psychiatric:         Mood and Affect: Mood normal.         Behavior: Behavior normal.         Fluids    Intake/Output Summary (Last 24 hours) at 1/6/2025 1528  Last data filed at 1/6/2025  0928  Gross per 24 hour   Intake 1020 ml   Output 1125 ml   Net -105 ml        Laboratory        Recent Labs     01/06/25  0403   SODIUM 134*   POTASSIUM 4.3   CHLORIDE 104   CO2 24   GLUCOSE 66   BUN 6*   CREATININE 0.74   CALCIUM 7.7*                     Imaging  [id number]-W/O LEFT   Final Result      1.  Very limited examination due to patient motion artifact. Patient was also unable to complete the examination and therefore contrast-enhanced sequences were not obtained.      2.  No evidence of marrow edema or bone lesion.      3.  Again seen diffuse soft tissue edema/induration likely representing cellulitis. This also patchy muscle edema suggesting myositis.      4.  No focal fluid collection to suggest presence of abscess.      MZ-SYEUNIR-2 VIEW   Final Result      No evidence of bowel obstruction. Mild constipation.      MR-KNEE-WITH & W/O LEFT   Final Result      1.  No evidence of marrow edema or abnormal enhancement to suggest presence of osteomyelitis.      2.  Small to moderate joint effusion with mild synovitis.      3.  Diffuse soft tissue edema/induration consistent with cellulitis.      4.  Small popliteal cyst.      5.  No evidence of soft tissue abscess.      6.  Markedly motion degraded examination. Ligaments and menisci are inadequately evaluated due to patient motion.      MR-LOWER EXTREMITY-NO JOINT-W/O LEFT   Final Result      1.  Patient motion degraded exam.      2.  No evidence of marrow edema or bone lesion to suggest presence of osteomyelitis.      3.  Diffuse cellulitis and patchy myositis.      4.  No evidence of focal fluid collection to suggest presence of abscess.      CT-EXTREMITY, LOWER WITH LEFT   Final Result      1.  Extensive subcutaneous inflammatory changes about the left lower extremity consistent with deep and superficial cellulitis.      2.  No definite CT evidence of necrotizing fasciitis.      3.  No CT evidence of deep abscess formation or acute or chronic  osteomyelitis.      4.  Small to moderate suprapatellar joint effusion.      CT-EXTREMITY, LOWER WITH LEFT   Final Result      No soft tissue gas to suggest necrotizing fasciitis.      No fluid collection identified.      DX-CHEST-PORTABLE (1 VIEW)   Final Result         1.  Hazy left pulmonary infiltrates, similar to prior study   2.  Trace left pleural effusion, stable   3.  Cardiomegaly   4.  Atherosclerosis      DX-CHEST-PORTABLE (1 VIEW)   Final Result         1.  Hazy left pulmonary infiltrates, similar to prior study   2.  Trace left pleural effusion, stable   3.  Cardiomegaly   4.  Atherosclerosis      EC-ECHOCARDIOGRAM COMPLETE W/O CONT   Final Result      CT-CHEST,ABDOMEN,PELVIS WITH   Final Result      1. Stable spiculated opacity in the right upper lobe, extending to the pleural surface.   2. Lingular and left lower lobe parenchymal airspace disease has improved in the interval, but has not completely resolved. There is pleural reaction, suggesting some of these areas may represent parenchymal scarring.   3. Asymmetry of the lung volumes, small on the left than the right.   4. Cardiomegaly with atherosclerotic calcifications in the aorta and coronary arteries.   5. Postsurgical changes in the stomach.   6. Stable scattered multiple hypodense lesions throughout the liver.   7. Moderate ascites throughout the abdomen and pelvis.   8. Cachexia.   9. Enlarged left inguinal lymph nodes with central necrosis. There is edema involving the soft tissues of the upper left leg extending to the left pelvis.      US-EXTREMITY VENOUS LOWER BILAT   Final Result      DX-CHEST-PORTABLE (1 VIEW)   Final Result         1.  Hazy left pulmonary infiltrates.   2.  Small left pleural effusion   3.  Cardiomegaly           Assessment/Plan  * Acute exacerbation of CHF (congestive heart failure) (HCC)- (present on admission)  Assessment & Plan  Echo: EF 28%; severe tricuspid and mitral regurg.  RVSP elevated at 55  - Continue GDMT  with losartan, spironolactone, metoprolol, Farxiga  - Continue digoxin  - Maintain euvolemia    Constipation  Assessment & Plan  I ordered a bowel management  Suppository today    Hypoglycemia  Assessment & Plan  Multiple episodes of hypoglycemia requiring intervention  Additional episodes of hypoglycemia today  Poor p.o. intake  - Continue to encourage PO intake   - Nutrition consulted  - Discontinue Farxiga due to hypoglycemia    Streptococcal bacteremia- (present on admission)  Assessment & Plan  Blood cultures 12/19 positive for group A strep  Repeat blood cultures 12/21 NGTD    Pulmonary hypertension (HCC)  Assessment & Plan  Previous Echo RVSP 65, severe TR  Suspect mainly Group 2  RV perfusion with MAP > 65 as need norepinepinephrine  Blood pressure is running on the lower side holding diuretic therapy at this time    Acute respiratory failure with hypoxia (HCC)- (present on admission)  Assessment & Plan  Improved off Bipap  Weaned to room air    Hypophosphatemia- (present on admission)  Assessment & Plan  Replaced    Malignant neoplasm of body of stomach (HCC)- (present on admission)  Assessment & Plan  T2, N0, M0 invasive gastric adenocarcinoma presenting as perforated  10/2024  -GDA embolization  -10/7 subtotal gastrectomy, liver wedge resection, Miguel Angel-en-Y gastrojejunostomy with omental flap and cholecystectomy  -pathology invasive well-differentiated adenocarcinoma with associated ulceration  -tumor invasion into the muscularis propria and 20 lymph nodes were negative for metastatic carcinoma  -Oncology was consulted and noted no evidence of metastatic disease  -MRI abdomen recommended for follow-up; no adjuvant therapy was recommended  He will need outpatient follow-up    Homeless- (present on admission)  Assessment & Plan  Patient is currently homeless.  He is unsure if he is able to return to [facility].  Wheelchair has been ordered.  Discussed with case management today.    Severe protein-calorie  malnutrition (HCC)- (present on admission)  Assessment & Plan  Discussed with nutrition, patient meets the ASPEN criteria of severe malnutrition  Continuous nutrition support    ACP (advance care planning)- (present on admission)  Assessment & Plan  Patient admitted with CHF exacerbation, significantly reduced the EF of 30%.  Also found bacteremia, LLE infection, severe malnutrition.  History of invasive gastric malignancy, A-fib RVR, PE, gastric perforation post extensive surgery including subtotal gastrectomy, liver resection  Miguel Angel-en-Y gastrojejunostomy with omental flap and cholecystectomy.  I discussed the CODE STATUS with him, including CPR, intubation/mechanical ventilation.  He wants to stay full code.  Patient has high complexity and guarded prognosis.  I consulted palliative care.  ACP 18 minutes.     COPD (chronic obstructive pulmonary disease) (HCC)- (present on admission)  Assessment & Plan  Current active smoker, no PFTs on file  RT protocols, bronchodilators as needed  No signs of exacerbation    Acute kidney injury (HCC)- (present on admission)  Assessment & Plan  Resolved     Sepsis (HCC)- (present on admission)  Assessment & Plan  See above    Atrial fibrillation with RVR (HCC)- (present on admission)  Assessment & Plan  Chronic atrial fibrillation, currently tachycardia  Digoxin 250 mcg IV monitoring closely with flip to prevent digoxin toxicity    Continue digoxin and Eliquis  12/27 BP better, I resumed Toprol 25 mg    Thrombocytopenia (HCC)- (present on admission)  Assessment & Plan  Platelets 288  - Repeat CBC in a.m.  - Stable to continue Eliquis at this time    Tobacco dependence- (present on admission)  Assessment & Plan  Counseling when appropriate    Hypertension- (present on admission)  Assessment & Plan  SBP well-controlled  - Continue losartan, metoprolol, spironolactone    Wound of lower extremity- (present on admission)  Assessment & Plan  Likely source of infection, lower ext doppler  negative  CT left leg shows small to moderate suprapatellar joint effusion.  MRI LLE showed Diffuse cellulitis and patchy myositis, negative for osteomyelitis or abscess.   Repeat MRI 12/30: Limited due to patient motion artifact; no obvious OM noted persistent cellulitis and myositis noted  - Infectious disease consulted  - IV Unasyn regimen completed 1/4  - P.o. clindamycin completed 12/31  - Orthopedic surgery consultation 12/20 and 12/25 no surgical interventions at this time         VTE prophylaxis:   SCDs/TEDs   therapeutic anticoagulation with eliquis 5 mg BID      I have performed a physical exam and reviewed and updated ROS and Plan today (1/6/2025). In review of yesterday's note (1/5/2025), there are no changes except as documented above.

## 2025-01-07 LAB
GLUCOSE BLD STRIP.AUTO-MCNC: 104 MG/DL (ref 65–99)
GLUCOSE BLD STRIP.AUTO-MCNC: 73 MG/DL (ref 65–99)
GLUCOSE BLD STRIP.AUTO-MCNC: 80 MG/DL (ref 65–99)
GLUCOSE BLD STRIP.AUTO-MCNC: 97 MG/DL (ref 65–99)

## 2025-01-07 PROCEDURE — 700102 HCHG RX REV CODE 250 W/ 637 OVERRIDE(OP): Performed by: INTERNAL MEDICINE

## 2025-01-07 PROCEDURE — 770006 HCHG ROOM/CARE - MED/SURG/GYN SEMI*

## 2025-01-07 PROCEDURE — 700102 HCHG RX REV CODE 250 W/ 637 OVERRIDE(OP): Performed by: HOSPITALIST

## 2025-01-07 PROCEDURE — 82962 GLUCOSE BLOOD TEST: CPT | Mod: 91

## 2025-01-07 PROCEDURE — A9270 NON-COVERED ITEM OR SERVICE: HCPCS | Performed by: INTERNAL MEDICINE

## 2025-01-07 PROCEDURE — 97602 WOUND(S) CARE NON-SELECTIVE: CPT

## 2025-01-07 PROCEDURE — 99232 SBSQ HOSP IP/OBS MODERATE 35: CPT | Performed by: STUDENT IN AN ORGANIZED HEALTH CARE EDUCATION/TRAINING PROGRAM

## 2025-01-07 PROCEDURE — A9270 NON-COVERED ITEM OR SERVICE: HCPCS | Performed by: HOSPITALIST

## 2025-01-07 PROCEDURE — 97597 DBRDMT OPN WND 1ST 20 CM/<: CPT

## 2025-01-07 RX ADMIN — DIGOXIN 125 MCG: 0.12 TABLET ORAL at 16:38

## 2025-01-07 RX ADMIN — NICOTINE 14 MG: 14 PATCH TRANSDERMAL at 05:33

## 2025-01-07 RX ADMIN — OMEPRAZOLE 20 MG: 20 CAPSULE, DELAYED RELEASE ORAL at 05:33

## 2025-01-07 RX ADMIN — APIXABAN 5 MG: 5 TABLET, FILM COATED ORAL at 16:38

## 2025-01-07 RX ADMIN — DAKIN'S SOLUTION 0.125% (QUARTER STRENGTH) 50 ML: 0.12 SOLUTION at 05:34

## 2025-01-07 RX ADMIN — SENNOSIDES AND DOCUSATE SODIUM 2 TABLET: 50; 8.6 TABLET ORAL at 16:38

## 2025-01-07 RX ADMIN — APIXABAN 5 MG: 5 TABLET, FILM COATED ORAL at 05:33

## 2025-01-07 ASSESSMENT — PAIN DESCRIPTION - PAIN TYPE
TYPE: ACUTE PAIN
TYPE: ACUTE PAIN

## 2025-01-07 ASSESSMENT — ENCOUNTER SYMPTOMS
SHORTNESS OF BREATH: 0
VOMITING: 0
ABDOMINAL PAIN: 0
WEAKNESS: 1
NAUSEA: 0
FEVER: 0

## 2025-01-07 NOTE — DISCHARGE PLANNING
Case Management Discharge Planning    Admission Date: 12/19/2024  GMLOS: 4.9  ALOS: 19    6-Clicks ADL Score: 15  6-Clicks Mobility Score: 19  PT and/or OT Eval ordered: Yes  Post-acute Referrals Ordered: Yes  Post-acute Choice Obtained: No  Has referral(s) been sent to post-acute provider:  Yes      Anticipated Discharge Dispo: Discharge Disposition: Discharged to home/self care (01)    DME Needed: Yes    DME Ordered: Yes    Action(s) Taken: Updated Provider/Nurse on Discharge Plan, Referral(s) sent, Acceptance Received, and Completed PASSR/LOC  RNCM discussed pt during IDT rounds. CM discussed discharge plan with Dr. Campos. Pt is medically cleared. Pt was accepted at [facility] and [facility]. Discharge is pending placement and insurance auth. Met with pt at bedside to discuss discharge planning. Pt is agreeable to either facility and transport to [facility] for care. Leadership and Complex Team is updated on this case.   @[id number] DPA updated: left message to start auth.   @[id number] Pt was accepted by [facility]. Dr. Campos confirmed that pt has completed his course of IV antibiotics. Choice was obtained for this facility, as it is closer to his friends home for visits and assistance as needed and as pt will discharge from the facility to his friends home. DPA was messaged to have [facility] discontinue ins. auth and have [place] start auth now.    Escalations Completed: None    Medically Clear: Yes    Next Steps: Follow-up with medical team to discuss DC needs and barriers.    Barriers to Discharge: Pending Placement, Pending PT Evaluation, and Pending Insurance Authorization

## 2025-01-07 NOTE — CARE PLAN
The patient is Stable - Low risk of patient condition declining or worsening      Problem: Wound/ / Incision Healing  Goal: Patient's wound/surgical incision will decrease in size and heals properly  Outcome: Progressing     Problem: Fall Risk  Goal: Patient will remain free from falls  Outcome: Progressing     Problem: Skin Integrity  Goal: Skin integrity is maintained or improved  Outcome: Progressing     Shift Goals  Clinical Goals: Blood sugar monitoring, wound care, pain control <5 throughout shift  Patient Goals: Rest  Family Goals: MARCOS    Progress made toward(s) clinical / shift goals:      Pt alert and oriented. VSS. On room air. C/O minimal pain, refused pain med at this time. Blood sugar monitoring d/t low appetite. Pt was seen by wound care team this am, dressing CDI. Reinforced education on fall precaution call light within reach, bed alarm on.

## 2025-01-07 NOTE — PROGRESS NOTES
Pt IV was accidentally pulled out. 4RNs attempted to place one, including charge RN with ultrasound guided, still unsuccessful. Night shift RN aware.

## 2025-01-07 NOTE — DIETARY
Nutrition Update: Follow-up for PO intake  Day 19 of admit.  Robert Mcdonnell is a 74 y.o. male with admitting DX of Acute left-sided CHF (congestive heart failure) (HCC) [I50.1]  Streptococcal bacteremia [R78.81, B95.5]  Left leg cellulitis [L03.116].    Current Diet: EC7, TN0, cardiac, Ensure Plus TID, Bruce BID    PO intake per ADLs since last RD assessment on 1/3 has been 0% x 3 meals, <25% x 1 meal, 25-50% x 3 meals, 50-75% x 1 meal, % x 1 meal, and 0% x 2 supplements. Meal PO avg is ~30%.     Wt has been stable this admit, +stooling.    Malnutrition risk: Severe Malnutrition in context of Chronic Illness related to muscle and fat wasting as evidenced by Severe muscle loss at (temple, shoulder, clavicle) Severe muscle loss at (orbital, buccal, triceps)     Nutrition Dx Status: Ongoing     Problem: Nutritional:  Goal: Achieve adequate nutritional intake  Description: Patient will consume >50% of meals  Outcome: not met    Recommendations:  Continue diet and supplements as ordered  Continue to encourage PO intake >50%  Continue to record PO intake as % in ADLs  Nutrition representative available for ongoing food requests  If pt unable to consistently consume >50% of meals, may consider use of artificial nutrition support as it aligns with pt's GOC/POC, especially within the context of severe malnutrition and ongoing hypoglycemic episodes 2/2 poor PO intake      RD following

## 2025-01-07 NOTE — PROGRESS NOTES
4 Eyes Skin Assessment Completed by JANNETTE Velasco and JANNETTE Smith.    Head Scab and Scar  Ears WDL  Nose WDL  Mouth WDL  Neck WDL  Breast/Chest WDL  Shoulder Blades WDL  Spine WDL; skin tags   (R) Arm/Elbow/Hand Discoloration; flaky  (L) Arm/Elbow/Hand Discoloration; flaky  Abdomen Scar  Groin discoloration; flaky; scar  Scrotum/Coccyx/Buttocks Redness, Excoriation, Discoloration, and Scar  (R) Leg WDL; flaky  (L) Leg lower leg wound with dressing in place: clean, dry and intact; swelling  (R) Heel/Foot/Toe Redness, Blanching, and Boggy, flaky and dry  (L) Heel/Foot/Toe Redness, Blanching, Discoloration, and Boggy, flaky and dry          Devices In Places Blood Pressure Cuff      Interventions In Place Heel Mepilex, Sacral Mepilex, TAP System, Pillows, Low Air Loss Mattress, and Barrier Cream    Possible Skin Injury Yes    Pictures Uploaded Into Epic N/A  Wound Consult Placed N/A; wound following  RN Wound Prevention Protocol Ordered Yes

## 2025-01-07 NOTE — DISCHARGE PLANNING
ARNIE spoke to David with [facility]. Said that pt is accepted to both SNF listed above.     Best call back number is P: [phone] , he also prefers to be texted.

## 2025-01-07 NOTE — WOUND TEAM
[facility]  Inpatient Services  Wound and Skin Care Follow-up    Admission Date: 12/19/2024     Last order of IP CONSULT TO WOUND CARE was found on 12/26/2024 from Hospital Encounter on 12/19/2024     HPI, PMH, SH: Reviewed    Past Surgical History:   Procedure Laterality Date    GASTRECTOMY N/A 10/7/2024    Procedure: LAPAROTOMY, GASTRECTOMY-SUBTOTAL, WITH INTRAOPERATIVE ULTRASOUND GUIDANCE;  Surgeon: Mt Cabral M.D.;  Location: SURGERY [facility];  Service: General    NODE DISSECTION N/A 10/7/2024    Procedure: LYMPHADENECTOMY-NODE DISSECTION;  Surgeon: Mt Cabral M.D.;  Location: SURGERY [facility];  Service: General    CREATION, FLAP, OMENTUM N/A 10/7/2024    Procedure: CREATION, FLAP, OMENTUM;  Surgeon: Mt Cabral M.D.;  Location: SURGERY [facility];  Service: General    CHOLECYSTECTOMY N/A 10/7/2024    Procedure: CHOLECYSTECTOMY;  Surgeon: Mt Cabral M.D.;  Location: SURGERY [facility];  Service: General    WA UPPER GI ENDOSCOPY,DIAGNOSIS N/A 9/13/2024    Procedure: GASTROSCOPY;  Surgeon: Sarah Lozoya M.D.;  Location: SURGERY SAME DAY [facility];  Service: Gastroenterology    WA UPPER GI ENDOSCOPY,BIOPSY N/A 9/13/2024    Procedure: GASTROSCOPY, WITH BIOPSY;  Surgeon: Sarah Lozoya M.D.;  Location: SURGERY SAME DAY [facility];  Service: Gastroenterology    WA UPPER GI ENDOSCOPY,SCLER INJECT N/A 9/13/2024    Procedure: GASTROSCOPY, WITH SCLEROTHERAPY;  Surgeon: Sarah Lozoya M.D.;  Location: SURGERY SAME DAY [facility];  Service: Gastroenterology    WA UPPER GI ENDOSCOPY,BIOPSY N/A 09/10/2024    Procedure: GASTROSCOPY, WITH BIOPSY;  Surgeon: Demond Gutierres M.D.;  Location: SURGERY SAME DAY [facility];  Service: Gastroenterology    WA UPPER GI ENDOSCOPY,SCLER INJECT N/A 09/10/2024    Procedure: GASTROSCOPY, WITH SCLEROTHERAPY;  Surgeon: Demond Gutierres M.D.;  Location: SURGERY SAME DAY [facility];  Service: Gastroenterology     "GASTROSCOPY WITH ENDOSTAT N/A 09/10/2024    Procedure: EGD, WITH CAUTERIZATION;  Surgeon: Demond Gutierres M.D.;  Location: SURGERY SAME DAY [facility];  Service: Gastroenterology    NE UPPER GI ENDOSCOPY,DIAGNOSIS N/A 01/31/2023    Procedure: GASTROSCOPY;  Surgeon: Joy Mcnair M.D.;  Location: SURGERY SAME DAY [facility];  Service: Gastroenterology    CATARACT PHACO WITH IOL Left      Social History     Tobacco Use    Smoking status: Some Days     Current packs/day: 1.00     Types: Cigarettes    Smokeless tobacco: Never   Substance Use Topics    Alcohol use: Yes     Alcohol/week: 1.2 oz     Types: 2 Cans of beer per week     Comment: occ     Chief Complaint   Patient presents with    Shortness of Breath           Peripheral Edema     Diagnosis: Acute left-sided CHF (congestive heart failure) (HCC) [I50.1]  Streptococcal bacteremia [R78.81, B95.5]  Left leg cellulitis [L03.116]    Unit where seen by Wound Team: S614/[id number]     WOUND FOLLOW UP RELATED TO:  LLE       WOUND TEAM PLAN OF CARE - Frequency of Follow-up:   Nursing to follow dressing orders written for wound care. Contact wound team if area fails to progress, deteriorates or with any questions/concerns if something comes up before next scheduled follow up (See below as to whether wound is following and frequency of wound follow up)  Dressing changes by wound team:                   Weekly - LLE    WOUND HISTORY:     \"Per H&P: 74 y.o. male with past medical history of chronic atrial fibrillation, homeless, prior PE, congestive heart failure with biventricular dysfunction, pulmonary hypertension recently admitted for invasive gastric adenocarcinoma status post EGD embolization, cholecystectomy who presented to the hospital on 12/19/2024 with shortness of breath, left leg swelling and encephalopathy and found to hypoglycemia and he was hypoxic and was placed on BiPAP. His symptoms of shortness of breath improved and encephalopathy also improved.\"        WOUND " ASSESSMENT/LDA  Wound 12/20/24 Full Thickness Wound Pretibial Circumferential Left (Active)   Date First Assessed/Time First Assessed: 12/20/24 0800   Present on Original Admission: Yes  Primary Wound Type: Full Thickness Wound  Location: Pretibial  Wound Orientation: Circumferential  Laterality: Left      Assessments 1/7/2025 11:00 AM   Wound Image        Site Assessment Red;Pink;Slough;Painful   Periwound Assessment Dry;Scar tissue   Margins Attached edges;Defined edges   Closure Secondary intention   Drainage Amount Moderate   Drainage Description Serosanguineous   Treatments Cleansed;Nonselective debridement;Site care;Chemical debridement;CSWD - Conservative Sharp Wound Debridement   Wound Cleansing Approved Wound Cleanser   Periwound Protectant Barrier Paste   Dressing Status Clean;Dry;Intact   Dressing Changed Changed   Dressing Cleansing/Solutions 1/4 Strength Dakin's Solution   Dressing Options Moist Roll Gauze;Absorbent Abdominal Pad;Dry Roll Gauze   Dressing Change/Treatment Frequency Every Shift, and As Needed   NEXT Dressing Change/Treatment Date 01/08/25   NEXT Weekly Photo (Inpatient Only) 01/14/25   Wound Team Following Weekly   Non-staged Wound Description Full thickness   Wound Length (cm) 18 cm   Wound Width (cm) 25 cm   Wound Surface Area (cm^2) 450 cm^2   Shape Large Irregular   Wound Odor None   Pulses Left;DP;2+   WOUND NURSE ONLY - Time Spent with Patient (mins) 60        Vascular:    SHANTI:   No results found.    Lab Values:    Lab Results   Component Value Date/Time    WBC 12.1 (H) 01/01/2025 03:25 AM    RBC 3.10 (L) 01/01/2025 03:25 AM    HEMOGLOBIN 8.8 (L) 01/01/2025 03:25 AM    HEMATOCRIT 26.7 (L) 01/01/2025 03:25 AM    CREACTPROT 30.02 (H) 12/20/2024 02:45 PM    SEDRATEWES 13 12/20/2024 02:45 PM    HBA1C 5.9 (H) 09/08/2024 12:24 AM         Culture Results show:  No results found for this or any previous visit (from the past 720 hours).    Pain Level/Medicated:  None, Tolerated without pain  medication       INTERVENTIONS BY WOUND TEAM:  Chart and images reviewed. Discussed with bedside RN. All areas of concern (based on picture review, LDA review and discussion with bedside RN) have been thoroughly assessed. Documentation of areas based on significant findings. This RN in to assess patient. Performed standard wound care which includes appropriate positioning, dressing removal and non-selective debridement. Pictures and measurements obtained weekly if/when required.    Wound:  LLE  Preparation for Dressing removal: Removed without difficulty  Cleansed/Non-selectively Debrided with:  Wound cleanser and Gauze  Non-Excisional Conservative Sharp debridement: Slough debrided away using scalpel and scissors < 20cm2 debrided down to slough, non-viable/devitalized tissue, and viable tissue.  Scant bleeding noted, controlled with manual pressure.  Obdulia wound: Cleansed with No rinse foam soap and Moist warm washcloth, Prepped with Barrier paste  Primary Dressing:  Dakins moistened roll gauze  Secondary (Outer) Dressing: ABD pads, dry roll gauze, tape    Advanced Wound Care Discharge Planning  Number of Clinicians necessary to complete wound care: 2  Is patient requiring IV pain medications for dressing changes:  No   Length of time for dressing change 45 min. (This does not include chart review, pre-medication time, set up, clean up or time spent charting.)    Interdisciplinary consultation: Patient, Bedside RN (Elizabeth),  Therese (Wound RN) .      EVALUATION / RATIONALE FOR TREATMENT:     Date:  01/07/25  Wound Status:  Wound progressing as expected    Slough beginning to lift in some areas, however, majority of wound bed still obscured with adhered slough. Slough crosshatched to allow for better penetration of dakins for chemical debridment.     Date:  12/31/24  Wound Status:  Wound progressing as expected     Wound to LLE continues to evolve. Previously blistered skin was easily debrided with cleansing.  Wound  bed now noted with areas of adhered slough. A dakins wet to dry was applied to chemically debride nonviable tissue, decrease bioburden and odor.   Upper thigh/groin assessed which appears improved, and with areas of scar tissue. Ok to continue with moisture prevention and offloading.      Date:  12/24/24  Wound Status:  No change in wound      Patient seen on 12/23/24 for the left lower extremity blister that has opened and is denuded. New consult placed on 12/24/24 for worsening and more areas on the upper thigh and scrotum that are becoming denuded. Modified dressing orders to include left upper thigh and scrotum with daily dressing changes. Recommend condom cath if patient can tolerate. Spoke with Dr. Zamora who also came bedside to assess and consult ID for further plan of care.   Sacrum and left buttock denuded as well. Unclear if etiology is friction/shearing/MASD vs the rest of the LLE wound. Applied barrier paste. Continue with offloading measures.        Date:  12/23/24  Wound Status:  Initial evaluation     Patient's LLE wound initially with a blister. Wound has now opened up circumferentially. The wound bed is red, bleeding, fragile and painful. Edges are denuded with sloughing skin. Applied Xeroform (yellow) to provide an occlusive dressing that incorporates bacteriostatic protection.   Ortho following patient. Per CT Scan, negative for soft tissue gas and fluid collection. Continuing with wound care and antibiotics.   BL Heels intact. Continue with offloading measures.  Sacrum is intact. Continue with offloading measures and turns.             Goals: Steady decrease in wound area and depth weekly.    NURSING PLAN OF CARE ORDERS:  No new orders this visit    NUTRITION RECOMMENDATIONS   Wound Team Recommendations:  N/A     DIET ORDERS (From admission to next 24h)       Start     Ordered    01/03/25 1010  Supplements  ALL MEALS        Comments: + Bruce BID   Question Answer Comment   Which Supplement Ensure     Ensure: Ensure Plus Carton        01/03/25 1010    12/31/24 0406  Diet Order Diet: Level 7 - Easy to Chew; Liquid level: Level 0 - Thin; Second Modifier: (optional): Cardiac  ALL MEALS        Question Answer Comment   Diet: Level 7 - Easy to Chew    Liquid level Level 0 - Thin    Second Modifier: (optional) Cardiac        12/31/24 0406    12/27/24 1352  Supplements  ALL MEALS        Question Answer Comment   Which Supplement Ensure    Ensure: Ensure High Protein Pudding        12/27/24 1351                    PREVENTATIVE INTERVENTIONS:   Q shift Edward - performed per nursing policy  Q shift pressure point assessments - performed per nursing policy    Surface/Positioning  Standard/trauma mattress - Currently in Place    Anticipated discharge plans:  Skilled Nursing        Vac Discharge Needs:  Vac Discharge plan is purely a recommendation from wound team and not a requirement for discharge unless otherwise stated by physician.  Not Applicable Pt not on a wound vac

## 2025-01-07 NOTE — PROGRESS NOTES
Checked patient's blood sugar at 2145 and it was at 71. Gave patient 4oz orange juice and 2 ice creams. Still unable to establish PIV for patient at this time. On call hospitalist Celso Mai notified and aware. Will try US guided in the morning as patient refusing to get poked again tonight despite explanation of it's importance. Rechecked patient's blood sugar after 30mins, and it went up to 92. Patient stated he didn't eat his dinner because he didn't like the food. Encouraged patient to increase oral intake.

## 2025-01-07 NOTE — THERAPY
Physical Therapy   Daily Treatment     Patient Name: Robert Mcdonnell  Age:  74 y.o., Sex:  male  Medical Record #: [id number]  Today's Date: 1/6/2025     Precautions  Precautions: Fall Risk;Swallow Precautions  Comments: LE wounds    Assessment    Pt received in bed and agreeable to PT session with min encouragement. Pt was able to complete bed mobility without assist. Pt required min A for STS, taking steps, and transferring to a chair. CNA reports pt assisted to the bathroom earlier in the day, declines wanting to walk further this session. Continue to recommend post-acute placement. Will follow for acute PT.    Plan    Treatment Plan Status: Modify Current Treatment Plan  Type of Treatment: Bed Mobility, Equipment, Family / Caregiver Training, Gait Training, Manual Therapy, Neuro Re-Education / Balance, Self Care / Home Evaluation, Stair Training, Therapeutic Activities, Therapeutic Exercise  Treatment Frequency: 3 Times per Week  Treatment Duration: Until Therapy Goals Met    DC Equipment Recommendations: Unable to determine at this time  Discharge Recommendations: Recommend post-acute placement for additional physical therapy services prior to discharge home     Objective       01/06/25 1521   Precautions   Precautions Fall Risk;Swallow Precautions   Comments LE wounds   Vitals   O2 Delivery Device None - Room Air   Pain 0 - 10 Group   Therapist Pain Assessment Post Activity Pain Same as Prior to Activity;Nurse Notified  (LE pain, not rated)   Cognition    Level of Consciousness Alert   Comments Pleasant and cooperative, particular with care   Strength Lower Body   Comments BLE strength improving to 3/5 or greater, no assist to bring to EOB   Balance   Sitting Balance (Static) Fair   Sitting Balance (Dynamic) Fair   Standing Balance (Static) Fair -   Standing Balance (Dynamic) Poor +   Weight Shift Sitting Fair   Weight Shift Standing Poor   Skilled Intervention Verbal Cuing;Compensatory Strategies   Comments  FWW in standing   Bed Mobility    Supine to Sit Standby Assist   Scooting Standby Assist   Skilled Intervention Verbal Cuing   Comments HOB elevated   Gait Analysis   Gait Level Of Assist Minimal Assist   Assistive Device Front Wheel Walker   Distance (Feet) 4   # of Times Distance was Traveled 1   Deviation Bradykinetic;Shuffled Gait   Skilled Intervention Verbal Cuing;Sequencing;Compensatory Strategies   Functional Mobility   Sit to Stand Minimal Assist   Bed, Chair, Wheelchair Transfer Minimal Assist   Transfer Method Stand Step   Mobility up to chair   Skilled Intervention Verbal Cuing;Compensatory Strategies   Comments Cues for use of FWW, positioning prior to sitting down   6 Clicks Assessment - How much HELP from from another person do you currently need... (If the patient hasn't done an activity recently, how much help from another person do you think he/she would need if he/she tried?)   Turning from your back to your side while in a flat bed without using bedrails? 4   Moving from lying on your back to sitting on the side of a flat bed without using bedrails? 4   Moving to and from a bed to a chair (including a wheelchair)? 3   Standing up from a chair using your arms (e.g., wheelchair, or bedside chair)? 3   Walking in hospital room? 3   Climbing 3-5 steps with a railing? 2   6 clicks Mobility Score 19   Short Term Goals    Short Term Goal # 1 Pt will perform supine <> sit with SPV in 6 visits to get in/out of bed   Goal Outcome # 1 Goal met   Short Term Goal # 2 Pt will perform stand step transfers with FWW and SPV in 6 visits to get in/out of chair   Goal Outcome # 2 Goal not met   Short Term Goal # 3 Pt will ambulate 150ft with FWW and SPV in 6 visits to access home environment   Goal Outcome # 3 Goal not met   Physical Therapy Treatment Plan   Physical Therapy Treatment Plan Modify Current Treatment Plan   Treatment Frequency 3 Times per Week   Anticipated Discharge Equipment and Recommendations   DC  Equipment Recommendations Unable to determine at this time   Discharge Recommendations Recommend post-acute placement for additional physical therapy services prior to discharge home   Interdisciplinary Plan of Care Collaboration   IDT Collaboration with  Nursing   Patient Position at End of Therapy Seated;Chair Alarm On;Call Light within Reach;Phone within Reach;Tray Table within Reach   Collaboration Comments RN updated   Session Information   Date / Session Number  1/6-3 (1/3, 1/9)

## 2025-01-07 NOTE — DISCHARGE PLANNING
Agency/Facility Name: [facility]  Plan or Request: ARNIE sent message to David to stop insurance auth.    Pt accepted to [facility]  ARNIE sent message to [facility] to start auth.

## 2025-01-07 NOTE — DISCHARGE PLANNING
Per request, referrals sent to [facility]    0954- Spoke To: David  Agency/Facility Name: [facility]  Plan or Request: Accepted    1002- DPA left vm for David to start insurance auth.

## 2025-01-07 NOTE — WOUND TEAM
Assisted Dale RAGLAND (Wound RN), with wound care, non-selective debridement performed using wound cleanser/NS and gauze. Please see Dale RGALAND (Wound RN) wound note for further wound care details.

## 2025-01-07 NOTE — CARE PLAN
The patient is Watcher - Medium risk of patient condition declining or worsening    Shift Goals  Clinical Goals: Patient's blood sugar will remain above 90 throughout shift  Patient Goals: Sleep and rest  Family Goals: MARCOS    Progress made toward(s) clinical / shift goals:   Patient is alert and oriented x4, able to communicate needs. Patient denies pain or discomfort. Patient's bed frame alarm is on, bed in low position, hourly rounding done, call light within reach.     Problem: Communication  Goal: The ability to communicate needs accurately and effectively will improve  Description: Target End Date:  End of day 1  Outcome: Progressing     Problem: Mobility  Goal: Patient's capacity to carry out activities will improve  Description: Target End Date:  Prior to discharge or change in level of care    Outcome: Progressing     Problem: Fall Risk  Goal: Patient will remain free from falls  Description: Target End Date:  Prior to discharge or change in level of care    Outcome: Progressing     Problem: Skin Integrity  Goal: Skin integrity is maintained or improved  Description: Target End Date:  Prior to discharge or change in level of care    Outcome: Progressing

## 2025-01-07 NOTE — PROGRESS NOTES
Palliative Care    Room: S614-2    Met briefly with patient at bedside to complete advance directive as we had discussed doing last week.  Patient said he was not interested in completing an advanced directive at this time as he is not getting along with his brother and would like to have his niece be his surrogate decision maker for healthcare decisions, but needs to discuss this with her first.    Encourage patient to reach back out to palliative care if he changes his mind and would like to complete an advance directive.    Vanessa Blanco, APRN  Palliative Care  [phone]

## 2025-01-08 LAB — GLUCOSE BLD STRIP.AUTO-MCNC: 100 MG/DL (ref 65–99)

## 2025-01-08 PROCEDURE — A9270 NON-COVERED ITEM OR SERVICE: HCPCS | Performed by: HOSPITALIST

## 2025-01-08 PROCEDURE — 99232 SBSQ HOSP IP/OBS MODERATE 35: CPT | Performed by: STUDENT IN AN ORGANIZED HEALTH CARE EDUCATION/TRAINING PROGRAM

## 2025-01-08 PROCEDURE — 770006 HCHG ROOM/CARE - MED/SURG/GYN SEMI*

## 2025-01-08 PROCEDURE — 700102 HCHG RX REV CODE 250 W/ 637 OVERRIDE(OP): Performed by: HOSPITALIST

## 2025-01-08 PROCEDURE — A9270 NON-COVERED ITEM OR SERVICE: HCPCS | Performed by: INTERNAL MEDICINE

## 2025-01-08 PROCEDURE — 97116 GAIT TRAINING THERAPY: CPT

## 2025-01-08 PROCEDURE — 700101 HCHG RX REV CODE 250: Performed by: STUDENT IN AN ORGANIZED HEALTH CARE EDUCATION/TRAINING PROGRAM

## 2025-01-08 PROCEDURE — 82962 GLUCOSE BLOOD TEST: CPT | Mod: 91

## 2025-01-08 PROCEDURE — 700102 HCHG RX REV CODE 250 W/ 637 OVERRIDE(OP): Performed by: INTERNAL MEDICINE

## 2025-01-08 RX ADMIN — OXYCODONE 5 MG: 5 TABLET ORAL at 15:48

## 2025-01-08 RX ADMIN — OXYCODONE 5 MG: 5 TABLET ORAL at 08:31

## 2025-01-08 RX ADMIN — DAKIN'S SOLUTION 0.125% (QUARTER STRENGTH) 10 ML: 0.12 SOLUTION at 16:15

## 2025-01-08 RX ADMIN — APIXABAN 5 MG: 5 TABLET, FILM COATED ORAL at 05:40

## 2025-01-08 RX ADMIN — DIGOXIN 125 MCG: 0.12 TABLET ORAL at 16:15

## 2025-01-08 RX ADMIN — APIXABAN 5 MG: 5 TABLET, FILM COATED ORAL at 16:15

## 2025-01-08 RX ADMIN — OXYCODONE 2.5 MG: 5 TABLET ORAL at 20:51

## 2025-01-08 RX ADMIN — OMEPRAZOLE 20 MG: 20 CAPSULE, DELAYED RELEASE ORAL at 05:39

## 2025-01-08 RX ADMIN — SENNOSIDES AND DOCUSATE SODIUM 2 TABLET: 50; 8.6 TABLET ORAL at 16:15

## 2025-01-08 RX ADMIN — DAKIN'S SOLUTION 0.125% (QUARTER STRENGTH) 30 ML: 0.12 SOLUTION at 05:40

## 2025-01-08 ASSESSMENT — ENCOUNTER SYMPTOMS
VOMITING: 0
WEAKNESS: 1
SHORTNESS OF BREATH: 0
FEVER: 0
NAUSEA: 0
ABDOMINAL PAIN: 0

## 2025-01-08 ASSESSMENT — COGNITIVE AND FUNCTIONAL STATUS - GENERAL
CLIMB 3 TO 5 STEPS WITH RAILING: A LITTLE
SUGGESTED CMS G CODE MODIFIER MOBILITY: CI
MOBILITY SCORE: 23

## 2025-01-08 ASSESSMENT — GAIT ASSESSMENTS
DISTANCE (FEET): 150
GAIT LEVEL OF ASSIST: SUPERVISED
DEVIATION: ANTALGIC
ASSISTIVE DEVICE: FRONT WHEEL WALKER

## 2025-01-08 ASSESSMENT — PAIN DESCRIPTION - PAIN TYPE
TYPE: ACUTE PAIN

## 2025-01-08 NOTE — CARE PLAN
The patient is Stable - Low risk of patient condition declining or worsening      Problem: Mobility  Goal: Patient's capacity to carry out activities will improve  Outcome: Progressing     Problem: Fall Risk  Goal: Patient will remain free from falls  Outcome: Progressing     Problem: Skin Integrity  Goal: Skin integrity is maintained or improved  Outcome: Progressing     Shift Goals  Clinical Goals: Blood sugar monitoring, wound care  Patient Goals: Rest  Family Goals: MARCOS    Progress made toward(s) clinical / shift goals:      Pt alert and oriented. VSS. On room air. C/O pain, medication given as ordered. Ambulate to hallway with PT and to bathroom, uses FWW, reinforced education on fall precaution, call light within reach, bed alarm on.

## 2025-01-08 NOTE — CARE PLAN
The patient is Stable - Low risk of patient condition declining or worsening    Shift Goals  Clinical Goals: Patient's blood sugar will be wnl througout shift  Patient Goals: Sleep  Family Goals: MARCOS    Progress made toward(s) clinical / shift goals:    Patient is alert and oriented x4, able to communicate needs and calls appropriately. Patient denies pain or discomfort. Encouraged patient to increase dinner intake. Patient's bed alarm is on, bed in low position, hourly rounding done, call light within reach.    Problem: Fall Risk  Goal: Patient will remain free from falls  Description: Target End Date:  Prior to discharge or change in level of care    Outcome: Progressing     Problem: Skin Integrity  Goal: Skin integrity is maintained or improved  Description: Target End Date:  Prior to discharge or change in level of care    Outcome: Progressing     Problem: Knowledge Deficit - Standard  Goal: Patient and family/care givers will demonstrate understanding of plan of care, disease process/condition, diagnostic tests and medications  Description: Target End Date:  1-3 days or as soon as patient condition allows    Outcome: Progressing

## 2025-01-08 NOTE — PROGRESS NOTES
4 Eyes Skin Assessment Completed by Elizabeth RN and Malia RN.    Head Scab and Scar  Ears Redness and Blanching  Nose WDL  Mouth WDL  Neck WDL  Breast/Chest WDL  Shoulder Blades WDL  Spine WDL  (R) Arm/Elbow/Hand Bruising, dry  (L) Arm/Elbow/Hand Bruising, dry  Abdomen Scar  Groin Discoloration  Scrotum/Coccyx/Buttocks Scar, redness, blanching  (R) Leg Discoloration, flaky  (L) Leg  Discoloration, flaky, wound, dressing CDI  (R) Heel/Foot/Toe Redness and Blanching, flaky  (L) Heel/Foot/Toe Redness and Blanching, flaky        Devices In Places Condom Cath  Interventions In Place Heel Mepilex, TAP System, Pillows, Low Air Loss Mattress, and Barrier Cream  Possible Skin Injury Yes  Pictures Uploaded Into Epic N/A  Wound Consult Placed Yes, following  RN Wound Prevention Protocol Ordered Yes

## 2025-01-08 NOTE — THERAPY
Physical Therapy   Daily Treatment     Patient Name: Robert Mcdonnell  Age:  74 y.o., Sex:  male  Medical Record #: [id number]  Today's Date: 1/8/2025          Assessment    Rec'd pt alert, in bed, agreeable to work w/ PT.  PT goals met today.  He is able to mobilize w/ a fww w/o loss of balance or need of physical assist.  Gait is antalgic 2/2 LE wound.  No longer presents w/ an acute PT need.    Plan    Treatment Plan Status: Modify Current Treatment Plan  Type of Treatment: Bed Mobility, Equipment, Family / Caregiver Training, Gait Training, Manual Therapy, Neuro Re-Education / Balance, Self Care / Home Evaluation, Stair Training, Therapeutic Activities, Therapeutic Exercise  Treatment Frequency: 3 Times per Week  Treatment Duration: Discharge Needs Only    DC Equipment Recommendations: Front-Wheel Walker  Discharge Recommendations: Recommend home health for continued physical therapy services        Objective       01/08/25 0850   Balance   Sitting Balance (Static) Fair +   Sitting Balance (Dynamic) Fair +   Standing Balance (Static) Fair   Standing Balance (Dynamic) Fair   Weight Shift Sitting Good   Weight Shift Standing Good   Comments w/ fww   Bed Mobility    Supine to Sit Supervised   Sit to Supine Supervised   Gait Analysis   Gait Level Of Assist Supervised   Assistive Device Front Wheel Walker   Distance (Feet) 150   Deviation Antalgic   Skilled Intervention Verbal Cuing   Functional Mobility   Sit to Stand Supervised   Bed, Chair, Wheelchair Transfer Supervised   Short Term Goals    Short Term Goal # 1 Pt will perform supine <> sit with SPV in 6 visits to get in/out of bed   Goal Outcome # 1 Goal met   Short Term Goal # 2 Pt will perform stand step transfers with FWW and SPV in 6 visits to get in/out of chair   Goal Outcome # 2 Goal met   Short Term Goal # 3 Pt will ambulate 150ft with FWW and SPV in 6 visits to access home environment   Goal Outcome # 3 Goal met   Physical Therapy Treatment Plan    Physical Therapy Treatment Plan Modify Current Treatment Plan   Duration Discharge Needs Only   Reason For Discharge Discharge Secondary to Goals Met   Anticipated Discharge Equipment and Recommendations   DC Equipment Recommendations Front-Wheel Walker   Discharge Recommendations Recommend home health for continued physical therapy services

## 2025-01-08 NOTE — PROGRESS NOTES
[facility] Medicine Daily Progress Note    Date of Service  1/7/2025    Chief Complaint  Robert Mcdonnell is a 74 y.o. male admitted 12/19/2024 with lower extremity edema and shortness of breath.    Hospital Course  Robert Mcdonnell is a 74-year-old male with PMHx homelessness, chronic atrial fibrillation on apixaban, HFrEF, pulmonary hypertension, perforated gastric ulcer s/p gastrectomy.    Per history: recent hospitalization 10/3 - 10/15 for perforated  secondary to invasive gastric adenocarcinoma, GDA embolization, subsequent subtotal gastrectomy, liver wedge resection, Miguel Angel-en-Y gastrojejunostomy with omental flap and cholecystectomy 10/7 with pathology showing invasive well-differentiated adenocarcinoma with associated ulceration. There was tumor invasion into the muscularis propria and 20 lymph nodes were negative for metastatic carcinoma. Oncology was consulted and noted no evidence of metastatic disease however MRI abdomen recommended for follow-up; no adjuvant therapy was recommended.     Patient was readmitted 12/19 for worsening lower extremity edema and shortness of breath.  He initially required BiPAP and IMCU admission.  Per history-patient had run out of his Lasix at home.  EKG in the emergency room showing irregular wide-complex arrhythmia.      Additionally, blood cultures were positive for group A strep bacteremia secondary to left lower extremity infection.  CT LLE: Small to moderate suprapatellar joint effusion.  MRI LLE: Diffuse cellulitis, patchy myositis, negative for OM.  Orthopedic surgery was consulted and recommended medical management. Infectious disease was consulted and recommended IV Unasyn with an end date of 1/4 and clindamycin with an end date of 12/31.    For patient's HFrEF-he was started on GDMT with effective diuresis.    Patient continued to have hypoglycemic episodes due to poor p.o. intake.    Interval Problem Update  12/30: Vital stable overnight.  SBP ranging 125 through  139.  Patient resting comfortably on room air.  WBC 14.2 from 14.9.  Hb stable at 8.9.  Chemistry panel stable.  MRI left lower extremity limited due to motion but no obvious osteomyelitis or bone lesions identified.  Continue IV Unasyn with an end date of 1/4.  Placement is pending.    12/31: Vitals stable overnight.  WBC 14.8 from 14.2.  Hb stable at 9.2.  Continue IV Unasyn until 1/4.  Borderline hypoglycemic with glucose ranging 60s to 90s.  Continue to encourage p.o. intake.     1/1: Vitals stable overnight.  SBP ranging .  WBC 12.1 from 14.8.  Hb stable at 8.8.  Glucose .  Hold Farxiga due to ongoing hypoglycemia.  Continue IV Unasyn today.    1/2: Vital stable overnight.  Patient resting comfortably on room air.  Glucose 70-80 overnight.  Plan to discontinue Farxiga due to hypoglycemia.  Continue IV Unasyn through 1/4.     1/3: Vitals stable overnight.  SBP 96 through 120.  No additional episodes of hypoglycemia.  Anticipate discharge tomorrow to [facility] after antibiotics have completed.  Wheelchair to be delivered today. Continue IV unasyn today. Final dose scheduled for tomorrow.     1/4: Vitals stable overnight.  Patient remains on room air.  Patient will complete IV Unasyn today.  However, he continues to have profound weakness.  Discharge to [facility] would be unsafe.  PT OT continue to recommend placement.  Patient is agreeable, compliant with care, pleasant on every exam.  Placement is pending. 2 episodes of hypoglycemia today. Treated with juice. Continue to encourage PO intake.     1/5: Vital stable overnight.   through 126.  Glucose ranging 66 through 106 overnight. Placement is pending for ongoing rehabilitation.  Antibiotic dosing completed yesterday.    1/6: Vitals stable overnight.  SBP 99 through 113.  Medically clear for discharge though will need wound care, PT OT.  SNF placement is pending.  Patient is agreeable and compliant with medical care.  Continue to  encourage p.o. intake.  Every 12 hour glucose monitoring.  Has completed IV antibiotics.    1/7 I have seen and examined the patient at bedside  WBC 14  Completed the Unasyn and clindamycin  Phos 2.2 replaced    I have discussed this patient's plan of care and discharge plan at IDT rounds today with Case Management, Nursing, Nursing leadership, and other members of the IDT team.    Consultants/Specialty  critical care, infectious disease, and orthopedics    Code Status  Full Code    Disposition  The patient is not medically cleared for discharge to home or a post-acute facility.      I have placed the appropriate orders for post-discharge needs.    Review of Systems  Review of Systems   Constitutional:  Positive for malaise/fatigue. Negative for fever.   Respiratory:  Negative for shortness of breath.    Cardiovascular:  Negative for chest pain and leg swelling.   Gastrointestinal:  Negative for abdominal pain, nausea and vomiting.   Neurological:  Positive for weakness.        Physical Exam  Temp:  [36.9 °C (98.4 °F)-37.4 °C (99.4 °F)] 37.1 °C (98.7 °F)  Pulse:  [65-75] 75  Resp:  [18] 18  BP: ()/(52-67) 91/52  SpO2:  [93 %-99 %] 99 %    Physical Exam  Vitals and nursing note reviewed.   Constitutional:       General: He is not in acute distress.     Appearance: Normal appearance. He is ill-appearing.      Comments: Frail appearing    Cardiovascular:      Rate and Rhythm: Normal rate and regular rhythm.   Pulmonary:      Effort: Pulmonary effort is normal.      Breath sounds: Normal breath sounds.   Musculoskeletal:      Comments: Healing right lower extremity wound with granulation tissue present based on wound care note from 1/5   Skin:     General: Skin is warm and dry.   Neurological:      Mental Status: He is alert and oriented to person, place, and time. Mental status is at baseline.   Psychiatric:         Mood and Affect: Mood normal.         Behavior: Behavior normal.         Fluids    Intake/Output  Summary (Last 24 hours) at 1/7/2025 1853  Last data filed at 1/6/2025 2140  Gross per 24 hour   Intake 240 ml   Output --   Net 240 ml        Laboratory        Recent Labs     01/06/25  0403   SODIUM 134*   POTASSIUM 4.3   CHLORIDE 104   CO2 24   GLUCOSE 66   BUN 6*   CREATININE 0.74   CALCIUM 7.7*                     Imaging  [id number]-W/O LEFT   Final Result      1.  Very limited examination due to patient motion artifact. Patient was also unable to complete the examination and therefore contrast-enhanced sequences were not obtained.      2.  No evidence of marrow edema or bone lesion.      3.  Again seen diffuse soft tissue edema/induration likely representing cellulitis. This also patchy muscle edema suggesting myositis.      4.  No focal fluid collection to suggest presence of abscess.      [id number] VIEW   Final Result      No evidence of bowel obstruction. Mild constipation.      MR-KNEE-WITH & W/O LEFT   Final Result      1.  No evidence of marrow edema or abnormal enhancement to suggest presence of osteomyelitis.      2.  Small to moderate joint effusion with mild synovitis.      3.  Diffuse soft tissue edema/induration consistent with cellulitis.      4.  Small popliteal cyst.      5.  No evidence of soft tissue abscess.      6.  Markedly motion degraded examination. Ligaments and menisci are inadequately evaluated due to patient motion.      MR-LOWER EXTREMITY-NO JOINT-W/O LEFT   Final Result      1.  Patient motion degraded exam.      2.  No evidence of marrow edema or bone lesion to suggest presence of osteomyelitis.      3.  Diffuse cellulitis and patchy myositis.      4.  No evidence of focal fluid collection to suggest presence of abscess.      CT-EXTREMITY, LOWER WITH LEFT   Final Result      1.  Extensive subcutaneous inflammatory changes about the left lower extremity consistent with deep and superficial cellulitis.      2.  No definite CT evidence of necrotizing fasciitis.      3.  No CT  evidence of deep abscess formation or acute or chronic osteomyelitis.      4.  Small to moderate suprapatellar joint effusion.      CT-EXTREMITY, LOWER WITH LEFT   Final Result      No soft tissue gas to suggest necrotizing fasciitis.      No fluid collection identified.      DX-CHEST-PORTABLE (1 VIEW)   Final Result         1.  Hazy left pulmonary infiltrates, similar to prior study   2.  Trace left pleural effusion, stable   3.  Cardiomegaly   4.  Atherosclerosis      DX-CHEST-PORTABLE (1 VIEW)   Final Result         1.  Hazy left pulmonary infiltrates, similar to prior study   2.  Trace left pleural effusion, stable   3.  Cardiomegaly   4.  Atherosclerosis      EC-ECHOCARDIOGRAM COMPLETE W/O CONT   Final Result      CT-CHEST,ABDOMEN,PELVIS WITH   Final Result      1. Stable spiculated opacity in the right upper lobe, extending to the pleural surface.   2. Lingular and left lower lobe parenchymal airspace disease has improved in the interval, but has not completely resolved. There is pleural reaction, suggesting some of these areas may represent parenchymal scarring.   3. Asymmetry of the lung volumes, small on the left than the right.   4. Cardiomegaly with atherosclerotic calcifications in the aorta and coronary arteries.   5. Postsurgical changes in the stomach.   6. Stable scattered multiple hypodense lesions throughout the liver.   7. Moderate ascites throughout the abdomen and pelvis.   8. Cachexia.   9. Enlarged left inguinal lymph nodes with central necrosis. There is edema involving the soft tissues of the upper left leg extending to the left pelvis.      US-EXTREMITY VENOUS LOWER BILAT   Final Result      DX-CHEST-PORTABLE (1 VIEW)   Final Result         1.  Hazy left pulmonary infiltrates.   2.  Small left pleural effusion   3.  Cardiomegaly           Assessment/Plan  * Acute exacerbation of CHF (congestive heart failure) (HCC)- (present on admission)  Assessment & Plan  Echo: EF 28%; severe tricuspid  and mitral regurg.  RVSP elevated at 55  - Continue GDMT with losartan, spironolactone, metoprolol, Farxiga  - Continue digoxin  - Maintain euvolemia    Constipation  Assessment & Plan  I ordered a bowel management  Suppository today    Hypoglycemia  Assessment & Plan  Multiple episodes of hypoglycemia requiring intervention  Additional episodes of hypoglycemia today  Poor p.o. intake  - Continue to encourage PO intake   - Nutrition consulted  - Discontinue Farxiga due to hypoglycemia    Streptococcal bacteremia- (present on admission)  Assessment & Plan  Blood cultures 12/19 positive for group A strep  Repeat blood cultures 12/21 NGTD    Pulmonary hypertension (HCC)  Assessment & Plan  Previous Echo RVSP 65, severe TR  Suspect mainly Group 2  RV perfusion with MAP > 65 as need norepinepinephrine  Blood pressure is running on the lower side holding diuretic therapy at this time    Acute respiratory failure with hypoxia (HCC)- (present on admission)  Assessment & Plan  Improved off Bipap  Weaned to room air    Hypophosphatemia- (present on admission)  Assessment & Plan  Replaced    Malignant neoplasm of body of stomach (HCC)- (present on admission)  Assessment & Plan  T2, N0, M0 invasive gastric adenocarcinoma presenting as perforated  10/2024  -GDA embolization  -10/7 subtotal gastrectomy, liver wedge resection, Miguel Angel-en-Y gastrojejunostomy with omental flap and cholecystectomy  -pathology invasive well-differentiated adenocarcinoma with associated ulceration  -tumor invasion into the muscularis propria and 20 lymph nodes were negative for metastatic carcinoma  -Oncology was consulted and noted no evidence of metastatic disease  -MRI abdomen recommended for follow-up; no adjuvant therapy was recommended  He will need outpatient follow-up    Homeless- (present on admission)  Assessment & Plan  Patient is currently homeless.  He is unsure if he is able to return to [facility].  Wheelchair has been ordered.   Discussed with case management today.    Severe protein-calorie malnutrition (HCC)- (present on admission)  Assessment & Plan  Discussed with nutrition, patient meets the ASPEN criteria of severe malnutrition  Continuous nutrition support    COPD (chronic obstructive pulmonary disease) (HCC)- (present on admission)  Assessment & Plan  Current active smoker, no PFTs on file  RT protocols, bronchodilators as needed  No signs of exacerbation    Acute kidney injury (HCC)- (present on admission)  Assessment & Plan  Resolved     Sepsis (HCC)- (present on admission)  Assessment & Plan  See above    Atrial fibrillation with RVR (HCC)- (present on admission)  Assessment & Plan  Chronic atrial fibrillation, currently tachycardia  Digoxin 250 mcg IV monitoring closely with flip to prevent digoxin toxicity    Continue digoxin and Eliquis  12/27 BP better, I resumed Toprol 25 mg    Thrombocytopenia (HCC)- (present on admission)  Assessment & Plan  Platelets 288  - Repeat CBC in a.m.  - Stable to continue Eliquis at this time    Tobacco dependence- (present on admission)  Assessment & Plan  Counseling when appropriate    Hypertension- (present on admission)  Assessment & Plan  SBP well-controlled  - Continue losartan, metoprolol, spironolactone    Wound of lower extremity- (present on admission)  Assessment & Plan  Likely source of infection, lower ext doppler negative  CT left leg shows small to moderate suprapatellar joint effusion.  MRI LLE showed Diffuse cellulitis and patchy myositis, negative for osteomyelitis or abscess.   Repeat MRI 12/30: Limited due to patient motion artifact; no obvious OM noted persistent cellulitis and myositis noted  - Infectious disease consulted  - IV Unasyn regimen completed 1/4  - P.o. clindamycin completed 12/31  - Orthopedic surgery consultation 12/20 and 12/25 no surgical interventions at this time    ACP (advance care planning)- (present on admission)  Assessment & Plan  Patient admitted with  CHF exacerbation, significantly reduced the EF of 30%.  Also found bacteremia, LLE infection, severe malnutrition.  History of invasive gastric malignancy, A-fib RVR, PE, gastric perforation post extensive surgery including subtotal gastrectomy, liver resection  Miguel Angel-en-Y gastrojejunostomy with omental flap and cholecystectomy.  I discussed the CODE STATUS with him, including CPR, intubation/mechanical ventilation.  He wants to stay full code.  Patient has high complexity and guarded prognosis.  I consulted palliative care.  ACP 18 minutes.          VTE prophylaxis: Eliquis    I have performed a physical exam and reviewed and updated ROS and Plan today (1/7/2025). In review of yesterday's note (1/6/2025), there are no changes except as documented above.    I spent 36 minutes for chart review, meeting and examining the patient, documenting, ordering medications and tests, interpreting the results independently, and communicating with the other health professionals.

## 2025-01-08 NOTE — PROGRESS NOTES
4 Eyes Skin Assessment Completed by JANNETTE Velasco and JANNETTE Smith.    Head Scab and Scar  Ears WDL  Nose WDL  Mouth WDL  Neck WDL  Breast/Chest WDL  Shoulder Blades WDL  Spine WDL; skin tags   (R) Arm/Elbow/Hand Discoloration; flaky  (L) Arm/Elbow/Hand Discoloration; flaky  Abdomen Scar  Groin discoloration; flaky; scar  Scrotum/Coccyx/Buttocks Redness, Excoriation, Discoloration, and Scar  (R) Leg WDL; flaky  (L) Leg lower leg wound with dressing in place: clean, dry and intact; swelling  (R) Heel/Foot/Toe Redness, Blanching, and Boggy, flaky and dry  (L) Heel/Foot/Toe Redness, Blanching, Discoloration, and Boggy, flaky and dry              Devices In Places Blood Pressure Cuff; PIV; condom cath        Interventions In Place Heel Mepilex, Sacral Mepilex, TAP System, Pillows, Low Air Loss Mattress, and Barrier Cream     Possible Skin Injury Yes     Pictures Uploaded Into Epic N/A  Wound Consult Placed N/A; wound following  RN Wound Prevention Protocol Ordered Yes

## 2025-01-09 LAB
GLUCOSE BLD STRIP.AUTO-MCNC: 111 MG/DL (ref 65–99)
GLUCOSE BLD STRIP.AUTO-MCNC: 134 MG/DL (ref 65–99)
GLUCOSE BLD STRIP.AUTO-MCNC: 95 MG/DL (ref 65–99)

## 2025-01-09 PROCEDURE — 97535 SELF CARE MNGMENT TRAINING: CPT

## 2025-01-09 PROCEDURE — A9270 NON-COVERED ITEM OR SERVICE: HCPCS | Performed by: HOSPITALIST

## 2025-01-09 PROCEDURE — A9270 NON-COVERED ITEM OR SERVICE: HCPCS | Performed by: INTERNAL MEDICINE

## 2025-01-09 PROCEDURE — 770006 HCHG ROOM/CARE - MED/SURG/GYN SEMI*

## 2025-01-09 PROCEDURE — 700102 HCHG RX REV CODE 250 W/ 637 OVERRIDE(OP): Performed by: INTERNAL MEDICINE

## 2025-01-09 PROCEDURE — 700102 HCHG RX REV CODE 250 W/ 637 OVERRIDE(OP): Performed by: STUDENT IN AN ORGANIZED HEALTH CARE EDUCATION/TRAINING PROGRAM

## 2025-01-09 PROCEDURE — A9270 NON-COVERED ITEM OR SERVICE: HCPCS | Performed by: STUDENT IN AN ORGANIZED HEALTH CARE EDUCATION/TRAINING PROGRAM

## 2025-01-09 PROCEDURE — 99232 SBSQ HOSP IP/OBS MODERATE 35: CPT | Performed by: STUDENT IN AN ORGANIZED HEALTH CARE EDUCATION/TRAINING PROGRAM

## 2025-01-09 PROCEDURE — 700102 HCHG RX REV CODE 250 W/ 637 OVERRIDE(OP): Performed by: HOSPITALIST

## 2025-01-09 PROCEDURE — 82962 GLUCOSE BLOOD TEST: CPT | Mod: 91

## 2025-01-09 RX ADMIN — APIXABAN 5 MG: 5 TABLET, FILM COATED ORAL at 17:15

## 2025-01-09 RX ADMIN — APIXABAN 5 MG: 5 TABLET, FILM COATED ORAL at 06:01

## 2025-01-09 RX ADMIN — OXYCODONE 5 MG: 5 TABLET ORAL at 06:02

## 2025-01-09 RX ADMIN — DAKIN'S SOLUTION 0.125% (QUARTER STRENGTH) 15 ML: 0.12 SOLUTION at 06:01

## 2025-01-09 RX ADMIN — POLYETHYLENE GLYCOL 3350 1 PACKET: 17 POWDER, FOR SOLUTION ORAL at 06:02

## 2025-01-09 RX ADMIN — SENNOSIDES AND DOCUSATE SODIUM 2 TABLET: 50; 8.6 TABLET ORAL at 17:15

## 2025-01-09 RX ADMIN — DIGOXIN 125 MCG: 0.12 TABLET ORAL at 17:12

## 2025-01-09 RX ADMIN — OMEPRAZOLE 20 MG: 20 CAPSULE, DELAYED RELEASE ORAL at 06:01

## 2025-01-09 RX ADMIN — DAKIN'S SOLUTION 0.125% (QUARTER STRENGTH) 1 ML: 0.12 SOLUTION at 18:34

## 2025-01-09 ASSESSMENT — COGNITIVE AND FUNCTIONAL STATUS - GENERAL
DAILY ACTIVITIY SCORE: 18
HELP NEEDED FOR BATHING: A LITTLE
PERSONAL GROOMING: A LITTLE
TOILETING: A LITTLE
DRESSING REGULAR UPPER BODY CLOTHING: A LITTLE
SUGGESTED CMS G CODE MODIFIER DAILY ACTIVITY: CK
DRESSING REGULAR LOWER BODY CLOTHING: A LOT

## 2025-01-09 ASSESSMENT — PAIN DESCRIPTION - PAIN TYPE
TYPE: ACUTE PAIN

## 2025-01-09 ASSESSMENT — ENCOUNTER SYMPTOMS
SHORTNESS OF BREATH: 0
VOMITING: 0
ABDOMINAL PAIN: 0
FEVER: 0
WEAKNESS: 1
NAUSEA: 0

## 2025-01-09 NOTE — PROGRESS NOTES
4 Eyes Skin Assessment Completed by JANNETTE Johnson and JANNETTE Ascencio.    Head Scab and Scar  Ears WDL  Nose WDL  Mouth WDL  Neck WDL  Breast/Chest WDL  Shoulder Blades WDL  Spine WDL  (R) Arm/Elbow/Hand Flaky  (L) Arm/Elbow/Hand Flaky  Abdomen Scar  Groin Discoloration  Scrotum/Coccyx/Buttocks Redness, Blanching, and Scar  (R) Leg Flaky, discoloration  (L) Leg Flaky, discoloration, wound  (R) Heel/Foot/Toe Redness and Blanching, flaky, boggy  (L) Heel/Foot/Toe Redness and Blanching, flaky, boggy          Devices In Places Condom Cath  Interventions In Place Sacral Mepilex, TAP System, Pillows, Low Air Loss Mattress, and Barrier Cream  Possible Skin Injury Yes  Pictures Uploaded Into Epic N/A  Wound Consult Placed Yes, following  RN Wound Prevention Protocol Ordered Yes

## 2025-01-09 NOTE — PROGRESS NOTES
[facility] Medicine Daily Progress Note    Date of Service  1/8/2025    Chief Complaint  Robert Mcdonnell is a 74 y.o. male admitted 12/19/2024 with lower extremity edema and shortness of breath.    Hospital Course  Robert Mcdonnell is a 74-year-old male with PMHx homelessness, chronic atrial fibrillation on apixaban, HFrEF, pulmonary hypertension, perforated gastric ulcer s/p gastrectomy.    Per history: recent hospitalization 10/3 - 10/15 for perforated  secondary to invasive gastric adenocarcinoma, GDA embolization, subsequent subtotal gastrectomy, liver wedge resection, Miguel Angel-en-Y gastrojejunostomy with omental flap and cholecystectomy 10/7 with pathology showing invasive well-differentiated adenocarcinoma with associated ulceration. There was tumor invasion into the muscularis propria and 20 lymph nodes were negative for metastatic carcinoma. Oncology was consulted and noted no evidence of metastatic disease however MRI abdomen recommended for follow-up; no adjuvant therapy was recommended.     Patient was readmitted 12/19 for worsening lower extremity edema and shortness of breath.  He initially required BiPAP and IMCU admission.  Per history-patient had run out of his Lasix at home.  EKG in the emergency room showing irregular wide-complex arrhythmia.      Additionally, blood cultures were positive for group A strep bacteremia secondary to left lower extremity infection.  CT LLE: Small to moderate suprapatellar joint effusion.  MRI LLE: Diffuse cellulitis, patchy myositis, negative for OM.  Orthopedic surgery was consulted and recommended medical management. Infectious disease was consulted and recommended IV Unasyn with an end date of 1/4 and clindamycin with an end date of 12/31.    For patient's HFrEF-he was started on GDMT with effective diuresis.    Patient continued to have hypoglycemic episodes due to poor p.o. intake.    Interval Problem Update  12/30: Vital stable overnight.  SBP ranging 125 through  139.  Patient resting comfortably on room air.  WBC 14.2 from 14.9.  Hb stable at 8.9.  Chemistry panel stable.  MRI left lower extremity limited due to motion but no obvious osteomyelitis or bone lesions identified.  Continue IV Unasyn with an end date of 1/4.  Placement is pending.    12/31: Vitals stable overnight.  WBC 14.8 from 14.2.  Hb stable at 9.2.  Continue IV Unasyn until 1/4.  Borderline hypoglycemic with glucose ranging 60s to 90s.  Continue to encourage p.o. intake.     1/1: Vitals stable overnight.  SBP ranging .  WBC 12.1 from 14.8.  Hb stable at 8.8.  Glucose .  Hold Farxiga due to ongoing hypoglycemia.  Continue IV Unasyn today.    1/2: Vital stable overnight.  Patient resting comfortably on room air.  Glucose 70-80 overnight.  Plan to discontinue Farxiga due to hypoglycemia.  Continue IV Unasyn through 1/4.     1/3: Vitals stable overnight.  SBP 96 through 120.  No additional episodes of hypoglycemia.  Anticipate discharge tomorrow to [facility] after antibiotics have completed.  Wheelchair to be delivered today. Continue IV unasyn today. Final dose scheduled for tomorrow.     1/4: Vitals stable overnight.  Patient remains on room air.  Patient will complete IV Unasyn today.  However, he continues to have profound weakness.  Discharge to [facility] would be unsafe.  PT OT continue to recommend placement.  Patient is agreeable, compliant with care, pleasant on every exam.  Placement is pending. 2 episodes of hypoglycemia today. Treated with juice. Continue to encourage PO intake.     1/5: Vital stable overnight.   through 126.  Glucose ranging 66 through 106 overnight. Placement is pending for ongoing rehabilitation.  Antibiotic dosing completed yesterday.    1/6: Vitals stable overnight.  SBP 99 through 113.  Medically clear for discharge though will need wound care, PT OT.  SNF placement is pending.  Patient is agreeable and compliant with medical care.  Continue to  encourage p.o. intake.  Every 12 hour glucose monitoring.  Has completed IV antibiotics.    1/7 I have seen and examined the patient at bedside  WBC 14  Completed the Unasyn and clindamycin  Phos 2.2 replaced    1/8 I have seen and examined the patient at bedside  Continue Eliquis, digoxin losartan,   Vitals reviewed    I have discussed this patient's plan of care and discharge plan at IDT rounds today with Case Management, Nursing, Nursing leadership, and other members of the IDT team.    Consultants/Specialty  critical care, infectious disease, and orthopedics    Code Status  Full Code    Disposition  Medically Cleared  I have placed the appropriate orders for post-discharge needs.    Review of Systems  Review of Systems   Constitutional:  Positive for malaise/fatigue. Negative for fever.   Respiratory:  Negative for shortness of breath.    Cardiovascular:  Negative for chest pain and leg swelling.   Gastrointestinal:  Negative for abdominal pain, nausea and vomiting.   Neurological:  Positive for weakness.        Physical Exam  Temp:  [36.4 °C (97.6 °F)-37.2 °C (98.9 °F)] 36.4 °C (97.6 °F)  Pulse:  [63-88] 88  Resp:  [17-18] 17  BP: ()/(49-68) 104/59  SpO2:  [95 %-100 %] 100 %    Physical Exam  Vitals and nursing note reviewed.   Constitutional:       General: He is not in acute distress.     Appearance: Normal appearance. He is ill-appearing.      Comments: Frail appearing    Cardiovascular:      Rate and Rhythm: Normal rate and regular rhythm.   Pulmonary:      Effort: Pulmonary effort is normal.      Breath sounds: Normal breath sounds.   Musculoskeletal:      Comments: Healing right lower extremity wound with granulation tissue present based on wound care note from 1/5   Skin:     General: Skin is warm and dry.   Neurological:      Mental Status: He is alert and oriented to person, place, and time. Mental status is at baseline.   Psychiatric:         Mood and Affect: Mood normal.         Behavior:  Behavior normal.         Fluids    Intake/Output Summary (Last 24 hours) at 1/8/2025 1818  Last data filed at 1/8/2025 1800  Gross per 24 hour   Intake 1080 ml   Output 1450 ml   Net -370 ml        Laboratory        Recent Labs     01/06/25  0403   SODIUM 134*   POTASSIUM 4.3   CHLORIDE 104   CO2 24   GLUCOSE 66   BUN 6*   CREATININE 0.74   CALCIUM 7.7*                     Imaging  [id number]-W/O LEFT   Final Result      1.  Very limited examination due to patient motion artifact. Patient was also unable to complete the examination and therefore contrast-enhanced sequences were not obtained.      2.  No evidence of marrow edema or bone lesion.      3.  Again seen diffuse soft tissue edema/induration likely representing cellulitis. This also patchy muscle edema suggesting myositis.      4.  No focal fluid collection to suggest presence of abscess.      [id number] VIEW   Final Result      No evidence of bowel obstruction. Mild constipation.      MR-KNEE-WITH & W/O LEFT   Final Result      1.  No evidence of marrow edema or abnormal enhancement to suggest presence of osteomyelitis.      2.  Small to moderate joint effusion with mild synovitis.      3.  Diffuse soft tissue edema/induration consistent with cellulitis.      4.  Small popliteal cyst.      5.  No evidence of soft tissue abscess.      6.  Markedly motion degraded examination. Ligaments and menisci are inadequately evaluated due to patient motion.      MR-LOWER EXTREMITY-NO JOINT-W/O LEFT   Final Result      1.  Patient motion degraded exam.      2.  No evidence of marrow edema or bone lesion to suggest presence of osteomyelitis.      3.  Diffuse cellulitis and patchy myositis.      4.  No evidence of focal fluid collection to suggest presence of abscess.      CT-EXTREMITY, LOWER WITH LEFT   Final Result      1.  Extensive subcutaneous inflammatory changes about the left lower extremity consistent with deep and superficial cellulitis.      2.  No  definite CT evidence of necrotizing fasciitis.      3.  No CT evidence of deep abscess formation or acute or chronic osteomyelitis.      4.  Small to moderate suprapatellar joint effusion.      CT-EXTREMITY, LOWER WITH LEFT   Final Result      No soft tissue gas to suggest necrotizing fasciitis.      No fluid collection identified.      DX-CHEST-PORTABLE (1 VIEW)   Final Result         1.  Hazy left pulmonary infiltrates, similar to prior study   2.  Trace left pleural effusion, stable   3.  Cardiomegaly   4.  Atherosclerosis      DX-CHEST-PORTABLE (1 VIEW)   Final Result         1.  Hazy left pulmonary infiltrates, similar to prior study   2.  Trace left pleural effusion, stable   3.  Cardiomegaly   4.  Atherosclerosis      EC-ECHOCARDIOGRAM COMPLETE W/O CONT   Final Result      CT-CHEST,ABDOMEN,PELVIS WITH   Final Result      1. Stable spiculated opacity in the right upper lobe, extending to the pleural surface.   2. Lingular and left lower lobe parenchymal airspace disease has improved in the interval, but has not completely resolved. There is pleural reaction, suggesting some of these areas may represent parenchymal scarring.   3. Asymmetry of the lung volumes, small on the left than the right.   4. Cardiomegaly with atherosclerotic calcifications in the aorta and coronary arteries.   5. Postsurgical changes in the stomach.   6. Stable scattered multiple hypodense lesions throughout the liver.   7. Moderate ascites throughout the abdomen and pelvis.   8. Cachexia.   9. Enlarged left inguinal lymph nodes with central necrosis. There is edema involving the soft tissues of the upper left leg extending to the left pelvis.      US-EXTREMITY VENOUS LOWER BILAT   Final Result      DX-CHEST-PORTABLE (1 VIEW)   Final Result         1.  Hazy left pulmonary infiltrates.   2.  Small left pleural effusion   3.  Cardiomegaly           Assessment/Plan  * Acute exacerbation of CHF (congestive heart failure) (HCC)- (present on  admission)  Assessment & Plan  Echo: EF 28%; severe tricuspid and mitral regurg.  RVSP elevated at 55  - Continue GDMT with losartan, spironolactone, metoprolol, Farxiga  - Continue digoxin  - Maintain euvolemia    Constipation  Assessment & Plan  I ordered a bowel management  Suppository today    Hypoglycemia  Assessment & Plan  Multiple episodes of hypoglycemia requiring intervention  Additional episodes of hypoglycemia today  Poor p.o. intake  - Continue to encourage PO intake   - Nutrition consulted  - Discontinue Farxiga due to hypoglycemia    Streptococcal bacteremia- (present on admission)  Assessment & Plan  Blood cultures 12/19 positive for group A strep  Repeat blood cultures 12/21 NGTD    Pulmonary hypertension (HCC)  Assessment & Plan  Previous Echo RVSP 65, severe TR  Suspect mainly Group 2  RV perfusion with MAP > 65 as need norepinepinephrine  Blood pressure is running on the lower side holding diuretic therapy at this time    Acute respiratory failure with hypoxia (HCC)- (present on admission)  Assessment & Plan  Improved off Bipap  Weaned to room air    Hypophosphatemia- (present on admission)  Assessment & Plan  Replaced    Malignant neoplasm of body of stomach (HCC)- (present on admission)  Assessment & Plan  T2, N0, M0 invasive gastric adenocarcinoma presenting as perforated  10/2024  -GDA embolization  -10/7 subtotal gastrectomy, liver wedge resection, Miguel Angel-en-Y gastrojejunostomy with omental flap and cholecystectomy  -pathology invasive well-differentiated adenocarcinoma with associated ulceration  -tumor invasion into the muscularis propria and 20 lymph nodes were negative for metastatic carcinoma  -Oncology was consulted and noted no evidence of metastatic disease  -MRI abdomen recommended for follow-up; no adjuvant therapy was recommended  He will need outpatient follow-up    Homeless- (present on admission)  Assessment & Plan  Patient is currently homeless.  He is unsure if he is able to  return to [facility].  Wheelchair has been ordered.  Discussed with case management today.    Severe protein-calorie malnutrition (HCC)- (present on admission)  Assessment & Plan  Discussed with nutrition, patient meets the ASPEN criteria of severe malnutrition  Continuous nutrition support    COPD (chronic obstructive pulmonary disease) (HCC)- (present on admission)  Assessment & Plan  Current active smoker, no PFTs on file  RT protocols, bronchodilators as needed  No signs of exacerbation    Acute kidney injury (HCC)- (present on admission)  Assessment & Plan  Resolved     Sepsis (HCC)- (present on admission)  Assessment & Plan  See above    Atrial fibrillation with RVR (HCC)- (present on admission)  Assessment & Plan  Chronic atrial fibrillation, currently tachycardia  Digoxin 250 mcg IV monitoring closely with flip to prevent digoxin toxicity    Continue digoxin and Eliquis  12/27 BP better, I resumed Toprol 25 mg    Thrombocytopenia (HCC)- (present on admission)  Assessment & Plan  Platelets 288  - Repeat CBC in a.m.  - Stable to continue Eliquis at this time    Tobacco dependence- (present on admission)  Assessment & Plan  Counseling when appropriate    Hypertension- (present on admission)  Assessment & Plan  SBP well-controlled  - Continue losartan, metoprolol, spironolactone    Wound of lower extremity- (present on admission)  Assessment & Plan  Likely source of infection, lower ext doppler negative  CT left leg shows small to moderate suprapatellar joint effusion.  MRI LLE showed Diffuse cellulitis and patchy myositis, negative for osteomyelitis or abscess.   Repeat MRI 12/30: Limited due to patient motion artifact; no obvious OM noted persistent cellulitis and myositis noted  - Infectious disease consulted  - IV Unasyn regimen completed 1/4  - P.o. clindamycin completed 12/31  - Orthopedic surgery consultation 12/20 and 12/25 no surgical interventions at this time    ACP (advance care planning)- (present  on admission)  Assessment & Plan  Patient admitted with CHF exacerbation, significantly reduced the EF of 30%.  Also found bacteremia, LLE infection, severe malnutrition.  History of invasive gastric malignancy, A-fib RVR, PE, gastric perforation post extensive surgery including subtotal gastrectomy, liver resection  Miguel Angel-en-Y gastrojejunostomy with omental flap and cholecystectomy.  I discussed the CODE STATUS with him, including CPR, intubation/mechanical ventilation.  He wants to stay full code.  Patient has high complexity and guarded prognosis.  I consulted palliative care.  ACP 18 minutes.          VTE prophylaxis: Eliquis    I have performed a physical exam and reviewed and updated ROS and Plan today (1/8/2025). In review of yesterday's note (1/7/2025), there are no changes except as documented above.    I spent 36 minutes for chart review, meeting and examining the patient, documenting, ordering medications and tests, interpreting the results independently, and communicating with the other health professionals.      VTE Selection

## 2025-01-09 NOTE — DISCHARGE PLANNING
Case Management Discharge Planning    Admission Date: 12/19/2024  GMLOS: 4.9  ALOS: 21    6-Clicks ADL Score: 15  6-Clicks Mobility Score: 23  PT and/or OT Eval ordered: Yes  Post-acute Referrals Ordered: Yes  Post-acute Choice Obtained: Yes  Has referral(s) been sent to post-acute provider:  Yes      Anticipated Discharge Dispo: Discharge Disposition: D/T to SNF with Medicare cert in anticipation of skilled care (03)  Discharge Address: [address] [place] [id number]  Discharge Contact Phone Number: [phone] (Mars Miranda)    DME Needed: No    Action(s) Taken: Updated Provider/Nurse on Discharge Plan, Authorization Sent, and Completed PASSR/LOC  RNCM discussed pt during IDT rounds. CM discussed discharge plan with Dr. Campos. Pt is medically cleared to discharge to SNF. Pt has daily wound care needs and post acute care needs to regain strength. Pt is accepted at [facility], pending insurance auth. RNCM messaged DPA to check current status of authorization. Met with pt at bedside to discuss discharge plan. Pt is very agreeable.      Escalations Completed: Insurance     Medically Clear: Yes    Next Steps: Follow-up with medical team to discuss DC needs and barriers.    Barriers to Discharge: Pending Insurance Authorization

## 2025-01-09 NOTE — DISCHARGE PLANNING
ARNIE called Deirdre [phone] and left a VM for Admissions to see if insurance auth has come back/Advised CM

## 2025-01-09 NOTE — PROGRESS NOTES
4 Eyes Skin Assessment Completed by JANNETTE Cueva and JANNETTE Mendoza.    Head Scab and Scar  Ears WDL  Nose WDL  Mouth WDL  Neck WDL  Breast/Chest WDL  Shoulder Blades WDL  Spine WDL  (R) Arm/Elbow/Hand Dry, Flaky  (L) Arm/Elbow/Hand Dry, Flaky  Abdomen Scar  Groin Discoloration  Scrotum/Coccyx/Buttocks Redness, Blanching, and Scar, discoloration  (R) Leg Discoloration  (L) Leg Discoloration, wound  (R) Heel/Foot/Toe Redness, Blanching, and Boggy, Flaky  (L) Heel/Foot/Toe Redness, Blanching, and Boggy, Flaky          Devices In Places Condom Cath, PIV      Interventions In Place Sacral Mepilex, TAP System, Pillows, Low Air Loss Mattress, and Barrier Cream    Possible Skin Injury Yes    Pictures Uploaded Into Epic Yes, previously done  Wound Consult Placed Yes, already following  RN Wound Prevention Protocol Ordered Yes, previously ordered

## 2025-01-09 NOTE — CARE PLAN
The patient is Stable - Low risk of patient condition declining or worsening    Shift Goals  Clinical Goals: Monitor BG, wound care, pt safety  Patient Goals: Rest  Family Goals: MARCOS    Progress made toward(s) clinical / shift goals:  Patient alert and oriented x4. Patient c/o pain, administered medications per MAR. 2 rn skin check completed, wound care done, pt able to turn self to help maintain skin integrity. Patient's B. Patient able to make needs known, bed in lowest position, call light within reach, bed alarm on.      Problem: Wound/ / Incision Healing  Goal: Patient's wound/surgical incision will decrease in size and heals properly  Outcome: Progressing     Problem: Fall Risk  Goal: Patient will remain free from falls  Outcome: Progressing     Problem: Knowledge Deficit - Standard  Goal: Patient and family/care givers will demonstrate understanding of plan of care, disease process/condition, diagnostic tests and medications  Outcome: Progressing       Patient is not progressing towards the following goals:

## 2025-01-09 NOTE — DISCHARGE PLANNING
Medical Social Work  PC to [facility], ANYI spoke to Brittanie in admissions, will submit authorization today, can take up two weeks.    ANYI was told that when she ran patient's insurance it came back stating unable to determine benefits due to no communication with patient.    ANYI spoke to patient to verify he is living at the shelter, receiving his mail there. Patient stated he is staying at the shelter and while his mail goes to the shelter.  He does not always pick it up.

## 2025-01-09 NOTE — THERAPY
Occupational Therapy  Daily Treatment     Patient Name: Robert Mcdonnell  Age:  74 y.o., Sex:  male  Medical Record #: [id number]  Today's Date: 1/9/2025     Precautions  Precautions: Fall Risk, Swallow Precautions  Comments: LE wounds    Assessment    Pt seen for OT session. Progressing with functional mobility, balance, and pain. Continues to be limited by decreased functional mobility, activity tolerance, cognition, balance, and pain which are currently affecting pt's ability to complete ADLs/txfs/IADLs at baseline. Will continue to follow.     Plan    Treatment Plan Status: Modify Current Treatment Plan  Type of Treatment: Self Care / Activities of Daily Living, Neuro Re-Education / Balance, Therapeutic Exercises, Therapeutic Activity, Adaptive Equipment  Treatment Frequency: 2 Times per Week  Treatment Duration: Until Therapy Goals Met    DC Equipment Recommendations: Tub / Shower Seat, Raised Toilet Seat with Arms, Reacher  Discharge Recommendations: Other - (May be a good candidate for [facility] for OTHH and wound care)     Objective     01/09/25 1001   Precautions   Precautions Fall Risk;Swallow Precautions   Comments LE wounds   Vitals   O2 Delivery Device None - Room Air   Pain 0 - 10 Group   Location Leg   Location Orientation Left   Therapist Pain Assessment Post Activity;During Activity;Nurse Notified  (not quantified; reports pain in improving)   Cognition    Cognition / Consciousness X   Level of Consciousness Alert   New Learning Impaired   Comments very pleasant and cooperative; receptive to education   Passive ROM Upper Body   Passive ROM Upper Body WDL   Active ROM Upper Body   Active ROM Upper Body  WDL   Strength Upper Body   Comments functional for light ADLs   Other Treatments   Other Treatments Provided Continued education on adaptive techniques for ADL/txfs, safety with FWW during ADLs, edema/pain mgmt, and importance of continued OOB activity. Questionable retention; recommend  reinforcement.   Balance   Sitting Balance (Static) Fair +   Sitting Balance (Dynamic) Fair +   Standing Balance (Static) Fair   Standing Balance (Dynamic) Fair -   Weight Shift Sitting Good   Weight Shift Standing Fair   Skilled Intervention Verbal Cuing;Compensatory Strategies   Comments w/FWW   Bed Mobility    Supine to Sit Supervised   Sit to Supine Supervised   Scooting Supervised   Skilled Intervention Verbal Cuing;Compensatory Strategies   Comments HOB elevated   Activities of Daily Living   Eating Supervision   Grooming Minimal Assist;Standing;Seated  (washing hands and sponge bathing sitting EOB)   Bathing Standby Assist  (sponge bathes at baseline)   Upper Body Dressing Minimal Assist   Lower Body Dressing Moderate Assist   Toileting Minimal Assist  (able to get to toilet)   Skilled Intervention Verbal Cuing;Tactile Cuing;Sequencing;Compensatory Strategies;Facilitation   Functional Mobility   Sit to Stand Standby Assist   Bed, Chair, Wheelchair Transfer Contact Guard Assist   Toilet Transfers Contact Guard Assist   Transfer Method Stand Step   Mobility w/FWW; v/cs for safety w/txfs. bed>toilet>sink>EOB   Skilled Intervention Verbal Cuing;Tactile Cuing;Compensatory Strategies   Activity Tolerance   Comments limited by pain and cognition   Patient / Family Goals   Patient / Family Goal #1 to get stronger   Goal #1 Outcome Progressing as expected   Short Term Goals   Short Term Goal # 1 Pt will complete toilet transfers Radha   Goal Outcome # 1 Goal met, new goal added   Short Term Goal # 1 B  toilet txf with SPV and no v/cs for safety/technique   Short Term Goal # 2 Pt will complete LB dressing with A/E Radha   Goal Outcome # 2 Progressing slower than expected   Short Term Goal # 3 Pt will complete standing grooming tasks Radha   Goal Outcome # 3 Goal met, new goal added   Short Term Goal # 3 B standing g/h w/SPV and no v/cs for technique w/FWW or for safety   Education Group   Education Provided Pathology of  bedrest   Pathology of Bedrest Patient Response Patient;Acceptance;Explanation;Reinforcement Needed;No Learning Evidence

## 2025-01-10 LAB
ANION GAP SERPL CALC-SCNC: 5 MMOL/L (ref 7–16)
BUN SERPL-MCNC: 15 MG/DL (ref 8–22)
CALCIUM SERPL-MCNC: 8.3 MG/DL (ref 8.5–10.5)
CHLORIDE SERPL-SCNC: 103 MMOL/L (ref 96–112)
CO2 SERPL-SCNC: 26 MMOL/L (ref 20–33)
CREAT SERPL-MCNC: 0.86 MG/DL (ref 0.5–1.4)
ERYTHROCYTE [DISTWIDTH] IN BLOOD BY AUTOMATED COUNT: 55.5 FL (ref 35.9–50)
GFR SERPLBLD CREATININE-BSD FMLA CKD-EPI: 91 ML/MIN/1.73 M 2
GLUCOSE BLD STRIP.AUTO-MCNC: 119 MG/DL (ref 65–99)
GLUCOSE BLD STRIP.AUTO-MCNC: 143 MG/DL (ref 65–99)
GLUCOSE BLD STRIP.AUTO-MCNC: 63 MG/DL (ref 65–99)
GLUCOSE BLD STRIP.AUTO-MCNC: 80 MG/DL (ref 65–99)
GLUCOSE SERPL-MCNC: 91 MG/DL (ref 65–99)
HCT VFR BLD AUTO: 28 % (ref 42–52)
HGB BLD-MCNC: 9 G/DL (ref 14–18)
MCH RBC QN AUTO: 28 PG (ref 27–33)
MCHC RBC AUTO-ENTMCNC: 32.1 G/DL (ref 32.3–36.5)
MCV RBC AUTO: 87.2 FL (ref 81.4–97.8)
PHOSPHATE SERPL-MCNC: 2.6 MG/DL (ref 2.5–4.5)
PLATELET # BLD AUTO: 291 K/UL (ref 164–446)
PMV BLD AUTO: 10.4 FL (ref 9–12.9)
POTASSIUM SERPL-SCNC: 4.6 MMOL/L (ref 3.6–5.5)
RBC # BLD AUTO: 3.21 M/UL (ref 4.7–6.1)
SODIUM SERPL-SCNC: 134 MMOL/L (ref 135–145)
WBC # BLD AUTO: 7.7 K/UL (ref 4.8–10.8)

## 2025-01-10 PROCEDURE — 80048 BASIC METABOLIC PNL TOTAL CA: CPT

## 2025-01-10 PROCEDURE — 700102 HCHG RX REV CODE 250 W/ 637 OVERRIDE(OP): Performed by: HOSPITALIST

## 2025-01-10 PROCEDURE — 700102 HCHG RX REV CODE 250 W/ 637 OVERRIDE(OP): Performed by: INTERNAL MEDICINE

## 2025-01-10 PROCEDURE — 84100 ASSAY OF PHOSPHORUS: CPT

## 2025-01-10 PROCEDURE — 99232 SBSQ HOSP IP/OBS MODERATE 35: CPT | Performed by: STUDENT IN AN ORGANIZED HEALTH CARE EDUCATION/TRAINING PROGRAM

## 2025-01-10 PROCEDURE — 82962 GLUCOSE BLOOD TEST: CPT | Mod: 91

## 2025-01-10 PROCEDURE — A9270 NON-COVERED ITEM OR SERVICE: HCPCS | Performed by: HOSPITALIST

## 2025-01-10 PROCEDURE — A9270 NON-COVERED ITEM OR SERVICE: HCPCS | Performed by: STUDENT IN AN ORGANIZED HEALTH CARE EDUCATION/TRAINING PROGRAM

## 2025-01-10 PROCEDURE — A9270 NON-COVERED ITEM OR SERVICE: HCPCS | Performed by: INTERNAL MEDICINE

## 2025-01-10 PROCEDURE — 700102 HCHG RX REV CODE 250 W/ 637 OVERRIDE(OP): Performed by: STUDENT IN AN ORGANIZED HEALTH CARE EDUCATION/TRAINING PROGRAM

## 2025-01-10 PROCEDURE — 770006 HCHG ROOM/CARE - MED/SURG/GYN SEMI*

## 2025-01-10 PROCEDURE — 85027 COMPLETE CBC AUTOMATED: CPT

## 2025-01-10 PROCEDURE — 36415 COLL VENOUS BLD VENIPUNCTURE: CPT

## 2025-01-10 RX ADMIN — APIXABAN 5 MG: 5 TABLET, FILM COATED ORAL at 16:27

## 2025-01-10 RX ADMIN — SENNOSIDES AND DOCUSATE SODIUM 2 TABLET: 50; 8.6 TABLET ORAL at 16:26

## 2025-01-10 RX ADMIN — OMEPRAZOLE 20 MG: 20 CAPSULE, DELAYED RELEASE ORAL at 04:44

## 2025-01-10 RX ADMIN — DAKIN'S SOLUTION 0.125% (QUARTER STRENGTH) 1 ML: 0.12 SOLUTION at 16:29

## 2025-01-10 RX ADMIN — METOPROLOL SUCCINATE 25 MG: 25 TABLET, EXTENDED RELEASE ORAL at 04:44

## 2025-01-10 RX ADMIN — DAKIN'S SOLUTION 0.125% (QUARTER STRENGTH) 15 ML: 0.12 SOLUTION at 04:49

## 2025-01-10 RX ADMIN — DIGOXIN 125 MCG: 0.12 TABLET ORAL at 16:26

## 2025-01-10 RX ADMIN — POLYETHYLENE GLYCOL 3350 1 PACKET: 17 POWDER, FOR SOLUTION ORAL at 04:44

## 2025-01-10 RX ADMIN — APIXABAN 5 MG: 5 TABLET, FILM COATED ORAL at 04:44

## 2025-01-10 RX ADMIN — SPIRONOLACTONE 25 MG: 25 TABLET ORAL at 04:44

## 2025-01-10 RX ADMIN — LOSARTAN POTASSIUM 25 MG: 25 TABLET, FILM COATED ORAL at 04:44

## 2025-01-10 RX ADMIN — OXYCODONE 5 MG: 5 TABLET ORAL at 03:55

## 2025-01-10 RX ADMIN — OXYCODONE 5 MG: 5 TABLET ORAL at 15:26

## 2025-01-10 ASSESSMENT — PAIN DESCRIPTION - PAIN TYPE
TYPE: ACUTE PAIN

## 2025-01-10 ASSESSMENT — ENCOUNTER SYMPTOMS
WEAKNESS: 1
NAUSEA: 0
FEVER: 0
ABDOMINAL PAIN: 0
SHORTNESS OF BREATH: 0
VOMITING: 0

## 2025-01-10 NOTE — CARE PLAN
The patient is Stable - Low risk of patient condition declining or worsening    Shift Goals  Clinical Goals: Keep patients pain below 3/10, keep wound clean and dry during shift  Patient Goals: Sleep comfortable, stay up to date on transfer to facility  Family Goals: no family at bedside    Progress made toward(s) clinical / shift goals:      Patient has been resting comfortably during shift, he states pain is still 3/10. Will continue to monitor patients pain level and comfort until end of shift.

## 2025-01-10 NOTE — PROGRESS NOTES
4 Eyes Skin Assessment Completed by JANNETTE Cueva and JANNETTE Scott.    Head Scab  Ears WDL  Nose WDL  Mouth WDL  Neck WDL  Breast/Chest WDL  Shoulder Blades WDL  Spine WDL  (R) Arm/Elbow/Hand Scab  (L) Arm/Elbow/Hand Scab  Abdomen Scar  Groin Discoloration  Scrotum/Coccyx/Buttocks Redness, Blanching, and Discoloration, Scar  (R) Leg WDL  (L) Leg Edema, wound, dressing in place  (R) Heel/Foot/Toe Redness, Blanching, and Boggy  (L) Heel/Foot/Toe Redness, Blanching, and Boggy          Devices In Places PIV      Interventions In Place Sacral Mepilex, TAP System, Q2 Turns, Barrier Cream, and Dri-Alan Pads    Possible Skin Injury Yes    Pictures Uploaded Into Epic Yes  Wound Consult Placed Yes, already following  RN Wound Prevention Protocol Ordered Yes, already ordered

## 2025-01-10 NOTE — PROGRESS NOTES
4 Eyes Skin Assessment Completed by JANNETTE ePter and JANNETTE Clark.    Head WDL  Ears WDL  Nose WDL  Mouth WDL  Neck WDL  Breast/Chest WDL  Shoulder Blades WDL  Spine WDL  (R) Arm/Elbow/Hand WDL  (L) Arm/Elbow/Hand WDL  Abdomen WDL  Groin - discoloration  Scrotum/Coccyx/Buttocks- discolored area, redness  (R) Leg - wdl  (L) Leg Edema, wound shin/back of leg  (R) Heel/Foot/Toe Blanching and Boggy- refusing heel float boots  (L) Heel/Foot/Toe Blanching and Boggy- refusing heel float boots           Devices In Places - PIV      Interventions In Place Sacral Mepilex, TAP System, and Q2 Turns    Possible Skin Injury Yes    Pictures Uploaded Into Epic Yes  Wound Consult Placed Yes  RN Wound Prevention Protocol Ordered Yes

## 2025-01-10 NOTE — PROGRESS NOTES
4 Eyes Skin Assessment Completed by JANNETTE Peetr and AJNNETTE Hendricks.    Head WDL  Ears WDL  Nose WDL  Mouth WDL  Neck WDL  Breast/Chest WDL  Shoulder Blades WDL  Spine Redness and Blanching ( top of spine)   (R) Arm/Elbow/Hand WDL  (L) Arm/Elbow/Hand WDL  Abdomen WDL  Groin- redness  Scrotum/Coccyx/Buttocks Redness and Blanching- slow to jenniffer, old scar   (R) Leg WDL  (L) Leg Edema- wound- dressing in place  (R) Heel/Foot/Toe Redness, Blanching, and Boggy- refuses mepilex/heel float boots   (L) Heel/Foot/Toe Redness, Blanching, and Boggy- refuses mepilex and heel float boots           Devices In Places- PIV, condom cath        Interventions In Place Sacral Mepilex, TAP System, Q2 Turns, and Dri-Alan Pads    Possible Skin Injury - yes    Pictures Uploaded Into Epic N/A  Wound Consult Placed Yes- already following  RN Wound Prevention Protocol Ordered Yes

## 2025-01-10 NOTE — CARE PLAN
The patient is Stable - Low risk of patient condition declining or worsening    Shift Goals  Clinical Goals: Patient to remain free from falls, patient to remain at less than 3/10 pain during shift  Patient Goals: Stay at a pain level below 3/10 rest  Family Goals: no family at bedside    Progress made toward(s) clinical / shift goals:      Patient states he has been at a 1/10 for pain most of the day during shift

## 2025-01-10 NOTE — DIETARY
Nutrition Update: Follow-up for PO > 50%   Day 22 of admit.  Robert Mcdonnell is a 74 y.o. male with admitting DX of Acute left-sided CHF (congestive heart failure) (HCC) [I50.1]  Streptococcal bacteremia [R78.81, B95.5]  Left leg cellulitis [L03.116].    Current Diet: Level 7, easy to chew    Malnutrition risk: Severe Malnutrition in context of Chronic Illness related to muscle and fat wasting as evidenced by Severe muscle loss at (temple, shoulder, clavicle) Severe muscle loss at (orbital, buccal, triceps)      Nutrition Dx Status: Ongoing     Patient PO still remain poor and not progress to = 50% at this time. PO inconsistent in past 3 days.  x1 75- 100%. x1 < 25%. x1 25- 50%. Still experiencing low BG 2/2 poor oral intake.     Problem: Nutritional:  Goal: Achieve adequate nutritional intake  Description: Patient will consume > 50%  of meals  Outcome: NOT MEET     RD following

## 2025-01-10 NOTE — PROGRESS NOTES
[facility] Medicine Daily Progress Note    Date of Service  1/9/2025    Chief Complaint  Robert Mcdonnell is a 74 y.o. male admitted 12/19/2024 with lower extremity edema and shortness of breath.    Hospital Course  Robert Mcdonnell is a 74-year-old male with PMHx homelessness, chronic atrial fibrillation on apixaban, HFrEF, pulmonary hypertension, perforated gastric ulcer s/p gastrectomy.    Per history: recent hospitalization 10/3 - 10/15 for perforated  secondary to invasive gastric adenocarcinoma, GDA embolization, subsequent subtotal gastrectomy, liver wedge resection, Miguel Angel-en-Y gastrojejunostomy with omental flap and cholecystectomy 10/7 with pathology showing invasive well-differentiated adenocarcinoma with associated ulceration. There was tumor invasion into the muscularis propria and 20 lymph nodes were negative for metastatic carcinoma. Oncology was consulted and noted no evidence of metastatic disease however MRI abdomen recommended for follow-up; no adjuvant therapy was recommended.     Patient was readmitted 12/19 for worsening lower extremity edema and shortness of breath.  He initially required BiPAP and IMCU admission.  Per history-patient had run out of his Lasix at home.  EKG in the emergency room showing irregular wide-complex arrhythmia.      Additionally, blood cultures were positive for group A strep bacteremia secondary to left lower extremity infection.  CT LLE: Small to moderate suprapatellar joint effusion.  MRI LLE: Diffuse cellulitis, patchy myositis, negative for OM.  Orthopedic surgery was consulted and recommended medical management. Infectious disease was consulted and recommended IV Unasyn with an end date of 1/4 and clindamycin with an end date of 12/31.    For patient's HFrEF-he was started on GDMT with effective diuresis.    Patient continued to have hypoglycemic episodes due to poor p.o. intake.    MRI left lower extremity limited due to motion but no obvious osteomyelitis or bone  lesions identified.  Completed IV Unasyn on 1/4.      Interval Problem Update  I have seen and examined the patient at bedside  Completed antibiotics    Continue wound care  Continue digoxin and Eliquis    I have discussed this patient's plan of care and discharge plan at IDT rounds today with Case Management, Nursing, Nursing leadership, and other members of the IDT team.    Consultants/Specialty  critical care, infectious disease, and orthopedics    Code Status  Full Code    Disposition  The patient is not medically cleared for discharge to home or a post-acute facility.      I have placed the appropriate orders for post-discharge needs.    Review of Systems  Review of Systems   Constitutional:  Positive for malaise/fatigue. Negative for fever.   Respiratory:  Negative for shortness of breath.    Cardiovascular:  Negative for chest pain and leg swelling.   Gastrointestinal:  Negative for abdominal pain, nausea and vomiting.   Neurological:  Positive for weakness.        Physical Exam  Temp:  [36.6 °C (97.8 °F)-37.2 °C (98.9 °F)] 36.9 °C (98.5 °F)  Pulse:  [60-88] 71  Resp:  [16-17] 17  BP: ()/(52-64) 113/64  SpO2:  [97 %-100 %] 97 %    Physical Exam  Vitals and nursing note reviewed.   Constitutional:       General: He is not in acute distress.     Appearance: Normal appearance. He is ill-appearing.      Comments: Frail appearing    Cardiovascular:      Rate and Rhythm: Normal rate and regular rhythm.   Pulmonary:      Effort: Pulmonary effort is normal.      Breath sounds: Normal breath sounds.   Musculoskeletal:      Comments: Healing right lower extremity wound with granulation tissue present based on wound care note from 1/5   Skin:     General: Skin is warm and dry.   Neurological:      Mental Status: He is alert and oriented to person, place, and time. Mental status is at baseline.   Psychiatric:         Mood and Affect: Mood normal.         Behavior: Behavior normal.         Fluids    Intake/Output  Summary (Last 24 hours) at 1/9/2025 1744  Last data filed at 1/8/2025 1800  Gross per 24 hour   Intake --   Output 250 ml   Net -250 ml        Laboratory                              Imaging  [id number]-W/O LEFT   Final Result      1.  Very limited examination due to patient motion artifact. Patient was also unable to complete the examination and therefore contrast-enhanced sequences were not obtained.      2.  No evidence of marrow edema or bone lesion.      3.  Again seen diffuse soft tissue edema/induration likely representing cellulitis. This also patchy muscle edema suggesting myositis.      4.  No focal fluid collection to suggest presence of abscess.      EY-TPSLYOR-9 VIEW   Final Result      No evidence of bowel obstruction. Mild constipation.      MR-KNEE-WITH & W/O LEFT   Final Result      1.  No evidence of marrow edema or abnormal enhancement to suggest presence of osteomyelitis.      2.  Small to moderate joint effusion with mild synovitis.      3.  Diffuse soft tissue edema/induration consistent with cellulitis.      4.  Small popliteal cyst.      5.  No evidence of soft tissue abscess.      6.  Markedly motion degraded examination. Ligaments and menisci are inadequately evaluated due to patient motion.      MR-LOWER EXTREMITY-NO JOINT-W/O LEFT   Final Result      1.  Patient motion degraded exam.      2.  No evidence of marrow edema or bone lesion to suggest presence of osteomyelitis.      3.  Diffuse cellulitis and patchy myositis.      4.  No evidence of focal fluid collection to suggest presence of abscess.      CT-EXTREMITY, LOWER WITH LEFT   Final Result      1.  Extensive subcutaneous inflammatory changes about the left lower extremity consistent with deep and superficial cellulitis.      2.  No definite CT evidence of necrotizing fasciitis.      3.  No CT evidence of deep abscess formation or acute or chronic osteomyelitis.      4.  Small to moderate suprapatellar joint effusion.       CT-EXTREMITY, LOWER WITH LEFT   Final Result      No soft tissue gas to suggest necrotizing fasciitis.      No fluid collection identified.      DX-CHEST-PORTABLE (1 VIEW)   Final Result         1.  Hazy left pulmonary infiltrates, similar to prior study   2.  Trace left pleural effusion, stable   3.  Cardiomegaly   4.  Atherosclerosis      DX-CHEST-PORTABLE (1 VIEW)   Final Result         1.  Hazy left pulmonary infiltrates, similar to prior study   2.  Trace left pleural effusion, stable   3.  Cardiomegaly   4.  Atherosclerosis      EC-ECHOCARDIOGRAM COMPLETE W/O CONT   Final Result      CT-CHEST,ABDOMEN,PELVIS WITH   Final Result      1. Stable spiculated opacity in the right upper lobe, extending to the pleural surface.   2. Lingular and left lower lobe parenchymal airspace disease has improved in the interval, but has not completely resolved. There is pleural reaction, suggesting some of these areas may represent parenchymal scarring.   3. Asymmetry of the lung volumes, small on the left than the right.   4. Cardiomegaly with atherosclerotic calcifications in the aorta and coronary arteries.   5. Postsurgical changes in the stomach.   6. Stable scattered multiple hypodense lesions throughout the liver.   7. Moderate ascites throughout the abdomen and pelvis.   8. Cachexia.   9. Enlarged left inguinal lymph nodes with central necrosis. There is edema involving the soft tissues of the upper left leg extending to the left pelvis.      US-EXTREMITY VENOUS LOWER BILAT   Final Result      DX-CHEST-PORTABLE (1 VIEW)   Final Result         1.  Hazy left pulmonary infiltrates.   2.  Small left pleural effusion   3.  Cardiomegaly           Assessment/Plan  * Acute exacerbation of CHF (congestive heart failure) (HCC)- (present on admission)  Assessment & Plan  Echo: EF 28%; severe tricuspid and mitral regurg.  RVSP elevated at 55  - Continue GDMT with losartan, spironolactone, metoprolol, Farxiga  - Continue digoxin  -  Maintain euvolemia    Constipation  Assessment & Plan  I ordered a bowel management  Suppository today    Hypoglycemia  Assessment & Plan  Multiple episodes of hypoglycemia requiring intervention  Additional episodes of hypoglycemia today  Poor p.o. intake  - Continue to encourage PO intake   - Nutrition consulted  - Discontinue Farxiga due to hypoglycemia    Streptococcal bacteremia- (present on admission)  Assessment & Plan  Blood cultures 12/19 positive for group A strep  Repeat blood cultures 12/21 NGTD    Pulmonary hypertension (HCC)  Assessment & Plan  Previous Echo RVSP 65, severe TR  Suspect mainly Group 2  RV perfusion with MAP > 65 as need norepinepinephrine  Blood pressure is running on the lower side holding diuretic therapy at this time    Acute respiratory failure with hypoxia (HCC)- (present on admission)  Assessment & Plan  Improved off Bipap  Weaned to room air    Hypophosphatemia- (present on admission)  Assessment & Plan  Replaced    Malignant neoplasm of body of stomach (HCC)- (present on admission)  Assessment & Plan  T2, N0, M0 invasive gastric adenocarcinoma presenting as perforated  10/2024  -GDA embolization  -10/7 subtotal gastrectomy, liver wedge resection, Miguel Angel-en-Y gastrojejunostomy with omental flap and cholecystectomy  -pathology invasive well-differentiated adenocarcinoma with associated ulceration  -tumor invasion into the muscularis propria and 20 lymph nodes were negative for metastatic carcinoma  -Oncology was consulted and noted no evidence of metastatic disease  -MRI abdomen recommended for follow-up; no adjuvant therapy was recommended  He will need outpatient follow-up    Homeless- (present on admission)  Assessment & Plan  Patient is currently homeless.  He is unsure if he is able to return to [facility].  Wheelchair has been ordered.  Discussed with case management today.    Severe protein-calorie malnutrition (HCC)- (present on admission)  Assessment & Plan  Discussed  with nutrition, patient meets the ASPEN criteria of severe malnutrition  Continuous nutrition support    COPD (chronic obstructive pulmonary disease) (HCC)- (present on admission)  Assessment & Plan  Current active smoker, no PFTs on file  RT protocols, bronchodilators as needed  No signs of exacerbation    Acute kidney injury (HCC)- (present on admission)  Assessment & Plan  Resolved     Sepsis (HCC)- (present on admission)  Assessment & Plan  See above    Atrial fibrillation with RVR (HCC)- (present on admission)  Assessment & Plan  Chronic atrial fibrillation, currently tachycardia  Digoxin 250 mcg IV monitoring closely with flip to prevent digoxin toxicity    Continue digoxin and Eliquis  12/27 BP better, I resumed Toprol 25 mg    Thrombocytopenia (HCC)- (present on admission)  Assessment & Plan  Platelets 288  - Repeat CBC in a.m.  - Stable to continue Eliquis at this time    Tobacco dependence- (present on admission)  Assessment & Plan  Counseling when appropriate    Hypertension- (present on admission)  Assessment & Plan  SBP well-controlled  - Continue losartan, metoprolol, spironolactone    Wound of lower extremity- (present on admission)  Assessment & Plan  Likely source of infection, lower ext doppler negative  CT left leg shows small to moderate suprapatellar joint effusion.  MRI LLE showed Diffuse cellulitis and patchy myositis, negative for osteomyelitis or abscess.   Repeat MRI 12/30: Limited due to patient motion artifact; no obvious OM noted persistent cellulitis and myositis noted  - Infectious disease consulted  - IV Unasyn regimen completed 1/4  - P.o. clindamycin completed 12/31  - Orthopedic surgery consultation 12/20 and 12/25 no surgical interventions at this time    ACP (advance care planning)- (present on admission)  Assessment & Plan  Patient admitted with CHF exacerbation, significantly reduced the EF of 30%.  Also found bacteremia, LLE infection, severe malnutrition.  History of invasive  gastric malignancy, A-fib RVR, PE, gastric perforation post extensive surgery including subtotal gastrectomy, liver resection  Miguel Angel-en-Y gastrojejunostomy with omental flap and cholecystectomy.  I discussed the CODE STATUS with him, including CPR, intubation/mechanical ventilation.  He wants to stay full code.  Patient has high complexity and guarded prognosis.  I consulted palliative care.  ACP 18 minutes.          VTE prophylaxis: Eliquis    I have performed a physical exam and reviewed and updated ROS and Plan today (1/9/2025). In review of yesterday's note (1/8/2025), there are no changes except as documented above.    I spent 36 minutes for chart review, meeting and examining the patient, documenting, ordering medications and tests, interpreting the results independently, and communicating with the other health professionals.      VTE Selection

## 2025-01-10 NOTE — CARE PLAN
The patient is Stable - Low risk of patient condition declining or worsening    Shift Goals  Clinical Goals: Wound care, Monitor BG, Pain less then 3/10 throughout shift  Patient Goals: Rest, comfort  Family Goals: no family at bedside    Progress made toward(s) clinical / shift goals:  Patient alert and oriented x4. Patient c/o pain, administered medications per MAR. Respirations even and unlabored. Patient's B, given orange juice and food. 2rn skin check completed, reminded pt to turn self often, with AREN, dressing change completed to help maintain skin integrity. Patient able to make needs known, bed in lowest position, call light within reach, bed alarm on.      Problem: Wound/ / Incision Healing  Goal: Patient's wound/surgical incision will decrease in size and heals properly  Outcome: Progressing     Problem: Fall Risk  Goal: Patient will remain free from falls  Outcome: Progressing     Problem: Skin Integrity  Goal: Skin integrity is maintained or improved  Outcome: Progressing     Problem: Knowledge Deficit - Standard  Goal: Patient and family/care givers will demonstrate understanding of plan of care, disease process/condition, diagnostic tests and medications  Outcome: Progressing       Patient is not progressing towards the following goals:

## 2025-01-11 LAB
GLUCOSE BLD STRIP.AUTO-MCNC: 82 MG/DL (ref 65–99)
GLUCOSE BLD STRIP.AUTO-MCNC: 95 MG/DL (ref 65–99)

## 2025-01-11 PROCEDURE — 700102 HCHG RX REV CODE 250 W/ 637 OVERRIDE(OP): Performed by: HOSPITALIST

## 2025-01-11 PROCEDURE — 700102 HCHG RX REV CODE 250 W/ 637 OVERRIDE(OP): Performed by: INTERNAL MEDICINE

## 2025-01-11 PROCEDURE — 99232 SBSQ HOSP IP/OBS MODERATE 35: CPT | Performed by: STUDENT IN AN ORGANIZED HEALTH CARE EDUCATION/TRAINING PROGRAM

## 2025-01-11 PROCEDURE — 700102 HCHG RX REV CODE 250 W/ 637 OVERRIDE(OP): Performed by: STUDENT IN AN ORGANIZED HEALTH CARE EDUCATION/TRAINING PROGRAM

## 2025-01-11 PROCEDURE — A9270 NON-COVERED ITEM OR SERVICE: HCPCS | Performed by: STUDENT IN AN ORGANIZED HEALTH CARE EDUCATION/TRAINING PROGRAM

## 2025-01-11 PROCEDURE — A9270 NON-COVERED ITEM OR SERVICE: HCPCS | Performed by: INTERNAL MEDICINE

## 2025-01-11 PROCEDURE — 770006 HCHG ROOM/CARE - MED/SURG/GYN SEMI*

## 2025-01-11 PROCEDURE — 82962 GLUCOSE BLOOD TEST: CPT

## 2025-01-11 PROCEDURE — A9270 NON-COVERED ITEM OR SERVICE: HCPCS | Performed by: HOSPITALIST

## 2025-01-11 RX ADMIN — DAKIN'S SOLUTION 0.125% (QUARTER STRENGTH) 10 ML: 0.12 SOLUTION at 16:59

## 2025-01-11 RX ADMIN — ACETAMINOPHEN 650 MG: 325 TABLET ORAL at 09:18

## 2025-01-11 RX ADMIN — SPIRONOLACTONE 25 MG: 25 TABLET ORAL at 04:35

## 2025-01-11 RX ADMIN — OXYCODONE 5 MG: 5 TABLET ORAL at 04:36

## 2025-01-11 RX ADMIN — APIXABAN 5 MG: 5 TABLET, FILM COATED ORAL at 16:31

## 2025-01-11 RX ADMIN — OMEPRAZOLE 20 MG: 20 CAPSULE, DELAYED RELEASE ORAL at 04:35

## 2025-01-11 RX ADMIN — APIXABAN 5 MG: 5 TABLET, FILM COATED ORAL at 04:35

## 2025-01-11 RX ADMIN — OXYCODONE 5 MG: 5 TABLET ORAL at 15:21

## 2025-01-11 RX ADMIN — LOSARTAN POTASSIUM 25 MG: 25 TABLET, FILM COATED ORAL at 04:36

## 2025-01-11 RX ADMIN — METOPROLOL SUCCINATE 25 MG: 25 TABLET, EXTENDED RELEASE ORAL at 04:35

## 2025-01-11 RX ADMIN — DAKIN'S SOLUTION 0.125% (QUARTER STRENGTH) 5 ML: 0.12 SOLUTION at 05:52

## 2025-01-11 RX ADMIN — DIGOXIN 125 MCG: 0.12 TABLET ORAL at 16:31

## 2025-01-11 RX ADMIN — SENNOSIDES AND DOCUSATE SODIUM 2 TABLET: 50; 8.6 TABLET ORAL at 16:31

## 2025-01-11 ASSESSMENT — ENCOUNTER SYMPTOMS
VOMITING: 0
FEVER: 0
ABDOMINAL PAIN: 0
SHORTNESS OF BREATH: 0
NAUSEA: 0
WEAKNESS: 1

## 2025-01-11 ASSESSMENT — PAIN DESCRIPTION - PAIN TYPE
TYPE: ACUTE PAIN

## 2025-01-11 NOTE — CARE PLAN
The patient is Stable - Low risk of patient condition declining or worsening    Shift Goals  Clinical Goals: Pain management/ skin integrity  Patient Goals: Rest  Family Goals: MARCOS    Progress made toward(s) clinical / shift goals:  Patient on daily wound dressing on LLE. Patient ambulates to the bathroom and back to bed, Resting comfortably, call light within reach,   Wound Dressing done, Patient refused heel float boots, heel mepilex.     Patient is not progressing towards the following goals:

## 2025-01-11 NOTE — PROGRESS NOTES
Hospital Medicine Daily Progress Note    Date of Service  1/10/2025    Chief Complaint  Robert Mcdonnell is a 74 y.o. male admitted 12/19/2024 with lower extremity edema and shortness of breath.    Hospital Course  Robert Mcdonnell is a 74-year-old male with PMHx homelessness, chronic atrial fibrillation on apixaban, HFrEF, pulmonary hypertension, perforated gastric ulcer s/p gastrectomy.    Per history: recent hospitalization 10/3 - 10/15 for perforated  secondary to invasive gastric adenocarcinoma, GDA embolization, subsequent subtotal gastrectomy, liver wedge resection, Miguel Angel-en-Y gastrojejunostomy with omental flap and cholecystectomy 10/7 with pathology showing invasive well-differentiated adenocarcinoma with associated ulceration. There was tumor invasion into the muscularis propria and 20 lymph nodes were negative for metastatic carcinoma. Oncology was consulted and noted no evidence of metastatic disease however MRI abdomen recommended for follow-up; no adjuvant therapy was recommended.     Patient was readmitted 12/19 for worsening lower extremity edema and shortness of breath.  He initially required BiPAP and IMCU admission.  Per history-patient had run out of his Lasix at home.  EKG in the emergency room showing irregular wide-complex arrhythmia.      Additionally, blood cultures were positive for group A strep bacteremia secondary to left lower extremity infection.  CT LLE: Small to moderate suprapatellar joint effusion.  MRI LLE: Diffuse cellulitis, patchy myositis, negative for OM.  Orthopedic surgery was consulted and recommended medical management. Infectious disease was consulted and recommended IV Unasyn with an end date of 1/4 and clindamycin with an end date of 12/31.    For patient's HFrEF-he was started on GDMT with effective diuresis.    Patient continued to have hypoglycemic episodes due to poor p.o. intake.    MRI left lower extremity limited due to motion but no obvious osteomyelitis or bone  lesions identified.  Completed IV Unasyn on 1/4.      Interval Problem Update  I have seen and examined the patient at bedside  Completed antibiotics     continue wound care  Continue digoxin and Eliquis  Heart rate and blood pressure stable in the normal range      I have discussed this patient's plan of care and discharge plan at IDT rounds today with Case Management, Nursing, Nursing leadership, and other members of the IDT team.    Consultants/Specialty  critical care, infectious disease, and orthopedics    Code Status  Full Code    Disposition  The patient is medically cleared for discharge to home or a post-acute facility.      I have placed the appropriate orders for post-discharge needs.    Review of Systems  Review of Systems   Constitutional:  Positive for malaise/fatigue. Negative for fever.   Respiratory:  Negative for shortness of breath.    Cardiovascular:  Negative for chest pain and leg swelling.   Gastrointestinal:  Negative for abdominal pain, nausea and vomiting.   Neurological:  Positive for weakness.        Physical Exam  Temp:  [36.4 °C (97.5 °F)-36.7 °C (98 °F)] 36.7 °C (98 °F)  Pulse:  [67-72] 67  Resp:  [17-18] 17  BP: ()/(58-67) 98/58  SpO2:  [93 %-100 %] 93 %    Physical Exam  Vitals and nursing note reviewed.   Constitutional:       General: He is not in acute distress.     Appearance: Normal appearance. He is ill-appearing.      Comments: Frail appearing    Cardiovascular:      Rate and Rhythm: Normal rate and regular rhythm.   Pulmonary:      Effort: Pulmonary effort is normal.      Breath sounds: Normal breath sounds.   Musculoskeletal:      Comments: Healing right lower extremity wound with granulation tissue present based on wound care note from 1/5   Skin:     General: Skin is warm and dry.   Neurological:      Mental Status: He is alert and oriented to person, place, and time. Mental status is at baseline.   Psychiatric:         Mood and Affect: Mood normal.         Behavior:  Behavior normal.         Fluids    Intake/Output Summary (Last 24 hours) at 1/10/2025 1752  Last data filed at 1/10/2025 0829  Gross per 24 hour   Intake 440 ml   Output 1500 ml   Net -1060 ml        Laboratory  Recent Labs     01/10/25  0835   WBC 7.7   RBC 3.21*   HEMOGLOBIN 9.0*   HEMATOCRIT 28.0*   MCV 87.2   MCH 28.0   MCHC 32.1*   RDW 55.5*   PLATELETCT 291   MPV 10.4       Recent Labs     01/10/25  0835   SODIUM 134*   POTASSIUM 4.6   CHLORIDE 103   CO2 26   GLUCOSE 91   BUN 15   CREATININE 0.86   CALCIUM 8.3*                       Imaging  [id number]-W/O LEFT   Final Result      1.  Very limited examination due to patient motion artifact. Patient was also unable to complete the examination and therefore contrast-enhanced sequences were not obtained.      2.  No evidence of marrow edema or bone lesion.      3.  Again seen diffuse soft tissue edema/induration likely representing cellulitis. This also patchy muscle edema suggesting myositis.      4.  No focal fluid collection to suggest presence of abscess.      [id number] VIEW   Final Result      No evidence of bowel obstruction. Mild constipation.      MR-KNEE-WITH & W/O LEFT   Final Result      1.  No evidence of marrow edema or abnormal enhancement to suggest presence of osteomyelitis.      2.  Small to moderate joint effusion with mild synovitis.      3.  Diffuse soft tissue edema/induration consistent with cellulitis.      4.  Small popliteal cyst.      5.  No evidence of soft tissue abscess.      6.  Markedly motion degraded examination. Ligaments and menisci are inadequately evaluated due to patient motion.      MR-LOWER EXTREMITY-NO JOINT-W/O LEFT   Final Result      1.  Patient motion degraded exam.      2.  No evidence of marrow edema or bone lesion to suggest presence of osteomyelitis.      3.  Diffuse cellulitis and patchy myositis.      4.  No evidence of focal fluid collection to suggest presence of abscess.      CT-EXTREMITY, LOWER WITH  LEFT   Final Result      1.  Extensive subcutaneous inflammatory changes about the left lower extremity consistent with deep and superficial cellulitis.      2.  No definite CT evidence of necrotizing fasciitis.      3.  No CT evidence of deep abscess formation or acute or chronic osteomyelitis.      4.  Small to moderate suprapatellar joint effusion.      CT-EXTREMITY, LOWER WITH LEFT   Final Result      No soft tissue gas to suggest necrotizing fasciitis.      No fluid collection identified.      DX-CHEST-PORTABLE (1 VIEW)   Final Result         1.  Hazy left pulmonary infiltrates, similar to prior study   2.  Trace left pleural effusion, stable   3.  Cardiomegaly   4.  Atherosclerosis      DX-CHEST-PORTABLE (1 VIEW)   Final Result         1.  Hazy left pulmonary infiltrates, similar to prior study   2.  Trace left pleural effusion, stable   3.  Cardiomegaly   4.  Atherosclerosis      EC-ECHOCARDIOGRAM COMPLETE W/O CONT   Final Result      CT-CHEST,ABDOMEN,PELVIS WITH   Final Result      1. Stable spiculated opacity in the right upper lobe, extending to the pleural surface.   2. Lingular and left lower lobe parenchymal airspace disease has improved in the interval, but has not completely resolved. There is pleural reaction, suggesting some of these areas may represent parenchymal scarring.   3. Asymmetry of the lung volumes, small on the left than the right.   4. Cardiomegaly with atherosclerotic calcifications in the aorta and coronary arteries.   5. Postsurgical changes in the stomach.   6. Stable scattered multiple hypodense lesions throughout the liver.   7. Moderate ascites throughout the abdomen and pelvis.   8. Cachexia.   9. Enlarged left inguinal lymph nodes with central necrosis. There is edema involving the soft tissues of the upper left leg extending to the left pelvis.      US-EXTREMITY VENOUS LOWER BILAT   Final Result      DX-CHEST-PORTABLE (1 VIEW)   Final Result         1.  Hazy left pulmonary  infiltrates.   2.  Small left pleural effusion   3.  Cardiomegaly           Assessment/Plan  * Acute exacerbation of CHF (congestive heart failure) (HCC)- (present on admission)  Assessment & Plan  Echo: EF 28%; severe tricuspid and mitral regurg.  RVSP elevated at 55  - Continue GDMT with losartan, spironolactone, metoprolol, Farxiga  - Continue digoxin  - Maintain euvolemia    Constipation  Assessment & Plan  I ordered a bowel management  Suppository today    Hypoglycemia  Assessment & Plan  Multiple episodes of hypoglycemia requiring intervention  Additional episodes of hypoglycemia today  Poor p.o. intake  - Continue to encourage PO intake   - Nutrition consulted  - Discontinue Farxiga due to hypoglycemia    Streptococcal bacteremia- (present on admission)  Assessment & Plan  Blood cultures 12/19 positive for group A strep  Repeat blood cultures 12/21 NGTD    Pulmonary hypertension (HCC)  Assessment & Plan  Previous Echo RVSP 65, severe TR  Suspect mainly Group 2  RV perfusion with MAP > 65 as need norepinepinephrine  Blood pressure is running on the lower side holding diuretic therapy at this time    Acute respiratory failure with hypoxia (HCC)- (present on admission)  Assessment & Plan  Improved off Bipap  Weaned to room air    Hypophosphatemia- (present on admission)  Assessment & Plan  Replaced    Malignant neoplasm of body of stomach (HCC)- (present on admission)  Assessment & Plan  T2, N0, M0 invasive gastric adenocarcinoma presenting as perforated  10/2024  -GDA embolization  -10/7 subtotal gastrectomy, liver wedge resection, Miguel Angel-en-Y gastrojejunostomy with omental flap and cholecystectomy  -pathology invasive well-differentiated adenocarcinoma with associated ulceration  -tumor invasion into the muscularis propria and 20 lymph nodes were negative for metastatic carcinoma  -Oncology was consulted and noted no evidence of metastatic disease  -MRI abdomen recommended for follow-up; no adjuvant therapy  was recommended  He will need outpatient follow-up    Homeless- (present on admission)  Assessment & Plan  Patient is currently homeless.  He is unsure if he is able to return to [facility].  Wheelchair has been ordered.  Discussed with case management today.    Severe protein-calorie malnutrition (HCC)- (present on admission)  Assessment & Plan  Discussed with nutrition, patient meets the ASPEN criteria of severe malnutrition  Continuous nutrition support    COPD (chronic obstructive pulmonary disease) (HCC)- (present on admission)  Assessment & Plan  Current active smoker, no PFTs on file  RT protocols, bronchodilators as needed  No signs of exacerbation    Acute kidney injury (HCC)- (present on admission)  Assessment & Plan  Resolved     Sepsis (HCC)- (present on admission)  Assessment & Plan  See above    Atrial fibrillation with RVR (HCC)- (present on admission)  Assessment & Plan  Chronic atrial fibrillation, currently tachycardia  Digoxin 250 mcg IV monitoring closely with flip to prevent digoxin toxicity    Continue digoxin and Eliquis  12/27 BP better, I resumed Toprol 25 mg    Thrombocytopenia (HCC)- (present on admission)  Assessment & Plan  Platelets 288  - Repeat CBC in a.m.  - Stable to continue Eliquis at this time    Tobacco dependence- (present on admission)  Assessment & Plan  Counseling when appropriate    Hypertension- (present on admission)  Assessment & Plan  SBP well-controlled  - Continue losartan, metoprolol, spironolactone    Wound of lower extremity- (present on admission)  Assessment & Plan  Likely source of infection, lower ext doppler negative  CT left leg shows small to moderate suprapatellar joint effusion.  MRI LLE showed Diffuse cellulitis and patchy myositis, negative for osteomyelitis or abscess.   Repeat MRI 12/30: Limited due to patient motion artifact; no obvious OM noted persistent cellulitis and myositis noted  - Infectious disease consulted  - IV Unasyn regimen completed  1/4  - P.o. clindamycin completed 12/31  - Orthopedic surgery consultation 12/20 and 12/25 no surgical interventions at this time    ACP (advance care planning)- (present on admission)  Assessment & Plan  Patient admitted with CHF exacerbation, significantly reduced the EF of 30%.  Also found bacteremia, LLE infection, severe malnutrition.  History of invasive gastric malignancy, A-fib RVR, PE, gastric perforation post extensive surgery including subtotal gastrectomy, liver resection  Miguel Angel-en-Y gastrojejunostomy with omental flap and cholecystectomy.  I discussed the CODE STATUS with him, including CPR, intubation/mechanical ventilation.  He wants to stay full code.  Patient has high complexity and guarded prognosis.  I consulted palliative care.  ACP 18 minutes.          VTE prophylaxis: Eliquis    I have performed a physical exam and reviewed and updated ROS and Plan today (1/10/2025). In review of yesterday's note (1/9/2025), there are no changes except as documented above.    I spent 36 minutes for chart review, meeting and examining the patient, documenting, ordering medications and tests, interpreting the results independently, and communicating with the other health professionals.      VTE Selection

## 2025-01-11 NOTE — CARE PLAN
The patient is Stable - Low risk of patient condition declining or worsening    Shift Goals  Clinical Goals: Patient will remain free from falls and injury throughout shift  Patient Goals: rest  Family Goals: MARCOS    Progress made toward(s) clinical / shift goals:  Falls precautions in place. Fall risk education provided. Skin breakdown precautions in place. Patient did remain free from falls and injury.    Patient is not progressing towards the following goals: progressing

## 2025-01-11 NOTE — PROGRESS NOTES
4 Eyes Skin Assessment Completed by JANNETTE Vigil and JANNETTE Morris.    Head Scab and Scar  Ears WDL  Nose WDL  Mouth WDL  Neck WDL  Breast/Chest WDL  Shoulder Blades WDL  Spine skin tags  (R) Arm/Elbow/Hand Discoloration, flaky   (L) Arm/Elbow/Hand Discoloration, flaky   Abdomen Scar  Groin Scar, flaky   Scrotum/Coccyx/Buttocks Redness, Blanching, and Discoloration, old scar  (R) Leg Scab, flaky  (L) Leg Scab, flaky , LLE wound dressing in place , swelling   (R) Heel/Foot/Toe Redness, Blanching, Discoloration, and Boggy, flaky  (L) Heel/Foot/Toe Redness, Blanching, Discoloration, and Boggy, flaky           Devices In Places Blood Pressure Cuff, PIV line       Interventions In Place Sacral Mepilex, TAP System, Pillows, Low Air Loss Mattress, Barrier Cream, and Heels Loaded W/Pillows    Possible Skin Injury Yes    Pictures Uploaded Into Epic N/A  Wound Consult Placed N/A, wound team following   RN Wound Prevention Protocol Ordered Yes

## 2025-01-12 LAB
GLUCOSE BLD STRIP.AUTO-MCNC: 115 MG/DL (ref 65–99)
GLUCOSE BLD STRIP.AUTO-MCNC: 72 MG/DL (ref 65–99)

## 2025-01-12 PROCEDURE — 700102 HCHG RX REV CODE 250 W/ 637 OVERRIDE(OP): Performed by: INTERNAL MEDICINE

## 2025-01-12 PROCEDURE — A9270 NON-COVERED ITEM OR SERVICE: HCPCS | Performed by: HOSPITALIST

## 2025-01-12 PROCEDURE — 700102 HCHG RX REV CODE 250 W/ 637 OVERRIDE(OP): Performed by: HOSPITALIST

## 2025-01-12 PROCEDURE — 99232 SBSQ HOSP IP/OBS MODERATE 35: CPT | Performed by: STUDENT IN AN ORGANIZED HEALTH CARE EDUCATION/TRAINING PROGRAM

## 2025-01-12 PROCEDURE — 770006 HCHG ROOM/CARE - MED/SURG/GYN SEMI*

## 2025-01-12 PROCEDURE — A9270 NON-COVERED ITEM OR SERVICE: HCPCS | Performed by: INTERNAL MEDICINE

## 2025-01-12 PROCEDURE — 700111 HCHG RX REV CODE 636 W/ 250 OVERRIDE (IP): Mod: JZ,TB | Performed by: INTERNAL MEDICINE

## 2025-01-12 PROCEDURE — 82962 GLUCOSE BLOOD TEST: CPT

## 2025-01-12 RX ADMIN — DAKIN'S SOLUTION 0.125% (QUARTER STRENGTH) 20 ML: 0.12 SOLUTION at 16:59

## 2025-01-12 RX ADMIN — OXYCODONE 5 MG: 5 TABLET ORAL at 05:26

## 2025-01-12 RX ADMIN — HYDROMORPHONE HYDROCHLORIDE 0.25 MG: 1 INJECTION, SOLUTION INTRAMUSCULAR; INTRAVENOUS; SUBCUTANEOUS at 16:13

## 2025-01-12 RX ADMIN — ACETAMINOPHEN 650 MG: 325 TABLET ORAL at 22:16

## 2025-01-12 RX ADMIN — APIXABAN 5 MG: 5 TABLET, FILM COATED ORAL at 05:26

## 2025-01-12 RX ADMIN — OXYCODONE 5 MG: 5 TABLET ORAL at 02:26

## 2025-01-12 RX ADMIN — SENNOSIDES AND DOCUSATE SODIUM 2 TABLET: 50; 8.6 TABLET ORAL at 16:11

## 2025-01-12 RX ADMIN — APIXABAN 5 MG: 5 TABLET, FILM COATED ORAL at 16:11

## 2025-01-12 RX ADMIN — OXYCODONE 5 MG: 5 TABLET ORAL at 14:11

## 2025-01-12 RX ADMIN — DIGOXIN 125 MCG: 0.12 TABLET ORAL at 16:11

## 2025-01-12 RX ADMIN — DAKIN'S SOLUTION 0.125% (QUARTER STRENGTH) 50 ML: 0.12 SOLUTION at 05:27

## 2025-01-12 RX ADMIN — OMEPRAZOLE 20 MG: 20 CAPSULE, DELAYED RELEASE ORAL at 05:26

## 2025-01-12 ASSESSMENT — ENCOUNTER SYMPTOMS
VOMITING: 0
ABDOMINAL PAIN: 0
SHORTNESS OF BREATH: 0
FEVER: 0
NAUSEA: 0
WEAKNESS: 1

## 2025-01-12 ASSESSMENT — PAIN DESCRIPTION - PAIN TYPE
TYPE: ACUTE PAIN

## 2025-01-12 NOTE — PROGRESS NOTES
Hospital Medicine Daily Progress Note    Date of Service  1/12/2025    Chief Complaint  Robert Mcdonnell is a 74 y.o. male admitted 12/19/2024 with lower extremity edema and shortness of breath.    Hospital Course  Robert Mcdonnell is a 74-year-old male with PMHx homelessness, chronic atrial fibrillation on apixaban, HFrEF, pulmonary hypertension, perforated gastric ulcer s/p gastrectomy.    Per history: recent hospitalization 10/3 - 10/15 for perforated  secondary to invasive gastric adenocarcinoma, GDA embolization, subsequent subtotal gastrectomy, liver wedge resection, Miguel Angel-en-Y gastrojejunostomy with omental flap and cholecystectomy 10/7 with pathology showing invasive well-differentiated adenocarcinoma with associated ulceration. There was tumor invasion into the muscularis propria and 20 lymph nodes were negative for metastatic carcinoma. Oncology was consulted and noted no evidence of metastatic disease however MRI abdomen recommended for follow-up; no adjuvant therapy was recommended.     Patient was readmitted 12/19 for worsening lower extremity edema and shortness of breath.  He initially required BiPAP and IMCU admission.  Per history-patient had run out of his Lasix at home.  EKG in the emergency room showing irregular wide-complex arrhythmia.      Additionally, blood cultures were positive for group A strep bacteremia secondary to left lower extremity infection.  CT LLE: Small to moderate suprapatellar joint effusion.  MRI LLE: Diffuse cellulitis, patchy myositis, negative for OM.  Orthopedic surgery was consulted and recommended medical management. Infectious disease was consulted and recommended IV Unasyn with an end date of 1/4 and clindamycin with an end date of 12/31.    For patient's HFrEF-he was started on GDMT with effective diuresis.    Patient continued to have hypoglycemic episodes due to poor p.o. intake.    MRI left lower extremity limited due to motion but no obvious osteomyelitis or bone  lesions identified.  Completed IV Unasyn on 1/4.      Interval Problem Update  I have seen and examined the patient at bedside  Completed antibiotics     HR 89, /58, on room air  Continue Eliquis and digoxin  Monitor vitals   I reviewed recent labs        I have discussed this patient's plan of care and discharge plan at IDT rounds today with Case Management, Nursing, Nursing leadership, and other members of the IDT team.    Consultants/Specialty  critical care, infectious disease, and orthopedics    Code Status  Full Code    Disposition  The patient is not medically cleared for discharge to home or a post-acute facility.      I have placed the appropriate orders for post-discharge needs.    Review of Systems  Review of Systems   Constitutional:  Positive for malaise/fatigue. Negative for fever.   Respiratory:  Negative for shortness of breath.    Cardiovascular:  Negative for chest pain and leg swelling.   Gastrointestinal:  Negative for abdominal pain, nausea and vomiting.   Neurological:  Positive for weakness.        Physical Exam  Temp:  [36.7 °C (98 °F)-36.8 °C (98.2 °F)] 36.7 °C (98 °F)  Pulse:  [60-89] 89  Resp:  [17] 17  BP: ()/(53-64) 103/58  SpO2:  [95 %-98 %] 95 %    Physical Exam  Vitals and nursing note reviewed.   Constitutional:       General: He is not in acute distress.     Appearance: Normal appearance. He is ill-appearing.      Comments: Frail appearing    Cardiovascular:      Rate and Rhythm: Normal rate and regular rhythm.   Pulmonary:      Effort: Pulmonary effort is normal.      Breath sounds: Normal breath sounds.   Musculoskeletal:      Comments: Healing right lower extremity wound with granulation tissue present based on wound care note from 1/5   Skin:     General: Skin is warm and dry.   Neurological:      Mental Status: He is alert and oriented to person, place, and time. Mental status is at baseline.   Psychiatric:         Mood and Affect: Mood normal.         Behavior:  Behavior normal.         Fluids    Intake/Output Summary (Last 24 hours) at 1/12/2025 1453  Last data filed at 1/12/2025 0528  Gross per 24 hour   Intake 640 ml   Output 2350 ml   Net -1710 ml        Laboratory  Recent Labs     01/10/25  0835   WBC 7.7   RBC 3.21*   HEMOGLOBIN 9.0*   HEMATOCRIT 28.0*   MCV 87.2   MCH 28.0   MCHC 32.1*   RDW 55.5*   PLATELETCT 291   MPV 10.4       Recent Labs     01/10/25  0835   SODIUM 134*   POTASSIUM 4.6   CHLORIDE 103   CO2 26   GLUCOSE 91   BUN 15   CREATININE 0.86   CALCIUM 8.3*                       Imaging  [id number]-W/O LEFT   Final Result      1.  Very limited examination due to patient motion artifact. Patient was also unable to complete the examination and therefore contrast-enhanced sequences were not obtained.      2.  No evidence of marrow edema or bone lesion.      3.  Again seen diffuse soft tissue edema/induration likely representing cellulitis. This also patchy muscle edema suggesting myositis.      4.  No focal fluid collection to suggest presence of abscess.      OV-[id number]-2 VIEW   Final Result      No evidence of bowel obstruction. Mild constipation.      MR-KNEE-WITH & W/O LEFT   Final Result      1.  No evidence of marrow edema or abnormal enhancement to suggest presence of osteomyelitis.      2.  Small to moderate joint effusion with mild synovitis.      3.  Diffuse soft tissue edema/induration consistent with cellulitis.      4.  Small popliteal cyst.      5.  No evidence of soft tissue abscess.      6.  Markedly motion degraded examination. Ligaments and menisci are inadequately evaluated due to patient motion.      MR-LOWER EXTREMITY-NO JOINT-W/O LEFT   Final Result      1.  Patient motion degraded exam.      2.  No evidence of marrow edema or bone lesion to suggest presence of osteomyelitis.      3.  Diffuse cellulitis and patchy myositis.      4.  No evidence of focal fluid collection to suggest presence of abscess.      CT-EXTREMITY, LOWER WITH  LEFT   Final Result      1.  Extensive subcutaneous inflammatory changes about the left lower extremity consistent with deep and superficial cellulitis.      2.  No definite CT evidence of necrotizing fasciitis.      3.  No CT evidence of deep abscess formation or acute or chronic osteomyelitis.      4.  Small to moderate suprapatellar joint effusion.      CT-EXTREMITY, LOWER WITH LEFT   Final Result      No soft tissue gas to suggest necrotizing fasciitis.      No fluid collection identified.      DX-CHEST-PORTABLE (1 VIEW)   Final Result         1.  Hazy left pulmonary infiltrates, similar to prior study   2.  Trace left pleural effusion, stable   3.  Cardiomegaly   4.  Atherosclerosis      DX-CHEST-PORTABLE (1 VIEW)   Final Result         1.  Hazy left pulmonary infiltrates, similar to prior study   2.  Trace left pleural effusion, stable   3.  Cardiomegaly   4.  Atherosclerosis      EC-ECHOCARDIOGRAM COMPLETE W/O CONT   Final Result      CT-CHEST,ABDOMEN,PELVIS WITH   Final Result      1. Stable spiculated opacity in the right upper lobe, extending to the pleural surface.   2. Lingular and left lower lobe parenchymal airspace disease has improved in the interval, but has not completely resolved. There is pleural reaction, suggesting some of these areas may represent parenchymal scarring.   3. Asymmetry of the lung volumes, small on the left than the right.   4. Cardiomegaly with atherosclerotic calcifications in the aorta and coronary arteries.   5. Postsurgical changes in the stomach.   6. Stable scattered multiple hypodense lesions throughout the liver.   7. Moderate ascites throughout the abdomen and pelvis.   8. Cachexia.   9. Enlarged left inguinal lymph nodes with central necrosis. There is edema involving the soft tissues of the upper left leg extending to the left pelvis.      US-EXTREMITY VENOUS LOWER BILAT   Final Result      DX-CHEST-PORTABLE (1 VIEW)   Final Result         1.  Hazy left pulmonary  infiltrates.   2.  Small left pleural effusion   3.  Cardiomegaly           Assessment/Plan  * Acute exacerbation of CHF (congestive heart failure) (HCC)- (present on admission)  Assessment & Plan  Echo: EF 28%; severe tricuspid and mitral regurg.  RVSP elevated at 55  - Continue GDMT with losartan, spironolactone, metoprolol, Farxiga  - Continue digoxin  - Maintain euvolemia    Constipation  Assessment & Plan  I ordered a bowel management  Suppository today    Hypoglycemia  Assessment & Plan  Multiple episodes of hypoglycemia requiring intervention  Additional episodes of hypoglycemia today  Poor p.o. intake  - Continue to encourage PO intake   - Nutrition consulted  - Discontinue Farxiga due to hypoglycemia    Streptococcal bacteremia- (present on admission)  Assessment & Plan  Blood cultures 12/19 positive for group A strep  Repeat blood cultures 12/21 NGTD    Pulmonary hypertension (HCC)  Assessment & Plan  Previous Echo RVSP 65, severe TR  Suspect mainly Group 2  RV perfusion with MAP > 65 as need norepinepinephrine  Blood pressure is running on the lower side holding diuretic therapy at this time    Acute respiratory failure with hypoxia (HCC)- (present on admission)  Assessment & Plan  Improved off Bipap  Weaned to room air    Hypophosphatemia- (present on admission)  Assessment & Plan  Replaced    Malignant neoplasm of body of stomach (HCC)- (present on admission)  Assessment & Plan  T2, N0, M0 invasive gastric adenocarcinoma presenting as perforated  10/2024  -GDA embolization  -10/7 subtotal gastrectomy, liver wedge resection, Miguel Angel-en-Y gastrojejunostomy with omental flap and cholecystectomy  -pathology invasive well-differentiated adenocarcinoma with associated ulceration  -tumor invasion into the muscularis propria and 20 lymph nodes were negative for metastatic carcinoma  -Oncology was consulted and noted no evidence of metastatic disease  -MRI abdomen recommended for follow-up; no adjuvant therapy  was recommended  He will need outpatient follow-up    Homeless- (present on admission)  Assessment & Plan  Patient is currently homeless.  He is unsure if he is able to return to [facility].  Wheelchair has been ordered.  Discussed with case management today.    Severe protein-calorie malnutrition (HCC)- (present on admission)  Assessment & Plan  Discussed with nutrition, patient meets the ASPEN criteria of severe malnutrition  Continuous nutrition support    COPD (chronic obstructive pulmonary disease) (HCC)- (present on admission)  Assessment & Plan  Current active smoker, no PFTs on file  RT protocols, bronchodilators as needed  No signs of exacerbation    Acute kidney injury (HCC)- (present on admission)  Assessment & Plan  Resolved     Sepsis (HCC)- (present on admission)  Assessment & Plan  See above    Atrial fibrillation with RVR (HCC)- (present on admission)  Assessment & Plan  Chronic atrial fibrillation, currently tachycardia  Digoxin 250 mcg IV monitoring closely with flip to prevent digoxin toxicity    Continue digoxin and Eliquis  12/27 BP better, I resumed Toprol 25 mg    Thrombocytopenia (HCC)- (present on admission)  Assessment & Plan  Platelets 288  - Repeat CBC in a.m.  - Stable to continue Eliquis at this time    Tobacco dependence- (present on admission)  Assessment & Plan  Counseling when appropriate    Hypertension- (present on admission)  Assessment & Plan  SBP well-controlled  - Continue losartan, metoprolol, spironolactone    Wound of lower extremity- (present on admission)  Assessment & Plan  Likely source of infection, lower ext doppler negative  CT left leg shows small to moderate suprapatellar joint effusion.  MRI LLE showed Diffuse cellulitis and patchy myositis, negative for osteomyelitis or abscess.   Repeat MRI 12/30: Limited due to patient motion artifact; no obvious OM noted persistent cellulitis and myositis noted  - Infectious disease consulted  - IV Unasyn regimen completed  1/4  - P.o. clindamycin completed 12/31  - Orthopedic surgery consultation 12/20 and 12/25 no surgical interventions at this time    ACP (advance care planning)- (present on admission)  Assessment & Plan  Patient admitted with CHF exacerbation, significantly reduced the EF of 30%.  Also found bacteremia, LLE infection, severe malnutrition.  History of invasive gastric malignancy, A-fib RVR, PE, gastric perforation post extensive surgery including subtotal gastrectomy, liver resection  Miguel Angel-en-Y gastrojejunostomy with omental flap and cholecystectomy.  I discussed the CODE STATUS with him, including CPR, intubation/mechanical ventilation.  He wants to stay full code.  Patient has high complexity and guarded prognosis.  I consulted palliative care.  ACP 18 minutes.          VTE prophylaxis: Eliquis    I have performed a physical exam and reviewed and updated ROS and Plan today (1/12/2025). In review of yesterday's note (1/11/2025), there are no changes except as documented above.    I spent 36 minutes for chart review, meeting and examining the patient, documenting, ordering medications and tests, interpreting the results independently, and communicating with the other health professionals.      VTE Selection

## 2025-01-12 NOTE — PROGRESS NOTES
Hospital Medicine Daily Progress Note    Date of Service  1/11/2025    Chief Complaint  Robert Mcdonnell is a 74 y.o. male admitted 12/19/2024 with lower extremity edema and shortness of breath.    Hospital Course  Robert Mcdonnell is a 74-year-old male with PMHx homelessness, chronic atrial fibrillation on apixaban, HFrEF, pulmonary hypertension, perforated gastric ulcer s/p gastrectomy.    Per history: recent hospitalization 10/3 - 10/15 for perforated  secondary to invasive gastric adenocarcinoma, GDA embolization, subsequent subtotal gastrectomy, liver wedge resection, Miguel Angel-en-Y gastrojejunostomy with omental flap and cholecystectomy 10/7 with pathology showing invasive well-differentiated adenocarcinoma with associated ulceration. There was tumor invasion into the muscularis propria and 20 lymph nodes were negative for metastatic carcinoma. Oncology was consulted and noted no evidence of metastatic disease however MRI abdomen recommended for follow-up; no adjuvant therapy was recommended.     Patient was readmitted 12/19 for worsening lower extremity edema and shortness of breath.  He initially required BiPAP and IMCU admission.  Per history-patient had run out of his Lasix at home.  EKG in the emergency room showing irregular wide-complex arrhythmia.      Additionally, blood cultures were positive for group A strep bacteremia secondary to left lower extremity infection.  CT LLE: Small to moderate suprapatellar joint effusion.  MRI LLE: Diffuse cellulitis, patchy myositis, negative for OM.  Orthopedic surgery was consulted and recommended medical management. Infectious disease was consulted and recommended IV Unasyn with an end date of 1/4 and clindamycin with an end date of 12/31.    For patient's HFrEF-he was started on GDMT with effective diuresis.    Patient continued to have hypoglycemic episodes due to poor p.o. intake.    MRI left lower extremity limited due to motion but no obvious osteomyelitis or bone  lesions identified.  Completed IV Unasyn on 1/4.      Interval Problem Update  I have seen and examined the patient at bedside  Completed antibiotics     HR 83, BP 95/53, on room air  Continue digoxin and Eliquis  Monitor vitals      I have discussed this patient's plan of care and discharge plan at IDT rounds today with Case Management, Nursing, Nursing leadership, and other members of the IDT team.    Consultants/Specialty  critical care, infectious disease, and orthopedics    Code Status  Full Code    Disposition  The patient is medically cleared for discharge to home or a post-acute facility.      I have placed the appropriate orders for post-discharge needs.    Review of Systems  Review of Systems   Constitutional:  Positive for malaise/fatigue. Negative for fever.   Respiratory:  Negative for shortness of breath.    Cardiovascular:  Negative for chest pain and leg swelling.   Gastrointestinal:  Negative for abdominal pain, nausea and vomiting.   Neurological:  Positive for weakness.        Physical Exam  Temp:  [36 °C (96.8 °F)-36.8 °C (98.2 °F)] 36.7 °C (98.1 °F)  Pulse:  [67-83] 83  Resp:  [17-18] 17  BP: ()/(49-62) 95/53  SpO2:  [97 %-99 %] 98 %    Physical Exam  Vitals and nursing note reviewed.   Constitutional:       General: He is not in acute distress.     Appearance: Normal appearance. He is ill-appearing.      Comments: Frail appearing    Cardiovascular:      Rate and Rhythm: Normal rate and regular rhythm.   Pulmonary:      Effort: Pulmonary effort is normal.      Breath sounds: Normal breath sounds.   Musculoskeletal:      Comments: Healing right lower extremity wound with granulation tissue present based on wound care note from 1/5   Skin:     General: Skin is warm and dry.   Neurological:      Mental Status: He is alert and oriented to person, place, and time. Mental status is at baseline.   Psychiatric:         Mood and Affect: Mood normal.         Behavior: Behavior normal.          Fluids    Intake/Output Summary (Last 24 hours) at 1/11/2025 1638  Last data filed at 1/11/2025 1600  Gross per 24 hour   Intake 400 ml   Output 1100 ml   Net -700 ml        Laboratory  Recent Labs     01/10/25  0835   WBC 7.7   RBC 3.21*   HEMOGLOBIN 9.0*   HEMATOCRIT 28.0*   MCV 87.2   MCH 28.0   MCHC 32.1*   RDW 55.5*   PLATELETCT 291   MPV 10.4       Recent Labs     01/10/25  0835   SODIUM 134*   POTASSIUM 4.6   CHLORIDE 103   CO2 26   GLUCOSE 91   BUN 15   CREATININE 0.86   CALCIUM 8.3*                       Imaging  AS-TEFTX-FTRZKD-W/O LEFT   Final Result      1.  Very limited examination due to patient motion artifact. Patient was also unable to complete the examination and therefore contrast-enhanced sequences were not obtained.      2.  No evidence of marrow edema or bone lesion.      3.  Again seen diffuse soft tissue edema/induration likely representing cellulitis. This also patchy muscle edema suggesting myositis.      4.  No focal fluid collection to suggest presence of abscess.      RO-[id number]-6 VIEW   Final Result      No evidence of bowel obstruction. Mild constipation.      MR-KNEE-WITH & W/O LEFT   Final Result      1.  No evidence of marrow edema or abnormal enhancement to suggest presence of osteomyelitis.      2.  Small to moderate joint effusion with mild synovitis.      3.  Diffuse soft tissue edema/induration consistent with cellulitis.      4.  Small popliteal cyst.      5.  No evidence of soft tissue abscess.      6.  Markedly motion degraded examination. Ligaments and menisci are inadequately evaluated due to patient motion.      MR-LOWER EXTREMITY-NO JOINT-W/O LEFT   Final Result      1.  Patient motion degraded exam.      2.  No evidence of marrow edema or bone lesion to suggest presence of osteomyelitis.      3.  Diffuse cellulitis and patchy myositis.      4.  No evidence of focal fluid collection to suggest presence of abscess.      CT-EXTREMITY, LOWER WITH LEFT   Final Result       1.  Extensive subcutaneous inflammatory changes about the left lower extremity consistent with deep and superficial cellulitis.      2.  No definite CT evidence of necrotizing fasciitis.      3.  No CT evidence of deep abscess formation or acute or chronic osteomyelitis.      4.  Small to moderate suprapatellar joint effusion.      CT-EXTREMITY, LOWER WITH LEFT   Final Result      No soft tissue gas to suggest necrotizing fasciitis.      No fluid collection identified.      DX-CHEST-PORTABLE (1 VIEW)   Final Result         1.  Hazy left pulmonary infiltrates, similar to prior study   2.  Trace left pleural effusion, stable   3.  Cardiomegaly   4.  Atherosclerosis      DX-CHEST-PORTABLE (1 VIEW)   Final Result         1.  Hazy left pulmonary infiltrates, similar to prior study   2.  Trace left pleural effusion, stable   3.  Cardiomegaly   4.  Atherosclerosis      EC-ECHOCARDIOGRAM COMPLETE W/O CONT   Final Result      CT-CHEST,ABDOMEN,PELVIS WITH   Final Result      1. Stable spiculated opacity in the right upper lobe, extending to the pleural surface.   2. Lingular and left lower lobe parenchymal airspace disease has improved in the interval, but has not completely resolved. There is pleural reaction, suggesting some of these areas may represent parenchymal scarring.   3. Asymmetry of the lung volumes, small on the left than the right.   4. Cardiomegaly with atherosclerotic calcifications in the aorta and coronary arteries.   5. Postsurgical changes in the stomach.   6. Stable scattered multiple hypodense lesions throughout the liver.   7. Moderate ascites throughout the abdomen and pelvis.   8. Cachexia.   9. Enlarged left inguinal lymph nodes with central necrosis. There is edema involving the soft tissues of the upper left leg extending to the left pelvis.      US-EXTREMITY VENOUS LOWER BILAT   Final Result      DX-CHEST-PORTABLE (1 VIEW)   Final Result         1.  Hazy left pulmonary infiltrates.   2.   Small left pleural effusion   3.  Cardiomegaly           Assessment/Plan  * Acute exacerbation of CHF (congestive heart failure) (HCC)- (present on admission)  Assessment & Plan  Echo: EF 28%; severe tricuspid and mitral regurg.  RVSP elevated at 55  - Continue GDMT with losartan, spironolactone, metoprolol, Farxiga  - Continue digoxin  - Maintain euvolemia    Constipation  Assessment & Plan  I ordered a bowel management  Suppository today    Hypoglycemia  Assessment & Plan  Multiple episodes of hypoglycemia requiring intervention  Additional episodes of hypoglycemia today  Poor p.o. intake  - Continue to encourage PO intake   - Nutrition consulted  - Discontinue Farxiga due to hypoglycemia    Streptococcal bacteremia- (present on admission)  Assessment & Plan  Blood cultures 12/19 positive for group A strep  Repeat blood cultures 12/21 NGTD    Pulmonary hypertension (HCC)  Assessment & Plan  Previous Echo RVSP 65, severe TR  Suspect mainly Group 2  RV perfusion with MAP > 65 as need norepinepinephrine  Blood pressure is running on the lower side holding diuretic therapy at this time    Acute respiratory failure with hypoxia (HCC)- (present on admission)  Assessment & Plan  Improved off Bipap  Weaned to room air    Hypophosphatemia- (present on admission)  Assessment & Plan  Replaced    Malignant neoplasm of body of stomach (HCC)- (present on admission)  Assessment & Plan  T2, N0, M0 invasive gastric adenocarcinoma presenting as perforated  10/2024  -GDA embolization  -10/7 subtotal gastrectomy, liver wedge resection, Miguel Angel-en-Y gastrojejunostomy with omental flap and cholecystectomy  -pathology invasive well-differentiated adenocarcinoma with associated ulceration  -tumor invasion into the muscularis propria and 20 lymph nodes were negative for metastatic carcinoma  -Oncology was consulted and noted no evidence of metastatic disease  -MRI abdomen recommended for follow-up; no adjuvant therapy was recommended  He  will need outpatient follow-up    Homeless- (present on admission)  Assessment & Plan  Patient is currently homeless.  He is unsure if he is able to return to [facility].  Wheelchair has been ordered.  Discussed with case management today.    Severe protein-calorie malnutrition (HCC)- (present on admission)  Assessment & Plan  Discussed with nutrition, patient meets the ASPEN criteria of severe malnutrition  Continuous nutrition support    COPD (chronic obstructive pulmonary disease) (HCC)- (present on admission)  Assessment & Plan  Current active smoker, no PFTs on file  RT protocols, bronchodilators as needed  No signs of exacerbation    Acute kidney injury (HCC)- (present on admission)  Assessment & Plan  Resolved     Sepsis (HCC)- (present on admission)  Assessment & Plan  See above    Atrial fibrillation with RVR (HCC)- (present on admission)  Assessment & Plan  Chronic atrial fibrillation, currently tachycardia  Digoxin 250 mcg IV monitoring closely with flip to prevent digoxin toxicity    Continue digoxin and Eliquis  12/27 BP better, I resumed Toprol 25 mg    Thrombocytopenia (HCC)- (present on admission)  Assessment & Plan  Platelets 288  - Repeat CBC in a.m.  - Stable to continue Eliquis at this time    Tobacco dependence- (present on admission)  Assessment & Plan  Counseling when appropriate    Hypertension- (present on admission)  Assessment & Plan  SBP well-controlled  - Continue losartan, metoprolol, spironolactone    Wound of lower extremity- (present on admission)  Assessment & Plan  Likely source of infection, lower ext doppler negative  CT left leg shows small to moderate suprapatellar joint effusion.  MRI LLE showed Diffuse cellulitis and patchy myositis, negative for osteomyelitis or abscess.   Repeat MRI 12/30: Limited due to patient motion artifact; no obvious OM noted persistent cellulitis and myositis noted  - Infectious disease consulted  - IV Unasyn regimen completed 1/4  - P.o.  clindamycin completed 12/31  - Orthopedic surgery consultation 12/20 and 12/25 no surgical interventions at this time    ACP (advance care planning)- (present on admission)  Assessment & Plan  Patient admitted with CHF exacerbation, significantly reduced the EF of 30%.  Also found bacteremia, LLE infection, severe malnutrition.  History of invasive gastric malignancy, A-fib RVR, PE, gastric perforation post extensive surgery including subtotal gastrectomy, liver resection  Miguel Angel-en-Y gastrojejunostomy with omental flap and cholecystectomy.  I discussed the CODE STATUS with him, including CPR, intubation/mechanical ventilation.  He wants to stay full code.  Patient has high complexity and guarded prognosis.  I consulted palliative care.  ACP 18 minutes.          VTE prophylaxis: Eliquis    I have performed a physical exam and reviewed and updated ROS and Plan today (1/11/2025). In review of yesterday's note (1/10/2025), there are no changes except as documented above.    I spent 36 minutes for chart review, meeting and examining the patient, documenting, ordering medications and tests, interpreting the results independently, and communicating with the other health professionals.      VTE Selection

## 2025-01-12 NOTE — CARE PLAN
The patient is Stable - Low risk of patient condition declining or worsening    Shift Goals  Clinical Goals: Patient will remain free from fall this shift  Patient Goals: Rest  Family Goals: MARCOS    Progress made toward(s) clinical / shift goals:  Patient on daily wound dressing on LLE. Patient ambulates to the bathroom and back to bed, Resting comfortably, call light within reach,   Wound Dressing done, Patient refused heel float boots,  and heel mepilex despite education.    Patient is not progressing towards the following goals:

## 2025-01-12 NOTE — PROGRESS NOTES
4 Eyes Skin Assessment Completed by Pederson RN and JANNETTE Scott.    Head Scab and Scar  Ears WDL  Nose WDL  Mouth WDL  Neck WDL  Breast/Chest WDL  Shoulder Blades WDL  Spine skin tags  (R) Arm/Elbow/Hand Discoloration, flaky   (L) Arm/Elbow/Hand Discoloration, flaky   Abdomen Scar  Groin Scar, flaky   Scrotum/Coccyx/Buttocks Redness, Blanching, and Discoloration, old scar  (R) Leg Scab, flaky  (L) Leg Scab, flaky , LLE wound dressing in place , swelling   (R) Heel/Foot/Toe Redness, Blanching, Discoloration, and Boggy, flaky  (L) Heel/Foot/Toe Redness, Blanching, Discoloration, and Boggy, flaky           Devices In Places Blood Pressure Cuff, PIV line       Interventions In Place Sacral Mepilex, TAP System, Pillows, Low Air Loss Mattress, Barrier Cream, and Heels Loaded W/Pillows    Possible Skin Injury Yes    Pictures Uploaded Into Epic N/A.   Wound Consult Placed N/A Wound care following  RN Wound Prevention Protocol Ordered Yes

## 2025-01-13 LAB
GLUCOSE BLD STRIP.AUTO-MCNC: 103 MG/DL (ref 65–99)
GLUCOSE BLD STRIP.AUTO-MCNC: 142 MG/DL (ref 65–99)
GLUCOSE BLD STRIP.AUTO-MCNC: 93 MG/DL (ref 65–99)

## 2025-01-13 PROCEDURE — 700102 HCHG RX REV CODE 250 W/ 637 OVERRIDE(OP): Performed by: HOSPITALIST

## 2025-01-13 PROCEDURE — 770006 HCHG ROOM/CARE - MED/SURG/GYN SEMI*

## 2025-01-13 PROCEDURE — 82962 GLUCOSE BLOOD TEST: CPT

## 2025-01-13 PROCEDURE — 700102 HCHG RX REV CODE 250 W/ 637 OVERRIDE(OP): Performed by: INTERNAL MEDICINE

## 2025-01-13 PROCEDURE — 700101 HCHG RX REV CODE 250: Performed by: STUDENT IN AN ORGANIZED HEALTH CARE EDUCATION/TRAINING PROGRAM

## 2025-01-13 PROCEDURE — A9270 NON-COVERED ITEM OR SERVICE: HCPCS | Performed by: INTERNAL MEDICINE

## 2025-01-13 PROCEDURE — 99497 ADVNCD CARE PLAN 30 MIN: CPT | Performed by: STUDENT IN AN ORGANIZED HEALTH CARE EDUCATION/TRAINING PROGRAM

## 2025-01-13 PROCEDURE — 700102 HCHG RX REV CODE 250 W/ 637 OVERRIDE(OP): Performed by: STUDENT IN AN ORGANIZED HEALTH CARE EDUCATION/TRAINING PROGRAM

## 2025-01-13 PROCEDURE — A9270 NON-COVERED ITEM OR SERVICE: HCPCS | Performed by: STUDENT IN AN ORGANIZED HEALTH CARE EDUCATION/TRAINING PROGRAM

## 2025-01-13 PROCEDURE — 99233 SBSQ HOSP IP/OBS HIGH 50: CPT | Mod: 25 | Performed by: STUDENT IN AN ORGANIZED HEALTH CARE EDUCATION/TRAINING PROGRAM

## 2025-01-13 PROCEDURE — A9270 NON-COVERED ITEM OR SERVICE: HCPCS | Performed by: HOSPITALIST

## 2025-01-13 RX ADMIN — POLYETHYLENE GLYCOL 3350 1 PACKET: 17 POWDER, FOR SOLUTION ORAL at 05:31

## 2025-01-13 RX ADMIN — SPIRONOLACTONE 25 MG: 25 TABLET ORAL at 05:32

## 2025-01-13 RX ADMIN — LOSARTAN POTASSIUM 25 MG: 25 TABLET, FILM COATED ORAL at 05:32

## 2025-01-13 RX ADMIN — METOPROLOL SUCCINATE 25 MG: 25 TABLET, EXTENDED RELEASE ORAL at 05:32

## 2025-01-13 RX ADMIN — APIXABAN 5 MG: 5 TABLET, FILM COATED ORAL at 17:06

## 2025-01-13 RX ADMIN — SENNOSIDES AND DOCUSATE SODIUM 2 TABLET: 50; 8.6 TABLET ORAL at 17:06

## 2025-01-13 RX ADMIN — OXYCODONE 5 MG: 5 TABLET ORAL at 17:06

## 2025-01-13 RX ADMIN — DAKIN'S SOLUTION 0.125% (QUARTER STRENGTH) 473 ML: 0.12 SOLUTION at 17:07

## 2025-01-13 RX ADMIN — DIGOXIN 125 MCG: 0.12 TABLET ORAL at 17:06

## 2025-01-13 RX ADMIN — OMEPRAZOLE 20 MG: 20 CAPSULE, DELAYED RELEASE ORAL at 05:32

## 2025-01-13 RX ADMIN — OXYCODONE 2.5 MG: 5 TABLET ORAL at 21:58

## 2025-01-13 RX ADMIN — DAKIN'S SOLUTION 0.125% (QUARTER STRENGTH) 30 ML: 0.12 SOLUTION at 04:00

## 2025-01-13 RX ADMIN — APIXABAN 5 MG: 5 TABLET, FILM COATED ORAL at 05:32

## 2025-01-13 ASSESSMENT — ENCOUNTER SYMPTOMS
NAUSEA: 0
ABDOMINAL PAIN: 0
VOMITING: 0
WEAKNESS: 1
FEVER: 0
SHORTNESS OF BREATH: 0

## 2025-01-13 ASSESSMENT — PAIN DESCRIPTION - PAIN TYPE
TYPE: ACUTE PAIN

## 2025-01-13 NOTE — CARE PLAN
The patient is Stable - Low risk of patient condition declining or worsening    Shift Goals  Clinical Goals: Wound care  Patient Goals: Rest, pain control  Family Goals: nilson    Progress made toward(s) clinical / shift goals:  Patient A&Ox4. Monitored for hyper/hypoglycemia. PRN tylenol given as requeste for pain with good effect. Wound dressing on left leg changed and tolerated well.     Problem: Communication  Goal: The ability to communicate needs accurately and effectively will improve  Outcome: Progressing     Problem: Wound/ / Incision Healing  Goal: Patient's wound/surgical incision will decrease in size and heals properly  Outcome: Progressing     Problem: Knowledge Deficit - Standard  Goal: Patient and family/care givers will demonstrate understanding of plan of care, disease process/condition, diagnostic tests and medications  Outcome: Progressing     Patient is not progressing towards the following goals:       Negative

## 2025-01-13 NOTE — CARE PLAN
The patient is Stable - Low risk of patient condition declining or worsening    Shift Goals  Clinical Goals: Patient will report a pain level less than 5 by the end of shift  Patient Goals: rest, pain control during wound care  Family Goals: MARCOS    Progress made toward(s) clinical / shift goals:  Patients pain controlled with medication as per MAR, distraction, and food. Patient did report a pain level less than 5.    Patient is not progressing towards the following goals: progressing

## 2025-01-14 ENCOUNTER — APPOINTMENT (OUTPATIENT)
Dept: RADIOLOGY | Facility: MEDICAL CENTER | Age: 75
DRG: 871 | End: 2025-01-14
Attending: STUDENT IN AN ORGANIZED HEALTH CARE EDUCATION/TRAINING PROGRAM
Payer: MEDICARE

## 2025-01-14 LAB
GLUCOSE BLD STRIP.AUTO-MCNC: 73 MG/DL (ref 65–99)
GLUCOSE BLD STRIP.AUTO-MCNC: 89 MG/DL (ref 65–99)

## 2025-01-14 PROCEDURE — 700102 HCHG RX REV CODE 250 W/ 637 OVERRIDE(OP): Performed by: HOSPITALIST

## 2025-01-14 PROCEDURE — 82962 GLUCOSE BLOOD TEST: CPT | Mod: 91

## 2025-01-14 PROCEDURE — 99233 SBSQ HOSP IP/OBS HIGH 50: CPT | Performed by: STUDENT IN AN ORGANIZED HEALTH CARE EDUCATION/TRAINING PROGRAM

## 2025-01-14 PROCEDURE — 700102 HCHG RX REV CODE 250 W/ 637 OVERRIDE(OP): Performed by: STUDENT IN AN ORGANIZED HEALTH CARE EDUCATION/TRAINING PROGRAM

## 2025-01-14 PROCEDURE — 700102 HCHG RX REV CODE 250 W/ 637 OVERRIDE(OP): Performed by: INTERNAL MEDICINE

## 2025-01-14 PROCEDURE — 770006 HCHG ROOM/CARE - MED/SURG/GYN SEMI*

## 2025-01-14 PROCEDURE — A9270 NON-COVERED ITEM OR SERVICE: HCPCS | Performed by: INTERNAL MEDICINE

## 2025-01-14 PROCEDURE — A9270 NON-COVERED ITEM OR SERVICE: HCPCS | Performed by: HOSPITALIST

## 2025-01-14 PROCEDURE — A9270 NON-COVERED ITEM OR SERVICE: HCPCS | Performed by: STUDENT IN AN ORGANIZED HEALTH CARE EDUCATION/TRAINING PROGRAM

## 2025-01-14 PROCEDURE — 93926 LOWER EXTREMITY STUDY: CPT | Mod: LT

## 2025-01-14 RX ADMIN — APIXABAN 5 MG: 5 TABLET, FILM COATED ORAL at 18:01

## 2025-01-14 RX ADMIN — SPIRONOLACTONE 25 MG: 25 TABLET ORAL at 04:52

## 2025-01-14 RX ADMIN — DIGOXIN 125 MCG: 0.12 TABLET ORAL at 18:01

## 2025-01-14 RX ADMIN — OMEPRAZOLE 20 MG: 20 CAPSULE, DELAYED RELEASE ORAL at 04:52

## 2025-01-14 RX ADMIN — POLYETHYLENE GLYCOL 3350 1 PACKET: 17 POWDER, FOR SOLUTION ORAL at 06:00

## 2025-01-14 RX ADMIN — METOPROLOL SUCCINATE 25 MG: 25 TABLET, EXTENDED RELEASE ORAL at 04:52

## 2025-01-14 RX ADMIN — LOSARTAN POTASSIUM 25 MG: 25 TABLET, FILM COATED ORAL at 04:52

## 2025-01-14 RX ADMIN — APIXABAN 5 MG: 5 TABLET, FILM COATED ORAL at 04:52

## 2025-01-14 ASSESSMENT — ENCOUNTER SYMPTOMS
FEVER: 0
VOMITING: 0
NAUSEA: 0
ABDOMINAL PAIN: 0
SHORTNESS OF BREATH: 0

## 2025-01-14 ASSESSMENT — PAIN DESCRIPTION - PAIN TYPE: TYPE: ACUTE PAIN

## 2025-01-14 NOTE — DIETARY
Nutrition Services: Follow-up for PO intake   Day 26 of admit. Robert Mcdonnell is a 74 y.o. male with admitting DX of Acute left-sided CHF (congestive heart failure) ([facility]) [I50.1]  Streptococcal bacteremia [R78.81, B95.5]  Left leg cellulitis [L03.116]    Recorded PO intake improving with some recent meals recorded at 50-75% and %. Spoke with pt at bedside and observed he ate well at lunch. He loves the Bruce and feels it is helping with healing.     Current diet order:   Consistent CHO diet  Glucerna (provides 220 calories, 10 g protein per 8 fl oz) and Bruce       Malnutrition risk: Severe Malnutrition in context of Chronic Illness related to muscle and fat wasting as evidenced by Severe muscle loss at (temple, shoulder, clavicle) Severe muscle loss at (orbital, buccal, triceps)      Nutrition Dx Status: Ongoing     Problem: Nutritional:  Goal: Achieve adequate nutritional intake  Description: Patient will consume >50% of meals and supplements  Outcome: Progressing      Plan/ Recommendations:      Continue Glucerna and Bruce supplements.  Encourage intake of meals, goal for >50% consumption from meals and supplements  Document intake of all meals as % taken in ADL's to provide interdisciplinary communication across all shifts.   Monitor weight.  Nutrition rep will continue to see patient for ongoing meal and snack preferences.     RD following

## 2025-01-14 NOTE — PROGRESS NOTES
Visit right that she was in the follow-up with CV once or RN hospital Medicine Daily Progress Note    Date of Service  1/14/2025    Chief Complaint  Robert Mcdonnell is a 74 y.o. male admitted 12/19/2024 with left lower extremity cellulitis.    Hospital Course  Robert Mcdonnell is a 74-year-old male with PMHx homelessness, chronic atrial fibrillation on apixaban, HFrEF, pulmonary hypertension, perforated gastric ulcer s/p gastrectomy.    Per history: recent hospitalization 10/3 - 10/15 for perforated  secondary to invasive gastric adenocarcinoma, GDA embolization, subsequent subtotal gastrectomy, liver wedge resection, Miguel Angel-en-Y gastrojejunostomy with omental flap and cholecystectomy 10/7 with pathology showing invasive well-differentiated adenocarcinoma with associated ulceration. There was tumor invasion into the muscularis propria and 20 lymph nodes were negative for metastatic carcinoma. Oncology was consulted and noted no evidence of metastatic disease however MRI abdomen recommended for follow-up; no adjuvant therapy was recommended.     Patient was readmitted 12/19 for worsening lower extremity edema and shortness of breath.  He initially required BiPAP and IMCU admission.  Per history-patient had run out of his Lasix at home.  EKG in the emergency room showing irregular wide-complex arrhythmia.      Additionally, blood cultures were positive for group A strep bacteremia secondary to left lower extremity infection.  CT LLE: Small to moderate suprapatellar joint effusion.  MRI LLE: Diffuse cellulitis, patchy myositis, negative for OM.  Orthopedic surgery was consulted and recommended medical management. Infectious disease was consulted and recommended IV Unasyn with an end date of 1/4 and clindamycin with an end date of 12/31.    For patient's HFrEF-he was started on GDMT with effective diuresis.    Patient continued to have hypoglycemic episodes due to poor p.o. intake.    Interval Problem Update  1/14:  Vitals stable overnight.   through 109.  Patient is n.p.o. for possible surgical debridement of lower extremity cellulitis today.  Discussed with Dr. Clayton, orthopedic surgery.  No plans for surgery at this time.  N.p.o. again at midnight for possible debridement in a.m.    I have discussed this patient's plan of care and discharge plan at IDT rounds today with Case Management, Nursing, Nursing leadership, and other members of the IDT team.    Consultants/Specialty  critical care, infectious disease, orthopedics, and palliative care    Code Status  Full Code    Disposition  The patient is not medically cleared for discharge to home or a post-acute facility.      I have placed the appropriate orders for post-discharge needs.    Review of Systems  Review of Systems   Constitutional:  Negative for fever and malaise/fatigue.   Respiratory:  Negative for shortness of breath.    Cardiovascular:  Negative for chest pain and leg swelling.   Gastrointestinal:  Negative for abdominal pain, nausea and vomiting.        Physical Exam  Temp:  [36.4 °C (97.5 °F)-36.8 °C (98.3 °F)] 36.4 °C (97.5 °F)  Pulse:  [76-86] 78  Resp:  [16-18] 17  BP: (104-111)/(56-68) 111/68  SpO2:  [96 %-100 %] 99 %    Physical Exam  Vitals and nursing note reviewed.   Constitutional:       General: He is not in acute distress.     Appearance: Normal appearance. He is ill-appearing.   Cardiovascular:      Rate and Rhythm: Normal rate and regular rhythm.   Pulmonary:      Effort: Pulmonary effort is normal.      Breath sounds: Normal breath sounds.   Skin:     General: Skin is warm and dry.   Neurological:      Mental Status: He is alert and oriented to person, place, and time. Mental status is at baseline.   Psychiatric:         Mood and Affect: Mood normal.         Behavior: Behavior normal.         Fluids    Intake/Output Summary (Last 24 hours) at 1/14/2025 1345  Last data filed at 1/14/2025 1300  Gross per 24 hour   Intake 240 ml   Output 600  ml   Net -360 ml        Laboratory                        Imaging  US-EXTREMITY ARTERY LOWER UNILAT LEFT         [id number]-W/O LEFT   Final Result      1.  Very limited examination due to patient motion artifact. Patient was also unable to complete the examination and therefore contrast-enhanced sequences were not obtained.      2.  No evidence of marrow edema or bone lesion.      3.  Again seen diffuse soft tissue edema/induration likely representing cellulitis. This also patchy muscle edema suggesting myositis.      4.  No focal fluid collection to suggest presence of abscess.      [id number] VIEW   Final Result      No evidence of bowel obstruction. Mild constipation.      MR-KNEE-WITH & W/O LEFT   Final Result      1.  No evidence of marrow edema or abnormal enhancement to suggest presence of osteomyelitis.      2.  Small to moderate joint effusion with mild synovitis.      3.  Diffuse soft tissue edema/induration consistent with cellulitis.      4.  Small popliteal cyst.      5.  No evidence of soft tissue abscess.      6.  Markedly motion degraded examination. Ligaments and menisci are inadequately evaluated due to patient motion.      MR-LOWER EXTREMITY-NO JOINT-W/O LEFT   Final Result      1.  Patient motion degraded exam.      2.  No evidence of marrow edema or bone lesion to suggest presence of osteomyelitis.      3.  Diffuse cellulitis and patchy myositis.      4.  No evidence of focal fluid collection to suggest presence of abscess.      CT-EXTREMITY, LOWER WITH LEFT   Final Result      1.  Extensive subcutaneous inflammatory changes about the left lower extremity consistent with deep and superficial cellulitis.      2.  No definite CT evidence of necrotizing fasciitis.      3.  No CT evidence of deep abscess formation or acute or chronic osteomyelitis.      4.  Small to moderate suprapatellar joint effusion.      CT-EXTREMITY, LOWER WITH LEFT   Final Result      No soft tissue gas to suggest  necrotizing fasciitis.      No fluid collection identified.      DX-CHEST-PORTABLE (1 VIEW)   Final Result         1.  Hazy left pulmonary infiltrates, similar to prior study   2.  Trace left pleural effusion, stable   3.  Cardiomegaly   4.  Atherosclerosis      DX-CHEST-PORTABLE (1 VIEW)   Final Result         1.  Hazy left pulmonary infiltrates, similar to prior study   2.  Trace left pleural effusion, stable   3.  Cardiomegaly   4.  Atherosclerosis      EC-ECHOCARDIOGRAM COMPLETE W/O CONT   Final Result      CT-CHEST,ABDOMEN,PELVIS WITH   Final Result      1. Stable spiculated opacity in the right upper lobe, extending to the pleural surface.   2. Lingular and left lower lobe parenchymal airspace disease has improved in the interval, but has not completely resolved. There is pleural reaction, suggesting some of these areas may represent parenchymal scarring.   3. Asymmetry of the lung volumes, small on the left than the right.   4. Cardiomegaly with atherosclerotic calcifications in the aorta and coronary arteries.   5. Postsurgical changes in the stomach.   6. Stable scattered multiple hypodense lesions throughout the liver.   7. Moderate ascites throughout the abdomen and pelvis.   8. Cachexia.   9. Enlarged left inguinal lymph nodes with central necrosis. There is edema involving the soft tissues of the upper left leg extending to the left pelvis.      US-EXTREMITY VENOUS LOWER BILAT   Final Result      DX-CHEST-PORTABLE (1 VIEW)   Final Result         1.  Hazy left pulmonary infiltrates.   2.  Small left pleural effusion   3.  Cardiomegaly           Assessment/Plan  * Wound of lower extremity- (present on admission)  Assessment & Plan  Likely source of infection, lower ext doppler negative  CT left leg shows small to moderate suprapatellar joint effusion.  MRI LLE showed Diffuse cellulitis and patchy myositis, negative for osteomyelitis or abscess.   Repeat MRI 12/30: Limited due to patient motion artifact;  no obvious OM noted persistent cellulitis and myositis noted  - Infectious disease consulted  - IV Unasyn regimen completed 1/4  - P.o. clindamycin completed 12/31  - Orthopedic surgery consultation 12/20 and 12/25 no surgical interventions at this time  - Per wound care wound has not made much improvement despite aggressive wound care measures  - Discussed with Dr. Clayton, orthopedic surgery.  N.p.o. at midnight for possible debridement tomorrow 1/15        Constipation  Assessment & Plan  I ordered a bowel management  Suppository today    Hypoglycemia  Assessment & Plan  Multiple episodes of hypoglycemia requiring intervention  Additional episodes of hypoglycemia today  Poor p.o. intake  - Continue to encourage PO intake   - Nutrition consulted  - Discontinue Farxiga due to hypoglycemia    Streptococcal bacteremia- (present on admission)  Assessment & Plan  Blood cultures 12/19 positive for group A strep  Repeat blood cultures 12/21 NGTD    Pulmonary hypertension (HCC)  Assessment & Plan  Previous Echo RVSP 65, severe TR  Suspect mainly Group 2  RV perfusion with MAP > 65 as need norepinepinephrine  Blood pressure is running on the lower side holding diuretic therapy at this time    Acute respiratory failure with hypoxia (HCC)- (present on admission)  Assessment & Plan  Improved off Bipap  Weaned to room air    Hypophosphatemia- (present on admission)  Assessment & Plan  Replaced    Malignant neoplasm of body of stomach (HCC)- (present on admission)  Assessment & Plan  T2, N0, M0 invasive gastric adenocarcinoma presenting as perforated  10/2024  -GDA embolization  -10/7 subtotal gastrectomy, liver wedge resection, Miguel Angel-en-Y gastrojejunostomy with omental flap and cholecystectomy  -pathology invasive well-differentiated adenocarcinoma with associated ulceration  -tumor invasion into the muscularis propria and 20 lymph nodes were negative for metastatic carcinoma  -Oncology was consulted and noted no evidence of  metastatic disease  -MRI abdomen recommended for follow-up; no adjuvant therapy was recommended  He will need outpatient follow-up    Homeless- (present on admission)  Assessment & Plan  Patient is currently homeless.  He is unsure if he is able to return to [facility].  Wheelchair has been ordered.  Discussed with case management today.    Severe protein-calorie malnutrition (HCC)- (present on admission)  Assessment & Plan  Discussed with nutrition, patient meets the ASPEN criteria of severe malnutrition  Continuous nutrition support    ACP (advance care planning)- (present on admission)  Assessment & Plan  Patient admitted with CHF exacerbation, significantly reduced the EF of 30%.  Also found bacteremia, LLE infection, severe malnutrition.  History of invasive gastric malignancy, A-fib RVR, PE, gastric perforation post extensive surgery including subtotal gastrectomy, liver resection  Miguel Angel-en-Y gastrojejunostomy with omental flap and cholecystectomy.  I discussed the CODE STATUS with him, including CPR, intubation/mechanical ventilation.  He wants to stay full code.  Patient has high complexity and guarded prognosis.  I consulted palliative care.  ACP 18 minutes.     COPD (chronic obstructive pulmonary disease) (HCC)- (present on admission)  Assessment & Plan  Current active smoker, no PFTs on file  RT protocols, bronchodilators as needed  No signs of exacerbation    Acute kidney injury (HCC)- (present on admission)  Assessment & Plan  Resolved     Sepsis (HCC)- (present on admission)  Assessment & Plan  See above    Acute exacerbation of CHF (congestive heart failure) (HCC)- (present on admission)  Assessment & Plan  Echo: EF 28%; severe tricuspid and mitral regurg.  RVSP elevated at 55  - Continue GDMT with losartan, spironolactone, metoprolol, Farxiga  - Continue digoxin  - Maintain euvolemia    Atrial fibrillation with RVR (HCC)- (present on admission)  Assessment & Plan  Chronic atrial fibrillation,  currently tachycardia  Digoxin 250 mcg IV monitoring closely with flip to prevent digoxin toxicity    Continue digoxin and Eliquis  12/27 BP better, I resumed Toprol 25 mg    Thrombocytopenia (HCC)- (present on admission)  Assessment & Plan  Platelets 288  - Repeat CBC in a.m.  - Stable to continue Eliquis at this time    Tobacco dependence- (present on admission)  Assessment & Plan  Counseling when appropriate    Hypertension- (present on admission)  Assessment & Plan  SBP well-controlled  - Continue losartan, metoprolol, spironolactone         VTE prophylaxis:   SCDs/TEDs      I have performed a physical exam and reviewed and updated ROS and Plan today (1/14/2025). In review of yesterday's note (1/13/2025), there are no changes except as documented above.

## 2025-01-14 NOTE — CARE PLAN
The patient is Stable - Low risk of patient condition declining or worsening    Shift Goals  Clinical Goals: keep pain below 5.  Patient Goals: Rest  Family Goals: nilson    Progress made toward(s) clinical / shift goals:  Patient is A&Ox4. Patient given PRN tylenol as per request. Wanted to forego morning dressing change before procedure. NPO maintained post midnight.     Problem: Communication  Goal: The ability to communicate needs accurately and effectively will improve  Outcome: Progressing     Problem: Wound/ / Incision Healing  Goal: Patient's wound/surgical incision will decrease in size and heals properly  Outcome: Progressing     Problem: Knowledge Deficit - Standard  Goal: Patient and family/care givers will demonstrate understanding of plan of care, disease process/condition, diagnostic tests and medications  Outcome: Progressing       Patient is not progressing towards the following goals:

## 2025-01-14 NOTE — CARE PLAN
The patient is Stable - Low risk of patient condition declining or worsening    Shift Goals  Clinical Goals: skin integrity this shift  Patient Goals: Rest  Family Goals:     Progress made toward(s) clinical / shift goals:      No falls this shift, bed in lowest position and locked, non slip socks on and bed alarm on.         Patient is not progressing towards the following goals:  Problem: Wound/ / Incision Healing  Goal: Patient's wound/surgical incision will decrease in size and heals properly  Description: Target End Date:  Prior to discharge or change in level of care    Document on LDA    1.  Assess and document surgical incision/wound  2.  Provide incision/wound care per policy and/or provider orders  3.  Manage surgical drains per policy if applicable  4.  Encourage adequate nutrition to promote wound healing  5.  Collaborate with Clinical Dietician  Note: Patient refused skin check and wound care refused this shift

## 2025-01-14 NOTE — PROGRESS NOTES
[facility] Medicine Daily Progress Note    Date of Service  1/13/2025    Chief Complaint  Robert Mcdonnell is a 74 y.o. male admitted 12/19/2024 with lower extremity edema and shortness of breath.    Hospital Course  Robert Mcdonnell is a 74-year-old male with PMHx homelessness, chronic atrial fibrillation on apixaban, HFrEF, pulmonary hypertension, perforated gastric ulcer s/p gastrectomy.    Per history: recent hospitalization 10/3 - 10/15 for perforated  secondary to invasive gastric adenocarcinoma, GDA embolization, subsequent subtotal gastrectomy, liver wedge resection, Miguel Angel-en-Y gastrojejunostomy with omental flap and cholecystectomy 10/7 with pathology showing invasive well-differentiated adenocarcinoma with associated ulceration. There was tumor invasion into the muscularis propria and 20 lymph nodes were negative for metastatic carcinoma. Oncology was consulted and noted no evidence of metastatic disease however MRI abdomen recommended for follow-up; no adjuvant therapy was recommended.     Patient was readmitted 12/19 for worsening lower extremity edema and shortness of breath.  He initially required BiPAP and IMCU admission.  Per history-patient had run out of his Lasix at home.  EKG in the emergency room showing irregular wide-complex arrhythmia.      Additionally, blood cultures were positive for group A strep bacteremia secondary to left lower extremity infection.  CT LLE: Small to moderate suprapatellar joint effusion.  MRI LLE: Diffuse cellulitis, patchy myositis, negative for OM.  Orthopedic surgery was consulted and recommended medical management. Infectious disease was consulted and recommended IV Unasyn with an end date of 1/4 and clindamycin with an end date of 12/31.    For patient's HFrEF-he was started on GDMT with effective diuresis.    Patient continued to have hypoglycemic episodes due to poor p.o. intake.    MRI left lower extremity limited due to motion but no obvious osteomyelitis or bone  lesions identified.  Completed IV Unasyn on 1/4.      Interval Problem Update  I have seen and examined the patient at bedside    I discussed with the wound care concerns of the wound is not heeling well, recommended surgical debridement    I discussed with Ortho Dr. Issa, ordered SHANTI.  Plan for possible surgery tomorrow.   n.p.o. midnight    Completed the antibiotics recently  Continue Eliquis I did digoxin  Vitals they are in good range    I have discussed this patient's plan of care and discharge plan at IDT rounds today with Case Management, Nursing, Nursing leadership, and other members of the IDT team.    Consultants/Specialty  critical care, infectious disease, and orthopedics    Code Status  Full Code    Disposition  The patient is not medically cleared for discharge to home or a post-acute facility.      I have placed the appropriate orders for post-discharge needs.    Review of Systems  Review of Systems   Constitutional:  Positive for malaise/fatigue. Negative for fever.   Respiratory:  Negative for shortness of breath.    Cardiovascular:  Negative for chest pain and leg swelling.   Gastrointestinal:  Negative for abdominal pain, nausea and vomiting.   Neurological:  Positive for weakness.        Physical Exam  Temp:  [36.4 °C (97.6 °F)-36.7 °C (98 °F)] 36.7 °C (98 °F)  Pulse:  [76-99] 76  Resp:  [16-17] 16  BP: ()/(52-64) 109/60  SpO2:  [99 %-100 %] 100 %    Physical Exam  Vitals and nursing note reviewed.   Constitutional:       General: He is not in acute distress.     Appearance: Normal appearance. He is ill-appearing.      Comments: Frail appearing    Cardiovascular:      Rate and Rhythm: Normal rate and regular rhythm.   Pulmonary:      Effort: Pulmonary effort is normal.      Breath sounds: Normal breath sounds.   Musculoskeletal:      Comments: Healing right lower extremity wound with granulation tissue present based on wound care note from 1/5   Skin:     General: Skin is warm and dry.    Neurological:      Mental Status: He is alert and oriented to person, place, and time. Mental status is at baseline.   Psychiatric:         Mood and Affect: Mood normal.         Behavior: Behavior normal.         Fluids    Intake/Output Summary (Last 24 hours) at 1/13/2025 1717  Last data filed at 1/13/2025 1450  Gross per 24 hour   Intake 680 ml   Output 500 ml   Net 180 ml        Laboratory                                  Imaging  [id number]-W/O LEFT   Final Result      1.  Very limited examination due to patient motion artifact. Patient was also unable to complete the examination and therefore contrast-enhanced sequences were not obtained.      2.  No evidence of marrow edema or bone lesion.      3.  Again seen diffuse soft tissue edema/induration likely representing cellulitis. This also patchy muscle edema suggesting myositis.      4.  No focal fluid collection to suggest presence of abscess.      [id number] VIEW   Final Result      No evidence of bowel obstruction. Mild constipation.      MR-KNEE-WITH & W/O LEFT   Final Result      1.  No evidence of marrow edema or abnormal enhancement to suggest presence of osteomyelitis.      2.  Small to moderate joint effusion with mild synovitis.      3.  Diffuse soft tissue edema/induration consistent with cellulitis.      4.  Small popliteal cyst.      5.  No evidence of soft tissue abscess.      6.  Markedly motion degraded examination. Ligaments and menisci are inadequately evaluated due to patient motion.      MR-LOWER EXTREMITY-NO JOINT-W/O LEFT   Final Result      1.  Patient motion degraded exam.      2.  No evidence of marrow edema or bone lesion to suggest presence of osteomyelitis.      3.  Diffuse cellulitis and patchy myositis.      4.  No evidence of focal fluid collection to suggest presence of abscess.      CT-EXTREMITY, LOWER WITH LEFT   Final Result      1.  Extensive subcutaneous inflammatory changes about the left lower extremity consistent  with deep and superficial cellulitis.      2.  No definite CT evidence of necrotizing fasciitis.      3.  No CT evidence of deep abscess formation or acute or chronic osteomyelitis.      4.  Small to moderate suprapatellar joint effusion.      CT-EXTREMITY, LOWER WITH LEFT   Final Result      No soft tissue gas to suggest necrotizing fasciitis.      No fluid collection identified.      DX-CHEST-PORTABLE (1 VIEW)   Final Result         1.  Hazy left pulmonary infiltrates, similar to prior study   2.  Trace left pleural effusion, stable   3.  Cardiomegaly   4.  Atherosclerosis      DX-CHEST-PORTABLE (1 VIEW)   Final Result         1.  Hazy left pulmonary infiltrates, similar to prior study   2.  Trace left pleural effusion, stable   3.  Cardiomegaly   4.  Atherosclerosis      EC-ECHOCARDIOGRAM COMPLETE W/O CONT   Final Result      CT-CHEST,ABDOMEN,PELVIS WITH   Final Result      1. Stable spiculated opacity in the right upper lobe, extending to the pleural surface.   2. Lingular and left lower lobe parenchymal airspace disease has improved in the interval, but has not completely resolved. There is pleural reaction, suggesting some of these areas may represent parenchymal scarring.   3. Asymmetry of the lung volumes, small on the left than the right.   4. Cardiomegaly with atherosclerotic calcifications in the aorta and coronary arteries.   5. Postsurgical changes in the stomach.   6. Stable scattered multiple hypodense lesions throughout the liver.   7. Moderate ascites throughout the abdomen and pelvis.   8. Cachexia.   9. Enlarged left inguinal lymph nodes with central necrosis. There is edema involving the soft tissues of the upper left leg extending to the left pelvis.      US-EXTREMITY VENOUS LOWER BILAT   Final Result      DX-CHEST-PORTABLE (1 VIEW)   Final Result         1.  Hazy left pulmonary infiltrates.   2.  Small left pleural effusion   3.  Cardiomegaly      US-EXTREMITY ARTERY LOWER UNILAT W/SHANTI (COMBO)  LEFT    (Results Pending)        Assessment/Plan  * Acute exacerbation of CHF (congestive heart failure) (HCC)- (present on admission)  Assessment & Plan  Echo: EF 28%; severe tricuspid and mitral regurg.  RVSP elevated at 55  - Continue GDMT with losartan, spironolactone, metoprolol, Farxiga  - Continue digoxin  - Maintain euvolemia    Constipation  Assessment & Plan  I ordered a bowel management  Suppository today    Hypoglycemia  Assessment & Plan  Multiple episodes of hypoglycemia requiring intervention  Additional episodes of hypoglycemia today  Poor p.o. intake  - Continue to encourage PO intake   - Nutrition consulted  - Discontinue Farxiga due to hypoglycemia    Streptococcal bacteremia- (present on admission)  Assessment & Plan  Blood cultures 12/19 positive for group A strep  Repeat blood cultures 12/21 NGTD    Pulmonary hypertension (HCC)  Assessment & Plan  Previous Echo RVSP 65, severe TR  Suspect mainly Group 2  RV perfusion with MAP > 65 as need norepinepinephrine  Blood pressure is running on the lower side holding diuretic therapy at this time    Acute respiratory failure with hypoxia (HCC)- (present on admission)  Assessment & Plan  Improved off Bipap  Weaned to room air    Hypophosphatemia- (present on admission)  Assessment & Plan  Replaced    Malignant neoplasm of body of stomach (HCC)- (present on admission)  Assessment & Plan  T2, N0, M0 invasive gastric adenocarcinoma presenting as perforated  10/2024  -GDA embolization  -10/7 subtotal gastrectomy, liver wedge resection, Miguel Angel-en-Y gastrojejunostomy with omental flap and cholecystectomy  -pathology invasive well-differentiated adenocarcinoma with associated ulceration  -tumor invasion into the muscularis propria and 20 lymph nodes were negative for metastatic carcinoma  -Oncology was consulted and noted no evidence of metastatic disease  -MRI abdomen recommended for follow-up; no adjuvant therapy was recommended  He will need outpatient  follow-up    Homeless- (present on admission)  Assessment & Plan  Patient is currently homeless.  He is unsure if he is able to return to [facility].  Wheelchair has been ordered.  Discussed with case management today.    Severe protein-calorie malnutrition (HCC)- (present on admission)  Assessment & Plan  Discussed with nutrition, patient meets the ASPEN criteria of severe malnutrition  Continuous nutrition support    COPD (chronic obstructive pulmonary disease) (HCC)- (present on admission)  Assessment & Plan  Current active smoker, no PFTs on file  RT protocols, bronchodilators as needed  No signs of exacerbation    Acute kidney injury (HCC)- (present on admission)  Assessment & Plan  Resolved     Sepsis (HCC)- (present on admission)  Assessment & Plan  See above    Atrial fibrillation with RVR (HCC)- (present on admission)  Assessment & Plan  Chronic atrial fibrillation, currently tachycardia  Digoxin 250 mcg IV monitoring closely with flip to prevent digoxin toxicity    Continue digoxin and Eliquis  12/27 BP better, I resumed Toprol 25 mg    Thrombocytopenia (HCC)- (present on admission)  Assessment & Plan  Platelets 288  - Repeat CBC in a.m.  - Stable to continue Eliquis at this time    Tobacco dependence- (present on admission)  Assessment & Plan  Counseling when appropriate    Hypertension- (present on admission)  Assessment & Plan  SBP well-controlled  - Continue losartan, metoprolol, spironolactone    Wound of lower extremity- (present on admission)  Assessment & Plan  Likely source of infection, lower ext doppler negative  CT left leg shows small to moderate suprapatellar joint effusion.  MRI LLE showed Diffuse cellulitis and patchy myositis, negative for osteomyelitis or abscess.   Repeat MRI 12/30: Limited due to patient motion artifact; no obvious OM noted persistent cellulitis and myositis noted  - Infectious disease consulted  - IV Unasyn regimen completed 1/4  - P.o. clindamycin completed 12/31  -  Orthopedic surgery consultation 12/20 and 12/25 no surgical interventions at this time    1/13 I discussed with the wound care concerns of the wound is not heeling well, recommended surgical debridement    I discussed with Ortho Dr. Issa, ordered SHANTI.  Plan for possible surgery tomorrow.   n.p.o. midnight          ACP (advance care planning)- (present on admission)  Assessment & Plan  Patient admitted with CHF exacerbation, significantly reduced the EF of 30%.  Also found bacteremia, LLE infection, severe malnutrition.  History of invasive gastric malignancy, A-fib RVR, PE, gastric perforation post extensive surgery including subtotal gastrectomy, liver resection  Miguel Angel-en-Y gastrojejunostomy with omental flap and cholecystectomy.  I discussed the CODE STATUS with him, including CPR, intubation/mechanical ventilation.  He wants to stay full code.  Patient has high complexity and guarded prognosis.  I consulted palliative care.  ACP 18 minutes.          VTE prophylaxis: Eliquis    I have performed a physical exam and reviewed and updated ROS and Plan today (1/13/2025). In review of yesterday's note (1/12/2025), there are no changes except as documented above.    I spent 51 minutes for chart review, meeting and examining the patient, documenting, ordering medications and tests, interpreting the results independently, and communicating with the other health professionals.      VTE Selection

## 2025-01-15 PROCEDURE — A9270 NON-COVERED ITEM OR SERVICE: HCPCS | Performed by: INTERNAL MEDICINE

## 2025-01-15 PROCEDURE — A9270 NON-COVERED ITEM OR SERVICE: HCPCS | Performed by: HOSPITALIST

## 2025-01-15 PROCEDURE — 700102 HCHG RX REV CODE 250 W/ 637 OVERRIDE(OP): Performed by: STUDENT IN AN ORGANIZED HEALTH CARE EDUCATION/TRAINING PROGRAM

## 2025-01-15 PROCEDURE — 99232 SBSQ HOSP IP/OBS MODERATE 35: CPT | Performed by: STUDENT IN AN ORGANIZED HEALTH CARE EDUCATION/TRAINING PROGRAM

## 2025-01-15 PROCEDURE — 770006 HCHG ROOM/CARE - MED/SURG/GYN SEMI*

## 2025-01-15 PROCEDURE — 700102 HCHG RX REV CODE 250 W/ 637 OVERRIDE(OP): Performed by: HOSPITALIST

## 2025-01-15 PROCEDURE — 83516 IMMUNOASSAY NONANTIBODY: CPT

## 2025-01-15 PROCEDURE — 36415 COLL VENOUS BLD VENIPUNCTURE: CPT

## 2025-01-15 PROCEDURE — 700102 HCHG RX REV CODE 250 W/ 637 OVERRIDE(OP): Performed by: INTERNAL MEDICINE

## 2025-01-15 PROCEDURE — A9270 NON-COVERED ITEM OR SERVICE: HCPCS | Performed by: STUDENT IN AN ORGANIZED HEALTH CARE EDUCATION/TRAINING PROGRAM

## 2025-01-15 RX ADMIN — LOSARTAN POTASSIUM 25 MG: 25 TABLET, FILM COATED ORAL at 04:38

## 2025-01-15 RX ADMIN — SPIRONOLACTONE 25 MG: 25 TABLET ORAL at 04:38

## 2025-01-15 RX ADMIN — OXYCODONE 2.5 MG: 5 TABLET ORAL at 12:52

## 2025-01-15 RX ADMIN — DIGOXIN 125 MCG: 0.12 TABLET ORAL at 18:25

## 2025-01-15 RX ADMIN — OMEPRAZOLE 20 MG: 20 CAPSULE, DELAYED RELEASE ORAL at 04:38

## 2025-01-15 RX ADMIN — OXYCODONE 2.5 MG: 5 TABLET ORAL at 18:25

## 2025-01-15 RX ADMIN — APIXABAN 5 MG: 5 TABLET, FILM COATED ORAL at 18:28

## 2025-01-15 RX ADMIN — APIXABAN 5 MG: 5 TABLET, FILM COATED ORAL at 04:38

## 2025-01-15 RX ADMIN — DAKIN'S SOLUTION 0.125% (QUARTER STRENGTH) 473 ML: 0.12 SOLUTION at 18:31

## 2025-01-15 RX ADMIN — METOPROLOL SUCCINATE 25 MG: 25 TABLET, EXTENDED RELEASE ORAL at 04:37

## 2025-01-15 RX ADMIN — SENNOSIDES AND DOCUSATE SODIUM 2 TABLET: 50; 8.6 TABLET ORAL at 18:27

## 2025-01-15 ASSESSMENT — ENCOUNTER SYMPTOMS
ABDOMINAL PAIN: 0
SHORTNESS OF BREATH: 0
NAUSEA: 0
VOMITING: 0
FEVER: 0

## 2025-01-15 ASSESSMENT — PAIN DESCRIPTION - PAIN TYPE
TYPE: ACUTE PAIN

## 2025-01-15 NOTE — CARE PLAN
The patient is Stable - Low risk of patient condition declining or worsening    Shift Goals  Clinical Goals: wound care, NPO at midnight  Patient Goals: snack,sleep  Family Goals: none present    Progress made toward(s) clinical / shift goals:  Patient is alert and oriented. NPO since midnight. Pt refuses LLE wound care states they might have surgery today and would rather wait until then. Education provided and pt agreed to reinforcement. Hourly rounding. Call light within reach.     Patient is not progressing towards the following goals:

## 2025-01-15 NOTE — DISCHARGE PLANNING
0911  Agency/Facility Name: [facility]  Spoke To: Brittanie  Outcome: DPA inquired about INS AUTH. Per Brittanie still currently pending at this time.  LSW notified.     Patient/Caregiver provided printed discharge information.

## 2025-01-15 NOTE — PROGRESS NOTES
Hospital Medicine Daily Progress Note    Date of Service  1/15/2025    Chief Complaint  Robert Mcdonnell is a 74 y.o. male admitted 12/19/2024 with left lower extremity cellulitis.    Hospital Course  Robert Mcdonnell is a 74-year-old male with PMHx homelessness, chronic atrial fibrillation on apixaban, HFrEF, pulmonary hypertension, perforated gastric ulcer s/p gastrectomy.    Per history: recent hospitalization 10/3 - 10/15 for perforated  secondary to invasive gastric adenocarcinoma, GDA embolization, subsequent subtotal gastrectomy, liver wedge resection, Miguel Angel-en-Y gastrojejunostomy with omental flap and cholecystectomy 10/7 with pathology showing invasive well-differentiated adenocarcinoma with associated ulceration. There was tumor invasion into the muscularis propria and 20 lymph nodes were negative for metastatic carcinoma. Oncology was consulted and noted no evidence of metastatic disease however MRI abdomen recommended for follow-up; no adjuvant therapy was recommended.     Patient was readmitted 12/19 for worsening lower extremity edema and shortness of breath.  He initially required BiPAP and IMCU admission.  Per history-patient had run out of his Lasix at home.  EKG in the emergency room showing irregular wide-complex arrhythmia.      Additionally, blood cultures were positive for group A strep bacteremia secondary to left lower extremity infection.  CT LLE: Small to moderate suprapatellar joint effusion.  MRI LLE: Diffuse cellulitis, patchy myositis, negative for OM.  Orthopedic surgery was consulted and recommended medical management. Infectious disease was consulted and recommended IV Unasyn with an end date of 1/4 and clindamycin with an end date of 12/31.    For patient's HFrEF-he was started on GDMT with effective diuresis.    Patient continued to have hypoglycemic episodes due to poor p.o. intake.    Interval Problem Update  1/14: Vitals stable overnight.   through 109.  Patient is n.p.o.  for possible surgical debridement of lower extremity cellulitis today.  Discussed with Dr. Clayton, orthopedic surgery.  No plans for surgery at this time.  N.p.o. again at midnight for possible debridement in a.m.    1/15: Vitals stable overnight.   through 131.  Discussed with orthopedic surgery.  No plans for surgical intervention at this time.  Continue antibiotics.  Continue wound care.  Patient is medically clear for discharge to skilled nursing facility.    I have discussed this patient's plan of care and discharge plan at IDT rounds today with Case Management, Nursing, Nursing leadership, and other members of the IDT team.    Consultants/Specialty  critical care, infectious disease, orthopedics, and palliative care    Code Status  Full Code    Disposition  The patient is medically cleared for discharge to home or a post-acute facility.  Anticipate discharge to: skilled nursing facility    I have placed the appropriate orders for post-discharge needs.    Review of Systems  Review of Systems   Constitutional:  Negative for fever and malaise/fatigue.   Respiratory:  Negative for shortness of breath.    Cardiovascular:  Negative for chest pain and leg swelling.   Gastrointestinal:  Negative for abdominal pain, nausea and vomiting.        Physical Exam  Temp:  [36 °C (96.8 °F)-36.4 °C (97.5 °F)] 36.3 °C (97.4 °F)  Pulse:  [69-89] 71  Resp:  [18] 18  BP: (100-131)/(47-81) 102/68  SpO2:  [98 %-100 %] 99 %    Physical Exam  Vitals and nursing note reviewed.   Constitutional:       General: He is not in acute distress.     Appearance: Normal appearance. He is ill-appearing.   Cardiovascular:      Rate and Rhythm: Normal rate and regular rhythm.   Pulmonary:      Effort: Pulmonary effort is normal.      Breath sounds: Normal breath sounds.   Skin:     General: Skin is warm and dry.   Neurological:      Mental Status: He is alert and oriented to person, place, and time. Mental status is at baseline.    Psychiatric:         Mood and Affect: Mood normal.         Behavior: Behavior normal.         Fluids    Intake/Output Summary (Last 24 hours) at 1/15/2025 1257  Last data filed at 1/15/2025 0725  Gross per 24 hour   Intake 440 ml   Output 1400 ml   Net -960 ml        Laboratory                        Imaging  US-EXTREMITY ARTERY LOWER UNILAT LEFT   Final Result      [id number]-W/O LEFT   Final Result      1.  Very limited examination due to patient motion artifact. Patient was also unable to complete the examination and therefore contrast-enhanced sequences were not obtained.      2.  No evidence of marrow edema or bone lesion.      3.  Again seen diffuse soft tissue edema/induration likely representing cellulitis. This also patchy muscle edema suggesting myositis.      4.  No focal fluid collection to suggest presence of abscess.      [id number] VIEW   Final Result      No evidence of bowel obstruction. Mild constipation.      MR-KNEE-WITH & W/O LEFT   Final Result      1.  No evidence of marrow edema or abnormal enhancement to suggest presence of osteomyelitis.      2.  Small to moderate joint effusion with mild synovitis.      3.  Diffuse soft tissue edema/induration consistent with cellulitis.      4.  Small popliteal cyst.      5.  No evidence of soft tissue abscess.      6.  Markedly motion degraded examination. Ligaments and menisci are inadequately evaluated due to patient motion.      MR-LOWER EXTREMITY-NO JOINT-W/O LEFT   Final Result      1.  Patient motion degraded exam.      2.  No evidence of marrow edema or bone lesion to suggest presence of osteomyelitis.      3.  Diffuse cellulitis and patchy myositis.      4.  No evidence of focal fluid collection to suggest presence of abscess.      CT-EXTREMITY, LOWER WITH LEFT   Final Result      1.  Extensive subcutaneous inflammatory changes about the left lower extremity consistent with deep and superficial cellulitis.      2.  No definite CT  evidence of necrotizing fasciitis.      3.  No CT evidence of deep abscess formation or acute or chronic osteomyelitis.      4.  Small to moderate suprapatellar joint effusion.      CT-EXTREMITY, LOWER WITH LEFT   Final Result      No soft tissue gas to suggest necrotizing fasciitis.      No fluid collection identified.      DX-CHEST-PORTABLE (1 VIEW)   Final Result         1.  Hazy left pulmonary infiltrates, similar to prior study   2.  Trace left pleural effusion, stable   3.  Cardiomegaly   4.  Atherosclerosis      DX-CHEST-PORTABLE (1 VIEW)   Final Result         1.  Hazy left pulmonary infiltrates, similar to prior study   2.  Trace left pleural effusion, stable   3.  Cardiomegaly   4.  Atherosclerosis      EC-ECHOCARDIOGRAM COMPLETE W/O CONT   Final Result      CT-CHEST,ABDOMEN,PELVIS WITH   Final Result      1. Stable spiculated opacity in the right upper lobe, extending to the pleural surface.   2. Lingular and left lower lobe parenchymal airspace disease has improved in the interval, but has not completely resolved. There is pleural reaction, suggesting some of these areas may represent parenchymal scarring.   3. Asymmetry of the lung volumes, small on the left than the right.   4. Cardiomegaly with atherosclerotic calcifications in the aorta and coronary arteries.   5. Postsurgical changes in the stomach.   6. Stable scattered multiple hypodense lesions throughout the liver.   7. Moderate ascites throughout the abdomen and pelvis.   8. Cachexia.   9. Enlarged left inguinal lymph nodes with central necrosis. There is edema involving the soft tissues of the upper left leg extending to the left pelvis.      US-EXTREMITY VENOUS LOWER BILAT   Final Result      DX-CHEST-PORTABLE (1 VIEW)   Final Result         1.  Hazy left pulmonary infiltrates.   2.  Small left pleural effusion   3.  Cardiomegaly           Assessment/Plan  * Wound of lower extremity- (present on admission)  Assessment & Plan  Likely source of  infection, lower ext doppler negative  CT left leg shows small to moderate suprapatellar joint effusion.  MRI LLE showed Diffuse cellulitis and patchy myositis, negative for osteomyelitis or abscess.   Repeat MRI 12/30: Limited due to patient motion artifact; no obvious OM noted persistent cellulitis and myositis noted  - Infectious disease consulted  - IV Unasyn regimen completed 1/4  - P.o. clindamycin completed 12/31  - Orthopedic surgery consultation 12/20 and 12/25 no surgical interventions at this time  - Discussed with orthopedic surgery again 1/15-unless patient is willing to have an amputation where there is a fluid-filled pocket concerning for deep abscess requiring debridement-she is not a candidate for surgery at this time.  Could consider vera flow wound VAC in the future      Constipation  Assessment & Plan  I ordered a bowel management  Suppository today    Hypoglycemia  Assessment & Plan  Multiple episodes of hypoglycemia requiring intervention  Additional episodes of hypoglycemia today  Poor p.o. intake  - Continue to encourage PO intake   - Nutrition consulted  - Discontinue Farxiga due to hypoglycemia    Streptococcal bacteremia- (present on admission)  Assessment & Plan  Blood cultures 12/19 positive for group A strep  Repeat blood cultures 12/21 NGTD    Pulmonary hypertension (HCC)  Assessment & Plan  Previous Echo RVSP 65, severe TR  Suspect mainly Group 2  RV perfusion with MAP > 65 as need norepinepinephrine  Blood pressure is running on the lower side holding diuretic therapy at this time    Acute respiratory failure with hypoxia (HCC)- (present on admission)  Assessment & Plan  Improved off Bipap  Weaned to room air    Hypophosphatemia- (present on admission)  Assessment & Plan  Replaced    Malignant neoplasm of body of stomach (HCC)- (present on admission)  Assessment & Plan  T2, N0, M0 invasive gastric adenocarcinoma presenting as perforated  10/2024  -GDA embolization  -10/7 subtotal  gastrectomy, liver wedge resection, Miguel Angel-en-Y gastrojejunostomy with omental flap and cholecystectomy  -pathology invasive well-differentiated adenocarcinoma with associated ulceration  -tumor invasion into the muscularis propria and 20 lymph nodes were negative for metastatic carcinoma  -Oncology was consulted and noted no evidence of metastatic disease  -MRI abdomen recommended for follow-up; no adjuvant therapy was recommended  He will need outpatient follow-up    Homeless- (present on admission)  Assessment & Plan  Patient is currently homeless.  He is unsure if he is able to [facility].  Wheelchair has been ordered.  Discussed with case management today.    Severe protein-calorie malnutrition (HCC)- (present on admission)  Assessment & Plan  Discussed with nutrition, patient meets the ASPEN criteria of severe malnutrition  Continuous nutrition support    ACP (advance care planning)- (present on admission)  Assessment & Plan  Patient admitted with CHF exacerbation, significantly reduced the EF of 30%.  Also found bacteremia, LLE infection, severe malnutrition.  History of invasive gastric malignancy, A-fib RVR, PE, gastric perforation post extensive surgery including subtotal gastrectomy, liver resection  Miguel Angel-en-Y gastrojejunostomy with omental flap and cholecystectomy.  I discussed the CODE STATUS with him, including CPR, intubation/mechanical ventilation.  He wants to stay full code.  Patient has high complexity and guarded prognosis.  I consulted palliative care.  ACP 18 minutes.     COPD (chronic obstructive pulmonary disease) (HCC)- (present on admission)  Assessment & Plan  Current active smoker, no PFTs on file  RT protocols, bronchodilators as needed  No signs of exacerbation    Acute kidney injury (HCC)- (present on admission)  Assessment & Plan  Resolved     Sepsis (HCC)- (present on admission)  Assessment & Plan  See above    Acute exacerbation of CHF (congestive heart failure) (HCC)-  (present on admission)  Assessment & Plan  Echo: EF 28%; severe tricuspid and mitral regurg.  RVSP elevated at 55  - Continue GDMT with losartan, spironolactone, metoprolol, Farxiga  - Continue digoxin  - Maintain euvolemia    Atrial fibrillation with RVR (HCC)- (present on admission)  Assessment & Plan  Chronic atrial fibrillation, currently tachycardia  Digoxin 250 mcg IV monitoring closely with flip to prevent digoxin toxicity    Continue digoxin and Eliquis  12/27 BP better, I resumed Toprol 25 mg    Thrombocytopenia (HCC)- (present on admission)  Assessment & Plan  Platelets 288  - Repeat CBC in a.m.  - Stable to continue Eliquis at this time    Tobacco dependence- (present on admission)  Assessment & Plan  Counseling when appropriate    Hypertension- (present on admission)  Assessment & Plan  SBP well-controlled  - Continue losartan, metoprolol, spironolactone         VTE prophylaxis:   SCDs/TEDs      I have performed a physical exam and reviewed and updated ROS and Plan today (1/15/2025). In review of yesterday's note (1/14/2025), there are no changes except as documented above.

## 2025-01-16 LAB
ERYTHROCYTE [DISTWIDTH] IN BLOOD BY AUTOMATED COUNT: 53.5 FL (ref 35.9–50)
GLUCOSE BLD STRIP.AUTO-MCNC: 90 MG/DL (ref 65–99)
HCT VFR BLD AUTO: 27.6 % (ref 42–52)
HGB BLD-MCNC: 9 G/DL (ref 14–18)
MAGNESIUM SERPL-MCNC: 1.8 MG/DL (ref 1.5–2.5)
MCH RBC QN AUTO: 28 PG (ref 27–33)
MCHC RBC AUTO-ENTMCNC: 32.6 G/DL (ref 32.3–36.5)
MCV RBC AUTO: 86 FL (ref 81.4–97.8)
PLATELET # BLD AUTO: 238 K/UL (ref 164–446)
PMV BLD AUTO: 10.2 FL (ref 9–12.9)
RBC # BLD AUTO: 3.21 M/UL (ref 4.7–6.1)
WBC # BLD AUTO: 9.5 K/UL (ref 4.8–10.8)

## 2025-01-16 PROCEDURE — 700111 HCHG RX REV CODE 636 W/ 250 OVERRIDE (IP): Mod: JZ,TB | Performed by: INTERNAL MEDICINE

## 2025-01-16 PROCEDURE — 85027 COMPLETE CBC AUTOMATED: CPT

## 2025-01-16 PROCEDURE — 700102 HCHG RX REV CODE 250 W/ 637 OVERRIDE(OP): Performed by: INTERNAL MEDICINE

## 2025-01-16 PROCEDURE — A9270 NON-COVERED ITEM OR SERVICE: HCPCS | Performed by: STUDENT IN AN ORGANIZED HEALTH CARE EDUCATION/TRAINING PROGRAM

## 2025-01-16 PROCEDURE — 770006 HCHG ROOM/CARE - MED/SURG/GYN SEMI*

## 2025-01-16 PROCEDURE — 700102 HCHG RX REV CODE 250 W/ 637 OVERRIDE(OP): Performed by: STUDENT IN AN ORGANIZED HEALTH CARE EDUCATION/TRAINING PROGRAM

## 2025-01-16 PROCEDURE — 36415 COLL VENOUS BLD VENIPUNCTURE: CPT

## 2025-01-16 PROCEDURE — 83735 ASSAY OF MAGNESIUM: CPT

## 2025-01-16 PROCEDURE — 82962 GLUCOSE BLOOD TEST: CPT

## 2025-01-16 PROCEDURE — 700102 HCHG RX REV CODE 250 W/ 637 OVERRIDE(OP): Performed by: HOSPITALIST

## 2025-01-16 PROCEDURE — A9270 NON-COVERED ITEM OR SERVICE: HCPCS | Performed by: HOSPITALIST

## 2025-01-16 PROCEDURE — 99231 SBSQ HOSP IP/OBS SF/LOW 25: CPT | Performed by: STUDENT IN AN ORGANIZED HEALTH CARE EDUCATION/TRAINING PROGRAM

## 2025-01-16 PROCEDURE — A9270 NON-COVERED ITEM OR SERVICE: HCPCS | Performed by: INTERNAL MEDICINE

## 2025-01-16 RX ADMIN — SENNOSIDES AND DOCUSATE SODIUM 2 TABLET: 50; 8.6 TABLET ORAL at 18:11

## 2025-01-16 RX ADMIN — LOSARTAN POTASSIUM 25 MG: 25 TABLET, FILM COATED ORAL at 05:57

## 2025-01-16 RX ADMIN — OXYCODONE 5 MG: 5 TABLET ORAL at 06:17

## 2025-01-16 RX ADMIN — POLYETHYLENE GLYCOL 3350 1 PACKET: 17 POWDER, FOR SOLUTION ORAL at 05:59

## 2025-01-16 RX ADMIN — OXYCODONE 2.5 MG: 5 TABLET ORAL at 09:26

## 2025-01-16 RX ADMIN — DAKIN'S SOLUTION 0.125% (QUARTER STRENGTH) 1 ML: 0.12 SOLUTION at 05:58

## 2025-01-16 RX ADMIN — SPIRONOLACTONE 25 MG: 25 TABLET ORAL at 05:56

## 2025-01-16 RX ADMIN — POLYETHYLENE GLYCOL 3350 1 PACKET: 17 POWDER, FOR SOLUTION ORAL at 05:56

## 2025-01-16 RX ADMIN — DIGOXIN 125 MCG: 0.12 TABLET ORAL at 18:12

## 2025-01-16 RX ADMIN — METOPROLOL SUCCINATE 25 MG: 25 TABLET, EXTENDED RELEASE ORAL at 05:56

## 2025-01-16 RX ADMIN — OXYCODONE 2.5 MG: 5 TABLET ORAL at 16:38

## 2025-01-16 RX ADMIN — APIXABAN 5 MG: 5 TABLET, FILM COATED ORAL at 05:56

## 2025-01-16 RX ADMIN — HYDROMORPHONE HYDROCHLORIDE 0.25 MG: 1 INJECTION, SOLUTION INTRAMUSCULAR; INTRAVENOUS; SUBCUTANEOUS at 17:04

## 2025-01-16 RX ADMIN — OMEPRAZOLE 20 MG: 20 CAPSULE, DELAYED RELEASE ORAL at 05:56

## 2025-01-16 RX ADMIN — DAKIN'S SOLUTION 0.125% (QUARTER STRENGTH) 0.12 ML: 0.12 SOLUTION at 18:12

## 2025-01-16 RX ADMIN — APIXABAN 5 MG: 5 TABLET, FILM COATED ORAL at 18:11

## 2025-01-16 ASSESSMENT — PAIN DESCRIPTION - PAIN TYPE
TYPE: ACUTE PAIN

## 2025-01-16 ASSESSMENT — ENCOUNTER SYMPTOMS
NAUSEA: 0
SHORTNESS OF BREATH: 0
FEVER: 0
VOMITING: 0
ABDOMINAL PAIN: 0

## 2025-01-16 NOTE — PROGRESS NOTES
Hospital Medicine Daily Progress Note    Date of Service  1/16/2025    Chief Complaint  Robert Mcdonnell is a 74 y.o. male admitted 12/19/2024 with left lower extremity cellulitis.    Hospital Course  Robert Mcdonnell is a 74-year-old male with PMHx homelessness, chronic atrial fibrillation on apixaban, HFrEF, pulmonary hypertension, perforated gastric ulcer s/p gastrectomy.    Per history: recent hospitalization 10/3 - 10/15 for perforated  secondary to invasive gastric adenocarcinoma, GDA embolization, subsequent subtotal gastrectomy, liver wedge resection, Miguel Angel-en-Y gastrojejunostomy with omental flap and cholecystectomy 10/7 with pathology showing invasive well-differentiated adenocarcinoma with associated ulceration. There was tumor invasion into the muscularis propria and 20 lymph nodes were negative for metastatic carcinoma. Oncology was consulted and noted no evidence of metastatic disease however MRI abdomen recommended for follow-up; no adjuvant therapy was recommended.     Patient was readmitted 12/19 for worsening lower extremity edema and shortness of breath.  He initially required BiPAP and IMCU admission.  Per history-patient had run out of his Lasix at home.  EKG in the emergency room showing irregular wide-complex arrhythmia.      Additionally, blood cultures were positive for group A strep bacteremia secondary to left lower extremity infection.  CT LLE: Small to moderate suprapatellar joint effusion.  MRI LLE: Diffuse cellulitis, patchy myositis, negative for OM.  Orthopedic surgery was consulted and recommended medical management. Infectious disease was consulted and recommended IV Unasyn with an end date of 1/4 and clindamycin with an end date of 12/31.    For patient's HFrEF-he was started on GDMT with effective diuresis.    Patient continued to have hypoglycemic episodes due to poor p.o. intake.    Interval Problem Update  1/14: Vitals stable overnight.   through 109.  Patient is n.p.o.  for possible surgical debridement of lower extremity cellulitis today.  Discussed with Dr. Clayton, orthopedic surgery.  No plans for surgery at this time.  N.p.o. again at midnight for possible debridement in a.m.    1/15: Vitals stable overnight.   through 131.  Discussed with orthopedic surgery.  No plans for surgical intervention at this time.  Continue antibiotics.  Continue wound care.  Patient is medically clear for discharge to skilled nursing facility.    1/16: Vital stable overnight.  WBC stable at 9.5.  Hb stable at 9.  Insurance authorization is pending for skilled nursing.  Patient is medically clear for discharge.    I have discussed this patient's plan of care and discharge plan at IDT rounds today with Case Management, Nursing, Nursing leadership, and other members of the IDT team.    Consultants/Specialty  critical care, infectious disease, orthopedics, and palliative care    Code Status  Full Code    Disposition  Medically Cleared  I have placed the appropriate orders for post-discharge needs.    Review of Systems  Review of Systems   Constitutional:  Negative for fever and malaise/fatigue.   Respiratory:  Negative for shortness of breath.    Cardiovascular:  Negative for chest pain and leg swelling.   Gastrointestinal:  Negative for abdominal pain, nausea and vomiting.        Physical Exam  Temp:  [36.7 °C (98 °F)-37.2 °C (99 °F)] 36.7 °C (98 °F)  Pulse:  [70-99] 71  Resp:  [18] 18  BP: ()/(54-67) 94/62  SpO2:  [97 %-100 %] 97 %    Physical Exam  Vitals and nursing note reviewed.   Constitutional:       General: He is not in acute distress.     Appearance: Normal appearance. He is ill-appearing.   Cardiovascular:      Rate and Rhythm: Normal rate and regular rhythm.   Pulmonary:      Effort: Pulmonary effort is normal.      Breath sounds: Normal breath sounds.   Skin:     General: Skin is warm and dry.   Neurological:      Mental Status: He is alert and oriented to person, place, and  time. Mental status is at baseline.   Psychiatric:         Mood and Affect: Mood normal.         Behavior: Behavior normal.         Fluids    Intake/Output Summary (Last 24 hours) at 1/16/2025 1312  Last data filed at 1/16/2025 0909  Gross per 24 hour   Intake 240 ml   Output 875 ml   Net -635 ml        Laboratory  Recent Labs     01/16/25  0043   WBC 9.5   RBC 3.21*   HEMOGLOBIN 9.0*   HEMATOCRIT 27.6*   MCV 86.0   MCH 28.0   MCHC 32.6   RDW 53.5*   PLATELETCT 238   MPV 10.2                       Imaging  US-EXTREMITY ARTERY LOWER UNILAT LEFT   Final Result      EL-XNFJZ-[id number]-W/O LEFT   Final Result      1.  Very limited examination due to patient motion artifact. Patient was also unable to complete the examination and therefore contrast-enhanced sequences were not obtained.      2.  No evidence of marrow edema or bone lesion.      3.  Again seen diffuse soft tissue edema/induration likely representing cellulitis. This also patchy muscle edema suggesting myositis.      4.  No focal fluid collection to suggest presence of abscess.      [id number] VIEW   Final Result      No evidence of bowel obstruction. Mild constipation.      MR-KNEE-WITH & W/O LEFT   Final Result      1.  No evidence of marrow edema or abnormal enhancement to suggest presence of osteomyelitis.      2.  Small to moderate joint effusion with mild synovitis.      3.  Diffuse soft tissue edema/induration consistent with cellulitis.      4.  Small popliteal cyst.      5.  No evidence of soft tissue abscess.      6.  Markedly motion degraded examination. Ligaments and menisci are inadequately evaluated due to patient motion.      MR-LOWER EXTREMITY-NO JOINT-W/O LEFT   Final Result      1.  Patient motion degraded exam.      2.  No evidence of marrow edema or bone lesion to suggest presence of osteomyelitis.      3.  Diffuse cellulitis and patchy myositis.      4.  No evidence of focal fluid collection to suggest presence of abscess.       CT-EXTREMITY, LOWER WITH LEFT   Final Result      1.  Extensive subcutaneous inflammatory changes about the left lower extremity consistent with deep and superficial cellulitis.      2.  No definite CT evidence of necrotizing fasciitis.      3.  No CT evidence of deep abscess formation or acute or chronic osteomyelitis.      4.  Small to moderate suprapatellar joint effusion.      CT-EXTREMITY, LOWER WITH LEFT   Final Result      No soft tissue gas to suggest necrotizing fasciitis.      No fluid collection identified.      DX-CHEST-PORTABLE (1 VIEW)   Final Result         1.  Hazy left pulmonary infiltrates, similar to prior study   2.  Trace left pleural effusion, stable   3.  Cardiomegaly   4.  Atherosclerosis      DX-CHEST-PORTABLE (1 VIEW)   Final Result         1.  Hazy left pulmonary infiltrates, similar to prior study   2.  Trace left pleural effusion, stable   3.  Cardiomegaly   4.  Atherosclerosis      EC-ECHOCARDIOGRAM COMPLETE W/O CONT   Final Result      CT-CHEST,ABDOMEN,PELVIS WITH   Final Result      1. Stable spiculated opacity in the right upper lobe, extending to the pleural surface.   2. Lingular and left lower lobe parenchymal airspace disease has improved in the interval, but has not completely resolved. There is pleural reaction, suggesting some of these areas may represent parenchymal scarring.   3. Asymmetry of the lung volumes, small on the left than the right.   4. Cardiomegaly with atherosclerotic calcifications in the aorta and coronary arteries.   5. Postsurgical changes in the stomach.   6. Stable scattered multiple hypodense lesions throughout the liver.   7. Moderate ascites throughout the abdomen and pelvis.   8. Cachexia.   9. Enlarged left inguinal lymph nodes with central necrosis. There is edema involving the soft tissues of the upper left leg extending to the left pelvis.      US-EXTREMITY VENOUS LOWER BILAT   Final Result      DX-CHEST-PORTABLE (1 VIEW)   Final Result          1.  Hazy left pulmonary infiltrates.   2.  Small left pleural effusion   3.  Cardiomegaly           Assessment/Plan  * Wound of lower extremity- (present on admission)  Assessment & Plan  Likely source of infection, lower ext doppler negative  CT left leg shows small to moderate suprapatellar joint effusion.  MRI LLE showed Diffuse cellulitis and patchy myositis, negative for osteomyelitis or abscess.   Repeat MRI 12/30: Limited due to patient motion artifact; no obvious OM noted persistent cellulitis and myositis noted  - Infectious disease consulted  - IV Unasyn regimen completed 1/4  - P.o. clindamycin completed 12/31  - Orthopedic surgery consultation 12/20 and 12/25 no surgical interventions at this time  - Discussed with orthopedic surgery again 1/15-unless patient is willing to have an amputation where there is a fluid-filled pocket concerning for deep abscess requiring debridement-she is not a candidate for surgery at this time.  Could consider vera flow wound VAC in the future      Constipation  Assessment & Plan  I ordered a bowel management  Suppository today    Hypoglycemia  Assessment & Plan  Multiple episodes of hypoglycemia requiring intervention  Additional episodes of hypoglycemia today  Poor p.o. intake  - Continue to encourage PO intake   - Nutrition consulted  - Discontinue Farxiga due to hypoglycemia    Streptococcal bacteremia- (present on admission)  Assessment & Plan  Blood cultures 12/19 positive for group A strep  Repeat blood cultures 12/21 NGTD    Pulmonary hypertension (HCC)  Assessment & Plan  Previous Echo RVSP 65, severe TR  Suspect mainly Group 2  RV perfusion with MAP > 65 as need norepinepinephrine  Blood pressure is running on the lower side holding diuretic therapy at this time    Acute respiratory failure with hypoxia (HCC)- (present on admission)  Assessment & Plan  Improved off Bipap  Weaned to room air    Hypophosphatemia- (present on admission)  Assessment &  Plan  Replaced    Malignant neoplasm of body of stomach (HCC)- (present on admission)  Assessment & Plan  T2, N0, M0 invasive gastric adenocarcinoma presenting as perforated  10/2024  -GDA embolization  -10/7 subtotal gastrectomy, liver wedge resection, Miguel Angel-en-Y gastrojejunostomy with omental flap and cholecystectomy  -pathology invasive well-differentiated adenocarcinoma with associated ulceration  -tumor invasion into the muscularis propria and 20 lymph nodes were negative for metastatic carcinoma  -Oncology was consulted and noted no evidence of metastatic disease  -MRI abdomen recommended for follow-up; no adjuvant therapy was recommended  He will need outpatient follow-up    Homeless- (present on admission)  Assessment & Plan  Patient is currently homeless.  He is unsure if he is able to return [facility].  Wheelchair has been ordered.  Discussed with case management today.    Severe protein-calorie malnutrition (HCC)- (present on admission)  Assessment & Plan  Discussed with nutrition, patient meets the ASPEN criteria of severe malnutrition  Continuous nutrition support    ACP (advance care planning)- (present on admission)  Assessment & Plan  Patient admitted with CHF exacerbation, significantly reduced the EF of 30%.  Also found bacteremia, LLE infection, severe malnutrition.  History of invasive gastric malignancy, A-fib RVR, PE, gastric perforation post extensive surgery including subtotal gastrectomy, liver resection  Miguel Angel-en-Y gastrojejunostomy with omental flap and cholecystectomy.  I discussed the CODE STATUS with him, including CPR, intubation/mechanical ventilation.  He wants to stay full code.  Patient has high complexity and guarded prognosis.  I consulted palliative care.  ACP 18 minutes.     COPD (chronic obstructive pulmonary disease) (HCC)- (present on admission)  Assessment & Plan  Current active smoker, no PFTs on file  RT protocols, bronchodilators as needed  No signs of  exacerbation    Acute kidney injury (HCC)- (present on admission)  Assessment & Plan  Resolved     Sepsis (HCC)- (present on admission)  Assessment & Plan  See above    Acute exacerbation of CHF (congestive heart failure) (HCC)- (present on admission)  Assessment & Plan  Echo: EF 28%; severe tricuspid and mitral regurg.  RVSP elevated at 55  - Continue GDMT with losartan, spironolactone, metoprolol, Farxiga  - Continue digoxin  - Maintain euvolemia    Atrial fibrillation with RVR (HCC)- (present on admission)  Assessment & Plan  Chronic atrial fibrillation, currently tachycardia  Digoxin 250 mcg IV monitoring closely with flip to prevent digoxin toxicity    Continue digoxin and Eliquis  12/27 BP better, I resumed Toprol 25 mg    Thrombocytopenia (HCC)- (present on admission)  Assessment & Plan  Platelets 288  - Repeat CBC in a.m.  - Stable to continue Eliquis at this time    Tobacco dependence- (present on admission)  Assessment & Plan  Counseling when appropriate    Hypertension- (present on admission)  Assessment & Plan  SBP well-controlled  - Continue losartan, metoprolol, spironolactone         VTE prophylaxis: VTE Selection    I have performed a physical exam and reviewed and updated ROS and Plan today (1/16/2025). In review of yesterday's note (1/15/2025), there are no changes except as documented above.

## 2025-01-16 NOTE — PROGRESS NOTES
2 RN Skin Assessment Completed by Kianna RN and JANNETTE choudhary.    Head: WDL  Ears: WDL  Nose: WDL  Mouth: WDL  Neck: WDL  Breasts/Chest: WDL, midline incision healed  Shoulder Blades: WDL  Spine: WDL  (R) Arm/Elbow/hand: discoloration  (L) Arm/Elbow/hand: discoloration  Abdomen:WDL  Groin: WDL  Sacrum/Coccyx/Buttocks: scar, healed scar discoloration   (R) Leg: swelling trace, and dry flaky skin   (L) Leg: swelling and weeping, wound  (R) Heel/Foot/Toe: blanching, dry flaky dry   (L) Heel/Foot/Toe: blanching, dry flaky dry           Devices in place: NA     Interventions in place: Heel Mepilex, Sacral Mepilex, Pillows, Low air loss mattress , Barrier Cream, and Heels floated with pillows    Possible skin injury found: No    Pictures uploaded into Epic: N/A  Wound Consult Placed: Yes

## 2025-01-16 NOTE — CARE PLAN
The patient is Stable - Low risk of patient condition declining or worsening    Shift Goals  Clinical Goals: Patient will report adequent pain level  Patient Goals: eat/rest  Family Goals: MARCOS    Progress made toward(s) clinical / shift goals:    Problem: Communication  Goal: The ability to communicate needs accurately and effectively will improve for this patient through shift  1/15/2025 1646 by Melinda Prasad R.N.  Outcome: Progressing  Note: Patient communicated his needs and reporting 6 out of 10 pain. Gave prn for relief  1/15/2025 1644 by Melinda Prasad R.N.  Outcome: Progressing     Problem: Knowledge Deficit - Standard  Goal: Patient and family/care givers will demonstrate understanding of plan of care, disease process/condition, diagnostic tests and medications for this patient through shift.  Outcome: Progressing  Note: Patient is involved with his plan of care by speaking with his provider about his wound.       Patient is not progressing towards the following goals:

## 2025-01-16 NOTE — CARE PLAN
Problem: Communication  Goal: The ability to communicate needs accurately and effectively will improve  1/15/2025 1646 by Melinda Prasad R.N.  Outcome: Progressing  Note: Patient communicated his needs and reporting 6 out of 10 pain. Gave prn for relief  1/15/2025 1644 by Melinda Prasad R.N.  Outcome: Progressing   The patient is Stable - Low risk of patient condition declining or worsening    Shift Goals  Clinical Goals: Patient will report adequent pain level  Patient Goals: eat/rest  Family Goals: MARCOS    Progress made toward(s) clinical / shift goals:      Patient is not progressing towards the following goals:

## 2025-01-16 NOTE — CARE PLAN
The patient is Stable - Low risk of patient condition declining or worsening    Shift Goals  Clinical Goals: pain management, safety, skin integrity (pt pain will be <6 throughout the shift, pt will remain free of falls or injury throughout the shift, pt skin integrity will improve or remain the same by the end of the shift)  Patient Goals: rest  Family Goals: nilson    Progress made toward(s) clinical / shift goals:  Pt had no complaints of pain throughout the shift, no PRN medications given. Pt bed locked in lowest position, bed alarm on, call light within reach, hourly rounding completed. Pt wound dressing changed during the shift.      Problem: Communication  Goal: The ability to communicate needs accurately and effectively will improve  Outcome: Progressing       Patient is not progressing towards the following goals:

## 2025-01-17 LAB
GLUCOSE BLD STRIP.AUTO-MCNC: 105 MG/DL (ref 65–99)
GLUCOSE BLD STRIP.AUTO-MCNC: 151 MG/DL (ref 65–99)

## 2025-01-17 PROCEDURE — 700102 HCHG RX REV CODE 250 W/ 637 OVERRIDE(OP): Performed by: INTERNAL MEDICINE

## 2025-01-17 PROCEDURE — 770006 HCHG ROOM/CARE - MED/SURG/GYN SEMI*

## 2025-01-17 PROCEDURE — 700102 HCHG RX REV CODE 250 W/ 637 OVERRIDE(OP): Performed by: HOSPITALIST

## 2025-01-17 PROCEDURE — A9270 NON-COVERED ITEM OR SERVICE: HCPCS | Performed by: HOSPITALIST

## 2025-01-17 PROCEDURE — 99232 SBSQ HOSP IP/OBS MODERATE 35: CPT | Performed by: STUDENT IN AN ORGANIZED HEALTH CARE EDUCATION/TRAINING PROGRAM

## 2025-01-17 PROCEDURE — 700102 HCHG RX REV CODE 250 W/ 637 OVERRIDE(OP): Performed by: STUDENT IN AN ORGANIZED HEALTH CARE EDUCATION/TRAINING PROGRAM

## 2025-01-17 PROCEDURE — A9270 NON-COVERED ITEM OR SERVICE: HCPCS | Performed by: STUDENT IN AN ORGANIZED HEALTH CARE EDUCATION/TRAINING PROGRAM

## 2025-01-17 PROCEDURE — A9270 NON-COVERED ITEM OR SERVICE: HCPCS | Performed by: INTERNAL MEDICINE

## 2025-01-17 PROCEDURE — 82962 GLUCOSE BLOOD TEST: CPT | Mod: 91

## 2025-01-17 RX ADMIN — DAKIN'S SOLUTION 0.125% (QUARTER STRENGTH) 473 ML: 0.12 SOLUTION at 16:23

## 2025-01-17 RX ADMIN — OXYCODONE 5 MG: 5 TABLET ORAL at 04:35

## 2025-01-17 RX ADMIN — DIGOXIN 125 MCG: 0.12 TABLET ORAL at 16:23

## 2025-01-17 RX ADMIN — SPIRONOLACTONE 25 MG: 25 TABLET ORAL at 04:35

## 2025-01-17 RX ADMIN — OXYCODONE 5 MG: 5 TABLET ORAL at 15:30

## 2025-01-17 RX ADMIN — OMEPRAZOLE 20 MG: 20 CAPSULE, DELAYED RELEASE ORAL at 04:35

## 2025-01-17 RX ADMIN — LOSARTAN POTASSIUM 25 MG: 25 TABLET, FILM COATED ORAL at 04:35

## 2025-01-17 RX ADMIN — APIXABAN 5 MG: 5 TABLET, FILM COATED ORAL at 16:23

## 2025-01-17 RX ADMIN — APIXABAN 5 MG: 5 TABLET, FILM COATED ORAL at 04:35

## 2025-01-17 RX ADMIN — METOPROLOL SUCCINATE 25 MG: 25 TABLET, EXTENDED RELEASE ORAL at 04:35

## 2025-01-17 ASSESSMENT — PAIN DESCRIPTION - PAIN TYPE
TYPE: ACUTE PAIN

## 2025-01-17 ASSESSMENT — ENCOUNTER SYMPTOMS
SHORTNESS OF BREATH: 0
VOMITING: 0
ABDOMINAL PAIN: 0
FEVER: 0
NAUSEA: 0

## 2025-01-17 NOTE — DISCHARGE PLANNING
Medical Social Work  PC to Erin Levi  [phone], message left to [facility]     Teams to [facility] requesting follow up with Josue Stafford in [place] for acceptance

## 2025-01-17 NOTE — WOUND TEAM
[facility]  Inpatient Services  Wound and Skin Care Follow-up    Admission Date: 12/19/2024     Last order of IP CONSULT TO WOUND CARE was found on 12/26/2024 from Hospital Encounter on 12/19/2024     HPI, PMH, SH: Reviewed    Past Surgical History:   Procedure Laterality Date    GASTRECTOMY N/A 10/7/2024    Procedure: LAPAROTOMY, GASTRECTOMY-SUBTOTAL, WITH INTRAOPERATIVE ULTRASOUND GUIDANCE;  Surgeon: Mt Cabral M.D.;  Location: SURGERY [facility];  Service: General    NODE DISSECTION N/A 10/7/2024    Procedure: LYMPHADENECTOMY-NODE DISSECTION;  Surgeon: Mt Cabral M.D.;  Location: SURGERY [facility];  Service: General    CREATION, FLAP, OMENTUM N/A 10/7/2024    Procedure: CREATION, FLAP, OMENTUM;  Surgeon: Mt Cabral M.D.;  Location: SURGERY [facility];  Service: General    CHOLECYSTECTOMY N/A 10/7/2024    Procedure: CHOLECYSTECTOMY;  Surgeon: Mt Cabral M.D.;  Location: SURGERY [facility];  Service: General    RI UPPER GI ENDOSCOPY,DIAGNOSIS N/A 9/13/2024    Procedure: GASTROSCOPY;  Surgeon: Sarah Lozoya M.D.;  Location: SURGERY SAME DAY [facility];  Service: Gastroenterology    RI UPPER GI ENDOSCOPY,BIOPSY N/A 9/13/2024    Procedure: GASTROSCOPY, WITH BIOPSY;  Surgeon: Sarah Lozoya M.D.;  Location: SURGERY SAME DAY [facility];  Service: Gastroenterology    RI UPPER GI ENDOSCOPY,SCLER INJECT N/A 9/13/2024    Procedure: GASTROSCOPY, WITH SCLEROTHERAPY;  Surgeon: Sarah Lozoya M.D.;  Location: SURGERY SAME DAY [facility];  Service: Gastroenterology    RI UPPER GI ENDOSCOPY,BIOPSY N/A 09/10/2024    Procedure: GASTROSCOPY, WITH BIOPSY;  Surgeon: Demond Gutierres M.D.;  Location: SURGERY SAME DAY [facility];  Service: Gastroenterology    RI UPPER GI ENDOSCOPY,SCLER INJECT N/A 09/10/2024    Procedure: GASTROSCOPY, WITH SCLEROTHERAPY;  Surgeon: Demond Gutierres M.D.;  Location: SURGERY SAME DAY [facility];  Service: Gastroenterology     "GASTROSCOPY WITH ENDOSTAT N/A 09/10/2024    Procedure: EGD, WITH CAUTERIZATION;  Surgeon: Demond Gutierres M.D.;  Location: SURGERY SAME DAY [facility];  Service: Gastroenterology    GA UPPER GI ENDOSCOPY,DIAGNOSIS N/A 01/31/2023    Procedure: GASTROSCOPY;  Surgeon: Joy Mcnair M.D.;  Location: SURGERY SAME DAY [facility];  Service: Gastroenterology    CATARACT PHACO WITH IOL Left      Social History     Tobacco Use    Smoking status: Some Days     Current packs/day: 1.00     Types: Cigarettes    Smokeless tobacco: Never   Substance Use Topics    Alcohol use: Yes     Alcohol/week: 1.2 oz     Types: 2 Cans of beer per week     Comment: occ     Chief Complaint   Patient presents with    Shortness of Breath           Peripheral Edema     Diagnosis: Acute left-sided CHF (congestive heart failure) (HCC) [I50.1]  Streptococcal bacteremia [R78.81, B95.5]  Left leg cellulitis [L03.116]    Unit where seen by Wound Team: S630/02     WOUND FOLLOW UP RELATED TO:  LLE       WOUND TEAM PLAN OF CARE - Frequency of Follow-up:   Nursing to follow dressing orders written for wound care. Contact wound team if area fails to progress, deteriorates or with any questions/concerns if something comes up before next scheduled follow up (See below as to whether wound is following and frequency of wound follow up)  Dressing changes by wound team:                   Weekly - LLE    WOUND HISTORY:       \"Per H&P: 74 y.o. male with past medical history of chronic atrial fibrillation, homeless, prior PE, congestive heart failure with biventricular dysfunction, pulmonary hypertension recently admitted for invasive gastric adenocarcinoma status post EGD embolization, cholecystectomy who presented to the hospital on 12/19/2024 with shortness of breath, left leg swelling and encephalopathy and found to hypoglycemia and he was hypoxic and was placed on BiPAP. His symptoms of shortness of breath improved and encephalopathy also improved.\"     Per  " Forrest, no surgical indication at this time.         WOUND ASSESSMENT/LDA    Wound 12/20/24 Full Thickness Wound Pretibial Circumferential Left (Active)   Date First Assessed/Time First Assessed: 12/20/24 0800   Present on Original Admission: Yes  Primary Wound Type: Full Thickness Wound  Location: Pretibial  Wound Orientation: Circumferential  Laterality: Left      Assessments 1/16/2025  5:22 PM   Wound Image        Site Assessment Red;Yellow;Slough   Periwound Assessment Denuded   Margins Defined edges;Attached edges   Closure Secondary intention   Drainage Amount Small   Drainage Description Serosanguineous   Treatments Cleansed;Site care   Wound Cleansing Approved Wound Cleanser   Periwound Protectant Barrier Paste   Dressing Status Clean;Dry;Intact   Dressing Changed Changed   Dressing Cleansing/Solutions 1/4 Strength Dakin's Solution   Dressing Options Moist Roll Gauze;Absorbent Abdominal Pad;Dry Roll Gauze   Dressing Change/Treatment Frequency Every Shift, and As Needed   NEXT Dressing Change/Treatment Date 01/16/25   NEXT Weekly Photo (Inpatient Only) 01/23/25   Wound Team Following Weekly   Non-staged Wound Description Full thickness   Wound Length (cm) 20 cm   Wound Width (cm) 9 cm   Wound Surface Area (cm^2) 180 cm^2   Shape irregular   Wound Odor None   Exposed Structures MARCOS   WOUND NURSE ONLY - Time Spent with Patient (mins) 30        Vascular:    SHANTI:   SHANTI Results, Last 30 Days US-EXTREMITY ARTERY LOWER UNILAT LEFT    Result Date: 1/15/2025  Narrative Lower Extremity  Arterial Duplex Report  Vascular Laboratory  CONCLUSIONS  1) Diffuse atherosclerosis  2) Hemodynamic changes suggest possible mid posterior tibial artery  occlusion or stenosis.  Examination limited as described  HUNTER STEELE  Exam Date:     01/14/2025 02:25  Room #:     Inpatient  Priority:     Routine  Ht (in):             Wt (lb):  Ordering Physician:        TON COOLEY  Referring Physician:       TON COOLEY  Sonographer:                eDclan Lord T  Study Type:                Complete Unilateral  Technical Quality:         Adequate  Age:    74    Gender:     M  MRN:    [id number]  :    1950      BSA:  Indications:     Ulcer of calf  CPT Codes:       73993  ICD Codes:       707.12  History:         Ulcerations of the left calf.  Limitations:     bandages                RIGHT  Waveform        Peak Systolic Velocity (cm/s)                  Prox    Prox-Mid  Mid    Mid-Dist  Distal                                                             CFA                                                             PFA                                                             SFA                                                             POP                                                             AT                                                             PT                                                             RUBEN                LEFT  Waveform        Peak Systolic Velocity (cm/s)                  Prox    Prox-Mid  Mid    Mid-Dist  Distal  Hyperemic       11                                         CFA  Triphasic       138                                        PFA  Hyperemic       105               125              133     SFA  Hyperemic       164                                        POP  Triphasic       68                                 97      AT  Monophasic      114                                18      PT  Hyperemic       112                                        RUBEN  FINDINGS  Left.  Calcific plaque is noted throughout the lower extremity.  The left distal peroneal artery was not visualized due to bandages present.  The left distal posterior tibial demonstrates monophasic waveforms however  the mid posterior tibial artery could not be assessed due to the bandages  present. Hemodynamics suggest a possible occlusion vs stenosis in the mid  posterior tibial artery.  No other hemodynamically significant stenosis with hyperemic  waveforms  noted.  Debbie Cardenas MD  (Electronically Signed)  Final Date:      15 January 2025                   06:28      Lab Values:    Lab Results   Component Value Date/Time    WBC 9.5 01/16/2025 12:43 AM    RBC 3.21 (L) 01/16/2025 12:43 AM    HEMOGLOBIN 9.0 (L) 01/16/2025 12:43 AM    HEMATOCRIT 27.6 (L) 01/16/2025 12:43 AM    CREACTPROT 30.02 (H) 12/20/2024 02:45 PM    SEDRATEWES 13 12/20/2024 02:45 PM    HBA1C 5.9 (H) 09/08/2024 12:24 AM         Culture Results show:  No results found for this or any previous visit (from the past 720 hours).    Pain Level/Medicated:  IV pain medications administered by bedside RN   prior (Pt educated that IV medication will not be available on outpatient basis)       INTERVENTIONS BY WOUND TEAM:  Chart and images reviewed. Discussed with bedside RN. All areas of concern (based on picture review, LDA review and discussion with bedside RN) have been thoroughly assessed. Documentation of areas based on significant findings. This RN in to assess patient. Performed standard wound care which includes appropriate positioning, dressing removal and non-selective debridement. Pictures and measurements obtained weekly if/when required.    Wound:  LLE  Preparation for Dressing removal: Dressing soaked with wound cleanser  Cleansed/Non-selectively Debrided with:  Wound cleanser and Gauze  Non-Excisional Conservative Sharp debridement: Slough and Eschar debrided away using scalpel > 20cm2 debrided down to slough and viable tissue.  Small amount of bleeding noted, controlled with manual pressure.  Obdulia wound: Cleansed with Wound cleanser and Gauze, Prepped with Barrier paste  Primary Dressing:  Dakins moistened gauze   Secondary (Outer) Dressing: ABD pad, roll gauze     Advanced Wound Care Discharge Planning  Number of Clinicians necessary to complete wound care: 2  Is patient requiring IV pain medications for dressing changes:  Yes  Length of time for dressing change 30 min. (This does  not include chart review, pre-medication time, set up, clean up or time spent charting.)    Interdisciplinary consultation: Patient, Bedside RN, Teresa TAPIA (Wound RN).  Pressure injury and staging reviewed with N/A.    EVALUATION / RATIONALE FOR TREATMENT:     Date:  01/16/25  Wound Status:  Wound progressing as expected    Slough and soft eschar debrided. Majority of wound still with adherent slough and would benefit from continued Dakins use. Pt tolerated debridement well. Eventually patient will benefit from cleanse choice VF NPWT.     Date:  01/07/25  Wound Status:  Wound progressing as expected     Slough beginning to lift in some areas, however, majority of wound bed still obscured with adhered slough. Slough crosshatched to allow for better penetration of dakins for chemical debridment.      Date:  12/31/24  Wound Status:  Wound progressing as expected     Wound to LLE continues to evolve. Previously blistered skin was easily debrided with cleansing.  Wound bed now noted with areas of adhered slough. A dakins wet to dry was applied to chemically debride nonviable tissue, decrease bioburden and odor.   Upper thigh/groin assessed which appears improved, and with areas of scar tissue. Ok to continue with moisture prevention and offloading.      Date:  12/24/24  Wound Status:  No change in wound      Patient seen on 12/23/24 for the left lower extremity blister that has opened and is denuded. New consult placed on 12/24/24 for worsening and more areas on the upper thigh and scrotum that are becoming denuded. Modified dressing orders to include left upper thigh and scrotum with daily dressing changes. Recommend condom cath if patient can tolerate. Spoke with Dr. Zamora who also came bedside to assess and consult ID for further plan of care.   Sacrum and left buttock denuded as well. Unclear if etiology is friction/shearing/MASD vs the rest of the LLE wound. Applied barrier paste. Continue with offloading  measures.        Date:  12/23/24  Wound Status:  Initial evaluation     Patient's LLE wound initially with a blister. Wound has now opened up circumferentially. The wound bed is red, bleeding, fragile and painful. Edges are denuded with sloughing skin. Applied Xeroform (yellow) to provide an occlusive dressing that incorporates bacteriostatic protection.   Ortho following patient. Per CT Scan, negative for soft tissue gas and fluid collection. Continuing with wound care and antibiotics.   BL Heels intact. Continue with offloading measures.  Sacrum is intact. Continue with offloading measures and turns.          Goals: Steady decrease in wound area and depth weekly.    NURSING PLAN OF CARE ORDERS:  No new orders this visit    NUTRITION RECOMMENDATIONS   Wound Team Recommendations:  N/A     DIET ORDERS (From admission to next 24h)       Start     Ordered    01/15/25 0854  Diet Order Diet: Consistent CHO (Diabetic)  ALL MEALS        Question:  Diet:  Answer:  Consistent CHO (Diabetic)    01/15/25 0853    01/03/25 1010  Supplements  ALL MEALS        Comments: + Bruce BID   Question Answer Comment   Which Supplement Ensure    Ensure: Ensure Plus Carton        01/03/25 1010    12/27/24 1352  Supplements  ALL MEALS        Question Answer Comment   Which Supplement Ensure    Ensure: Ensure High Protein Pudding        12/27/24 1351                    PREVENTATIVE INTERVENTIONS:   Q shift Edward - performed per nursing policy  Q shift pressure point assessments - performed per nursing policy    Surface/Positioning  Standard/trauma mattress - Currently in Place  Reposition q 2 hours - Currently in Place    Offloading/Redistribution  Float Heels off Bed with Pillows - Currently in Place           Containment/Moisture Prevention    Dri-nava pad - Currently in Place    Anticipated discharge plans:  TBD        Vac Discharge Needs:  Vac Discharge plan is purely a recommendation from wound team and not a requirement for discharge  unless otherwise stated by physician.  Not Applicable Pt not on a wound vac

## 2025-01-17 NOTE — DISCHARGE PLANNING
1430  Agency/Facility Name: [facility]  Spoke To: David  Outcome: DPA inquired about bed availability. Per David to have DPA fax referral over and will have bed Monday for Pt.  LSW notified via Teams.    1433   DPA RightFax updated referral to [facility]

## 2025-01-17 NOTE — CARE PLAN
Problem: Pain - Standard  Goal: Alleviation of pain or a reduction in pain to the patient’s comfort goal  Outcome: Progressing  Note: Patient for the most part has 6/10 pain and is relieved after PRN 2.5mg oxy. Patient received dilaudid IV push for wound debridement      Problem: Skin Integrity  Goal: Risk for impaired skin integrity will decrease  Outcome: Progressing  Note: Patient maintained his skin integrity by adhering to Q2 turns (self regulated), agreed to wound care (debridement by wound care team), and elevated left foot, noted to frequently offloading leg and no new areas of skin breakdown.   The patient is Stable - Low risk of patient condition declining or worsening    Shift Goals  Clinical Goals: Pain management and free from falls through shift  Patient Goals: rest  Family Goals: MARCOS    Progress made toward(s) clinical / shift goals:      Patient is not progressing towards the following goals:

## 2025-01-17 NOTE — CARE PLAN
The patient is Stable - Low risk of patient condition declining or worsening    Shift Goals  Clinical Goals: pt will report pain lower than 4 out of 10 this shift  Patient Goals: sleep  Family Goals: mome present    Progress made toward(s) clinical / shift goals:  Patient is alert and oriented. Needs addressed at this time. Medicated for pain with PRN meds per MAR. Pt refuses wound care this morning, education provided . Hourly rounding. Call light within reach.   Problem: Skin Integrity  Goal: Risk for impaired skin integrity will decrease  Outcome: Progressing     Problem: Pain - Standard  Goal: Alleviation of pain or a reduction in pain to the patient’s comfort goal  Outcome: Progressing       Patient is not progressing towards the following goals:

## 2025-01-17 NOTE — DIETARY
Nutrition Services: Follow-up for PO intake   Day 29 of admit. Robert Mcdonnell is a 74 y.o. male with admitting DX of Acute left-sided CHF (congestive heart failure) (HCC) [I50.1]  Streptococcal bacteremia [R78.81, B95.5]  Left leg cellulitis [L03.116]    Recorded PO intake has improved with pt consistently eating 50-75% and % of meals.     Current diet order:   Consistent CHO diet  Glucerna (provides 220 calories, 10 g protein per 8 fl oz) and Bruce (BID), Glucerna  TID     Malnutrition risk: Severe Malnutrition in context of Chronic Illness related to muscle and fat wasting as evidenced by Severe muscle loss at (temple, shoulder, clavicle) Severe muscle loss at (orbital, buccal, triceps)      Nutrition Dx Status: Ongoing  but improving with improved PO intake    Problem: Nutritional:  Goal: Achieve adequate nutritional intake  Description: Patient will consume >50 of meals and supplements  Outcome: Goal met      Plan/ Recommendations:      Continue current supplements.  Encourage continued good intake of meals, goal for >50% consumption from meals and/or supplements  Document intake of all meals as % taken in ADL's to provide interdisciplinary communication across all shifts.   Monitor weight.  Nutrition rep will continue to see patient for ongoing meal and snack preferences.     RD to monitor per department policy.

## 2025-01-17 NOTE — PROGRESS NOTES
Hospital Medicine Daily Progress Note    Date of Service  1/17/2025    Chief Complaint  Robert Mcdonnell is a 74 y.o. male admitted 12/19/2024 with left lower extremity cellulitis.    Hospital Course  Robert Mcdonnell is a 74-year-old male with PMHx homelessness, chronic atrial fibrillation on apixaban, HFrEF, pulmonary hypertension, perforated gastric ulcer s/p gastrectomy.    Per history: recent hospitalization 10/3 - 10/15 for perforated  secondary to invasive gastric adenocarcinoma, GDA embolization, subsequent subtotal gastrectomy, liver wedge resection, Miguel Angel-en-Y gastrojejunostomy with omental flap and cholecystectomy 10/7 with pathology showing invasive well-differentiated adenocarcinoma with associated ulceration. There was tumor invasion into the muscularis propria and 20 lymph nodes were negative for metastatic carcinoma. Oncology was consulted and noted no evidence of metastatic disease however MRI abdomen recommended for follow-up; no adjuvant therapy was recommended.     Patient was readmitted 12/19 for worsening lower extremity edema and shortness of breath.  He initially required BiPAP and IMCU admission.  Per history-patient had run out of his Lasix at home.  EKG in the emergency room showing irregular wide-complex arrhythmia.      Additionally, blood cultures were positive for group A strep bacteremia secondary to left lower extremity infection.  CT LLE: Small to moderate suprapatellar joint effusion.  MRI LLE: Diffuse cellulitis, patchy myositis, negative for OM.  Orthopedic surgery was consulted and recommended medical management. Infectious disease was consulted and recommended IV Unasyn with an end date of 1/4 and clindamycin with an end date of 12/31.    For patient's HFrEF-he was started on GDMT with effective diuresis.    Patient continued to have hypoglycemic episodes due to poor p.o. intake.    Interval Problem Update  1/14: Vitals stable overnight.   through 109.  Patient is n.p.o.  for possible surgical debridement of lower extremity cellulitis today.  Discussed with Dr. Clayton, orthopedic surgery.  No plans for surgery at this time.  N.p.o. again at midnight for possible debridement in a.m.    1/15: Vitals stable overnight.   through 131.  Discussed with orthopedic surgery.  No plans for surgical intervention at this time.  Continue antibiotics.  Continue wound care.  Patient is medically clear for discharge to skilled nursing facility.    1/16: Vital stable overnight.  WBC stable at 9.5.  Hb stable at 9.  Insurance authorization is pending for skilled nursing.  Patient is medically clear for discharge.    1/17: Vitals stable overnight.  SBP .  Wound care notes reviewed from 1/16.  Wound still has a large area of adhered slough.  Slough is crosshatched to allow Dakin's penetration.  Patient is medically clear for discharge.  Waiting for insurance authorization to skilled.    I have discussed this patient's plan of care and discharge plan at IDT rounds today with Case Management, Nursing, Nursing leadership, and other members of the IDT team.    Consultants/Specialty  critical care, infectious disease, orthopedics, and palliative care    Code Status  Full Code    Disposition  The patient is medically cleared for discharge to home or a post-acute facility.  Anticipate discharge to: skilled nursing facility    I have placed the appropriate orders for post-discharge needs.    Review of Systems  Review of Systems   Constitutional:  Negative for fever and malaise/fatigue.   Respiratory:  Negative for shortness of breath.    Cardiovascular:  Negative for chest pain and leg swelling.   Gastrointestinal:  Negative for abdominal pain, nausea and vomiting.        Physical Exam  Temp:  [36.4 °C (97.5 °F)-36.8 °C (98.2 °F)] 36.4 °C (97.5 °F)  Pulse:  [91-94] 94  Resp:  [18] 18  BP: (94-99)/(49-59) 94/51  SpO2:  [98 %-100 %] 98 %    Physical Exam  Vitals and nursing note reviewed.    Constitutional:       General: He is not in acute distress.     Appearance: Normal appearance. He is ill-appearing.   Cardiovascular:      Rate and Rhythm: Normal rate and regular rhythm.   Pulmonary:      Effort: Pulmonary effort is normal.      Breath sounds: Normal breath sounds.   Skin:     General: Skin is warm and dry.   Neurological:      Mental Status: He is alert and oriented to person, place, and time. Mental status is at baseline.   Psychiatric:         Mood and Affect: Mood normal.         Behavior: Behavior normal.         Fluids    Intake/Output Summary (Last 24 hours) at 1/17/2025 1033  Last data filed at 1/17/2025 1000  Gross per 24 hour   Intake 930 ml   Output 200 ml   Net 730 ml        Laboratory  Recent Labs     01/16/25  0043   WBC 9.5   RBC 3.21*   HEMOGLOBIN 9.0*   HEMATOCRIT 27.6*   MCV 86.0   MCH 28.0   MCHC 32.6   RDW 53.5*   PLATELETCT 238   MPV 10.2                       Imaging  US-EXTREMITY ARTERY LOWER UNILAT LEFT   Final Result      IN-TPQIA-[id number]-W/O LEFT   Final Result      1.  Very limited examination due to patient motion artifact. Patient was also unable to complete the examination and therefore contrast-enhanced sequences were not obtained.      2.  No evidence of marrow edema or bone lesion.      3.  Again seen diffuse soft tissue edema/induration likely representing cellulitis. This also patchy muscle edema suggesting myositis.      4.  No focal fluid collection to suggest presence of abscess.      [id number] VIEW   Final Result      No evidence of bowel obstruction. Mild constipation.      MR-KNEE-WITH & W/O LEFT   Final Result      1.  No evidence of marrow edema or abnormal enhancement to suggest presence of osteomyelitis.      2.  Small to moderate joint effusion with mild synovitis.      3.  Diffuse soft tissue edema/induration consistent with cellulitis.      4.  Small popliteal cyst.      5.  No evidence of soft tissue abscess.      6.  Markedly motion degraded  examination. Ligaments and menisci are inadequately evaluated due to patient motion.      MR-LOWER EXTREMITY-NO JOINT-W/O LEFT   Final Result      1.  Patient motion degraded exam.      2.  No evidence of marrow edema or bone lesion to suggest presence of osteomyelitis.      3.  Diffuse cellulitis and patchy myositis.      4.  No evidence of focal fluid collection to suggest presence of abscess.      CT-EXTREMITY, LOWER WITH LEFT   Final Result      1.  Extensive subcutaneous inflammatory changes about the left lower extremity consistent with deep and superficial cellulitis.      2.  No definite CT evidence of necrotizing fasciitis.      3.  No CT evidence of deep abscess formation or acute or chronic osteomyelitis.      4.  Small to moderate suprapatellar joint effusion.      CT-EXTREMITY, LOWER WITH LEFT   Final Result      No soft tissue gas to suggest necrotizing fasciitis.      No fluid collection identified.      DX-CHEST-PORTABLE (1 VIEW)   Final Result         1.  Hazy left pulmonary infiltrates, similar to prior study   2.  Trace left pleural effusion, stable   3.  Cardiomegaly   4.  Atherosclerosis      DX-CHEST-PORTABLE (1 VIEW)   Final Result         1.  Hazy left pulmonary infiltrates, similar to prior study   2.  Trace left pleural effusion, stable   3.  Cardiomegaly   4.  Atherosclerosis      EC-ECHOCARDIOGRAM COMPLETE W/O CONT   Final Result      CT-CHEST,ABDOMEN,PELVIS WITH   Final Result      1. Stable spiculated opacity in the right upper lobe, extending to the pleural surface.   2. Lingular and left lower lobe parenchymal airspace disease has improved in the interval, but has not completely resolved. There is pleural reaction, suggesting some of these areas may represent parenchymal scarring.   3. Asymmetry of the lung volumes, small on the left than the right.   4. Cardiomegaly with atherosclerotic calcifications in the aorta and coronary arteries.   5. Postsurgical changes in the stomach.   6.  Stable scattered multiple hypodense lesions throughout the liver.   7. Moderate ascites throughout the abdomen and pelvis.   8. Cachexia.   9. Enlarged left inguinal lymph nodes with central necrosis. There is edema involving the soft tissues of the upper left leg extending to the left pelvis.      US-EXTREMITY VENOUS LOWER BILAT   Final Result      DX-CHEST-PORTABLE (1 VIEW)   Final Result         1.  Hazy left pulmonary infiltrates.   2.  Small left pleural effusion   3.  Cardiomegaly           Assessment/Plan  * Wound of lower extremity- (present on admission)  Assessment & Plan  Likely source of infection, lower ext doppler negative  CT left leg shows small to moderate suprapatellar joint effusion.  MRI LLE showed Diffuse cellulitis and patchy myositis, negative for osteomyelitis or abscess.   Repeat MRI 12/30: Limited due to patient motion artifact; no obvious OM noted persistent cellulitis and myositis noted  - Infectious disease consulted  - IV Unasyn regimen completed 1/4  - P.o. clindamycin completed 12/31  - Orthopedic surgery consultation 12/20 and 12/25 no surgical interventions at this time  - Discussed with orthopedic surgery again 1/15-unless patient is willing to have an amputation where there is a fluid-filled pocket concerning for deep abscess requiring debridement-she is not a candidate for surgery at this time.  Could consider vera flow wound VAC in the future  - Wound care notes reviewed; large area of adherent slough.  Crosshatched to allow Dakin's penetration    Constipation  Assessment & Plan  I ordered a bowel management  Suppository today    Hypoglycemia  Assessment & Plan  Multiple episodes of hypoglycemia requiring intervention  Additional episodes of hypoglycemia today  Poor p.o. intake  - Continue to encourage PO intake   - Nutrition consulted  - Discontinue Farxiga due to hypoglycemia    Streptococcal bacteremia- (present on admission)  Assessment & Plan  Blood cultures 12/19 positive  for group A strep  Repeat blood cultures 12/21 NGTD    Pulmonary hypertension (HCC)  Assessment & Plan  Previous Echo RVSP 65, severe TR  Suspect mainly Group 2  RV perfusion with MAP > 65 as need norepinepinephrine  Blood pressure is running on the lower side holding diuretic therapy at this time    Acute respiratory failure with hypoxia (HCC)- (present on admission)  Assessment & Plan  Improved off Bipap  Weaned to room air    Hypophosphatemia- (present on admission)  Assessment & Plan  Replaced    Malignant neoplasm of body of stomach (HCC)- (present on admission)  Assessment & Plan  T2, N0, M0 invasive gastric adenocarcinoma presenting as perforated  10/2024  -GDA embolization  -10/7 subtotal gastrectomy, liver wedge resection, Miguel Angel-en-Y gastrojejunostomy with omental flap and cholecystectomy  -pathology invasive well-differentiated adenocarcinoma with associated ulceration  -tumor invasion into the muscularis propria and 20 lymph nodes were negative for metastatic carcinoma  -Oncology was consulted and noted no evidence of metastatic disease  -MRI abdomen recommended for follow-up; no adjuvant therapy was recommended  He will need outpatient follow-up    Homeless- (present on admission)  Assessment & Plan  Patient is currently homeless.  He is unsure if he is able to return to [facility].  Wheelchair has been ordered.  Discussed with case management today.    Severe protein-calorie malnutrition (HCC)- (present on admission)  Assessment & Plan  Discussed with nutrition, patient meets the ASPEN criteria of severe malnutrition  Continuous nutrition support    ACP (advance care planning)- (present on admission)  Assessment & Plan  Patient admitted with CHF exacerbation, significantly reduced the EF of 30%.  Also found bacteremia, LLE infection, severe malnutrition.  History of invasive gastric malignancy, A-fib RVR, PE, gastric perforation post extensive surgery including subtotal gastrectomy, liver resection   Miguel Angel-en-Y gastrojejunostomy with omental flap and cholecystectomy.  I discussed the CODE STATUS with him, including CPR, intubation/mechanical ventilation.  He wants to stay full code.  Patient has high complexity and guarded prognosis.  I consulted palliative care.  ACP 18 minutes.     COPD (chronic obstructive pulmonary disease) ([facility])- (present on admission)  Assessment & Plan  Current active smoker, no PFTs on file  RT protocols, bronchodilators as needed  No signs of exacerbation    Acute kidney injury (HCC)- (present on admission)  Assessment & Plan  Resolved     Sepsis ([facility])- (present on admission)  Assessment & Plan  See above    Acute exacerbation of CHF (congestive heart failure) ([facility])- (present on admission)  Assessment & Plan  Echo: EF 28%; severe tricuspid and mitral regurg.  RVSP elevated at 55  - Continue GDMT with losartan, spironolactone, metoprolol, Farxiga  - Continue digoxin  - Maintain euvolemia    Atrial fibrillation with RVR ([facility])- (present on admission)  Assessment & Plan  Chronic atrial fibrillation, currently tachycardia  Digoxin 250 mcg IV monitoring closely with flip to prevent digoxin toxicity    Continue digoxin and Eliquis  12/27 BP better, I resumed Toprol 25 mg    Thrombocytopenia ([facility])- (present on admission)  Assessment & Plan  Platelets 288  - Repeat CBC in a.m.  - Stable to continue Eliquis at this time    Tobacco dependence- (present on admission)  Assessment & Plan  Counseling when appropriate    Hypertension- (present on admission)  Assessment & Plan  SBP well-controlled  - Continue losartan, metoprolol, spironolactone         VTE prophylaxis:   SCDs/TEDs   therapeutic anticoagulation with eliquis 5 mg BID      I have performed a physical exam and reviewed and updated ROS and Plan today (1/17/2025). In review of yesterday's note (1/16/2025), there are no changes except as documented above.

## 2025-01-18 LAB
GLUCOSE BLD STRIP.AUTO-MCNC: 71 MG/DL (ref 65–99)
GLUCOSE BLD STRIP.AUTO-MCNC: 74 MG/DL (ref 65–99)
MYELOPEROXIDASE AB SER-ACNC: 0 AU/ML (ref 0–19)
PROTEINASE3 AB SER-ACNC: 1 AU/ML (ref 0–19)

## 2025-01-18 PROCEDURE — 82962 GLUCOSE BLOOD TEST: CPT | Mod: 91

## 2025-01-18 PROCEDURE — A9270 NON-COVERED ITEM OR SERVICE: HCPCS | Performed by: INTERNAL MEDICINE

## 2025-01-18 PROCEDURE — 700102 HCHG RX REV CODE 250 W/ 637 OVERRIDE(OP): Performed by: INTERNAL MEDICINE

## 2025-01-18 PROCEDURE — 700102 HCHG RX REV CODE 250 W/ 637 OVERRIDE(OP): Performed by: STUDENT IN AN ORGANIZED HEALTH CARE EDUCATION/TRAINING PROGRAM

## 2025-01-18 PROCEDURE — 700111 HCHG RX REV CODE 636 W/ 250 OVERRIDE (IP): Mod: JZ,TB | Performed by: INTERNAL MEDICINE

## 2025-01-18 PROCEDURE — A9270 NON-COVERED ITEM OR SERVICE: HCPCS | Performed by: STUDENT IN AN ORGANIZED HEALTH CARE EDUCATION/TRAINING PROGRAM

## 2025-01-18 PROCEDURE — 770006 HCHG ROOM/CARE - MED/SURG/GYN SEMI*

## 2025-01-18 PROCEDURE — 700102 HCHG RX REV CODE 250 W/ 637 OVERRIDE(OP): Performed by: HOSPITALIST

## 2025-01-18 PROCEDURE — A9270 NON-COVERED ITEM OR SERVICE: HCPCS | Performed by: HOSPITALIST

## 2025-01-18 PROCEDURE — 700101 HCHG RX REV CODE 250: Performed by: STUDENT IN AN ORGANIZED HEALTH CARE EDUCATION/TRAINING PROGRAM

## 2025-01-18 PROCEDURE — 99232 SBSQ HOSP IP/OBS MODERATE 35: CPT | Performed by: STUDENT IN AN ORGANIZED HEALTH CARE EDUCATION/TRAINING PROGRAM

## 2025-01-18 RX ADMIN — DAKIN'S SOLUTION 0.125% (QUARTER STRENGTH) 30 ML: 0.12 SOLUTION at 16:52

## 2025-01-18 RX ADMIN — OXYCODONE 5 MG: 5 TABLET ORAL at 04:07

## 2025-01-18 RX ADMIN — METOPROLOL SUCCINATE 25 MG: 25 TABLET, EXTENDED RELEASE ORAL at 04:10

## 2025-01-18 RX ADMIN — HYDROMORPHONE HYDROCHLORIDE 0.25 MG: 1 INJECTION, SOLUTION INTRAMUSCULAR; INTRAVENOUS; SUBCUTANEOUS at 16:25

## 2025-01-18 RX ADMIN — DAKIN'S SOLUTION 0.125% (QUARTER STRENGTH) 473 ML: 0.12 SOLUTION at 04:11

## 2025-01-18 RX ADMIN — APIXABAN 5 MG: 5 TABLET, FILM COATED ORAL at 16:53

## 2025-01-18 RX ADMIN — DIGOXIN 125 MCG: 0.12 TABLET ORAL at 16:53

## 2025-01-18 RX ADMIN — SPIRONOLACTONE 25 MG: 25 TABLET ORAL at 04:10

## 2025-01-18 RX ADMIN — OXYCODONE 5 MG: 5 TABLET ORAL at 15:11

## 2025-01-18 RX ADMIN — APIXABAN 5 MG: 5 TABLET, FILM COATED ORAL at 04:06

## 2025-01-18 RX ADMIN — OMEPRAZOLE 20 MG: 20 CAPSULE, DELAYED RELEASE ORAL at 04:06

## 2025-01-18 RX ADMIN — OXYCODONE 2.5 MG: 5 TABLET ORAL at 09:06

## 2025-01-18 RX ADMIN — LOSARTAN POTASSIUM 25 MG: 25 TABLET, FILM COATED ORAL at 04:11

## 2025-01-18 ASSESSMENT — PAIN DESCRIPTION - PAIN TYPE
TYPE: ACUTE PAIN

## 2025-01-18 ASSESSMENT — ENCOUNTER SYMPTOMS
FEVER: 0
VOMITING: 0
ABDOMINAL PAIN: 0
NAUSEA: 0
SHORTNESS OF BREATH: 0

## 2025-01-18 NOTE — CARE PLAN
The patient is Stable - Low risk of patient condition declining or worsening    Shift Goals  Clinical Goals: Patient will remain free from falls and injury  Patient Goals: rest  Family Goals: mome present    Progress made toward(s) clinical / shift goals:  Fall precautions in place. Skin breakdown precautions in place. Reinforced use of call light. Patient did remain free from falls and injury.    Patient is not progressing towards the following goals: progressing

## 2025-01-18 NOTE — CARE PLAN
The patient is Stable - Low risk of patient condition declining or worsening    Shift Goals  Clinical Goals: Patient will report a pain level of 5 or less by the end of shift  Patient Goals: comfort, rest  Family Goals: MARCOS    Progress made toward(s) clinical / shift goals:  Patients pain controlled with medication as per MAR, distraction and food. Patient did report a pain level of 5 or less.    Patient is not progressing towards the following goals: progressing

## 2025-01-18 NOTE — CARE PLAN
The patient is Stable - Low risk of patient condition declining or worsening    Shift Goals  Clinical Goals: turn compliance, monitor pain  Patient Goals: rest and cluster care  Family Goals: MARCOS    Progress made toward(s) clinical / shift goals:  premedicated for dressing change    Patient is not progressing towards the following goals: n/a    Problem: Wound/ / Incision Healing  Goal: Patient's wound/surgical incision will decrease in size and heals properly  Outcome: Progressing     Problem: Knowledge Deficit - Standard  Goal: Patient and family/care givers will demonstrate understanding of plan of care, disease process/condition, diagnostic tests and medications  Outcome: Progressing     Problem: Pain - Standard  Goal: Alleviation of pain or a reduction in pain to the patient’s comfort goal  Outcome: Progressing     Problem: Skin Integrity  Goal: Risk for impaired skin integrity will decrease  Outcome: Progressing

## 2025-01-18 NOTE — PROGRESS NOTES
Hospital Medicine Daily Progress Note    Date of Service  1/18/2025    Chief Complaint  Robert Mcdonnell is a 74 y.o. male admitted 12/19/2024 with left lower extremity cellulitis.    Hospital Course  Robert Mcdonnell is a 74-year-old male with PMHx homelessness, chronic atrial fibrillation on apixaban, HFrEF, pulmonary hypertension, perforated gastric ulcer s/p gastrectomy.    Per history: recent hospitalization 10/3 - 10/15 for perforated  secondary to invasive gastric adenocarcinoma, GDA embolization, subsequent subtotal gastrectomy, liver wedge resection, Miguel Angel-en-Y gastrojejunostomy with omental flap and cholecystectomy 10/7 with pathology showing invasive well-differentiated adenocarcinoma with associated ulceration. There was tumor invasion into the muscularis propria and 20 lymph nodes were negative for metastatic carcinoma. Oncology was consulted and noted no evidence of metastatic disease however MRI abdomen recommended for follow-up; no adjuvant therapy was recommended.     Patient was readmitted 12/19 for worsening lower extremity edema and shortness of breath.  He initially required BiPAP and IMCU admission.  Per history-patient had run out of his Lasix at home.  EKG in the emergency room showing irregular wide-complex arrhythmia.      Additionally, blood cultures were positive for group A strep bacteremia secondary to left lower extremity infection.  CT LLE: Small to moderate suprapatellar joint effusion.  MRI LLE: Diffuse cellulitis, patchy myositis, negative for OM.  Orthopedic surgery was consulted and recommended medical management. Infectious disease was consulted and recommended IV Unasyn with an end date of 1/4 and clindamycin with an end date of 12/31.    For patient's HFrEF-he was started on GDMT with effective diuresis.    Patient continued to have hypoglycemic episodes due to poor p.o. intake.    Interval Problem Update  1/14: Vitals stable overnight.   through 109.  Patient is n.p.o.  for possible surgical debridement of lower extremity cellulitis today.  Discussed with Dr. Clayton, orthopedic surgery.  No plans for surgery at this time.  N.p.o. again at midnight for possible debridement in a.m.    1/15: Vitals stable overnight.   through 131.  Discussed with orthopedic surgery.  No plans for surgical intervention at this time.  Continue antibiotics.  Continue wound care.  Patient is medically clear for discharge to skilled nursing facility.    1/16: Vital stable overnight.  WBC stable at 9.5.  Hb stable at 9.  Insurance authorization is pending for skilled nursing.  Patient is medically clear for discharge.    1/17: Vitals stable overnight.  SBP .  Wound care notes reviewed from 1/16.  Wound still has a large area of adhered slough.  Slough is crosshatched to allow Dakin's penetration.  Patient is medically clear for discharge.  Waiting for insurance authorization to skilled.    1/18: Vitals remained stable overnight.  Patient may have a bed available at [facility] in [place] on Monday.  We will continue to follow with complex discharge committee.    I have discussed this patient's plan of care and discharge plan at IDT rounds today with Case Management, Nursing, Nursing leadership, and other members of the IDT team.    Consultants/Specialty  critical care, infectious disease, orthopedics, and palliative care    Code Status  Full Code    Disposition  The patient is medically cleared for discharge to home or a post-acute facility.    I have placed the appropriate orders for post-discharge needs.    Review of Systems  Review of Systems   Constitutional:  Negative for fever and malaise/fatigue.   Respiratory:  Negative for shortness of breath.    Cardiovascular:  Negative for chest pain and leg swelling.   Gastrointestinal:  Negative for abdominal pain, nausea and vomiting.        Physical Exam  Temp:  [36.4 °C (97.5 °F)-37.2 °C (98.9 °F)] 36.8 °C (98.2 °F)  Pulse:  [70-95]  90  Resp:  [18] 18  BP: ()/(51-68) 110/59  SpO2:  [98 %-99 %] 98 %    Physical Exam  Vitals and nursing note reviewed.   Constitutional:       General: He is not in acute distress.     Appearance: Normal appearance. He is ill-appearing.   Cardiovascular:      Rate and Rhythm: Normal rate and regular rhythm.   Pulmonary:      Effort: Pulmonary effort is normal.      Breath sounds: Normal breath sounds.   Skin:     General: Skin is warm and dry.   Neurological:      Mental Status: He is alert and oriented to person, place, and time. Mental status is at baseline.   Psychiatric:         Mood and Affect: Mood normal.         Behavior: Behavior normal.       Fluids    Intake/Output Summary (Last 24 hours) at 1/18/2025 0654  Last data filed at 1/17/2025 1700  Gross per 24 hour   Intake 910 ml   Output 250 ml   Net 660 ml        Laboratory  Recent Labs     01/16/25  0043   WBC 9.5   RBC 3.21*   HEMOGLOBIN 9.0*   HEMATOCRIT 27.6*   MCV 86.0   MCH 28.0   MCHC 32.6   RDW 53.5*   PLATELETCT 238   MPV 10.2                       Imaging  US-EXTREMITY ARTERY LOWER UNILAT LEFT   Final Result      PR-FEDWC-SXZBRS-W/O LEFT   Final Result      1.  Very limited examination due to patient motion artifact. Patient was also unable to complete the examination and therefore contrast-enhanced sequences were not obtained.      2.  No evidence of marrow edema or bone lesion.      3.  Again seen diffuse soft tissue edema/induration likely representing cellulitis. This also patchy muscle edema suggesting myositis.      4.  No focal fluid collection to suggest presence of abscess.      UO-[id number]-2 VIEW   Final Result      No evidence of bowel obstruction. Mild constipation.      MR-KNEE-WITH & W/O LEFT   Final Result      1.  No evidence of marrow edema or abnormal enhancement to suggest presence of osteomyelitis.      2.  Small to moderate joint effusion with mild synovitis.      3.  Diffuse soft tissue edema/induration consistent with  cellulitis.      4.  Small popliteal cyst.      5.  No evidence of soft tissue abscess.      6.  Markedly motion degraded examination. Ligaments and menisci are inadequately evaluated due to patient motion.      MR-LOWER EXTREMITY-NO JOINT-W/O LEFT   Final Result      1.  Patient motion degraded exam.      2.  No evidence of marrow edema or bone lesion to suggest presence of osteomyelitis.      3.  Diffuse cellulitis and patchy myositis.      4.  No evidence of focal fluid collection to suggest presence of abscess.      CT-EXTREMITY, LOWER WITH LEFT   Final Result      1.  Extensive subcutaneous inflammatory changes about the left lower extremity consistent with deep and superficial cellulitis.      2.  No definite CT evidence of necrotizing fasciitis.      3.  No CT evidence of deep abscess formation or acute or chronic osteomyelitis.      4.  Small to moderate suprapatellar joint effusion.      CT-EXTREMITY, LOWER WITH LEFT   Final Result      No soft tissue gas to suggest necrotizing fasciitis.      No fluid collection identified.      DX-CHEST-PORTABLE (1 VIEW)   Final Result         1.  Hazy left pulmonary infiltrates, similar to prior study   2.  Trace left pleural effusion, stable   3.  Cardiomegaly   4.  Atherosclerosis      DX-CHEST-PORTABLE (1 VIEW)   Final Result         1.  Hazy left pulmonary infiltrates, similar to prior study   2.  Trace left pleural effusion, stable   3.  Cardiomegaly   4.  Atherosclerosis      EC-ECHOCARDIOGRAM COMPLETE W/O CONT   Final Result      CT-CHEST,ABDOMEN,PELVIS WITH   Final Result      1. Stable spiculated opacity in the right upper lobe, extending to the pleural surface.   2. Lingular and left lower lobe parenchymal airspace disease has improved in the interval, but has not completely resolved. There is pleural reaction, suggesting some of these areas may represent parenchymal scarring.   3. Asymmetry of the lung volumes, small on the left than the right.   4.  Cardiomegaly with atherosclerotic calcifications in the aorta and coronary arteries.   5. Postsurgical changes in the stomach.   6. Stable scattered multiple hypodense lesions throughout the liver.   7. Moderate ascites throughout the abdomen and pelvis.   8. Cachexia.   9. Enlarged left inguinal lymph nodes with central necrosis. There is edema involving the soft tissues of the upper left leg extending to the left pelvis.      US-EXTREMITY VENOUS LOWER BILAT   Final Result      DX-CHEST-PORTABLE (1 VIEW)   Final Result         1.  Hazy left pulmonary infiltrates.   2.  Small left pleural effusion   3.  Cardiomegaly           Assessment/Plan  * Wound of lower extremity- (present on admission)  Assessment & Plan  Likely source of infection, lower ext doppler negative  CT left leg shows small to moderate suprapatellar joint effusion.  MRI LLE showed Diffuse cellulitis and patchy myositis, negative for osteomyelitis or abscess.   Repeat MRI 12/30: Limited due to patient motion artifact; no obvious OM noted persistent cellulitis and myositis noted  - Infectious disease consulted  - IV Unasyn regimen completed 1/4  - P.o. clindamycin completed 12/31  - Orthopedic surgery consultation 12/20 and 12/25 no surgical interventions at this time  - Discussed with orthopedic surgery again 1/15-unless patient is willing to have an amputation where there is a fluid-filled pocket concerning for deep abscess requiring debridement-she is not a candidate for surgery at this time.  Could consider vera flow wound VAC in the future  - Wound care notes reviewed; large area of adherent slough.  Crosshatched to allow Dakin's penetration    Constipation  Assessment & Plan  I ordered a bowel management  Suppository today    Hypoglycemia  Assessment & Plan  Multiple episodes of hypoglycemia requiring intervention  Additional episodes of hypoglycemia today  Poor p.o. intake  - Continue to encourage PO intake   - Nutrition consulted  -  Discontinue Farxiga due to hypoglycemia    Streptococcal bacteremia- (present on admission)  Assessment & Plan  Blood cultures 12/19 positive for group A strep  Repeat blood cultures 12/21 NGTD    Pulmonary hypertension (HCC)  Assessment & Plan  Previous Echo RVSP 65, severe TR  Suspect mainly Group 2  RV perfusion with MAP > 65 as need norepinepinephrine  Blood pressure is running on the lower side holding diuretic therapy at this time    Acute respiratory failure with hypoxia (HCC)- (present on admission)  Assessment & Plan  Improved off Bipap  Weaned to room air    Hypophosphatemia- (present on admission)  Assessment & Plan  Replaced    Malignant neoplasm of body of stomach (HCC)- (present on admission)  Assessment & Plan  T2, N0, M0 invasive gastric adenocarcinoma presenting as perforated  10/2024  -GDA embolization  -10/7 subtotal gastrectomy, liver wedge resection, Miguel Angel-en-Y gastrojejunostomy with omental flap and cholecystectomy  -pathology invasive well-differentiated adenocarcinoma with associated ulceration  -tumor invasion into the muscularis propria and 20 lymph nodes were negative for metastatic carcinoma  -Oncology was consulted and noted no evidence of metastatic disease  -MRI abdomen recommended for follow-up; no adjuvant therapy was recommended  He will need outpatient follow-up    Homeless- (present on admission)  Assessment & Plan  Patient is currently homeless.  He is unsure if he is able to return to [facility].  Wheelchair has been ordered.  Discussed with case management today.    Severe protein-calorie malnutrition (HCC)- (present on admission)  Assessment & Plan  Discussed with nutrition, patient meets the ASPEN criteria of severe malnutrition  Continuous nutrition support    ACP (advance care planning)- (present on admission)  Assessment & Plan  Patient admitted with CHF exacerbation, significantly reduced the EF of 30%.  Also found bacteremia, LLE infection, severe malnutrition.   History of invasive gastric malignancy, A-fib RVR, PE, gastric perforation post extensive surgery including subtotal gastrectomy, liver resection  Miguel Angel-en-Y gastrojejunostomy with omental flap and cholecystectomy.  I discussed the CODE STATUS with him, including CPR, intubation/mechanical ventilation.  He wants to stay full code.  Patient has high complexity and guarded prognosis.  I consulted palliative care.  ACP 18 minutes.     COPD (chronic obstructive pulmonary disease) ([facility])- (present on admission)  Assessment & Plan  Current active smoker, no PFTs on file  RT protocols, bronchodilators as needed  No signs of exacerbation    Acute kidney injury (HCC)- (present on admission)  Assessment & Plan  Resolved     Sepsis (HCC)- (present on admission)  Assessment & Plan  See above    Acute exacerbation of CHF (congestive heart failure) ([facility])- (present on admission)  Assessment & Plan  Echo: EF 28%; severe tricuspid and mitral regurg.  RVSP elevated at 55  - Continue GDMT with losartan, spironolactone, metoprolol, Farxiga  - Continue digoxin  - Maintain euvolemia    Atrial fibrillation with RVR ([facility])- (present on admission)  Assessment & Plan  Chronic atrial fibrillation, currently tachycardia  Digoxin 250 mcg IV monitoring closely with flip to prevent digoxin toxicity    Continue digoxin and Eliquis  12/27 BP better, I resumed Toprol 25 mg    Thrombocytopenia ([facility])- (present on admission)  Assessment & Plan  Platelets 288  - Repeat CBC in a.m.  - Stable to continue Eliquis at this time    Tobacco dependence- (present on admission)  Assessment & Plan  Counseling when appropriate    Hypertension- (present on admission)  Assessment & Plan  SBP well-controlled  - Continue losartan, metoprolol, spironolactone         VTE prophylaxis:   SCDs/TEDs   therapeutic anticoagulation with eliquis 5 mg BID      I have performed a physical exam and reviewed and updated ROS and Plan today (1/18/2025). In review of yesterday's note  (1/17/2025), there are no changes except as documented above.

## 2025-01-19 PROCEDURE — 700102 HCHG RX REV CODE 250 W/ 637 OVERRIDE(OP): Performed by: INTERNAL MEDICINE

## 2025-01-19 PROCEDURE — 700102 HCHG RX REV CODE 250 W/ 637 OVERRIDE(OP): Performed by: HOSPITALIST

## 2025-01-19 PROCEDURE — 770006 HCHG ROOM/CARE - MED/SURG/GYN SEMI*

## 2025-01-19 PROCEDURE — 99232 SBSQ HOSP IP/OBS MODERATE 35: CPT | Performed by: STUDENT IN AN ORGANIZED HEALTH CARE EDUCATION/TRAINING PROGRAM

## 2025-01-19 PROCEDURE — 36415 COLL VENOUS BLD VENIPUNCTURE: CPT

## 2025-01-19 PROCEDURE — 86480 TB TEST CELL IMMUN MEASURE: CPT

## 2025-01-19 PROCEDURE — 700102 HCHG RX REV CODE 250 W/ 637 OVERRIDE(OP): Performed by: STUDENT IN AN ORGANIZED HEALTH CARE EDUCATION/TRAINING PROGRAM

## 2025-01-19 PROCEDURE — A9270 NON-COVERED ITEM OR SERVICE: HCPCS | Performed by: HOSPITALIST

## 2025-01-19 PROCEDURE — A9270 NON-COVERED ITEM OR SERVICE: HCPCS | Performed by: INTERNAL MEDICINE

## 2025-01-19 PROCEDURE — A9270 NON-COVERED ITEM OR SERVICE: HCPCS | Performed by: STUDENT IN AN ORGANIZED HEALTH CARE EDUCATION/TRAINING PROGRAM

## 2025-01-19 RX ADMIN — DAKIN'S SOLUTION 0.125% (QUARTER STRENGTH) 473 ML: 0.12 SOLUTION at 05:33

## 2025-01-19 RX ADMIN — APIXABAN 5 MG: 5 TABLET, FILM COATED ORAL at 04:02

## 2025-01-19 RX ADMIN — DAKIN'S SOLUTION 0.125% (QUARTER STRENGTH) 5 ML: 0.12 SOLUTION at 16:22

## 2025-01-19 RX ADMIN — LOSARTAN POTASSIUM 25 MG: 25 TABLET, FILM COATED ORAL at 04:02

## 2025-01-19 RX ADMIN — OMEPRAZOLE 20 MG: 20 CAPSULE, DELAYED RELEASE ORAL at 04:02

## 2025-01-19 RX ADMIN — SPIRONOLACTONE 25 MG: 25 TABLET ORAL at 04:02

## 2025-01-19 RX ADMIN — APIXABAN 5 MG: 5 TABLET, FILM COATED ORAL at 16:21

## 2025-01-19 RX ADMIN — DIGOXIN 125 MCG: 0.12 TABLET ORAL at 16:21

## 2025-01-19 RX ADMIN — OXYCODONE 5 MG: 5 TABLET ORAL at 04:01

## 2025-01-19 RX ADMIN — METOPROLOL SUCCINATE 25 MG: 25 TABLET, EXTENDED RELEASE ORAL at 04:01

## 2025-01-19 ASSESSMENT — PAIN DESCRIPTION - PAIN TYPE
TYPE: ACUTE PAIN
TYPE: ACUTE PAIN

## 2025-01-19 ASSESSMENT — ENCOUNTER SYMPTOMS
VOMITING: 0
ABDOMINAL PAIN: 0
FEVER: 0
SHORTNESS OF BREATH: 0
NAUSEA: 0

## 2025-01-19 NOTE — PROGRESS NOTES
Hospital Medicine Daily Progress Note    Date of Service  1/19/2025    Chief Complaint  Robert Mcdonnell is a 74 y.o. male admitted 12/19/2024 with left lower extremity cellulitis.    Hospital Course  Robert Mcdonnell is a 74-year-old male with PMHx homelessness, chronic atrial fibrillation on apixaban, HFrEF, pulmonary hypertension, perforated gastric ulcer s/p gastrectomy.    Per history: recent hospitalization 10/3 - 10/15 for perforated  secondary to invasive gastric adenocarcinoma, GDA embolization, subsequent subtotal gastrectomy, liver wedge resection, Miguel Angel-en-Y gastrojejunostomy with omental flap and cholecystectomy 10/7 with pathology showing invasive well-differentiated adenocarcinoma with associated ulceration. There was tumor invasion into the muscularis propria and 20 lymph nodes were negative for metastatic carcinoma. Oncology was consulted and noted no evidence of metastatic disease however MRI abdomen recommended for follow-up; no adjuvant therapy was recommended.     Patient was readmitted 12/19 for worsening lower extremity edema and shortness of breath.  He initially required BiPAP and IMCU admission.  Per history-patient had run out of his Lasix at home.  EKG in the emergency room showing irregular wide-complex arrhythmia.      Additionally, blood cultures were positive for group A strep bacteremia secondary to left lower extremity infection.  CT LLE: Small to moderate suprapatellar joint effusion.  MRI LLE: Diffuse cellulitis, patchy myositis, negative for OM.  Orthopedic surgery was consulted and recommended medical management. Infectious disease was consulted and recommended IV Unasyn with an end date of 1/4 and clindamycin with an end date of 12/31.    For patient's HFrEF-he was started on GDMT with effective diuresis.    Patient continued to have hypoglycemic episodes due to poor p.o. intake.    Interval Problem Update  1/14: Vitals stable overnight.   through 109.  Patient is n.p.o.  for possible surgical debridement of lower extremity cellulitis today.  Discussed with Dr. Clayton, orthopedic surgery.  No plans for surgery at this time.  N.p.o. again at midnight for possible debridement in a.m.    1/15: Vitals stable overnight.   through 131.  Discussed with orthopedic surgery.  No plans for surgical intervention at this time.  Continue antibiotics.  Continue wound care.  Patient is medically clear for discharge to skilled nursing facility.    1/16: Vital stable overnight.  WBC stable at 9.5.  Hb stable at 9.  Insurance authorization is pending for skilled nursing.  Patient is medically clear for discharge.    1/17: Vitals stable overnight.  SBP .  Wound care notes reviewed from 1/16.  Wound still has a large area of adhered slough.  Slough is crosshatched to allow Dakin's penetration.  Patient is medically clear for discharge.  Waiting for insurance authorization to skilled.    1/18: Vitals remained stable overnight.  Patient may have a bed available at [facility] in [place] on Monday.  We will continue to follow with complex discharge committee.    1/19: Vitals notable for  through 111. Patient agreeable to discharge to [facility] should a bed be available. Continue with wound care. Wound showing signs of improvement.  QuantiFERON ordered.    I have discussed this patient's plan of care and discharge plan at IDT rounds today with Case Management, Nursing, Nursing leadership, and other members of the IDT team.    Consultants/Specialty  critical care, infectious disease, orthopedics, and palliative care    Code Status  Full Code    Disposition  Medically Cleared  I have placed the appropriate orders for post-discharge needs.    Review of Systems  Review of Systems   Constitutional:  Negative for fever and malaise/fatigue.   Respiratory:  Negative for shortness of breath.    Cardiovascular:  Negative for chest pain and leg swelling.   Gastrointestinal:  Negative for  abdominal pain, nausea and vomiting.        Physical Exam  Temp:  [36.1 °C (97 °F)-36.7 °C (98.1 °F)] 36.2 °C (97.1 °F)  Pulse:  [75-93] 88  Resp:  [18] 18  BP: (103-111)/(58-75) 106/66  SpO2:  [97 %-99 %] 97 %    Physical Exam  Vitals and nursing note reviewed.   Constitutional:       General: He is not in acute distress.     Appearance: Normal appearance. He is ill-appearing.   Cardiovascular:      Rate and Rhythm: Normal rate and regular rhythm.   Pulmonary:      Effort: Pulmonary effort is normal.      Breath sounds: Normal breath sounds.   Skin:     General: Skin is warm and dry.   Neurological:      Mental Status: He is alert and oriented to person, place, and time. Mental status is at baseline.   Psychiatric:         Mood and Affect: Mood normal.         Behavior: Behavior normal.         Fluids    Intake/Output Summary (Last 24 hours) at 1/19/2025 1333  Last data filed at 1/19/2025 0856  Gross per 24 hour   Intake 720 ml   Output 2000 ml   Net -1280 ml        Laboratory                          Imaging  US-EXTREMITY ARTERY LOWER UNILAT LEFT   Final Result      MR-[id number]-W/O LEFT   Final Result      1.  Very limited examination due to patient motion artifact. Patient was also unable to complete the examination and therefore contrast-enhanced sequences were not obtained.      2.  No evidence of marrow edema or bone lesion.      3.  Again seen diffuse soft tissue edema/induration likely representing cellulitis. This also patchy muscle edema suggesting myositis.      4.  No focal fluid collection to suggest presence of abscess.      [id number] VIEW   Final Result      No evidence of bowel obstruction. Mild constipation.      MR-KNEE-WITH & W/O LEFT   Final Result      1.  No evidence of marrow edema or abnormal enhancement to suggest presence of osteomyelitis.      2.  Small to moderate joint effusion with mild synovitis.      3.  Diffuse soft tissue edema/induration consistent with cellulitis.      4.   Small popliteal cyst.      5.  No evidence of soft tissue abscess.      6.  Markedly motion degraded examination. Ligaments and menisci are inadequately evaluated due to patient motion.      MR-LOWER EXTREMITY-NO JOINT-W/O LEFT   Final Result      1.  Patient motion degraded exam.      2.  No evidence of marrow edema or bone lesion to suggest presence of osteomyelitis.      3.  Diffuse cellulitis and patchy myositis.      4.  No evidence of focal fluid collection to suggest presence of abscess.      CT-EXTREMITY, LOWER WITH LEFT   Final Result      1.  Extensive subcutaneous inflammatory changes about the left lower extremity consistent with deep and superficial cellulitis.      2.  No definite CT evidence of necrotizing fasciitis.      3.  No CT evidence of deep abscess formation or acute or chronic osteomyelitis.      4.  Small to moderate suprapatellar joint effusion.      CT-EXTREMITY, LOWER WITH LEFT   Final Result      No soft tissue gas to suggest necrotizing fasciitis.      No fluid collection identified.      DX-CHEST-PORTABLE (1 VIEW)   Final Result         1.  Hazy left pulmonary infiltrates, similar to prior study   2.  Trace left pleural effusion, stable   3.  Cardiomegaly   4.  Atherosclerosis      DX-CHEST-PORTABLE (1 VIEW)   Final Result         1.  Hazy left pulmonary infiltrates, similar to prior study   2.  Trace left pleural effusion, stable   3.  Cardiomegaly   4.  Atherosclerosis      EC-ECHOCARDIOGRAM COMPLETE W/O CONT   Final Result      CT-CHEST,ABDOMEN,PELVIS WITH   Final Result      1. Stable spiculated opacity in the right upper lobe, extending to the pleural surface.   2. Lingular and left lower lobe parenchymal airspace disease has improved in the interval, but has not completely resolved. There is pleural reaction, suggesting some of these areas may represent parenchymal scarring.   3. Asymmetry of the lung volumes, small on the left than the right.   4. Cardiomegaly with  atherosclerotic calcifications in the aorta and coronary arteries.   5. Postsurgical changes in the stomach.   6. Stable scattered multiple hypodense lesions throughout the liver.   7. Moderate ascites throughout the abdomen and pelvis.   8. Cachexia.   9. Enlarged left inguinal lymph nodes with central necrosis. There is edema involving the soft tissues of the upper left leg extending to the left pelvis.      US-EXTREMITY VENOUS LOWER BILAT   Final Result      DX-CHEST-PORTABLE (1 VIEW)   Final Result         1.  Hazy left pulmonary infiltrates.   2.  Small left pleural effusion   3.  Cardiomegaly           Assessment/Plan  * Wound of lower extremity- (present on admission)  Assessment & Plan  Likely source of infection, lower ext doppler negative  CT left leg shows small to moderate suprapatellar joint effusion.  MRI LLE showed Diffuse cellulitis and patchy myositis, negative for osteomyelitis or abscess.   Repeat MRI 12/30: Limited due to patient motion artifact; no obvious OM noted persistent cellulitis and myositis noted  - Infectious disease consulted  - IV Unasyn regimen completed 1/4  - P.o. clindamycin completed 12/31  - Orthopedic surgery consultation 12/20 and 12/25 no surgical interventions at this time  - Discussed with orthopedic surgery again 1/15-unless patient is willing to have an amputation where there is a fluid-filled pocket concerning for deep abscess requiring debridement-she is not a candidate for surgery at this time.  Could consider vera flow wound VAC in the future  - Wound care notes reviewed; large area of adherent slough.  Crosshatched to allow Dakin's penetration    Constipation  Assessment & Plan  I ordered a bowel management  Suppository today    Hypoglycemia  Assessment & Plan  Multiple episodes of hypoglycemia requiring intervention  Additional episodes of hypoglycemia today  Poor p.o. intake  - Continue to encourage PO intake   - Nutrition consulted  - Discontinue Farxiga due to  hypoglycemia    Streptococcal bacteremia- (present on admission)  Assessment & Plan  Blood cultures 12/19 positive for group A strep  Repeat blood cultures 12/21 NGTD    Pulmonary hypertension (HCC)  Assessment & Plan  Previous Echo RVSP 65, severe TR  Suspect mainly Group 2  RV perfusion with MAP > 65 as need norepinepinephrine  Blood pressure is running on the lower side holding diuretic therapy at this time    Acute respiratory failure with hypoxia (HCC)- (present on admission)  Assessment & Plan  Improved off Bipap  Weaned to room air    Hypophosphatemia- (present on admission)  Assessment & Plan  Replaced    Malignant neoplasm of body of stomach (HCC)- (present on admission)  Assessment & Plan  T2, N0, M0 invasive gastric adenocarcinoma presenting as perforated  10/2024  -GDA embolization  -10/7 subtotal gastrectomy, liver wedge resection, Miguel Angel-en-Y gastrojejunostomy with omental flap and cholecystectomy  -pathology invasive well-differentiated adenocarcinoma with associated ulceration  -tumor invasion into the muscularis propria and 20 lymph nodes were negative for metastatic carcinoma  -Oncology was consulted and noted no evidence of metastatic disease  -MRI abdomen recommended for follow-up; no adjuvant therapy was recommended  He will need outpatient follow-up    Homeless- (present on admission)  Assessment & Plan  Patient is currently homeless.  He is unsure if he is able to return to [facility].  Wheelchair has been ordered.  Discussed with case management today.    Severe protein-calorie malnutrition (HCC)- (present on admission)  Assessment & Plan  Discussed with nutrition, patient meets the ASPEN criteria of severe malnutrition  Continuous nutrition support    ACP (advance care planning)- (present on admission)  Assessment & Plan  Patient admitted with CHF exacerbation, significantly reduced the EF of 30%.  Also found bacteremia, LLE infection, severe malnutrition.  History of invasive gastric  malignancy, A-fib RVR, PE, gastric perforation post extensive surgery including subtotal gastrectomy, liver resection  Miguel Angel-en-Y gastrojejunostomy with omental flap and cholecystectomy.  I discussed the CODE STATUS with him, including CPR, intubation/mechanical ventilation.  He wants to stay full code.  Patient has high complexity and guarded prognosis.  I consulted palliative care.  ACP 18 minutes.     COPD (chronic obstructive pulmonary disease) ([facility])- (present on admission)  Assessment & Plan  Current active smoker, no PFTs on file  RT protocols, bronchodilators as needed  No signs of exacerbation    Acute kidney injury (HCC)- (present on admission)  Assessment & Plan  Resolved     Sepsis (HCC)- (present on admission)  Assessment & Plan  See above    Acute exacerbation of CHF (congestive heart failure) ([facility])- (present on admission)  Assessment & Plan  Echo: EF 28%; severe tricuspid and mitral regurg.  RVSP elevated at 55  - Continue GDMT with losartan, spironolactone, metoprolol, Farxiga  - Continue digoxin  - Maintain euvolemia    Atrial fibrillation with RVR ([facility])- (present on admission)  Assessment & Plan  Chronic atrial fibrillation, currently tachycardia  Digoxin 250 mcg IV monitoring closely with flip to prevent digoxin toxicity    Continue digoxin and Eliquis  12/27 BP better, I resumed Toprol 25 mg    Thrombocytopenia ([facility])- (present on admission)  Assessment & Plan  Platelets 288  - Repeat CBC in a.m.  - Stable to continue Eliquis at this time    Tobacco dependence- (present on admission)  Assessment & Plan  Counseling when appropriate    Hypertension- (present on admission)  Assessment & Plan  SBP well-controlled  - Continue losartan, metoprolol, spironolactone         VTE prophylaxis: VTE Selection    I have performed a physical exam and reviewed and updated ROS and Plan today (1/19/2025). In review of yesterday's note (1/18/2025), there are no changes except as documented above.

## 2025-01-19 NOTE — CARE PLAN
The patient is Stable - Low risk of patient condition declining or worsening    Shift Goals  Clinical Goals: drink atleast 1 judd packet  Patient Goals: comfort, rest  Family Goals: MARCOS    Progress made toward(s) clinical / shift goals: drank judd packet, educated on importance to wound healing    Patient is not progressing towards the following goals: n/a    Problem: Wound/ / Incision Healing  Goal: Patient's wound/surgical incision will decrease in size and heals properly  Outcome: Progressing     Problem: Knowledge Deficit - Standard  Goal: Patient and family/care givers will demonstrate understanding of plan of care, disease process/condition, diagnostic tests and medications  Outcome: Progressing     Problem: Pain - Standard  Goal: Alleviation of pain or a reduction in pain to the patient’s comfort goal  Outcome: Progressing     Problem: Skin Integrity  Goal: Risk for impaired skin integrity will decrease  Outcome: Progressing

## 2025-01-19 NOTE — CARE PLAN
The patient is Watcher - Medium risk of patient condition declining or worsening    Shift Goals  Clinical Goals: wound management  Patient Goals: comfort, rest  Family Goals: MARCOS    Progress made toward(s) clinical / shift goals:    Problem: Wound/ / Incision Healing  Goal: Patient's wound/surgical incision will decrease in size and heals properly  Outcome: Progressing     Problem: Knowledge Deficit - Standard  Goal: Patient and family/care givers will demonstrate understanding of plan of care, disease process/condition, diagnostic tests and medications  Outcome: Progressing  Note: Pt aox4. Meds given per mar. No complaints of pain or discomfort. Vitals within normal limits. Wound care provided. Knowledgeable about current condition and aware of plan of care      Problem: Pain - Standard  Goal: Alleviation of pain or a reduction in pain to the patient’s comfort goal  Outcome: Progressing     Problem: Skin Integrity  Goal: Risk for impaired skin integrity will decrease  Outcome: Progressing  Note: Pt is able to reposition self. No new skin breakdown       Patient is not progressing towards the following goals:

## 2025-01-20 LAB
GAMMA INTERFERON BACKGROUND BLD IA-ACNC: 0.02 IU/ML
M TB IFN-G BLD-IMP: NEGATIVE
M TB IFN-G CD4+ BCKGRND COR BLD-ACNC: 0 IU/ML
MITOGEN IGNF BCKGRD COR BLD-ACNC: >10 IU/ML
QFT TB2 - NIL TBQ2: 0 IU/ML

## 2025-01-20 PROCEDURE — A9270 NON-COVERED ITEM OR SERVICE: HCPCS | Performed by: HOSPITALIST

## 2025-01-20 PROCEDURE — A9270 NON-COVERED ITEM OR SERVICE: HCPCS | Performed by: INTERNAL MEDICINE

## 2025-01-20 PROCEDURE — 700102 HCHG RX REV CODE 250 W/ 637 OVERRIDE(OP): Performed by: INTERNAL MEDICINE

## 2025-01-20 PROCEDURE — 770006 HCHG ROOM/CARE - MED/SURG/GYN SEMI*

## 2025-01-20 PROCEDURE — 700102 HCHG RX REV CODE 250 W/ 637 OVERRIDE(OP): Performed by: HOSPITALIST

## 2025-01-20 PROCEDURE — 99232 SBSQ HOSP IP/OBS MODERATE 35: CPT | Performed by: STUDENT IN AN ORGANIZED HEALTH CARE EDUCATION/TRAINING PROGRAM

## 2025-01-20 RX ADMIN — SENNOSIDES AND DOCUSATE SODIUM 2 TABLET: 50; 8.6 TABLET ORAL at 16:59

## 2025-01-20 RX ADMIN — OXYCODONE 5 MG: 5 TABLET ORAL at 08:26

## 2025-01-20 RX ADMIN — APIXABAN 5 MG: 5 TABLET, FILM COATED ORAL at 04:33

## 2025-01-20 RX ADMIN — DIGOXIN 125 MCG: 0.12 TABLET ORAL at 16:59

## 2025-01-20 RX ADMIN — DAKIN'S SOLUTION 0.125% (QUARTER STRENGTH) 50 ML: 0.12 SOLUTION at 04:35

## 2025-01-20 RX ADMIN — OXYCODONE 5 MG: 5 TABLET ORAL at 02:48

## 2025-01-20 RX ADMIN — APIXABAN 5 MG: 5 TABLET, FILM COATED ORAL at 16:58

## 2025-01-20 RX ADMIN — OMEPRAZOLE 20 MG: 20 CAPSULE, DELAYED RELEASE ORAL at 04:33

## 2025-01-20 ASSESSMENT — ENCOUNTER SYMPTOMS
FEVER: 0
ABDOMINAL PAIN: 0
NAUSEA: 0
SHORTNESS OF BREATH: 0
VOMITING: 0

## 2025-01-20 ASSESSMENT — PAIN DESCRIPTION - PAIN TYPE
TYPE: ACUTE PAIN

## 2025-01-20 NOTE — CARE PLAN
The patient is Stable - Low risk of patient condition declining or worsening    Shift Goals  Clinical Goals: wound dressing CDI, discharge, pain <5 end of shift, free from injury or falls  Patient Goals: comfort  Family Goals: nilson    Progress made toward(s) clinical / shift goals:      Problem: Communication  Goal: The ability to communicate needs accurately and effectively will improve  Description: Target End Date:  End of day 1    1.  Assess ability to communicate and understand  2.  Provide augmentative or alternative methods of communication devices  3.  Use /language line as appropriate  4.  Collaborate with Speech Therapy as needed  Outcome: Progressing     Problem: Knowledge Deficit - Standard  Goal: Patient and family/care givers will demonstrate understanding of plan of care, disease process/condition, diagnostic tests and medications  Description: Target End Date:  1-3 days or as soon as patient condition allows    Document in Patient Education    1.  Patient and family/caregiver oriented to unit, equipment, visitation policy and means for communicating concern  2.  Complete/review Learning Assessment  3.  Assess knowledge level of disease process/condition, treatment plan, diagnostic tests and medications  4.  Explain disease process/condition, treatment plan, diagnostic tests and medications  Outcome: Progressing

## 2025-01-20 NOTE — DISCHARGE PLANNING
DC Transport Scheduled    Transport Company Scheduled:  [facility]  Spoke with Amber at [facility] to schedule transport.    Scheduled Date: 1/21/2025  Scheduled Time: 0900    Transport Type: [facility]  Destination: [address]   Destination address: [address] [place] [address]    Notified care team of scheduled transport via [employer].     If there are any changes needed to the DC transportation scheduled, please contact [facility] at ext. [phone] between the hours of 7065-1906. If outside those hours, contact the ED Case Manager at ext. [phone].

## 2025-01-20 NOTE — PROGRESS NOTES
Hospital Medicine Daily Progress Note    Date of Service  1/20/2025    Chief Complaint  Robert Mcdonnell is a 74 y.o. male admitted 12/19/2024 with left lower extremity cellulitis.    Hospital Course  Robert Mcodnnell is a 74-year-old male with PMHx homelessness, chronic atrial fibrillation on apixaban, HFrEF, pulmonary hypertension, perforated gastric ulcer s/p gastrectomy.    Per history: recent hospitalization 10/3 - 10/15 for perforated  secondary to invasive gastric adenocarcinoma, GDA embolization, subsequent subtotal gastrectomy, liver wedge resection, Miguel Angel-en-Y gastrojejunostomy with omental flap and cholecystectomy 10/7 with pathology showing invasive well-differentiated adenocarcinoma with associated ulceration. There was tumor invasion into the muscularis propria and 20 lymph nodes were negative for metastatic carcinoma. Oncology was consulted and noted no evidence of metastatic disease however MRI abdomen recommended for follow-up; no adjuvant therapy was recommended.     Patient was readmitted 12/19 for worsening lower extremity edema and shortness of breath.  He initially required BiPAP and IMCU admission.  Per history-patient had run out of his Lasix at home.  EKG in the emergency room showing irregular wide-complex arrhythmia.      Additionally, blood cultures were positive for group A strep bacteremia secondary to left lower extremity infection.  CT LLE: Small to moderate suprapatellar joint effusion.  MRI LLE: Diffuse cellulitis, patchy myositis, negative for OM.  Orthopedic surgery was consulted and recommended medical management. Infectious disease was consulted and recommended IV Unasyn with an end date of 1/4 and clindamycin with an end date of 12/31.    For patient's HFrEF-he was started on GDMT with effective diuresis.    Patient continued to have hypoglycemic episodes due to poor p.o. intake.    Interval Problem Update  1/14: Vitals stable overnight.   through 109.  Patient is n.p.o.  for possible surgical debridement of lower extremity cellulitis today.  Discussed with Dr. Clayton, orthopedic surgery.  No plans for surgery at this time.  N.p.o. again at midnight for possible debridement in a.m.    1/15: Vitals stable overnight.   through 131.  Discussed with orthopedic surgery.  No plans for surgical intervention at this time.  Continue antibiotics.  Continue wound care.  Patient is medically clear for discharge to skilled nursing facility.    1/16: Vital stable overnight.  WBC stable at 9.5.  Hb stable at 9.  Insurance authorization is pending for skilled nursing.  Patient is medically clear for discharge.    1/17: Vitals stable overnight.  SBP .  Wound care notes reviewed from 1/16.  Wound still has a large area of adhered slough.  Slough is crosshatched to allow Dakin's penetration.  Patient is medically clear for discharge.  Waiting for insurance authorization to skilled.    1/18: Vitals remained stable overnight.  Patient may have a bed available at [facility] in [place] on Monday.  We will continue to follow with complex discharge committee.    1/19: Vitals notable for  through 111. Patient agreeable to discharge to [facility] should a bed be available. Continue with wound care. Wound showing signs of improvement.  QuantiFERON ordered.    1/20: Vitals stable overnight.  SBP 95-1 05.  Patient will discharge to [facility] when transportation has been arranged.     I have discussed this patient's plan of care and discharge plan at IDT rounds today with Case Management, Nursing, Nursing leadership, and other members of the IDT team.    Consultants/Specialty  critical care, infectious disease, orthopedics, and palliative care    Code Status  Full Code    Disposition  Medically Cleared  I have placed the appropriate orders for post-discharge needs.    Review of Systems  Review of Systems   Constitutional:  Negative for fever and malaise/fatigue.   Respiratory:   Negative for shortness of breath.    Cardiovascular:  Negative for chest pain and leg swelling.   Gastrointestinal:  Negative for abdominal pain, nausea and vomiting.        Physical Exam  Temp:  [36.1 °C (97 °F)-36.8 °C (98.2 °F)] 36.5 °C (97.7 °F)  Pulse:  [79-94] 79  Resp:  [16-18] 18  BP: ()/(58-68) 103/61  SpO2:  [97 %-99 %] 99 %    Physical Exam  Vitals and nursing note reviewed.   Constitutional:       General: He is not in acute distress.     Appearance: Normal appearance. He is ill-appearing.   Cardiovascular:      Rate and Rhythm: Normal rate and regular rhythm.   Pulmonary:      Effort: Pulmonary effort is normal.      Breath sounds: Normal breath sounds.   Skin:     General: Skin is warm and dry.   Neurological:      Mental Status: He is alert and oriented to person, place, and time. Mental status is at baseline.   Psychiatric:         Mood and Affect: Mood normal.         Behavior: Behavior normal.         Fluids    Intake/Output Summary (Last 24 hours) at 1/20/2025 1206  Last data filed at 1/20/2025 0914  Gross per 24 hour   Intake 1420 ml   Output 300 ml   Net 1120 ml        Laboratory                          Imaging  US-EXTREMITY ARTERY LOWER UNILAT LEFT   Final Result      [id number]-W/O LEFT   Final Result      1.  Very limited examination due to patient motion artifact. Patient was also unable to complete the examination and therefore contrast-enhanced sequences were not obtained.      2.  No evidence of marrow edema or bone lesion.      3.  Again seen diffuse soft tissue edema/induration likely representing cellulitis. This also patchy muscle edema suggesting myositis.      4.  No focal fluid collection to suggest presence of abscess.      [id number] VIEW   Final Result      No evidence of bowel obstruction. Mild constipation.      MR-KNEE-WITH & W/O LEFT   Final Result      1.  No evidence of marrow edema or abnormal enhancement to suggest presence of osteomyelitis.      2.  Small  to moderate joint effusion with mild synovitis.      3.  Diffuse soft tissue edema/induration consistent with cellulitis.      4.  Small popliteal cyst.      5.  No evidence of soft tissue abscess.      6.  Markedly motion degraded examination. Ligaments and menisci are inadequately evaluated due to patient motion.      MR-LOWER EXTREMITY-NO JOINT-W/O LEFT   Final Result      1.  Patient motion degraded exam.      2.  No evidence of marrow edema or bone lesion to suggest presence of osteomyelitis.      3.  Diffuse cellulitis and patchy myositis.      4.  No evidence of focal fluid collection to suggest presence of abscess.      CT-EXTREMITY, LOWER WITH LEFT   Final Result      1.  Extensive subcutaneous inflammatory changes about the left lower extremity consistent with deep and superficial cellulitis.      2.  No definite CT evidence of necrotizing fasciitis.      3.  No CT evidence of deep abscess formation or acute or chronic osteomyelitis.      4.  Small to moderate suprapatellar joint effusion.      CT-EXTREMITY, LOWER WITH LEFT   Final Result      No soft tissue gas to suggest necrotizing fasciitis.      No fluid collection identified.      DX-CHEST-PORTABLE (1 VIEW)   Final Result         1.  Hazy left pulmonary infiltrates, similar to prior study   2.  Trace left pleural effusion, stable   3.  Cardiomegaly   4.  Atherosclerosis      DX-CHEST-PORTABLE (1 VIEW)   Final Result         1.  Hazy left pulmonary infiltrates, similar to prior study   2.  Trace left pleural effusion, stable   3.  Cardiomegaly   4.  Atherosclerosis      EC-ECHOCARDIOGRAM COMPLETE W/O CONT   Final Result      CT-CHEST,ABDOMEN,PELVIS WITH   Final Result      1. Stable spiculated opacity in the right upper lobe, extending to the pleural surface.   2. Lingular and left lower lobe parenchymal airspace disease has improved in the interval, but has not completely resolved. There is pleural reaction, suggesting some of these areas may  represent parenchymal scarring.   3. Asymmetry of the lung volumes, small on the left than the right.   4. Cardiomegaly with atherosclerotic calcifications in the aorta and coronary arteries.   5. Postsurgical changes in the stomach.   6. Stable scattered multiple hypodense lesions throughout the liver.   7. Moderate ascites throughout the abdomen and pelvis.   8. Cachexia.   9. Enlarged left inguinal lymph nodes with central necrosis. There is edema involving the soft tissues of the upper left leg extending to the left pelvis.      US-EXTREMITY VENOUS LOWER BILAT   Final Result      DX-CHEST-PORTABLE (1 VIEW)   Final Result         1.  Hazy left pulmonary infiltrates.   2.  Small left pleural effusion   3.  Cardiomegaly           Assessment/Plan  * Wound of lower extremity- (present on admission)  Assessment & Plan  Likely source of infection, lower ext doppler negative  CT left leg shows small to moderate suprapatellar joint effusion.  MRI LLE showed Diffuse cellulitis and patchy myositis, negative for osteomyelitis or abscess.   Repeat MRI 12/30: Limited due to patient motion artifact; no obvious OM noted persistent cellulitis and myositis noted  - Infectious disease consulted  - IV Unasyn regimen completed 1/4  - P.o. clindamycin completed 12/31  - Orthopedic surgery consultation 12/20 and 12/25 no surgical interventions at this time  - Discussed with orthopedic surgery again 1/15-unless patient is willing to have an amputation where there is a fluid-filled pocket concerning for deep abscess requiring debridement-she is not a candidate for surgery at this time.  Could consider vera flow wound VAC in the future  - Wound care notes reviewed; large area of adherent slough.  Crosshatched to allow Dakin's penetration    Constipation  Assessment & Plan  I ordered a bowel management  Suppository today    Hypoglycemia  Assessment & Plan  Multiple episodes of hypoglycemia requiring intervention  Additional episodes of  hypoglycemia today  Poor p.o. intake  - Continue to encourage PO intake   - Nutrition consulted  - Discontinue Farxiga due to hypoglycemia    Streptococcal bacteremia- (present on admission)  Assessment & Plan  Blood cultures 12/19 positive for group A strep  Repeat blood cultures 12/21 NGTD    Pulmonary hypertension (HCC)  Assessment & Plan  Previous Echo RVSP 65, severe TR  Suspect mainly Group 2  RV perfusion with MAP > 65 as need norepinepinephrine  Blood pressure is running on the lower side holding diuretic therapy at this time    Acute respiratory failure with hypoxia (HCC)- (present on admission)  Assessment & Plan  Improved off Bipap  Weaned to room air    Hypophosphatemia- (present on admission)  Assessment & Plan  Replaced    Malignant neoplasm of body of stomach (HCC)- (present on admission)  Assessment & Plan  T2, N0, M0 invasive gastric adenocarcinoma presenting as perforated  10/2024  -GDA embolization  -10/7 subtotal gastrectomy, liver wedge resection, Miguel Angel-en-Y gastrojejunostomy with omental flap and cholecystectomy  -pathology invasive well-differentiated adenocarcinoma with associated ulceration  -tumor invasion into the muscularis propria and 20 lymph nodes were negative for metastatic carcinoma  -Oncology was consulted and noted no evidence of metastatic disease  -MRI abdomen recommended for follow-up; no adjuvant therapy was recommended  He will need outpatient follow-up    Homeless- (present on admission)  Assessment & Plan  Patient is currently homeless.  He is unsure if he is able to return to [facility].  Wheelchair has been ordered.  Discussed with case management today.    Severe protein-calorie malnutrition (HCC)- (present on admission)  Assessment & Plan  Discussed with nutrition, patient meets the ASPEN criteria of severe malnutrition  Continuous nutrition support    ACP (advance care planning)- (present on admission)  Assessment & Plan  Patient admitted with CHF exacerbation,  significantly reduced the EF of 30%.  Also found bacteremia, LLE infection, severe malnutrition.  History of invasive gastric malignancy, A-fib RVR, PE, gastric perforation post extensive surgery including subtotal gastrectomy, liver resection  Miguel Angel-en-Y gastrojejunostomy with omental flap and cholecystectomy.  I discussed the CODE STATUS with him, including CPR, intubation/mechanical ventilation.  He wants to stay full code.  Patient has high complexity and guarded prognosis.  I consulted palliative care.  ACP 18 minutes.     COPD (chronic obstructive pulmonary disease) ([facility])- (present on admission)  Assessment & Plan  Current active smoker, no PFTs on file  RT protocols, bronchodilators as needed  No signs of exacerbation    Acute kidney injury ([facility])- (present on admission)  Assessment & Plan  Resolved     Sepsis ([facility])- (present on admission)  Assessment & Plan  See above    Acute exacerbation of CHF (congestive heart failure) ([facility])- (present on admission)  Assessment & Plan  Echo: EF 28%; severe tricuspid and mitral regurg.  RVSP elevated at 55  - Continue GDMT with losartan, spironolactone, metoprolol, Farxiga  - Continue digoxin  - Maintain euvolemia    Atrial fibrillation with RVR ([facility])- (present on admission)  Assessment & Plan  Chronic atrial fibrillation, currently tachycardia  Digoxin 250 mcg IV monitoring closely with flip to prevent digoxin toxicity    Continue digoxin and Eliquis  12/27 BP better, I resumed Toprol 25 mg    Thrombocytopenia ([facility])- (present on admission)  Assessment & Plan  Platelets 288  - Repeat CBC in a.m.  - Stable to continue Eliquis at this time    Tobacco dependence- (present on admission)  Assessment & Plan  Counseling when appropriate    Hypertension- (present on admission)  Assessment & Plan  SBP well-controlled  - Continue losartan, metoprolol, spironolactone         VTE prophylaxis: VTE Selection    I have performed a physical exam and reviewed and updated ROS and Plan today  (1/20/2025). In review of yesterday's note (1/19/2025), there are no changes except as documented above.

## 2025-01-20 NOTE — DISCHARGE PLANNING
"Medical Social Work  SW spoke to patient about going to a [facility] for care.  Patient stated he was told earlier by his nurse, and is okay with going.  \"I have my stuff packed.\"  SW stated patient will be informed of transport time once arranged.      "

## 2025-01-20 NOTE — DISCHARGE PLANNING
0918  Agency/Facility Name: [facility]  Spoke To: David  Outcome: DPA inquired about bed availability. Per David [facility] has accepted Pt. Per David room number 118C. Per David RN to RN Report is [phone]. DPA inquired about time for transport. Per David the bed is ready whenever we can arrange.  LSW notified.    [id number]  Agency[facility]Facility Name: [facility]  Spoke To: David  Outcome: DPA informed David that Pt transport is scheduled for tomorrow 01/21/25 @ 0900 am.     Daily Assessment

## 2025-01-20 NOTE — PROGRESS NOTES
Received bedside report from previous RN, patient is resting in bed alert and oriented x4. On RA. Denies pain or SOB at this time. Fall precautions in place and call light within reach. All needs met at this time.

## 2025-01-20 NOTE — CARE PLAN
The patient is Stable - Low risk of patient condition declining or worsening    Shift Goals  Clinical Goals: Discharge vinny and continue wound care  Patient Goals: rest  Family Goals: MARCOS    Progress made toward(s) clinical / shift goals: Patient is alert and oriented x4. Updated on POC and verbalized understanding. Medicated per MAR and tolerated well. Dressing changed. Hourly rounding and fall precautions in place. Needs attended.     Patient is not progressing towards the following goals: PRN for pain given.

## 2025-01-21 VITALS
DIASTOLIC BLOOD PRESSURE: 69 MMHG | HEART RATE: 96 BPM | BODY MASS INDEX: 23.98 KG/M2 | TEMPERATURE: 97.6 F | RESPIRATION RATE: 16 BRPM | OXYGEN SATURATION: 100 % | HEIGHT: 72 IN | SYSTOLIC BLOOD PRESSURE: 132 MMHG | WEIGHT: 177.03 LBS

## 2025-01-21 PROCEDURE — A9270 NON-COVERED ITEM OR SERVICE: HCPCS | Performed by: INTERNAL MEDICINE

## 2025-01-21 PROCEDURE — 99239 HOSP IP/OBS DSCHRG MGMT >30: CPT | Performed by: INTERNAL MEDICINE

## 2025-01-21 PROCEDURE — 700102 HCHG RX REV CODE 250 W/ 637 OVERRIDE(OP): Performed by: INTERNAL MEDICINE

## 2025-01-21 RX ORDER — AMOXICILLIN 250 MG
2 CAPSULE ORAL EVERY EVENING
Qty: 30 TABLET | Refills: 0 | Status: SHIPPED | OUTPATIENT
Start: 2025-01-21

## 2025-01-21 RX ORDER — OXYCODONE HYDROCHLORIDE 5 MG/1
5 TABLET ORAL
Qty: 6 TABLET | Refills: 0 | Status: SHIPPED | OUTPATIENT
Start: 2025-01-21 | End: 2025-01-23

## 2025-01-21 RX ADMIN — DAKIN'S SOLUTION 0.125% (QUARTER STRENGTH) 25 ML: 0.12 SOLUTION at 04:15

## 2025-01-21 RX ADMIN — APIXABAN 5 MG: 5 TABLET, FILM COATED ORAL at 04:13

## 2025-01-21 RX ADMIN — OMEPRAZOLE 20 MG: 20 CAPSULE, DELAYED RELEASE ORAL at 04:13

## 2025-01-21 RX ADMIN — OXYCODONE 5 MG: 5 TABLET ORAL at 00:32

## 2025-01-21 ASSESSMENT — PAIN DESCRIPTION - PAIN TYPE
TYPE: ACUTE PAIN;CHRONIC PAIN
TYPE: ACUTE PAIN

## 2025-01-21 NOTE — CARE PLAN
The patient is Stable - Low risk of patient condition declining or worsening    Shift Goals  Clinical Goals: Discharge vinny and continue wound care  Patient Goals: Rest  Family Goals: MARCOS    Progress made toward(s) clinical / shift goals:  Patient is alert and oriented x4. Updated on POC and verbalized understanding. Medicated per MAR and tolerated well. Dressing changed. PRN for pain given. Fall precautions and hourly rounding in place. Needs attended.    Patient is not progressing towards the following goals:

## 2025-01-21 NOTE — DISCHARGE SUMMARY
Discharge Summary    CHIEF COMPLAINT ON ADMISSION  Chief Complaint   Patient presents with    Shortness of Breath           Peripheral Edema       Reason for Admission  EMS    Admission Date  12/19/2024     CODE STATUS  Full Code    HPI & HOSPITAL COURSE    Robert Mcdonnell is a 74-year-old male with PMHx homelessness, chronic atrial fibrillation on apixaban, HFrEF, pulmonary hypertension, perforated gastric ulcer s/p gastrectomy.    Per history: recent hospitalization 10/3 - 10/15 for perforated  secondary to invasive gastric adenocarcinoma, GDA embolization, subsequent subtotal gastrectomy, liver wedge resection, Miguel Angel-en-Y gastrojejunostomy with omental flap and cholecystectomy 10/7 with pathology showing invasive well-differentiated adenocarcinoma with associated ulceration. There was tumor invasion into the muscularis propria and 20 lymph nodes were negative for metastatic carcinoma. Oncology was consulted and noted no evidence of metastatic disease however MRI abdomen recommended for follow-up; no adjuvant therapy was recommended.     Patient was readmitted 12/19 for worsening lower extremity edema and shortness of breath.  He initially required BiPAP and IMCU admission.  Per history-patient had run out of his Lasix at home.  EKG in the emergency room showing irregular wide-complex arrhythmia.      Additionally, blood cultures were positive for group A strep bacteremia secondary to left lower extremity infection.  CT LLE: Small to moderate suprapatellar joint effusion.  MRI LLE: Diffuse cellulitis, patchy myositis, negative for OM.  Orthopedic surgery was consulted and recommended medical management. Infectious disease was consulted and recommended IV Unasyn with an end date of 1/4 and clindamycin with an end date of 12/31.    For patient's HFrEF-he was started on GDMT with effective diuresis.    Patient continued to have hypoglycemic episodes due to poor p.o. intake.  When Farxiga was discontinued-hypoglycemia  improved.    In terms of patient's lower extremity wound-patient needs daily wound care.  Wound has been evaluated by orthopedic surgery on 3 different occasions.  Patient is not agreeable for an amputation and is agreeable to continue with prolonged wound care.  He has completed antibiotics.  Most recently seen by wound care on 1/16/2025.  Gentle slough and soft eschar debridement with Dakin's overlying.  Continue to follow with wound care until wound has healed.    Patient will [facility] to [facility] for [facility] care.    Therefore, he is discharged in good and stable condition to skilled nursing facility.    The patient met 2-midnight criteria for an inpatient stay at the time of discharge.      FOLLOW UP ITEMS POST DISCHARGE  PCP in 1 week  Wound care    DISCHARGE DIAGNOSES  Principal Problem:    Wound of lower extremity (POA: Yes)  Active Problems:    Hypertension (POA: Yes)    Tobacco dependence (POA: Yes)    Thrombocytopenia (HCC) (POA: Yes)    Atrial fibrillation with RVR (HCC) (POA: Yes)    Acute exacerbation of CHF (congestive heart failure) (HCC) (POA: Yes)    Sepsis (HCC) (POA: Yes)    Acute kidney injury (HCC) (POA: Yes)    COPD (chronic obstructive pulmonary disease) (HCC) (POA: Yes)    ACP (advance care planning) (POA: Yes)    Severe protein-calorie malnutrition (HCC) (POA: Yes)    Homeless (POA: Yes)    Malignant neoplasm of body of stomach (HCC) (POA: Yes)    Hypophosphatemia (POA: Yes)    Acute respiratory failure with hypoxia (HCC) (POA: Yes)    Pulmonary hypertension (HCC) (POA: Unknown)    Streptococcal bacteremia (POA: Yes)    Hypoglycemia (POA: Unknown)    Left leg cellulitis (POA: Yes)    Constipation (POA: Unknown)  Resolved Problems:    Acute left-sided CHF (congestive heart failure) (HCC) (POA: Yes)    Bacteremia (POA: Unknown)      FOLLOW UP  No future appointments.  [facility]  [address]  [address]  [phone]          MEDICATIONS ON DISCHARGE     Medication List        START taking these medications        Instructions   digoxin 125 MCG Tabs  Commonly known as: Lanoxin   Take 1 Tablet by mouth every day at 6 PM.  Dose: 125 mcg     losartan 25 MG Tabs  Commonly known as: Cozaar   Take 1 Tablet by mouth every day.  Dose: 25 mg     oxyCODONE immediate-release 5 MG Tabs  Commonly known as: Roxicodone   Take 1 Tablet by mouth every 3 hours as needed for Severe Pain for up to 2 days.  Dose: 5 mg     Senna Plus 8.6-50 MG Tabs  Generic drug: senna-docusate   Take 2 Tablets by mouth every evening.  Dose: 2 Tablet     spironolactone 25 MG Tabs  Commonly known as: Aldactone   Take 1 Tablet by mouth every day.  Dose: 25 mg            CHANGE how you take these medications        Instructions   metoprolol SR 25 MG Tb24  What changed: how much to take  Commonly known as: Toprol XL   Take one-half (0.5) Tablet by mouth every day.  Dose: 12.5 mg            CONTINUE taking these medications        Instructions   apixaban 5mg Tabs  Commonly known as: Eliquis   Take 1 Tablet by mouth 2 times a day.  Dose: 5 mg     furosemide 20 MG Tabs  Commonly known as: Lasix   Take 1 Tablet by mouth 2 times a day.  Dose: 20 mg     omeprazole 20 MG delayed-release capsule  Commonly known as: PriLOSEC   Take 1 Capsule by mouth every day.  Dose: 20 mg              Allergies  No Known Allergies    DIET  Orders Placed This Encounter   Procedures    Diet Order Diet: Consistent CHO (Diabetic)     Standing Status:   Standing     Number of Occurrences:   1     Order Specific Question:   Diet:     Answer:   Consistent CHO (Diabetic) [4]       ACTIVITY  As tolerated.  Weight bearing as tolerated    LINES, DRAINS, AND WOUNDS  This is an automated list. Peripheral IVs will be removed prior to discharge.  Peripheral IV 01/07/25 Anterior;Right Forearm (Active)   Site Assessment Clean;Dry;Intact 01/21/25 0730   Dressing Type Transparent 01/21/25  0730   Line Status Flushed;Scrubbed the hub prior to access;Saline locked 01/21/25 0730   Dressing Status Clean;Dry;Intact 01/21/25 0730   Dressing Intervention N/A 01/21/25 0730   Dressing Change Due 01/21/25 01/21/25 0730   Date Primary Tubing Changed 01/11/25 01/11/25 2021   NEXT Primary Tubing Change  01/18/25 01/12/25 2216   Infiltration Grading ([facility], [facility]) 0 01/21/25 0730   Phlebitis Scale ([facility] Only) 0 01/21/25 0730     External Urinary Catheter ([facility]) (Active)   Collection Container Standard drainage bag 01/11/25 2049   Output (mL) 250 mL 01/08/25 1800      Moisture Associated Skin Damage 12/24/24 Buttock;Groin (Active)   Wound Image   12/24/24 1010   NEXT Weekly Photo (Inpatient Only) 01/01/25 12/28/24 0750   Drainage Amount None 01/13/25 0900   Drainage Description Serosanguineous 01/05/25 2010   Periwound Assessment Pink;Red;Blanchable erythema 01/17/25 2043   IAD Cleansing Dimethecone Wipes 01/10/25 2050   Periwound Protectant Barrier Paste 01/13/25 0900   IAD Containment Device None 01/10/25 2050       Wound 12/20/24 Full Thickness Wound Pretibial Circumferential Left (Active)   Wound Image      01/16/25 1722   Site Assessment MARCOS 01/21/25 0730   Periwound Assessment Denuded 01/16/25 1722   Margins Defined edges;Attached edges 01/16/25 1722   Closure Secondary intention 01/16/25 1722   Drainage Amount Small 01/19/25 1800   Drainage Description Serosanguineous 01/19/25 1800   Treatments Cleansed;Site care 01/21/25 0348   Offloading/DME Other (comment) 01/11/25 0600   Wound Cleansing Approved Wound Cleanser 01/21/25 0348   Periwound Protectant Barrier Paste 01/21/25 0348   Dressing Status Clean;Dry;Intact 01/21/25 0348   Dressing Changed Changed 01/21/25 0348   Dressing Cleansing/Solutions 1/4 Strength Dakin's Solution 01/21/25 0348   Dressing Options Absorbent Abdominal Pad;Dry Roll Gauze;Hypafix Tape 01/21/25 4832   Dressing Change/Treatment Frequency Every Shift, and As Needed 01/21/25 8399    NEXT Dressing Change/Treatment Date 01/21/25 01/21/25 0348   NEXT Weekly Photo (Inpatient Only) 01/23/25 01/16/25 1722   Wound Team Following Weekly 01/16/25 1722   Non-staged Wound Description Full thickness 01/16/25 1722   Wound Length (cm) 20 cm 01/16/25 1722   Wound Width (cm) 9 cm 01/16/25 1722   Wound Surface Area (cm^2) 180 cm^2 01/16/25 1722   Shape irregular 01/16/25 1722   Wound Odor None 01/16/25 1722   Pulses Left;DP;2+ 01/07/25 1100   Exposed Structures MARCOS 01/16/25 1722   WOUND NURSE ONLY - Time Spent with Patient (mins) 30 01/16/25 1722       Peripheral IV 01/07/25 Anterior;Right Forearm (Active)   Site Assessment Clean;Dry;Intact 01/21/25 0730   Dressing Type Transparent 01/21/25 0730   Line Status Flushed;Scrubbed the hub prior to access;Saline locked 01/21/25 0730   Dressing Status Clean;Dry;Intact 01/21/25 0730   Dressing Intervention N/A 01/21/25 0730   Dressing Change Due 01/21/25 01/21/25 0730   Date Primary Tubing Changed 01/11/25 01/11/25 2021   NEXT Primary Tubing Change  01/18/25 01/12/25 2216   Infiltration Grading ([facility], [facility]) 0 01/21/25 0730   Phlebitis Scale ([facility] Only) 0 01/21/25 0730               MENTAL STATUS ON TRANSFER             CONSULTATIONS  Palliative care, infectious disease, orthopedic surgery, critical care    PROCEDURES  None    LABORATORY  Lab Results   Component Value Date    SODIUM 134 (L) 01/10/2025    POTASSIUM 4.6 01/10/2025    CHLORIDE 103 01/10/2025    CO2 26 01/10/2025    GLUCOSE 91 01/10/2025    BUN 15 01/10/2025    CREATININE 0.86 01/10/2025        Lab Results   Component Value Date    WBC 9.5 01/16/2025    HEMOGLOBIN 9.0 (L) 01/16/2025    HEMATOCRIT 27.6 (L) 01/16/2025    PLATELETCT 238 01/16/2025        Total time of the discharge process exceeds 45 minutes.

## 2025-01-21 NOTE — PROGRESS NOTES
Attempted to give report, per RN not available for report at this time. Left phone number for call back.

## 2025-01-21 NOTE — PROGRESS NOTES
Report given to Kerrie at [facility]. Pt. Left the unit, COBRA packet  given to transport, AVS given to pt. And placed on packet. Script for oxycodone included in the packet. PIV removed. All belongings sent with the pt.

## 2025-05-27 ENCOUNTER — APPOINTMENT (OUTPATIENT)
Dept: RADIOLOGY | Facility: MEDICAL CENTER | Age: 75
End: 2025-05-27
Attending: EMERGENCY MEDICINE
Payer: MEDICARE

## 2025-05-27 ENCOUNTER — APPOINTMENT (OUTPATIENT)
Dept: RADIOLOGY | Facility: MEDICAL CENTER | Age: 75
End: 2025-05-27
Payer: MEDICARE

## 2025-05-27 ENCOUNTER — APPOINTMENT (OUTPATIENT)
Dept: CARDIOLOGY | Facility: MEDICAL CENTER | Age: 75
End: 2025-05-27
Attending: HOSPITALIST
Payer: MEDICARE

## 2025-05-27 ENCOUNTER — HOSPITAL ENCOUNTER (INPATIENT)
Facility: MEDICAL CENTER | Age: 75
LOS: 3 days | End: 2025-05-30
Attending: EMERGENCY MEDICINE | Admitting: HOSPITALIST
Payer: MEDICARE

## 2025-05-27 DIAGNOSIS — I50.1 ACUTE LEFT-SIDED CHF (CONGESTIVE HEART FAILURE) (HCC): ICD-10-CM

## 2025-05-27 DIAGNOSIS — J81.0 ACUTE PULMONARY EDEMA (HCC): ICD-10-CM

## 2025-05-27 DIAGNOSIS — Z59.00 HOMELESSNESS: ICD-10-CM

## 2025-05-27 DIAGNOSIS — L97.929 ULCER OF LEFT LOWER EXTREMITY, UNSPECIFIED ULCER STAGE (HCC): Primary | ICD-10-CM

## 2025-05-27 DIAGNOSIS — I50.21 ACUTE SYSTOLIC (CONGESTIVE) HEART FAILURE (HCC): ICD-10-CM

## 2025-05-27 DIAGNOSIS — I27.20 PULMONARY HYPERTENSION (HCC): ICD-10-CM

## 2025-05-27 PROBLEM — I50.23 ACUTE ON CHRONIC SYSTOLIC HEART FAILURE (HCC): Status: ACTIVE | Noted: 2020-04-27

## 2025-05-27 LAB
ALBUMIN SERPL BCP-MCNC: 3.1 G/DL (ref 3.2–4.9)
ALBUMIN/GLOB SERPL: 1 G/DL
ALP SERPL-CCNC: 91 U/L (ref 30–99)
ALT SERPL-CCNC: 22 U/L (ref 2–50)
ANION GAP SERPL CALC-SCNC: 11 MMOL/L (ref 7–16)
AST SERPL-CCNC: 37 U/L (ref 12–45)
BASOPHILS # BLD AUTO: 0.3 % (ref 0–1.8)
BASOPHILS # BLD: 0.02 K/UL (ref 0–0.12)
BILIRUB SERPL-MCNC: 0.7 MG/DL (ref 0.1–1.5)
BUN SERPL-MCNC: 17 MG/DL (ref 8–22)
CALCIUM ALBUM COR SERPL-MCNC: 8.7 MG/DL (ref 8.5–10.5)
CALCIUM SERPL-MCNC: 8 MG/DL (ref 8.5–10.5)
CHLORIDE SERPL-SCNC: 114 MMOL/L (ref 96–112)
CO2 SERPL-SCNC: 17 MMOL/L (ref 20–33)
CREAT SERPL-MCNC: 1.19 MG/DL (ref 0.5–1.4)
EKG IMPRESSION: NORMAL
EKG IMPRESSION: NORMAL
EOSINOPHIL # BLD AUTO: 0.14 K/UL (ref 0–0.51)
EOSINOPHIL NFR BLD: 2.4 % (ref 0–6.9)
ERYTHROCYTE [DISTWIDTH] IN BLOOD BY AUTOMATED COUNT: 55.1 FL (ref 35.9–50)
GFR SERPLBLD CREATININE-BSD FMLA CKD-EPI: 64 ML/MIN/1.73 M 2
GLOBULIN SER CALC-MCNC: 3.2 G/DL (ref 1.9–3.5)
GLUCOSE SERPL-MCNC: 83 MG/DL (ref 65–99)
HCT VFR BLD AUTO: 24.1 % (ref 42–52)
HGB BLD-MCNC: 7.3 G/DL (ref 14–18)
IMM GRANULOCYTES # BLD AUTO: 0.02 K/UL (ref 0–0.11)
IMM GRANULOCYTES NFR BLD AUTO: 0.3 % (ref 0–0.9)
LV EJECT FRACT  99904: 45
LV EJECT FRACT MOD 2C 99903: 66.82
LV EJECT FRACT MOD 4C 99902: 33.86
LV EJECT FRACT MOD BP 99901: 53.85
LYMPHOCYTES # BLD AUTO: 0.83 K/UL (ref 1–4.8)
LYMPHOCYTES NFR BLD: 14.2 % (ref 22–41)
MCH RBC QN AUTO: 27.2 PG (ref 27–33)
MCHC RBC AUTO-ENTMCNC: 30.3 G/DL (ref 32.3–36.5)
MCV RBC AUTO: 89.9 FL (ref 81.4–97.8)
MONOCYTES # BLD AUTO: 0.58 K/UL (ref 0–0.85)
MONOCYTES NFR BLD AUTO: 9.9 % (ref 0–13.4)
NEUTROPHILS # BLD AUTO: 4.26 K/UL (ref 1.82–7.42)
NEUTROPHILS NFR BLD: 72.9 % (ref 44–72)
NRBC # BLD AUTO: 0 K/UL
NRBC BLD-RTO: 0 /100 WBC (ref 0–0.2)
NT-PROBNP SERPL IA-MCNC: 3715 PG/ML (ref 0–125)
PLATELET # BLD AUTO: 187 K/UL (ref 164–446)
PMV BLD AUTO: 10.4 FL (ref 9–12.9)
POTASSIUM SERPL-SCNC: 3.7 MMOL/L (ref 3.6–5.5)
PROT SERPL-MCNC: 6.3 G/DL (ref 6–8.2)
RBC # BLD AUTO: 2.68 M/UL (ref 4.7–6.1)
SODIUM SERPL-SCNC: 142 MMOL/L (ref 135–145)
TROPONIN T SERPL-MCNC: 23 NG/L (ref 6–19)
WBC # BLD AUTO: 5.9 K/UL (ref 4.8–10.8)

## 2025-05-27 PROCEDURE — 83880 ASSAY OF NATRIURETIC PEPTIDE: CPT

## 2025-05-27 PROCEDURE — 93306 TTE W/DOPPLER COMPLETE: CPT | Mod: 26 | Performed by: INTERNAL MEDICINE

## 2025-05-27 PROCEDURE — 84484 ASSAY OF TROPONIN QUANT: CPT

## 2025-05-27 PROCEDURE — 700111 HCHG RX REV CODE 636 W/ 250 OVERRIDE (IP): Mod: JZ | Performed by: EMERGENCY MEDICINE

## 2025-05-27 PROCEDURE — 96374 THER/PROPH/DIAG INJ IV PUSH: CPT

## 2025-05-27 PROCEDURE — 700111 HCHG RX REV CODE 636 W/ 250 OVERRIDE (IP): Performed by: HOSPITALIST

## 2025-05-27 PROCEDURE — 99223 1ST HOSP IP/OBS HIGH 75: CPT | Mod: AI | Performed by: HOSPITALIST

## 2025-05-27 PROCEDURE — 80053 COMPREHEN METABOLIC PANEL: CPT

## 2025-05-27 PROCEDURE — 700102 HCHG RX REV CODE 250 W/ 637 OVERRIDE(OP): Performed by: HOSPITALIST

## 2025-05-27 PROCEDURE — 99285 EMERGENCY DEPT VISIT HI MDM: CPT

## 2025-05-27 PROCEDURE — 85025 COMPLETE CBC W/AUTO DIFF WBC: CPT

## 2025-05-27 PROCEDURE — A9270 NON-COVERED ITEM OR SERVICE: HCPCS | Performed by: HOSPITALIST

## 2025-05-27 PROCEDURE — 71045 X-RAY EXAM CHEST 1 VIEW: CPT

## 2025-05-27 PROCEDURE — 93306 TTE W/DOPPLER COMPLETE: CPT

## 2025-05-27 PROCEDURE — 93005 ELECTROCARDIOGRAM TRACING: CPT | Mod: TC | Performed by: EMERGENCY MEDICINE

## 2025-05-27 PROCEDURE — 36415 COLL VENOUS BLD VENIPUNCTURE: CPT

## 2025-05-27 PROCEDURE — 770020 HCHG ROOM/CARE - TELE (206)

## 2025-05-27 PROCEDURE — 93005 ELECTROCARDIOGRAM TRACING: CPT | Mod: TC

## 2025-05-27 RX ORDER — ONDANSETRON 2 MG/ML
4 INJECTION INTRAMUSCULAR; INTRAVENOUS EVERY 4 HOURS PRN
Status: DISCONTINUED | OUTPATIENT
Start: 2025-05-27 | End: 2025-05-30 | Stop reason: HOSPADM

## 2025-05-27 RX ORDER — FUROSEMIDE 10 MG/ML
40 INJECTION INTRAMUSCULAR; INTRAVENOUS EVERY 8 HOURS
Status: DISCONTINUED | OUTPATIENT
Start: 2025-05-27 | End: 2025-05-28

## 2025-05-27 RX ORDER — SPIRONOLACTONE 25 MG/1
25 TABLET ORAL EVERY EVENING
Status: DISCONTINUED | OUTPATIENT
Start: 2025-05-27 | End: 2025-05-30 | Stop reason: HOSPADM

## 2025-05-27 RX ORDER — ONDANSETRON 4 MG/1
4 TABLET, ORALLY DISINTEGRATING ORAL EVERY 4 HOURS PRN
Status: DISCONTINUED | OUTPATIENT
Start: 2025-05-27 | End: 2025-05-30 | Stop reason: HOSPADM

## 2025-05-27 RX ORDER — ACETAMINOPHEN 325 MG/1
650 TABLET ORAL EVERY 6 HOURS PRN
Status: DISCONTINUED | OUTPATIENT
Start: 2025-05-27 | End: 2025-05-30 | Stop reason: HOSPADM

## 2025-05-27 RX ORDER — LOSARTAN POTASSIUM 25 MG/1
25 TABLET ORAL EVERY EVENING
Status: DISCONTINUED | OUTPATIENT
Start: 2025-05-27 | End: 2025-05-30 | Stop reason: HOSPADM

## 2025-05-27 RX ORDER — FUROSEMIDE 10 MG/ML
20 INJECTION INTRAMUSCULAR; INTRAVENOUS ONCE
Status: COMPLETED | OUTPATIENT
Start: 2025-05-27 | End: 2025-05-27

## 2025-05-27 RX ORDER — MAGNESIUM SULFATE HEPTAHYDRATE 40 MG/ML
2 INJECTION, SOLUTION INTRAVENOUS ONCE
Status: COMPLETED | OUTPATIENT
Start: 2025-05-27 | End: 2025-05-27

## 2025-05-27 RX ORDER — OMEPRAZOLE 20 MG/1
20 CAPSULE, DELAYED RELEASE ORAL DAILY
Status: DISCONTINUED | OUTPATIENT
Start: 2025-05-27 | End: 2025-05-30 | Stop reason: HOSPADM

## 2025-05-27 RX ORDER — METOPROLOL SUCCINATE 25 MG/1
12.5 TABLET, EXTENDED RELEASE ORAL EVERY EVENING
Status: DISCONTINUED | OUTPATIENT
Start: 2025-05-27 | End: 2025-05-30 | Stop reason: HOSPADM

## 2025-05-27 RX ORDER — DIGOXIN 125 MCG
125 TABLET ORAL DAILY
Status: DISCONTINUED | OUTPATIENT
Start: 2025-05-27 | End: 2025-05-30 | Stop reason: HOSPADM

## 2025-05-27 RX ADMIN — SPIRONOLACTONE 25 MG: 25 TABLET ORAL at 17:14

## 2025-05-27 RX ADMIN — OMEPRAZOLE 20 MG: 20 CAPSULE, DELAYED RELEASE ORAL at 15:53

## 2025-05-27 RX ADMIN — MAGNESIUM SULFATE HEPTAHYDRATE 2 G: 2 INJECTION, SOLUTION INTRAVENOUS at 15:52

## 2025-05-27 RX ADMIN — FUROSEMIDE 40 MG: 10 INJECTION, SOLUTION INTRAVENOUS at 21:20

## 2025-05-27 RX ADMIN — DIGOXIN 125 MCG: 0.12 TABLET ORAL at 17:14

## 2025-05-27 RX ADMIN — LOSARTAN POTASSIUM 25 MG: 25 TABLET, FILM COATED ORAL at 17:14

## 2025-05-27 RX ADMIN — FUROSEMIDE 40 MG: 10 INJECTION, SOLUTION INTRAVENOUS at 15:53

## 2025-05-27 RX ADMIN — FUROSEMIDE 20 MG: 10 INJECTION, SOLUTION INTRAVENOUS at 10:50

## 2025-05-27 RX ADMIN — APIXABAN 5 MG: 5 TABLET, FILM COATED ORAL at 17:14

## 2025-05-27 RX ADMIN — METOPROLOL SUCCINATE 12.5 MG: 25 TABLET, EXTENDED RELEASE ORAL at 17:14

## 2025-05-27 SDOH — ECONOMIC STABILITY - HOUSING INSECURITY: HOMELESSNESS UNSPECIFIED: Z59.00

## 2025-05-27 ASSESSMENT — LIFESTYLE VARIABLES
CONSUMPTION TOTAL: NEGATIVE
EVER FELT BAD OR GUILTY ABOUT YOUR DRINKING: NO
HAVE PEOPLE ANNOYED YOU BY CRITICIZING YOUR DRINKING: NO
EVER HAD A DRINK FIRST THING IN THE MORNING TO STEADY YOUR NERVES TO GET RID OF A HANGOVER: NO
ON A TYPICAL DAY WHEN YOU DRINK ALCOHOL HOW MANY DRINKS DO YOU HAVE: 2
TOTAL SCORE: 0
TOTAL SCORE: 0
DOES PATIENT WANT TO STOP DRINKING: NO
AVERAGE NUMBER OF DAYS PER WEEK YOU HAVE A DRINK CONTAINING ALCOHOL: 5
ALCOHOL_USE: YES
HOW MANY TIMES IN THE PAST YEAR HAVE YOU HAD 5 OR MORE DRINKS IN A DAY: 0
TOTAL SCORE: 0
HAVE YOU EVER FELT YOU SHOULD CUT DOWN ON YOUR DRINKING: NO

## 2025-05-27 ASSESSMENT — CHA2DS2 SCORE
HYPERTENSION: YES
CHA2DS2 VASC SCORE: 4
CHF OR LEFT VENTRICULAR DYSFUNCTION: YES
PRIOR STROKE OR TIA OR THROMBOEMBOLISM: NO
AGE 75 OR GREATER: YES
AGE 65 TO 74: NO
DIABETES: NO
SEX: MALE
VASCULAR DISEASE: NO

## 2025-05-27 ASSESSMENT — PAIN DESCRIPTION - PAIN TYPE: TYPE: ACUTE PAIN

## 2025-05-27 ASSESSMENT — COGNITIVE AND FUNCTIONAL STATUS - GENERAL
CLIMB 3 TO 5 STEPS WITH RAILING: A LITTLE
SUGGESTED CMS G CODE MODIFIER MOBILITY: CI
DAILY ACTIVITIY SCORE: 22
MOBILITY SCORE: 23
SUGGESTED CMS G CODE MODIFIER DAILY ACTIVITY: CJ
HELP NEEDED FOR BATHING: A LITTLE
EATING MEALS: A LITTLE

## 2025-05-27 ASSESSMENT — ENCOUNTER SYMPTOMS
DIZZINESS: 0
SHORTNESS OF BREATH: 1
NAUSEA: 0
BACK PAIN: 0
DIARRHEA: 0
PALPITATIONS: 0
CHILLS: 0
COUGH: 1
HEADACHES: 0
LOSS OF CONSCIOUSNESS: 0
ABDOMINAL PAIN: 0
VOMITING: 0
ORTHOPNEA: 1
FEVER: 0

## 2025-05-27 ASSESSMENT — FIBROSIS 4 INDEX
FIB4 SCORE: 2.63
FIB4 SCORE: 3.16

## 2025-05-27 NOTE — H&P
Hospital Medicine History & Physical Note    Date of Service  5/27/2025    Primary Care Physician  Torri Razo P.A.-C.    Consultants      Specialist Names:     Code Status  Full Code    Chief Complaint  Chief Complaint   Patient presents with    Shortness of Breath       History of Presenting Illness  Robert Mcdonnell is a 75 y.o. male who presented 5/27/2025 with history of tobacco use, COPD, distant history of gastric cancer, systolic heart failure, A-fib, hypertension.    Patient was recently admitted to the hospital in [place] for treatment of cellulitis involving his left lower leg.  He was discharged with medications for his heart, and other things but on the bus between here and in [place] on his way to [place], he lost his medications and has been off of them for several days now    He presents to the emergency room with complaint of shortness of breath.  His symptoms have been getting worse over the last 48 hours.  They are especially worse when he lies flat or exerts himself.  Here in the ED has been treated with IV Lasix as well as respiratory therapy, and he notes that he is feeling much better.  He has not had any unusual back neck jaw or shoulder pain, no chest pain or pressure in the recent few days.  Denies any fever or chills.  He does have a cough, generally nonproductive.    I discussed the plan of care with patient.    Review of Systems  Review of Systems   Constitutional:  Negative for chills and fever.   Respiratory:  Positive for cough and shortness of breath.    Cardiovascular:  Positive for orthopnea and leg swelling. Negative for chest pain and palpitations.   Gastrointestinal:  Negative for abdominal pain, diarrhea, nausea and vomiting.   Genitourinary:  Negative for dysuria and urgency.   Musculoskeletal:  Negative for back pain.   Skin:  Negative for rash.   Neurological:  Negative for dizziness, loss of consciousness and headaches.       Past Medical History   has a past  medical history of Arrhythmia, CHF (congestive heart failure) (HCC), Congestive heart failure (HCC), and Hypertension.    Surgical History   has a past surgical history that includes pr upper gi endoscopy,diagnosis (N/A, 01/31/2023); pr upper gi endoscopy,biopsy (N/A, 09/10/2024); pr upper gi endoscopy,scler inject (N/A, 09/10/2024); gastroscopy with endostat (N/A, 09/10/2024); cataract phaco with iol (Left); pr upper gi endoscopy,diagnosis (N/A, 9/13/2024); pr upper gi endoscopy,biopsy (N/A, 9/13/2024); pr upper gi endoscopy,scler inject (N/A, 9/13/2024); gastrectomy (N/A, 10/7/2024); node dissection (N/A, 10/7/2024); creation, flap, omentum (N/A, 10/7/2024); and cholecystectomy (N/A, 10/7/2024).     Family History  family history includes Heart Disease in his father and mother.   Family history reviewed with patient. There is no family history that is pertinent to the chief complaint.     Social History   reports that he has been smoking cigarettes. He has never used smokeless tobacco. He reports current alcohol use of about 1.2 oz of alcohol per week. He reports that he does not currently use drugs.    Allergies  Allergies[1]    Medications  Prior to Admission Medications   Prescriptions Last Dose Informant Patient Reported? Taking?   apixaban (ELIQUIS) 5mg Tab   No No   Sig: Take 1 Tablet by mouth 2 times a day.   digoxin (LANOXIN) 125 MCG Tab   No No   Sig: Take 1 Tablet by mouth every day at 6 PM.   losartan (COZAAR) 25 MG Tab   No No   Sig: Take 1 Tablet by mouth every day.   metoprolol SR (TOPROL XL) 25 MG TABLET SR 24 HR   No No   Sig: Take one-half (0.5) Tablet by mouth every day.   omeprazole (PRILOSEC) 20 MG delayed-release capsule   No No   Sig: Take 1 Capsule by mouth every day.   spironolactone (ALDACTONE) 25 MG Tab   No No   Sig: Take 1 Tablet by mouth every day.      Facility-Administered Medications: None       Physical Exam  Temp:  [36.4 °C (97.6 °F)] 36.4 °C (97.6 °F)  Pulse:  [88-94] 94  Resp:   [16-18] 18  BP: (124-142)/(76-90) 142/76  SpO2:  [94 %-98 %] 94 %  Blood Pressure : (!) 142/76   Temperature: 36.4 °C (97.6 °F)   Pulse: 94   Respiration: 18   Pulse Oximetry: 94 %       Physical Exam  Constitutional:       General: He is not in acute distress.     Appearance: He is well-developed. He is not diaphoretic.   HENT:      Head: Normocephalic and atraumatic.   Eyes:      Conjunctiva/sclera: Conjunctivae normal.   Neck:      Vascular: JVD present.   Cardiovascular:      Rate and Rhythm: Normal rate.      Heart sounds: Murmur heard.      No gallop.   Pulmonary:      Effort: Pulmonary effort is normal. No respiratory distress.      Breath sounds: No stridor. Rales present. No wheezing.   Abdominal:      Palpations: Abdomen is soft.      Tenderness: There is no abdominal tenderness. There is no guarding or rebound.   Musculoskeletal:      Right lower leg: Edema present.      Left lower leg: Edema present.   Skin:     General: Skin is warm and dry.      Findings: No rash.   Neurological:      Mental Status: He is oriented to person, place, and time.   Psychiatric:         Thought Content: Thought content normal.         Laboratory:  Recent Labs     05/27/25  0909   WBC 5.9   RBC 2.68*   HEMOGLOBIN 7.3*   HEMATOCRIT 24.1*   MCV 89.9   MCH 27.2   MCHC 30.3*   RDW 55.1*   PLATELETCT 187   MPV 10.4     Recent Labs     05/27/25  0909   SODIUM 142   POTASSIUM 3.7   CHLORIDE 114*   CO2 17*   GLUCOSE 83   BUN 17   CREATININE 1.19   CALCIUM 8.0*     Recent Labs     05/27/25  0909   ALTSGPT 22   ASTSGOT 37   ALKPHOSPHAT 91   TBILIRUBIN 0.7   GLUCOSE 83         Recent Labs     05/27/25  0909   NTPROBNP 3715*         Recent Labs     05/27/25  0909   TROPONINT 23*       Imaging:  DX-CHEST-PORTABLE (1 VIEW)   Final Result      1.  Hypoinflation with LEFT lung base atelectasis and small LEFT pleural effusion.   2.  No pneumothorax.      EC-ECHOCARDIOGRAM COMPLETE W/O CONT    (Results Pending)       X-Ray:  I have personally  reviewed the images and compared with prior images.  EKG:  I have personally reviewed the images and compared with prior images.    Assessment/Plan:  Justification for Admission Status  I anticipate this patient will require at least two midnights for appropriate medical management, necessitating inpatient admission because acute systolic heart failure    Patient will need a Telemetry bed on MEDICAL service .  The need is secondary to acute systolic heart failure.    COPD (chronic obstructive pulmonary disease) ([facility])- (present on admission)  Assessment & Plan  O2 and RT protocols  Encourage smoking cessation    Atrial fibrillation with RVR ([facility])- (present on admission)  Assessment & Plan  Patient is currently in A-fib  Continue Eliquis and metoprolol  Admit to monitored bed    Acute on chronic systolic heart failure (HCC)- (present on admission)  Assessment & Plan  We do not have a recent echo on this gentleman, the last 1 was multiple years ago.  At that time he had an EF in the range of 25% with severe MR and TR  Admit to telemetry  Start IV Lasix 40 mg 3 times daily, continue spironolactone  Check cardiac echo  Continue ARB and beta-blockade  Daily BMP  Etiology of the patient's flare is most likely medication issues, as he lost his medications in transit.    Tobacco dependence- (present on admission)  Assessment & Plan  Encourage cessation  As needed replacement while in house    Hypertension- (present on admission)  Assessment & Plan  Patient is maintained on spironolactone 25, metoprolol SR 12.5, losartan 25 mg, and furosemide.  Continue home medications other than furosemide which we will give IV        VTE prophylaxis: therapeutic anticoagulation with apixaban       [1] No Known Allergies

## 2025-05-27 NOTE — ED TRIAGE NOTES
Pt bib ambulance c/o sob x 3 days. Pt has lost his medication 2 weeks ago for chf and his antibiotics for cellulitis. Pt has wound to back of left lower leg. Pt denies fevers. Blood drawn and sent to lab

## 2025-05-27 NOTE — ASSESSMENT & PLAN NOTE
We do not have a recent echo on this gentleman, the last 1 was multiple years ago.  At that time he had an EF in the range of 25% with severe MR and TR  BNP of 3700, chest x-ray with small left pleural effusion, EKG noted chronic atrial fibrillation.  Echocardiogram noted improved LVEF from prior echocardiogram in 2024 now 45%, right ventricle is dilated, moderate MR, severe TR, RSVP of 74 mmHg. Patient started on aggressive IV diuresis.  Admit to telemetry  Start IV Lasix 40 mg 3 times daily, continue spironolactone  Continue ARB and beta-blockade  Daily BMP  Etiology of the patient's flare is most likely medication issues, as he lost his medications in transit.

## 2025-05-27 NOTE — ED PROVIDER NOTES
CHIEF COMPLAINT  Chief Complaint   Patient presents with    Shortness of Breath       LIMITATION TO HISTORY   None    HPI    Robert Mcdonnell is a 75 y.o. male presenting via EMS complaining of shortness of breath onset 3 days ago. Patient notes he lost his medications 2 weeks ago for CHF and antibiotics for cellulitis. Patient has associated symptoms of lower extremities swelling and cellulitis.Patient states he presents to ED to get refill on all lost prescribed medications.       OUTSIDE HISTORIAN(S):  None    EXTERNAL RECORDS REVIEWED  Patient has apixaban for A-fibb, homeless and pulmonary hypertension.       PAST MEDICAL HISTORY  Past Medical History[1]    FAMILY HISTORY  Family History   Problem Relation Age of Onset    Heart Disease Mother     Heart Disease Father        SOCIAL HISTORY  Social History[2]  Social History     Substance and Sexual Activity   Drug Use Not Currently       SURGICAL HISTORY  Past Surgical History[3]    CURRENT MEDICATIONS  Current Medications[4]    ALLERGIES  Allergies[5]    PHYSICAL EXAM  VITAL SIGNS: BP (!) 140/84   Pulse 91   Temp 36.4 °C (97.6 °F) (Temporal)   Resp 16   Wt 83.9 kg (185 lb)   SpO2 98%   BMI 25.09 kg/m²   Reviewed and slightly elevated BP  Constitutional: Well developed, Well nourished, NAD.  HENT: Normocephalic, atraumatic, bilateral external ears normal, No intraoral erythema, edema, exudate  Eyes: PERRLA, conjunctiva pink, no scleral icterus.   Cardiovascular: Regular rate and rhythm. No murmurs, rubs or gallops.  Positive for dependent edema or no calf tenderness  Respiratory: Questionable rhonchi breath sounds on left side  Abdominal:  Abdomen soft, non-tender, non distended. No rebound, or guarding.    Skin: Right leg with abrasion/ulceration measuring 9 cm x 1-3cm in band like pattern posterior lower leg with minimal erythema and granulation tissue   Genitourinary: No costovertebral angle tenderness.   Musculoskeletal: no deformities.    Neurologic: Alert, no facial droop noted. All extra ocular muscles intact. Moves all extremities with out weakness noted  Psychiatric: Affect normal, Judgment normal, Mood normal.                 PROBLEMS EVALUATED THIS VISIT:  Shortness of breath concerned for left leg infection, lost medications 2 weeks ago ad homeless, oxygen saturation normal on room air, questionable breath sounds left lobe, bilateral edema, and chronic of the left leg.     MEDICAL DECISION MAKING:  Congestive heart failure, worse from no medications. Leg wound exhibits no obvious sings of cellulitis at this time. Breath sounds may indicate pneumonia vs PE all complicated by homelessness.     PLAN:    10:05 AM - Patient seen and examined at bedside. Discussed plan of care, including lab work. Patient agrees to the plan of care. The patient will be medicated with Lasix 20 mg. Ordered for EKG, DX-Chest portable, troponin, BNP, CMP, CBC w/Diff, IV saline shock to evaluate his symptoms.      RESULTS    LABS Ordered and Reviewed by Me:  Results for orders placed or performed during the hospital encounter of 25   EKG    Collection Time: 25  8:59 AM   Result Value Ref Range    Report       [facility] Emergency Dept.    Test Date:  2025  Pt Name:    HUNTER STEELE                 Department: ER  MRN:        [id number]                      Room:  Gender:     Male                         Technician: [id number]  :        1950                   Requested By:ER TRIAGE PROTOCOL  Order #:    [id number]                    Reading MD:    Measurements  Intervals                                Axis  Rate:       102                          P:          0  ND:         0                            QRS:        -81  QRSD:       140                          T:          132  QT:         401  QTc:        523    Interpretive Statements  Atrial fibrillation  Ventricular premature complex  Left bundle branch block  Compared to ECG  12/19/2024 10:20:50  Ventricular premature complex(es) now present     CBC with Differential    Collection Time: 05/27/25  9:09 AM   Result Value Ref Range    WBC 5.9 4.8 - 10.8 K/uL    RBC 2.68 (L) 4.70 - 6.10 M/uL    Hemoglobin 7.3 (L) 14.0 - 18.0 g/dL    Hematocrit 24.1 (L) 42.0 - 52.0 %    MCV 89.9 81.4 - 97.8 fL    MCH 27.2 27.0 - 33.0 pg    MCHC 30.3 (L) 32.3 - 36.5 g/dL    RDW 55.1 (H) 35.9 - 50.0 fL    Platelet Count 187 164 - 446 K/uL    MPV 10.4 9.0 - 12.9 fL    Neutrophils-Polys 72.90 (H) 44.00 - 72.00 %    Lymphocytes 14.20 (L) 22.00 - 41.00 %    Monocytes 9.90 0.00 - 13.40 %    Eosinophils 2.40 0.00 - 6.90 %    Basophils 0.30 0.00 - 1.80 %    Immature Granulocytes 0.30 0.00 - 0.90 %    Nucleated RBC 0.00 0.00 - 0.20 /100 WBC    Neutrophils (Absolute) 4.26 1.82 - 7.42 K/uL    Lymphs (Absolute) 0.83 (L) 1.00 - 4.80 K/uL    Monos (Absolute) 0.58 0.00 - 0.85 K/uL    Eos (Absolute) 0.14 0.00 - 0.51 K/uL    Baso (Absolute) 0.02 0.00 - 0.12 K/uL    Immature Granulocytes (abs) 0.02 0.00 - 0.11 K/uL    NRBC (Absolute) 0.00 K/uL   Comp Metabolic Panel    Collection Time: 05/27/25  9:09 AM   Result Value Ref Range    Sodium 142 135 - 145 mmol/L    Potassium 3.7 3.6 - 5.5 mmol/L    Chloride 114 (H) 96 - 112 mmol/L    Co2 17 (L) 20 - 33 mmol/L    Anion Gap 11.0 7.0 - 16.0    Glucose 83 65 - 99 mg/dL    Bun 17 8 - 22 mg/dL    Creatinine 1.19 0.50 - 1.40 mg/dL    Calcium 8.0 (L) 8.5 - 10.5 mg/dL    Correct Calcium 8.7 8.5 - 10.5 mg/dL    AST(SGOT) 37 12 - 45 U/L    ALT(SGPT) 22 2 - 50 U/L    Alkaline Phosphatase 91 30 - 99 U/L    Total Bilirubin 0.7 0.1 - 1.5 mg/dL    Albumin 3.1 (L) 3.2 - 4.9 g/dL    Total Protein 6.3 6.0 - 8.2 g/dL    Globulin 3.2 1.9 - 3.5 g/dL    A-G Ratio 1.0 g/dL   proBrain Natriuretic Peptide, NT    Collection Time: 05/27/25  9:09 AM   Result Value Ref Range    NT-proBNP 3715 (H) 0 - 125 pg/mL   Troponin    Collection Time: 05/27/25  9:09 AM   Result Value Ref Range    Troponin T 23 (H) 6 - 19  ng/L   ESTIMATED GFR    Collection Time: 25  9:09 AM   Result Value Ref Range    GFR (CKD-EPI) 64 >60 mL/min/1.73 m 2   EKG    Collection Time: 25  2:09 PM   Result Value Ref Range    Report       [facility] Emergency Dept.    Test Date:  2025  Pt Name:    HUNTER STEELE                 Department: ER  MRN:        [id number]                      Room:        29  Gender:     Male                         Technician: [id number]  :        1950                   Requested By:ER TRIAGE PROTOCOL  Order #:    [id number]                    Reading MD: Pablo FRANCO MD    Measurements  Intervals                                Axis  Rate:       102                          P:          0  SD:         0                            QRS:        -81  QRSD:       140                          T:          132  QT:         401  QTc:        523    Interpretive Statements  Atrial fibrillation  With irregular rhythm.  Rate is of 102  He has a bundle branch block with widened QRS and left axis deviation SYSTEMS  left bundle branch block.  Likely ST segment elevations are from left bundle  branch and anterior leads no reciprocal changes in the inferior leads  Impress ion A-fib with bundle branch.  Electronically Signed On 2025 14:09:47 PDT by Pablo FRANCO MD         RADIOLOGY    DX-CHEST-PORTABLE (1 VIEW)   Final Result      1.  Hypoinflation with LEFT lung base atelectasis and small LEFT pleural effusion.   2.  No pneumothorax.           RISK:  High due to patient hospitalization for further treatment      ED COURSE:    11:39 AM - Paged Hospitalist      I discussed the patient's case and the above findings with Dr. Tellez (Hospitalist) who agrees to admit patient.        INTERVENTIONS BY ME:  Patient received Lasix IV    CONSULTANTS/OTHER GROUPS CONTACTED    Dr. Tellez (Hospitalist)    DISPOSITION:  Patient will be hospitalized by Dr. Tellez in guarded  condition.    Summary homeless gentleman lost his medications 75 years old with a history of congestive heart failure here with signs symptoms of possible fluid overload old ulcer of his left leg abnormal EKG elevated BNP troponin.    CONDITION: guarded.     FINAL IMPRESSION  1. Ulcer of left lower extremity, unspecified ulcer stage (HCC)    2. Acute pulmonary edema (HCC)    3. Homelessness          Migue ZAMAN (Scribe), am scribing for, and in the presence of, Pablo Correa, *.    Electronically signed by: Migue Clements (Scribe), 5/27/2025    Pablo ZAMAN, * personally performed the services described in this documentation, as scribed by Migue Clements in my presence, and it is both accurate and complete.     The note accurately reflects work and decisions made by me.  Pablo Correa M.D.  5/27/2025  2:10 PM             [1]   Past Medical History:  Diagnosis Date    Arrhythmia     CHF (congestive heart failure) (HCC)     Congestive heart failure (HCC)     Hypertension    [2]   Social History  Tobacco Use    Smoking status: Some Days     Current packs/day: 1.00     Types: Cigarettes    Smokeless tobacco: Never   Vaping Use    Vaping status: Never Used   Substance Use Topics    Alcohol use: Yes     Alcohol/week: 1.2 oz     Types: 2 Cans of beer per week     Comment: occ    Drug use: Not Currently   [3]   Past Surgical History:  Procedure Laterality Date    GASTRECTOMY N/A 10/7/2024    Procedure: LAPAROTOMY, GASTRECTOMY-SUBTOTAL, WITH INTRAOPERATIVE ULTRASOUND GUIDANCE;  Surgeon: Mt Cabral M.D.;  Location: SURGERY [facility];  Service: General    NODE DISSECTION N/A 10/7/2024    Procedure: LYMPHADENECTOMY-NODE DISSECTION;  Surgeon: Mt Cabral M.D.;  Location: SURGERY [facility];  Service: General    CREATION, FLAP, OMENTUM N/A 10/7/2024    Procedure: CREATION, FLAP, OMENTUM;  Surgeon: Mt Cabral M.D.;  Location: [facility];   Service: General    CHOLECYSTECTOMY N/A 10/7/2024    Procedure: CHOLECYSTECTOMY;  Surgeon: Mt Cabral M.D.;  Location: SURGERY [facility];  Service: General    HI UPPER GI ENDOSCOPY,DIAGNOSIS N/A 9/13/2024    Procedure: GASTROSCOPY;  Surgeon: Sarah Lozoya M.D.;  Location: SURGERY SAME DAY [facility];  Service: Gastroenterology    HI UPPER GI ENDOSCOPY,BIOPSY N/A 9/13/2024    Procedure: GASTROSCOPY, WITH BIOPSY;  Surgeon: Sarah Lozoya M.D.;  Location: SURGERY SAME DAY [facility];  Service: Gastroenterology    HI UPPER GI ENDOSCOPY,SCLER INJECT N/A 9/13/2024    Procedure: GASTROSCOPY, WITH SCLEROTHERAPY;  Surgeon: Sarah Lozoya M.D.;  Location: SURGERY SAME DAY [facility];  Service: Gastroenterology    HI UPPER GI ENDOSCOPY,BIOPSY N/A 09/10/2024    Procedure: GASTROSCOPY, WITH BIOPSY;  Surgeon: Demond Gutierres M.D.;  Location: SURGERY SAME DAY [facility];  Service: Gastroenterology    HI UPPER GI ENDOSCOPY,SCLER INJECT N/A 09/10/2024    Procedure: GASTROSCOPY, WITH SCLEROTHERAPY;  Surgeon: Demond Gutierres M.D.;  Location: SURGERY SAME DAY [facility];  Service: Gastroenterology    GASTROSCOPY WITH ENDOSTAT N/A 09/10/2024    Procedure: EGD, WITH CAUTERIZATION;  Surgeon: Demond Gutierres M.D.;  Location: SURGERY SAME DAY [facility];  Service: Gastroenterology    HI UPPER GI ENDOSCOPY,DIAGNOSIS N/A 01/31/2023    Procedure: GASTROSCOPY;  Surgeon: Joy Mcnair M.D.;  Location: SURGERY SAME DAY [facility];  Service: Gastroenterology    CATARACT PHACO WITH IOL Left    [4] No current facility-administered medications for this encounter.    Current Outpatient Medications:     senna-docusate (PERICOLACE OR SENOKOT S) 8.6-50 MG Tab, Take 2 Tablets by mouth every evening., Disp: 30 Tablet, Rfl: 0    apixaban (ELIQUIS) 5mg Tab, Take 1 Tablet by mouth 2 times a day., Disp: 60 Tablet, Rfl: 3    metoprolol SR (TOPROL XL) 25 MG TABLET SR 24 HR, Take one-half (0.5) Tablet by mouth every day., Disp: 30 Tablet, Rfl:  3    omeprazole (PRILOSEC) 20 MG delayed-release capsule, Take 1 Capsule by mouth every day., Disp: 30 Capsule, Rfl: 3    digoxin (LANOXIN) 125 MCG Tab, Take 1 Tablet by mouth every day at 6 PM., Disp: 30 Tablet, Rfl: 1    losartan (COZAAR) 25 MG Tab, Take 1 Tablet by mouth every day., Disp: 30 Tablet, Rfl: 3    spironolactone (ALDACTONE) 25 MG Tab, Take 1 Tablet by mouth every day., Disp: 30 Tablet, Rfl: 3    furosemide (LASIX) 20 MG Tab, Take 1 Tablet by mouth 2 times a day., Disp: 60 Tablet, Rfl: 0  [5] No Known Allergies

## 2025-05-27 NOTE — ED NOTES
PT wheeled to GR 29. C/C is med refill and SOB. Pt is in a gown, on the monitor, and call light is within reach. Chart up for ERP.

## 2025-05-27 NOTE — PROGRESS NOTES
Patient was not a good historian and  informed me that he was in [facility] in [place] and was discharged two weeks ago.  Patient was given medications but told me the airline lost his luggage so he has taken NO medications for the last two weeks.  I phoned the Rehab [phone] twice and left messages for facility to call me back and send MAR's.

## 2025-05-27 NOTE — ASSESSMENT & PLAN NOTE
Patient is maintained on spironolactone 25, metoprolol SR 12.5, losartan 25 mg, and furosemide.  Continue home medications other than furosemide which we will give IV

## 2025-05-28 ENCOUNTER — PATIENT OUTREACH (OUTPATIENT)
Dept: SCHEDULING | Facility: IMAGING CENTER | Age: 75
End: 2025-05-28
Payer: MEDICARE

## 2025-05-28 ENCOUNTER — APPOINTMENT (OUTPATIENT)
Dept: RADIOLOGY | Facility: MEDICAL CENTER | Age: 75
End: 2025-05-28
Attending: GENERAL PRACTICE
Payer: MEDICARE

## 2025-05-28 LAB
ALBUMIN SERPL BCP-MCNC: 3.4 G/DL (ref 3.2–4.9)
ANION GAP SERPL CALC-SCNC: 12 MMOL/L (ref 7–16)
BUN SERPL-MCNC: 15 MG/DL (ref 8–22)
CALCIUM ALBUM COR SERPL-MCNC: 9.1 MG/DL (ref 8.5–10.5)
CALCIUM SERPL-MCNC: 8.6 MG/DL (ref 8.5–10.5)
CHLORIDE SERPL-SCNC: 109 MMOL/L (ref 96–112)
CO2 SERPL-SCNC: 21 MMOL/L (ref 20–33)
CREAT SERPL-MCNC: 1.09 MG/DL (ref 0.5–1.4)
ERYTHROCYTE [DISTWIDTH] IN BLOOD BY AUTOMATED COUNT: 53.9 FL (ref 35.9–50)
GFR SERPLBLD CREATININE-BSD FMLA CKD-EPI: 71 ML/MIN/1.73 M 2
GLUCOSE SERPL-MCNC: 90 MG/DL (ref 65–99)
HCT VFR BLD AUTO: 23.9 % (ref 42–52)
HGB BLD-MCNC: 7.2 G/DL (ref 14–18)
MAGNESIUM SERPL-MCNC: 2 MG/DL (ref 1.5–2.5)
MCH RBC QN AUTO: 26.5 PG (ref 27–33)
MCHC RBC AUTO-ENTMCNC: 30.1 G/DL (ref 32.3–36.5)
MCV RBC AUTO: 87.9 FL (ref 81.4–97.8)
PHOSPHATE SERPL-MCNC: 2.8 MG/DL (ref 2.5–4.5)
PLATELET # BLD AUTO: 210 K/UL (ref 164–446)
PMV BLD AUTO: 10.5 FL (ref 9–12.9)
POTASSIUM SERPL-SCNC: 3.6 MMOL/L (ref 3.6–5.5)
RBC # BLD AUTO: 2.72 M/UL (ref 4.7–6.1)
SODIUM SERPL-SCNC: 142 MMOL/L (ref 135–145)
WBC # BLD AUTO: 6.4 K/UL (ref 4.8–10.8)

## 2025-05-28 PROCEDURE — 85027 COMPLETE CBC AUTOMATED: CPT

## 2025-05-28 PROCEDURE — 83735 ASSAY OF MAGNESIUM: CPT

## 2025-05-28 PROCEDURE — 80069 RENAL FUNCTION PANEL: CPT

## 2025-05-28 PROCEDURE — A9270 NON-COVERED ITEM OR SERVICE: HCPCS | Performed by: HOSPITALIST

## 2025-05-28 PROCEDURE — 700102 HCHG RX REV CODE 250 W/ 637 OVERRIDE(OP): Performed by: HOSPITALIST

## 2025-05-28 PROCEDURE — 36415 COLL VENOUS BLD VENIPUNCTURE: CPT

## 2025-05-28 PROCEDURE — 306591 TRAY SUTURE REMOVAL DISP: Performed by: GENERAL PRACTICE

## 2025-05-28 PROCEDURE — 97602 WOUND(S) CARE NON-SELECTIVE: CPT

## 2025-05-28 PROCEDURE — A9270 NON-COVERED ITEM OR SERVICE: HCPCS | Performed by: GENERAL PRACTICE

## 2025-05-28 PROCEDURE — 770020 HCHG ROOM/CARE - TELE (206)

## 2025-05-28 PROCEDURE — 99233 SBSQ HOSP IP/OBS HIGH 50: CPT | Performed by: GENERAL PRACTICE

## 2025-05-28 PROCEDURE — 700111 HCHG RX REV CODE 636 W/ 250 OVERRIDE (IP): Mod: JZ | Performed by: HOSPITALIST

## 2025-05-28 PROCEDURE — 700111 HCHG RX REV CODE 636 W/ 250 OVERRIDE (IP): Mod: JZ | Performed by: GENERAL PRACTICE

## 2025-05-28 PROCEDURE — 700102 HCHG RX REV CODE 250 W/ 637 OVERRIDE(OP): Performed by: GENERAL PRACTICE

## 2025-05-28 PROCEDURE — 700101 HCHG RX REV CODE 250: Performed by: GENERAL PRACTICE

## 2025-05-28 RX ORDER — FUROSEMIDE 10 MG/ML
40 INJECTION INTRAMUSCULAR; INTRAVENOUS 2 TIMES DAILY
Status: DISCONTINUED | OUTPATIENT
Start: 2025-05-28 | End: 2025-05-29

## 2025-05-28 RX ORDER — SODIUM HYPOCHLORITE 1.25 MG/ML
SOLUTION TOPICAL 2 TIMES DAILY
Status: DISCONTINUED | OUTPATIENT
Start: 2025-05-28 | End: 2025-05-30 | Stop reason: HOSPADM

## 2025-05-28 RX ORDER — NICOTINE 21 MG/24HR
21 PATCH, TRANSDERMAL 24 HOURS TRANSDERMAL
Status: DISCONTINUED | OUTPATIENT
Start: 2025-05-28 | End: 2025-05-30 | Stop reason: HOSPADM

## 2025-05-28 RX ADMIN — APIXABAN 5 MG: 5 TABLET, FILM COATED ORAL at 17:23

## 2025-05-28 RX ADMIN — DIGOXIN 125 MCG: 0.12 TABLET ORAL at 17:24

## 2025-05-28 RX ADMIN — METOPROLOL SUCCINATE 12.5 MG: 25 TABLET, EXTENDED RELEASE ORAL at 17:24

## 2025-05-28 RX ADMIN — FUROSEMIDE 40 MG: 10 INJECTION, SOLUTION INTRAVENOUS at 17:25

## 2025-05-28 RX ADMIN — APIXABAN 5 MG: 5 TABLET, FILM COATED ORAL at 05:16

## 2025-05-28 RX ADMIN — OMEPRAZOLE 20 MG: 20 CAPSULE, DELAYED RELEASE ORAL at 05:16

## 2025-05-28 RX ADMIN — ACETAMINOPHEN 650 MG: 325 TABLET ORAL at 03:44

## 2025-05-28 RX ADMIN — LOSARTAN POTASSIUM 25 MG: 25 TABLET, FILM COATED ORAL at 18:00

## 2025-05-28 RX ADMIN — FUROSEMIDE 40 MG: 10 INJECTION, SOLUTION INTRAVENOUS at 05:16

## 2025-05-28 RX ADMIN — NICOTINE TRANSDERMAL SYSTEM 21 MG: 21 PATCH, EXTENDED RELEASE TRANSDERMAL at 11:15

## 2025-05-28 RX ADMIN — SPIRONOLACTONE 25 MG: 25 TABLET ORAL at 17:23

## 2025-05-28 RX ADMIN — DAKIN'S SOLUTION 0.125% (QUARTER STRENGTH) 10 ML: 0.12 SOLUTION at 07:15

## 2025-05-28 ASSESSMENT — PAIN DESCRIPTION - PAIN TYPE
TYPE: ACUTE PAIN

## 2025-05-28 ASSESSMENT — ENCOUNTER SYMPTOMS: SHORTNESS OF BREATH: 1

## 2025-05-28 ASSESSMENT — FIBROSIS 4 INDEX: FIB4 SCORE: 2.82

## 2025-05-28 NOTE — CARE PLAN
The patient is Stable - Low risk of patient condition declining or worsening    Shift Goals  Clinical Goals: Diurese, wound consult, VSS, safety  Patient Goals: updates  Family Goals: nilson    Progress made toward(s) clinical / shift goals:      Problem: Care Map:  Day 2 Optimal Outcome for the Heart Failure Patient  Goal: Day 2:  Optimal Care of the heart failure patient  Outcome: Progressing     Problem: Knowledge Deficit - Standard  Goal: Patient and family/care givers will demonstrate understanding of plan of care, disease process/condition, diagnostic tests and medications  Outcome: Progressing     Problem: Pain - Standard  Goal: Alleviation of pain or a reduction in pain to the patient’s comfort goal  Outcome: Progressing

## 2025-05-28 NOTE — WOUND TEAM
Assisted Wound RN Reji with skin assessment and wound consult evaluation for pressure injury.  Additional staff necessary for turning/standing from chair, repositioning patient, assisting with dressing application and supplies and determining progress, assessment and staging of pressure injuries and/or complicated wound care.

## 2025-05-28 NOTE — CARE PLAN
The patient is Watcher - Medium risk of patient condition declining or worsening    Shift Goals  Clinical Goals: workup, meds  Patient Goals: meds, comfort  Family Goals: nilson    Progress made toward(s) clinical / shift goals:    Problem: Care Map:  Admission Optimal Outcome for the Heart Failure Patient  Goal: Admission:  Optimal Care of the heart failure patient  Outcome: Progressing     Problem: Care Map:  Day 2 Optimal Outcome for the Heart Failure Patient  Goal: Day 2:  Optimal Care of the heart failure patient  Outcome: Progressing     Problem: Care Map:  Day 3 Optimal Outcome for the Heart Failure Patient  Goal: Day 3:  Optimal Care of the heart failure patient  Outcome: Progressing     Problem: Care Map:  Day Before Discharge Optimal Outcome for the Heart Failure Patient  Goal: Day Before Discharge:  Optimal Care of the heart failure patient  Outcome: Progressing       Patient is not progressing towards the following goals:

## 2025-05-28 NOTE — ASSESSMENT & PLAN NOTE
hx of perforated gastric ulcer s/p ex laparotomy subtotal gastrectomy, liver wedge resection, Miguel Angel-en-Y gastrojejunostomy, omental flap, cholecystectomy, celiac node and hepatic artery node dissection with Dr. Cabral 10/7/24.

## 2025-05-28 NOTE — DISCHARGE PLANNING
Case Management Discharge Planning    Admission Date: 5/27/2025  GMLOS: 3.9  ALOS: 1    6-Clicks ADL Score: 22  6-Clicks Mobility Score: 23      Anticipated Discharge Dispo: Discharge Disposition: Discharged to home/self care (01)  Discharge Address: Shelter    DME Needed: No    Action(s) Taken: Updated Provider/Nurse on Discharge Plan and DC Assessment Complete (See below)    Escalations Completed: None    Medically Clear: No    Next Steps: Pt does not have PCP, called scheduling and requested to have PCP assigned and Outpt Wound Clinic appointment scheduled. Spoke to Rosalva, she will work on scheduling both of those.Pt cannot have HH as he is staying at shelter.    Barriers to Discharge: Medical clearance    Is the patient up for discharge tomorrow: No

## 2025-05-28 NOTE — PROGRESS NOTES
4 Eyes Skin Assessment Completed by JANNETTE Miranda and JANNETTE Werner.    Skin assessment is primarily focused on high risk bony prominences. Pay special attention to skin beneath and around medical devices, high risk bony prominences, skin to skin areas and areas where the patient lacks sensation to feel pain and areas where the patient previously had breakdown.     Head (Occipital):  WDL   Ears (Under Medical Devices): WDL   Nose (Under Medical Devices): WDL   Mouth:  WDL   Neck: WDL   Breast/Chest:  WDL   Shoulder Blades:  WDL   Spine:   Red and Blanching   (R) Arm/Elbow/Hand: Bruising   (L) Arm/Elbow/Hand: Bruising   Abdomen: Scar   Pannus/Groin:  Red and Blanching   Sacrum/Coccyx:   Red and Blanching   (R) Ischial Tuberosity (Sit Bones):  WDL   (L) Ischial Tuberosity (Sit Bones):  WDL   (R) Leg:  Red and Blanching   (L) Leg:  Open wound, Red, and Blanching   (R) Heel:  WDL   (R) Foot/Toe: WDL   (L) Heel: Red and Blanching   (L) Foot/Toe:  Red and Blanching       DEVICES IN USE:   Respiratory Devices:  Pulse ox  Feeding Devices:  N/A   Lines & BP Monitoring Devices:  Peripheral IV, BP cuff, and Pulse ox    Orthopedic Devices:  N/A  Miscellaneous Devices:  Telemetry monitor    PROTOCOL INTERVENTIONS:   Standard/Trauma Bed:  Already in place    WOUND PHOTOS:   Completed and in EPIC     WOUND CONSULT:   Consult ordered for the following areas LLE

## 2025-05-28 NOTE — DISCHARGE PLANNING
In the case of an emergency, pt's legal NOK is sister in law Tabitha Manzano     RNCM met with pt at bedside and obtained the information used in this assessment. Pt verified that hs is homeless and plans to go to shelter at discharge.  Prior to current hospitalization, pt was completely independent in ADLS/IADLS. Pt earns approximately $1400/mo by [employer]. Pt has limited support system, states he las lots of relatives in the area but chooses not to be with them. Pt denies any hx of substance use and denies any dx of mh. Owns no DME.      Care Transition Team Assessment    Information Source:pt  Orientation Level: Oriented X4  Information Given By: Patient  Informant's Name: Robert  Who is responsible for making decisions for patient? : Patient         Elopement Risk  Legal Hold: No  Ambulatory or Self Mobile in Wheelchair: Yes  Disoriented: No  Psychiatric Symptoms: None  History of Wandering: No  Elopement this Admit: No  Vocalizing Wanting to Leave: No  Displays Behaviors, Body Language Wanting to Leave: No-Not at Risk for Elopement  Elopement Risk: Not at Risk for Elopement    Interdisciplinary Discharge Planning  Does Admitting Nurse Feel This Could be a Complex Discharge?: No  Primary Care Physician: none, will get one scheduled  Lives with - Patient's Self Care Capacity: Unrelated Adult  Patient or legal guardian wants to designate a caregiver: No  Housing / Facility: Homeless  Do You Take your Prescribed Medications Regularly: Yes  Mobility Issues: No  Prior Services: None    Discharge Preparedness  What is your plan after discharge?: Home with help  Prior Functional Level: Ambulatory, Independent with Activities of Daily Living, Independent with Medication Management  Difficulity with ADLs: None  Difficulity with IADLs: None    Functional Assesment  Prior Functional Level: Ambulatory, Independent with Activities of Daily Living, Independent with Medication Management                        Domestic  Abuse  Have you ever been the victim of abuse or violence?: No    Psychological Assessment  History of Substance Abuse: None  History of Psychiatric Problems: No    Discharge Risks or Barriers  Discharge risks or barriers?: Transportation, Post-acute placement / services, Homeless / couch surfing, Non-adherence to medication or treatment  Patient risk factors: Homeless, Lack of outside supports    Anticipated Discharge Information  Discharge Disposition: Discharged to home/self care (01)  Discharge Address: Shelter

## 2025-05-28 NOTE — PROGRESS NOTES
PT admitted to T7, all belongings w pt, resting comfortably without respiratory distress. Pt on tele box, afib 80s-90s. VSS. Fall precautions in place. Pt oriented to unit and policies.

## 2025-05-28 NOTE — WOUND TEAM
[facility]  Inpatient Services  Initial Wound and Skin Care Evaluation    Admission Date: 5/27/2025     Last order of IP CONSULT TO WOUND CARE was found on 5/27/2025 from Hospital Encounter on 5/27/2025     HPI, PMH, SH: Reviewed    Past Surgical History[1]  Social History     Tobacco Use    Smoking status: Some Days     Current packs/day: 1.00     Types: Cigarettes    Smokeless tobacco: Never   Substance Use Topics    Alcohol use: Yes     Alcohol/week: 1.2 oz     Types: 2 Cans of beer per week     Comment: occ     Chief Complaint   Patient presents with    Shortness of Breath     Diagnosis: Acute systolic (congestive) heart failure (HCC) [I50.21]    Unit where seen by Wound Team: T703/02     WOUND CONSULT RELATED TO:  LLE Posterior    WOUND TEAM PLAN OF CARE - Frequency of Follow-up:   Nursing to follow dressing orders written for wound care. Contact wound team if area fails to progress, deteriorates or with any questions/concerns if something comes up before next scheduled follow up (See below as to whether wound is following and frequency of wound follow up)   Weekly - LLE Posterior    WOUND HISTORY:   Pt is a 75yr old male with history of CHF, A. Fib, Gastric Malignancy, and COPD. Pt has chronic wound to the LLE posterior. Pt states he has not attended a wound clinic at [facility] but did attend one in [place] where he felt the wound was not properly cared for. Pt states he has minimal pain to the area.        WOUND ASSESSMENT/LDA       Wound 05/27/25 Vascular Ulcer Venous Leg Left (Active)   Wound Image      05/28/25 1100   Site Assessment Pink;Red;Yellow    Periwound Assessment Hemosiderin Staining    Margins Attached edges    Closure Open to air    Drainage Amount Small    Drainage Description Serosanguineous    Treatments Cleansed;Site care;Offloading    Offloading/DME Heel offloading dressing    Wound Cleansing Foam Cleanser/Washcloth    Periwound Protectant No-sting Skin Prep    Moisture  Containment Device None    Dressing Status Clean;Intact;Dry    Dressing Changed Changed    Dressing Cleansing/Solutions 1/4 Strength Dakin's Solution    Dressing Options Sacral Dressing    Dressing Change/Treatment Frequency Every Shift, and As Needed    NEXT Dressing Change/Treatment Date 05/28/25    NEXT Weekly Photo (Inpatient Only) 06/04/25    Wound Team Following Weekly    Non-staged Wound Description Full thickness    Wound Length (cm) 5.5 cm    Wound Width (cm) 18 cm    Wound Surface Area (cm^2) 77.75 cm^2    WOUND NURSE ONLY - Time Spent with Patient (mins) 60               Vascular:    SHANTI:   No results found.    Lab Values:    Lab Results   Component Value Date/Time    WBC 6.4 05/28/2025 12:26 AM    RBC 2.72 (L) 05/28/2025 12:26 AM    HEMOGLOBIN 7.2 (L) 05/28/2025 12:26 AM    HEMATOCRIT 23.9 (L) 05/28/2025 12:26 AM    CREACTPROT 30.02 (H) 12/20/2024 02:45 PM    SEDRATEWES 13 12/20/2024 02:45 PM    HBA1C 5.9 (H) 09/08/2024 12:24 AM    PLATELETCT 210 05/28/2025 12:26 AM         Culture Results show:  No results found for this or any previous visit (from the past 720 hours).    Pain Level/Medicated:  None, Tolerated without pain medication       INTERVENTIONS BY WOUND TEAM:  Chart and images reviewed. Discussed with bedside RN. All areas of concern (based on picture review, LDA review and discussion with bedside RN) have been thoroughly assessed. Documentation of areas based on significant findings. This RN in to assess patient. Performed standard wound care which includes appropriate positioning, dressing removal and non-selective debridement. Pictures and measurements obtained weekly if/when required.    Wound:  LLE Posterior  Preparation for Dressing removal: Open to air  Cleansed/Non-selectively Debrided with:  Wound cleanser and Gauze  Obdulia wound: Cleansed with Wound cleanser and Gauze, Prepped with No Sting  Primary Dressing:  Dakins moistened roll gauze  Secondary (Outer) Dressing: Sacral adhesive foam  followed by Tubigrip E    Area Assessed:  Sacrococcygeal area  Area intact, Offloading dressing applied    Area Assessed:  Bilateral I.T.s  Area intact,     Area Assessed:  BLE & Knees  Area intact,     Area Assessed:  Bilateral ankles  Area intact,     Area Assessed:  Bilateral heels  Area intact, Heel offloading dressing applied      Advanced Wound Care Discharge Planning  Number of Clinicians necessary to complete wound care: 1  Is patient requiring IV pain medications for dressing changes:  No   Length of time for dressing change 30 min. (This does not include chart review, pre-medication time, set up, clean up or time spent charting.)    Interdisciplinary consultation: Patient, Bedside RN, Brittanie COSTA (Wound RN).  Pressure injury and staging reviewed with N/A.    EVALUATION / RATIONALE FOR TREATMENT:     Date:  05/28/25  Wound Status:  Initial evaluation    Pt with full thickness wound to the posterior LLE. Dakins was applied to chemically debride and cleanse wound. Tubigrip E applied for mild compression.          Goals: Steady decrease in wound area and depth weekly.    NURSING PLAN OF CARE ORDERS:  Dressing changes: See Dressing Care orders    NUTRITION RECOMMENDATIONS   Wound Team Recommendations:  N/A    DIET ORDERS (From admission to next 24h)       Start     Ordered    05/28/25 0651  Diet Order Diet: 2 Gram Sodium; Fluid modifications: (optional): 2000 ml Fluid Restriction  ALL MEALS        Question Answer Comment   Diet: 2 Gram Sodium    Fluid modifications: (optional) 2000 ml Fluid Restriction        05/28/25 0651                    PREVENTATIVE INTERVENTIONS:    Q shift Edward - performed per nursing policy  Q shift pressure point assessments - performed per nursing policy    Surface/Positioning  Standard/trauma mattress - Currently in Place    Offloading/Redistribution  Sacral offloading dressing (Silicone dressing) - Newly Applied this Visit  Heel offloading dressing (Silicone dressing) - Newly Applied  this Visit      Containment/Moisture Prevention    Dri-nava pad - Currently in Place    Anticipated discharge plans:  TBD        Vac Discharge Needs:  Vac Discharge plan is purely a recommendation from wound team and not a requirement for discharge unless otherwise stated by physician.  Not Applicable Pt not on a wound vac        [1]   Past Surgical History:  Procedure Laterality Date    GASTRECTOMY N/A 10/7/2024    Procedure: LAPAROTOMY, GASTRECTOMY-SUBTOTAL, WITH INTRAOPERATIVE ULTRASOUND GUIDANCE;  Surgeon: Mt Cabral M.D.;  Location: SURGERY [facility];  Service: General    NODE DISSECTION N/A 10/7/2024    Procedure: LYMPHADENECTOMY-NODE DISSECTION;  Surgeon: Mt Cabral M.D.;  Location: SURGERY [facility];  Service: General    CREATION, FLAP, OMENTUM N/A 10/7/2024    Procedure: CREATION, FLAP, OMENTUM;  Surgeon: Mt Cabral M.D.;  Location: SURGERY [facility];  Service: General    CHOLECYSTECTOMY N/A 10/7/2024    Procedure: CHOLECYSTECTOMY;  Surgeon: Mt Cabral M.D.;  Location: SURGERY [facility];  Service: General    IN UPPER GI ENDOSCOPY,DIAGNOSIS N/A 9/13/2024    Procedure: GASTROSCOPY;  Surgeon: Sarah Lozoya M.D.;  Location: SURGERY SAME DAY [facility];  Service: Gastroenterology    IN UPPER GI ENDOSCOPY,BIOPSY N/A 9/13/2024    Procedure: GASTROSCOPY, WITH BIOPSY;  Surgeon: Sarah Lozoya M.D.;  Location: SURGERY SAME DAY [facility];  Service: Gastroenterology    IN UPPER GI ENDOSCOPY,SCLER INJECT N/A 9/13/2024    Procedure: GASTROSCOPY, WITH SCLEROTHERAPY;  Surgeon: Sarah Lozoya M.D.;  Location: SURGERY SAME DAY [facility];  Service: Gastroenterology    IN UPPER GI ENDOSCOPY,BIOPSY N/A 09/10/2024    Procedure: GASTROSCOPY, WITH BIOPSY;  Surgeon: Demond Gutierres M.D.;  Location: SURGERY SAME DAY [facility];  Service: Gastroenterology    IN UPPER GI ENDOSCOPY,SCLER INJECT N/A 09/10/2024    Procedure: GASTROSCOPY, WITH SCLEROTHERAPY;  Surgeon:  Demond Gutierres M.D.;  Location: SURGERY SAME DAY [facility];  Service: Gastroenterology    GASTROSCOPY WITH ENDOSTAT N/A 09/10/2024    Procedure: EGD, WITH CAUTERIZATION;  Surgeon: Demond Gutierres M.D.;  Location: SURGERY SAME DAY [facility];  Service: Gastroenterology    ME UPPER GI ENDOSCOPY,DIAGNOSIS N/A 01/31/2023    Procedure: GASTROSCOPY;  Surgeon: Joy Mcnair M.D.;  Location: SURGERY SAME DAY [facility];  Service: Gastroenterology    CATARACT PHACO WITH IOL Left

## 2025-05-28 NOTE — PROGRESS NOTES
Monitor Summary  Rhythm: Afib  Rate:   Ectopy: occasional PVC couplet/trigeminy  Measurements:.- /.11/.-

## 2025-05-28 NOTE — CARE PLAN
The patient is Stable - Low risk of patient condition declining or worsening    Shift Goals  Clinical Goals: diurese, monitor oxygenation, VSS, safety  Patient Goals: sleep, comfort  Family Goals: nilson    Progress made toward(s) clinical / shift goals:    Problem: Care Map:  Day 1 Optimal Outcome for the Heart Failure Patient  Goal: Day 1:  Optimal Care of the heart failure patient  Description: Target End Date:  end of day 1  Outcome: Progressing     Problem: Knowledge Deficit - Standard  Goal: Patient and family/care givers will demonstrate understanding of plan of care, disease process/condition, diagnostic tests and medications  Description: Target End Date:  1-3 days or as soon as patient condition allowsDocument in Patient Education1.  Patient and family/caregiver oriented to unit, equipment, visitation policy and means for communicating concern2.  Complete/review Learning Assessment3.  Assess knowledge level of disease process/condition, treatment plan, diagnostic tests and medications4.  Explain disease process/condition, treatment plan, diagnostic tests and medications  Outcome: Progressing       Patient is not progressing towards the following goals:

## 2025-05-28 NOTE — HOSPITAL COURSE
This is a 75-year-old male with past medical history of chronic systolic heart failure, chronic atrial fibrillation on Eliquis, history of gastric malignancy, COPD with continued tobacco use who was admitted on 5/27 with shortness of breath, significant for acute on chronic systolic heart failure exacerbation.    BNP of 3700, chest x-ray with small left pleural effusion, EKG noted chronic atrial fibrillation.  Echocardiogram noted improved LVEF from prior echocardiogram in 2024 now 45%, right ventricle is dilated, moderate MR, severe TR, RSVP of 74 mmHg. Patient started on aggressive IV diuresis.    Patient was recently admitted to the hospital in [place] for treatment of cellulitis involving his left lower leg.  He was discharged with medications for his heart, and other things but on the bus between here and in [place] on his way to [place], he lost his medications and has been off of them for several days now.    Left lower extremity leg ulcer with, nonhealing. SHANTI from January was concern for possible mid posterior tibial artery stenosis or occlusion. Repeat SHANTI -diffuse plaque throughout the common femoral, superficial femoral and popliteal arteries, with 50 to 75% stenosis in distal femoral and popliteal artery, absence of flow in the distal peroneal.  Imaging was reviewed by vascular surgery, they do not recommend any vascular intervention at this time.  Patient to continue with statin therapy, lipid panel pending.  Patient to continue with wound care.  Outpatient referral placed.

## 2025-05-28 NOTE — ASSESSMENT & PLAN NOTE
Left lower extremity leg ulcer with, nonhealing. SHANTI from January was concern for possible mid posterior tibial artery stenosis or occlusion. Repeat SHANTI -diffuse plaque throughout the common femoral, superficial femoral and popliteal arteries, with 50 to 75% stenosis in distal femoral and popliteal artery, absence of flow in the distal peroneal.  Imaging was reviewed by vascular surgery, they do not recommend any vascular intervention at this time.  Patient to continue with statin therapy, lipid panel pending.  Patient to continue with wound care.  Outpatient referral placed.

## 2025-05-28 NOTE — PROGRESS NOTES
Hospital Medicine Daily Progress Note    Date of Service  5/28/2025    Chief Complaint  Robert Mcdonnell is a 75 y.o. male admitted 5/27/2025 with shortness of breath    Hospital Course  This is a 75-year-old male with past medical history of chronic systolic heart failure, chronic atrial fibrillation on Eliquis, history of gastric malignancy, COPD with history of prior smoking use who was admitted on 5/27 with shortness of breath, significant for acute on chronic systolic heart failure exacerbation.    BNP of 3700, chest x-ray with small left pleural effusion, EKG noted chronic atrial fibrillation.  Echocardiogram noted improved LVEF from prior echocardiogram in 2024 now 45%, right ventricle is dilated, moderate MR, severe TR, RSVP of 74 mmHg. Patient started on aggressive IV diuresis.    Patient was recently admitted to the hospital in [place] for treatment of cellulitis involving his left lower leg.  He was discharged with medications for his heart, and other things but on the bus between here and in [place] on his way to [place], he lost his medications and has been off of them for several days now.    Interval Problem Update  Acute on chronic systolic heart failure exacerbation patient off his medication now for several weeks.  Continue with aggressive IV diuresis, GDMT therapy resumed as tolerated.  Repeat echocardiogram noted some improvement in LVEF.  Severe pulmonary hypertension.    Left lower extremity leg ulcer -wound care, antibiotics not indicated at this time.  Nonhealing. SHANTI from January was concern for possible mid posterior tibial artery stenosis or occlusion. Repeat SHANTI ordered, pending results may require vascular intervention.     I have discussed this patient's plan of care and discharge plan at IDT rounds today with Case Management, Nursing, Nursing leadership, and other members of the IDT team.    Consultants/Specialty  None    Code Status  Full Code    Disposition  Patient is not  medically clear to discharge home.   I have placed the appropriate orders for post-discharge needs.    Review of Systems  Review of Systems   Respiratory:  Positive for shortness of breath.    All other systems reviewed and are negative.       Physical Exam  Temp:  [36.5 °C (97.7 °F)-36.7 °C (98.1 °F)] 36.7 °C (98.1 °F)  Pulse:  [82-98] 87  Resp:  [16-20] 18  BP: (124-164)/(73-90) 126/79  SpO2:  [90 %-95 %] 90 %    Physical Exam  Vitals and nursing note reviewed.   Constitutional:       General: He is not in acute distress.     Appearance: He is ill-appearing.   HENT:      Head: Normocephalic and atraumatic.   Eyes:      Extraocular Movements: Extraocular movements intact.      Conjunctiva/sclera: Conjunctivae normal.      Pupils: Pupils are equal, round, and reactive to light.   Cardiovascular:      Rate and Rhythm: Normal rate and regular rhythm.      Pulses: Normal pulses.      Heart sounds: No murmur heard.     No friction rub. No gallop.   Pulmonary:      Effort: Pulmonary effort is normal. No respiratory distress.      Breath sounds: Normal breath sounds. No wheezing, rhonchi or rales.   Abdominal:      General: Bowel sounds are normal. There is no distension.      Palpations: Abdomen is soft.      Tenderness: There is no abdominal tenderness.   Musculoskeletal:         General: No swelling or tenderness. Normal range of motion.      Cervical back: Normal range of motion and neck supple. No muscular tenderness.      Right lower leg: No edema.      Left lower leg: No edema.   Skin:     General: Skin is warm and dry.      Capillary Refill: Capillary refill takes less than 2 seconds.      Findings: No bruising, erythema or rash.   Neurological:      General: No focal deficit present.      Mental Status: He is alert and oriented to person, place, and time.         Fluids    Intake/Output Summary (Last 24 hours) at 5/28/2025 1026  Last data filed at 5/28/2025 0640  Gross per 24 hour   Intake 1100 ml   Output 6350  ml   Net -5250 ml        Laboratory  Recent Labs     05/27/25  0909 05/28/25  0026   WBC 5.9 6.4   RBC 2.68* 2.72*   HEMOGLOBIN 7.3* 7.2*   HEMATOCRIT 24.1* 23.9*   MCV 89.9 87.9   MCH 27.2 26.5*   MCHC 30.3* 30.1*   RDW 55.1* 53.9*   PLATELETCT 187 210   MPV 10.4 10.5     Recent Labs     05/27/25  0909 05/28/25  0026   SODIUM 142 142   POTASSIUM 3.7 3.6   CHLORIDE 114* 109   CO2 17* 21   GLUCOSE 83 90   BUN 17 15   CREATININE 1.19 1.09   CALCIUM 8.0* 8.6                   Imaging  EC-ECHOCARDIOGRAM COMPLETE W/O CONT   Final Result      DX-CHEST-PORTABLE (1 VIEW)   Final Result      1.  Hypoinflation with LEFT lung base atelectasis and small LEFT pleural effusion.   2.  No pneumothorax.      US-EXTREMITY ARTERY LOWER UNILAT W/SHANTI (COMBO) LEFT    (Results Pending)        Assessment/Plan  * Acute on chronic systolic heart failure (HCC)- (present on admission)  Assessment & Plan  We do not have a recent echo on this gentleman, the last 1 was multiple years ago.  At that time he had an EF in the range of 25% with severe MR and TR  BNP of 3700, chest x-ray with small left pleural effusion, EKG noted chronic atrial fibrillation.  Echocardiogram noted improved LVEF from prior echocardiogram in 2024 now 45%, right ventricle is dilated, moderate MR, severe TR, RSVP of 74 mmHg. Patient started on aggressive IV diuresis.  Admit to telemetry  Start IV Lasix 40 mg 3 times daily, continue spironolactone  Continue ARB and beta-blockade  Daily BMP  Etiology of the patient's flare is most likely medication issues, as he lost his medications in transit.    Pulmonary hypertension (HCC)- (present on admission)  Assessment & Plan  Severe on ECHO  Diuresis     Atrial fibrillation with RVR (HCC)- (present on admission)  Assessment & Plan  Patient is currently in A-fib  Continue Eliquis and metoprolol  Admit to monitored bed    Wound of lower extremity- (present on admission)  Assessment & Plan  Nonhealing  SHANTI from January was concern for  possible mid posterior tibial artery stenosis or occlusion  Repeat SHANTI ordered, pending results may require vascular intervention  Wound care  Home health or outpatient wound clinic for further wound care    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 Cabral 10/7/24.    COPD (chronic obstructive pulmonary disease) (HCC)- (present on admission)  Assessment & Plan  O2 and RT protocols  Encourage smoking cessation    Tobacco dependence- (present on admission)  Assessment & Plan  Encourage cessation  As needed replacement while in house    Hypertension- (present on admission)  Assessment & Plan  Patient is maintained on spironolactone 25, metoprolol SR 12.5, losartan 25 mg, and furosemide.  Continue home medications other than furosemide which we will give IV         VTE prophylaxis: Eliquis    I have performed a physical exam and reviewed and updated ROS and Plan today (5/28/2025). In review of yesterday's note (5/27/2025), there are no changes except as documented above.      Greater than 51 minutes spent prepping to see patient (e.g. reviewing EMR) obtaining and/or reviewing separately obtained history. Performing a medically appropriate examination and evaluation. Independently interpreting results and communicating results to patient/family/caregiver. Counseling and educating the patient/family/caregiver. Ordering medications, tests, and/or procedures. Referring and communicating with other health care professionals.  Documenting clinical information in EPIC. Care coordination.

## 2025-05-29 ENCOUNTER — PATIENT OUTREACH (OUTPATIENT)
Dept: SCHEDULING | Facility: IMAGING CENTER | Age: 75
End: 2025-05-29
Payer: MEDICARE

## 2025-05-29 ENCOUNTER — APPOINTMENT (OUTPATIENT)
Dept: RADIOLOGY | Facility: MEDICAL CENTER | Age: 75
End: 2025-05-29
Attending: GENERAL PRACTICE
Payer: MEDICARE

## 2025-05-29 PROBLEM — I73.9 PAD (PERIPHERAL ARTERY DISEASE) (HCC): Status: ACTIVE | Noted: 2025-05-29

## 2025-05-29 LAB
ALBUMIN SERPL BCP-MCNC: 3.3 G/DL (ref 3.2–4.9)
ANION GAP SERPL CALC-SCNC: 11 MMOL/L (ref 7–16)
BUN SERPL-MCNC: 13 MG/DL (ref 8–22)
CALCIUM ALBUM COR SERPL-MCNC: 9 MG/DL (ref 8.5–10.5)
CALCIUM SERPL-MCNC: 8.4 MG/DL (ref 8.5–10.5)
CHLORIDE SERPL-SCNC: 108 MMOL/L (ref 96–112)
CO2 SERPL-SCNC: 24 MMOL/L (ref 20–33)
CREAT SERPL-MCNC: 1.09 MG/DL (ref 0.5–1.4)
ERYTHROCYTE [DISTWIDTH] IN BLOOD BY AUTOMATED COUNT: 51.9 FL (ref 35.9–50)
FERRITIN SERPL-MCNC: 137 NG/ML (ref 22–322)
GFR SERPLBLD CREATININE-BSD FMLA CKD-EPI: 71 ML/MIN/1.73 M 2
GLUCOSE SERPL-MCNC: 83 MG/DL (ref 65–99)
HCT VFR BLD AUTO: 24.1 % (ref 42–52)
HGB BLD-MCNC: 7.5 G/DL (ref 14–18)
HGB RETIC QN AUTO: 21.8 PG/CELL (ref 29–35)
IMM RETICS NFR: 27.1 % (ref 2.6–16.1)
IRON SATN MFR SERPL: 3 % (ref 15–55)
IRON SERPL-MCNC: 11 UG/DL (ref 50–180)
MAGNESIUM SERPL-MCNC: 1.9 MG/DL (ref 1.5–2.5)
MCH RBC QN AUTO: 26.7 PG (ref 27–33)
MCHC RBC AUTO-ENTMCNC: 31.1 G/DL (ref 32.3–36.5)
MCV RBC AUTO: 85.8 FL (ref 81.4–97.8)
PHOSPHATE SERPL-MCNC: 2.7 MG/DL (ref 2.5–4.5)
PLATELET # BLD AUTO: 198 K/UL (ref 164–446)
PMV BLD AUTO: 11.2 FL (ref 9–12.9)
POTASSIUM SERPL-SCNC: 3.9 MMOL/L (ref 3.6–5.5)
RBC # BLD AUTO: 2.81 M/UL (ref 4.7–6.1)
RETICS # AUTO: 0.06 M/UL (ref 0.04–0.12)
RETICS/RBC NFR: 2 % (ref 0.8–2.6)
SODIUM SERPL-SCNC: 143 MMOL/L (ref 135–145)
TIBC SERPL-MCNC: 321 UG/DL (ref 250–450)
UIBC SERPL-MCNC: 310 UG/DL (ref 110–370)
WBC # BLD AUTO: 6.2 K/UL (ref 4.8–10.8)

## 2025-05-29 PROCEDURE — 83550 IRON BINDING TEST: CPT

## 2025-05-29 PROCEDURE — A9270 NON-COVERED ITEM OR SERVICE: HCPCS | Performed by: HOSPITALIST

## 2025-05-29 PROCEDURE — 700102 HCHG RX REV CODE 250 W/ 637 OVERRIDE(OP): Performed by: GENERAL PRACTICE

## 2025-05-29 PROCEDURE — 83735 ASSAY OF MAGNESIUM: CPT

## 2025-05-29 PROCEDURE — 93926 LOWER EXTREMITY STUDY: CPT | Mod: LT

## 2025-05-29 PROCEDURE — 80069 RENAL FUNCTION PANEL: CPT

## 2025-05-29 PROCEDURE — 83540 ASSAY OF IRON: CPT

## 2025-05-29 PROCEDURE — 700102 HCHG RX REV CODE 250 W/ 637 OVERRIDE(OP): Performed by: HOSPITALIST

## 2025-05-29 PROCEDURE — RXMED WILLOW AMBULATORY MEDICATION CHARGE: Performed by: GENERAL PRACTICE

## 2025-05-29 PROCEDURE — A9270 NON-COVERED ITEM OR SERVICE: HCPCS | Performed by: GENERAL PRACTICE

## 2025-05-29 PROCEDURE — 82728 ASSAY OF FERRITIN: CPT

## 2025-05-29 PROCEDURE — 85027 COMPLETE CBC AUTOMATED: CPT

## 2025-05-29 PROCEDURE — 700111 HCHG RX REV CODE 636 W/ 250 OVERRIDE (IP): Mod: JZ | Performed by: GENERAL PRACTICE

## 2025-05-29 PROCEDURE — 85046 RETICYTE/HGB CONCENTRATE: CPT

## 2025-05-29 PROCEDURE — 700105 HCHG RX REV CODE 258: Performed by: GENERAL PRACTICE

## 2025-05-29 PROCEDURE — 93922 UPR/L XTREMITY ART 2 LEVELS: CPT

## 2025-05-29 PROCEDURE — 99233 SBSQ HOSP IP/OBS HIGH 50: CPT | Performed by: GENERAL PRACTICE

## 2025-05-29 PROCEDURE — 770020 HCHG ROOM/CARE - TELE (206)

## 2025-05-29 RX ORDER — ATORVASTATIN CALCIUM 40 MG/1
40 TABLET, FILM COATED ORAL EVERY EVENING
Status: DISCONTINUED | OUTPATIENT
Start: 2025-05-29 | End: 2025-05-30 | Stop reason: HOSPADM

## 2025-05-29 RX ORDER — FUROSEMIDE 40 MG/1
40 TABLET ORAL DAILY
Qty: 90 TABLET | Refills: 0 | Status: ON HOLD | OUTPATIENT
Start: 2025-05-30 | End: 2025-08-28

## 2025-05-29 RX ORDER — FUROSEMIDE 40 MG/1
40 TABLET ORAL
Status: DISCONTINUED | OUTPATIENT
Start: 2025-05-30 | End: 2025-05-30 | Stop reason: HOSPADM

## 2025-05-29 RX ORDER — ATORVASTATIN CALCIUM 40 MG/1
40 TABLET, FILM COATED ORAL EVERY EVENING
Qty: 90 TABLET | Refills: 0 | Status: ON HOLD | OUTPATIENT
Start: 2025-05-29 | End: 2025-08-28

## 2025-05-29 RX ORDER — POLYETHYLENE GLYCOL 3350 17 G/17G
1 POWDER, FOR SOLUTION ORAL
Status: DISCONTINUED | OUTPATIENT
Start: 2025-05-29 | End: 2025-05-30 | Stop reason: HOSPADM

## 2025-05-29 RX ORDER — DIGOXIN 125 MCG
125 TABLET ORAL DAILY
Qty: 90 TABLET | Refills: 0 | Status: ON HOLD | OUTPATIENT
Start: 2025-05-29 | End: 2025-08-28

## 2025-05-29 RX ORDER — SPIRONOLACTONE 25 MG/1
25 TABLET ORAL DAILY
Qty: 90 TABLET | Refills: 0 | Status: ON HOLD | OUTPATIENT
Start: 2025-05-29 | End: 2025-08-28

## 2025-05-29 RX ORDER — OMEPRAZOLE 20 MG/1
20 CAPSULE, DELAYED RELEASE ORAL DAILY
Qty: 90 CAPSULE | Refills: 0 | Status: ON HOLD | OUTPATIENT
Start: 2025-05-29 | End: 2025-08-28

## 2025-05-29 RX ORDER — FUROSEMIDE 10 MG/ML
40 INJECTION INTRAMUSCULAR; INTRAVENOUS 2 TIMES DAILY
Status: COMPLETED | OUTPATIENT
Start: 2025-05-29 | End: 2025-05-29

## 2025-05-29 RX ORDER — AMOXICILLIN 250 MG
2 CAPSULE ORAL EVERY EVENING
Status: DISCONTINUED | OUTPATIENT
Start: 2025-05-30 | End: 2025-05-30 | Stop reason: HOSPADM

## 2025-05-29 RX ORDER — METOPROLOL SUCCINATE 25 MG/1
12.5 TABLET, EXTENDED RELEASE ORAL EVERY EVENING
Qty: 45 TABLET | Refills: 0 | Status: ON HOLD | OUTPATIENT
Start: 2025-05-29 | End: 2025-08-28

## 2025-05-29 RX ORDER — LOSARTAN POTASSIUM 25 MG/1
25 TABLET ORAL DAILY
Qty: 90 TABLET | Refills: 0 | Status: ON HOLD | OUTPATIENT
Start: 2025-05-29 | End: 2025-08-28

## 2025-05-29 RX ADMIN — NICOTINE TRANSDERMAL SYSTEM 21 MG: 21 PATCH, EXTENDED RELEASE TRANSDERMAL at 05:01

## 2025-05-29 RX ADMIN — ATORVASTATIN CALCIUM 40 MG: 40 TABLET, FILM COATED ORAL at 17:39

## 2025-05-29 RX ADMIN — SODIUM CHLORIDE 250 MG: 9 INJECTION, SOLUTION INTRAVENOUS at 11:21

## 2025-05-29 RX ADMIN — OMEPRAZOLE 20 MG: 20 CAPSULE, DELAYED RELEASE ORAL at 05:01

## 2025-05-29 RX ADMIN — FUROSEMIDE 40 MG: 10 INJECTION, SOLUTION INTRAVENOUS at 17:40

## 2025-05-29 RX ADMIN — FUROSEMIDE 40 MG: 10 INJECTION, SOLUTION INTRAVENOUS at 05:01

## 2025-05-29 RX ADMIN — LOSARTAN POTASSIUM 25 MG: 25 TABLET, FILM COATED ORAL at 17:39

## 2025-05-29 RX ADMIN — DAKIN'S SOLUTION 0.125% (QUARTER STRENGTH) 473 ML: 0.12 SOLUTION at 05:02

## 2025-05-29 RX ADMIN — DIGOXIN 125 MCG: 0.12 TABLET ORAL at 17:40

## 2025-05-29 RX ADMIN — SODIUM CHLORIDE 250 MG: 9 INJECTION, SOLUTION INTRAVENOUS at 17:55

## 2025-05-29 RX ADMIN — APIXABAN 5 MG: 5 TABLET, FILM COATED ORAL at 17:39

## 2025-05-29 RX ADMIN — DAKIN'S SOLUTION 0.125% (QUARTER STRENGTH) 1 ML: 0.12 SOLUTION at 17:45

## 2025-05-29 RX ADMIN — SPIRONOLACTONE 25 MG: 25 TABLET ORAL at 17:40

## 2025-05-29 RX ADMIN — APIXABAN 5 MG: 5 TABLET, FILM COATED ORAL at 05:01

## 2025-05-29 ASSESSMENT — COPD QUESTIONNAIRES
COPD SCREENING SCORE: 4
IN THE PAST 12 MONTHS DO YOU DO LESS THAN YOU USED TO BECAUSE OF YOUR BREATHING PROBLEMS: DISAGREE/UNSURE
DURING THE PAST 4 WEEKS HOW MUCH DID YOU FEEL SHORT OF BREATH: NONE/LITTLE OF THE TIME
DO YOU EVER COUGH UP ANY MUCUS OR PHLEGM?: NO/ONLY WITH OCCASIONAL COLDS OR INFECTIONS
HAVE YOU SMOKED AT LEAST 100 CIGARETTES IN YOUR ENTIRE LIFE: YES

## 2025-05-29 ASSESSMENT — FIBROSIS 4 INDEX: FIB4 SCORE: 2.99

## 2025-05-29 ASSESSMENT — PAIN DESCRIPTION - PAIN TYPE: TYPE: ACUTE PAIN

## 2025-05-29 NOTE — CARE PLAN
The patient is Stable - Low risk of patient condition declining or worsening    Shift Goals  Clinical Goals: diurese, SHANTI, wound care  Patient Goals: rest  Family Goals: nilson    Progress made toward(s) clinical / shift goals:  Pt safety maintained. Plan of care reviewed with patient at bedside, all questions addressed. Pt medicated for pain per MAR, given comfort measures of rest and repositioning.       Problem: Knowledge Deficit - Standard  Goal: Patient and family/care givers will demonstrate understanding of plan of care, disease process/condition, diagnostic tests and medications  Outcome: Progressing     Problem: Pain - Standard  Goal: Alleviation of pain or a reduction in pain to the patient’s comfort goal  Outcome: Progressing       Patient is not progressing towards the following goals:

## 2025-05-29 NOTE — ASSESSMENT & PLAN NOTE
LLE  Left lower extremity leg ulcer with, nonhealing. SHANTI from January was concern for possible mid posterior tibial artery stenosis or occlusion. Repeat SHANTI -diffuse plaque throughout the common femoral, superficial femoral and popliteal arteries, with 50 to 75% stenosis in distal femoral and popliteal artery, absence of flow in the distal peroneal.  Imaging was reviewed by vascular surgery, they do not recommend any vascular intervention at this time.  Patient to continue with statin therapy, lipid panel pending.  Patient to continue with wound care.  Outpatient referral placed.

## 2025-05-29 NOTE — PROGRESS NOTES
Hospital Medicine Daily Progress Note    Date of Service  5/29/2025    Chief Complaint  Robert Mcdonnell is a 75 y.o. male admitted 5/27/2025 with shortness of breath    Hospital Course  This is a 75-year-old male with past medical history of chronic systolic heart failure, chronic atrial fibrillation on Eliquis, history of gastric malignancy, COPD with continued tobacco use who was admitted on 5/27 with shortness of breath, significant for acute on chronic systolic heart failure exacerbation.    BNP of 3700, chest x-ray with small left pleural effusion, EKG noted chronic atrial fibrillation.  Echocardiogram noted improved LVEF from prior echocardiogram in 2024 now 45%, right ventricle is dilated, moderate MR, severe TR, RSVP of 74 mmHg. Patient started on aggressive IV diuresis.    Patient was recently admitted to the hospital in [place] for treatment of cellulitis involving his left lower leg.  He was discharged with medications for his heart, and other things but on the bus between here and in [place] on his way to [place], he lost his medications and has been off of them for several days now.    Left lower extremity leg ulcer with, nonhealing. SHANTI from January was concern for possible mid posterior tibial artery stenosis or occlusion. Repeat SHANTI -diffuse plaque throughout the common femoral, superficial femoral and popliteal arteries, with 50 to 75% stenosis in distal femoral and popliteal artery, absence of flow in the distal peroneal.  Imaging was reviewed by vascular surgery, they do not recommend any vascular intervention at this time.  Patient to continue with statin therapy, lipid panel pending.  Patient to continue with wound care.  Outpatient referral placed.    Interval Problem Update  Acute on chronic systolic heart failure exacerbation patient off his medication now for several weeks.  Continue with aggressive IV diuresis, GDMT therapy resumed as tolerated.  Repeat echocardiogram noted some  improvement in LVEF.  Severe pulmonary hypertension.    Left lower extremity leg ulcer with PAD, nonhealing  -wound care, antibiotics not indicated at this time.  Nonhealing. SHANTI from January was concern for possible mid posterior tibial artery stenosis or occlusion. Repeat SHANTI -diffuse plaque throughout the common femoral, superficial femoral and popliteal arteries, with 50 to 75% stenosis in distal femoral and popliteal artery, absence of flow in the distal peroneal.  Imaging was reviewed by vascular surgery, they do not recommend any vascular intervention at this time.  Patient to continue with statin therapy, lipid panel pending.  Patient to continue with wound care.  Outpatient referral placed.      I have discussed this patient's plan of care and discharge plan at IDT rounds today with Case Management, Nursing, Nursing leadership, and other members of the IDT team.    Consultants/Specialty  vascular surgery    Code Status  Full Code    Disposition  Patient is not medically clear to discharge home.   I have placed the appropriate orders for post-discharge needs.    Review of Systems  Review of Systems   All other systems reviewed and are negative.       Physical Exam  Temp:  [36.5 °C (97.7 °F)-36.6 °C (97.9 °F)] 36.6 °C (97.9 °F)  Pulse:  [] 93  Resp:  [17-20] 17  BP: (108-138)/(63-81) 138/81  SpO2:  [91 %-99 %] 96 %    Physical Exam  Vitals and nursing note reviewed.   Constitutional:       General: He is not in acute distress.     Appearance: He is ill-appearing.   HENT:      Head: Normocephalic and atraumatic.   Eyes:      Extraocular Movements: Extraocular movements intact.      Conjunctiva/sclera: Conjunctivae normal.      Pupils: Pupils are equal, round, and reactive to light.   Cardiovascular:      Rate and Rhythm: Normal rate and regular rhythm.      Pulses: Normal pulses.      Heart sounds: No murmur heard.     No friction rub. No gallop.   Pulmonary:      Effort: Pulmonary effort is normal. No  respiratory distress.      Breath sounds: Normal breath sounds. No wheezing, rhonchi or rales.   Abdominal:      General: Bowel sounds are normal. There is no distension.      Palpations: Abdomen is soft.      Tenderness: There is no abdominal tenderness.   Musculoskeletal:         General: No swelling or tenderness. Normal range of motion.      Cervical back: Normal range of motion and neck supple. No muscular tenderness.      Right lower leg: No edema.      Left lower leg: No edema.   Skin:     General: Skin is warm and dry.      Capillary Refill: Capillary refill takes less than 2 seconds.      Findings: No bruising, erythema or rash.   Neurological:      General: No focal deficit present.      Mental Status: He is alert and oriented to person, place, and time.         Fluids    Intake/Output Summary (Last 24 hours) at 5/29/2025 1424  Last data filed at 5/29/2025 1140  Gross per 24 hour   Intake 600 ml   Output 2875 ml   Net -2275 ml        Laboratory  Recent Labs     05/27/25  0909 05/28/25  0026 05/29/25  0331   WBC 5.9 6.4 6.2   RBC 2.68* 2.72* 2.81*   HEMOGLOBIN 7.3* 7.2* 7.5*   HEMATOCRIT 24.1* 23.9* 24.1*   MCV 89.9 87.9 85.8   MCH 27.2 26.5* 26.7*   MCHC 30.3* 30.1* 31.1*   RDW 55.1* 53.9* 51.9*   PLATELETCT 187 210 198   MPV 10.4 10.5 11.2     Recent Labs     05/27/25  0909 05/28/25  0026 05/29/25  0331   SODIUM 142 142 143   POTASSIUM 3.7 3.6 3.9   CHLORIDE 114* 109 108   CO2 17* 21 24   GLUCOSE 83 90 83   BUN 17 15 13   CREATININE 1.19 1.09 1.09   CALCIUM 8.0* 8.6 8.4*                   Imaging  US-SHANTI SINGLE LEVEL BILAT   Final Result      US-EXTREMITY ARTERY LOWER UNILAT LEFT   Final Result      EC-ECHOCARDIOGRAM COMPLETE W/O CONT   Final Result      DX-CHEST-PORTABLE (1 VIEW)   Final Result      1.  Hypoinflation with LEFT lung base atelectasis and small LEFT pleural effusion.   2.  No pneumothorax.           Assessment/Plan  * Acute on chronic systolic heart failure (HCC)- (present on  admission)  Assessment & Plan  We do not have a recent echo on this gentleman, the last 1 was multiple years ago.  At that time he had an EF in the range of 25% with severe MR and TR  BNP of 3700, chest x-ray with small left pleural effusion, EKG noted chronic atrial fibrillation.  Echocardiogram noted improved LVEF from prior echocardiogram in 2024 now 45%, right ventricle is dilated, moderate MR, severe TR, RSVP of 74 mmHg. Patient started on aggressive IV diuresis.  Admit to telemetry  Start IV Lasix 40 mg 3 times daily, continue spironolactone  Continue ARB and beta-blockade  Daily BMP  Etiology of the patient's flare is most likely medication issues, as he lost his medications in transit.    Pulmonary hypertension (formerly [facility])- (present on admission)  Assessment & Plan  Severe on ECHO  Diuresis     Atrial fibrillation with RVR (formerly [facility])- (present on admission)  Assessment & Plan  Patient is currently in A-fib  Continue Eliquis and metoprolol  Admit to monitored bed    Wound of lower extremity- (present on admission)  Assessment & Plan  Left lower extremity leg ulcer with, nonhealing. SHANTI from January was concern for possible mid posterior tibial artery stenosis or occlusion. Repeat SHANTI -diffuse plaque throughout the common femoral, superficial femoral and popliteal arteries, with 50 to 75% stenosis in distal femoral and popliteal artery, absence of flow in the distal peroneal.  Imaging was reviewed by vascular surgery, they do not recommend any vascular intervention at this time.  Patient to continue with statin therapy, lipid panel pending.  Patient to continue with wound care.  Outpatient referral placed.    PAD (peripheral artery disease) (formerly [facility])  Assessment & Plan  LLE  Left lower extremity leg ulcer with, nonhealing. SHANTI from January was concern for possible mid posterior tibial artery stenosis or occlusion. Repeat SHANTI -diffuse plaque throughout the common femoral, superficial femoral and popliteal arteries, with 50 to  75% stenosis in distal femoral and popliteal artery, absence of flow in the distal peroneal.  Imaging was reviewed by vascular surgery, they do not recommend any vascular intervention at this time.  Patient to continue with statin therapy, lipid panel pending.  Patient to continue with wound care.  Outpatient referral placed.    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 Cabral 10/7/24.    COPD (chronic obstructive pulmonary disease) (HCC)- (present on admission)  Assessment & Plan  O2 and RT protocols  Encourage smoking cessation    Tobacco dependence- (present on admission)  Assessment & Plan  Encourage cessation  As needed replacement while in house    Hypertension- (present on admission)  Assessment & Plan  Patient is maintained on spironolactone 25, metoprolol SR 12.5, losartan 25 mg, and furosemide.  Continue home medications other than furosemide which we will give IV         VTE prophylaxis: Eliquis    I have performed a physical exam and reviewed and updated ROS and Plan today (5/29/2025). In review of yesterday's note (5/28/2025), there are no changes except as documented above.      Greater than 55 minutes spent prepping to see patient (e.g. reviewing EMR) obtaining and/or reviewing separately obtained history. Performing a medically appropriate examination and evaluation. Independently interpreting results and communicating results to patient/family/caregiver. Counseling and educating the patient/family/caregiver. Ordering medications, tests, and/or procedures. Referring and communicating with other health care professionals.  Documenting clinical information in EPIC. Care coordination.

## 2025-05-29 NOTE — CARE PLAN
The patient is Watcher - Medium risk of patient condition declining or worsening    Shift Goals  Clinical Goals: Diurese, monitor wounds to BLE, safety  Patient Goals: Rest comfortably  Family Goals: MARCOS    Progress made toward(s) clinical / shift goals:    Problem: Care Map:  Admission Optimal Outcome for the Heart Failure Patient  Goal: Admission:  Optimal Care of the heart failure patient  Outcome: Progressing, patient was provided with [facility] informational handout on heart failure and continues to learn about his diagnosis and what kind of lifestyle changes will need to be implemented.      Problem: Knowledge Deficit - Standard  Goal: Patient and family/care givers will demonstrate understanding of plan of care, disease process/condition, diagnostic tests and medications  Outcome: Progressing     Problem: Pain - Standard  Goal: Alleviation of pain or a reduction in pain to the patient’s comfort goal  Outcome: Progressing

## 2025-05-29 NOTE — PROGRESS NOTES
Bedside report received from [facility] BHATIA. Pt assessment complete. Pt AO x 4. Reviewed plan of care with pt. Pt is tele monitored. Chart and labs reviewed. Bed in lowest position, and 2 side rails up. Pt educated on call lights, call light within reach. Hourly rounding in place

## 2025-05-29 NOTE — PROGRESS NOTES
12-hour chart check complete.    Monitor Summary  Rhythm: Afib  Rate: 80-96  Ectopy: PVC, Coup  Measurements: -/.12/-

## 2025-05-29 NOTE — RESPIRATORY CARE
"COPD EDUCATION by COPD CLINICAL EDUCATOR  5/29/2025 at 8:26 AM by Miranda Mills, RRT     Patient interviewed by COPD education team. Patient refused COPD program at this time, denies history.   COPD Screen  COPD Risk Screening  Do you have a history of COPD?: No  COPD Population Screener  During the past 4 weeks, how much did you feel short of breath?: None/Little of the time  Do you ever cough up any mucus or phlegm?: No/only with occasional colds or infections  In the past 12 months, you do less than you used to because of your breathing problems: Disagree/unsure  Have you smoked at least 100 cigarettes in your entire life?: Yes  How old are you?: 60+  COPD Screening Score: 4  COPD Coordinator Recommended: Yes  COPD Coordinator Not Recommended: Yes    COPD Assessment  COPD Clinical Specialists ONLY  COPD Education Initiated: Yes--Short Intervention (pt denies hx of COPD/asthma, no meds or O2 at home. pt states quit smoking 2 weeks ago. offered education and support, pt declined.  NRT patch on pt, declines to have it ordered for OP. education left at bedside.)  Is this a COPD exacerbation patient?: No  Pulmonary Rehab: No  Smoking Cessation: Declined (education left at bedside per approval of pt)  Hospice: No  Home Health Care: No  Mobile Urgent Care Services: No  Geriatric Specialty Group: No  Private In-Home Care Agency: No  Interdisciplinary Rounds: Attendance at Rounds (30 Min)    PFT Results    No results found for: \"PFT\"    Meds to Beds  [facility] provides bedside medication delivery for all eligible patients at discharge and you have been automatically enrolled in the Meds to Beds Program. Would you like to opt out of this program for any reason?: No - Stay Opted In  "

## 2025-05-30 ENCOUNTER — PHARMACY VISIT (OUTPATIENT)
Dept: PHARMACY | Facility: MEDICAL CENTER | Age: 75
End: 2025-05-30
Payer: COMMERCIAL

## 2025-05-30 VITALS
TEMPERATURE: 98.2 F | DIASTOLIC BLOOD PRESSURE: 92 MMHG | WEIGHT: 167.33 LBS | HEIGHT: 72 IN | SYSTOLIC BLOOD PRESSURE: 145 MMHG | OXYGEN SATURATION: 95 % | HEART RATE: 90 BPM | RESPIRATION RATE: 18 BRPM | BODY MASS INDEX: 22.66 KG/M2

## 2025-05-30 LAB
ALBUMIN SERPL BCP-MCNC: 3.2 G/DL (ref 3.2–4.9)
ANION GAP SERPL CALC-SCNC: 10 MMOL/L (ref 7–16)
BUN SERPL-MCNC: 15 MG/DL (ref 8–22)
CALCIUM ALBUM COR SERPL-MCNC: 9.4 MG/DL (ref 8.5–10.5)
CALCIUM SERPL-MCNC: 8.8 MG/DL (ref 8.5–10.5)
CHLORIDE SERPL-SCNC: 105 MMOL/L (ref 96–112)
CHOLEST SERPL-MCNC: 76 MG/DL (ref 100–199)
CO2 SERPL-SCNC: 26 MMOL/L (ref 20–33)
CREAT SERPL-MCNC: 1.18 MG/DL (ref 0.5–1.4)
ERYTHROCYTE [DISTWIDTH] IN BLOOD BY AUTOMATED COUNT: 53.9 FL (ref 35.9–50)
GFR SERPLBLD CREATININE-BSD FMLA CKD-EPI: 64 ML/MIN/1.73 M 2
GLUCOSE SERPL-MCNC: 84 MG/DL (ref 65–99)
HCT VFR BLD AUTO: 27 % (ref 42–52)
HDLC SERPL-MCNC: 36 MG/DL
HGB BLD-MCNC: 8.1 G/DL (ref 14–18)
LDLC SERPL CALC-MCNC: 32 MG/DL
MAGNESIUM SERPL-MCNC: 2 MG/DL (ref 1.5–2.5)
MCH RBC QN AUTO: 26.4 PG (ref 27–33)
MCHC RBC AUTO-ENTMCNC: 30 G/DL (ref 32.3–36.5)
MCV RBC AUTO: 87.9 FL (ref 81.4–97.8)
PHOSPHATE SERPL-MCNC: 3 MG/DL (ref 2.5–4.5)
PLATELET # BLD AUTO: 211 K/UL (ref 164–446)
PMV BLD AUTO: 9.7 FL (ref 9–12.9)
POTASSIUM SERPL-SCNC: 3.9 MMOL/L (ref 3.6–5.5)
RBC # BLD AUTO: 3.07 M/UL (ref 4.7–6.1)
SODIUM SERPL-SCNC: 141 MMOL/L (ref 135–145)
TRIGL SERPL-MCNC: 42 MG/DL (ref 0–149)
WBC # BLD AUTO: 7.2 K/UL (ref 4.8–10.8)

## 2025-05-30 PROCEDURE — A9270 NON-COVERED ITEM OR SERVICE: HCPCS | Performed by: HOSPITALIST

## 2025-05-30 PROCEDURE — A9270 NON-COVERED ITEM OR SERVICE: HCPCS | Performed by: GENERAL PRACTICE

## 2025-05-30 PROCEDURE — 700102 HCHG RX REV CODE 250 W/ 637 OVERRIDE(OP): Performed by: GENERAL PRACTICE

## 2025-05-30 PROCEDURE — 80069 RENAL FUNCTION PANEL: CPT

## 2025-05-30 PROCEDURE — 83735 ASSAY OF MAGNESIUM: CPT

## 2025-05-30 PROCEDURE — 85027 COMPLETE CBC AUTOMATED: CPT

## 2025-05-30 PROCEDURE — 700105 HCHG RX REV CODE 258: Performed by: GENERAL PRACTICE

## 2025-05-30 PROCEDURE — 700111 HCHG RX REV CODE 636 W/ 250 OVERRIDE (IP): Mod: JZ | Performed by: GENERAL PRACTICE

## 2025-05-30 PROCEDURE — 80061 LIPID PANEL: CPT

## 2025-05-30 PROCEDURE — 700111 HCHG RX REV CODE 636 W/ 250 OVERRIDE (IP): Mod: JZ | Performed by: HOSPITALIST

## 2025-05-30 PROCEDURE — 99239 HOSP IP/OBS DSCHRG MGMT >30: CPT | Performed by: GENERAL PRACTICE

## 2025-05-30 PROCEDURE — 700102 HCHG RX REV CODE 250 W/ 637 OVERRIDE(OP): Performed by: HOSPITALIST

## 2025-05-30 RX ORDER — PROCHLORPERAZINE EDISYLATE 5 MG/ML
10 INJECTION INTRAMUSCULAR; INTRAVENOUS ONCE
Status: COMPLETED | OUTPATIENT
Start: 2025-05-30 | End: 2025-05-30

## 2025-05-30 RX ADMIN — FUROSEMIDE 40 MG: 40 TABLET ORAL at 06:12

## 2025-05-30 RX ADMIN — ONDANSETRON 4 MG: 2 INJECTION INTRAMUSCULAR; INTRAVENOUS at 07:39

## 2025-05-30 RX ADMIN — ONDANSETRON 4 MG: 2 INJECTION INTRAMUSCULAR; INTRAVENOUS at 12:37

## 2025-05-30 RX ADMIN — NICOTINE TRANSDERMAL SYSTEM 21 MG: 21 PATCH, EXTENDED RELEASE TRANSDERMAL at 06:12

## 2025-05-30 RX ADMIN — OMEPRAZOLE 20 MG: 20 CAPSULE, DELAYED RELEASE ORAL at 06:12

## 2025-05-30 RX ADMIN — SODIUM CHLORIDE 250 MG: 9 INJECTION, SOLUTION INTRAVENOUS at 06:11

## 2025-05-30 RX ADMIN — DAKIN'S SOLUTION 0.125% (QUARTER STRENGTH) 2 ML: 0.12 SOLUTION at 06:13

## 2025-05-30 RX ADMIN — APIXABAN 5 MG: 5 TABLET, FILM COATED ORAL at 06:12

## 2025-05-30 RX ADMIN — PROCHLORPERAZINE EDISYLATE 10 MG: 5 INJECTION INTRAMUSCULAR; INTRAVENOUS at 13:14

## 2025-05-30 ASSESSMENT — PAIN DESCRIPTION - PAIN TYPE: TYPE: ACUTE PAIN

## 2025-05-30 ASSESSMENT — FIBROSIS 4 INDEX: FIB4 SCORE: 2.99

## 2025-05-30 NOTE — DISCHARGE SUMMARY
Discharge Summary    CHIEF COMPLAINT ON ADMISSION  Chief Complaint   Patient presents with    Shortness of Breath       Reason for Admission  SOB     Admission Date  5/27/2025    CODE STATUS  Full Code    HPI & HOSPITAL COURSE  This is a 75-year-old male with past medical history of chronic systolic heart failure, chronic atrial fibrillation on Eliquis, history of gastric malignancy, COPD with continued tobacco use who was admitted on 5/27 with shortness of breath, significant for acute on chronic systolic heart failure exacerbation.    BNP of 3700, chest x-ray with small left pleural effusion, EKG noted chronic atrial fibrillation.  Echocardiogram noted improved LVEF from prior echocardiogram in 2024 now 45%, right ventricle is dilated, moderate MR, severe TR, RSVP of 74 mmHg. Patient started on aggressive IV diuresis.    Patient was recently admitted to the hospital in [place] for treatment of cellulitis involving his left lower leg.  He was discharged with medications for his heart, and other things but on the bus between here and in [place] on his way to [place], he lost his medications and has been off of them for several days now.    Left lower extremity leg ulcer with, nonhealing. SHANTI from January was concern for possible mid posterior tibial artery stenosis or occlusion. Repeat SHANTI -diffuse plaque throughout the common femoral, superficial femoral and popliteal arteries, with 50 to 75% stenosis in distal femoral and popliteal artery, absence of flow in the distal peroneal.  Imaging was reviewed by vascular surgery, they do not recommend any vascular intervention at this time.  Patient to continue with statin therapy, lipid panel pending.  Patient to continue with wound care.  Outpatient referral placed.    Therefore, he is discharged in good and stable condition to home with close outpatient follow-up.    The patient met 2-midnight criteria for an inpatient stay at the time of discharge.    Discharge  Date  5/30/2025    FOLLOW UP ITEMS POST DISCHARGE  PCP  Wound Care  Cardiology    DISCHARGE DIAGNOSES  Principal Problem:    Acute on chronic systolic heart failure (HCC) (POA: Yes)  Active Problems:    Wound of lower extremity (POA: Yes)    Atrial fibrillation with RVR (HCC) (POA: Yes)    Pulmonary hypertension (HCC) (POA: Yes)    Hypertension (POA: Yes)    Tobacco dependence (POA: Yes)    COPD (chronic obstructive pulmonary disease) (HCC) (POA: Yes)    Malignant neoplasm of body of stomach (HCC) (POA: Yes)    PAD (peripheral artery disease) (HCC) (POA: Unknown)  Resolved Problems:    * No resolved hospital problems. *      FOLLOW UP  Future Appointments   Date Time Provider Department Center   6/10/2025 11:15 AM JOSLYN Frye None     [facility] HEART AND VASC[facility]  [address]  [facility] [address]  [phone]  Follow up      [facility] - PULMONARY MEDICINE  [address]  [facility] [address]  [phone]        [facility]lot[facility]  [address]  [address]  [facility] [address]  [phone]    Go on 6/12/2025  Please go to your New Patient / hospital follow up appointment with ANA HoltNTOMMY on Thursday, June 12th 2025 at 1:00pm. Thank you.    [facility]  [address]  [facility] [address]  [phone]  Go on 6/6/2025  Please arrive at 8:00am for ongoing heart failure treatment. This is a walk-in clinic and you may go anytime between 8:00am and 4:00pm Monday-Friday and closed for lunch from 1:00pm-2:00pm. Patients are seen on a first come, first served basis, wait times may vary. This clinic offers a sliding-fee scale. Thank you.      MEDICATIONS ON DISCHARGE     Medication List        START taking these medications        Instructions   atorvastatin 40 MG Tabs  Commonly known as: Lipitor   Take 1 Tablet by mouth every evening for 90 days.  Dose: 40 mg     furosemide 40 MG Tabs  Commonly known as: Lasix   Take 1 Tablet by mouth every  day for 90 days.  Dose: 40 mg            CHANGE how you take these medications        Instructions   metoprolol SR 25 MG Tb24  What changed: when to take this  Commonly known as: Toprol XL   Take one-half (0.5) Tablet by mouth every evening for 90 days.  Dose: 12.5 mg            CONTINUE taking these medications        Instructions   apixaban 5mg Tabs  Commonly known as: Eliquis   Take 1 Tablet by mouth 2 times a day for 90 days.  Dose: 5 mg     digoxin 125 MCG Tabs  Commonly known as: Lanoxin   Take 1 Tablet by mouth every day at 6 PM for 90 days.  Dose: 125 mcg     losartan 25 MG Tabs  Commonly known as: Cozaar   Take 1 Tablet by mouth every day for 90 days.  Dose: 25 mg     omeprazole 20 MG delayed-release capsule  Commonly known as: PriLOSEC   Take 1 Capsule by mouth every day for 90 days.  Dose: 20 mg     spironolactone 25 MG Tabs  Commonly known as: Aldactone   Take 1 Tablet by mouth every day for 90 days.  Dose: 25 mg              Allergies  Allergies[1]    DIET  Orders Placed This Encounter   Procedures    Diet Order Diet: 2 Gram Sodium; Fluid modifications: (optional): 2000 ml Fluid Restriction     Standing Status:   Standing     Number of Occurrences:   1     Diet::   2 Gram Sodium [7]     Fluid modifications: (optional):   2000 ml Fluid Restriction [11]       ACTIVITY  As tolerated.  Weight bearing as tolerated    CONSULTATIONS  Vascular    PROCEDURES  None    LABORATORY  Lab Results   Component Value Date    SODIUM 141 05/30/2025    POTASSIUM 3.9 05/30/2025    CHLORIDE 105 05/30/2025    CO2 26 05/30/2025    GLUCOSE 84 05/30/2025    BUN 15 05/30/2025    CREATININE 1.18 05/30/2025        Lab Results   Component Value Date    WBC 7.2 05/30/2025    HEMOGLOBIN 8.1 (L) 05/30/2025    HEMATOCRIT 27.0 (L) 05/30/2025    PLATELETCT 211 05/30/2025      US-SHANTI SINGLE LEVEL BILAT   Final Result      US-EXTREMITY ARTERY LOWER UNILAT LEFT   Final Result      EC-ECHOCARDIOGRAM COMPLETE W/O CONT   Final Result       DX-CHEST-PORTABLE (1 VIEW)   Final Result      1.  Hypoinflation with LEFT lung base atelectasis and small LEFT pleural effusion.   2.  No pneumothorax.         Total time of the discharge process exceeds 45 minutes.         [1] No Known Allergies

## 2025-05-30 NOTE — CARE PLAN
The patient is Stable - Low risk of patient condition declining or worsening    Shift Goals  Clinical Goals: diuresis, wound care, discharge  Patient Goals: discharge  Family Goals: nilson    Progress made toward(s) clinical / shift goals:      Patient is not progressing towards the following goals:      Problem: Care Map:  Admission Optimal Outcome for the Heart Failure Patient  Goal: Admission:  Optimal Care of the heart failure patient  Outcome: Met     Problem: Care Map:  Day 1 Optimal Outcome for the Heart Failure Patient  Goal: Day 1:  Optimal Care of the heart failure patient  Outcome: Met     Problem: Care Map:  Day 2 Optimal Outcome for the Heart Failure Patient  Goal: Day 2:  Optimal Care of the heart failure patient  Outcome: Met     Problem: Care Map:  Day 3 Optimal Outcome for the Heart Failure Patient  Goal: Day 3:  Optimal Care of the heart failure patient  Outcome: Met     Problem: Care Map:  Day Before Discharge Optimal Outcome for the Heart Failure Patient  Goal: Day Before Discharge:  Optimal Care of the heart failure patient  Outcome: Met     Problem: Care Map:  Day of Discharge Optimal Outcome for the Heart Failure Patient  Goal: Day of Discharge:  Optimal Care of the heart failure patient  Outcome: Met     Problem: Knowledge Deficit - Standard  Goal: Patient and family/care givers will demonstrate understanding of plan of care, disease process/condition, diagnostic tests and medications  Outcome: Met     Problem: Pain - Standard  Goal: Alleviation of pain or a reduction in pain to the patient’s comfort goal  Outcome: Met

## 2025-05-30 NOTE — PROGRESS NOTES
Discharge orders received.  Patient arrived to the discharge lounge.  PIV removed.  Instructions given, medications reviewed and general discharge education provided to patient.  Follow up appointments discussed.  Patient verbalized understanding of dc instructions and prescriptions.  Patient signed discharge instructions.  Patient verbalized understanding had all belongings with him.  Patient left with meds and wheeled to bus stop. Wished patient a speedy recovery.

## 2025-05-30 NOTE — PROGRESS NOTES
Bedside report received, pt care assumed, tele box on.  Pt denies any additional needs at this time. Bed in lowest position, pt educated on fall risk and verbalized understanding, call light within reach.

## 2025-05-30 NOTE — PROGRESS NOTES
HF booklet and education provided to patient with no further questions. HF core measures met. IV and tele box removed. Pt getting dressed.

## 2025-05-30 NOTE — PROGRESS NOTES
Monitor summary:        Rhythm: a fib w BBB  Rate:   Ectopy: (o)PVC, (r)coup, touched down to 38-40  Measurements: /.12/        12hr chart check

## 2025-05-30 NOTE — CARE PLAN
The patient is Stable - Low risk of patient condition declining or worsening    Shift Goals  Clinical Goals: diuresis, wound care, monitor labs  Patient Goals: comfort, rest,  Family Goals: nilson    Progress made toward(s) clinical / shift goals:    Problem: Care Map:  Day 1 Optimal Outcome for the Heart Failure Patient  Goal: Day 1:  Optimal Care of the heart failure patient  Outcome: Progressing     Problem: Knowledge Deficit - Standard  Goal: Patient and family/care givers will demonstrate understanding of plan of care, disease process/condition, diagnostic tests and medications  Outcome: Progressing     Problem: Pain - Standard  Goal: Alleviation of pain or a reduction in pain to the patient’s comfort goal  Outcome: Progressing       Patient is not progressing towards the following goals:

## 2025-05-30 NOTE — DISCHARGE PLANNING
Case Management Discharge Planning    Admission Date: 5/27/2025  GMLOS: 3.9  ALOS: 3    6-Clicks ADL Score: 22  6-Clicks Mobility Score: 23      Anticipated Discharge Dispo: Discharge Disposition: Discharged to home/self care (01)  Discharge Address: Shelter    DME Needed: No    Action(s) Taken: Updated Provider/Nurse on Discharge Plan    0830, Pt was discussed in IDT rounds. Pt is medically cleared for discharge but will need outpatient wound clinic appointment set up per Dr. Earl.    1006, RN LEONEL called and spoke to Gabriele from [facility] and he was able to set up wound clinic appointment on 06/03/2025 at 0900. IDT team informed through [employer]. Bus pass given to JANNETTE TAPIA per Pt's request.    1045, RN LEONEL informed CHW Ericka PEREZ to provide Pt community resources.    No further CM needs identified.    Escalations Completed: None    Medically Clear: Yes    Next Steps: CM will continue to assist Pt with discharge needs.      Barriers to Discharge: None    Is the patient up for discharge tomorrow: No

## 2025-06-17 ENCOUNTER — APPOINTMENT (OUTPATIENT)
Dept: WOUND CARE | Facility: MEDICAL CENTER | Age: 75
End: 2025-06-17
Payer: MEDICARE

## 2025-06-24 ENCOUNTER — APPOINTMENT (OUTPATIENT)
Dept: WOUND CARE | Facility: MEDICAL CENTER | Age: 75
End: 2025-06-24
Payer: MEDICARE

## 2025-06-29 ENCOUNTER — HOSPITAL ENCOUNTER (INPATIENT)
Facility: MEDICAL CENTER | Age: 75
End: 2025-06-29
Attending: STUDENT IN AN ORGANIZED HEALTH CARE EDUCATION/TRAINING PROGRAM | Admitting: HOSPITALIST
Payer: MEDICARE

## 2025-06-29 ENCOUNTER — APPOINTMENT (OUTPATIENT)
Dept: RADIOLOGY | Facility: MEDICAL CENTER | Age: 75
DRG: 291 | End: 2025-06-29
Attending: STUDENT IN AN ORGANIZED HEALTH CARE EDUCATION/TRAINING PROGRAM
Payer: MEDICARE

## 2025-06-29 DIAGNOSIS — L97.929 ULCER OF LEFT LOWER EXTREMITY, UNSPECIFIED ULCER STAGE (HCC): ICD-10-CM

## 2025-06-29 DIAGNOSIS — I50.1 ACUTE LEFT-SIDED CHF (CONGESTIVE HEART FAILURE) (HCC): ICD-10-CM

## 2025-06-29 DIAGNOSIS — L03.116 CELLULITIS OF LEFT LOWER EXTREMITY: ICD-10-CM

## 2025-06-29 DIAGNOSIS — I50.9 DECOMPENSATED HEART FAILURE (HCC): Primary | ICD-10-CM

## 2025-06-29 PROBLEM — Z66 DNR (DO NOT RESUSCITATE): Status: ACTIVE | Noted: 2025-06-29

## 2025-06-29 PROBLEM — E87.70 VOLUME OVERLOAD: Status: ACTIVE | Noted: 2025-06-29

## 2025-06-29 LAB
ALBUMIN SERPL BCP-MCNC: 3.6 G/DL (ref 3.2–4.9)
ALBUMIN/GLOB SERPL: 1.1 G/DL
ALP SERPL-CCNC: 87 U/L (ref 30–99)
ALT SERPL-CCNC: 41 U/L (ref 2–50)
ANION GAP SERPL CALC-SCNC: 10 MMOL/L (ref 7–16)
ANISOCYTOSIS BLD QL SMEAR: ABNORMAL
AST SERPL-CCNC: 80 U/L (ref 12–45)
BASOPHILS # BLD AUTO: 0.1 % (ref 0–1.8)
BASOPHILS # BLD: 0.01 K/UL (ref 0–0.12)
BILIRUB SERPL-MCNC: 0.5 MG/DL (ref 0.1–1.5)
BUN SERPL-MCNC: 20 MG/DL (ref 8–22)
CALCIUM ALBUM COR SERPL-MCNC: 9 MG/DL (ref 8.5–10.5)
CALCIUM SERPL-MCNC: 8.7 MG/DL (ref 8.5–10.5)
CHLORIDE SERPL-SCNC: 113 MMOL/L (ref 96–112)
CO2 SERPL-SCNC: 21 MMOL/L (ref 20–33)
COMMENT 1642: NORMAL
CREAT SERPL-MCNC: 1.1 MG/DL (ref 0.5–1.4)
CRP SERPL HS-MCNC: 0.53 MG/DL (ref 0–0.75)
EKG IMPRESSION: NORMAL
EOSINOPHIL # BLD AUTO: 0.14 K/UL (ref 0–0.51)
EOSINOPHIL NFR BLD: 1.4 % (ref 0–6.9)
ERYTHROCYTE [DISTWIDTH] IN BLOOD BY AUTOMATED COUNT: 59.9 FL (ref 35.9–50)
ERYTHROCYTE [SEDIMENTATION RATE] IN BLOOD BY WESTERGREN METHOD: 90 MM/HOUR (ref 0–20)
FLUAV RNA SPEC QL NAA+PROBE: NEGATIVE
FLUBV RNA SPEC QL NAA+PROBE: NEGATIVE
GFR SERPLBLD CREATININE-BSD FMLA CKD-EPI: 70 ML/MIN/1.73 M 2
GLOBULIN SER CALC-MCNC: 3.4 G/DL (ref 1.9–3.5)
GLUCOSE SERPL-MCNC: 95 MG/DL (ref 65–99)
HCT VFR BLD AUTO: 27.8 % (ref 42–52)
HGB BLD-MCNC: 8.2 G/DL (ref 14–18)
HYPOCHROMIA BLD QL SMEAR: ABNORMAL
IMM GRANULOCYTES # BLD AUTO: 0.04 K/UL (ref 0–0.11)
IMM GRANULOCYTES NFR BLD AUTO: 0.4 % (ref 0–0.9)
LYMPHOCYTES # BLD AUTO: 0.64 K/UL (ref 1–4.8)
LYMPHOCYTES NFR BLD: 6.4 % (ref 22–41)
MACROCYTES BLD QL SMEAR: ABNORMAL
MAGNESIUM SERPL-MCNC: 2.1 MG/DL (ref 1.5–2.5)
MCH RBC QN AUTO: 25.5 PG (ref 27–33)
MCHC RBC AUTO-ENTMCNC: 29.5 G/DL (ref 32.3–36.5)
MCV RBC AUTO: 86.3 FL (ref 81.4–97.8)
MONOCYTES # BLD AUTO: 0.72 K/UL (ref 0–0.85)
MONOCYTES NFR BLD AUTO: 7.2 % (ref 0–13.4)
MORPHOLOGY BLD-IMP: NORMAL
NEUTROPHILS # BLD AUTO: 8.5 K/UL (ref 1.82–7.42)
NEUTROPHILS NFR BLD: 84.5 % (ref 44–72)
NRBC # BLD AUTO: 0 K/UL
NRBC BLD-RTO: 0 /100 WBC (ref 0–0.2)
NT-PROBNP SERPL IA-MCNC: 2905 PG/ML (ref 0–125)
OVALOCYTES BLD QL SMEAR: NORMAL
PHOSPHATE SERPL-MCNC: 2.8 MG/DL (ref 2.5–4.5)
PLATELET # BLD AUTO: 190 K/UL (ref 164–446)
PLATELET BLD QL SMEAR: NORMAL
PMV BLD AUTO: 9.5 FL (ref 9–12.9)
POIKILOCYTOSIS BLD QL SMEAR: NORMAL
POTASSIUM SERPL-SCNC: 4.4 MMOL/L (ref 3.6–5.5)
PROT SERPL-MCNC: 7 G/DL (ref 6–8.2)
RBC # BLD AUTO: 3.22 M/UL (ref 4.7–6.1)
RBC BLD AUTO: PRESENT
RSV RNA SPEC QL NAA+PROBE: NEGATIVE
SARS-COV-2 RNA RESP QL NAA+PROBE: NEGATIVE
SODIUM SERPL-SCNC: 144 MMOL/L (ref 135–145)
TROPONIN T SERPL-MCNC: 28 NG/L (ref 6–19)
TROPONIN T SERPL-MCNC: 29 NG/L (ref 6–19)
WBC # BLD AUTO: 10.1 K/UL (ref 4.8–10.8)

## 2025-06-29 PROCEDURE — 700102 HCHG RX REV CODE 250 W/ 637 OVERRIDE(OP): Performed by: HOSPITALIST

## 2025-06-29 PROCEDURE — 36415 COLL VENOUS BLD VENIPUNCTURE: CPT

## 2025-06-29 PROCEDURE — 84100 ASSAY OF PHOSPHORUS: CPT

## 2025-06-29 PROCEDURE — 700111 HCHG RX REV CODE 636 W/ 250 OVERRIDE (IP): Mod: JZ | Performed by: STUDENT IN AN ORGANIZED HEALTH CARE EDUCATION/TRAINING PROGRAM

## 2025-06-29 PROCEDURE — 99285 EMERGENCY DEPT VISIT HI MDM: CPT

## 2025-06-29 PROCEDURE — A9270 NON-COVERED ITEM OR SERVICE: HCPCS | Performed by: HOSPITALIST

## 2025-06-29 PROCEDURE — 84484 ASSAY OF TROPONIN QUANT: CPT

## 2025-06-29 PROCEDURE — 83735 ASSAY OF MAGNESIUM: CPT

## 2025-06-29 PROCEDURE — 96374 THER/PROPH/DIAG INJ IV PUSH: CPT

## 2025-06-29 PROCEDURE — 80053 COMPREHEN METABOLIC PANEL: CPT

## 2025-06-29 PROCEDURE — 700111 HCHG RX REV CODE 636 W/ 250 OVERRIDE (IP): Mod: JZ | Performed by: HOSPITALIST

## 2025-06-29 PROCEDURE — 99223 1ST HOSP IP/OBS HIGH 75: CPT | Mod: AI | Performed by: HOSPITALIST

## 2025-06-29 PROCEDURE — 85652 RBC SED RATE AUTOMATED: CPT

## 2025-06-29 PROCEDURE — 770006 HCHG ROOM/CARE - MED/SURG/GYN SEMI*

## 2025-06-29 PROCEDURE — 700101 HCHG RX REV CODE 250: Performed by: HOSPITALIST

## 2025-06-29 PROCEDURE — 0241U POC COV-2, FLU A/B, RSV BY PCR: CPT | Performed by: STUDENT IN AN ORGANIZED HEALTH CARE EDUCATION/TRAINING PROGRAM

## 2025-06-29 PROCEDURE — 93005 ELECTROCARDIOGRAM TRACING: CPT | Mod: TC | Performed by: STUDENT IN AN ORGANIZED HEALTH CARE EDUCATION/TRAINING PROGRAM

## 2025-06-29 PROCEDURE — 86140 C-REACTIVE PROTEIN: CPT

## 2025-06-29 PROCEDURE — 73590 X-RAY EXAM OF LOWER LEG: CPT | Mod: LT

## 2025-06-29 PROCEDURE — 85025 COMPLETE CBC W/AUTO DIFF WBC: CPT

## 2025-06-29 PROCEDURE — 71045 X-RAY EXAM CHEST 1 VIEW: CPT

## 2025-06-29 PROCEDURE — 83880 ASSAY OF NATRIURETIC PEPTIDE: CPT

## 2025-06-29 RX ORDER — DIGOXIN 125 MCG
125 TABLET ORAL DAILY
Status: DISCONTINUED | OUTPATIENT
Start: 2025-06-29 | End: 2025-07-01 | Stop reason: HOSPADM

## 2025-06-29 RX ORDER — FUROSEMIDE 10 MG/ML
40 INJECTION INTRAMUSCULAR; INTRAVENOUS ONCE
Status: COMPLETED | OUTPATIENT
Start: 2025-06-29 | End: 2025-06-29

## 2025-06-29 RX ORDER — ATORVASTATIN CALCIUM 40 MG/1
40 TABLET, FILM COATED ORAL EVERY EVENING
Status: DISCONTINUED | OUTPATIENT
Start: 2025-06-29 | End: 2025-07-01 | Stop reason: HOSPADM

## 2025-06-29 RX ORDER — METOPROLOL SUCCINATE 25 MG/1
12.5 TABLET, EXTENDED RELEASE ORAL EVERY EVENING
Status: DISCONTINUED | OUTPATIENT
Start: 2025-06-29 | End: 2025-07-01 | Stop reason: HOSPADM

## 2025-06-29 RX ORDER — ACETAMINOPHEN 325 MG/1
650 TABLET ORAL EVERY 6 HOURS PRN
Status: DISCONTINUED | OUTPATIENT
Start: 2025-06-29 | End: 2025-07-01 | Stop reason: HOSPADM

## 2025-06-29 RX ORDER — AMOXICILLIN 250 MG
2 CAPSULE ORAL EVERY EVENING
Status: DISCONTINUED | OUTPATIENT
Start: 2025-06-29 | End: 2025-07-01 | Stop reason: HOSPADM

## 2025-06-29 RX ORDER — FUROSEMIDE 10 MG/ML
80 INJECTION INTRAMUSCULAR; INTRAVENOUS EVERY 12 HOURS
Status: DISCONTINUED | OUTPATIENT
Start: 2025-06-29 | End: 2025-06-30

## 2025-06-29 RX ORDER — SODIUM HYPOCHLORITE 1.25 MG/ML
SOLUTION TOPICAL 2 TIMES DAILY
Status: DISCONTINUED | OUTPATIENT
Start: 2025-06-29 | End: 2025-07-01 | Stop reason: HOSPADM

## 2025-06-29 RX ORDER — POLYETHYLENE GLYCOL 3350 17 G/17G
1 POWDER, FOR SOLUTION ORAL
Status: DISCONTINUED | OUTPATIENT
Start: 2025-06-29 | End: 2025-07-01 | Stop reason: HOSPADM

## 2025-06-29 RX ADMIN — FUROSEMIDE 40 MG: 10 INJECTION, SOLUTION INTRAVENOUS at 05:16

## 2025-06-29 RX ADMIN — METOPROLOL SUCCINATE 12.5 MG: 25 TABLET, EXTENDED RELEASE ORAL at 18:07

## 2025-06-29 RX ADMIN — FUROSEMIDE 80 MG: 10 INJECTION, SOLUTION INTRAVENOUS at 10:01

## 2025-06-29 RX ADMIN — DAKIN'S SOLUTION 0.125% (QUARTER STRENGTH) 10 ML: 0.12 SOLUTION at 18:08

## 2025-06-29 RX ADMIN — APIXABAN 5 MG: 5 TABLET, FILM COATED ORAL at 10:01

## 2025-06-29 RX ADMIN — ATORVASTATIN CALCIUM 40 MG: 40 TABLET, FILM COATED ORAL at 18:08

## 2025-06-29 RX ADMIN — ACETAMINOPHEN 650 MG: 325 TABLET ORAL at 21:03

## 2025-06-29 RX ADMIN — DOCUSATE SODIUM 50 MG AND SENNOSIDES 8.6 MG 2 TABLET: 8.6; 5 TABLET, FILM COATED ORAL at 18:07

## 2025-06-29 RX ADMIN — DIGOXIN 125 MCG: 0.12 TABLET ORAL at 18:08

## 2025-06-29 RX ADMIN — APIXABAN 5 MG: 5 TABLET, FILM COATED ORAL at 18:12

## 2025-06-29 ASSESSMENT — CHA2DS2 SCORE
VASCULAR DISEASE: YES
AGE 65 TO 74: NO
CHA2DS2 VASC SCORE: 5
AGE 75 OR GREATER: YES
PRIOR STROKE OR TIA OR THROMBOEMBOLISM: NO
CHF OR LEFT VENTRICULAR DYSFUNCTION: YES
HYPERTENSION: YES
DIABETES: NO
SEX: MALE

## 2025-06-29 ASSESSMENT — LIFESTYLE VARIABLES
HOW MANY TIMES IN THE PAST YEAR HAVE YOU HAD 5 OR MORE DRINKS IN A DAY: 0
AVERAGE NUMBER OF DAYS PER WEEK YOU HAVE A DRINK CONTAINING ALCOHOL: 5
DOES PATIENT WANT TO STOP DRINKING: NO
TOTAL SCORE: 0
TOTAL SCORE: 0
ON A TYPICAL DAY WHEN YOU DRINK ALCOHOL HOW MANY DRINKS DO YOU HAVE: 2
EVER FELT BAD OR GUILTY ABOUT YOUR DRINKING: NO
TOTAL SCORE: 0
ALCOHOL_USE: YES
CONSUMPTION TOTAL: NEGATIVE
HAVE PEOPLE ANNOYED YOU BY CRITICIZING YOUR DRINKING: NO
EVER HAD A DRINK FIRST THING IN THE MORNING TO STEADY YOUR NERVES TO GET RID OF A HANGOVER: NO
HAVE YOU EVER FELT YOU SHOULD CUT DOWN ON YOUR DRINKING: NO

## 2025-06-29 ASSESSMENT — COGNITIVE AND FUNCTIONAL STATUS - GENERAL
DAILY ACTIVITIY SCORE: 22
EATING MEALS: A LITTLE
SUGGESTED CMS G CODE MODIFIER MOBILITY: CI
SUGGESTED CMS G CODE MODIFIER DAILY ACTIVITY: CJ
HELP NEEDED FOR BATHING: A LITTLE
MOBILITY SCORE: 23
CLIMB 3 TO 5 STEPS WITH RAILING: A LITTLE

## 2025-06-29 ASSESSMENT — SOCIAL DETERMINANTS OF HEALTH (SDOH)
WITHIN THE LAST YEAR, HAVE YOU BEEN AFRAID OF YOUR PARTNER OR EX-PARTNER?: NO
WITHIN THE PAST 12 MONTHS, YOU WORRIED THAT YOUR FOOD WOULD RUN OUT BEFORE YOU GOT THE MONEY TO BUY MORE: NEVER TRUE
WITHIN THE LAST YEAR, HAVE TO BEEN RAPED OR FORCED TO HAVE ANY KIND OF SEXUAL ACTIVITY BY YOUR PARTNER OR EX-PARTNER?: NO
WITHIN THE LAST YEAR, HAVE YOU BEEN AFRAID OF YOUR PARTNER OR EX-PARTNER?: NO
WITHIN THE LAST YEAR, HAVE TO BEEN RAPED OR FORCED TO HAVE ANY KIND OF SEXUAL ACTIVITY BY YOUR PARTNER OR EX-PARTNER?: NO
WITHIN THE LAST YEAR, HAVE YOU BEEN HUMILIATED OR EMOTIONALLY ABUSED IN OTHER WAYS BY YOUR PARTNER OR EX-PARTNER?: NO
IN THE PAST 12 MONTHS, HAS THE ELECTRIC, GAS, OIL, OR WATER COMPANY THREATENED TO SHUT OFF SERVICE IN YOUR HOME?: NO
WITHIN THE LAST YEAR, HAVE YOU BEEN HUMILIATED OR EMOTIONALLY ABUSED IN OTHER WAYS BY YOUR PARTNER OR EX-PARTNER?: NO
WITHIN THE LAST YEAR, HAVE YOU BEEN KICKED, HIT, SLAPPED, OR OTHERWISE PHYSICALLY HURT BY YOUR PARTNER OR EX-PARTNER?: NO
WITHIN THE PAST 12 MONTHS, THE FOOD YOU BOUGHT JUST DIDN'T LAST AND YOU DIDN'T HAVE MONEY TO GET MORE: NEVER TRUE
WITHIN THE LAST YEAR, HAVE YOU BEEN KICKED, HIT, SLAPPED, OR OTHERWISE PHYSICALLY HURT BY YOUR PARTNER OR EX-PARTNER?: NO

## 2025-06-29 ASSESSMENT — FIBROSIS 4 INDEX
FIB4 SCORE: 4.93
FIB4 SCORE: 4.93
FIB4 SCORE: 2.8

## 2025-06-29 ASSESSMENT — PAIN DESCRIPTION - PAIN TYPE
TYPE: ACUTE PAIN

## 2025-06-29 ASSESSMENT — ENCOUNTER SYMPTOMS
CHILLS: 0
FEVER: 0
SHORTNESS OF BREATH: 1

## 2025-06-29 ASSESSMENT — PATIENT HEALTH QUESTIONNAIRE - PHQ9
1. LITTLE INTEREST OR PLEASURE IN DOING THINGS: NOT AT ALL
2. FEELING DOWN, DEPRESSED, IRRITABLE, OR HOPELESS: NOT AT ALL
SUM OF ALL RESPONSES TO PHQ9 QUESTIONS 1 AND 2: 0

## 2025-06-29 NOTE — ED NOTES
PIV established. Blood collected and sent to lab. Pt medicated per MAR. Call light within reach. Urinals at bedside.

## 2025-06-29 NOTE — ASSESSMENT & PLAN NOTE
He lives on the streets and does not want to go to the [facility]  He does not have children and no local family

## 2025-06-29 NOTE — ASSESSMENT & PLAN NOTE
SHANTI from January was concern for possible mid posterior tibial artery stenosis or occlusion. Repeat SHANTI -diffuse plaque throughout the common femoral, superficial femoral and popliteal arteries, with 50 to 75% stenosis in distal femoral and popliteal artery, absence of flow in the distal peroneal.  Imaging was reviewed by vascular surgery, they do not recommend any vascular intervention

## 2025-06-29 NOTE — ASSESSMENT & PLAN NOTE
Chronic wound  Exacerbated by swelling  Xray does not reveal evidence of osteomyelitis  ESR 90 though no clinical evidence of acute infection.  Refrain from antibiotics.  Procalcitonin negative  Wound care team consulted.   Outpatient wound care will be a challenge as he is homeless and living on the streets. He missed the outpatient wound care appointments on 6/3 and 6/10.

## 2025-06-29 NOTE — ED NOTES
Pt has a weeping circumferential wound on left calf. Pt placed on monitor. Chart up for ERP.

## 2025-06-29 NOTE — ASSESSMENT & PLAN NOTE
Severe lower extremity edema despite compliance with outpatient diuretics  Secondary to severe pulmonary hypertension,      6/30/2025  Transfer to  telemetry for continuous ECG monitoring for arrhythmia  Continue on Eliquis, digoxin, metoprolol 12.5  Decrease IV Lasix 40 mg twice daily to reduce fluid overload, need close vitals monitoring  Administer supplemental oxygen to keep oxygen saturation over 90%  Repeat BMP in a.m. to monitor electrolytes, renal function and toxicity while on  high-dose IV diuretics  Strict monitoring for input and output, daily weight to guide diuretic therapy  Need close monitoring of blood pressure, oxygen requirement, heart rate and  worsening sign of heart failure  Optimize core measure medication for heart failure  Monitor for toxicity while in IV Lasix  High risk of deterioration from ongoing acute heart failure.  Need close hemodynamic monitoring under telemetry  Patient has a high medical complexity, complex decision making and is at high risk of complication, morbidity and mortality

## 2025-06-29 NOTE — H&P
Hospital Medicine History & Physical Note    Date of Service  6/29/2025    Primary Care Physician  Torri Razo P.A.-C.    Consultants  none      Code Status  Prior    Chief Complaint  Chief Complaint   Patient presents with    Leg Pain     Bilateral leg pain and swelling x 5 years, worse on the L. Pt has a Hx of CHF, says he has been taking his medications as prescribed but his swelling is not improving.        History of Presenting Illness  Robert Mcdonnell is a 75 y.o. male who presented 6/29/2025 with lower extremity swelling.  Mr. Mcdonnell has a past medical history of gastric adenocarcinoma status post partial gastrectomy, atrial fibrillation on Eliquis, severe pulmonary hypertension echocardiogram May 2025 revealing RSVP of 74 mmHg, and homelessness there was most recently admitted to this hospital from May 27 through May 30 with volume overload.  Echocardiogram during the hospitalization revealed a RSVP of 74 mmHg and EF of 45%.  He was treated with aggressive IV diuresis during the hospitalization.  He has a chronic wound left lower extremity and was found to have peripheral vascular disease though vascular surgery has evaluated him in the past and did not recommend intervention.  After discharge from his last hospitalization he missed the last 2 outpatient wound care visits on Lacey 3 and Lacey 10.  Mr. Mcdonnell presents emergency room today with worsening lower extremity swelling despite adherence to his outpatient Lasix 40 mg daily and Aldactone 25 mg daily.  In the emergency room he has severe swelling lower extremities and the nonhealing chronic wound.  His ESR is 90 though the wound does not appear to go down to the bone and there is no evidence of periosteal compromise on x-ray.  He will be admitted for aggressive IV diuresis follow his electrolytes.  We discussed his CODE STATUS and he is elected for DNR/DNI status.    I discussed the plan of care with Dr. Philippe.    Review of Systems  Review of  Systems   Constitutional:  Negative for chills and fever.   Respiratory:  Positive for shortness of breath.    Cardiovascular:  Positive for leg swelling. Negative for chest pain.       Past Medical History   has a past medical history of Arrhythmia, CHF (congestive heart failure) (HCC), Congestive heart failure (HCC), and Hypertension.    Surgical History   has a past surgical history that includes pr upper gi endoscopy,diagnosis (N/A, 01/31/2023); pr upper gi endoscopy,biopsy (N/A, 09/10/2024); pr upper gi endoscopy,scler inject (N/A, 09/10/2024); gastroscopy with endostat (N/A, 09/10/2024); cataract phaco with iol (Left); pr upper gi endoscopy,diagnosis (N/A, 9/13/2024); pr upper gi endoscopy,biopsy (N/A, 9/13/2024); pr upper gi endoscopy,scler inject (N/A, 9/13/2024); gastrectomy (N/A, 10/7/2024); node dissection (N/A, 10/7/2024); creation, flap, omentum (N/A, 10/7/2024); and cholecystectomy (N/A, 10/7/2024).     Family History  Family History   Problem Relation Age of Onset    Heart Disease Mother     Heart Disease Father         Family history reviewed with patient. There is no family history that is pertinent to the chief complaint.     Social History   reports that he has been smoking cigarettes. He has never used smokeless tobacco. He reports current alcohol use of about 1.2 oz of alcohol per week. He reports that he does not currently use drugs.    Allergies  Allergies[1]    Medications  Prior to Admission Medications   Prescriptions Last Dose Informant Patient Reported? Taking?   apixaban (ELIQUIS) 5mg Tab 6/27/2025  No No   Sig: Take 1 Tablet by mouth 2 times a day for 90 days.   atorvastatin (LIPITOR) 40 MG Tab 6/27/2025  No No   Sig: Take 1 Tablet by mouth every evening for 90 days.   digoxin (LANOXIN) 125 MCG Tab 6/27/2025  No No   Sig: Take 1 Tablet by mouth every day at 6 PM for 90 days.   furosemide (LASIX) 40 MG Tab 6/27/2025  No No   Sig: Take 1 Tablet by mouth every day for 90 days.   losartan  (COZAAR) 25 MG Tab 6/27/2025  No No   Sig: Take 1 Tablet by mouth every day for 90 days.   metoprolol SR (TOPROL XL) 25 MG TABLET SR 24 HR 6/27/2025  No No   Sig: Take one-half (0.5) Tablet by mouth every evening for 90 days.   omeprazole (PRILOSEC) 20 MG delayed-release capsule 6/27/2025  No No   Sig: Take 1 Capsule by mouth every day for 90 days.   spironolactone (ALDACTONE) 25 MG Tab 6/27/2025  No No   Sig: Take 1 Tablet by mouth every day for 90 days.      Facility-Administered Medications: None       Physical Exam  Temp:  [37.6 °C (99.6 °F)-38 °C (100.4 °F)] 37.6 °C (99.6 °F)  Pulse:  [] 79  Resp:  [16-20] 17  BP: (120-157)/(57-78) 155/78  SpO2:  [95 %-99 %] 97 %  Blood Pressure : (!) 155/78   Temperature: 37.6 °C (99.6 °F)   Pulse: 79   Respiration: 17   Pulse Oximetry: 97 %       Physical Exam  Vitals and nursing note reviewed.   Constitutional:       Appearance: He is ill-appearing.   Cardiovascular:      Rate and Rhythm: Normal rate. Rhythm irregular.   Pulmonary:      Comments: Room air  Few crackles  Abdominal:      General: There is distension.      Tenderness: There is no abdominal tenderness.   Musculoskeletal:      Right lower leg: Edema present.      Left lower leg: Edema present.      Comments: Left shin wound with granulation tissue and exudate though no redness no kristie pus   Skin:     Comments: Very tan   Neurological:      General: No focal deficit present.      Mental Status: He is alert and oriented to person, place, and time.   Psychiatric:         Behavior: Behavior normal.         Laboratory:  Recent Labs     06/29/25  0511   WBC 10.1   RBC 3.22*   HEMOGLOBIN 8.2*   HEMATOCRIT 27.8*   MCV 86.3   MCH 25.5*   MCHC 29.5*   RDW 59.9*   PLATELETCT 190   MPV 9.5     Recent Labs     06/29/25  0511   SODIUM 144   POTASSIUM 4.4   CHLORIDE 113*   CO2 21   GLUCOSE 95   BUN 20   CREATININE 1.10   CALCIUM 8.7     Recent Labs     06/29/25  0511   ALTSGPT 41   ASTSGOT 80*   ALKPHOSPHAT 87    TBILIRUBIN 0.5   GLUCOSE 95         Recent Labs     06/29/25  0511   NTPROBNP 2905*         Recent Labs     06/29/25  0511 06/29/25  0652   TROPONINT 28* 29*       Imaging:  DX-TIBIA AND FIBULA LEFT   Final Result      1.  No acute osseous injury.   2.  Soft tissue swelling and soft tissue ulceration/wound.      DX-CHEST-PORTABLE (1 VIEW)   Final Result      Stable small left pleural effusion, central pulmonary vascular congestion, and cardiomegaly.          EKG: unchanged left bundle branch block, Afib        Assessment/Plan:  Justification for Admission Status  I anticipate this patient will require at least two midnights for appropriate medical management, necessitating inpatient admission because IV diuretics and follow electrolytes given the severe swelling despite adherence to outpatient diuretics.     Patient will need a Med/Surg bed on MEDICAL service .  The need is secondary to as above.    * Volume overload- (present on admission)  Assessment & Plan  Severe lower extremity edema despite compliance with outpatient diuretics  Secondary to severe pulmonary pretension  IV diuretics  Follow his urine output and follow his potassium and creatinine in the morning  Keep legs elevated while in bed    Pulmonary hypertension (HCC)- (present on admission)  Assessment & Plan  Echo May 2025 revealed RVSP 74 mmHg  He is on diuretics outpatient which will be given IV     Wound of lower extremity- (present on admission)  Assessment & Plan  Chronic wound  Exacerbated by swelling  Xray does not reveal evidence of osteomyelitis  ESR 90 though no clinical evidence of acute infection.  Refrain from antibiotics.   Wound care team consulted.   Outpatient wound care will be a challenge as he is homeless and living on the streets. He missed the outpatient wound care appointments on 6/3 and 6/10.    DNR (do not resuscitate)- (present on admission)  Assessment & Plan  We had a long discussion about advanced directives and he has  elected for DNR/DNI  He may be a candidate for Hospice in the near future given his severe pulmonary hypertension    PAD (peripheral artery disease) (HCC)- (present on admission)  Assessment & Plan   SHANTI from January was concern for possible mid posterior tibial artery stenosis or occlusion. Repeat SHANTI -diffuse plaque throughout the common femoral, superficial femoral and popliteal arteries, with 50 to 75% stenosis in distal femoral and popliteal artery, absence of flow in the distal peroneal.  Imaging was reviewed by vascular surgery, they do not recommend any vascular intervention     Malignant neoplasm of body of stomach (HCC)- (present on admission)  Assessment & Plan  Status post partial gastrectomy 2024  Medical oncology Dr. Liu and radiation oncology Dr. Bhatia    [facility]  Assessment & Plan  He lives on the streets and does not want to go to the [facility]  He does not have children and no local family        VTE prophylaxis: therapeutic anticoagulation with Eliquis         [1] No Known Allergies

## 2025-06-29 NOTE — ED TRIAGE NOTES
Chief Complaint   Patient presents with    Leg Pain     Bilateral leg pain and swelling x 5 years, worse on the L. Pt has a Hx of CHF, says he has been taking his medications as prescribed but his swelling is not improving.      Pt ambulatory to triage for above complaint. A&Ox4, GCS 15, speaking in full sentences. Respirations equal and unlabored. NAD.  Appropriate protocols ordered.   Pt educated on triage process and instructed to notify staff of worsening condition.   Pt placed in the lobby in stable condition.

## 2025-06-29 NOTE — ASSESSMENT & PLAN NOTE
We had a long discussion about advanced directives and he has elected for DNR/DNI  He may be a candidate for Hospice in the near future given his severe pulmonary hypertension

## 2025-06-29 NOTE — ED PROVIDER NOTES
ER Provider Note    Scribed for Sanket Philippe M.D. by Wendy Guerra. 6/29/2025   4:14 AM    Primary Care Provider: Torri Razo P.A.-C.    CHIEF COMPLAINT  Chief Complaint   Patient presents with    Leg Pain     Bilateral leg pain and swelling x 5 years, worse on the L. Pt has a Hx of CHF, says he has been taking his medications as prescribed but his swelling is not improving.      EXTERNAL RECORDS REVIEWED  Other Patient was last admitted on 5/27/2025 for HCC, wounds to the lower extremities, Pulmonary hypertension, COPD and PAD.     HPI/ROS  LIMITATION TO HISTORY   Select: : None  OUTSIDE HISTORIAN(S):  None    Robert Mcdonnell is a 75 y.o. male who presents to the ED for evaluation of bilateral lower leg pain onset two weeks ago. The patient notes he also has  intermittent dyspnea with exertion, chills, and lower leg swelling. He denies any fever, cough, or vomiting.  Denies chest pain.  He reports that he is taking Lasix as prescribed   The patient is homeless.       PAST MEDICAL HISTORY  Past Medical History[1]    SURGICAL HISTORY  Past Surgical History[2]    FAMILY HISTORY  Family History   Problem Relation Age of Onset    Heart Disease Mother     Heart Disease Father        SOCIAL HISTORY   reports that he has been smoking cigarettes. He has never used smokeless tobacco. He reports current alcohol use of about 1.2 oz of alcohol per week. He reports that he does not currently use drugs.    CURRENT MEDICATIONS  Previous Medications    APIXABAN (ELIQUIS) 5MG TAB    Take 1 Tablet by mouth 2 times a day for 90 days.    ATORVASTATIN (LIPITOR) 40 MG TAB    Take 1 Tablet by mouth every evening for 90 days.    DIGOXIN (LANOXIN) 125 MCG TAB    Take 1 Tablet by mouth every day at 6 PM for 90 days.    FUROSEMIDE (LASIX) 40 MG TAB    Take 1 Tablet by mouth every day for 90 days.    LOSARTAN (COZAAR) 25 MG TAB    Take 1 Tablet by mouth every day for 90 days.    METOPROLOL SR (TOPROL XL) 25 MG TABLET SR 24  "HR    Take one-half (0.5) Tablet by mouth every evening for 90 days.    OMEPRAZOLE (PRILOSEC) 20 MG DELAYED-RELEASE CAPSULE    Take 1 Capsule by mouth every day for 90 days.    SPIRONOLACTONE (ALDACTONE) 25 MG TAB    Take 1 Tablet by mouth every day for 90 days.       ALLERGIES  Allergies[3]     PHYSICAL EXAM  /57   Pulse (!) 103   Temp 37.6 °C (99.6 °F) (Oral)   Resp 20   Ht 1.905 m (6' 3\")   Wt 88 kg (194 lb 0.1 oz)   SpO2 95%   BMI 24.25 kg/m²    General: Chronically ill-appearing weak male.  Head: Normocephalic atraumatic  HENT: Extraocular motion intact  Neck: Supple, no rigidity elevated jugular venous pressure.  Cardiovascular: Regular rate and rhythm no murmurs rubs or gallops  Respiratory: Clear to auscultation bilaterally, equal chest rise and fall, no increased work of breathing  Abdomen: Soft nontender no guarding  Musculoskeletal: Warm and well perfused, Bilateral chronic appearing wounds with 3+ pitting edema,   Neuro: Alert, no focal deficits    DIAGNOSTIC STUDIES    Labs:   Labs Reviewed   CBC WITH DIFFERENTIAL - Abnormal; Notable for the following components:       Result Value    RBC 3.22 (*)     Hemoglobin 8.2 (*)     Hematocrit 27.8 (*)     MCH 25.5 (*)     MCHC 29.5 (*)     RDW 59.9 (*)     Neutrophils-Polys 84.50 (*)     Lymphocytes 6.40 (*)     Neutrophils (Absolute) 8.50 (*)     Lymphs (Absolute) 0.64 (*)     All other components within normal limits   COMP METABOLIC PANEL - Abnormal; Notable for the following components:    Chloride 113 (*)     AST(SGOT) 80 (*)     All other components within normal limits   SED RATE - Abnormal; Notable for the following components:    Sed Rate Westergren 90 (*)     All other components within normal limits   PROBRAIN NATRIURETIC PEPTIDE, NT - Abnormal; Notable for the following components:    NT-proBNP 2905 (*)     All other components within normal limits   TROPONIN - Abnormal; Notable for the following components:    Troponin T 28 (*)     All " other components within normal limits   TROPONIN - Abnormal; Notable for the following components:    Troponin T 29 (*)     All other components within normal limits   CRP QUANTITIVE (NON-CARDIAC)   MAGNESIUM   PHOSPHORUS   ESTIMATED GFR   PLATELET ESTIMATE   MORPHOLOGY   PERIPHERAL SMEAR REVIEW   DIFFERENTIAL COMMENT   POCT COV-2, FLU A/B, RSV BY PCR   POC COV-2, FLU A/B, RSV BY PCR   Chronic anemia not significant change, elevated proBNP, elevated troponin however similar to baseline, downtrending on repeat.  Patient  has a newly elevated ESR, which does suggest active infection.    Results for orders placed or performed during the hospital encounter of 25   EKG   Result Value Ref Range    Report       [facility] Emergency Dept.    Test Date:  2025  Pt Name:    HUNTER STEELE                 Department: ER  MRN:        [id number]                      Room:       15  Gender:     Male                         Technician: [id number]  :        [id number]                   Requested By:PABLO PHILIPPE  Order #:    [id number]                    Reading MD: Pablo Philippe    Measurements  Intervals                                Axis  Rate:       98                           P:          0  ME:         0                            QRS:        136  QRSD:       133                          T:          -59  QT:         351  QTc:        449    Interpretive Statements  Atrial fibrillation, rate of 98, right axis deviation, LBBB negative modified  Sgarbossa criteria, not significant change compared to prior 2025  Electronically Signed On 2025 08:42:45 PDT by Pablo Philippe         EKG:   I have independently interpreted this EKG as detailed above.       Radiology:       Radiologist interpretation:   DX-TIBIA AND FIBULA LEFT   Final Result      1.  No acute osseous injury.   2.  Soft tissue swelling and soft tissue ulceration/wound.      DX-CHEST-PORTABLE (1 VIEW)   Final Result      Stable  small left pleural effusion, central pulmonary vascular congestion, and cardiomegaly.           INITIAL ASSESSMENT COURSE AND PLAN    Care Narrative     4:14 AM - Patient was evaluated at bedside. They present for bilateral leg pain. Pertinent PE findings include: chronic wounds bilateral on the lower ex. Differential diagnoses include but not limited to: Heart failure exacerbation, osteomyelitis, cellulitis, chronic venous stasis.  No significant discoloration of the leg, it is warm, I doubt acute limb ischemia.      I am concerned that patient's edema is worsening despite diuretic adherence, he is unhoused, living on the street, and is barely mobile.  He is borderline febrile, which raises concern for early cellulitis or osteomyelitis.  His labs are discussed above, ultimately will defer antibiotic therapy as patient will be admitted, he is not septic, admitting team can decide if they want to initiate antibiotics, consider MRI  Or observe for clinical improvement with diuresis.      I am concerned that the patient is quite decompensated, and is failing outpatient management in the setting of severe pulmonary hypertension.    7:01 AM - I reevaluated the patient.  I discussed the patient's diagnostic study results which show . I informed the patient of my plan to admit today given the patient's current presentation and diagnostic study results. Patient verbalizes understanding and support with my plan for admission.       DISPOSITION AND DISCUSSIONS    I have discussed management of the patient with the following physicians and BRANDON's: Dr. Abbott (Hospitalist)    Discussion of management with other \[facility]\"" or appropriate source(s): None     Escalation of care considered, and ultimately not performed: diagnostic imaging.    Barriers to care at this time, including but not limited to: None noted.     DISPOSITION:  Patient will be hospitalized by Dr. Abbott in fair condition.      FINAL DIAGNOSIS  1. Decompensated heart  failure (HCC)    2. Cellulitis of left lower extremity        Wendy ZAMAN (Scribe), am scribing for, and in the presence of, Pablo Philippe M.D..    Electronically signed by: Wendy Guerra (Scribscooby), 6/29/2025    Pablo ZAMAN M.D. personally performed the services described in this documentation, as scribed by Wendy Guerra in my presence, and it is both accurate and complete.      The note accurately reflects work and decisions made by me.  Pablo Philippe M.D.  6/29/2025  8:43 AM         [1]   Past Medical History:  Diagnosis Date    Arrhythmia     CHF (congestive heart failure) (HCC)     Congestive heart failure (HCC)     Hypertension    [2]   Past Surgical History:  Procedure Laterality Date    GASTRECTOMY N/A 10/7/2024    Procedure: LAPAROTOMY, GASTRECTOMY-SUBTOTAL, WITH INTRAOPERATIVE ULTRASOUND GUIDANCE;  Surgeon: Mt Cabral M.D.;  Location: [facility];  Service: General    NODE DISSECTION N/A 10/7/2024    Procedure: LYMPHADENECTOMY-NODE DISSECTION;  Surgeon: Mt Cabral M.D.;  Location: SURGERY [facility];  Service: General    CREATION, FLAP, OMENTUM N/A 10/7/2024    Procedure: CREATION, FLAP, OMENTUM;  Surgeon: Mt Cabral M.D.;  Location: SURGERY [facility];  Service: General    CHOLECYSTECTOMY N/A 10/7/2024    Procedure: CHOLECYSTECTOMY;  Surgeon: Mt Cabral M.D.;  Location: SURGERY [facility];  Service: General    AZ UPPER GI ENDOSCOPY,DIAGNOSIS N/A 9/13/2024    Procedure: GASTROSCOPY;  Surgeon: Sarah Lozoya M.D.;  Location: SURGERY SAME DAY [facility];  Service: Gastroenterology    AZ UPPER GI ENDOSCOPY,BIOPSY N/A 9/13/2024    Procedure: GASTROSCOPY, WITH BIOPSY;  Surgeon: Sarah Lozoya M.D.;  Location: SURGERY SAME DAY [facility];  Service: Gastroenterology    [place] UPPER GI ENDOSCOPY,SCLER INJECT N/A 9/13/2024    Procedure: GASTROSCOPY, WITH SCLEROTHERAPY;  Surgeon: Sarah Lozoya M.D.;   Location: SURGERY SAME DAY [facility];  Service: Gastroenterology    [place] UPPER GI ENDOSCOPY,BIOPSY N/A 09/10/2024    Procedure: GASTROSCOPY, WITH BIOPSY;  Surgeon: Demond Gutierres M.D.;  Location: SURGERY SAME DAY [facility];  Service: Gastroenterology    [place] UPPER GI ENDOSCOPY,SCLER INJECT N/A 09/10/2024    Procedure: GASTROSCOPY, WITH SCLEROTHERAPY;  Surgeon: Demond Gutierres M.D.;  Location: SURGERY SAME DAY [facility];  Service: Gastroenterology    GASTROSCOPY WITH ENDOSTAT N/A 09/10/2024    Procedure: EGD, WITH CAUTERIZATION;  Surgeon: Demond Gutierres M.D.;  Location: SURGERY SAME DAY [facility];  Service: Gastroenterology    VT UPPER GI ENDOSCOPY,DIAGNOSIS N/A 01/31/2023    Procedure: GASTROSCOPY;  Surgeon: Joy Mcnair M.D.;  Location: SURGERY SAME DAY [facility];  Service: Gastroenterology    CATARACT PHACO WITH IOL Left    [3] No Known Allergies

## 2025-06-29 NOTE — ED NOTES
Med rec complete per pt and rx bottles at bedside. Meds were reviewed with pt then returned to bedside.     Pt takes eliquis- was last taken on 6/27/25

## 2025-06-30 VITALS
OXYGEN SATURATION: 93 % | HEIGHT: 75 IN | RESPIRATION RATE: 18 BRPM | TEMPERATURE: 98.2 F | DIASTOLIC BLOOD PRESSURE: 76 MMHG | HEART RATE: 83 BPM | BODY MASS INDEX: 21.71 KG/M2 | WEIGHT: 174.6 LBS | SYSTOLIC BLOOD PRESSURE: 134 MMHG

## 2025-06-30 PROBLEM — I50.9 ACUTE CHF (CONGESTIVE HEART FAILURE) (HCC): Status: ACTIVE | Noted: 2025-06-29

## 2025-06-30 LAB
ANION GAP SERPL CALC-SCNC: 12 MMOL/L (ref 7–16)
BASOPHILS # BLD AUTO: 0.3 % (ref 0–1.8)
BASOPHILS # BLD: 0.03 K/UL (ref 0–0.12)
BUN SERPL-MCNC: 19 MG/DL (ref 8–22)
CALCIUM SERPL-MCNC: 9 MG/DL (ref 8.5–10.5)
CHLORIDE SERPL-SCNC: 104 MMOL/L (ref 96–112)
CO2 SERPL-SCNC: 24 MMOL/L (ref 20–33)
CREAT SERPL-MCNC: 1.17 MG/DL (ref 0.5–1.4)
EOSINOPHIL # BLD AUTO: 0.3 K/UL (ref 0–0.51)
EOSINOPHIL NFR BLD: 3.4 % (ref 0–6.9)
ERYTHROCYTE [DISTWIDTH] IN BLOOD BY AUTOMATED COUNT: 59.4 FL (ref 35.9–50)
GFR SERPLBLD CREATININE-BSD FMLA CKD-EPI: 65 ML/MIN/1.73 M 2
GLUCOSE SERPL-MCNC: 72 MG/DL (ref 65–99)
HCT VFR BLD AUTO: 28.4 % (ref 42–52)
HGB BLD-MCNC: 8.8 G/DL (ref 14–18)
IMM GRANULOCYTES # BLD AUTO: 0.03 K/UL (ref 0–0.11)
IMM GRANULOCYTES NFR BLD AUTO: 0.3 % (ref 0–0.9)
LYMPHOCYTES # BLD AUTO: 0.93 K/UL (ref 1–4.8)
LYMPHOCYTES NFR BLD: 10.7 % (ref 22–41)
MAGNESIUM SERPL-MCNC: 1.9 MG/DL (ref 1.5–2.5)
MCH RBC QN AUTO: 25.7 PG (ref 27–33)
MCHC RBC AUTO-ENTMCNC: 31 G/DL (ref 32.3–36.5)
MCV RBC AUTO: 83 FL (ref 81.4–97.8)
MONOCYTES # BLD AUTO: 0.66 K/UL (ref 0–0.85)
MONOCYTES NFR BLD AUTO: 7.6 % (ref 0–13.4)
NEUTROPHILS # BLD AUTO: 6.75 K/UL (ref 1.82–7.42)
NEUTROPHILS NFR BLD: 77.7 % (ref 44–72)
NRBC # BLD AUTO: 0 K/UL
NRBC BLD-RTO: 0 /100 WBC (ref 0–0.2)
PHOSPHATE SERPL-MCNC: 3 MG/DL (ref 2.5–4.5)
PLATELET # BLD AUTO: 201 K/UL (ref 164–446)
PMV BLD AUTO: 9.8 FL (ref 9–12.9)
POTASSIUM SERPL-SCNC: 4.6 MMOL/L (ref 3.6–5.5)
PROCALCITONIN SERPL-MCNC: 0.06 NG/ML
RBC # BLD AUTO: 3.42 M/UL (ref 4.7–6.1)
SODIUM SERPL-SCNC: 140 MMOL/L (ref 135–145)
WBC # BLD AUTO: 8.7 K/UL (ref 4.8–10.8)

## 2025-06-30 PROCEDURE — 84100 ASSAY OF PHOSPHORUS: CPT

## 2025-06-30 PROCEDURE — 97602 WOUND(S) CARE NON-SELECTIVE: CPT

## 2025-06-30 PROCEDURE — 770020 HCHG ROOM/CARE - TELE (206)

## 2025-06-30 PROCEDURE — 84145 PROCALCITONIN (PCT): CPT

## 2025-06-30 PROCEDURE — 85025 COMPLETE CBC W/AUTO DIFF WBC: CPT

## 2025-06-30 PROCEDURE — A9270 NON-COVERED ITEM OR SERVICE: HCPCS | Performed by: HOSPITALIST

## 2025-06-30 PROCEDURE — 700111 HCHG RX REV CODE 636 W/ 250 OVERRIDE (IP): Mod: JZ | Performed by: HOSPITALIST

## 2025-06-30 PROCEDURE — 700102 HCHG RX REV CODE 250 W/ 637 OVERRIDE(OP): Performed by: HOSPITALIST

## 2025-06-30 PROCEDURE — 80048 BASIC METABOLIC PNL TOTAL CA: CPT

## 2025-06-30 PROCEDURE — 99233 SBSQ HOSP IP/OBS HIGH 50: CPT | Performed by: STUDENT IN AN ORGANIZED HEALTH CARE EDUCATION/TRAINING PROGRAM

## 2025-06-30 PROCEDURE — 83735 ASSAY OF MAGNESIUM: CPT

## 2025-06-30 PROCEDURE — 700111 HCHG RX REV CODE 636 W/ 250 OVERRIDE (IP): Performed by: STUDENT IN AN ORGANIZED HEALTH CARE EDUCATION/TRAINING PROGRAM

## 2025-06-30 RX ORDER — FUROSEMIDE 10 MG/ML
40 INJECTION INTRAMUSCULAR; INTRAVENOUS
Status: DISCONTINUED | OUTPATIENT
Start: 2025-06-30 | End: 2025-07-01 | Stop reason: HOSPADM

## 2025-06-30 RX ORDER — MAGNESIUM SULFATE HEPTAHYDRATE 40 MG/ML
2 INJECTION, SOLUTION INTRAVENOUS ONCE
Status: COMPLETED | OUTPATIENT
Start: 2025-06-30 | End: 2025-06-30

## 2025-06-30 RX ADMIN — APIXABAN 5 MG: 5 TABLET, FILM COATED ORAL at 16:35

## 2025-06-30 RX ADMIN — DAKIN'S SOLUTION 0.125% (QUARTER STRENGTH) 473 ML: 0.12 SOLUTION at 16:11

## 2025-06-30 RX ADMIN — DIGOXIN 125 MCG: 0.12 TABLET ORAL at 16:35

## 2025-06-30 RX ADMIN — FUROSEMIDE 80 MG: 10 INJECTION, SOLUTION INTRAVENOUS at 05:08

## 2025-06-30 RX ADMIN — METOPROLOL SUCCINATE 12.5 MG: 25 TABLET, EXTENDED RELEASE ORAL at 16:35

## 2025-06-30 RX ADMIN — APIXABAN 5 MG: 5 TABLET, FILM COATED ORAL at 05:08

## 2025-06-30 RX ADMIN — DAKIN'S SOLUTION 0.125% (QUARTER STRENGTH) 0.12 ML: 0.12 SOLUTION at 05:08

## 2025-06-30 RX ADMIN — FUROSEMIDE 40 MG: 10 INJECTION, SOLUTION INTRAVENOUS at 16:35

## 2025-06-30 RX ADMIN — ATORVASTATIN CALCIUM 40 MG: 40 TABLET, FILM COATED ORAL at 16:35

## 2025-06-30 RX ADMIN — MAGNESIUM SULFATE HEPTAHYDRATE 2 G: 2 INJECTION, SOLUTION INTRAVENOUS at 11:42

## 2025-06-30 ASSESSMENT — ENCOUNTER SYMPTOMS
VOMITING: 0
SHORTNESS OF BREATH: 1
NAUSEA: 0

## 2025-06-30 ASSESSMENT — FIBROSIS 4 INDEX: FIB4 SCORE: 4.66

## 2025-06-30 ASSESSMENT — PAIN DESCRIPTION - PAIN TYPE
TYPE: ACUTE PAIN
TYPE: ACUTE PAIN

## 2025-06-30 NOTE — DOCUMENTATION QUERY
[facility]                                                                       Query Response Note      PATIENT:               HUNTER STEELE  ACCT #:                  [id number]  MRN:                     [id number]  :                      1950  ADMIT DATE:       2025 3:46 AM  DISCH DATE:          RESPONDING  PROVIDER #:        [id number]           QUERY TEXT:    Can the diagnosis of heart failure be further clarified based on the clinical indicators and treatment?    The patient's Clinical Indicators include:  Findings:  HP: bilateral leg pain swelling, hx of chf, severe pulmonary htn, echo May 2025 EF 45% pulmonary on roomair, few crackles RLE and LLE edema present volume overload secondary to severe pulmonary hypertension IV diuretics      Chest xray: Stable small left pleural effusion, central pulmonary vascular congestion, and cardiomegaly.     BNP 2905    Treatment: IV lasix, chest xray, Labs    Risk Factors: history of pulmonary htn, chf    Sailaja Deutsch RN BSN  [email or web address],  Clinical Documentation   [email or web address]  Connect via [email or web address] Messenger    Note: If you agree with a diagnosis listed above, please remember to include it in your concurrent daily documentation and onto the Discharge Summary.  Options provided:   -- Acute on Chronic Systolic Congestive Heart Failure   -- Chronic Systolic Congestive Heart Failure   -- Other explanation, (please specify other explanation)      Query created by: Sailaja Deutsch on 2025 8:35 AM    RESPONSE TEXT:    Acute on Chronic Systolic Congestive Heart Failure          Electronically signed by:  JEANETTE BONILLA MD 2025 11:10 AM

## 2025-06-30 NOTE — CARE PLAN
The patient is Stable - Low risk of patient condition declining or worsening    Shift Goals  Clinical Goals: Wound care, Monitor output  Patient Goals: discharge  Family Goals: nilson    Progress made toward(s) clinical / shift goals: Pt alert, oriented, calm and appropriate. Patient on room air and satting >90%. Reports joint pain and prn tylenol given with good relief. Dressing changed to LLE, tolerated procedure well. Bed to lowest position, call light in reach and all needs attended.     Patient is not progressing towards the following goals:

## 2025-06-30 NOTE — PROGRESS NOTES
4 Eyes Skin Assessment Completed by JANNETTE Rush and CELESTINE Duong.    Skin assessment is primarily focused on high risk bony prominences. Pay special attention to skin beneath and around medical devices, high risk bony prominences, skin to skin areas and areas where the patient lacks sensation to feel pain and areas where the patient previously had breakdown.     Head (Occipital):  WDL left eye scab from previous wound   Ears (Under Medical Devices): Red and Blanching with old scabbing   Nose (Under Medical Devices): WDL   Mouth:  WDL   Neck: WDL   Breast/Chest:  WDL   Shoulder Blades:  WDL   Spine:   WDL   (R) Arm/Elbow/Hand: WDL   (L) Arm/Elbow/Hand: WDL   Abdomen: Scar   Pannus/Groin:  WDL   Sacrum/Coccyx:   Red and Blanching   (R) Ischial Tuberosity (Sit Bones):  WDL   (L) Ischial Tuberosity (Sit Bones):  WDL   (R) Leg:  Dry and Flaky   (L) Leg:  Open wound, plantar scab, lateral scab,    (R) Heel:  WDL   (R) Foot/Toe: WDL   (L) Heel: WDL   (L) Foot/Toe:  WDL       DEVICES IN USE:   Respiratory Devices:  NA, patient on room air  Feeding Devices:  N/A   Lines & BP Monitoring Devices:  Peripheral IV    Orthopedic Devices:  N/A  Miscellaneous Devices:  Telemetry monitor    PROTOCOL INTERVENTIONS:   Standard/Trauma Bed:  Applied this assessment  Q2 Turns with Pillows:  Applied this assessment  Barrier Paste:  Applied this assessment  Condom Cath/Purewick:  Applied this assessment    WOUND PHOTOS:   Completed and in EPIC     WOUND CONSULT:   Consult ordered for the following areas left lower extremity

## 2025-06-30 NOTE — WOUND TEAM
[facility]  Inpatient Services  Initial Wound and Skin Care Evaluation    Admission Date: 6/29/2025     Last order of IP CONSULT TO WOUND CARE was found on 6/30/2025 from Hospital Encounter on 6/29/2025     HPI, PMH, SH: Reviewed    Past Surgical History[1]  Social History     Tobacco Use    Smoking status: Some Days     Current packs/day: 1.00     Types: Cigarettes    Smokeless tobacco: Never   Substance Use Topics    Alcohol use: Yes     Alcohol/week: 1.2 oz     Types: 2 Cans of beer per week     Comment: occ     Chief Complaint   Patient presents with    Leg Pain     Bilateral leg pain and swelling x 5 years, worse on the L. Pt has a Hx of CHF, says he has been taking his medications as prescribed but his swelling is not improving.      Diagnosis: Volume overload [E87.70]    Unit where seen by Wound Team: T805/00     WOUND CONSULT RELATED TO:  LLE - suspected venous ulceration    WOUND TEAM PLAN OF CARE - Frequency of Follow-up:   Nursing to follow dressing orders written for wound care. Contact wound team if area fails to progress, deteriorates or with any questions/concerns if something comes up before next scheduled follow up (See below as to whether wound is following and frequency of wound follow up)   Weekly - LLE - dakins    WOUND HISTORY:       Bilateral leg pain and swelling x 5 years, worse on the L. Pt has a Hx of CHF, says he has been taking his medications as prescribed but his swelling is not improving.           WOUND ASSESSMENT/LDA  Wound 05/27/25 Vascular Ulcer Venous Leg Left (Active)   Date First Assessed/Time First Assessed: 05/27/25 1600   Present on Original Admission: Yes  Primary Wound Type: Vascular Ulcer  Secondary Wound Type - Vascular Ulcer: Venous  Location: Leg  Laterality: Left      Assessments 6/30/2025  4:00 PM   Wound Image      Site Assessment Pink;Red   Periwound Assessment Clean;Dry;Intact;Hemosiderin Staining;Warm;Edema   Margins Attached edges;Defined edges    Closure Adhesive bandage   Drainage Amount Small   Drainage Description Serosanguineous;Serous   Treatments Cleansed;Nonselective debridement;Site care;Offloading   Wound Cleansing Approved Wound Cleanser   Periwound Protectant Barrier Paste   Dressing Status Removed   Dressing Changed Changed   Dressing Cleansing/Solutions 1/4 Strength Dakin's Solution   Dressing Options Moist Roll Gauze   Dressing Change/Treatment Frequency Every Shift, and As Needed   NEXT Dressing Change/Treatment Date 25   NEXT Weekly Photo (Inpatient Only) 25   Wound Team Following Weekly   Non-staged Wound Description Full thickness   Wound Length (cm) 6 cm   Wound Width (cm) 17 cm   Wound Depth (cm) 0.2 cm   Wound Surface Area (cm^2) 80.11 cm^2   Wound Volume (cm^3) 10.681 cm^3   Wound Bed Granulation (%) 100 %   Shape irregular   Wound Odor None   Exposed Structures None   WOUND NURSE ONLY - Time Spent with Patient (mins) 30        Vascular:    SHANTI:   US-SHANTI SINGLE LEVEL BILAT  Order: [id number]   Status: Final result       Next appt: None    Test Result Released: No (inaccessible in MyChart)    0 Result Notes  Details    Reading Physician Reading Date Result Priority   No Reading Provider Prelim 2025    Ruben aCrranza M.D.  [phone] 2025      Narrative & Impression               Vascular Laboratory   Conclusions   Right.    There is no evidence of significant arterial disease.    Left.    There is mild to moderate arterial disease demonstrated (SHANTI is .5-.89).       HUNTER STEELE      Age:    75    Gender:     M      MRN:    [id number]      :    1950      BSA:      Exam Date:     2025 09:13      Room #:     Inpatient      Priority:     Routine      Ht (in):             Wt (lb):      Ordering Physician:        COLLEEN COSTELLO      Referring Physician:       [id number] Moseley      Sonographer:               [place] RVT,                               RDMS       Study Type:                Complete Bilateral      Technical Quality:         Adequate      Indications:     Ulceration of LE      CPT Codes:       66720      ICD Codes:       707.1      History:         Nonhealing wound left lower extremity. No prior studies.      Limitations:                     RIGHT      Waveform            Systolic BPs (mmHg)                                            Brachial   Triphasic                                Common Femoral   Triphasic                                Popliteal   Triphasic                  118           Posterior Tibial   Triphasic                  128           Dorsalis Pedis                                            Digit                              1.01          SHANTI                                            TBI                           LEFT   Waveform        Systolic BPs (mmHg)                              127           Brachial   Triphasic                                Common Femoral   Bi, non-rev                              Popliteal   Triphasic                  112           Posterior Tibial   Bi, non-rev                113           Dorsalis Pedis                                            Digit                              0.89          SHANTI                                            TBI         Findings   Unable to obtain right brachial pressure due to presence of IV in right    arm.       Right.    Doppler waveforms of the common femoral, popliteal, posterior tibial, and    dorsalis pedis arteries are of high amplitude and multiphasic.    Doppler waveforms at the ankle are brisk and triphasic.    The ankle-brachial index is normal.       Left.    Doppler waveforms of the common femoral, popliteal, posterior tibial, and    dorsalis pedis arteries are of high amplitude and multiphasic.    Doppler waveforms at the ankle are hyperemic and multiphasic.    The ankle-brachial index is mildly reduced.       An arterial duplex was performed in accordance with  lower extremity    arterial evaluation protocol - see separate report.       Ruben Carranza MD   (Electronically Signed)   Final Date:      29 May 2025 10:42        Exam Ended: 05/29/25 11:35 Last Resulted: 05/29/25 10:42       Lab Values:    Lab Results   Component Value Date/Time    WBC 8.7 06/30/2025 08:17 AM    RBC 3.42 (L) 06/30/2025 08:17 AM    HEMOGLOBIN 8.8 (L) 06/30/2025 08:17 AM    HEMATOCRIT 28.4 (L) 06/30/2025 08:17 AM    CREACTPROT 0.53 06/29/2025 05:11 AM    SEDRATEWES 90 (H) 06/29/2025 05:11 AM    HBA1C 5.9 (H) 09/08/2024 12:24 AM    PLATELETCT 201 06/30/2025 08:17 AM         Culture Results show:  No results found for this or any previous visit (from the past 720 hours).    Pain Level/Medicated:  None, Tolerated without pain medication       INTERVENTIONS BY WOUND TEAM:  Chart and images reviewed. Discussed with bedside RN. All areas of concern (based on picture review, LDA review and discussion with bedside RN) have been thoroughly assessed. Documentation of areas based on significant findings. This RN in to assess patient. Performed standard wound care which includes appropriate positioning, dressing removal and non-selective debridement. Pictures and measurements obtained weekly if/when required.    Wound:  LLE - venous  Preparation for Dressing removal: Removed without difficulty  Cleansed/Non-selectively Debrided with:  Wound cleanser and Gauze  Obdulia wound: Cleansed with Wound cleanser and Gauze, Prepped with Barrier paste  Primary Dressing:  dakins moistened gauze to wound bed  Secondary (Outer) Dressing: ABD pad and dry rolled gauze    Advanced Wound Care Discharge Planning  Number of Clinicians necessary to complete wound care: 1  Is patient requiring IV pain medications for dressing changes:  No   Length of time for dressing change 30 min. (This does not include chart review, pre-medication time, set up, clean up or time spent charting.)    Interdisciplinary consultation: Patient, Bedside  RN (Ellie)  EVALUATION / RATIONALE FOR TREATMENT:     Date:  06/30/25  Wound Status:  Initial evaluation    Patient with chronic appearing venous ulceration to left lower leg with 100% red granular tissue, small amount of serosanguinous and serous drainage, no odor, hemosiderin staining and brawny induration. The wound is irregular in shape and is lateral and posterior. Dakins applied to chemically debride nonviable tissue, decrease bioburden and odor. Covered with ABD pad and dry gauze.        Goals: Steady decrease in wound area and depth weekly.    NURSING PLAN OF CARE ORDERS:  Dressing changes: See Dressing Care orders  Skin care: See Skin Care orders    NUTRITION RECOMMENDATIONS   Wound Team Recommendations:  Protein supplements  Arginine powder  Vitamin C supplements  Zinc supplements    DIET ORDERS (From admission to next 24h)       Start     Ordered    06/29/25 0820  Diet Order Diet: Cardiac  ALL MEALS        Question:  Diet:  Answer:  Cardiac    06/29/25 0820                    PREVENTATIVE INTERVENTIONS:    Q shift Edward - performed per nursing policy  Q shift pressure point assessments - performed per nursing policy         Anticipated discharge plans:  TBD        Vac Discharge Needs:  Vac Discharge plan is purely a recommendation from wound team and not a requirement for discharge unless otherwise stated by physician.  Not Applicable Pt not on a wound vac          [1]   Past Surgical History:  Procedure Laterality Date    GASTRECTOMY N/A 10/7/2024    Procedure: LAPAROTOMY, GASTRECTOMY-SUBTOTAL, WITH INTRAOPERATIVE ULTRASOUND GUIDANCE;  Surgeon: Mt Cabral M.D.;  Location: SURGERY [facility];  Service: General    NODE DISSECTION N/A 10/7/2024    Procedure: LYMPHADENECTOMY-NODE DISSECTION;  Surgeon: Mt Cabral M.D.;  Location: SURGERY [facility];  Service: General    CREATION, FLAP, OMENTUM N/A 10/7/2024    Procedure: CREATION, FLAP, OMENTUM;  Surgeon: Mt TAPIA  JOSLYN Cabral;  Location: SURGERY [facility];  Service: General    CHOLECYSTECTOMY N/A 10/7/2024    Procedure: CHOLECYSTECTOMY;  Surgeon: Mt Cabral M.D.;  Location: SURGERY [facility];  Service: General    NY UPPER GI ENDOSCOPY,DIAGNOSIS N/A 9/13/2024    Procedure: GASTROSCOPY;  Surgeon: Sarah Lozoya M.D.;  Location: SURGERY SAME DAY [facility];  Service: Gastroenterology    NY UPPER GI ENDOSCOPY,BIOPSY N/A 9/13/2024    Procedure: GASTROSCOPY, WITH BIOPSY;  Surgeon: Sarah Lozoya M.D.;  Location: SURGERY SAME DAY [facility];  Service: Gastroenterology    NY UPPER GI ENDOSCOPY,SCLER INJECT N/A 9/13/2024    Procedure: GASTROSCOPY, WITH SCLEROTHERAPY;  Surgeon: Sarah Lozoya M.D.;  Location: SURGERY SAME DAY [facility];  Service: Gastroenterology    NY UPPER GI ENDOSCOPY,BIOPSY N/A 09/10/2024    Procedure: GASTROSCOPY, WITH BIOPSY;  Surgeon: Demond Gutierres M.D.;  Location: SURGERY SAME DAY [facility];  Service: Gastroenterology    NY UPPER GI ENDOSCOPY,SCLER INJECT N/A 09/10/2024    Procedure: GASTROSCOPY, WITH SCLEROTHERAPY;  Surgeon: Demond Gutierres M.D.;  Location: SURGERY SAME DAY [facility];  Service: Gastroenterology    GASTROSCOPY WITH ENDOSTAT N/A 09/10/2024    Procedure: EGD, WITH CAUTERIZATION;  Surgeon: Demond Gutierres M.D.;  Location: SURGERY SAME DAY [facility];  Service: Gastroenterology    NY UPPER GI ENDOSCOPY,DIAGNOSIS N/A 01/31/2023    Procedure: GASTROSCOPY;  Surgeon: Joy Mcnair M.D.;  Location: SURGERY SAME DAY [facility];  Service: Gastroenterology    CATARACT PHACO WITH IOL Left

## 2025-06-30 NOTE — PROGRESS NOTES
Hospital Medicine Daily Progress Note    Date of Service  6/30/2025    Chief Complaint  Robert Mcdonnell is a 75 y.o. male admitted 6/29/2025 with lower extremity swelling    Hospital Course  75-year-old homeless male with past medical history of gastric adenocarcinoma status post partial gastrectomy, atrial fibrillation on Eliquis, severe pulmonary hypertension ,echocardiogram May 2025 revealing RSVP of 74 mmHg, EF of 45%, recently discharged on May 30 after treatment for volume overload presented with bilateral leg swelling and shortness of breath.  Patient admitted for acute CHF exacerbation.    Interval Problem Update    6/30/2025  Seen and examined at bedside  AO x 3  Blood pressure soft  Negative fluid balance -6 L since admission  Denies chest pain, reports shortness of breath has improved  A.m.'s labs reviewed noted to have normal white count hemoglobin 8.8, sodium 140 potassium 4.6, renal function is stable, magnesium 1.9  Chest x-ray reviewed noted to have pulmonary edema  Left tibia-fibula x-ray noted with no acute osseous injury, soft tissue swelling and ulceration  EKG reviewed noted to have A-fib  Chart reviewed, H&P reviewed, meds reviewed  Plan  Transfer to  telemetry for continuous ECG monitoring for arrhythmia  Continue on Eliquis, digoxin, metoprolol 12.5  Decrease IV Lasix 40 mg twice daily to reduce fluid overload  Administer supplemental oxygen to keep oxygen saturation over 90%  Repeat BMP in a.m. to monitor electrolytes, renal function and toxicity while on  high-dose IV diuretics  Strict monitoring for input and output, daily weight to guide diuretic therapy  Need close monitoring of blood pressure, oxygen requirement, heart rate and  worsening sign of heart failure  Optimize core measure medication for heart failure  Monitor for toxicity while in IV Lasix  Wound care evaluation  High risk of deterioration from ongoing acute heart failure.  Need close hemodynamic monitoring under  telemetry  Patient has a high medical complexity, complex decision making and is at high risk of complication, morbidity and mortality      I have discussed this patient's plan of care and discharge plan at IDT rounds today with Case Management, Nursing, Nursing leadership, and other members of the IDT team.        Code Status  DNAR/DNI    Disposition  The patient is not medically cleared for discharge to home or a post-acute facility.      I have placed the appropriate orders for post-discharge needs.    Review of Systems  Review of Systems   Respiratory:  Positive for shortness of breath.    Cardiovascular:  Positive for leg swelling. Negative for chest pain.   Gastrointestinal:  Negative for nausea and vomiting.        Physical Exam  Temp:  [36.7 °C (98 °F)-37.3 °C (99.2 °F)] 36.7 °C (98 °F)  Pulse:  [] 92  Resp:  [17-19] 17  BP: ()/(54-65) 95/60  SpO2:  [94 %-97 %] 97 %    Physical Exam  Constitutional:       Appearance: He is ill-appearing.   Cardiovascular:      Rate and Rhythm: Normal rate. Rhythm irregular.   Pulmonary:      Effort: Pulmonary effort is normal.      Breath sounds: Rales present.   Abdominal:      General: Abdomen is flat.   Musculoskeletal:      Right lower leg: Edema present.      Left lower leg: Edema present.      Comments: Left lower extremity covered with clean dressing   Neurological:      General: No focal deficit present.      Mental Status: He is alert and oriented to person, place, and time.   Psychiatric:         Mood and Affect: Mood normal.         Fluids    Intake/Output Summary (Last 24 hours) at 6/30/2025 1039  Last data filed at 6/30/2025 0914  Gross per 24 hour   Intake 980 ml   Output 5550 ml   Net -4570 ml        Laboratory  Recent Labs     06/29/25  0511 06/30/25  0817   WBC 10.1 8.7   RBC 3.22* 3.42*   HEMOGLOBIN 8.2* 8.8*   HEMATOCRIT 27.8* 28.4*   MCV 86.3 83.0   MCH 25.5* 25.7*   MCHC 29.5* 31.0*   RDW 59.9* 59.4*   PLATELETCT 190 201   MPV 9.5 9.8      Recent Labs     06/29/25  0511 06/30/25  0817   SODIUM 144 140   POTASSIUM 4.4 4.6   CHLORIDE 113* 104   CO2 21 24   GLUCOSE 95 72   BUN 20 19   CREATININE 1.10 1.17   CALCIUM 8.7 9.0                   Imaging  DX-TIBIA AND FIBULA LEFT   Final Result      1.  No acute osseous injury.   2.  Soft tissue swelling and soft tissue ulceration/wound.      DX-CHEST-PORTABLE (1 VIEW)   Final Result      Stable small left pleural effusion, central pulmonary vascular congestion, and cardiomegaly.           Assessment/Plan  * Acute CHF (congestive heart failure) ([facility])- (present on admission)  Assessment & Plan  Severe lower extremity edema despite compliance with outpatient diuretics  Secondary to severe pulmonary hypertension,      6/30/2025  Transfer to  telemetry for continuous ECG monitoring for arrhythmia  Continue on Eliquis, digoxin, metoprolol 12.5  Decrease IV Lasix 40 mg twice daily to reduce fluid overload, need close vitals monitoring  Administer supplemental oxygen to keep oxygen saturation over 90%  Repeat BMP in a.m. to monitor electrolytes, renal function and toxicity while on  high-dose IV diuretics  Strict monitoring for input and output, daily weight to guide diuretic therapy  Need close monitoring of blood pressure, oxygen requirement, heart rate and  worsening sign of heart failure  Optimize core measure medication for heart failure  Monitor for toxicity while in IV Lasix  High risk of deterioration from ongoing acute heart failure.  Need close hemodynamic monitoring under telemetry  Patient has a high medical complexity, complex decision making and is at high risk of complication, morbidity and mortality    DNR (do not resuscitate)- (present on admission)  Assessment & Plan  We had a long discussion about advanced directives and he has elected for DNR/DNI  He may be a candidate for Hospice in the near future given his severe pulmonary hypertension    PAD (peripheral artery disease) ([facility])- (present on  admission)  Assessment & Plan   SHANTI from January was concern for possible mid posterior tibial artery stenosis or occlusion. Repeat SHANTI -diffuse plaque throughout the common femoral, superficial femoral and popliteal arteries, with 50 to 75% stenosis in distal femoral and popliteal artery, absence of flow in the distal peroneal.  Imaging was reviewed by vascular surgery, they do not recommend any vascular intervention     Pulmonary hypertension (H[facility])- (present on admission)  Assessment & Plan  Echo May 2025 revealed RVSP 74 mmHg  He is on diuretics outpatient which will be given IV     Malignant neoplasm of body of stomach (HCC)- (present on admission)  Assessment & Plan  Status post partial gastrectomy 2024  Medical oncology Dr. Liu and radiation oncology Dr. Bhatia    Homelessness- (present on admission)  Assessment & Plan  He lives on the streets and does not want to go to the [facility]  He does not have children and no local family    A-fib (HCC)- (present on admission)  Assessment & Plan  Rate controlled on metoprolol and digoxin  Continue on Eliquis  Monitor closely on telemetry    Wound of lower extremity- (present on admission)  Assessment & Plan  Chronic wound  Exacerbated by swelling  Xray does not reveal evidence of osteomyelitis  ESR 90 though no clinical evidence of acute infection.  Refrain from antibiotics.  Procalcitonin negative  Wound care team consulted.   Outpatient wound care will be a challenge as he is homeless and living on the streets. He missed the outpatient wound care appointments on 6/3 and 6/10.           VTE prophylaxis: eliquis    I have performed a physical exam and reviewed and updated ROS and Plan today (6/30/2025). In review of yesterday's note (6/29/2025), there are no changes except as documented above.

## 2025-06-30 NOTE — PROGRESS NOTES
4 Eyes Skin Assessment Completed by JANNETTE Vázquez and JANNETTE Phan.    Skin assessment is primarily focused on high risk bony prominences. Pay special attention to skin beneath and around medical devices, high risk bony prominences, skin to skin areas and areas where the patient lacks sensation to feel pain and areas where the patient previously had breakdown.     Head (Occipital):  WDL and Pink blanching, small scab above left eye     Ears (Under Medical Devices): Pink, Brown, and Blanching   Nose (Under Medical Devices): WDL   Mouth:  WDL   Neck: WDL   Breast/Chest:  WDL   Shoulder Blades:  WDL   Spine:   WDL   (R) Arm/Elbow/Hand: Diffuse bruising, scabs, scars   (L) Arm/Elbow/Hand: Diffuse bruising, scabs, scars   Abdomen: Scar and midline, healed   Pannus/Groin:  WDL   Sacrum/Coccyx:   Pink and blanching   (R) Ischial Tuberosity (Sit Bones):  WDL   (L) Ischial Tuberosity (Sit Bones):  WDL   (R) Leg:  Edema   (L) Leg:  Ulcer of lower extremity or foot, Edema, Scar, and wound at calf                     (R) Heel:  Boggy and Edema       (R) Foot/Toe: Scab, Edema, and Scar   (L) Heel: Pink, Blanching, and Boggy           (L) Foot/Toe:  Scab and Scar       DEVICES IN USE:   Respiratory Devices:  NA, patient on room air  Feeding Devices:  N/A   Lines & BP Monitoring Devices:  Peripheral IV and BP cuff    Orthopedic Devices:  N/A  Miscellaneous Devices:  N/A    PROTOCOL INTERVENTIONS:   Standard/Trauma Bed:  Already in place    WOUND PHOTOS:   Completed and in EPIC     WOUND CONSULT:   Wound team already following area(s) of concern

## 2025-06-30 NOTE — HOSPITAL COURSE
75-year-old homeless male with past medical history of gastric adenocarcinoma status post partial gastrectomy, atrial fibrillation on Eliquis, severe pulmonary hypertension ,echocardiogram May 2025 revealing RSVP of 74 mmHg, EF of 45%, recently discharged on May 30 after treatment for volume overload presented with bilateral leg swelling and shortness of breath.  Patient admitted for acute CHF exacerbation.

## 2025-06-30 NOTE — CARE PLAN
Problem: Knowledge Deficit - Standard  Goal: Patient and family/care givers will demonstrate understanding of plan of care, disease process/condition, diagnostic tests and medications  Outcome: Progressing   The patient is Watcher - Medium risk of patient condition declining or worsening    Shift Goals  Clinical Goals: Wound care, monitor heart rate and labs  Patient Goals: dc  Family Goals: nilson    Progress made toward(s) clinical / shift goals:  patient involved in plan of care    Patient is not progressing towards the following goals: n/a

## 2025-06-30 NOTE — DISCHARGE PLANNING
Post Acute Navigator Team    Patient screened based off of following information:    End of Life Care Index risk score 87  High LACE + score 69  6 click Mobility score 22   6 click ADL score 22   Readmission: Yes       Per chart review, post acute needs to be determined based on hospital course.   Per chart review, patient is currently a standby assist.      Please reach out to me directly should care team feel patient will benefit from a discharge goals of care conversation regarding outpatient palliative care or hospice.

## 2025-07-01 ENCOUNTER — APPOINTMENT (OUTPATIENT)
Dept: WOUND CARE | Facility: MEDICAL CENTER | Age: 75
End: 2025-07-01
Payer: MEDICARE

## 2025-07-01 ENCOUNTER — PHARMACY VISIT (OUTPATIENT)
Dept: PHARMACY | Facility: MEDICAL CENTER | Age: 75
End: 2025-07-01
Payer: COMMERCIAL

## 2025-07-01 VITALS
RESPIRATION RATE: 18 BRPM | DIASTOLIC BLOOD PRESSURE: 55 MMHG | HEIGHT: 75 IN | HEART RATE: 97 BPM | TEMPERATURE: 98.6 F | WEIGHT: 166.23 LBS | SYSTOLIC BLOOD PRESSURE: 92 MMHG | OXYGEN SATURATION: 91 % | BODY MASS INDEX: 20.67 KG/M2

## 2025-07-01 LAB
ANION GAP SERPL CALC-SCNC: 11 MMOL/L (ref 7–16)
BUN SERPL-MCNC: 22 MG/DL (ref 8–22)
CALCIUM SERPL-MCNC: 8.7 MG/DL (ref 8.5–10.5)
CHLORIDE SERPL-SCNC: 105 MMOL/L (ref 96–112)
CO2 SERPL-SCNC: 23 MMOL/L (ref 20–33)
CREAT SERPL-MCNC: 1.26 MG/DL (ref 0.5–1.4)
ERYTHROCYTE [DISTWIDTH] IN BLOOD BY AUTOMATED COUNT: 60 FL (ref 35.9–50)
GFR SERPLBLD CREATININE-BSD FMLA CKD-EPI: 59 ML/MIN/1.73 M 2
GLUCOSE SERPL-MCNC: 87 MG/DL (ref 65–99)
HCT VFR BLD AUTO: 27.6 % (ref 42–52)
HGB BLD-MCNC: 8.3 G/DL (ref 14–18)
MAGNESIUM SERPL-MCNC: 2.3 MG/DL (ref 1.5–2.5)
MCH RBC QN AUTO: 25.5 PG (ref 27–33)
MCHC RBC AUTO-ENTMCNC: 30.1 G/DL (ref 32.3–36.5)
MCV RBC AUTO: 84.7 FL (ref 81.4–97.8)
PHOSPHATE SERPL-MCNC: 3.2 MG/DL (ref 2.5–4.5)
PLATELET # BLD AUTO: 201 K/UL (ref 164–446)
PMV BLD AUTO: 9.8 FL (ref 9–12.9)
POTASSIUM SERPL-SCNC: 4 MMOL/L (ref 3.6–5.5)
RBC # BLD AUTO: 3.26 M/UL (ref 4.7–6.1)
SODIUM SERPL-SCNC: 139 MMOL/L (ref 135–145)
WBC # BLD AUTO: 7.7 K/UL (ref 4.8–10.8)

## 2025-07-01 PROCEDURE — 85027 COMPLETE CBC AUTOMATED: CPT

## 2025-07-01 PROCEDURE — A9270 NON-COVERED ITEM OR SERVICE: HCPCS | Performed by: HOSPITALIST

## 2025-07-01 PROCEDURE — 80048 BASIC METABOLIC PNL TOTAL CA: CPT

## 2025-07-01 PROCEDURE — 700102 HCHG RX REV CODE 250 W/ 637 OVERRIDE(OP): Performed by: HOSPITALIST

## 2025-07-01 PROCEDURE — 99239 HOSP IP/OBS DSCHRG MGMT >30: CPT | Performed by: STUDENT IN AN ORGANIZED HEALTH CARE EDUCATION/TRAINING PROGRAM

## 2025-07-01 PROCEDURE — 700111 HCHG RX REV CODE 636 W/ 250 OVERRIDE (IP): Mod: JZ | Performed by: STUDENT IN AN ORGANIZED HEALTH CARE EDUCATION/TRAINING PROGRAM

## 2025-07-01 PROCEDURE — 84100 ASSAY OF PHOSPHORUS: CPT

## 2025-07-01 PROCEDURE — RXMED WILLOW AMBULATORY MEDICATION CHARGE: Performed by: STUDENT IN AN ORGANIZED HEALTH CARE EDUCATION/TRAINING PROGRAM

## 2025-07-01 PROCEDURE — 83735 ASSAY OF MAGNESIUM: CPT

## 2025-07-01 RX ORDER — DIGOXIN 125 MCG
125 TABLET ORAL DAILY
Qty: 90 TABLET | Refills: 0 | Status: SHIPPED | OUTPATIENT
Start: 2025-07-01 | End: 2025-07-01

## 2025-07-01 RX ORDER — OMEPRAZOLE 20 MG/1
20 CAPSULE, DELAYED RELEASE ORAL DAILY
Qty: 30 CAPSULE | Refills: 11 | Status: SHIPPED | OUTPATIENT
Start: 2025-07-01

## 2025-07-01 RX ORDER — LOSARTAN POTASSIUM 25 MG/1
25 TABLET ORAL DAILY
Qty: 90 TABLET | Refills: 11 | Status: SHIPPED | OUTPATIENT
Start: 2025-07-01 | End: 2025-07-01

## 2025-07-01 RX ORDER — ATORVASTATIN CALCIUM 40 MG/1
40 TABLET, FILM COATED ORAL EVERY EVENING
Qty: 90 TABLET | Refills: 11 | Status: SHIPPED | OUTPATIENT
Start: 2025-07-01 | End: 2025-07-01

## 2025-07-01 RX ORDER — METOPROLOL SUCCINATE 25 MG/1
12.5 TABLET, EXTENDED RELEASE ORAL EVERY EVENING
Qty: 30 TABLET | Refills: 11 | Status: SHIPPED | OUTPATIENT
Start: 2025-07-01

## 2025-07-01 RX ORDER — OMEPRAZOLE 20 MG/1
20 CAPSULE, DELAYED RELEASE ORAL DAILY
Qty: 90 CAPSULE | Refills: 0 | Status: SHIPPED | OUTPATIENT
Start: 2025-07-01 | End: 2025-07-01

## 2025-07-01 RX ORDER — SPIRONOLACTONE 25 MG/1
25 TABLET ORAL DAILY
Qty: 90 TABLET | Refills: 11 | Status: SHIPPED | OUTPATIENT
Start: 2025-07-01 | End: 2025-07-01

## 2025-07-01 RX ORDER — DIGOXIN 125 MCG
125 TABLET ORAL DAILY
Qty: 30 TABLET | Refills: 0 | Status: SHIPPED | OUTPATIENT
Start: 2025-07-01

## 2025-07-01 RX ORDER — SPIRONOLACTONE 25 MG/1
25 TABLET ORAL DAILY
Qty: 30 TABLET | Refills: 11 | Status: SHIPPED | OUTPATIENT
Start: 2025-07-01

## 2025-07-01 RX ORDER — FUROSEMIDE 40 MG/1
40 TABLET ORAL DAILY
Qty: 90 TABLET | Refills: 11 | Status: SHIPPED | OUTPATIENT
Start: 2025-07-01 | End: 2025-07-01

## 2025-07-01 RX ORDER — METOPROLOL SUCCINATE 25 MG/1
12.5 TABLET, EXTENDED RELEASE ORAL EVERY EVENING
Qty: 90 TABLET | Refills: 11 | Status: SHIPPED | OUTPATIENT
Start: 2025-07-01 | End: 2025-07-01

## 2025-07-01 RX ORDER — ATORVASTATIN CALCIUM 40 MG/1
40 TABLET, FILM COATED ORAL EVERY EVENING
Qty: 30 TABLET | Refills: 11 | Status: SHIPPED | OUTPATIENT
Start: 2025-07-01

## 2025-07-01 RX ORDER — FUROSEMIDE 40 MG/1
40 TABLET ORAL DAILY
Qty: 30 TABLET | Refills: 11 | Status: SHIPPED | OUTPATIENT
Start: 2025-07-01

## 2025-07-01 RX ORDER — LOSARTAN POTASSIUM 25 MG/1
25 TABLET ORAL DAILY
Qty: 30 TABLET | Refills: 11 | Status: SHIPPED | OUTPATIENT
Start: 2025-07-01

## 2025-07-01 RX ADMIN — APIXABAN 5 MG: 5 TABLET, FILM COATED ORAL at 04:39

## 2025-07-01 RX ADMIN — FUROSEMIDE 40 MG: 10 INJECTION, SOLUTION INTRAVENOUS at 04:39

## 2025-07-01 RX ADMIN — DAKIN'S SOLUTION 0.125% (QUARTER STRENGTH) 10 ML: 0.12 SOLUTION at 04:39

## 2025-07-01 RX ADMIN — ACETAMINOPHEN 650 MG: 325 TABLET ORAL at 01:48

## 2025-07-01 ASSESSMENT — COGNITIVE AND FUNCTIONAL STATUS - GENERAL
MOVING FROM LYING ON BACK TO SITTING ON SIDE OF FLAT BED: TOTAL
DAILY ACTIVITIY SCORE: 22
STANDING UP FROM CHAIR USING ARMS: TOTAL
SUGGESTED CMS G CODE MODIFIER DAILY ACTIVITY: CJ
CLIMB 3 TO 5 STEPS WITH RAILING: A LOT
SUGGESTED CMS G CODE MODIFIER MOBILITY: CM
MOBILITY SCORE: 7
MOVING TO AND FROM BED TO CHAIR: TOTAL
WALKING IN HOSPITAL ROOM: TOTAL
TURNING FROM BACK TO SIDE WHILE IN FLAT BAD: TOTAL
HELP NEEDED FOR BATHING: A LITTLE
EATING MEALS: A LITTLE

## 2025-07-01 ASSESSMENT — FIBROSIS 4 INDEX: FIB4 SCORE: 4.66

## 2025-07-01 NOTE — DISCHARGE PLANNING
Care Transition Team Assessment  LMSW met with pt to conduct assessment and verify demographics. Pt is AOX4 and provided all of the following information below. Pt is a readmit from 5/27/25 for Acute on chronic systolic heart failure (HCC) and was discharged to back to shelter on 5/30/25 with outpatient wound clinic established. Pt is now admitted on 6/29/25 for Acute CHF (congestive heart failure) ([facility]). Please see pt's H&P for prior medical history.    Last admission pt was established with outpatient wound care. Per chart pt was a no show to outpatient wound clinic.    Pt's PCP is MARLO OLEA P.A.-C.     Pt verified address on file as [facility].    Pt's emergency contact on file is his brother Clyde Mcdonnell [phone].    Prior to admission pt was independent with ADLS and IADLS with no use of DME. Pt does not use O2 at [facility].    Pt denies history of SNF/HH/IPR.    Pt's insurance is Medicare.     Upon DC pt may need assistance with transportation back to location of choice.     Information Source  Orientation Level: Oriented X4  Information Given By: Patient  Who is responsible for making decisions for patient? : Patient    Readmission Evaluation  Is this a readmission?: Yes - unplanned readmission    Elopement Risk  Legal Hold: No  Ambulatory or Self Mobile in Wheelchair: Yes  Disoriented: No  Psychiatric Symptoms: None  History of Wandering: No  Elopement this Admit: No  Vocalizing Wanting to Leave: No  Displays Behaviors, Body Language Wanting to Leave: No-Not at Risk for Elopement  Elopement Risk: Not at Risk for Elopement    Interdisciplinary Discharge Planning  Primary Care Physician: MARLO OLEA P.A.-C.  Lives with - Patient's Self Care Capacity: Unrelated Adult  Patient or legal guardian wants to designate a caregiver: No  Support Systems:  /   Housing / Facility: Homeless    Discharge Preparedness  What is your plan after discharge?: Home with help  What are your  discharge supports?: Other (comment) (Friend)  Prior Functional Level: Independent with Activities of Daily Living, Ambulatory, Independent with Medication Management  Difficulity with ADLs: None  Difficulity with IADLs: None    Functional Assesment  Prior Functional Level: Independent with Activities of Daily Living, Ambulatory, Independent with Medication Management    Finances  Financial Barriers to Discharge: No  Prescription Coverage: Yes    Vision / Hearing Impairment  Vision Impairment : Yes  Right Eye Vision: Impaired  Left Eye Vision: Impaired  Hearing Impairment : No    Advance Directive  Advance Directive?: None    Domestic Abuse  Have you ever been the victim of abuse or violence?: No  Possible Abuse/Neglect Reported to:: Not Applicable    Psychological Assessment  History of Substance Abuse: None  History of Psychiatric Problems: No  Non-compliant with Treatment: No  Newly Diagnosed Illness: No    Discharge Risks or Barriers  Discharge risks or barriers?: Homeless / couch surfing  Patient risk factors: Homeless    Anticipated Discharge Information  Discharge Disposition: Discharged to home/self care (01)  Discharge Address: [address] [place] [id number]  Discharge Contact Phone Number: [phone]

## 2025-07-01 NOTE — PROGRESS NOTES
Discharge orders received.  Patient arrived to the discharge lounge.  PIV removed by bedside RN prior to arrival. AVS instructions given, medications reviewed and general discharge education provided to patient.  Follow up appointments discussed.  Patient verbalized understanding of dc instructions and prescriptions.  Patient signed discharge instructions.  Patient verbalized understanding and had all belongings with him. Pt states he does not have his home meds with him - notified [facility] staff, MD spoke with charge RN Anderson who states she is able to do partial refills. Anderson spoke to pt on this RN's phone regarding medication plan. Pt pending meds. Wished patient a speedy recovery.      1200: pt was escorted to bus stop by care aide

## 2025-07-01 NOTE — PROGRESS NOTES
Bedside report received from off going RN/tech: Ellie, assumed care of patient.     Fall Risk Score: LOW RISK  Fall risk interventions in place: Place yellow fall risk ID band on patient, Provide patient/family education based on risk assessment, Educate patient/family to call staff for assistance when getting out of bed, Place fall precaution signage outside patient door, Place patient in room close to nursing station, and Utilize bed/chair fall alarm  Bed type: Regular (Edward Score less than 17 interventions in place)  Patient on cardiac monitor: Yes  IVF/IV medications: Not Applicable   Oxygen: Room Air  Bedside sitter: Not Applicable   Isolation: Not applicable

## 2025-07-01 NOTE — CARE PLAN
The patient is Watcher - Medium risk of patient condition declining or worsening    Shift Goals  Clinical Goals: wound care, I/Os  Patient Goals: rest, discharge  Family Goals: nilson    Progress made toward(s) clinical / shift goals:  Pt tolerated q shift wound care, calls appropriately, PRN pain medication given      Problem: Pain - Standard  Goal: Alleviation of pain or a reduction in pain to the patient’s comfort goal  Outcome: Progressing     Problem: Knowledge Deficit - Standard  Goal: Patient and family/care givers will demonstrate understanding of plan of care, disease process/condition, diagnostic tests and medications  Outcome: Progressing

## 2025-07-01 NOTE — PROGRESS NOTES
Patient cleared to [place]. Scripts called into [facility]. Home scripts in central pharm/ slip will be sent with pt. CHF follow up made with cards 7/3. Chf education and info covered.   IV removed. Pt dressed fr NICO Pittmanunge

## 2025-07-01 NOTE — DISCHARGE SUMMARY
Discharge Summary    CHIEF COMPLAINT ON ADMISSION  Chief Complaint   Patient presents with    Leg Pain     Bilateral leg pain and swelling x 5 years, worse on the L. Pt has a Hx of CHF, says he has been taking his medications as prescribed but his swelling is not improving.        Reason for Admission  (R) Leg pain     Admission Date  6/29/2025    CODE STATUS  DNAR/DNI    HPI & HOSPITAL COURSE    Robert Mcdonnell is a 75-year-old male with PMHx gastric adenocarcinoma s/p partial gastrectomy, atrial fibrillation on Eliquis, severe pulmonary hypertension, housing insecurity, chronic lower extremity ulceration.  Admitted 6/29 for volume overload and heart failure exacerbation.    Per history: echocardiogram May 2025 revealing RSVP of 74 mmHg, EF of 45%, recently discharged on 5/30 after treatment for volume overload.  He is again presenting with bilateral leg swelling and shortness of breath.  Patient admitted for acute CHF exacerbation.    Patient was started on IV diuresis with improvement in his shortness of breath and lower extremity swelling.  At time of discharge, patient is resting comfortably on room air.  Lower extremity swelling and edema has improved.  Medications were refilled and sent to our pharmacy.  Patient is discharged home with close outpatient follow-up.    Therefore, he is discharged in good and stable condition to home with close outpatient follow-up.    The patient met 2-midnight criteria for an inpatient stay at the time of discharge.    Discharge Date  7/1/2025    FOLLOW UP ITEMS POST DISCHARGE  Follow-up with primary care physician 3 to 5 days    DISCHARGE DIAGNOSES  Principal Problem:    Acute CHF (congestive heart failure) (HCC) (POA: Yes)  Active Problems:    Wound of lower extremity (POA: Yes)    A-fib (HCC) (POA: Yes)    Homelessness (POA: Yes)    Malignant neoplasm of body of stomach (HCC) (POA: Yes)    Pulmonary hypertension (HCC) (POA: Yes)    PAD (peripheral artery disease) (HCC) (POA:  Yes)    DNR (do not resuscitate) (POA: Yes)  Resolved Problems:    * No resolved hospital problems. *      FOLLOW UP  No future appointments.  Torri Razo P.A.-C.  [address] [address]  [place] [id number] [phone]    Follow up in 1 week(s)  If symptoms worsen      MEDICATIONS ON DISCHARGE     Medication List        CONTINUE taking these medications        Instructions   apixaban 5 MG Tabs  Commonly known as: Eliquis   Take 1 Tablet by mouth 2 times a day.  Dose: 5 mg     atorvastatin 40 MG Tabs  Commonly known as: Lipitor   Take 1 Tablet by mouth every evening.  Dose: 40 mg     digoxin 125 MCG Tabs  Commonly known as: Lanoxin   Take 1 Tablet by mouth every day at 6 PM.  Dose: 125 mcg     furosemide 40 MG Tabs  Commonly known as: Lasix   Take 1 Tablet by mouth every day.  Dose: 40 mg     losartan 25 MG Tabs  Commonly known as: Cozaar   Take 1 Tablet by mouth every day.  Dose: 25 mg     metoprolol SR 25 MG Tb24  Commonly known as: Toprol XL   Take one-half (0.5) Tablet by mouth every evening for 90 days.  Dose: 12.5 mg     omeprazole 20 MG delayed-release capsule  Commonly known as: PriLOSEC   Take 1 Capsule by mouth every day.  Dose: 20 mg     spironolactone 25 MG Tabs  Commonly known as: Aldactone   Take 1 Tablet by mouth every day.  Dose: 25 mg              Allergies  Allergies[1]    DIET  Orders Placed This Encounter   Procedures    Diet Order Diet: Cardiac     Standing Status:   Standing     Number of Occurrences:   1     Diet::   Cardiac [6]       ACTIVITY  As tolerated.  Weight bearing as tolerated    CONSULTATIONS  Wound care:  Patient with chronic appearing venous ulceration to left lower leg with 100% red granular tissue, small amount of serosanguinous and serous drainage, no odor, hemosiderin staining and brawny induration. The wound is irregular in shape and is lateral and posterior. Dakins applied to chemically debride nonviable tissue, decrease bioburden and odor. Covered with ABD pad and  dry gauze.     PROCEDURES  None    LABORATORY  Lab Results   Component Value Date    SODIUM 139 07/01/2025    POTASSIUM 4.0 07/01/2025    CHLORIDE 105 07/01/2025    CO2 23 07/01/2025    GLUCOSE 87 07/01/2025    BUN 22 07/01/2025    CREATININE 1.26 07/01/2025        Lab Results   Component Value Date    WBC 7.7 07/01/2025    HEMOGLOBIN 8.3 (L) 07/01/2025    HEMATOCRIT 27.6 (L) 07/01/2025    PLATELETCT 201 07/01/2025      DX-TIBIA AND FIBULA LEFT   Final Result      1.  No acute osseous injury.   2.  Soft tissue swelling and soft tissue ulceration/wound.      DX-CHEST-PORTABLE (1 VIEW)   Final Result      Stable small left pleural effusion, central pulmonary vascular congestion, and cardiomegaly.            Total time of the discharge process exceeds 40 minutes.         [1] No Known Allergies

## 2025-07-01 NOTE — PROGRESS NOTES
Bedside report received from off going RN/tech: Yuko, assumed care of patient.   Overnight Events: no overnight events per the night RN.  A&O x 4  Oxygen: Room Air  SBP Ranged: 90's-130's  Patient on cardiac monitor: Yes AFIB  80's -low 100's   IVF/IV medications: Not Applicable   Fall Risk Score: low fall risk   Fall risk interventions in place: Place yellow fall risk ID band on patient, Provide patient/family education based on risk assessment, Educate patient/family to call staff for assistance when getting out of bed, Place fall precaution signage outside patient door, Place patient in room close to nursing station, Utilize bed/chair fall alarm, Notify charge of high risk for huddle, and Bed alarm connected correctly  Bed type: Regular (Edward Score less than 17 interventions in place)  Bedside sitter: Not Applicable   Isolation: Not applicable

## 2025-07-01 NOTE — DISCHARGE INSTRUCTIONS
HF Patient Discharge Instructions  Monitor your weight daily, and maintain a weight chart, to track your weight changes.   Activity as tolerated, unless your Doctor has ordered otherwise. Other activity order: ADVANCE AS TOLERATED .  Follow a low fat, low cholesterol, low salt diet unless instructed otherwise by your Doctor. Read the labels on the back of food products and track your intake of fat, cholesterol and salt.   Fluid Restriction Yes. If a Fluid Restriction has been ordered by your Doctor, measure fluids with a measuring cup to ensure that you are not exceeding the restriction.   No smoking.  Oxygen No. If your Doctor has ordered that you wear Oxygen at home, it is important to wear it as ordered.  Did you receive an explanation from staff on the importance of taking each of your medications and why it is necessary to keep taking them unless your doctor says to stop? Yes  Were all of your questions answered about how to manage your heart failure and what to do if you have increased signs and symptoms after you go home? Yes  Do you feel like your heart failure care team involved you in the care treatment plan and allowed you to make decisions regarding your care while in the hospital and addressed any discharge needs you might have? Yes    See the educational handout provided at discharge for more information on monitoring your daily weight, activity and diet. This also explains more about Heart Failure, symptoms of a flare-up and some of the tests that you have undergone.     Warning Signs of a Flare-Up include:  Swelling in the ankles or lower legs.  Shortness of breath, while at rest, or while doing normal activities.   Shortness of breath at night when in bed, or coughing in bed.   Requiring more pillows to sleep at night, or needing to sit up at night to sleep.  Feeling weak, dizzy or fatigued.     When to call your Doctor:  Call your Primary Care Physician or Cardiologist if:   You experience any  pain radiating to your jaw or neck.  You have any difficulty breathing.  You experience weight gain of 3 lbs in a day or 5 lbs in a week.   You feel any palpitations or irregular heartbeats.  You become dizzy or lose consciousness.   If you have had an angiogram or had a pacemaker or AICD placed, and experience:  Bleeding, drainage or swelling at the surgical / puncture site.  Fever greater than 100.0 F  Shock from internal defibrillator.  Cool and / or numb extremities.     Please access the AHA My HF Guide/Heart Failure Interactive Workbook:   [email or web address]    Heart Failure, Diagnosis    Heart failure means that your heart is not able to pump blood in the right way. This makes it hard for your body to work well. Heart failure is usually a long-term (chronic) condition. You must take good care of yourself and follow your treatment plan from your doctor.  Different stages of heart failure have different treatment plans. The stages are:  Stage A: At risk for heart failure.  Stage B: Pre-heart failure.  Stage C: Symptomatic heart failure.  Stage D: Advanced heart failure.  What are the causes?  High blood pressure.  Buildup of cholesterol and fat in the arteries.  Heart attack. This injures the heart muscle.  Heart valves that do not open and close properly.  Damage of the heart muscle. This is also called cardiomyopathy.  Infection of the heart muscle. This is also called myocarditis.  Lung disease.  What increases the risk?  Getting older. The risk of heart failure goes up as a person ages.  Being overweight.  Using tobacco or nicotine products.  Abusing alcohol or drugs.  Having taken medicines that can damage the heart.  Having any of these conditions:  Diabetes.  Abnormal heart rhythms.  Thyroid problems.  Low blood counts (anemia).  Having a family history of heart failure.  What are the signs or symptoms?  Shortness of breath.  Coughing.  Swelling of the feet, ankles, legs, or  belly.  Losing or gaining weight for no reason.  Trouble breathing.  Waking from sleep because of the need to sit up and get more air.  Fast heartbeat.  Other symptoms may include:  Being very tired.  Feeling dizzy, or feeling like you may pass out (faint).  Having no desire to eat.  Feeling like you may vomit (nauseous).  Peeing (urinating) more at night.  Feeling confused.  How is this treated?  This condition may be treated with:  Medicines. These can be given to treat blood pressure and to make the heart muscles stronger.  Changes in your daily life. These may include:  Eating a healthy diet.  Staying at a healthy body weight.  Quitting tobacco, alcohol, and drug use.  Doing exercises.  Participating in a cardiac rehabilitation program. This program helps you improve your health through exercise, education, and counseling.  Surgery. Surgery can be done to open blocked valves or to put devices in the heart, such as pacemakers.  A donor heart (heart transplant). You will receive a healthy heart from a donor.  Follow these instructions at home:  Treat other conditions as told by your doctor. These may include high blood pressure, diabetes, thyroid disease, or abnormal heart rhythms.  Learn as much as you can about heart failure.  Get support as you need it.  Keep all follow-up visits.  Where to find more information  American Heart Association: www.heart.org  Centers for Disease Control and Prevention: www.cdc.gov  National [place] on Aging: www.jena.nih.gov  Summary  Heart failure means that your heart is not able to pump blood in the right way.  This condition is often caused by high blood pressure, heart attack, or damage of the heart muscle.  Symptoms of this condition include shortness of breath and swelling of the feet, ankles, legs, or belly. You may also feel very tired or feel like you may vomit.  You may be treated with medicines, surgery, or changes in your daily life.  Treat other health conditions as  told by your doctor.  This information is not intended to replace advice given to you by your health care provider. Make sure you discuss any questions you have with your health care provider.  Document Revised: 06/16/2022 Document Reviewed: 07/10/2021  Elsevier Patient Education © 2023 [employer]    Heart Failure, Self-Care  Heart failure is a serious condition. The following information explains things you need to do to take care of yourself at home. To help you stay as healthy as possible, you may be asked to change your diet, take certain medicines, and make other changes in your life. Your doctor may also give you more specific instructions. If you have problems or questions, call your doctor.  What are the risks?  Having heart failure makes it more likely for you to have some problems. These problems can get worse if you do not take good care of yourself. Problems may include:  Damage to the kidneys, liver, or lungs.  Malnutrition.  Abnormal heart rhythms.  Blood clotting problems that could cause a stroke.  Supplies needed:  Scale for weighing yourself.  Blood pressure monitor.  Notebook.  Medicines.  How to care for yourself when you have heart failure  Medicines  Take over-the-counter and prescription medicines only as told by your doctor. Take your medicines every day.  Do not stop taking your medicine unless your doctor tells you to do so.  Do not skip any medicines.  Get your prescriptions refilled before you run out of medicine. This is important.  Talk with your doctor if you cannot afford your medicines.  Eating and drinking    Eat heart-healthy foods. Talk with a diet specialist (dietitian) to create an eating plan.  Limit salt (sodium) if told by your doctor. Ask your diet specialist to tell you which seasonings are healthy for your heart.  Cook in healthy ways instead of frying. Healthy ways of cooking include roasting, grilling, broiling, baking, poaching, steaming, and stir-frying.  Choose  foods that:  Have no trans fat.  Are low in saturated fat and cholesterol.  Choose healthy foods, such as:  Fresh or frozen fruits and vegetables.  Fish.  Low-fat (lean) meats.  Legumes, such as beans, peas, and lentils.  Fat-free or low-fat dairy products.  Whole-grain foods.  High-fiber foods.  Limit how much fluid you drink, if told by your doctor.  Alcohol use  Do not drink alcohol if:  Your doctor tells you not to drink.  Your heart was damaged by alcohol, or you have very bad heart failure.  You are pregnant, may be pregnant, or are planning to become pregnant.  If you drink alcohol:  Limit how much you have to:  0-1 drink a day for women.  0-2 drinks a day for men.  Know how much alcohol is in your drink. In the [place] one drink equals one 12 oz bottle of beer (355 mL), one 5 oz glass of wine (148 mL), or one 1½ oz glass of hard liquor (44 mL).  Lifestyle    Do not smoke or use any products that contain nicotine or tobacco. If you need help quitting, ask your doctor.  Do not use nicotine gum or patches before talking to your doctor.  Do not use illegal drugs.  Lose weight if told by your doctor.  Do physical activity if told by your doctor. Talk to your doctor before you begin an exercise if:  You are an older adult.  You have very bad heart failure.  Learn to manage stress. If you need help, ask your doctor.  Get physical rehab (rehabilitation) to help you stay independent and to help with your quality of life.  Participate in a cardiac rehab program. This program helps you improve your health through exercise, education, and counseling.  Plan time to rest when you get tired.  Check weight and blood pressure    Weigh yourself every day. This will help you to know if fluid is building up in your body.  Weigh yourself every morning after you pee (urinate) and before you eat breakfast.  Wear the same amount of clothing each time.  Write down your daily weight. Give your record to your doctor.  Check and write  down your blood pressure as told by your doctor.  Check your pulse as told by your doctor.  Dealing with very hot and very cold weather  If it is very hot:  Avoid activities that take a lot of energy.  Use air conditioning or fans, or find a cooler place.  Avoid caffeine and alcohol.  Wear clothing that is loose-fitting, lightweight, and light-colored.  If it is very cold:  Avoid activities that take a lot of energy.  Layer your clothes.  Wear mittens or gloves, a hat, and a face covering when you go outside.  Avoid alcohol.  Follow these instructions at home:  Stay up to date with shots (vaccines). Get pneumococcal and flu (influenza) shots.  Keep all follow-up visits.  Contact a doctor if:  You gain 2-3 lb (1-1.4 kg) in 24 hours or 5 lb (2.3 kg) in a week.  You have increasing shortness of breath.  You cannot do your normal activities.  You get tired easily.  You cough a lot.  You do not feel like eating or feel like you may vomit (nauseous).  You have swelling in your hands, feet, ankles, or belly (abdomen).  You cannot sleep well because it is hard to breathe.  You feel like your heart is beating fast (palpitations).  You get dizzy when you stand up.  You feel depressed or sad.  Get help right away if:  You have trouble breathing.  You or someone else notices a change in your behavior, such as having trouble staying awake.  You have chest pain or discomfort.  You pass out (faint).  These symptoms may be an emergency. Get help right away. Call your local emergency services (911 in the [place]  Do not wait to see if the symptoms will go away.  Do not drive yourself to the hospital.  Summary  Heart failure is a serious condition. To care for yourself, you may have to change your diet, take medicines, and make other lifestyle changes.  Take your medicines every day. Do not stop taking them unless your doctor tells you to do so.  Limit salt and eat heart-healthy foods.  Ask your doctor if you can drink alcohol. You  may have to stop alcohol use if you have very bad heart failure.  Contact your doctor if you gain weight quickly or feel that your heart is beating too fast. Get help right away if you pass out or have chest pain or trouble breathing.  This information is not intended to replace advice given to you by your health care provider. Make sure you discuss any questions you have with your health care provider.  Document Revised: 07/10/2021 Document Reviewed: 07/10/2021  Elsevier Patient Education © 2023 [employer] Inc[employer]

## 2025-07-02 ENCOUNTER — TELEPHONE (OUTPATIENT)
Dept: CARDIOLOGY | Facility: MEDICAL CENTER | Age: 75
End: 2025-07-02
Payer: MEDICARE

## 2025-07-02 NOTE — TELEPHONE ENCOUNTER
Chart prep:   Called pt in regards to his upcoming appt with [facility] on 7/3/25. Left VM with appt details and asked for pt to return call if pt has seen a cardiologist or if pt has gotten any cardiac testing/labs completed outside of [facility].

## 2025-07-04 NOTE — DOCUMENTATION QUERY
[facility]                                                                       Query Response Note      PATIENT:               HUNTER STEELE  ACCT #:                  [id number]  MRN:                     [id number]  :                      1950  ADMIT DATE:       2025 3:46 AM  DISCH DATE:        2025 12:04 PM  RESPONDING  PROVIDER #:        [id number]           QUERY TEXT:    Atrial fibrillation is documented in the Medical Record.      Please specify the type.    The patient's Clinical Indicators include:  Findings:  HP: history of atrial fibrillation on eliquis      EKG: atrial fibrillation    Treatment: EKG, home medication eliquis     Risk Factors: history of atrial fibrillation, pulmonary hypertension,     Sailaja Deutsch RN BSN  [place],  Clinical Documentation   [email or web address]  Connect via [employer] Messenger    Note: If you agree with a diagnosis listed above, please remember to include it in your concurrent daily documentation and onto the Discharge Summary.  Options provided:   -- Paroxysmal atrial fibrillation (self-terminating or intermittent spontaneously or with intervention within 7 days of onset)   -- Permanent atrial fibrillation (persistent or longstanding persistent AF where cardioversion cannot or will not be performed   -- Chronic atrial fibrillation, unspecified (may refer to persistent, longstanding persistent or permanent AF.  A more specific descriptive term is preferred over the nonspecific AF   -- Atrial fibrillation unspecified   -- Other explanation, (please specify other explanation)      Query created by: Sailaja Deutsch on 2025 8:31 AM    RESPONSE TEXT:    Permanent atrial fibrillation (persistent or longstanding persistent AF where cardioversion cannot or will not be performed          Electronically signed by:  MICHAEL COX MD 2025 11:46 AM

## 2025-07-14 ENCOUNTER — HOSPITAL ENCOUNTER (EMERGENCY)
Facility: MEDICAL CENTER | Age: 75
End: 2025-07-14
Attending: EMERGENCY MEDICINE
Payer: MEDICARE

## 2025-07-14 VITALS
HEIGHT: 72 IN | WEIGHT: 179 LBS | BODY MASS INDEX: 24.24 KG/M2 | HEART RATE: 86 BPM | TEMPERATURE: 99.3 F | RESPIRATION RATE: 17 BRPM | DIASTOLIC BLOOD PRESSURE: 60 MMHG | OXYGEN SATURATION: 94 % | SYSTOLIC BLOOD PRESSURE: 111 MMHG

## 2025-07-14 DIAGNOSIS — I50.9 CHRONIC CONGESTIVE HEART FAILURE, UNSPECIFIED HEART FAILURE TYPE (HCC): ICD-10-CM

## 2025-07-14 DIAGNOSIS — S81.802A WOUND OF LEFT LOWER EXTREMITY, INITIAL ENCOUNTER: ICD-10-CM

## 2025-07-14 DIAGNOSIS — T14.8XXA WOUND INFECTION: Primary | ICD-10-CM

## 2025-07-14 DIAGNOSIS — L08.9 WOUND INFECTION: Primary | ICD-10-CM

## 2025-07-14 LAB
ALBUMIN SERPL BCP-MCNC: 3.6 G/DL (ref 3.2–4.9)
ALBUMIN/GLOB SERPL: 1.1 G/DL
ALP SERPL-CCNC: 83 U/L (ref 30–99)
ALT SERPL-CCNC: 15 U/L (ref 2–50)
ANION GAP SERPL CALC-SCNC: 13 MMOL/L (ref 7–16)
ANISOCYTOSIS BLD QL SMEAR: ABNORMAL
AST SERPL-CCNC: 27 U/L (ref 12–45)
BASOPHILS # BLD AUTO: 0.2 % (ref 0–1.8)
BASOPHILS # BLD: 0.02 K/UL (ref 0–0.12)
BILIRUB SERPL-MCNC: 0.3 MG/DL (ref 0.1–1.5)
BUN SERPL-MCNC: 28 MG/DL (ref 8–22)
CALCIUM ALBUM COR SERPL-MCNC: 8.8 MG/DL (ref 8.5–10.5)
CALCIUM SERPL-MCNC: 8.5 MG/DL (ref 8.5–10.5)
CHLORIDE SERPL-SCNC: 112 MMOL/L (ref 96–112)
CO2 SERPL-SCNC: 19 MMOL/L (ref 20–33)
COMMENT 1642: NORMAL
CREAT SERPL-MCNC: 1.49 MG/DL (ref 0.5–1.4)
CRP SERPL HS-MCNC: 1 MG/DL (ref 0–0.75)
EOSINOPHIL # BLD AUTO: 0.14 K/UL (ref 0–0.51)
EOSINOPHIL NFR BLD: 1.5 % (ref 0–6.9)
ERYTHROCYTE [DISTWIDTH] IN BLOOD BY AUTOMATED COUNT: 61 FL (ref 35.9–50)
GFR SERPLBLD CREATININE-BSD FMLA CKD-EPI: 49 ML/MIN/1.73 M 2
GLOBULIN SER CALC-MCNC: 3.2 G/DL (ref 1.9–3.5)
GLUCOSE SERPL-MCNC: 76 MG/DL (ref 65–99)
HCT VFR BLD AUTO: 27.8 % (ref 42–52)
HGB BLD-MCNC: 8.2 G/DL (ref 14–18)
HYPOCHROMIA BLD QL SMEAR: ABNORMAL
IMM GRANULOCYTES # BLD AUTO: 0.04 K/UL (ref 0–0.11)
IMM GRANULOCYTES NFR BLD AUTO: 0.4 % (ref 0–0.9)
LYMPHOCYTES # BLD AUTO: 0.93 K/UL (ref 1–4.8)
LYMPHOCYTES NFR BLD: 9.7 % (ref 22–41)
MACROCYTES BLD QL SMEAR: ABNORMAL
MCH RBC QN AUTO: 25.2 PG (ref 27–33)
MCHC RBC AUTO-ENTMCNC: 29.5 G/DL (ref 32.3–36.5)
MCV RBC AUTO: 85.5 FL (ref 81.4–97.8)
MICROCYTES BLD QL SMEAR: ABNORMAL
MONOCYTES # BLD AUTO: 1.07 K/UL (ref 0–0.85)
MONOCYTES NFR BLD AUTO: 11.1 % (ref 0–13.4)
MORPHOLOGY BLD-IMP: NORMAL
NEUTROPHILS # BLD AUTO: 7.4 K/UL (ref 1.82–7.42)
NEUTROPHILS NFR BLD: 77.1 % (ref 44–72)
NRBC # BLD AUTO: 0 K/UL
NRBC BLD-RTO: 0 /100 WBC (ref 0–0.2)
NT-PROBNP SERPL IA-MCNC: 1762 PG/ML (ref 0–125)
OVALOCYTES BLD QL SMEAR: NORMAL
PLATELET # BLD AUTO: 258 K/UL (ref 164–446)
PLATELET BLD QL SMEAR: NORMAL
PMV BLD AUTO: 10.7 FL (ref 9–12.9)
POIKILOCYTOSIS BLD QL SMEAR: NORMAL
POTASSIUM SERPL-SCNC: 4.5 MMOL/L (ref 3.6–5.5)
PROT SERPL-MCNC: 6.8 G/DL (ref 6–8.2)
RBC # BLD AUTO: 3.25 M/UL (ref 4.7–6.1)
RBC BLD AUTO: PRESENT
SODIUM SERPL-SCNC: 144 MMOL/L (ref 135–145)
WBC # BLD AUTO: 9.6 K/UL (ref 4.8–10.8)

## 2025-07-14 PROCEDURE — 86140 C-REACTIVE PROTEIN: CPT

## 2025-07-14 PROCEDURE — 36415 COLL VENOUS BLD VENIPUNCTURE: CPT

## 2025-07-14 PROCEDURE — 85025 COMPLETE CBC W/AUTO DIFF WBC: CPT

## 2025-07-14 PROCEDURE — A9270 NON-COVERED ITEM OR SERVICE: HCPCS | Performed by: EMERGENCY MEDICINE

## 2025-07-14 PROCEDURE — 99285 EMERGENCY DEPT VISIT HI MDM: CPT

## 2025-07-14 PROCEDURE — 94760 N-INVAS EAR/PLS OXIMETRY 1: CPT

## 2025-07-14 PROCEDURE — 700102 HCHG RX REV CODE 250 W/ 637 OVERRIDE(OP): Performed by: EMERGENCY MEDICINE

## 2025-07-14 PROCEDURE — 80053 COMPREHEN METABOLIC PANEL: CPT

## 2025-07-14 PROCEDURE — 83880 ASSAY OF NATRIURETIC PEPTIDE: CPT

## 2025-07-14 RX ORDER — SULFAMETHOXAZOLE AND TRIMETHOPRIM 800; 160 MG/1; MG/1
1 TABLET ORAL 2 TIMES DAILY
Qty: 14 TABLET | Refills: 0 | Status: ACTIVE | OUTPATIENT
Start: 2025-07-14 | End: 2025-07-21

## 2025-07-14 RX ORDER — SULFAMETHOXAZOLE AND TRIMETHOPRIM 800; 160 MG/1; MG/1
1 TABLET ORAL ONCE
Status: COMPLETED | OUTPATIENT
Start: 2025-07-14 | End: 2025-07-14

## 2025-07-14 RX ADMIN — SULFAMETHOXAZOLE AND TRIMETHOPRIM 1 TABLET: 800; 160 TABLET ORAL at 05:13

## 2025-07-14 ASSESSMENT — FIBROSIS 4 INDEX: FIB4 SCORE: 4.66

## 2025-07-14 NOTE — ED TRIAGE NOTES
Chief Complaint   Patient presents with    Leg Pain     Pt BIB [facility] for LLE pain and wound. Pt reports wound to LLE since December. +swelling, +drainage to anterior and posterior shin. Hx of CHF with bilat LE swelling at baseline, takes medications as prescribed.      Pt to [facility] for above complaint. Received 1000mg tylenol with EMS en route.  Pt reports intermittent numbness/tingling to LLE, reporting 8/10 pain. Pt is ambulatory at baseline. Denies any fevers or antibiotic use. Clinical pictures uploaded to chart. Pt A&Ox4, speaking in complete sentences, following commands. On RA. Pt changed into gown, placed on monitor. PIV established and labs drawn. Chart up for ERP.

## 2025-07-14 NOTE — ED NOTES
Pt sleeping comfortably at this time. NAD. On the monitor. VSS. Bed locked in lowest position. Call light within reach.

## 2025-07-14 NOTE — ED PROVIDER NOTES
ER Provider Note    Scribed for Rustam Connolly Ii, M.d. by Addie Rivas. 7/14/2025  2:39 AM    Primary Care Provider: Torri Razo P.A.-C.    CHIEF COMPLAINT   Chief Complaint   Patient presents with    Leg Pain     Pt BIB [facility] for LLE pain and wound. Pt reports wound to LLE since December. +swelling, +drainage to anterior and posterior shin. Hx of CHF with bilat LE swelling at baseline, takes medications as prescribed.      EXTERNAL RECORDS REVIEWED  Inpatient Notes The patient was hospitalized on June 29th for right leg pain and treated for fluid overload secondary to heart failure exacerbation. The patient has an EF of 45%.     HPI/ROS  LIMITATION TO HISTORY   Select: : None  OUTSIDE HISTORIAN(S):  None    Robert Mcdonnell is a 75 y.o. male with a history of atrial fibrillation who presents to the ED complaining of right leg pain onset prior to arrival. The patient was recently admitted for fluid overload secondary to heart failure exacerbation and treated for the same wounds on his right leg. He reports he has been compliant with medication, but has not been seen at wound care. The patient states he doesn't have many opportunities to elevate his legs. The patient denies any difficulty breathing or fever. No alleviating or exacerbating factors noted. The patient states he is currently taking blood thinners.     PAST MEDICAL HISTORY  Past Medical History[1]    SURGICAL HISTORY  Past Surgical History[2]    FAMILY HISTORY  Family History   Problem Relation Age of Onset    Heart Disease Mother     Heart Disease Father      SOCIAL HISTORY   reports that he has been smoking cigarettes. He has never used smokeless tobacco. He reports current alcohol use of about 1.2 oz of alcohol per week. He reports that he does not currently use drugs.    CURRENT MEDICATIONS  Current Outpatient Medications   Medication Instructions    atorvastatin (LIPITOR) 40 mg, Oral, EVERY EVENING    digoxin (LANOXIN) 125 mcg, Oral,  DAILY    Eliquis 5 mg, Oral, 2 TIMES DAILY    furosemide (LASIX) 40 mg, Oral, DAILY    losartan (COZAAR) 25 mg, Oral, DAILY    metoprolol SR (TOPROL XL) 25 MG TABLET SR 24 HR Take one-half (0.5) Tablet by mouth every evening for 90 days.    omeprazole (PRILOSEC) 20 mg, Oral, DAILY    spironolactone (ALDACTONE) 25 mg, Oral, DAILY      ALLERGIES  Patient has no known allergies.    PHYSICAL EXAM  /63   Pulse 85   Temp 37.4 °C (99.3 °F) (Temporal)   Resp 18   Ht 1.829 m (6')   Wt 81.2 kg (179 lb)   SpO2 96%   BMI 24.28 kg/m²   Physical Exam  Vitals and nursing note reviewed.   Constitutional:       Comments: Pleasant 75 year old man in no distress   HENT:      Head: Normocephalic.      Mouth/Throat:      Mouth: Mucous membranes are moist.   Eyes:      Extraocular Movements: Extraocular movements intact.      Pupils: Pupils are equal, round, and reactive to light.   Neck:      Comments: No JVD  Cardiovascular:      Rate and Rhythm: Normal rate. Rhythm irregular.   Pulmonary:      Effort: Pulmonary effort is normal.      Breath sounds: Normal breath sounds.   Abdominal:      General: There is no distension.      Tenderness: There is no abdominal tenderness. There is no guarding.   Musculoskeletal:      Comments: Pitting edema 3+ bilateral to knees. Left leg with partial thickness skin breakdown with odor, weeping serous fluid, some purulence and warmth anterior lower leg wound. Breakdown extends to posterior left leg.    Neurological:      General: No focal deficit present.      Mental Status: He is alert and oriented to person, place, and time.      Comments: Diminished sensation in bilateral feet.    Psychiatric:         Mood and Affect: Mood normal.              DIAGNOSTIC STUDIES    EKG/LABS  Results for orders placed or performed during the hospital encounter of 07/14/25   CBC WITH DIFFERENTIAL    Collection Time: 07/14/25  2:25 AM   Result Value Ref Range    WBC 9.6 4.8 - 10.8 K/uL    RBC 3.25 (L) 4.70  - 6.10 M/uL    Hemoglobin 8.2 (L) 14.0 - 18.0 g/dL    Hematocrit 27.8 (L) 42.0 - 52.0 %    MCV 85.5 81.4 - 97.8 fL    MCH 25.2 (L) 27.0 - 33.0 pg    MCHC 29.5 (L) 32.3 - 36.5 g/dL    RDW 61.0 (H) 35.9 - 50.0 fL    Platelet Count 258 164 - 446 K/uL    MPV 10.7 9.0 - 12.9 fL    Neutrophils-Polys 77.10 (H) 44.00 - 72.00 %    Lymphocytes 9.70 (L) 22.00 - 41.00 %    Monocytes 11.10 0.00 - 13.40 %    Eosinophils 1.50 0.00 - 6.90 %    Basophils 0.20 0.00 - 1.80 %    Immature Granulocytes 0.40 0.00 - 0.90 %    Nucleated RBC 0.00 0.00 - 0.20 /100 WBC    Neutrophils (Absolute) 7.40 1.82 - 7.42 K/uL    Lymphs (Absolute) 0.93 (L) 1.00 - 4.80 K/uL    Monos (Absolute) 1.07 (H) 0.00 - 0.85 K/uL    Eos (Absolute) 0.14 0.00 - 0.51 K/uL    Baso (Absolute) 0.02 0.00 - 0.12 K/uL    Immature Granulocytes (abs) 0.04 0.00 - 0.11 K/uL    NRBC (Absolute) 0.00 K/uL    Hypochromia 1+     Anisocytosis 1+     Macrocytosis 1+     Microcytosis 1+    COMP METABOLIC PANEL    Collection Time: 07/14/25  2:25 AM   Result Value Ref Range    Sodium 144 135 - 145 mmol/L    Potassium 4.5 3.6 - 5.5 mmol/L    Chloride 112 96 - 112 mmol/L    Co2 19 (L) 20 - 33 mmol/L    Anion Gap 13.0 7.0 - 16.0    Glucose 76 65 - 99 mg/dL    Bun 28 (H) 8 - 22 mg/dL    Creatinine 1.49 (H) 0.50 - 1.40 mg/dL    Calcium 8.5 8.5 - 10.5 mg/dL    Correct Calcium 8.8 8.5 - 10.5 mg/dL    AST(SGOT) 27 12 - 45 U/L    ALT(SGPT) 15 2 - 50 U/L    Alkaline Phosphatase 83 30 - 99 U/L    Total Bilirubin 0.3 0.1 - 1.5 mg/dL    Albumin 3.6 3.2 - 4.9 g/dL    Total Protein 6.8 6.0 - 8.2 g/dL    Globulin 3.2 1.9 - 3.5 g/dL    A-G Ratio 1.1 g/dL   CRP QUANTITIVE (NON-CARDIAC)    Collection Time: 07/14/25  2:25 AM   Result Value Ref Range    Stat C-Reactive Protein 1.00 (H) 0.00 - 0.75 mg/dL   proBrain Natriuretic Peptide, NT    Collection Time: 07/14/25  2:25 AM   Result Value Ref Range    NT-proBNP 1762 (H) 0 - 125 pg/mL   ESTIMATED GFR    Collection Time: 07/14/25  2:25 AM   Result Value Ref  Range    GFR (CKD-EPI) 49 (A) >60 mL/min/1.73 m 2   PLATELET ESTIMATE    Collection Time: 07/14/25  2:25 AM   Result Value Ref Range    Plt Estimation Normal    MORPHOLOGY    Collection Time: 07/14/25  2:25 AM   Result Value Ref Range    RBC Morphology Present     Poikilocytosis 1+     Ovalocytes 1+    PERIPHERAL SMEAR REVIEW    Collection Time: 07/14/25  2:25 AM   Result Value Ref Range    Peripheral Smear Review see below    DIFFERENTIAL COMMENT    Collection Time: 07/14/25  2:25 AM   Result Value Ref Range    Comments-Diff see below       COURSE & MEDICAL DECISION MAKING     ASSESSMENT, COURSE AND PLAN  Care Narrative: This is an emergency department evaluation of a 75 year old man with CHF, atrial fibrillation on eliquis, and chronic left lower extremity wound. Wound with breakdown somewhat worse than hospitalization last month. There is warmth and some purulence at anterior part of wound.  He has no respiratory symptoms. Plan for labs to screen inflammatory markers, CHF markers. Could be underlying cellulitis. Less suspicion for abscess or necrotizing infection.     4:39 AM - Patient was reevaluated at bedside. Discussed lab results with the patient and informed them that there is little concern for severe infection. At this time, the patient is not showing evidence of decompensated heart failure. A sterile dressing will be applied to the patient's legs and the patient will be treated with Bactrim 800-160 mg tablet to address the purulent discharge. I consulted the ER pharmacy, they are Pharm.D. who advises the patient be treated with the antibiotic Bactrim.  His wounds were redressed.  He says he has not been to our wound care clinic.  I did place referral for wound care and provided him with contact information and address.  I discussed plan for discharge and follow up as outlined below. The patient is stable for discharge at this time and will return for any new or worsening symptoms. Patient verbalizes  understanding and support with my plan for discharge.      ED OBS: No; Patient does not meet criteria for ED Observation.      DISPOSITION AND DISCUSSIONS    Escalation of care considered, and ultimately not performed: None.    Barriers to care at this time, including but not limited to: Homeless    Decision tools and prescription drugs considered including, but not limited to: Antibiotics Bactrim.    The patient will return for new or worsening symptoms and is stable at the time of discharge.    The patient is referred to a primary physician for blood pressure management, diabetic screening, and for all other preventative health concerns.    DISPOSITION:  Patient will be discharged home in stable condition.    FOLLOW UP:  [facility]  [address] [facility] [address]  [facility] [id number] [phone]  Schedule an appointment as soon as possible for a visit   Call to make an appointment asap or go to location to make an appointment    OUTPATIENT MEDICATIONS:  New Prescriptions    SULFAMETHOXAZOLE-TRIMETHOPRIM (BACTRIM DS) 800-160 MG TABLET    Take 1 Tablet by mouth 2 times a day for 7 days.      FINAL DIAGNOSIS  1. Wound infection    2. Chronic congestive heart failure, unspecified heart failure type (HCC)    3. Wound of left lower extremity, initial encounter      Addie ZAMAN (Scribe), am scribing for, and in the presence of, HOMER Jimenez II.    Electronically signed by: Addie Rivas (Panchito), 7/14/2025    Rustam ZAMAN II, M* personally performed the services described in this documentation, as scribed by Addie Rivas in my presence, and it is both accurate and complete.      The note accurately reflects work and decisions made by me.  Rustam Connolly II, M.D.  7/14/2025  9:44 AM         [1]   Past Medical History:  Diagnosis Date    Arrhythmia     CHF (congestive heart failure) (HCC)     Congestive heart failure (HCC)     Hypertension    [2]   Past Surgical History:  Procedure  Laterality Date    GASTRECTOMY N/A 10/7/2024    Procedure: LAPAROTOMY, GASTRECTOMY-SUBTOTAL, WITH INTRAOPERATIVE ULTRASOUND GUIDANCE;  Surgeon: Mt Cabral M.D.;  Location: SURGERY [facility];  Service: General    NODE DISSECTION N/A 10/7/2024    Procedure: LYMPHADENECTOMY-NODE DISSECTION;  Surgeon: Mt Cabral M.D.;  Location: SURGERY [facility];  Service: General    CREATION, FLAP, OMENTUM N/A 10/7/2024    Procedure: CREATION, FLAP, OMENTUM;  Surgeon: Mt Cabral M.D.;  Location: SURGERY [facility];  Service: General    CHOLECYSTECTOMY N/A 10/7/2024    Procedure: CHOLECYSTECTOMY;  Surgeon: Mt Cabral M.D.;  Location: SURGERY [facility];  Service: General    MO UPPER GI ENDOSCOPY,DIAGNOSIS N/A 9/13/2024    Procedure: GASTROSCOPY;  Surgeon: Sarah Lozoya M.D.;  Location: SURGERY SAME DAY [facility];  Service: Gastroenterology    MO UPPER GI ENDOSCOPY,BIOPSY N/A 9/13/2024    Procedure: GASTROSCOPY, WITH BIOPSY;  Surgeon: Sarah Lozoya M.D.;  Location: SURGERY SAME DAY [facility];  Service: Gastroenterology    MO UPPER GI ENDOSCOPY,SCLER INJECT N/A 9/13/2024    Procedure: GASTROSCOPY, WITH SCLEROTHERAPY;  Surgeon: Sarah Lozoya M.D.;  Location: SURGERY SAME DAY [facility];  Service: Gastroenterology    MO UPPER GI ENDOSCOPY,BIOPSY N/A 09/10/2024    Procedure: GASTROSCOPY, WITH BIOPSY;  Surgeon: Demond Gutierres M.D.;  Location: SURGERY SAME DAY [facility];  Service: Gastroenterology    MO UPPER GI ENDOSCOPY,SCLER INJECT N/A 09/10/2024    Procedure: GASTROSCOPY, WITH SCLEROTHERAPY;  Surgeon: Demond Gutierres M.D.;  Location: SURGERY SAME DAY [facility];  Service: Gastroenterology    GASTROSCOPY WITH ENDOSTAT N/A 09/10/2024    Procedure: EGD, WITH CAUTERIZATION;  Surgeon: Demond Gutierres M.D.;  Location: SURGERY SAME DAY [facility];  Service: Gastroenterology    MO UPPER GI ENDOSCOPY,DIAGNOSIS N/A 01/31/2023    Procedure: GASTROSCOPY;  Surgeon: Joy HOPPER  JOSLYN Mcnair;  Location: SURGERY SAME DAY [facility];  Service: Gastroenterology    CATARACT PHACO WITH IOL Left

## 2025-07-14 NOTE — ED NOTES
"LLE wound cleansed and clean dressing applied. Pt provided with additional supplies for dressing change. Pt provided with paper scrub pants. Pt verbalized frustration EMS cut denim jeans prior to his arrival to ED. Pt asked to have jeans \"taped\" closed, applied tape to jeans, given additional tape.   "

## 2025-07-14 NOTE — ED NOTES
Pt discharged home with written and verbal instructions regarding f/u, activity, reasons to return to ED & RX to  at preferred pharmacy. Verbalized understanding, all questions addressed, Pt wheelchair out to lobby with all belongings.  Pt given bus pass, snacks and fluids at time of discharge.

## 2025-07-16 ENCOUNTER — TELEPHONE (OUTPATIENT)
Dept: HEALTH INFORMATION MANAGEMENT | Facility: OTHER | Age: 75
End: 2025-07-16
Payer: MEDICARE

## 2025-07-24 ENCOUNTER — TELEPHONE (OUTPATIENT)
Dept: WOUND CARE | Facility: MEDICAL CENTER | Age: 75
End: 2025-07-24

## 2025-07-24 NOTE — TELEPHONE ENCOUNTER
Called Robert and left message at 9am to call if wanted to come to clinic for wound care. Called again at 2:42pm and advised all appointments have been cancelled and will need to call to schedule any future appointments.

## 2025-07-31 ENCOUNTER — APPOINTMENT (OUTPATIENT)
Dept: WOUND CARE | Facility: MEDICAL CENTER | Age: 75
End: 2025-07-31
Attending: STUDENT IN AN ORGANIZED HEALTH CARE EDUCATION/TRAINING PROGRAM
Payer: MEDICARE

## (undated) DEVICE — SUTURE 4-0 PROLENE SH 36 (36PK/BX)"

## (undated) DEVICE — SLEEVE VASO DVT COMPRESSION CALF MED - (10PR/CA)

## (undated) DEVICE — CATHERTER CLEAR SINGLE USE INJECTION THERAPY NEEDLE 25GA X 4MM 2.3MM X 240CM (5EA/BX)

## (undated) DEVICE — CATHETER FOLEY ROBINSON 14FR 16IN STRL (12EA/CA)

## (undated) DEVICE — SUTURE 1 VICRYL PLUS CTX - 8 X 18 INCH (12/BX)

## (undated) DEVICE — NEPTUNE 4 PORT MANIFOLD - (20/PK)

## (undated) DEVICE — SPONGE LAP XR ST 36X36 LF - (1/PK40PK/CA)

## (undated) DEVICE — FILM CASSETTE ENDO

## (undated) DEVICE — BOVIE BLADE COATED &INSULATED (50EA/PK)

## (undated) DEVICE — RELOAD WITH GRIPPING SURFACE TECHNOLOGY GOLD 60MM (12EA/BX)

## (undated) DEVICE — LACTATED RINGERS INJ 1000 ML - (14EA/CA 60CA/PF)

## (undated) DEVICE — DRAPE LARGE 3 QUARTER - (20/CA)

## (undated) DEVICE — CORETEMP DRAPE FORM-FITTED EASY DROPANDGO DRAPE FOR USE ON THE CORETEMP FLUID MANAGEMENT 56IN X 56IN

## (undated) DEVICE — SUTURE 1 PDS II PLUS TP-1 - (12PK/BX)

## (undated) DEVICE — SENSOR OXIMETER ADULT SPO2 RD SET (20EA/BX)

## (undated) DEVICE — CONTAINER, SPECIMEN, STERILE

## (undated) DEVICE — SPONGE GAUZESTER 4 X 4 4PLY - (128PK/CA)

## (undated) DEVICE — DRESSING NON-ADHERING 8 X 3 - (50/BX)

## (undated) DEVICE — SUTURE 2-0 SILK SH 30 IN C/R (12PK/BX)

## (undated) DEVICE — TUBE CONNECTING SUCTION - CLEAR PLASTIC STERILE 72 IN (50EA/CA)

## (undated) DEVICE — SUTURE 3-0 VICRYL PLUS SH - 8X 18 INCH (12/BX)

## (undated) DEVICE — CATHETER IV 20 GA X 1-1/4 ---SURG.& SDS ONLY--- (50EA/BX)

## (undated) DEVICE — FORCEP RADIAL JAW 4 STANDARD CAPACITY W/NEEDLE 240CM (40EA/BX)

## (undated) DEVICE — DRAIN PENROSE STERILE 1/4 X - 18 IN (25EA/BX)

## (undated) DEVICE — BUTTON ENDOSCOPY DISPOSABLE

## (undated) DEVICE — CLIP MED INTNL HRZN TI ESCP - (25/BX)

## (undated) DEVICE — SODIUM CHL IRRIGATION 0.9% 1000ML (12EA/CA)

## (undated) DEVICE — SET EXTENSION WITH 2 PORTS (48EA/CA) ***PART #2C8610 IS A SUBSTITUTE*****

## (undated) DEVICE — COVER LIGHT HANDLE ALC PLUS DISP (18EA/BX)

## (undated) DEVICE — SURGIFOAM (SIZE 100) - (6EA/CA)

## (undated) DEVICE — HANDPIECE THUNDERBEAT 5MM 20CM FRONT GRIP TYPE S (5EA/BX)

## (undated) DEVICE — PORT AUXILLARY WATER (50EA/BX)

## (undated) DEVICE — PAD LAP STERILE 18 X 18 - (5/PK 40PK/CA)

## (undated) DEVICE — ADHESIVE MASTISOL - (48/BX)

## (undated) DEVICE — TUBING CLEARLINK DUO-VENT - C-FLO (48EA/CA)

## (undated) DEVICE — CLIP LG INTNL HRZN TI ESCP LGT - (20/BX)

## (undated) DEVICE — GLOVE BIOGEL SZ 8 SURGICAL PF LTX - (50PR/BX 4BX/CA)

## (undated) DEVICE — BOVIE BLADE COATED - (50/PK)

## (undated) DEVICE — SUTURE 3-0 SILK SH (12PK/BX)

## (undated) DEVICE — SUTURE 3-0 SILK SH 30 (36PK/BX)

## (undated) DEVICE — ELECTRODE 850 FOAM ADHESIVE - HYDROGEL RADIOTRNSPRNT (50/PK)

## (undated) DEVICE — DRAPE IOBAN II INCISE 23X17 - (10EA/BX 4BX/CA)

## (undated) DEVICE — GOWN WARMING STANDARD FLEX - (30/CA)

## (undated) DEVICE — TOWELS CLOTH SURGICAL - (4/PK 20PK/CA)

## (undated) DEVICE — DRAPESURG STERI-DRAPE LONG - (10/BX 4BX/CA)

## (undated) DEVICE — TATTOO ENDOSCOPIC SPOT EX (10EA/BX)

## (undated) DEVICE — CANNULA O2 COMFORT SOFT EAR ADULT 7 FT TUBING (50/CA)

## (undated) DEVICE — CANISTER SUCTION 3000ML MECHANICAL FILTER AUTO SHUTOFF MEDI-VAC NONSTERILE LF DISP (40EA/CA)

## (undated) DEVICE — SET LEADWIRE 5 LEAD BEDSIDE DISPOSABLE ECG (1SET OF 5/EA)

## (undated) DEVICE — TRAY FOLEY CATHETER TEMP SENSING TOTAL ONE LAYER WITH PERICARE WIPE 12FR SNAP SECURE (10EA/CA)

## (undated) DEVICE — TOWEL STOP TIMEOUT SAFETY FLAG (40EA/CA)

## (undated) DEVICE — KIT CUSTOM PROCEDURE SINGLE FOR ENDO (15/CA)

## (undated) DEVICE — STAPLER 60MM BLUE 3.5MM WITH - STAPLE (3EA/BX)

## (undated) DEVICE — BITE BLOCK ADULT 60FR (100EA/CA)

## (undated) DEVICE — TRAY SURESTEP FOLEY TEMP SENSING 16FR SNAP SECURE(10EA/CA) ORDER #18764 FOR TEMP FOLEY ONLY

## (undated) DEVICE — CLIP MED LG INTNL HRZN TI ESCP - (20/BX)

## (undated) DEVICE — SUCTION INSTRUMENT YANKAUER BULBOUS TIP W/O VENT (50EA/CA)

## (undated) DEVICE — DRAPE LAPAROTOMY T SHEET - (12EA/CA)

## (undated) DEVICE — CHLORAPREP 26 ML APPLICATOR - ORANGE TINT(25/CA)

## (undated) DEVICE — TRAY ANESTHESIA - CONTINUOUS EPIDURAL (10EA/CA) THIS IS A CUSTOM PACK

## (undated) DEVICE — KIT  I.V. START (100EA/CA)

## (undated) DEVICE — SUTURE 4-0 PROLENE RB-1 D/A 36 (36PK/BX)"

## (undated) DEVICE — DRAPE ULTRA GUARD LAPAROTOMY WITH POUCHES 102 X 121 X 78 (12EA/CA)

## (undated) DEVICE — KIT CUSTOM PROCEDURE SINGLE FOR ENDO  (15/CA)

## (undated) DEVICE — WATER IRRIGATION STERILE 1000ML (12EA/CA)

## (undated) DEVICE — VESSELOOP MAXI BLUE STERILE- SURG-I-LOOP (10EA/BX)

## (undated) DEVICE — TUBE E-T HI-LO CUFF 7.5MM (10EA/PK)

## (undated) DEVICE — BLOCK BITE MAXI DENTAL RETENTION RIM (100EA/BX)

## (undated) DEVICE — PACK MAJOR BASIN - (2EA/CA)

## (undated) DEVICE — CANISTER SUCTION RIGID RED 1500CC (40EA/CA)

## (undated) DEVICE — GOLD PROBE 7FR (5/BX)

## (undated) DEVICE — ELECTRODE DUAL RETURN W/ CORD - (50/PK)

## (undated) DEVICE — MASK PANORAMIC OXYGEN PRO2 (30EA/CA)

## (undated) DEVICE — STAPLER SKIN DISP - (6/BX 10BX/CA) VISISTAT

## (undated) DEVICE — SUTURE GENERAL